# Patient Record
Sex: MALE | Race: WHITE | NOT HISPANIC OR LATINO | Employment: OTHER | URBAN - METROPOLITAN AREA
[De-identification: names, ages, dates, MRNs, and addresses within clinical notes are randomized per-mention and may not be internally consistent; named-entity substitution may affect disease eponyms.]

---

## 2017-01-05 RX ORDER — IRBESARTAN 75 MG/1
TABLET ORAL
Qty: 30 TABLET | Refills: 6 | Status: SHIPPED | OUTPATIENT
Start: 2017-01-05 | End: 2017-04-25 | Stop reason: SDUPTHER

## 2017-01-12 ENCOUNTER — DOCUMENTATION ONLY (OUTPATIENT)
Dept: CARDIAC REHAB | Facility: CLINIC | Age: 82
End: 2017-01-12

## 2017-01-12 NOTE — PROGRESS NOTES
Cardiac rehab order received on 12/14/21016. Epic letter sent to patient on 12/15/2016. On 12/22/2016 spoke to patient and stated he will be out of town for a while and stated he is not able to come to rehab on Mondays. As of 1/12/2017, pt has not contacted cardiac rehab office. Epic order removed.

## 2017-01-20 RX ORDER — INSULIN ASPART 100 [IU]/ML
16 INJECTION, SOLUTION INTRAVENOUS; SUBCUTANEOUS
Qty: 15 ML | Refills: 3 | Status: SHIPPED | OUTPATIENT
Start: 2017-01-20 | End: 2017-06-06 | Stop reason: SDUPTHER

## 2017-01-20 RX ORDER — INSULIN ASPART 100 [IU]/ML
16 INJECTION, SOLUTION INTRAVENOUS; SUBCUTANEOUS
Qty: 15 ML | Refills: 3 | Status: SHIPPED | OUTPATIENT
Start: 2017-01-20 | End: 2017-01-20 | Stop reason: SDUPTHER

## 2017-01-20 NOTE — TELEPHONE ENCOUNTER
----- Message from Jaime Maddox sent at 1/20/2017  9:49 AM CST -----  Contact: Self/ [phone]  Type: Rx    Name of medication(s): insulin aspart (NOVOLOG FLEXPEN) 100 unit/mL InPn pen    Is this a refill? New rx?refill    Who prescribed medication?Dr. Martinez    Pharmacy Name, Phone, & Location:[facility] on file    Comments:Patient is calling to request a refill on medication. He states he is completely out of the medication. Please call and advise.    Thank you!

## 2017-01-25 ENCOUNTER — OFFICE VISIT (OUTPATIENT)
Dept: CARDIOLOGY | Facility: CLINIC | Age: 82
End: 2017-01-25
Payer: MEDICARE

## 2017-01-25 VITALS
HEART RATE: 80 BPM | HEIGHT: 67 IN | DIASTOLIC BLOOD PRESSURE: 58 MMHG | WEIGHT: 216.5 LBS | SYSTOLIC BLOOD PRESSURE: 131 MMHG | BODY MASS INDEX: 33.98 KG/M2

## 2017-01-25 DIAGNOSIS — I25.10 CORONARY ARTERY DISEASE INVOLVING NATIVE CORONARY ARTERY OF NATIVE HEART WITHOUT ANGINA PECTORIS: Primary | ICD-10-CM

## 2017-01-25 DIAGNOSIS — I10 ESSENTIAL HYPERTENSION: ICD-10-CM

## 2017-01-25 DIAGNOSIS — N18.30 KIDNEY DISEASE, CHRONIC, STAGE III (GFR 30-59 ML/MIN): ICD-10-CM

## 2017-01-25 DIAGNOSIS — I21.4 NSTEMI (NON-ST ELEVATED MYOCARDIAL INFARCTION): ICD-10-CM

## 2017-01-25 DIAGNOSIS — I25.10 CORONARY ARTERY DISEASE, ANGINA PRESENCE UNSPECIFIED, UNSPECIFIED VESSEL OR LESION TYPE, UNSPECIFIED WHETHER NATIVE OR TRANSPLANTED HEART: ICD-10-CM

## 2017-01-25 PROCEDURE — 3078F DIAST BP <80 MM HG: CPT | Mod: S$GLB,,, | Performed by: INTERNAL MEDICINE

## 2017-01-25 PROCEDURE — 99499 UNLISTED E&M SERVICE: CPT | Mod: S$GLB,,, | Performed by: INTERNAL MEDICINE

## 2017-01-25 PROCEDURE — 99999 PR PBB SHADOW E&M-EST. PATIENT-LVL IV: CPT | Mod: PBBFAC,,, | Performed by: INTERNAL MEDICINE

## 2017-01-25 PROCEDURE — 1157F ADVNC CARE PLAN IN RCRD: CPT | Mod: S$GLB,,, | Performed by: INTERNAL MEDICINE

## 2017-01-25 PROCEDURE — 3075F SYST BP GE 130 - 139MM HG: CPT | Mod: S$GLB,,, | Performed by: INTERNAL MEDICINE

## 2017-01-25 PROCEDURE — 99214 OFFICE O/P EST MOD 30 MIN: CPT | Mod: S$GLB,,, | Performed by: INTERNAL MEDICINE

## 2017-01-25 PROCEDURE — 1126F AMNT PAIN NOTED NONE PRSNT: CPT | Mod: S$GLB,,, | Performed by: INTERNAL MEDICINE

## 2017-01-25 PROCEDURE — 1159F MED LIST DOCD IN RCRD: CPT | Mod: S$GLB,,, | Performed by: INTERNAL MEDICINE

## 2017-01-25 PROCEDURE — 1160F RVW MEDS BY RX/DR IN RCRD: CPT | Mod: S$GLB,,, | Performed by: INTERNAL MEDICINE

## 2017-01-25 NOTE — LETTER
January 25, 2017      Gavin aMrtinez MD  [address]  [place] [address]           [facility]  [address]  [place] [address]  Phone: [phone]          Patient: Anjum Cai Jr.   MR Number: [id number]   YOB: 1935   Date of Visit: 1/25/2017       Dear Dr. Gavin Martinez:    Thank you for referring Anjum Cai to me for evaluation. Attached you will find relevant portions of my assessment and plan of care.    If you have questions, please do not hesitate to call me. I look forward to following Anjum Cai along with you.    Sincerely,    Louie Shin MD    Enclosure  CC:  No Recipients    If you would like to receive this communication electronically, please contact [email or web address] or [phone] to request more information on [employer] access.    For providers and/or their staff who would like to refer a patient to [facility], please contact us through our one-stop-shop provider referral line, [facility], at [phone].    If you feel you have received this communication in error or would no longer like to receive these types of communications, please e-mail [email or web address]

## 2017-01-25 NOTE — PROGRESS NOTES
Subjective:    Patient ID:  Anjum Cai Jr. is a 81 y.o. male who presents for follow-up of NSTEMI (non-ST elevated myocardial infarction) (hospital discharge 12/10/16)      HPI  Mr. Anjum Cai Jr. is a 80 y.o. male with hx of CAD s/p PCI to LAD and RCA x 2 (2007), HTN, HLD, DM2, DAMIAN and CKD3 who presented 12/9/16 to [facility] ED with an episode of chest pain. He was taken to Cath Lab 12/9/16. Ostial OM2/D1 lesions noted in LHC. No intervention due to small size of vessels. He has mild SOB for a short time while in [place] but has resolved.[ticagrelor high altitude?]     Lab Results   Component Value Date     12/09/2016    K 3.8 12/09/2016     12/09/2016    CO2 24 12/09/2016    BUN 21 12/09/2016    CREATININE 1.5 (H) 12/09/2016     (H) 12/09/2016    HGBA1C 8.2 (H) 12/09/2016    MG 1.6 12/09/2016    AST 17 12/09/2016    ALT 21 12/09/2016    ALBUMIN 3.4 (L) 12/09/2016    PROT 7.3 12/09/2016    BILITOT 0.3 12/09/2016    WBC 7.29 12/09/2016    HGB 11.0 (L) 12/09/2016    HCT 33.6 (L) 12/09/2016    MCV 87 12/09/2016     12/09/2016    INR 1.0 12/09/2016    PSA 10.06 (H) 02/26/2013    TSH 1.336 10/16/2013         Lab Results   Component Value Date    CHOL 123 08/10/2016    HDL 44 08/10/2016    TRIG 62 08/10/2016       Lab Results   Component Value Date    LDLCALC 66.6 08/10/2016     Past Medical History   Diagnosis Date    Arthritis     BPH (benign prostatic hyperplasia)     Cataract      right     Colon polyp 11/18/2015    Colon polyps     Coronary artery disease     DR (diabetic retinopathy)     Dyslipidemia     Elevated PSA     Goiter, nontoxic, multinodular     Headaches, cluster     Hypertension     Kidney stones     Lump in the testicle      Left    Rotator cuff tendinitis      Right shoulder    Sleep apnea with use of continuous positive airway pressure (CPAP)      uses cpap at night    Type II or unspecified type diabetes mellitus with other specified  "manifestations, uncontrolled      Current Outpatient Prescriptions   Medication Sig    ASPIRIN (ASPIR-81 ORAL) Take 1 tablet by mouth Daily.    atorvastatin (LIPITOR) 40 MG tablet Take 1 tablet (40 mg total) by mouth once daily.    BD INSULIN SYRINGE ULTRA-FINE 1 mL 30 x 1/2" Syrg USE AS DIRECTED FOUR TIMES DAILY    blood sugar diagnostic, drum (ACCU-CHEK COMPACT TEST) Strp CHECK BLOOD GLUCOSE 4 TIMES DAILY    clotrimazole (LOTRIMIN) 1 % Soln Apply topically once daily. Apply to filed toenails daily    ergocalciferol (ERGOCALCIFEROL) 50,000 unit Cap TAKE ONE CAPSULE BY MOUTH EVERY 30 DAYS.    finasteride (PROSCAR) 5 mg tablet Take one tablet by mouth one time daily    insulin aspart (NOVOLOG FLEXPEN) 100 unit/mL InPn pen Inject 16 Units into the skin 3 (three) times daily with meals.    irbesartan (AVAPRO) 75 MG tablet TAKE ONE TABLET BY MOUTH ONE TIME DAILY     LANTUS SOLOSTAR 100 unit/mL (3 mL) InPn pen Inject 65 units into the skin every evening    metformin (GLUCOPHAGE) 1000 MG tablet TAKE 1 TABLET BY MOUTH TWICE DAILY WITH FOOD    metoprolol succinate (TOPROL-XL) 25 MG 24 hr tablet Take 1 tablet (25 mg total) by mouth once daily.    MULTIVITAMIN W-MINERALS/LUTEIN (CENTRUM SILVER ORAL) Take by mouth once daily.    omega-3 fatty acids-vitamin E (FISH OIL) 1,000 mg Cap Take 3 capsules by mouth Daily.    omeprazole (PRILOSEC) 20 MG capsule TAKE ONE CAPSULE BY MOUTH TWICE DAILY BEFORE MEALS    pen needle, diabetic (BD INSULIN PEN NEEDLE UF MINI) 31 gauge x 3/16" Ndle Give insulin injection 4 x a day.    ticagrelor (BRILINTA) 90 mg tablet Take 1 tablet (90 mg total) by mouth 2 (two) times daily.     No current facility-administered medications for this visit.      Review of Systems   Constitution: Negative for decreased appetite, diaphoresis, fever, weakness, malaise/fatigue, weight gain and weight loss.   HENT: Negative for congestion, ear discharge, ear pain, headaches and nosebleeds.    Eyes: " "Negative for blurred vision, double vision and visual disturbance.   Cardiovascular: Negative for chest pain, claudication, cyanosis, dyspnea on exertion, irregular heartbeat, leg swelling, near-syncope, orthopnea, palpitations, paroxysmal nocturnal dyspnea and syncope.   Respiratory: Negative for cough, hemoptysis, shortness of breath, sleep disturbances due to breathing, snoring, sputum production and wheezing.    Endocrine: Negative for polydipsia, polyphagia and polyuria.   Hematologic/Lymphatic: Negative for adenopathy and bleeding problem. Does not bruise/bleed easily.   Skin: Negative for color change, nail changes, poor wound healing and rash.   Musculoskeletal: Negative for muscle cramps and muscle weakness.   Gastrointestinal: Negative for abdominal pain, anorexia, change in bowel habit, hematochezia, nausea and vomiting.   Genitourinary: Negative for dysuria, frequency and hematuria.   Neurological: Negative for brief paralysis, difficulty with concentration, excessive daytime sleepiness, dizziness, focal weakness, light-headedness, seizures and vertigo.   Psychiatric/Behavioral: Negative for altered mental status and depression.   Allergic/Immunologic: Negative for persistent infections.        Objective:  Visit Vitals    BP (!) 131/58 (BP Location: Left arm, Patient Position: Sitting, BP Method: Automatic)    Pulse 80    Ht 5' 6.5" (1.689 m)    Wt 98.2 kg (216 lb 7.9 oz)    BMI 34.42 kg/m2       Physical Exam   Constitutional: He is oriented to person, place, and time. He appears well-developed and well-nourished.   HENT:   Head: Normocephalic.   Right Ear: External ear normal.   Left Ear: External ear normal.   Nose: Nose normal.   Inspection of lips, teeth and gums normal   Eyes: EOM are normal. Pupils are equal, round, and reactive to light. No scleral icterus.   Neck: Normal range of motion. Neck supple. No JVD present. No tracheal deviation present. No thyromegaly present.   Cardiovascular: " Normal rate, regular rhythm and intact distal pulses.  Exam reveals no gallop and no friction rub.    No murmur heard.  Pulses:       Dorsalis pedis pulses are 2+ on the right side, and 2+ on the left side.        Posterior tibial pulses are 2+ on the right side, and 2+ on the left side.   Pulmonary/Chest: Effort normal and breath sounds normal.   Abdominal: Bowel sounds are normal. He exhibits no distension. There is no hepatosplenomegaly. There is no tenderness. There is no guarding.   Musculoskeletal: Normal range of motion. He exhibits edema (plus 1). He exhibits no tenderness.   Lymphadenopathy:   Palpation of neck and groin lymph nodes normal   Neurological: He is alert and oriented to person, place, and time. No cranial nerve deficit. He exhibits normal muscle tone. Coordination normal.   Skin: Skin is dry.   Palpation of skin normal   Psychiatric: His behavior is normal. Judgment and thought content normal.         Assessment:       1. Coronary artery disease involving native coronary artery of native heart without angina pectoris    2. Essential hypertension    3. Coronary artery disease, angina presence unspecified, unspecified vessel or lesion type, unspecified whether native or transplanted heart    4. Kidney disease, chronic, stage III (GFR 30-59 ml/min)    5. NSTEMI (non-ST elevated myocardial infarction)         Plan:     Anjum was seen today for nstemi (non-st elevated myocardial infarction).    Diagnoses and all orders for this visit:    Coronary artery disease involving native coronary artery of native heart without angina pectoris  -     Cardiac Rehab Phase II; Future    Essential hypertension    Coronary artery disease, angina presence unspecified, unspecified vessel or lesion type, unspecified whether native or transplanted heart    Kidney disease, chronic, stage III (GFR 30-59 ml/min)    NSTEMI (non-ST elevated myocardial infarction)  -     Cardiac Rehab Phase II; Future

## 2017-01-25 NOTE — MR AVS SNAPSHOT
[facility]  [address]  [facility] [address]  Phone: [phone]                  Anjum Cai    2017 8:30 AM   Office Visit    Description:  Male : 1935   Provider:  Louie Shin MD   Department:  [facility]           Reason for Visit     NSTEMI (non-ST elevated myocardial infarction)           Diagnoses this Visit        Comments    Coronary artery disease involving native coronary artery of native heart without angina pectoris    -  Primary     Essential hypertension         Coronary artery disease, angina presence unspecified, unspecified vessel or lesion type, unspecified whether native or transplanted heart         Kidney disease, chronic, stage III (GFR 30-59 ml/min)         NSTEMI (non-ST elevated myocardial infarction)                To Do List           Future Appointments        Provider Department Dept Phone    2/3/2017 9:30 AM [facility] - Podiatry [phone]      Goals (5 Years of Data)     None      Follow-Up and Disposition     Return in about 3 months (around 2017), or if symptoms worsen or fail to improve.      [facility] On [facility]     [facility] On Call Nurse Care Line -  Assistance  Registered nurses in the [facility] On Call Center provide clinical advisement, health education, appointment booking, and other advisory services.  Call for this free service at [phone].             Medications           Message regarding Medications     Verify the changes and/or additions to your medication regime listed below are the same as discussed with your clinician today.  If any of these changes or additions are incorrect, please notify your healthcare provider.        STOP taking these medications     cyanocobalamin, vitamin B-12, 1,000 mcg/15 mL Liqd Take by mouth. 1 daily           Verify that the below list of medications is an accurate representation of the medications you are currently taking.  If none  "reported, the list may be blank. If incorrect, please contact your healthcare provider. Carry this list with you in case of emergency.           Current Medications     ASPIRIN (ASPIR-81 ORAL) Take 1 tablet by mouth Daily.    atorvastatin (LIPITOR) 40 MG tablet Take 1 tablet (40 mg total) by mouth once daily.    BD INSULIN SYRINGE ULTRA-FINE 1 mL 30 x 1/2" Syrg USE AS DIRECTED FOUR TIMES DAILY    blood sugar diagnostic, drum (ACCU-CHEK COMPACT TEST) Strp CHECK BLOOD GLUCOSE 4 TIMES DAILY    clotrimazole (LOTRIMIN) 1 % Soln Apply topically once daily. Apply to filed toenails daily    ergocalciferol (ERGOCALCIFEROL) 50,000 unit Cap TAKE ONE CAPSULE BY MOUTH EVERY 30 DAYS.    finasteride (PROSCAR) 5 mg tablet Take one tablet by mouth one time daily    insulin aspart (NOVOLOG FLEXPEN) 100 unit/mL InPn pen Inject 16 Units into the skin 3 (three) times daily with meals.    irbesartan (AVAPRO) 75 MG tablet TAKE ONE TABLET BY MOUTH ONE TIME DAILY     LANTUS SOLOSTAR 100 unit/mL (3 mL) InPn pen Inject 65 units into the skin every evening    metformin (GLUCOPHAGE) 1000 MG tablet TAKE 1 TABLET BY MOUTH TWICE DAILY WITH FOOD    metoprolol succinate (TOPROL-XL) 25 MG 24 hr tablet Take 1 tablet (25 mg total) by mouth once daily.    MULTIVITAMIN W-MINERALS/LUTEIN (CENTRUM SILVER ORAL) Take by mouth once daily.    omega-3 fatty acids-vitamin E (FISH OIL) 1,000 mg Cap Take 3 capsules by mouth Daily.    omeprazole (PRILOSEC) 20 MG capsule TAKE ONE CAPSULE BY MOUTH TWICE DAILY BEFORE MEALS    pen needle, diabetic (BD INSULIN PEN NEEDLE UF MINI) 31 gauge x 3/16" Ndle Give insulin injection 4 x a day.    ticagrelor (BRILINTA) 90 mg tablet Take 1 tablet (90 mg total) by mouth 2 (two) times daily.           Clinical Reference Information           Vital Signs - Last Recorded  Most recent update: 1/25/2017  8:46 AM by Silvana Hewitt MA    BP Pulse Ht Wt BMI    (!) 131/58 (BP Location: Left arm, Patient Position: Sitting, BP Method: " "Automatic) 80 5' 6.5" (1.689 m) 98.2 kg (216 lb 7.9 oz) 34.42 kg/m2      Blood Pressure          Most Recent Value    Right Arm BP - Sitting  129/59    Left Arm BP - Sitting  131/58    BP  (!)  131/58      Allergies as of 1/25/2017     Ace Inhibitors    Cefadroxil      Immunizations Administered on Date of Encounter - 1/25/2017     None      Orders Placed During Today's Visit     Future Labs/Procedures Expected by Expires    Cardiac Rehab Phase II  As directed 1/25/2018      Instructions    Cardiac rehab       "

## 2017-02-03 ENCOUNTER — OFFICE VISIT (OUTPATIENT)
Dept: PODIATRY | Facility: CLINIC | Age: 82
End: 2017-02-03
Payer: MEDICARE

## 2017-02-03 VITALS
HEART RATE: 72 BPM | HEIGHT: 67 IN | DIASTOLIC BLOOD PRESSURE: 58 MMHG | BODY MASS INDEX: 34.11 KG/M2 | SYSTOLIC BLOOD PRESSURE: 128 MMHG | WEIGHT: 217.31 LBS

## 2017-02-03 DIAGNOSIS — E11.42 DIABETIC POLYNEUROPATHY ASSOCIATED WITH TYPE 2 DIABETES MELLITUS: Primary | ICD-10-CM

## 2017-02-03 DIAGNOSIS — I25.10 CORONARY ATHEROSCLEROSIS OF UNSPECIFIED TYPE OF VESSEL, NATIVE OR GRAFT: ICD-10-CM

## 2017-02-03 DIAGNOSIS — M62.469 GASTROCNEMIUS EQUINUS, UNSPECIFIED LATERALITY: ICD-10-CM

## 2017-02-03 DIAGNOSIS — I87.8 VENOUS STASIS: ICD-10-CM

## 2017-02-03 DIAGNOSIS — I21.4 NON-ST ELEVATION MYOCARDIAL INFARCTION (NSTEMI): Primary | ICD-10-CM

## 2017-02-03 DIAGNOSIS — B35.1 ONYCHOMYCOSIS DUE TO DERMATOPHYTE: ICD-10-CM

## 2017-02-03 DIAGNOSIS — L84 CORN OR CALLUS: ICD-10-CM

## 2017-02-03 DIAGNOSIS — E11.9 DIABETES MELLITUS WITHOUT COMPLICATION: ICD-10-CM

## 2017-02-03 DIAGNOSIS — M21.41 PES PLANUS OF BOTH FEET: ICD-10-CM

## 2017-02-03 DIAGNOSIS — M21.42 PES PLANUS OF BOTH FEET: ICD-10-CM

## 2017-02-03 PROCEDURE — 1159F MED LIST DOCD IN RCRD: CPT | Mod: S$GLB,,, | Performed by: PODIATRIST

## 2017-02-03 PROCEDURE — 3078F DIAST BP <80 MM HG: CPT | Mod: S$GLB,,, | Performed by: PODIATRIST

## 2017-02-03 PROCEDURE — 99212 OFFICE O/P EST SF 10 MIN: CPT | Mod: 25,S$GLB,, | Performed by: PODIATRIST

## 2017-02-03 PROCEDURE — 1160F RVW MEDS BY RX/DR IN RCRD: CPT | Mod: S$GLB,,, | Performed by: PODIATRIST

## 2017-02-03 PROCEDURE — 1126F AMNT PAIN NOTED NONE PRSNT: CPT | Mod: S$GLB,,, | Performed by: PODIATRIST

## 2017-02-03 PROCEDURE — 1157F ADVNC CARE PLAN IN RCRD: CPT | Mod: S$GLB,,, | Performed by: PODIATRIST

## 2017-02-03 PROCEDURE — 99499 UNLISTED E&M SERVICE: CPT | Mod: S$GLB,,, | Performed by: PODIATRIST

## 2017-02-03 PROCEDURE — 99999 PR PBB SHADOW E&M-EST. PATIENT-LVL III: CPT | Mod: PBBFAC,,, | Performed by: PODIATRIST

## 2017-02-03 PROCEDURE — 11721 DEBRIDE NAIL 6 OR MORE: CPT | Mod: Q9,S$GLB,, | Performed by: PODIATRIST

## 2017-02-03 PROCEDURE — 3074F SYST BP LT 130 MM HG: CPT | Mod: S$GLB,,, | Performed by: PODIATRIST

## 2017-02-03 NOTE — PROGRESS NOTES
Subjective:      Patient ID: Anjum Cai Jr. is a 81 y.o. male.    Chief Complaint: Diabetes Mellitus (PCP Dr. Martinez 8/18/16); Diabetic Foot Exam; Routine Foot Care; and Peripheral Neuropathy    Anjum is a 81 y.o. male who presents to the clinic upon referral from Dr. Kristen us. provider found  for evaluation and treatment of diabetic feet. Anjum has a past medical history of Arthritis; BPH (benign prostatic hyperplasia); Cataract; Colon polyp (11/18/2015); Colon polyps; Coronary artery disease; DR (diabetic retinopathy); Dyslipidemia; Elevated PSA; Goiter, nontoxic, multinodular; Headaches, cluster; Hypertension; Kidney stones; Lump in the testicle; Rotator cuff tendinitis; Sleep apnea with use of continuous positive airway pressure (CPAP); and Type II or unspecified type diabetes mellitus with other specified manifestations, uncontrolled. Patient relates no major problem with feet. Only complaints today consist of tingling, numbness and occasional forefoot pain. Also complains of fungal toenails. He is using lotrimin with noted improvement.    PCP: Gavin Martinez MD    1/25/17, cardiology    Current shoe gear: Tennis shoes    Hemoglobin A1C   Date Value Ref Range Status   12/09/2016 8.2 (H) 4.5 - 6.2 % Final     Comment:     According to ADA guidelines, hemoglobin A1C <7.0% represents  optimal control in non-pregnant diabetic patients.  Different  metrics may apply to specific populations.   Standards of Medical Care in Diabetes - 2016.  For the purpose of screening for the presence of diabetes:  <5.7%     Consistent with the absence of diabetes  5.7-6.4%  Consistent with increasing risk for diabetes   (prediabetes)  >or=6.5%  Consistent with diabetes  Currently no consensus exists for use of hemoglobin A1C  for diagnosis of diabetes for children.     09/29/2016 7.6 (H) 4.5 - 6.2 % Final     Comment:     According to ADA guidelines, hemoglobin A1C <7.0% represents  optimal control in non-pregnant diabetic  patients.  Different  metrics may apply to specific populations.   Standards of Medical Care in Diabetes - 2016.  For the purpose of screening for the presence of diabetes:  <5.7%     Consistent with the absence of diabetes  5.7-6.4%  Consistent with increasing risk for diabetes   (prediabetes)  >or=6.5%  Consistent with diabetes  Currently no consensus exists for use of hemoglobin A1C  for diagnosis of diabetes for children.     08/10/2016 7.8 (H) 4.5 - 6.2 % Final     Comment:     According to ADA guidelines, hemoglobin A1C <7.0% represents  optimal control in non-pregnant diabetic patients.  Different  metrics may apply to specific populations.   Standards of Medical Care in Diabetes - 2016.  For the purpose of screening for the presence of diabetes:  <5.7%     Consistent with the absence of diabetes  5.7-6.4%  Consistent with increasing risk for diabetes   (prediabetes)  >or=6.5%  Consistent with diabetes  Currently no consensus exists for use of hemoglobin A1C  for diagnosis of diabetes for children.               Review of Systems   Constitution: Negative for chills, fever and malaise/fatigue.   Cardiovascular: Negative for chest pain, leg swelling, orthopnea and palpitations.   Respiratory: Negative for cough, shortness of breath and wheezing.    Skin: Positive for color change, dry skin and nail changes. Negative for itching, poor wound healing and rash.   Musculoskeletal: Negative for arthritis, gout, joint pain, joint swelling, muscle weakness and myalgias.   Neurological: Positive for numbness, paresthesias and sensory change. Negative for disturbances in coordination, dizziness, focal weakness and tremors.           Objective:      Physical Exam   Cardiovascular:   Dorsalis pedis and posterior tibial pulses are diminished Bilaterally. Toes are cool to touch. Feet are warm proximally.There is decreased digital hair. Skin is atrophic, slightly hyperpigmented, and mildly edematous       Musculoskeletal:    Musculoskeletal:  Muscle strength is 5/5 in all groups bilaterally.  Metatarsophalangeal and subtalar range of motion are within normal limits without crepitus bilaterally. There is limitation of ankle dorsiflexion with knees extended and flexed Bilaterally. Medial arch collapse in stance with mild heel valgus.       Neurological:   Mon[place]-Jose Antonio 5.07 monofilamant testing is diminished both feet. Sharp/dull sensation diminished Bilaterally. Light touch absent Bilaterally.       Skin:   Toenails 1-5 bilaterally are elongated by 2-3 mm, thickened by 2-3 mm, discolored/yellowed, dystrophic, brittle with subungual debris. No incurvation. Mild xerosis noted, bilat. No open wounds.                 Assessment:       Encounter Diagnoses   Name Primary?    Diabetic polyneuropathy associated with type 2 diabetes mellitus Yes    Gastrocnemius equinus, unspecified laterality     Pes planus of both feet     Onychomycosis due to dermatophyte     Venous stasis     Corn or callus          Plan:       Anjum was seen today for diabetes mellitus, diabetic foot exam, routine foot care and peripheral neuropathy.    Diagnoses and all orders for this visit:    Diabetic polyneuropathy associated with type 2 diabetes mellitus    Gastrocnemius equinus, unspecified laterality    Pes planus of both feet    Onychomycosis due to dermatophyte    Venous stasis    Corn or callus      I counseled the patient on his conditions, their implications and medical management.    Shoe inspection. Diabetic Foot Education. Patient reminded of the importance of good nutrition and blood sugar control to help prevent podiatric complications of diabetes. Patient instructed on proper foot hygeine. We discussed wearing proper shoe gear, daily foot inspections, never walking without protective shoe gear, never putting sharp instruments to feet    - With patient's permission, nails were aggressively reduced and debrided x 10 to their soft tissue attachment  mechanically, removing all offending nail and debris. Patient relates relief following the procedure. She will continue to monitor the areas daily, inspect her feet, wear protective shoe gear when ambulatory, moisturizer to maintain skin integrity and follow in this office in approximately 2-3 months, sooner p.r.n.    - Continue lotrimin solution to nails daily.

## 2017-02-06 RX ORDER — OMEPRAZOLE 20 MG/1
20 CAPSULE, DELAYED RELEASE ORAL
Qty: 60 CAPSULE | Refills: 5 | Status: SHIPPED | OUTPATIENT
Start: 2017-02-06 | End: 2017-08-03 | Stop reason: SDUPTHER

## 2017-02-15 ENCOUNTER — TELEPHONE (OUTPATIENT)
Dept: CARDIAC REHAB | Facility: CLINIC | Age: 82
End: 2017-02-15

## 2017-02-16 ENCOUNTER — TELEPHONE (OUTPATIENT)
Dept: CARDIAC REHAB | Facility: CLINIC | Age: 82
End: 2017-02-16

## 2017-02-22 ENCOUNTER — PATIENT MESSAGE (OUTPATIENT)
Dept: ENDOSCOPY | Facility: HOSPITAL | Age: 82
End: 2017-02-22

## 2017-03-06 RX ORDER — ATORVASTATIN CALCIUM 40 MG/1
TABLET, FILM COATED ORAL
Qty: 30 TABLET | Refills: 0 | Status: SHIPPED | OUTPATIENT
Start: 2017-03-06 | End: 2017-04-05 | Stop reason: SDUPTHER

## 2017-03-06 RX ORDER — ERGOCALCIFEROL 1.25 MG/1
CAPSULE ORAL
Qty: 3 CAPSULE | Refills: 2 | Status: SHIPPED | OUTPATIENT
Start: 2017-03-06 | End: 2017-03-07 | Stop reason: SDUPTHER

## 2017-03-07 RX ORDER — ERGOCALCIFEROL 1.25 MG/1
CAPSULE ORAL
Qty: 3 CAPSULE | Refills: 2 | Status: SHIPPED | OUTPATIENT
Start: 2017-03-07 | End: 2017-11-08 | Stop reason: SDUPTHER

## 2017-04-05 RX ORDER — ATORVASTATIN CALCIUM 40 MG/1
TABLET, FILM COATED ORAL
Qty: 30 TABLET | Refills: 0 | Status: CANCELLED | OUTPATIENT
Start: 2017-04-05

## 2017-04-05 RX ORDER — ATORVASTATIN CALCIUM 40 MG/1
40 TABLET, FILM COATED ORAL DAILY
Qty: 30 TABLET | Refills: 6 | Status: SHIPPED | OUTPATIENT
Start: 2017-04-05 | End: 2017-10-23 | Stop reason: SDUPTHER

## 2017-04-13 DIAGNOSIS — E11.9 DIABETES MELLITUS WITHOUT COMPLICATION: ICD-10-CM

## 2017-04-25 ENCOUNTER — OFFICE VISIT (OUTPATIENT)
Dept: CARDIOLOGY | Facility: CLINIC | Age: 82
End: 2017-04-25
Payer: MEDICARE

## 2017-04-25 VITALS
WEIGHT: 214.06 LBS | DIASTOLIC BLOOD PRESSURE: 57 MMHG | HEART RATE: 78 BPM | SYSTOLIC BLOOD PRESSURE: 126 MMHG | BODY MASS INDEX: 33.6 KG/M2 | HEIGHT: 67 IN

## 2017-04-25 DIAGNOSIS — N18.30 KIDNEY DISEASE, CHRONIC, STAGE III (GFR 30-59 ML/MIN): ICD-10-CM

## 2017-04-25 DIAGNOSIS — I10 ESSENTIAL HYPERTENSION: ICD-10-CM

## 2017-04-25 DIAGNOSIS — I25.118 CORONARY ARTERY DISEASE OF NATIVE ARTERY OF NATIVE HEART WITH STABLE ANGINA PECTORIS: ICD-10-CM

## 2017-04-25 DIAGNOSIS — E78.00 PURE HYPERCHOLESTEROLEMIA: ICD-10-CM

## 2017-04-25 DIAGNOSIS — N18.30 DIABETES MELLITUS DUE TO UNDERLYING CONDITION WITH STAGE 3 CHRONIC KIDNEY DISEASE, WITHOUT LONG-TERM CURRENT USE OF INSULIN: ICD-10-CM

## 2017-04-25 DIAGNOSIS — E08.22 DIABETES MELLITUS DUE TO UNDERLYING CONDITION WITH STAGE 3 CHRONIC KIDNEY DISEASE, WITHOUT LONG-TERM CURRENT USE OF INSULIN: ICD-10-CM

## 2017-04-25 DIAGNOSIS — I20.89 ANGINA EFFORT: Primary | ICD-10-CM

## 2017-04-25 PROCEDURE — 99214 OFFICE O/P EST MOD 30 MIN: CPT | Mod: S$GLB,,, | Performed by: INTERNAL MEDICINE

## 2017-04-25 PROCEDURE — 3074F SYST BP LT 130 MM HG: CPT | Mod: S$GLB,,, | Performed by: INTERNAL MEDICINE

## 2017-04-25 PROCEDURE — 3078F DIAST BP <80 MM HG: CPT | Mod: S$GLB,,, | Performed by: INTERNAL MEDICINE

## 2017-04-25 PROCEDURE — 1160F RVW MEDS BY RX/DR IN RCRD: CPT | Mod: S$GLB,,, | Performed by: INTERNAL MEDICINE

## 2017-04-25 PROCEDURE — 1126F AMNT PAIN NOTED NONE PRSNT: CPT | Mod: S$GLB,,, | Performed by: INTERNAL MEDICINE

## 2017-04-25 PROCEDURE — 99999 PR PBB SHADOW E&M-EST. PATIENT-LVL III: CPT | Mod: PBBFAC,,, | Performed by: INTERNAL MEDICINE

## 2017-04-25 PROCEDURE — 99499 UNLISTED E&M SERVICE: CPT | Mod: S$GLB,,, | Performed by: INTERNAL MEDICINE

## 2017-04-25 PROCEDURE — 1159F MED LIST DOCD IN RCRD: CPT | Mod: S$GLB,,, | Performed by: INTERNAL MEDICINE

## 2017-04-25 RX ORDER — METOPROLOL SUCCINATE 50 MG/1
50 TABLET, EXTENDED RELEASE ORAL DAILY
Qty: 90 TABLET | Refills: 3 | Status: SHIPPED | OUTPATIENT
Start: 2017-04-25 | End: 2018-04-16 | Stop reason: SDUPTHER

## 2017-04-25 RX ORDER — IRBESARTAN 75 MG/1
75 TABLET ORAL DAILY
Qty: 30 TABLET | Refills: 6 | Status: SHIPPED | OUTPATIENT
Start: 2017-04-25 | End: 2017-11-27 | Stop reason: SDUPTHER

## 2017-04-25 RX ORDER — NITROGLYCERIN 0.4 MG/1
0.4 TABLET SUBLINGUAL EVERY 5 MIN PRN
Qty: 60 TABLET | Refills: 12 | Status: SHIPPED | OUTPATIENT
Start: 2017-04-25 | End: 2018-11-26 | Stop reason: SDUPTHER

## 2017-04-25 NOTE — PROGRESS NOTES
Subjective:    Patient ID:  Anjum Cai Jr. is a 81 y.o. male who presents for follow-up of Coronary Artery Disease (3 months fu)      HPI  Mr. Anjum Cai Jr. is a 81 y.o. male with hx of CAD s/p PCI to LAD and RCA x 2 (2007), HTN, HLD, DM2, DAMIAN and CKD3 who presented 12/9/16 to [facility] with an episode of chest pain. He was taken to Cath Lab 12/9/16. Ostial OM2/D1 lesions noted in LHC. No intervention due to small size of vessels. He has occasional mild chest tightness with  exercise, mowing lawn etc.    Past Medical History:   Diagnosis Date    Arthritis     BPH (benign prostatic hyperplasia)     Cataract     right     Colon polyp 11/18/2015    Colon polyps     Coronary artery disease     DR (diabetic retinopathy)     Dyslipidemia     Elevated PSA     Goiter, nontoxic, multinodular     Headaches, cluster     Hypertension     Kidney stones     Lump in the testicle     Left    Rotator cuff tendinitis     Right shoulder    Sleep apnea with use of continuous positive airway pressure (CPAP)     uses cpap at night    Type II or unspecified type diabetes mellitus with other specified manifestations, uncontrolled                 Review of Systems   Constitution: Negative for decreased appetite, diaphoresis, fever, weakness, malaise/fatigue, weight gain and weight loss.   HENT: Negative for congestion, ear discharge, ear pain, headaches and nosebleeds.    Eyes: Negative for blurred vision, double vision and visual disturbance.   Cardiovascular: Negative for chest pain, claudication, cyanosis, dyspnea on exertion, irregular heartbeat, leg swelling, near-syncope, orthopnea, palpitations, paroxysmal nocturnal dyspnea and syncope.   Respiratory: Negative for cough, hemoptysis, shortness of breath, sleep disturbances due to breathing, snoring, sputum production and wheezing.    Endocrine: Negative for polydipsia, polyphagia and polyuria.   Hematologic/Lymphatic: Negative for adenopathy and bleeding  "problem. Does not bruise/bleed easily.   Skin: Negative for color change, nail changes, poor wound healing and rash.   Musculoskeletal: Negative for muscle cramps and muscle weakness.   Gastrointestinal: Negative for abdominal pain, anorexia, change in bowel habit, hematochezia, nausea and vomiting.   Genitourinary: Negative for dysuria, frequency and hematuria.   Neurological: Negative for brief paralysis, difficulty with concentration, excessive daytime sleepiness, dizziness, focal weakness, light-headedness, seizures and vertigo.   Psychiatric/Behavioral: Negative for altered mental status and depression.   Allergic/Immunologic: Negative for persistent infections.        Objective:BP (!) 126/57 (BP Location: Left arm, Patient Position: Sitting, BP Method: Automatic)  Pulse 78  Ht 5' 6.5" (1.689 m)  Wt 97.1 kg (214 lb 1.1 oz)  BMI 34.03 kg/m2    Physical Exam   Constitutional: He is oriented to person, place, and time. He appears well-developed and well-nourished.   obese   HENT:   Head: Normocephalic.   Right Ear: External ear normal.   Left Ear: External ear normal.   Nose: Nose normal.   Inspection of lips, teeth and gums normal   Eyes: EOM are normal. Pupils are equal, round, and reactive to light. No scleral icterus.   Neck: Normal range of motion. Neck supple. No JVD present. No tracheal deviation present. No thyromegaly present.   Cardiovascular: Normal rate, regular rhythm and intact distal pulses.  Exam reveals no gallop and no friction rub.    No murmur heard.  Pulses:       Dorsalis pedis pulses are 0 on the right side, and 0 on the left side.        Posterior tibial pulses are 0 on the right side, and 0 on the left side.   Pulmonary/Chest: Effort normal and breath sounds normal.   Abdominal: Bowel sounds are normal. He exhibits no distension. There is no hepatosplenomegaly. There is no tenderness. There is no guarding.   Musculoskeletal: Normal range of motion. He exhibits no edema or tenderness. "   Lymphadenopathy:   Palpation of neck and groin lymph nodes normal   Neurological: He is alert and oriented to person, place, and time. No cranial nerve deficit. He exhibits normal muscle tone. Coordination normal.   Skin: Skin is dry.   Palpation of skin normal   Psychiatric: His behavior is normal. Judgment and thought content normal.         Assessment:       1. Angina effort    2. Coronary artery disease of native artery of native heart with stable angina pectoris    3. Essential hypertension    4. Kidney disease, chronic, stage III (GFR 30-59 ml/min)    5. Pure hypercholesterolemia    6. Diabetes mellitus due to underlying condition with stage 3 chronic kidney disease, without long-term current use of insulin         Plan:       Anjum was seen today for coronary artery disease.    Diagnoses and all orders for this visit:    Angina effort  -     nitroGLYCERIN (NITROSTAT) 0.4 MG SL tablet; Place 1 tablet (0.4 mg total) under the tongue every 5 (five) minutes as needed for Chest pain.    Coronary artery disease of native artery of native heart with stable angina pectoris  -     CBC auto differential; Future; Expected date: 4/25/17  -     Lipid panel; Future; Expected date: 4/25/17  -     Lipid panel; Future; Expected date: 10/25/17  -     CBC auto differential; Future; Expected date: 10/25/17    Essential hypertension  -     Comprehensive metabolic panel; Future; Expected date: 4/25/17  -     Comprehensive metabolic panel; Future; Expected date: 10/25/17    Kidney disease, chronic, stage III (GFR 30-59 ml/min)    Pure hypercholesterolemia  -     Lipid panel; Future; Expected date: 4/25/17  -     Lipid panel; Future; Expected date: 10/25/17    Diabetes mellitus due to underlying condition with stage 3 chronic kidney disease, without long-term current use of insulin  -     Comprehensive metabolic panel; Future; Expected date: 4/25/17  -     Hemoglobin A1c; Future; Expected date: 4/25/17  -     Hemoglobin A1c;  Future; Expected date: 10/25/17  -     Comprehensive metabolic panel; Future; Expected date: 10/25/17    Other orders  -     metoprolol succinate (TOPROL-XL) 50 MG 24 hr tablet; Take 1 tablet (50 mg total) by mouth once daily.

## 2017-04-25 NOTE — MR AVS SNAPSHOT
[facility]  [address]  [facility] [address]  Phone: [phone]                  Anjum Cai JrApril   2017 8:30 AM   Office Visit    Description:  Male : 1935   Provider:  Louie Shin MD   Department:  [facility]           Reason for Visit     Coronary Artery Disease           Diagnoses this Visit        Comments    Angina effort    -  Primary     Coronary artery disease of native artery of native heart with stable angina pectoris         Essential hypertension         Kidney disease, chronic, stage III (GFR 30-59 ml/min)         Pure hypercholesterolemia         Diabetes mellitus due to underlying condition with stage 3 chronic kidney disease, without long-term current use of insulin                To Do List           Future Appointments        Provider Department Dept Phone    2017 10:00 AM [facility] [phone]      Goals (5 Years of Data)     None      Follow-Up and Disposition     Return in about 6 months (around 10/25/2017).    Follow-up and Disposition History       These Medications        Disp Refills Start End    nitroGLYCERIN (NITROSTAT) 0.4 MG SL tablet 60 tablet 12 2017     Place 1 tablet (0.4 mg total) under the tongue every 5 (five) minutes as needed for Chest pain. - Sublingual    [facility]: [facility] [address] [place], [place] [id number] [facility] Ph #: [phone]       metoprolol succinate (TOPROL-XL) 50 MG 24 hr tablet 90 tablet 3 2017     Take 1 tablet (50 mg total) by mouth once daily. - Oral    Pharmacy: [facility] [address] [place] [address] [facility] Ph #: [phone]       ticagrelor (BRILINTA) 90 mg tablet 60 tablet 11 2017    Take 1 tablet (90 mg total) by mouth 2 (two) times daily. - Oral    Pharmacy: [facility] [address] [place] [address] [facility] Ph #: [phone]       irbesartan (AVAPRO) 75 MG tablet 30 tablet 6 2017      Take 1 tablet (75 mg total) by mouth once daily. - Oral    Pharmacy: [facility] [address] [address] [place] Ph #: [phone]         [facility]     [facility] On Call Nurse Care Line - 24/7 Assistance  Unless otherwise directed by your provider, please contact [facility], our nurse care line that is available for 24/7 assistance.     Registered nurses in the [facility] provide: appointment scheduling, clinical advisement, health education, and other advisory services.  Call: [phone] (toll free)               Medications           Message regarding Medications     Verify the changes and/or additions to your medication regime listed below are the same as discussed with your clinician today.  If any of these changes or additions are incorrect, please notify your healthcare provider.        START taking these NEW medications        Refills    nitroGLYCERIN (NITROSTAT) 0.4 MG SL tablet 12    Sig: Place 1 tablet (0.4 mg total) under the tongue every 5 (five) minutes as needed for Chest pain.    Class: Normal    Route: Sublingual      CHANGE how you are taking these medications     Start Taking Instead of    metoprolol succinate (TOPROL-XL) 50 MG 24 hr tablet metoprolol succinate (TOPROL-XL) 25 MG 24 hr tablet    Dosage:  Take 1 tablet (50 mg total) by mouth once daily. Dosage:  Take 1 tablet (25 mg total) by mouth once daily.    Reason for Change:  Reorder     irbesartan (AVAPRO) 75 MG tablet irbesartan (AVAPRO) 75 MG tablet    Dosage:  Take 1 tablet (75 mg total) by mouth once daily. Dosage:  TAKE ONE TABLET BY MOUTH ONE TIME DAILY     Reason for Change:  Reorder            Verify that the below list of medications is an accurate representation of the medications you are currently taking.  If none reported, the list may be blank. If incorrect, please contact your healthcare provider. Carry this list with you in case of emergency.           Current Medications     ASPIRIN  "(ASPIR-81 ORAL) Take 1 tablet by mouth Daily.    atorvastatin (LIPITOR) 40 MG tablet Take 1 tablet (40 mg total) by mouth once daily.    BD INSULIN SYRINGE ULTRA-FINE 1 mL 30 x 1/2" Syrg USE AS DIRECTED FOUR TIMES DAILY    blood sugar diagnostic, drum (ACCU-CHEK COMPACT TEST) Strp CHECK BLOOD GLUCOSE 4 TIMES DAILY    finasteride (PROSCAR) 5 mg tablet Take one tablet by mouth one time daily    insulin aspart (NOVOLOG FLEXPEN) 100 unit/mL InPn pen Inject 16 Units into the skin 3 (three) times daily with meals.    irbesartan (AVAPRO) 75 MG tablet Take 1 tablet (75 mg total) by mouth once daily.    LANTUS SOLOSTAR 100 unit/mL (3 mL) InPn pen Inject 65 units into the skin every evening    metformin (GLUCOPHAGE) 1000 MG tablet TAKE 1 TABLET BY MOUTH TWICE DAILY WITH FOOD    metoprolol succinate (TOPROL-XL) 50 MG 24 hr tablet Take 1 tablet (50 mg total) by mouth once daily.    MULTIVITAMIN W-MINERALS/LUTEIN (CENTRUM SILVER ORAL) Take by mouth once daily.    omega-3 fatty acids-vitamin E (FISH OIL) 1,000 mg Cap Take 3 capsules by mouth Daily.    omeprazole (PRILOSEC) 20 MG capsule Take 1 capsule (20 mg total) by mouth 2 (two) times daily before meals.    pen needle, diabetic (BD INSULIN PEN NEEDLE UF MINI) 31 gauge x 3/16" Ndle Give insulin injection 4 x a day.    ticagrelor (BRILINTA) 90 mg tablet Take 1 tablet (90 mg total) by mouth 2 (two) times daily.    VITAMIN D2 50,000 unit capsule take 1 capsule by mouth every 30 days    clotrimazole (LOTRIMIN) 1 % Soln Apply topically once daily. Apply to filed toenails daily    nitroGLYCERIN (NITROSTAT) 0.4 MG SL tablet Place 1 tablet (0.4 mg total) under the tongue every 5 (five) minutes as needed for Chest pain.           Clinical Reference Information           Your Vitals Were     BP Pulse Height Weight BMI    126/57 (BP Location: Left arm, Patient Position: Sitting, BP Method: Automatic) 78 5' 6.5" (1.689 m) 97.1 kg (214 lb 1.1 oz) 34.03 kg/m2      Blood Pressure          " Most Recent Value    Right Arm BP - Sitting  133/60    Left Arm BP - Sitting  126/57    BP  (!)  126/57      Allergies as of 4/25/2017     Ace Inhibitors    Cefadroxil      Immunizations Administered on Date of Encounter - 4/25/2017     None      Orders Placed During Today's Visit     Future Labs/Procedures Expected by Expires    CBC auto differential  4/25/2017 (Approximate) 4/25/2018    Comprehensive metabolic panel  4/25/2017 (Approximate) 4/25/2018    Hemoglobin A1c  4/25/2017 (Approximate) 4/25/2018    Lipid panel  4/25/2017 (Approximate) 4/25/2018    CBC auto differential  10/25/2017 4/25/2018    Comprehensive metabolic panel  10/25/2017 (Approximate) 4/25/2018    Hemoglobin A1c  10/25/2017 4/25/2018    Lipid panel  10/25/2017 (Approximate) 4/25/2018      Language Assistance Services     ATTENTION: Language assistance services are available, free of charge. Please call [phone].      ATENCIÓN: Si habla español, tiene a crespo disposición servicios gratuitos de asistencia lingüística. Llame al [phone].     CHÚ Ý: N?u b?n nói Ti?ng Vi?t, có các d?ch v? h? tr? ngôn ng? mi?n phí dành cho b?n. G?i s? [phone].         [facility] complies with applicable Federal civil rights laws and does not discriminate on the basis of race, color, national origin, age, disability, or sex.

## 2017-04-28 ENCOUNTER — LAB VISIT (OUTPATIENT)
Dept: LAB | Facility: HOSPITAL | Age: 82
End: 2017-04-28
Attending: INTERNAL MEDICINE
Payer: MEDICARE

## 2017-04-28 DIAGNOSIS — I10 ESSENTIAL HYPERTENSION: ICD-10-CM

## 2017-04-28 DIAGNOSIS — N18.30 DIABETES MELLITUS DUE TO UNDERLYING CONDITION WITH STAGE 3 CHRONIC KIDNEY DISEASE, WITHOUT LONG-TERM CURRENT USE OF INSULIN: ICD-10-CM

## 2017-04-28 DIAGNOSIS — E78.00 PURE HYPERCHOLESTEROLEMIA: ICD-10-CM

## 2017-04-28 DIAGNOSIS — E08.22 DIABETES MELLITUS DUE TO UNDERLYING CONDITION WITH STAGE 3 CHRONIC KIDNEY DISEASE, WITHOUT LONG-TERM CURRENT USE OF INSULIN: ICD-10-CM

## 2017-04-28 DIAGNOSIS — I25.118 CORONARY ARTERY DISEASE OF NATIVE ARTERY OF NATIVE HEART WITH STABLE ANGINA PECTORIS: ICD-10-CM

## 2017-04-28 LAB
ALBUMIN SERPL BCP-MCNC: 3.3 G/DL
ALP SERPL-CCNC: 66 U/L
ALT SERPL W/O P-5'-P-CCNC: 19 U/L
ANION GAP SERPL CALC-SCNC: 7 MMOL/L
AST SERPL-CCNC: 19 U/L
BASOPHILS # BLD AUTO: 0.04 K/UL
BASOPHILS NFR BLD: 0.5 %
BILIRUB SERPL-MCNC: 0.6 MG/DL
BUN SERPL-MCNC: 23 MG/DL
CALCIUM SERPL-MCNC: 9.3 MG/DL
CHLORIDE SERPL-SCNC: 106 MMOL/L
CHOLEST/HDLC SERPL: 3 {RATIO}
CO2 SERPL-SCNC: 28 MMOL/L
CREAT SERPL-MCNC: 1.5 MG/DL
DIFFERENTIAL METHOD: ABNORMAL
EOSINOPHIL # BLD AUTO: 0.4 K/UL
EOSINOPHIL NFR BLD: 4.1 %
ERYTHROCYTE [DISTWIDTH] IN BLOOD BY AUTOMATED COUNT: 15.1 %
EST. GFR  (AFRICAN AMERICAN): 50.5 ML/MIN/1.73 M^2
EST. GFR  (NON AFRICAN AMERICAN): 43.7 ML/MIN/1.73 M^2
ESTIMATED AVG GLUCOSE: 192 MG/DL
GLUCOSE SERPL-MCNC: 105 MG/DL
HBA1C MFR BLD HPLC: 8.3 %
HCT VFR BLD AUTO: 34.5 %
HDL/CHOLESTEROL RATIO: 33.1 %
HDLC SERPL-MCNC: 121 MG/DL
HDLC SERPL-MCNC: 40 MG/DL
HGB BLD-MCNC: 11.1 G/DL
LDLC SERPL CALC-MCNC: 61.8 MG/DL
LYMPHOCYTES # BLD AUTO: 2.2 K/UL
LYMPHOCYTES NFR BLD: 26.5 %
MCH RBC QN AUTO: 27.9 PG
MCHC RBC AUTO-ENTMCNC: 32.2 %
MCV RBC AUTO: 87 FL
MONOCYTES # BLD AUTO: 0.8 K/UL
MONOCYTES NFR BLD: 9.9 %
NEUTROPHILS # BLD AUTO: 5 K/UL
NEUTROPHILS NFR BLD: 58.6 %
NONHDLC SERPL-MCNC: 81 MG/DL
PLATELET # BLD AUTO: 176 K/UL
PMV BLD AUTO: 11.2 FL
POTASSIUM SERPL-SCNC: 4.1 MMOL/L
PROT SERPL-MCNC: 7 G/DL
RBC # BLD AUTO: 3.98 M/UL
SODIUM SERPL-SCNC: 141 MMOL/L
TRIGL SERPL-MCNC: 96 MG/DL
WBC # BLD AUTO: 8.45 K/UL

## 2017-04-28 PROCEDURE — 80053 COMPREHEN METABOLIC PANEL: CPT

## 2017-04-28 PROCEDURE — 83036 HEMOGLOBIN GLYCOSYLATED A1C: CPT

## 2017-04-28 PROCEDURE — 80061 LIPID PANEL: CPT

## 2017-04-28 PROCEDURE — 85025 COMPLETE CBC W/AUTO DIFF WBC: CPT

## 2017-04-28 PROCEDURE — 36415 COLL VENOUS BLD VENIPUNCTURE: CPT

## 2017-05-01 ENCOUNTER — TELEPHONE (OUTPATIENT)
Dept: CARDIOLOGY | Facility: CLINIC | Age: 82
End: 2017-05-01

## 2017-05-01 NOTE — TELEPHONE ENCOUNTER
Pt instructed as ordered by ,that the dose was increased to 50 mg take 1 tablet daily.Pt reports understanding of these instructions & said that he would go to the Pharmacy and  the Rx.

## 2017-05-01 NOTE — TELEPHONE ENCOUNTER
----- Message from Mian Garcia sent at 5/1/2017  2:26 PM CDT -----  Contact: patient  Please call pt at [phone]. Need a clarification of directions for Toprol-XL 50 mg. Last seen Dr Shin 04/25/17    Thank you

## 2017-05-01 NOTE — TELEPHONE ENCOUNTER
,        LOV:4/25/17.The pt said that his pharmacist called him b/c the dose of the of the Metoprolol succ. was increased from 25 mg QD to 50 mg QD.He said he was not aware that you would be increasing the dose but thought maybe you may have done this for another reason,he did c/o occasional c/p but said his b/p was good.Just double checking.Please advise.Thanks,Cony

## 2017-05-02 ENCOUNTER — OFFICE VISIT (OUTPATIENT)
Dept: PODIATRY | Facility: CLINIC | Age: 82
End: 2017-05-02
Payer: MEDICARE

## 2017-05-02 VITALS
HEIGHT: 67 IN | BODY MASS INDEX: 33.73 KG/M2 | DIASTOLIC BLOOD PRESSURE: 64 MMHG | SYSTOLIC BLOOD PRESSURE: 124 MMHG | WEIGHT: 214.88 LBS | HEART RATE: 76 BPM

## 2017-05-02 DIAGNOSIS — E11.42 DIABETIC POLYNEUROPATHY ASSOCIATED WITH TYPE 2 DIABETES MELLITUS: Primary | ICD-10-CM

## 2017-05-02 DIAGNOSIS — I87.8 VENOUS STASIS: ICD-10-CM

## 2017-05-02 DIAGNOSIS — M62.469 GASTROCNEMIUS EQUINUS, UNSPECIFIED LATERALITY: ICD-10-CM

## 2017-05-02 DIAGNOSIS — B35.1 ONYCHOMYCOSIS DUE TO DERMATOPHYTE: ICD-10-CM

## 2017-05-02 PROCEDURE — 99999 PR PBB SHADOW E&M-EST. PATIENT-LVL IV: CPT | Mod: PBBFAC,,, | Performed by: PODIATRIST

## 2017-05-02 PROCEDURE — 11721 DEBRIDE NAIL 6 OR MORE: CPT | Mod: Q9,S$GLB,, | Performed by: PODIATRIST

## 2017-05-02 PROCEDURE — 99499 UNLISTED E&M SERVICE: CPT | Mod: S$GLB,,, | Performed by: PODIATRIST

## 2017-05-02 NOTE — PATIENT INSTRUCTIONS
Diabetes: Inspecting Your Feet  Diabetes increases your chances of developing foot problems. So inspect your feet every day. This helps you find small skin irritations before they become serious infections. If you have trouble seeing the bottoms of your feet, use a mirror or ask a family member or friend to help.    How to check your feet  Below are tips to help you look for foot problems. Try to check your feet at the same time each day, such as when you get out of bed in the morning:  · Check the top of each foot. The tops of toes, back of the heel, and outer edge of the foot can get a lot of rubbing from poor-fitting shoes.  · Check the bottom of each foot. Daily wear and tear often leads to problems at pressure spots.  · Check the toes and nails. Fungal infections often occur between toes. Toenail problems can also be a sign of fungal infections or lead to breaks in the skin.  · Check your shoes, too. Loose objects inside a shoe can injure the foot. Use your hand to feel inside your shoes for things like nakia, loose stitching, or rough areas that could irritate your skin.  Warning signs  Look for any color changes in the foot. Redness with streaks can signal a severe infection, which needs immediate medical attention. Tell your doctor right away if you have any of these problems:  · Swelling, sometimes with color changes, may be a sign of poor blood flow or infection. Symptoms include tenderness and an increase in the size of your foot.  · Warm or hot areas on your feet may be signs of infection. A foot that is cold may not be getting enough blood.  · Sensations such as burning, tingling, or pins and needles can be signs of a problem. Also check for areas that may be numb.  · Hot spots are caused by friction or pressure. Look for hot spots in areas that get a lot of rubbing. Hot spots can turn into blisters, calluses, or sores.  · Cracks and sores are caused by dry or irritated skin. They are a sign that  the skin is breaking down, which can lead to infection.  · Toenail problems to watch for include nails growing into the skin (ingrown toenail) and causing redness or pain. Thick, yellow, or discolored nails can signal a fungal infection.  · Drainage and odor can develop from untreated sores and ulcers. Call your doctor right away if you notice white or yellow drainage, bleeding, or unpleasant odor.   © 6154-9309 [employer]. [address] [place] [address], [place] [address]. All rights reserved. This information is not intended as a substitute for professional medical care. Always follow your healthcare professional's instructions.    Diabetic Foot Care    Diabetes can lead to a number of different foot complications. Fortunately, most of these complications can be prevented with a little extra foot care. If diabetes is not well controlled, the high blood sugar can cause damage to blood vessels and result in poor circulation to the foot. When the skin does not get enough blood flow, it becomes prone to pressure sores and ulcers, which heal slowly.  High blood sugar can also damage nerves, interfering with the ability to feel pain and pressure. When you cant feel your foot normally, it is easy to injure your skin, bones and joints without knowing it. For these reasons diabetes increases the risk of fungal infections, bunions and ulcers. Deep ulcers can lead to bone infection. Gangrene is the most serious foot complication of diabetes. It usually occurs on the tips of the toes as blacked areas of skin. The black area is dead tissue. In severe cases, gangrene spreads to involve the entire toe, other toes and the entire foot. Foot or toe amputation may be required. Good foot care and blood sugar control can prevent this.    Home Care  1. Wear comfortable, proper fitting shoes.  2. Wash your feet daily with warm water and mild soap.  3. After drying, apply a moisturizing cream or lotion.  4. Check your feet daily  for skin breaks, blisters, swelling, or redness. Look between your toes also.  5. Wear cotton socks and change them every day.  6. Trim toe nails carefully and do not cut your cuticles.  7. Strive to keep your blood sugar under control with a combination of medicines, diet and activity.  8. If you smoke and have diabetes, it is very important that you stop. Smoking reduces blood flow to your foot.  9. Avoid activities that increase your risk of foot injury:  · Do not walk barefoot.  · Do not use heating pads or hot water bottles on your feet.  · Do not put your foot in a hot tub without first checking the temperature with your hand.  10) Schedule yearly foot exams.    Follow Up  with your doctor or as advised by our staff. Report any cut, puncture, scrape, other injury, blister, ingrown toenail or ulcer on your foot.    Get Prompt Medical Attention  if any of the following occur:  -- Open ulcer with pus draining from the wound  -- Increasing foot or leg pain  -- New areas of redness or swelling or tender areas of the foot    © 1569-9330 [employer]. [address] [place] [address]. All rights reserved. This information is not intended as a substitute for professional medical care. Always follow your healthcare professional's instructions.      Understanding Thickened Nails  There are several causes of very thick or crumbling nails. They can be caused by injuries or pressure from shoes. Fungal infections are a common cause. Diabetes, psoriasis, or vascular disease are other possible causes.    Symptoms  Along with thickening, the nail may appear ridged, brittle, or yellowish. It may also feel painful when pressure is put on it. After a while, a thickened nail may loosen and fall off.  Evaluation  Because thickened nails may be a symptom of a medical condition, your doctor will look at your medical history. A test may be done to check for fungal infection. The thickness and color of the nail  are examined carefully. This helps determine the cause.  Treatment  · For infection: Oral or topical antifungal medications may be used to treat infection. These can help prevent sores under the nail. They also keep the fungus from spreading to other nails.  · For thick nails not caused by infection: Thinning the nail may be an option. This can be done by trimming, filing, or grinding.  · For pain: If the nail causes pain, part or all of the nail can be removed with surgery. Never try to remove a nail by yourself.  Prevention  Many nail problems can be prevented by wearing the right shoes and trimming your nails properly. Keep your feet clean and dry to help avoid infection. If you have diabetes, talk with your podiatrist or primary care doctor before doing any foot self-care.  · The right shoes: Get your feet measured. Your size may change as you age. This is due to ligaments that loosen with age and allow the bones in your foot to change position or spread. Wear shoes that are supportive and roomy enough for your toes to wiggle. Look for shoes made of natural materials, such as leather, which allow your feet to breathe.  · Proper trimming: To avoid problems, trim your toenails straight across. Then, file the edges with a nail file. If you cant trim your own nails, ask your health care provider to do so for you.  © 1647-2101 [employer]. [address] [place] [address], [place] [address]. All rights reserved. This information is not intended as a substitute for professional medical care. Always follow your healthcare professional's instructions.

## 2017-05-03 NOTE — PROGRESS NOTES
Subjective:      Patient ID: Anjum Cai Jr. is a 81 y.o. male.    Chief Complaint: Diabetes Mellitus (PCP Dr. Martinez 8/18/16); Diabetic Foot Exam; and Routine Foot Care    Anjum is a 81 y.o. male who presents to the clinic upon referral from Dr. Kristen us. provider found  for evaluation and treatment of diabetic feet. Anjum has a past medical history of Arthritis; BPH (benign prostatic hyperplasia); Cataract; Colon polyp (11/18/2015); Colon polyps; Coronary artery disease; DR (diabetic retinopathy); Dyslipidemia; Elevated PSA; Goiter, nontoxic, multinodular; Headaches, cluster; Hypertension; Kidney stones; Lump in the testicle; Rotator cuff tendinitis; Sleep apnea with use of continuous positive airway pressure (CPAP); and Type II or unspecified type diabetes mellitus with other specified manifestations, uncontrolled. Patient relates no major problem with feet. Only complaints today consist of tingling, numbness and occasional forefoot pain. Also complains of fungal toenails. He is using lotrimin with noted improvement.    PCP: Gavin Martinez MD    4/25/17, cardiology    Current shoe gear: Tennis shoes    Hemoglobin A1C   Date Value Ref Range Status   04/28/2017 8.3 (H) 4.5 - 6.2 % Final     Comment:     According to ADA guidelines, hemoglobin A1C <7.0% represents  optimal control in non-pregnant diabetic patients.  Different  metrics may apply to specific populations.   Standards of Medical Care in Diabetes - 2016.  For the purpose of screening for the presence of diabetes:  <5.7%     Consistent with the absence of diabetes  5.7-6.4%  Consistent with increasing risk for diabetes   (prediabetes)  >or=6.5%  Consistent with diabetes  Currently no consensus exists for use of hemoglobin A1C  for diagnosis of diabetes for children.     12/09/2016 8.2 (H) 4.5 - 6.2 % Final     Comment:     According to ADA guidelines, hemoglobin A1C <7.0% represents  optimal control in non-pregnant diabetic patients.  Different  metrics  may apply to specific populations.   Standards of Medical Care in Diabetes - 2016.  For the purpose of screening for the presence of diabetes:  <5.7%     Consistent with the absence of diabetes  5.7-6.4%  Consistent with increasing risk for diabetes   (prediabetes)  >or=6.5%  Consistent with diabetes  Currently no consensus exists for use of hemoglobin A1C  for diagnosis of diabetes for children.     09/29/2016 7.6 (H) 4.5 - 6.2 % Final     Comment:     According to ADA guidelines, hemoglobin A1C <7.0% represents  optimal control in non-pregnant diabetic patients.  Different  metrics may apply to specific populations.   Standards of Medical Care in Diabetes - 2016.  For the purpose of screening for the presence of diabetes:  <5.7%     Consistent with the absence of diabetes  5.7-6.4%  Consistent with increasing risk for diabetes   (prediabetes)  >or=6.5%  Consistent with diabetes  Currently no consensus exists for use of hemoglobin A1C  for diagnosis of diabetes for children.               Review of Systems   Constitution: Negative for chills, fever and malaise/fatigue.   Cardiovascular: Negative for chest pain, leg swelling, orthopnea and palpitations.   Respiratory: Negative for cough, shortness of breath and wheezing.    Skin: Positive for color change, dry skin and nail changes. Negative for itching, poor wound healing and rash.   Musculoskeletal: Negative for arthritis, gout, joint pain, joint swelling, muscle weakness and myalgias.   Neurological: Positive for numbness, paresthesias and sensory change. Negative for disturbances in coordination, dizziness, focal weakness and tremors.           Objective:      Physical Exam   Cardiovascular:   Dorsalis pedis and posterior tibial pulses are diminished Bilaterally. Toes are cool to touch. Feet are warm proximally.There is decreased digital hair. Skin is atrophic, slightly hyperpigmented, and mildly edematous       Musculoskeletal:   Musculoskeletal:  Muscle  strength is 5/5 in all groups bilaterally.  Metatarsophalangeal and subtalar range of motion are within normal limits without crepitus bilaterally. There is limitation of ankle dorsiflexion with knees extended and flexed Bilaterally. Medial arch collapse in stance with mild heel valgus.       Neurological:   Elm Mott-Jose Antonio 5.07 monofilamant testing is diminished both feet. Sharp/dull sensation diminished Bilaterally. Light touch absent Bilaterally.       Skin:   Toenails 1-5 bilaterally are elongated by 2-3 mm, thickened by 2-3 mm, discolored/yellowed, dystrophic, brittle with subungual debris. No incurvation. Mild xerosis noted, bilat. No open wounds.                 Assessment:       Encounter Diagnoses   Name Primary?    Diabetic polyneuropathy associated with type 2 diabetes mellitus Yes    Gastrocnemius equinus, unspecified laterality     Onychomycosis due to dermatophyte     Venous stasis          Plan:       Anjum was seen today for diabetes mellitus, diabetic foot exam and routine foot care.    Diagnoses and all orders for this visit:    Diabetic polyneuropathy associated with type 2 diabetes mellitus    Gastrocnemius equinus, unspecified laterality    Onychomycosis due to dermatophyte    Venous stasis      I counseled the patient on his conditions, their implications and medical management.    Shoe inspection. Diabetic Foot Education. Patient reminded of the importance of good nutrition and blood sugar control to help prevent podiatric complications of diabetes. Patient instructed on proper foot hygeine. We discussed wearing proper shoe gear, daily foot inspections, never walking without protective shoe gear, never putting sharp instruments to feet    - With patient's permission, nails were aggressively reduced and debrided x 10 to their soft tissue attachment mechanically, removing all offending nail and debris. Patient relates relief following the procedure.    - Continue lotrimin solution to nails  daily.     - New DM shoes at next visit in 4 months.

## 2017-05-05 RX ORDER — METOPROLOL SUCCINATE 25 MG/1
TABLET, EXTENDED RELEASE ORAL
Qty: 30 TABLET | Refills: 1 | OUTPATIENT
Start: 2017-05-05

## 2017-05-08 ENCOUNTER — PATIENT MESSAGE (OUTPATIENT)
Dept: UROLOGY | Facility: CLINIC | Age: 82
End: 2017-05-08

## 2017-05-08 DIAGNOSIS — N40.1 BENIGN NON-NODULAR PROSTATIC HYPERPLASIA WITH LOWER URINARY TRACT SYMPTOMS: ICD-10-CM

## 2017-05-08 DIAGNOSIS — R97.20 ELEVATED PSA: ICD-10-CM

## 2017-05-08 RX ORDER — FINASTERIDE 5 MG/1
5 TABLET, FILM COATED ORAL DAILY
Qty: 30 TABLET | Refills: 1 | Status: SHIPPED | OUTPATIENT
Start: 2017-05-08 | End: 2017-07-07 | Stop reason: SDUPTHER

## 2017-05-16 ENCOUNTER — TELEPHONE (OUTPATIENT)
Dept: GASTROENTEROLOGY | Facility: CLINIC | Age: 82
End: 2017-05-16

## 2017-06-05 RX ORDER — METFORMIN HYDROCHLORIDE 1000 MG/1
TABLET ORAL
Qty: 60 TABLET | Refills: 2 | Status: SHIPPED | OUTPATIENT
Start: 2017-06-05 | End: 2017-10-02 | Stop reason: SDUPTHER

## 2017-06-06 RX ORDER — INSULIN ASPART 100 [IU]/ML
16 INJECTION, SOLUTION INTRAVENOUS; SUBCUTANEOUS
Qty: 15 ML | Refills: 3 | Status: SHIPPED | OUTPATIENT
Start: 2017-06-06 | End: 2017-10-17 | Stop reason: SDUPTHER

## 2017-06-06 NOTE — TELEPHONE ENCOUNTER
----- Message from Gavin Martinez MD sent at 6/5/2017  6:55 PM CDT -----  Please schedule a follow up appointment with me if he does not have one already.

## 2017-06-19 ENCOUNTER — TELEPHONE (OUTPATIENT)
Dept: GASTROENTEROLOGY | Facility: CLINIC | Age: 82
End: 2017-06-19

## 2017-06-20 NOTE — TELEPHONE ENCOUNTER
Spoke with patient's daughter regarding scheduling colonoscopy. She stated that her father had a recent heart attack and was placed on a blood thinner. She stated that it will be about 1 year before he can consider having colonosocopy

## 2017-07-07 ENCOUNTER — PATIENT MESSAGE (OUTPATIENT)
Dept: UROLOGY | Facility: CLINIC | Age: 82
End: 2017-07-07

## 2017-07-07 DIAGNOSIS — N40.1 BENIGN NON-NODULAR PROSTATIC HYPERPLASIA WITH LOWER URINARY TRACT SYMPTOMS: ICD-10-CM

## 2017-07-07 DIAGNOSIS — R97.20 ELEVATED PSA: ICD-10-CM

## 2017-07-07 RX ORDER — FINASTERIDE 5 MG/1
5 TABLET, FILM COATED ORAL DAILY
Qty: 30 TABLET | Refills: 11 | Status: SHIPPED | OUTPATIENT
Start: 2017-07-07 | End: 2018-06-15 | Stop reason: SDUPTHER

## 2017-07-07 NOTE — PROGRESS NOTES
Diagnoses and all orders for this visit:    Benign non-nodular prostatic hyperplasia with lower urinary tract symptoms  -     finasteride (PROSCAR) 5 mg tablet; Take 1 tablet (5 mg total) by mouth once daily.    Elevated PSA  -     finasteride (PROSCAR) 5 mg tablet; Take 1 tablet (5 mg total) by mouth once daily.    eRxed to the pharmacy.  Please call and advise the patient to take the medication as given.

## 2017-07-13 ENCOUNTER — OFFICE VISIT (OUTPATIENT)
Dept: INTERNAL MEDICINE | Facility: CLINIC | Age: 82
End: 2017-07-13
Payer: MEDICARE

## 2017-07-13 VITALS
SYSTOLIC BLOOD PRESSURE: 112 MMHG | HEART RATE: 75 BPM | DIASTOLIC BLOOD PRESSURE: 70 MMHG | WEIGHT: 212.5 LBS | BODY MASS INDEX: 33.35 KG/M2 | HEIGHT: 67 IN

## 2017-07-13 DIAGNOSIS — E78.5 HYPERLIPIDEMIA, UNSPECIFIED HYPERLIPIDEMIA TYPE: ICD-10-CM

## 2017-07-13 DIAGNOSIS — I25.2 HX OF NON-ST ELEVATION MYOCARDIAL INFARCTION (NSTEMI): ICD-10-CM

## 2017-07-13 DIAGNOSIS — I10 ESSENTIAL HYPERTENSION: ICD-10-CM

## 2017-07-13 PROCEDURE — 1159F MED LIST DOCD IN RCRD: CPT | Mod: S$GLB,,, | Performed by: INTERNAL MEDICINE

## 2017-07-13 PROCEDURE — 99214 OFFICE O/P EST MOD 30 MIN: CPT | Mod: S$GLB,,, | Performed by: INTERNAL MEDICINE

## 2017-07-13 PROCEDURE — 99499 UNLISTED E&M SERVICE: CPT | Mod: S$GLB,,, | Performed by: INTERNAL MEDICINE

## 2017-07-13 PROCEDURE — 1126F AMNT PAIN NOTED NONE PRSNT: CPT | Mod: S$GLB,,, | Performed by: INTERNAL MEDICINE

## 2017-07-13 PROCEDURE — 99999 PR PBB SHADOW E&M-EST. PATIENT-LVL III: CPT | Mod: PBBFAC,,, | Performed by: INTERNAL MEDICINE

## 2017-07-14 ENCOUNTER — OFFICE VISIT (OUTPATIENT)
Dept: UROLOGY | Facility: CLINIC | Age: 82
End: 2017-07-14
Payer: MEDICARE

## 2017-07-14 VITALS
BODY MASS INDEX: 33.91 KG/M2 | DIASTOLIC BLOOD PRESSURE: 59 MMHG | SYSTOLIC BLOOD PRESSURE: 129 MMHG | HEIGHT: 66 IN | WEIGHT: 211 LBS

## 2017-07-14 DIAGNOSIS — N13.8 BENIGN PROSTATIC HYPERPLASIA WITH URINARY OBSTRUCTION: Primary | ICD-10-CM

## 2017-07-14 DIAGNOSIS — N40.1 BENIGN PROSTATIC HYPERPLASIA WITH URINARY OBSTRUCTION: Primary | ICD-10-CM

## 2017-07-14 DIAGNOSIS — R97.20 ELEVATED PSA: ICD-10-CM

## 2017-07-14 PROCEDURE — 99999 PR PBB SHADOW E&M-EST. PATIENT-LVL III: CPT | Mod: PBBFAC,,, | Performed by: UROLOGY

## 2017-07-14 PROCEDURE — 99214 OFFICE O/P EST MOD 30 MIN: CPT | Mod: S$GLB,,, | Performed by: UROLOGY

## 2017-07-14 PROCEDURE — 1126F AMNT PAIN NOTED NONE PRSNT: CPT | Mod: S$GLB,,, | Performed by: UROLOGY

## 2017-07-14 PROCEDURE — 1159F MED LIST DOCD IN RCRD: CPT | Mod: S$GLB,,, | Performed by: UROLOGY

## 2017-07-14 NOTE — PATIENT INSTRUCTIONS
Lab Results   Component Value Date    PSA 10.06 (H) 02/26/2013    PSA 6.3 (H) 09/15/2009    PSA 4.0 08/11/2005    PSADIAG 5.5 (H) 04/05/2016    PSADIAG 5.0 (H) 10/28/2013

## 2017-07-14 NOTE — PROGRESS NOTES
Anjum Cai JrApril is a 81 y.o. man who is here for the evaluation of No chief complaint on file.  last seen by me on 4/5/16  had a negative prostate bx back on 3/28/13 for elevated PSA of 10. His prostate was 40 grams in size and no cancer was found.  Finasteride started for his LUTS and elevated PSA.  He has done well and reports no problem with urination.  Denies flank pain, dysuira, hematuria .     Past Medical History:   Diagnosis Date    Arthritis     BPH (benign prostatic hyperplasia)     Cataract     right     Colon polyp 11/18/2015    Colon polyps     Coronary artery disease     DR (diabetic retinopathy)     Dyslipidemia     Elevated PSA     Goiter, nontoxic, multinodular     Headaches, cluster     Hypertension     Kidney stones     Lump in the testicle     Left    Rotator cuff tendinitis     Right shoulder    Sleep apnea with use of continuous positive airway pressure (CPAP)     uses cpap at night    Type II or unspecified type diabetes mellitus with other specified manifestations, uncontrolled      Past Surgical History:   Procedure Laterality Date    CARDIAC CATHETERIZATION  2007    CATARACT EXTRACTION W/  INTRAOCULAR LENS IMPLANT Right 09/27/2016    Dr. Garcia    CORONARY STENT PLACEMENT  02/02/2007    LAD, RCAx2    INGUINAL HERNIA REPAIR Right 1995    PROSTATE BIOPSY  2012    ROTATOR CUFF REPAIR Right 2014    x2    ROTATOR CUFF REPAIR Left 2013    TONSILLECTOMY, ADENOIDECTOMY      TRIGGER FINGER RELEASE Right 2004    x2     Social History   Substance Use Topics    Smoking status: Former Smoker     Quit date: 3/12/1978    Smokeless tobacco: Never Used    Alcohol use No     Family History   Problem Relation Age of Onset    Cataracts Father     Heart disease Father     Cancer Mother     Diabetes Mother     Amblyopia Neg Hx     Blindness Neg Hx     Glaucoma Neg Hx     Macular degeneration Neg Hx     Retinal detachment Neg Hx     Strabismus Neg Hx   "    Allergy:  Allergies   Allergen Reactions    Ace Inhibitors      Other reaction(s): cough    Cefadroxil      Other reaction(s): possible vasculitis  Other reaction(s): vasculitis     Outpatient Encounter Prescriptions as of 7/14/2017   Medication Sig Dispense Refill    ASPIRIN (ASPIR-81 ORAL) Take 1 tablet by mouth Daily.      atorvastatin (LIPITOR) 40 MG tablet Take 1 tablet (40 mg total) by mouth once daily. 30 tablet 6    BD INSULIN SYRINGE ULTRA-FINE 1 mL 30 x 1/2" Syrg USE AS DIRECTED FOUR TIMES DAILY 200 each 11    blood sugar diagnostic, drum (ACCU-CHEK COMPACT TEST) Strp CHECK BLOOD GLUCOSE 4 TIMES DAILY 204 each 5    irbesartan (AVAPRO) 75 MG tablet Take 1 tablet (75 mg total) by mouth once daily. 30 tablet 6    LANTUS SOLOSTAR 100 unit/mL (3 mL) InPn pen Inject 65 units into the skin every evening 30 mL 5    metoprolol succinate (TOPROL-XL) 50 MG 24 hr tablet Take 1 tablet (50 mg total) by mouth once daily. 90 tablet 3    MULTIVITAMIN W-MINERALS/LUTEIN (CENTRUM SILVER ORAL) Take by mouth once daily.      nitroGLYCERIN (NITROSTAT) 0.4 MG SL tablet Place 1 tablet (0.4 mg total) under the tongue every 5 (five) minutes as needed for Chest pain. 60 tablet 12    omega-3 fatty acids-vitamin E (FISH OIL) 1,000 mg Cap Take 3 capsules by mouth Daily.      omeprazole (PRILOSEC) 20 MG capsule Take 1 capsule (20 mg total) by mouth 2 (two) times daily before meals. 60 capsule 5    pen needle, diabetic (BD INSULIN PEN NEEDLE UF MINI) 31 gauge x 3/16" Ndle Give insulin injection 4 x a day. 360 each 3    ticagrelor (BRILINTA) 90 mg tablet Take 1 tablet (90 mg total) by mouth 2 (two) times daily. 60 tablet 11    VITAMIN D2 50,000 unit capsule take 1 capsule by mouth every 30 days 3 capsule 2    clotrimazole (LOTRIMIN) 1 % Soln Apply topically once daily. Apply to filed toenails daily 30 mL 5    finasteride (PROSCAR) 5 mg tablet Take 1 tablet (5 mg total) by mouth once daily. 30 tablet 11    insulin " aspart (NOVOLOG FLEXPEN) 100 unit/mL InPn pen Inject 16 Units into the skin 3 (three) times daily with meals. 15 mL 3    metformin (GLUCOPHAGE) 1000 MG tablet TAKE ONE TABLET BY MOUTH TWICE DAILY WITH MEALS 60 tablet 2     No facility-administered encounter medications on file as of 7/14/2017.      Review of Systems   General ROS: GENERAL:  No weight gain or loss  SKIN:  No rashes or lacerations  HEAD:  No headaches  EYES:  No exophthalmos, jaundice or blindness  EARS:  No dizziness, tinnitus or hearing loss  NOSE:  No changes in smell  MOUTH & THROAT:  No dyskinetic movements or obvious goiter  CHEST:  No shortness of breath, hyperventilation or cough  CARDIOVASCULAR:  No tachycardia or chest pain  ABDOMEN:  No nausea, vomiting, pain, constipation or diarrhea  URINARY:  No frequency, dysuria or sexual dysfunction  ENDOCRINE:  No polydipsia, polyuria  MUSCULOSKELETAL:  No pain or stiffness of the joints  NEUROLOGIC:  No weakness, sensory changes, seizures, confusion, memory loss, tremor or other abnormal movements  Physical Exam     Vitals:    07/14/17 1011   BP: (!) 129/59     General Appearance:  Alert, cooperative, no distress, appears stated age   Head:  Normocephalic, without obvious abnormality, atraumatic   Eyes:  PERRL, conjunctiva/corneas clear, EOM's intact, fundi benign, both eyes   Ears:  Normal TM's and external ear canals, both ears   Nose: Nares normal, septum midline, mucosa normal, no drainage or sinus tenderness   Throat: Lips, mucosa, and tongue normal; teeth and gums normal   Neck: Supple, symmetrical, trachea midline, no adenopathy, thyroid: not enlarged, symmetric, no tenderness/mass/nodules, no carotid bruit or JVD   Back:   Symmetric, no curvature, ROM normal, no CVA tenderness   Lungs:   Clear to auscultation bilaterally, respirations unlabored   Chest Wall:  No tenderness or deformity   Heart:  Regular rate and rhythm, S1, S2 normal, no murmur, rub or gallop   Abdomen:   Soft, non-tender,  bowel sounds active all four quadrants,  no masses, no organomegaly of liver and spleen  No hernia noted   Genitalia:  Scrotum: no rash or lesion  Normal symmetric epididymis without masses  Normal vas palpated  Normal size, symmetric testicles with no masses   Normal urethral meatus with no discharge  Normal circumcised penis with no lesion   Rectal:  Normal perineum and anus upon inspection.  Normal tone, no masses or tenderness;   Extremities: Extremities normal, atraumatic, no cyanosis or edema   Pulses: 2+ and symmetric   Skin: Skin color, texture, turgor normal, no rashes or lesions   Lymph nodes: Cervical, supraclavicular, and axillary nodes normal   Neurologic: Normal     Prostate 30 grams smooth with no nodule or tenderness.    LABS:  Lab Results   Component Value Date    PSA 10.06 (H) 02/26/2013    PSA 6.3 (H) 09/15/2009    PSA 4.0 08/11/2005    PSA 3.7 06/17/2004    PSADIAG 5.5 (H) 04/05/2016    PSADIAG 5.0 (H) 10/28/2013     Results for orders placed or performed in visit on 10/28/13   Prostate Specific Antigen, Diagnostic   Result Value Ref Range    PSA DIAGNOSTIC 5.0 (H) 0.00-4.00 ng/mL     Lab Results   Component Value Date    CREATININE 1.5 (H) 04/28/2017    CREATININE 1.5 (H) 12/09/2016    CREATININE 1.5 (H) 10/21/2016     Urine Culture, Routine   Date Value Ref Range Status   09/13/2004 NO GROWTH AFTER 24 HOURS.  Final     Assessment and Plan:  Diagnoses and all orders for this visit:    Benign prostatic hyperplasia with urinary obstruction    Elevated PSA  -     Prostate Specific Antigen, Diagnostic; Future  -     Prostate Specific Antigen, Diagnostic; Future    PSA today. Will continue to monitor his PSA.  Continue Proscar.  Refills given. ( he called earlier and got the refills from me).  He says that he is on blood thinner because of heart disease and he can't stop for at least 1 year.    Follow-up:  Return in about 1 year (around 7/14/2018) for PSA.     Addendum:  Lab Results   Component Value  Date    PSA 10.06 (H) 02/26/2013    PSADIAG 7.7 (H) 07/14/2017     His PSA came back still elevated.  Will see him back in 6 months with PSA.

## 2017-07-16 NOTE — PROGRESS NOTES
CHIEF COMPLAINT:  Followup.    HPI:  The patient is a 81-year-old gentleman who is currently being treated for   insulin requiring diabetes, hypertension, hyperlipidemia, who comes in today for   followup.  Since we last saw him in December of this year, he had a non-ST   elevation MI.  He did undergo a heart catheterization, which showed that he had   a stenosis of a small OM1, as well as D1.  No intervention was done.  The   patient was referred to cardiac rehab, which he declined.  He states he can do   all this on his own at home.  He is feeling good.  He is cutting his own yard.    He went back to work.  He is working as much as he wants.  In regard to his   diabetes, his last A1c was in April and was 8.3.  He is on Lantus 65 units, as   well as NovoLog 13-15 units t.i.d.  He is also on metformin b.i.d.  He does   check his blood sugars twice a day.  The reason for this when he is at work, he   does not check his sugar.  He states he is not a big eater, but does like   sweets, especially chocolates.    REVIEW OF SYSTEMS:  No chest pain, no shortness of breath, no nausea or   vomiting, no abdominal pain.  He has a bowel movement daily.  He does complain   of a trigger finger involving his right hand.  He does have carpal tunnel   syndrome involving his right wrist.  He does wear wrist splint at night.    PHYSICAL EXAMINATION:  GENERAL APPEARANCE:  No acute distress.  PULMONARY:  Good inspiratory and expiratory breath sounds are heard.  Lungs are   clear to auscultation.  CARDIOVASCULAR:  S1, S2.  EXTREMITIES:  With trace to 1+ edema.  ABDOMEN:  Nontender, nondistended, without hepatosplenomegaly.  FEET:  He had 1-2+ dorsal pedal pulses.    ASSESSMENT:  1.  Insulin requiring diabetes.  2.  Non-ST elevation MI.  3.  Hypertension.  4.  Hyperlipidemia.    PLAN:  We will check a CMP, A1c in three months.  The patient is to watch his   diet more closely.  No other changes will be made at this time.      JUDI/ZANE  dd:  07/16/2017 15:25:30 (CDT)  td: 07/17/2017 02:44:02 (LYNNE)  Doc ID   #[id number]  Job ID #[id number]    CC:

## 2017-07-18 ENCOUNTER — PATIENT MESSAGE (OUTPATIENT)
Dept: INTERNAL MEDICINE | Facility: CLINIC | Age: 82
End: 2017-07-18

## 2017-07-18 RX ORDER — INSULIN GLARGINE 100 [IU]/ML
INJECTION, SOLUTION SUBCUTANEOUS
Qty: 30 ML | Refills: 4 | Status: SHIPPED | OUTPATIENT
Start: 2017-07-18 | End: 2017-09-05 | Stop reason: SDUPTHER

## 2017-08-03 RX ORDER — OMEPRAZOLE 20 MG/1
CAPSULE, DELAYED RELEASE ORAL
Qty: 60 CAPSULE | Refills: 4 | Status: SHIPPED | OUTPATIENT
Start: 2017-08-03 | End: 2017-12-21 | Stop reason: SDUPTHER

## 2017-09-05 RX ORDER — INSULIN GLARGINE 100 [IU]/ML
INJECTION, SOLUTION SUBCUTANEOUS
Qty: 30 ML | Refills: 4 | Status: SHIPPED | OUTPATIENT
Start: 2017-09-05 | End: 2018-03-26 | Stop reason: SDUPTHER

## 2017-09-07 RX ORDER — PEN NEEDLE, DIABETIC 31 GX5/16"
NEEDLE, DISPOSABLE MISCELLANEOUS
Qty: 100 EACH | Refills: 4 | Status: SHIPPED | OUTPATIENT
Start: 2017-09-07 | End: 2018-11-12 | Stop reason: SDUPTHER

## 2017-09-11 ENCOUNTER — TELEPHONE (OUTPATIENT)
Dept: ORTHOPEDICS | Facility: CLINIC | Age: 82
End: 2017-09-11

## 2017-09-11 NOTE — TELEPHONE ENCOUNTER
Left message for patient to return call to office.    ----- Message from Tessa Garduno sent at 9/7/2017 10:37 AM CDT -----  No. [phone]    Patient will not be able to have surgery until next year for the trigger finger and carpal tunnel.    Can he have a steroid injection.   He would like an injection very soon.

## 2017-09-11 NOTE — TELEPHONE ENCOUNTER
Left message for patient to return call to office.    ----- Message from Dora Macedo sent at 9/8/2017  3:01 PM CDT -----  Contact: self, [phone]  Patient states he is calling back requesting to schedule an injection.  Please advise.

## 2017-09-11 NOTE — TELEPHONE ENCOUNTER
----- Message from Gena NORRIS Stephon sent at 9/11/2017  1:48 PM CDT -----  Contact: PT  Hello,  PT was just calling back trying to reach the person that he just missed the call from.  PT would like for you to call him back please.    Callback: [phone]

## 2017-09-11 NOTE — TELEPHONE ENCOUNTER
----- Message from Charlene Davison sent at 9/11/2017  1:34 PM CDT -----  Contact: [phone] / self  Patient called in returning your call. Please advise.

## 2017-09-25 ENCOUNTER — OFFICE VISIT (OUTPATIENT)
Dept: ORTHOPEDICS | Facility: CLINIC | Age: 82
End: 2017-09-25
Payer: MEDICARE

## 2017-09-25 VITALS — BODY MASS INDEX: 33.91 KG/M2 | WEIGHT: 211 LBS | HEIGHT: 66 IN

## 2017-09-25 DIAGNOSIS — G56.01 CARPAL TUNNEL SYNDROME OF RIGHT WRIST: ICD-10-CM

## 2017-09-25 PROCEDURE — 99999 PR PBB SHADOW E&M-EST. PATIENT-LVL II: CPT | Mod: PBBFAC,,, | Performed by: ORTHOPAEDIC SURGERY

## 2017-09-25 PROCEDURE — 3008F BODY MASS INDEX DOCD: CPT | Mod: S$GLB,,, | Performed by: ORTHOPAEDIC SURGERY

## 2017-09-25 PROCEDURE — 99213 OFFICE O/P EST LOW 20 MIN: CPT | Mod: 25,S$GLB,, | Performed by: ORTHOPAEDIC SURGERY

## 2017-09-25 PROCEDURE — 20526 THER INJECTION CARP TUNNEL: CPT | Mod: 59,RT,S$GLB, | Performed by: ORTHOPAEDIC SURGERY

## 2017-09-25 PROCEDURE — 20550 NJX 1 TENDON SHEATH/LIGAMENT: CPT | Mod: 51,RT,S$GLB, | Performed by: ORTHOPAEDIC SURGERY

## 2017-09-25 PROCEDURE — 1125F AMNT PAIN NOTED PAIN PRSNT: CPT | Mod: S$GLB,,, | Performed by: ORTHOPAEDIC SURGERY

## 2017-09-25 PROCEDURE — 99499 UNLISTED E&M SERVICE: CPT | Mod: S$GLB,,, | Performed by: ORTHOPAEDIC SURGERY

## 2017-09-25 PROCEDURE — 1159F MED LIST DOCD IN RCRD: CPT | Mod: S$GLB,,, | Performed by: ORTHOPAEDIC SURGERY

## 2017-09-25 RX ORDER — TRIAMCINOLONE ACETONIDE 40 MG/ML
40 INJECTION, SUSPENSION INTRA-ARTICULAR; INTRAMUSCULAR
Status: COMPLETED | OUTPATIENT
Start: 2017-09-25 | End: 2017-09-25

## 2017-09-25 RX ADMIN — TRIAMCINOLONE ACETONIDE 40 MG: 40 INJECTION, SUSPENSION INTRA-ARTICULAR; INTRAMUSCULAR at 02:09

## 2017-09-25 NOTE — PROGRESS NOTES
Mr. Cai seen previously for carpal tunnel syndrome, had to cancel his   previously scheduled surgery because of a heart attack, but he is having a   flareup of pain in the right hand and also triggering of the right ring finger.    He would like to try injections today.    PHYSICAL EXAMINATION:  RIGHT HAND:  There is some mild swelling in the hand, tenderness over the flexor   tendon sheath to the right ring finger, with mild triggering of the ring finger   and a positive Tinel sign at the wrist.    IMPRESSION:  1.  Right carpal tunnel syndrome.  2.  Triggering, right ring finger.    PLAN:  After a pause for timeout, the patient identified the right ring finger   and right carpal tunnel.  Each injected with combination of Kenalog 10 mg and   0.5 mL Xylocaine, sterile technique.  He tolerated the procedure well.    Recommended ice to the hand tonight, continued use wrist splints at night, and   follow up in one month for recheck.      SHAE  dd: 09/25/2017 14:42:44 (CDT)  td: 09/26/2017 02:07:57 (CDT)  Doc ID   #[id number]  Job ID #[id number]    CC:

## 2017-10-02 RX ORDER — METFORMIN HYDROCHLORIDE 1000 MG/1
TABLET ORAL
Qty: 60 TABLET | Refills: 1 | Status: SHIPPED | OUTPATIENT
Start: 2017-10-02 | End: 2017-10-17 | Stop reason: SDUPTHER

## 2017-10-13 ENCOUNTER — LAB VISIT (OUTPATIENT)
Dept: LAB | Facility: HOSPITAL | Age: 82
End: 2017-10-13
Attending: INTERNAL MEDICINE
Payer: MEDICARE

## 2017-10-13 DIAGNOSIS — I10 ESSENTIAL HYPERTENSION: ICD-10-CM

## 2017-10-13 DIAGNOSIS — E78.5 HYPERLIPIDEMIA, UNSPECIFIED HYPERLIPIDEMIA TYPE: ICD-10-CM

## 2017-10-13 LAB
ALBUMIN SERPL BCP-MCNC: 3.3 G/DL
ALP SERPL-CCNC: 62 U/L
ALT SERPL W/O P-5'-P-CCNC: 24 U/L
ANION GAP SERPL CALC-SCNC: 9 MMOL/L
AST SERPL-CCNC: 16 U/L
BILIRUB SERPL-MCNC: 0.5 MG/DL
BUN SERPL-MCNC: 25 MG/DL
CALCIUM SERPL-MCNC: 9.1 MG/DL
CHLORIDE SERPL-SCNC: 107 MMOL/L
CO2 SERPL-SCNC: 27 MMOL/L
CREAT SERPL-MCNC: 1.6 MG/DL
EST. GFR  (AFRICAN AMERICAN): 47 ML/MIN/1.73 M^2
EST. GFR  (NON AFRICAN AMERICAN): 40 ML/MIN/1.73 M^2
ESTIMATED AVG GLUCOSE: 171 MG/DL
GLUCOSE SERPL-MCNC: 143 MG/DL
HBA1C MFR BLD HPLC: 7.6 %
POTASSIUM SERPL-SCNC: 3.9 MMOL/L
PROT SERPL-MCNC: 7 G/DL
SODIUM SERPL-SCNC: 143 MMOL/L

## 2017-10-13 PROCEDURE — 36415 COLL VENOUS BLD VENIPUNCTURE: CPT

## 2017-10-13 PROCEDURE — 80053 COMPREHEN METABOLIC PANEL: CPT

## 2017-10-13 PROCEDURE — 83036 HEMOGLOBIN GLYCOSYLATED A1C: CPT

## 2017-10-14 ENCOUNTER — PATIENT MESSAGE (OUTPATIENT)
Dept: INTERNAL MEDICINE | Facility: CLINIC | Age: 82
End: 2017-10-14

## 2017-10-16 ENCOUNTER — TELEPHONE (OUTPATIENT)
Dept: INTERNAL MEDICINE | Facility: CLINIC | Age: 82
End: 2017-10-16

## 2017-10-16 NOTE — TELEPHONE ENCOUNTER
----- Message from Sharona Kirby sent at 10/12/2017  9:13 AM CDT -----  Contact: pt [phone]  Pt would like a call from the nurse in regards to him getting an appointment on a Tuesday or Wednesday this month in the morning ,please advise pt

## 2017-10-17 ENCOUNTER — IMMUNIZATION (OUTPATIENT)
Dept: INTERNAL MEDICINE | Facility: CLINIC | Age: 82
End: 2017-10-17
Payer: MEDICARE

## 2017-10-17 ENCOUNTER — OFFICE VISIT (OUTPATIENT)
Dept: INTERNAL MEDICINE | Facility: CLINIC | Age: 82
End: 2017-10-17
Payer: MEDICARE

## 2017-10-17 ENCOUNTER — HOSPITAL ENCOUNTER (OUTPATIENT)
Dept: RADIOLOGY | Facility: HOSPITAL | Age: 82
Discharge: HOME | End: 2017-10-17
Attending: INTERNAL MEDICINE
Payer: MEDICARE

## 2017-10-17 VITALS
HEART RATE: 74 BPM | SYSTOLIC BLOOD PRESSURE: 118 MMHG | HEIGHT: 66 IN | DIASTOLIC BLOOD PRESSURE: 64 MMHG | OXYGEN SATURATION: 95 % | WEIGHT: 206.56 LBS | BODY MASS INDEX: 33.2 KG/M2

## 2017-10-17 DIAGNOSIS — R10.12 ACUTE LUQ PAIN: Primary | ICD-10-CM

## 2017-10-17 DIAGNOSIS — R10.12 ACUTE LUQ PAIN: ICD-10-CM

## 2017-10-17 DIAGNOSIS — N18.30 CKD (CHRONIC KIDNEY DISEASE) STAGE 3, GFR 30-59 ML/MIN: ICD-10-CM

## 2017-10-17 PROCEDURE — 99499 UNLISTED E&M SERVICE: CPT | Mod: S$GLB,,, | Performed by: INTERNAL MEDICINE

## 2017-10-17 PROCEDURE — 99214 OFFICE O/P EST MOD 30 MIN: CPT | Mod: S$GLB,,, | Performed by: INTERNAL MEDICINE

## 2017-10-17 PROCEDURE — 99999 PR PBB SHADOW E&M-EST. PATIENT-LVL IV: CPT | Mod: PBBFAC,,, | Performed by: INTERNAL MEDICINE

## 2017-10-17 PROCEDURE — 90662 IIV NO PRSV INCREASED AG IM: CPT | Mod: S$GLB,,, | Performed by: INTERNAL MEDICINE

## 2017-10-17 PROCEDURE — 74020 XR ABDOMEN FLAT AND ERECT: CPT | Mod: 26,,, | Performed by: RADIOLOGY

## 2017-10-17 PROCEDURE — 74020 XR ABDOMEN FLAT AND ERECT: CPT | Mod: TC

## 2017-10-17 PROCEDURE — G0008 ADMIN INFLUENZA VIRUS VAC: HCPCS | Mod: S$GLB,,, | Performed by: INTERNAL MEDICINE

## 2017-10-17 RX ORDER — INSULIN ASPART 100 [IU]/ML
12 INJECTION, SOLUTION INTRAVENOUS; SUBCUTANEOUS
Qty: 15 ML | Refills: 3 | Status: SHIPPED | OUTPATIENT
Start: 2017-10-17 | End: 2018-03-26 | Stop reason: SDUPTHER

## 2017-10-17 RX ORDER — METFORMIN HYDROCHLORIDE 1000 MG/1
TABLET ORAL
Qty: 60 TABLET | Refills: 1 | Status: SHIPPED | OUTPATIENT
Start: 2017-10-17 | End: 2017-12-05

## 2017-10-18 NOTE — PROGRESS NOTES
CHIEF COMPLAINT:  Followup of diabetes plus abdominal discomfort.    HISTORY OF PRESENT ILLNESS:  The patient is a 81-year-old gentleman who we see   for insulin requiring diabetes, hypertension, hyperlipidemia who comes in today   for followup of his diabetes.  He did have an A1c performed, which showed his   blood sugars were averaging around 171.  His A1c was 7.6, which is down from   8.3.  However, his BUN and creatinine was 25 and 1.6, giving an estimated GFR of   40.  The patient is on metformin 1000 mg twice a day.  The patient does state   he checks his blood sugars four times a day.  According to his MedCard, he is   supposed to be on NovoLog 16 units t.i.d.; however, he states he takes anywhere   from 12 to 14 units depending on what his blood sugars are.  He rarely takes 16.    He does take 65 units of Lantus in the evening.  The patient also reports he   has been having problems with abdominal discomfort.  He states he was in   [place] visiting his daughter.  While he was taking a shower, he developed   pretty intense abdominal discomfort.  He had pressure in his abdomen, it passed   after about 15 minutes, he was able to complete the shower.  The following day,   it happened again.  He thought he was going to vomit.  He got up and went to the   bathroom, but nothing happened.  He does report his symptoms improved with   belching.  He does report having an acid taste in the back of his throat.  Last   night, he thought he was going to go to the Emergency Room because it happened   again.  It lasted about 40 minutes.  Each time when this occurred, he felt very   bloated.  He had no bowel changes.  No diarrhea.    REVIEW OF SYSTEMS:  Please see the patient entered review of systems.  The   patient does report his weight has been staying stable.  No fever or chills.  He   has a normal appetite.  On clarification, the patient thinks he may have lost   some weight because he has been working more.  He has  two to three bowel   movements a day, which is usual for him.  No change in the caliber.    PHYSICAL EXAMINATION:  GENERAL APPEARANCE:  No acute distress.  VITAL SIGNS:  Repeat blood pressure was much better.  HEENT:  Conjunctivae are clear.  Pupils were equal.  He does have a cataract   involving the left eye, right shows a lens replacement.  TMs are clear.  Nasal   septum is midline without discharge.  Oropharynx is without erythema.  PULMONARY:  Good inspiratory, expiratory breath sounds are heard.  Lungs are   clear to auscultation.  CARDIOVASCULAR:  S1, S2.  EXTREMITIES:  With trace edema.  ABDOMEN:  Nontender, nondistended, without hepatosplenomegaly.  The patient had   no rebound, no guarding.    ASSESSMENT:  1.  Abdominal discomfort, especially in the left upper quadrant with associated   bloating.  2.  Insulin requiring diabetes.  3.  Chronic kidney disease, stage III.    PLAN:  Did discuss the patient's CKD with him and I would like to wean him off   of metformin.  We are going to do so by decreasing it to 500 mg b.i.d. for one   week, then decrease to 500 mg daily for one week, then stop.  He is to continue   with his insulin on a sliding scale.  He is to continue with the Lantus at 65   units every evening, but for the NovoLog, he is going to use 12 units t.i.d.   plus a sliding scale; if his blood sugar is 200 or less, it will be 12 units, if   it is 200 to 250, it will be 14 units, 251 to 300, 16 units, 300 to 350, will   be 18 units.  We will also get an abdominal film today.  The patient is to   follow up in two weeks for reevaluation.  At that time, we will repeat a BMP.    We will see what his creatinine is doing and possibly refer him to Nephrology.      JDS/HN  dd: 10/18/2017 11:37:28 (CDT)  td: 10/19/2017 02:25:00 (CDT)  Doc ID   #[id number]  Job ID #[id number]    CC:     Answers for HPI/ROS submitted by the patient on 10/16/2017   Diabetes problem  Diabetes type: type 2  MedicAlert ID: No  Disease  duration: 35 years  blurred vision: No  chest pain: No  fatigue: No  foot paresthesias: No  foot ulcerations: No  polydipsia: No  polyphagia: No  polyuria: No  visual change: No  weakness: No  weight loss: No  Symptom course: improving  confusion: No  dizziness: No  hunger: No  mood changes: No  nervous/anxious: No  pallor: No  seizures: No  sleepiness: No  speech difficulty: No  sweats: No  tremors: No  blackouts: No  hospitalization: No  nocturnal hypoglycemia: No  required assistance: No  required glucagon: No  CVA: No  heart disease: Yes  impotence: Yes  nephropathy: No  peripheral neuropathy: No  PVD: No  retinopathy: No  autonomic neuropathy: No  CAD risks: dyslipidemia, family history, hypertension, diabetes mellitus  Current treatments: diet, insulin injections, intensive insulin program  Dose schedule: pre-breakfast, pre-lunch, pre-dinner, at bedtime  Given by: patient  Injection sites: abdominal wall  Home blood tests: 5+ x per day  Home urines: <1 x per month  Monitoring compliance: excellent  Blood glucose trend: fluctuating minimally  Weight trend: stable  Current diet: generally healthy, low fat/cholesterol, low salt  Meal planning: avoidance of concentrated sweets  Exercise: every other day  Dietitian visit: No  Eye exam current: Yes  Sees podiatrist: Yes

## 2017-10-20 ENCOUNTER — OFFICE VISIT (OUTPATIENT)
Dept: PODIATRY | Facility: CLINIC | Age: 82
End: 2017-10-20
Payer: MEDICARE

## 2017-10-20 ENCOUNTER — TELEPHONE (OUTPATIENT)
Dept: CARDIOLOGY | Facility: CLINIC | Age: 82
End: 2017-10-20

## 2017-10-20 VITALS
HEART RATE: 69 BPM | BODY MASS INDEX: 33.49 KG/M2 | SYSTOLIC BLOOD PRESSURE: 114 MMHG | WEIGHT: 208.38 LBS | HEIGHT: 66 IN | DIASTOLIC BLOOD PRESSURE: 58 MMHG

## 2017-10-20 DIAGNOSIS — M21.42 PES PLANUS OF BOTH FEET: ICD-10-CM

## 2017-10-20 DIAGNOSIS — M62.469 GASTROCNEMIUS EQUINUS, UNSPECIFIED LATERALITY: ICD-10-CM

## 2017-10-20 DIAGNOSIS — E11.42 DIABETIC POLYNEUROPATHY ASSOCIATED WITH TYPE 2 DIABETES MELLITUS: Primary | ICD-10-CM

## 2017-10-20 DIAGNOSIS — I87.8 VENOUS STASIS: ICD-10-CM

## 2017-10-20 DIAGNOSIS — M21.41 PES PLANUS OF BOTH FEET: ICD-10-CM

## 2017-10-20 DIAGNOSIS — E11.628 TYPE 2 DIABETES MELLITUS WITH PRESSURE CALLUS: ICD-10-CM

## 2017-10-20 DIAGNOSIS — B35.1 ONYCHOMYCOSIS DUE TO DERMATOPHYTE: ICD-10-CM

## 2017-10-20 DIAGNOSIS — L84 TYPE 2 DIABETES MELLITUS WITH PRESSURE CALLUS: ICD-10-CM

## 2017-10-20 PROCEDURE — 99212 OFFICE O/P EST SF 10 MIN: CPT | Mod: 25,S$GLB,, | Performed by: PODIATRIST

## 2017-10-20 PROCEDURE — 99999 PR PBB SHADOW E&M-EST. PATIENT-LVL III: CPT | Mod: PBBFAC,,, | Performed by: PODIATRIST

## 2017-10-20 PROCEDURE — 11721 DEBRIDE NAIL 6 OR MORE: CPT | Mod: Q9,S$GLB,, | Performed by: PODIATRIST

## 2017-10-20 PROCEDURE — 99499 UNLISTED E&M SERVICE: CPT | Mod: S$GLB,,, | Performed by: PODIATRIST

## 2017-10-20 NOTE — TELEPHONE ENCOUNTER
Pt called and spoke with MISA Reynolds. Says that he got the message and has an appt with Dr. Shin for f/u on 11-28-17 - will call if continues to have symptoms.

## 2017-10-20 NOTE — PROGRESS NOTES
Subjective:      Patient ID: Anjum Cai Jr. is a 81 y.o. male.    Chief Complaint: Diabetes Mellitus (PCP Dr. Martinez 10/17/17); Diabetic Foot Exam (Pat.need RX for diabet. shoes); Routine Foot Care; and Peripheral Neuropathy    Anjum is a 81 y.o. male who presents to the clinic upon referral from Dr. Peterson ref. provider found  for evaluation and treatment of diabetic feet. Anjum has a past medical history of Arthritis; BPH (benign prostatic hyperplasia); Cataract; Colon polyp (11/18/2015); Colon polyps; Coronary artery disease; DR (diabetic retinopathy); Dyslipidemia; Elevated PSA; Goiter, nontoxic, multinodular; Headaches, cluster; Hypertension; Kidney stones; Lump in the testicle; Rotator cuff tendinitis; Sleep apnea with use of continuous positive airway pressure (CPAP); and Type II or unspecified type diabetes mellitus with other specified manifestations, uncontrolled. Patient relates no major problem with feet. Only complaints today consist of tingling, numbness and occasional forefoot pain. Also complains of fungal toenails. He is using lotrimin with noted improvement.    PCP: Gavin Martinez MD    Last visit 10/17/17    Current shoe gear: Tennis shoes    Hemoglobin A1C   Date Value Ref Range Status   10/13/2017 7.6 (H) 4.0 - 5.6 % Final     Comment:     According to ADA guidelines, hemoglobin A1c <7.0% represents  optimal control in non-pregnant diabetic patients. Different  metrics may apply to specific patient populations.   Standards of Medical Care in Diabetes-2016.  For the purpose of screening for the presence of diabetes:  <5.7%     Consistent with the absence of diabetes  5.7-6.4%  Consistent with increasing risk for diabetes   (prediabetes)  >or=6.5%  Consistent with diabetes  Currently, no consensus exists for use of hemoglobin A1c  for diagnosis of diabetes for children.  This Hemoglobin A1c assay has significant interference with fetal   hemoglobin   (HbF). The results are invalid for patients with  abnormal amounts of   HbF,   including those with known Hereditary Persistence   of Fetal Hemoglobin. Heterozygous hemoglobin variants (HbAS, HbAC,   HbAD, HbAE, HbA2) do not significantly interfere with this assay;   however, presence of multiple variants in a sample may impact the %   interference.     04/28/2017 8.3 (H) 4.5 - 6.2 % Final     Comment:     According to ADA guidelines, hemoglobin A1C <7.0% represents  optimal control in non-pregnant diabetic patients.  Different  metrics may apply to specific populations.   Standards of Medical Care in Diabetes - 2016.  For the purpose of screening for the presence of diabetes:  <5.7%     Consistent with the absence of diabetes  5.7-6.4%  Consistent with increasing risk for diabetes   (prediabetes)  >or=6.5%  Consistent with diabetes  Currently no consensus exists for use of hemoglobin A1C  for diagnosis of diabetes for children.     12/09/2016 8.2 (H) 4.5 - 6.2 % Final     Comment:     According to ADA guidelines, hemoglobin A1C <7.0% represents  optimal control in non-pregnant diabetic patients.  Different  metrics may apply to specific populations.   Standards of Medical Care in Diabetes - 2016.  For the purpose of screening for the presence of diabetes:  <5.7%     Consistent with the absence of diabetes  5.7-6.4%  Consistent with increasing risk for diabetes   (prediabetes)  >or=6.5%  Consistent with diabetes  Currently no consensus exists for use of hemoglobin A1C  for diagnosis of diabetes for children.               Review of Systems   Constitution: Negative for chills, fever and malaise/fatigue.   Cardiovascular: Negative for chest pain, leg swelling, orthopnea and palpitations.   Respiratory: Negative for cough, shortness of breath and wheezing.    Skin: Positive for color change, dry skin and nail changes. Negative for itching, poor wound healing and rash.   Musculoskeletal: Negative for arthritis, gout, joint pain, joint swelling, muscle weakness and  myalgias.   Neurological: Positive for numbness, paresthesias and sensory change. Negative for disturbances in coordination, dizziness, focal weakness and tremors.           Objective:      Physical Exam   Cardiovascular:   Dorsalis pedis and posterior tibial pulses are diminished Bilaterally. Toes are cool to touch. Feet are warm proximally.There is decreased digital hair. Skin is atrophic, slightly hyperpigmented, and mildly edematous       Musculoskeletal:   Musculoskeletal:  Muscle strength is 5/5 in all groups bilaterally.  Metatarsophalangeal and subtalar range of motion are within normal limits without crepitus bilaterally. There is limitation of ankle dorsiflexion with knees extended and flexed Bilaterally. Medial arch collapse in stance with mild heel valgus.       Neurological:   [place]-Jose Antonio 5.07 monofilamant testing is diminished both feet. Sharp/dull sensation diminished Bilaterally. Light touch absent Bilaterally.       Skin:   Toenails 1-5 bilaterally are elongated by 2-3 mm, thickened by 2-3 mm, discolored/yellowed, dystrophic, brittle with subungual debris. No incurvation. Mild xerosis noted, bilat. No open wounds.  Hyperkeratotic lesions at the following locations: plantar 1st and 2nd MTPJ, bilat                 Assessment:       Encounter Diagnoses   Name Primary?    Diabetic polyneuropathy associated with type 2 diabetes mellitus Yes    Gastrocnemius equinus, unspecified laterality     Pes planus of both feet     Type 2 diabetes mellitus with pressure callus     Onychomycosis due to dermatophyte     Venous stasis          Plan:       Anjum was seen today for diabetes mellitus, diabetic foot exam, routine foot care and peripheral neuropathy.    Diagnoses and all orders for this visit:    Diabetic polyneuropathy associated with type 2 diabetes mellitus  -     DIABETIC SHOES FOR HOME USE    Gastrocnemius equinus, unspecified laterality  -     DIABETIC SHOES FOR HOME USE    Pes planus of  both feet  -     DIABETIC SHOES FOR HOME USE    Type 2 diabetes mellitus with pressure callus  -     DIABETIC SHOES FOR HOME USE    Onychomycosis due to dermatophyte    Venous stasis      I counseled the patient on his conditions, their implications and medical management.    Shoe inspection. Diabetic Foot Education. Patient reminded of the importance of good nutrition and blood sugar control to help prevent podiatric complications of diabetes. Patient instructed on proper foot hygeine. We discussed wearing proper shoe gear, daily foot inspections, never walking without protective shoe gear, never putting sharp instruments to feet    - With patient's permission, nails were aggressively reduced and debrided x 10 to their soft tissue attachment mechanically, removing all offending nail and debris. Patient relates relief following the procedure.    - Continue lotrimin solution to nails daily.

## 2017-10-20 NOTE — TELEPHONE ENCOUNTER
----- Message from Louie Shin MD sent at 10/20/2017 12:33 PM CDT -----  Not likely cardiac pain, but call for appt if cotinues  ----- Message -----  From: Freida Narvaez MA  Sent: 10/20/2017  10:08 AM  To: Louie Shin MD, Aleida ANSARI Staff    Mr. Cai stopped by the clinic today to express his concern about having some abdominal pressure and discomfort. He took a Nitro on 10/17 while experiencing abdominal discomfort and the discomfort subsided so he believes it mat be heart related.  He is currently scheduled for a f/u on 11/28 but is unsure if he should be seen sooner.     Thank you,  Freida'

## 2017-10-24 RX ORDER — ATORVASTATIN CALCIUM 40 MG/1
40 TABLET, FILM COATED ORAL DAILY
Qty: 30 TABLET | Refills: 6 | Status: SHIPPED | OUTPATIENT
Start: 2017-10-24 | End: 2018-05-14 | Stop reason: SDUPTHER

## 2017-10-31 ENCOUNTER — TELEPHONE (OUTPATIENT)
Dept: OPHTHALMOLOGY | Facility: CLINIC | Age: 82
End: 2017-10-31

## 2017-10-31 ENCOUNTER — OFFICE VISIT (OUTPATIENT)
Dept: INTERNAL MEDICINE | Facility: CLINIC | Age: 82
End: 2017-10-31
Payer: MEDICARE

## 2017-10-31 VITALS
HEART RATE: 60 BPM | HEIGHT: 66 IN | BODY MASS INDEX: 34.01 KG/M2 | SYSTOLIC BLOOD PRESSURE: 128 MMHG | OXYGEN SATURATION: 9 % | DIASTOLIC BLOOD PRESSURE: 60 MMHG | WEIGHT: 211.63 LBS

## 2017-10-31 DIAGNOSIS — R10.13 EPIGASTRIC ABDOMINAL PAIN: Primary | ICD-10-CM

## 2017-10-31 PROCEDURE — 99999 PR PBB SHADOW E&M-EST. PATIENT-LVL III: CPT | Mod: PBBFAC,,, | Performed by: INTERNAL MEDICINE

## 2017-10-31 PROCEDURE — 99499 UNLISTED E&M SERVICE: CPT | Mod: S$GLB,,, | Performed by: INTERNAL MEDICINE

## 2017-10-31 PROCEDURE — 99214 OFFICE O/P EST MOD 30 MIN: CPT | Mod: S$GLB,,, | Performed by: INTERNAL MEDICINE

## 2017-10-31 RX ORDER — BLOOD SUGAR DIAGNOSTIC
STRIP MISCELLANEOUS
COMMUNITY
Start: 2017-10-22 | End: 2018-03-06 | Stop reason: SDUPTHER

## 2017-10-31 NOTE — PROGRESS NOTES
CHIEF COMPLAINT:  Followup.    HISTORY OF PRESENT ILLNESS:  The patient is a 81-year-old gentleman who we see   for insulin requiring diabetes, hypertension and hyperlipidemia, who comes in   today for followup of his diabetes.  The patient is being weaned off of   metformin, today is his last dose.  The patient is also on a sliding scale of   NovoLog, reports this is working well.  He does check his blood sugars 4 times a   day, the lowest has been 89, the highest was in the 220s, this was last night.    It was his wedding anniversary.  His wife had  a few years ago.  He felt a   little depressed.  He did go out to eat and overate.  His blood sugar this   morning was 190.  The patient also was seen with complaints of epigastric   abdominal discomfort and bloating.  He reports that he still has some bloating.    After we saw him last time, he took a sublingual nitroglycerin and symptoms   went away.  He did get in contact with his cardiologist's office.  He states he   was told they did not feel this was related to his heart.    REVIEW OF SYSTEMS:  Bloating as mentioned above in HPI, epigastric discomfort.    No symptoms of polyuria, polyphagia or polydipsia.    PHYSICAL EXAMINATION:  GENERAL APPEARANCE:  No acute distress.  HEENT:  Conjunctivae are clear.  Pupils are equal and reactive.  TMs are clear.    Nasal septum is midline without discharge.  Oropharynx is without erythema.  NECK:  Trachea is midline without JVD, without thyromegaly.  PULMONARY:  Good inspiratory, expiratory breath sounds are heard.  Lungs are   clear to auscultation.  CARDIOVASCULAR:  S1, S2.  Rhythm appeared to be normal.  EXTREMITIES:  With trace to 1+ edema.  ABDOMEN:  Nontender, nondistended, without hepatosplenomegaly, without rebound,   without guarding.    COMMENTS:  Did discuss with the patient about his medications.  We will go ahead   and try adding Gas-X and see if that helps.  If he ever has any more episodes   of abdominal  discomfort, he is to continue with the omeprazole twice a day.    ASSESSMENT:  1.  Insulin requiring diabetes.  2.  Epigastric abdominal discomfort.    PLAN:  We will get an ultrasound of the abdomen.  The patient is to continue   with insulin sliding scale.  We will also prescribe him Gas-X to try if he has   any more episodes of epigastric abdominal discomfort.      JUDI/IN  dd: 10/31/2017 13:40:40 (CDT)  td: 11/01/2017 01:01:40 (LYNNE)  Doc ID   #[id number]  Job ID #[id number]    CC:

## 2017-11-01 ENCOUNTER — PATIENT MESSAGE (OUTPATIENT)
Dept: INTERNAL MEDICINE | Facility: CLINIC | Age: 82
End: 2017-11-01

## 2017-11-01 ENCOUNTER — HOSPITAL ENCOUNTER (OUTPATIENT)
Dept: RADIOLOGY | Facility: HOSPITAL | Age: 82
Discharge: HOME | End: 2017-11-01
Attending: INTERNAL MEDICINE
Payer: MEDICARE

## 2017-11-01 DIAGNOSIS — R10.13 EPIGASTRIC ABDOMINAL PAIN: ICD-10-CM

## 2017-11-01 DIAGNOSIS — K80.20 GALLSTONES: Primary | ICD-10-CM

## 2017-11-01 PROCEDURE — 76700 US EXAM ABDOM COMPLETE: CPT | Mod: TC

## 2017-11-01 PROCEDURE — 76700 US EXAM ABDOM COMPLETE: CPT | Mod: 26,,, | Performed by: RADIOLOGY

## 2017-11-02 ENCOUNTER — TELEPHONE (OUTPATIENT)
Dept: PODIATRY | Facility: CLINIC | Age: 82
End: 2017-11-02

## 2017-11-02 ENCOUNTER — TELEPHONE (OUTPATIENT)
Dept: INTERNAL MEDICINE | Facility: CLINIC | Age: 82
End: 2017-11-02

## 2017-11-02 ENCOUNTER — PATIENT MESSAGE (OUTPATIENT)
Dept: INTERNAL MEDICINE | Facility: CLINIC | Age: 82
End: 2017-11-02

## 2017-11-02 NOTE — TELEPHONE ENCOUNTER
----- Message from Hellen Washington sent at 11/2/2017 10:31 AM CDT -----  Contact: [facility] [place]   [facility] need notes stating that patient has a pressure callus for shoe order

## 2017-11-02 NOTE — TELEPHONE ENCOUNTER
----- Message from Ina Vidales sent at 11/2/2017  9:54 AM CDT -----  Contact: Patient [phone]  Patient called and said he got the test results and would like to speak to you concerning the next step    Please call and advise.    Thank You

## 2017-11-03 ENCOUNTER — TELEPHONE (OUTPATIENT)
Dept: RADIOLOGY | Facility: HOSPITAL | Age: 82
End: 2017-11-03

## 2017-11-06 ENCOUNTER — HOSPITAL ENCOUNTER (OUTPATIENT)
Dept: RADIOLOGY | Facility: HOSPITAL | Age: 82
Discharge: HOME | End: 2017-11-06
Attending: INTERNAL MEDICINE
Payer: MEDICARE

## 2017-11-06 ENCOUNTER — PATIENT MESSAGE (OUTPATIENT)
Dept: INTERNAL MEDICINE | Facility: CLINIC | Age: 82
End: 2017-11-06

## 2017-11-06 DIAGNOSIS — R10.13 EPIGASTRIC ABDOMINAL PAIN: Primary | ICD-10-CM

## 2017-11-06 DIAGNOSIS — K80.20 GALLSTONES: ICD-10-CM

## 2017-11-06 DIAGNOSIS — R10.13 EPIGASTRIC ABDOMINAL PAIN: ICD-10-CM

## 2017-11-06 PROCEDURE — 78227 HEPATOBIL SYST IMAGE W/DRUG: CPT | Mod: 26,,, | Performed by: NUCLEAR MEDICINE

## 2017-11-06 PROCEDURE — B4155 EF INCOMPLETE/MODULAR: HCPCS

## 2017-11-06 PROCEDURE — 78227 HEPATOBIL SYST IMAGE W/DRUG: CPT | Mod: TC

## 2017-11-07 ENCOUNTER — OFFICE VISIT (OUTPATIENT)
Dept: OPTOMETRY | Facility: CLINIC | Age: 82
End: 2017-11-07
Payer: MEDICARE

## 2017-11-07 ENCOUNTER — TELEPHONE (OUTPATIENT)
Dept: INTERNAL MEDICINE | Facility: CLINIC | Age: 82
End: 2017-11-07

## 2017-11-07 DIAGNOSIS — H52.7 REFRACTIVE ERROR: ICD-10-CM

## 2017-11-07 DIAGNOSIS — E11.9 TYPE 2 DIABETES MELLITUS WITHOUT RETINOPATHY: Primary | ICD-10-CM

## 2017-11-07 DIAGNOSIS — H25.12 NUCLEAR SCLEROSIS OF LEFT EYE: ICD-10-CM

## 2017-11-07 PROCEDURE — 92014 COMPRE OPH EXAM EST PT 1/>: CPT | Mod: S$GLB,,, | Performed by: OPTOMETRIST

## 2017-11-07 PROCEDURE — 99499 UNLISTED E&M SERVICE: CPT | Mod: S$GLB,,, | Performed by: OPTOMETRIST

## 2017-11-07 PROCEDURE — 99999 PR PBB SHADOW E&M-EST. PATIENT-LVL II: CPT | Mod: PBBFAC,,, | Performed by: OPTOMETRIST

## 2017-11-07 NOTE — TELEPHONE ENCOUNTER
----- Message from Gavin Martinez MD sent at 11/6/2017  5:19 PM CST -----  Please contact patient. Notify that his HIDA scan was normal. I would like to refer him to Gastroenterology. Referral is in.

## 2017-11-07 NOTE — PROGRESS NOTES
HPI     Diabetic eye exam\  Using tears as needed  No blurred vision  Doesn't feel left cataract is ready     Last edited by Jose A Morgan, OD on 11/7/2017  9:25 AM. (History)            Assessment /Plan     For exam results, see Encounter Report.    Type 2 diabetes mellitus without retinopathy    Nuclear sclerosis of left eye    Refractive error      1. No diabetic retinopathy, no csme. Return in 1 year for dilated eye exam.  2. Educated pt on presence of cataracts and effects on vision. No surgery at this time. Recheck in one year.  3. Spec Rx given. Different lens options discussed with patient. RTC 1 year full exam.

## 2017-11-08 ENCOUNTER — PATIENT MESSAGE (OUTPATIENT)
Dept: GASTROENTEROLOGY | Facility: CLINIC | Age: 82
End: 2017-11-08

## 2017-11-09 ENCOUNTER — TELEPHONE (OUTPATIENT)
Dept: GASTROENTEROLOGY | Facility: CLINIC | Age: 82
End: 2017-11-09

## 2017-11-09 RX ORDER — ERGOCALCIFEROL 1.25 MG/1
CAPSULE ORAL
Qty: 3 CAPSULE | Refills: 1 | Status: SHIPPED | OUTPATIENT
Start: 2017-11-09 | End: 2018-05-14 | Stop reason: SDUPTHER

## 2017-11-09 NOTE — TELEPHONE ENCOUNTER
----- Message from Onelia Treviño sent at 11/9/2017 10:29 AM CST -----  Contact: self - [phone]  Girish - ashlyns f/u appt with dr richey - please call patient at [phone]

## 2017-11-09 NOTE — TELEPHONE ENCOUNTER
Spoke with patient.  Stated  would like for him to see Dr.James Stout for pressure in his stomach.  Appointment scheduled for him to see Dr.James Stout on 11/20 for 4:00.

## 2017-11-09 NOTE — TELEPHONE ENCOUNTER
Message left for patient to return my call regarding an appointment.  Dr.James Stout said we would have to put on cancellation list for him or he can see a midlevel provider.

## 2017-11-14 ENCOUNTER — PATIENT MESSAGE (OUTPATIENT)
Dept: INTERNAL MEDICINE | Facility: CLINIC | Age: 82
End: 2017-11-14

## 2017-11-14 NOTE — TELEPHONE ENCOUNTER
Spoke with pt informed him that we have received his paper work  Just waiting for paper work to be signed so it can be faxed over will call pt back once it has been done

## 2017-11-20 ENCOUNTER — PATIENT MESSAGE (OUTPATIENT)
Dept: INTERNAL MEDICINE | Facility: CLINIC | Age: 82
End: 2017-11-20

## 2017-11-20 ENCOUNTER — TELEPHONE (OUTPATIENT)
Dept: INTERNAL MEDICINE | Facility: CLINIC | Age: 82
End: 2017-11-20

## 2017-11-20 ENCOUNTER — OFFICE VISIT (OUTPATIENT)
Dept: GASTROENTEROLOGY | Facility: CLINIC | Age: 82
End: 2017-11-20
Payer: MEDICARE

## 2017-11-20 ENCOUNTER — LAB VISIT (OUTPATIENT)
Dept: LAB | Facility: HOSPITAL | Age: 82
End: 2017-11-20
Attending: INTERNAL MEDICINE
Payer: MEDICARE

## 2017-11-20 VITALS
HEART RATE: 67 BPM | WEIGHT: 209.88 LBS | SYSTOLIC BLOOD PRESSURE: 113 MMHG | DIASTOLIC BLOOD PRESSURE: 50 MMHG | BODY MASS INDEX: 33.73 KG/M2 | HEIGHT: 66 IN

## 2017-11-20 DIAGNOSIS — E08.22 DIABETES MELLITUS DUE TO UNDERLYING CONDITION WITH STAGE 3 CHRONIC KIDNEY DISEASE, WITHOUT LONG-TERM CURRENT USE OF INSULIN: ICD-10-CM

## 2017-11-20 DIAGNOSIS — N18.30 DIABETES MELLITUS DUE TO UNDERLYING CONDITION WITH STAGE 3 CHRONIC KIDNEY DISEASE, WITHOUT LONG-TERM CURRENT USE OF INSULIN: ICD-10-CM

## 2017-11-20 DIAGNOSIS — K80.20 GALLSTONES: Primary | ICD-10-CM

## 2017-11-20 DIAGNOSIS — I25.118 CORONARY ARTERY DISEASE OF NATIVE ARTERY OF NATIVE HEART WITH STABLE ANGINA PECTORIS: ICD-10-CM

## 2017-11-20 DIAGNOSIS — E78.00 PURE HYPERCHOLESTEROLEMIA: ICD-10-CM

## 2017-11-20 DIAGNOSIS — I10 ESSENTIAL HYPERTENSION: ICD-10-CM

## 2017-11-20 DIAGNOSIS — Z86.010 HISTORY OF COLON POLYPS: ICD-10-CM

## 2017-11-20 LAB
ALBUMIN SERPL BCP-MCNC: 3.3 G/DL
ALP SERPL-CCNC: 71 U/L
ALT SERPL W/O P-5'-P-CCNC: 21 U/L
ANION GAP SERPL CALC-SCNC: 7 MMOL/L
AST SERPL-CCNC: 19 U/L
BASOPHILS # BLD AUTO: 0.06 K/UL
BASOPHILS NFR BLD: 0.8 %
BILIRUB SERPL-MCNC: 0.7 MG/DL
BUN SERPL-MCNC: 21 MG/DL
CALCIUM SERPL-MCNC: 8.5 MG/DL
CHLORIDE SERPL-SCNC: 108 MMOL/L
CHOLEST SERPL-MCNC: 125 MG/DL
CHOLEST/HDLC SERPL: 2.7 {RATIO}
CO2 SERPL-SCNC: 28 MMOL/L
CREAT SERPL-MCNC: 1.4 MG/DL
DIFFERENTIAL METHOD: ABNORMAL
EOSINOPHIL # BLD AUTO: 0.3 K/UL
EOSINOPHIL NFR BLD: 3.5 %
ERYTHROCYTE [DISTWIDTH] IN BLOOD BY AUTOMATED COUNT: 14.7 %
EST. GFR  (AFRICAN AMERICAN): 54.9 ML/MIN/1.73 M^2
EST. GFR  (NON AFRICAN AMERICAN): 47.4 ML/MIN/1.73 M^2
ESTIMATED AVG GLUCOSE: 163 MG/DL
GLUCOSE SERPL-MCNC: 106 MG/DL
HBA1C MFR BLD HPLC: 7.3 %
HCT VFR BLD AUTO: 34.9 %
HDLC SERPL-MCNC: 47 MG/DL
HDLC SERPL: 37.6 %
HGB BLD-MCNC: 11.4 G/DL
IMM GRANULOCYTES # BLD AUTO: 0.02 K/UL
IMM GRANULOCYTES NFR BLD AUTO: 0.3 %
LDLC SERPL CALC-MCNC: 68.2 MG/DL
LYMPHOCYTES # BLD AUTO: 2.1 K/UL
LYMPHOCYTES NFR BLD: 28.7 %
MCH RBC QN AUTO: 28.6 PG
MCHC RBC AUTO-ENTMCNC: 32.7 G/DL
MCV RBC AUTO: 88 FL
MONOCYTES # BLD AUTO: 0.7 K/UL
MONOCYTES NFR BLD: 9.2 %
NEUTROPHILS # BLD AUTO: 4.3 K/UL
NEUTROPHILS NFR BLD: 57.5 %
NONHDLC SERPL-MCNC: 78 MG/DL
NRBC BLD-RTO: 0 /100 WBC
PLATELET # BLD AUTO: 167 K/UL
PMV BLD AUTO: 11.4 FL
POTASSIUM SERPL-SCNC: 4.1 MMOL/L
PROT SERPL-MCNC: 7.6 G/DL
RBC # BLD AUTO: 3.98 M/UL
SODIUM SERPL-SCNC: 143 MMOL/L
TRIGL SERPL-MCNC: 49 MG/DL
WBC # BLD AUTO: 7.43 K/UL

## 2017-11-20 PROCEDURE — 36415 COLL VENOUS BLD VENIPUNCTURE: CPT

## 2017-11-20 PROCEDURE — 85025 COMPLETE CBC W/AUTO DIFF WBC: CPT

## 2017-11-20 PROCEDURE — 80061 LIPID PANEL: CPT

## 2017-11-20 PROCEDURE — 99214 OFFICE O/P EST MOD 30 MIN: CPT | Mod: S$GLB,,, | Performed by: INTERNAL MEDICINE

## 2017-11-20 PROCEDURE — 99499 UNLISTED E&M SERVICE: CPT | Mod: S$GLB,,, | Performed by: INTERNAL MEDICINE

## 2017-11-20 PROCEDURE — 80053 COMPREHEN METABOLIC PANEL: CPT

## 2017-11-20 PROCEDURE — 83036 HEMOGLOBIN GLYCOSYLATED A1C: CPT

## 2017-11-20 PROCEDURE — 99999 PR PBB SHADOW E&M-EST. PATIENT-LVL III: CPT | Mod: PBBFAC,,, | Performed by: INTERNAL MEDICINE

## 2017-11-20 NOTE — TELEPHONE ENCOUNTER
----- Message from Bev Martin sent at 11/20/2017 10:33 AM CST -----  Contact: self/[phone]  Patient called in regards needing to talk with Dr Martinez about the rx for his shoes. Patient wants to know why is taking more than two weeks for him to follow up on the request. Please call and advise.      Thank you!!!

## 2017-11-20 NOTE — PROGRESS NOTES
Subjective:       Patient ID: Anjum Cai Jr. is a 81 y.o. male.    Chief Complaint: Abdominal Pain    HPI  Review of Systems    Objective:      Physical Exam   Abdominal: Soft. Normal appearance and bowel sounds are normal. He exhibits no shifting dullness, no distension, no fluid wave, no abdominal bruit and no mass. There is no hepatosplenomegaly.       Assessment:       1. Gallstones    2. History of colon polyps        Plan:         HISTORY OF PRESENT ILLNESS:  This is an add-on appointment for Mr. Cai.    He is a 81-year-old gentleman who has had several recent attacks of epigastric   pain.  This is something completely new for him.  This is a pain isolated to the   epigastric region.  It is a tightness type pain.  One time, it radiated to the   back.  It was associated with nausea, although he did not vomit.  He felt like   if he would vomit he would feel better.  Also, associated with belching   afterwards.  The pain lasted around 10 minutes.  None of these episodes woke him   up.  He does not say that any of them were particularly caused by any specific   food.  He denies any heartburn.  He has been on Prilosec twice a day for many   years and this gives him complete relief of his heartburn.  There has been no   change in his bowel movements.    He has had a workup, which included ultrasound, which showed multiple gallstones   and a HIDA scan, which was normal.    ASSESSMENT AND DECISION MAKIN.  Epigastric pain.  I think this likely is biliary colic.  I think by the   description of the pain that is the most likely diagnosis.  The fact that he is   on the Prilosec twice a day pretty much rules out peptic ulcer disease.  I do   not think it was cardiac in origin.  I told him that I think he will likely need   to have cholecystectomy.  This is more complicated as he is on blood thinner   because of a heart attack last year.  For the time being, I encouraged him to be   moderate in his diet and  try to eat low-fat foods in general.  I specifically   advised him that if he has a prolonged episode of pain, for instance one that   lasts more than 2 hours, he should go to the Emergency Room because of the   possibility of cholecystitis.  2.  History of colon polyps.  I did a colonoscopy and because of his very   tortuous colon, could not completely navigate the colon.  Therefore, I referred   him to Dr. Patino at the end of 2015 to have a balloon-assisted colonoscopy and   that was successful at navigating the colon.  Multiple polyps were found.  Dr. Patino recommended a followup within six months, which would have been a year or   year and a half ago.  However, he was unable to schedule that because of his   heart attack and inability to get off blood thinners.  He has a followup exam   with Cardiology next month.  If they give him a clearance for colonoscopy, then   I think he should proceed with that.  I recommended to him that he get the   procedure done with Dr. Posada who also does the balloon-assisted enteroscopy.    A 30-minute appointment, greater than half the time face-to-face counseling.      MARIAA  dd: 11/20/2017 16:23:34 (CST)  td: 11/21/2017 11:54:54 (CST)  Doc ID   #[id number]  Job ID #[id number]    CC:

## 2017-11-22 ENCOUNTER — TELEPHONE (OUTPATIENT)
Dept: INTERNAL MEDICINE | Facility: CLINIC | Age: 82
End: 2017-11-22

## 2017-11-22 NOTE — TELEPHONE ENCOUNTER
----- Message from Bev Martin sent at 11/22/2017  8:43 AM CST -----  Contact: self/[phone]  Patient called in regards needing to talk with Dr Martinez nurse about paper work that has not been done yet, patient stated that if he does not receive a call today, he will call management. Please call and advise.       Thank you!!!

## 2017-11-22 NOTE — TELEPHONE ENCOUNTER
Spoke with pt and informed him again that  is away from the office until 12/11. I also informed pt that Dr. Martinez has the paperwork that he's requesting with him. I will contact management to see if there's anything else we can do to help the pt.

## 2017-11-27 ENCOUNTER — PATIENT MESSAGE (OUTPATIENT)
Dept: CARDIOLOGY | Facility: CLINIC | Age: 82
End: 2017-11-27

## 2017-11-27 RX ORDER — IRBESARTAN 75 MG/1
75 TABLET ORAL DAILY
Qty: 30 TABLET | Refills: 6 | Status: SHIPPED | OUTPATIENT
Start: 2017-11-27 | End: 2017-11-28 | Stop reason: SDUPTHER

## 2017-11-28 ENCOUNTER — PATIENT MESSAGE (OUTPATIENT)
Dept: INTERNAL MEDICINE | Facility: CLINIC | Age: 82
End: 2017-11-28

## 2017-11-28 RX ORDER — IRBESARTAN 75 MG/1
75 TABLET ORAL DAILY
Qty: 30 TABLET | Refills: 6 | Status: SHIPPED | OUTPATIENT
Start: 2017-11-28 | End: 2018-11-26 | Stop reason: SDUPTHER

## 2017-11-28 RX ORDER — METFORMIN HYDROCHLORIDE 1000 MG/1
TABLET ORAL
Qty: 60 TABLET | Refills: 0 | OUTPATIENT
Start: 2017-11-28

## 2017-11-29 NOTE — TELEPHONE ENCOUNTER
I spoke with Marcy from [employer] and she states that she's the one handling Mr Trell briceno. Ms. Vidal states that  is within the time frame for his shoes and that the paperwork must be signed by Dr. Martinez only in order to be approved by his insurance  and on Dec. 11 th when  return we(her and I ) will go over the paperwork and make sure that the paperwork is taking care of. Ms Vidal will also contact pt and inform of the situation.

## 2017-12-04 NOTE — PROGRESS NOTES
"Mr. Cai is a patient of Dr. Shin and was last seen in [facility] 4/25/2017.      Subjective:   Patient ID:  Anjum Cai Jr. is a 81 y.o. male who presents for follow-up of Coronary Artery Disease (Annual Exam)  .   HPI:    Mr. Trell Kay Is a 82yo male with CAD (s/p PCI to LAD and RCA x 2 in 2007; [facility] in 12/2016 noted ostial OM2/D1 lesions with no intervention), HTN, HLD, DM II, DAMIAN, and CKD III here for follow up. Overall he feels well and has no cardiac complaints. He was recently diagnosed with gallstones. Mr. Cai denies chest pain with exertion or at rest, palpitations, SOB, MYERS, dizziness, syncope, claudication, PND, or orthopnea. He has chronic leg edema. He does not notice whether it has worsened or whether it resolves overnight. He does not wear compression stockings.  He is currently taking ASA 81mg, brilinta 90mg BID, and atorvastatin 40mg for CAD management. LDL 68.2 on 11/20/2017. He denies bleeding episodes. He is also taking irbesartan 75mg and metoprolol succinate 50mg daily. BPs are in the 110s-130s systolic. Creatinine is 1.4. K is 4.1. Hepatic transaminases are within normal limits. HA1C is 7.3 on insulin.   He did not do cardiac rehab after his angiogram in 12/2016. He says he walks and mows the lawn and experiences no limitations in activity tolerance, but he does not exercise regularly. Weight is stable since last clinic visit.    Recent Cardiac Tests:    2D Echo (2/9/2016):  CONCLUSIONS     1 - Normal left ventricular systolic function (EF 60-65%).     2 - Low normal right ventricular systolic function .     3 - Left ventricular diastolic dysfunction.     Current Outpatient Prescriptions   Medication Sig    ACCU-CHEK SMARTVIEW TEST STRIP Strp     ASPIRIN (ASPIR-81 ORAL) Take 1 tablet by mouth Daily.    atorvastatin (LIPITOR) 40 MG tablet Take 1 tablet (40 mg total) by mouth once daily.    BD INSULIN PEN NEEDLE UF MINI 31 gauge x 3/16" Ndle USE WITH INSULIN " "INJECTION FOUR TIMES DAILY    BD INSULIN SYRINGE ULTRA-FINE 1 mL 30 x 1/2" Syrg USE AS DIRECTED FOUR TIMES DAILY    blood sugar diagnostic, drum (ACCU-CHEK COMPACT TEST) Strp CHECK BLOOD GLUCOSE 4 TIMES DAILY    clotrimazole (LOTRIMIN) 1 % Soln Apply topically once daily. Apply to filed toenails daily    finasteride (PROSCAR) 5 mg tablet Take 1 tablet (5 mg total) by mouth once daily.    insulin aspart (NOVOLOG FLEXPEN) 100 unit/mL InPn pen Inject 12 Units into the skin 3 (three) times daily with meals. Insulin sliding scale : 100 to 200 -12 units.                                     200 to 250 -14 units.                                      251 to 300 -16 units.                                       301 to 350 - 18 units.    insulin glargine (LANTUS SOLOSTAR) 100 unit/mL (3 mL) InPn pen inject 65 units subcutaneously every evening    irbesartan (AVAPRO) 75 MG tablet Take 1 tablet (75 mg total) by mouth once daily.    metformin (GLUCOPHAGE) 1000 MG tablet Take 500 mg twice a day for one weeks then 500 mg one a day for one weeks then stop.    metoprolol succinate (TOPROL-XL) 50 MG 24 hr tablet Take 1 tablet (50 mg total) by mouth once daily.    MULTIVITAMIN W-MINERALS/LUTEIN (CENTRUM SILVER ORAL) Take by mouth once daily.    nitroGLYCERIN (NITROSTAT) 0.4 MG SL tablet Place 1 tablet (0.4 mg total) under the tongue every 5 (five) minutes as needed for Chest pain.    omega-3 fatty acids-vitamin E (FISH OIL) 1,000 mg Cap Take 3 capsules by mouth Daily.    omeprazole (PRILOSEC) 20 MG capsule TAKE ONE CAPSULE BY MOUTH TWICE DAILY before meals    ticagrelor (BRILINTA) 90 mg tablet Take 1 tablet (90 mg total) by mouth 2 (two) times daily.    VITAMIN D2 50,000 unit capsule take one capsule by mouth every 30 days     No current facility-administered medications for this visit.        Review of Systems   Constitution: Negative for malaise/fatigue.   Eyes: Negative for blurred vision.   Cardiovascular: Negative " "for chest pain, claudication, dyspnea on exertion, irregular heartbeat, leg swelling, orthopnea, palpitations, paroxysmal nocturnal dyspnea and syncope.   Respiratory: Negative for shortness of breath.    Hematologic/Lymphatic: Negative for bleeding problem.   Skin: Negative for rash.   Musculoskeletal: Negative for myalgias.   Gastrointestinal: Negative for abdominal pain, constipation, diarrhea and nausea.   Genitourinary: Negative for hematuria.   Neurological: Negative for headaches, loss of balance and numbness.   Psychiatric/Behavioral: Negative for altered mental status.   Allergic/Immunologic: Negative for persistent infections.     Objective:     Right Arm BP - Sittin/61 (17 0830)  Left Arm BP - Sittin/60 (1730)    /60 (BP Location: Left arm, Patient Position: Sitting, BP Method: Medium (Automatic))   Pulse 70   Ht 5' 6" (1.676 m)   Wt 97.4 kg (214 lb 11.7 oz)   BMI 34.66 kg/m²     Physical Exam   Constitutional: He is oriented to person, place, and time. He appears well-developed and well-nourished.   HENT:   Head: Normocephalic.   Nose: Nose normal.   Eyes: Pupils are equal, round, and reactive to light.   Neck: No JVD present. Carotid bruit is present (right - soft).   Cardiovascular: Normal rate, regular rhythm, S1 normal and S2 normal.  Exam reveals no gallop.    No murmur heard.  Pulses:       Carotid pulses are 2+ on the right side, and 2+ on the left side.       Radial pulses are 2+ on the right side, and 2+ on the left side.        Dorsalis pedis pulses are 1+ on the right side, and 1+ on the left side.   Pulmonary/Chest: Breath sounds normal. No respiratory distress.   Abdominal: Soft. Bowel sounds are normal. He exhibits no distension. There is no tenderness.   Musculoskeletal: Normal range of motion. He exhibits edema (bilateral pitting edema +2 to lower legs).   Neurological: He is alert and oriented to person, place, and time.   Skin: Skin is warm and dry. " No erythema.   Psychiatric: He has a normal mood and affect. His speech is normal and behavior is normal.   Nursing note and vitals reviewed.    Lab Results   Component Value Date     11/20/2017    K 4.1 11/20/2017    MG 1.6 12/09/2016     11/20/2017    CO2 28 11/20/2017    BUN 21 11/20/2017    CREATININE 1.4 11/20/2017     11/20/2017    HGBA1C 7.3 (H) 11/20/2017    AST 19 11/20/2017    ALT 21 11/20/2017    ALBUMIN 3.3 (L) 11/20/2017    PROT 7.6 11/20/2017    BILITOT 0.7 11/20/2017    WBC 7.43 11/20/2017    HGB 11.4 (L) 11/20/2017    HCT 34.9 (L) 11/20/2017    MCV 88 11/20/2017     11/20/2017    TSH 1.336 10/16/2013    CHOL 125 11/20/2017    HDL 47 11/20/2017    LDLCALC 68.2 11/20/2017    TRIG 49 11/20/2017         Recent Labs  Lab 12/09/16  0527   INR 1.0        Test(s) Reviewed  I have reviewed the following in detail:  [] Stress test   [] Angiography   [] Echocardiogram   [x] Labs   [x] Other:  EKG     Assessment:         1. Coronary artery disease of native artery of native heart with stable angina pectoris. On DAPT and statin. Will stop brilinta (one year post [facility]). EKG shows no significant changes. Patient has no angina, SOB or other signs of ischemia. No unstable arrhythmia. No symptoms of heart failure. No additional cardiac testing required prior to proceeding with surgery for gallstones or carpal tunnel syndrome.      2. Essential hypertension. Controlled on current medications.      3. Hyperlipidemia, unspecified hyperlipidemia type. LDL 68.2 on atorvastatin 40mg.      4. Kidney disease, chronic, stage III (GFR 30-59 ml/min). Creatinine 1.4.      7. Type 2 diabetes mellitus without retinopathy. HA1C 7.3.     8. Bilateral leg edema. Likely venous insufficiency. Low salt diet and compression stockings prescribed.    9. Right carotid bruit. Will order carotid ultrasound for evaluation.      Plan:     Anjum was seen today for coronary artery disease.    Diagnoses and all orders for  this visit:    Coronary artery disease of native artery of native heart with stable angina pectoris  -     Lipid panel; Future; Expected date: 06/06/2018  -     Basic metabolic panel; Future; Expected date: 06/06/2018    Essential hypertension    Hyperlipidemia, unspecified hyperlipidemia type  -     Lipid panel; Future; Expected date: 06/06/2018    Kidney disease, chronic, stage III (GFR 30-59 ml/min)    Dyslipidemia    Type 2 diabetes mellitus without retinopathy    Bilateral leg edema  -     COMPRESSION STOCKINGS    Right carotid bruit  -     CAR Ultrasound doppler carotid bliateral; Future; Expected date: 12/05/2017      Can stop brilinta (ticagrelor).   Compression stockings.  Low salt diet.  Patient has been encouraged to exercise a minimum of 30 minutes daily, 5 times per week.   Carotid ultrasound.  Return to clinic in 6 months after blood tests.    Return in about 6 months (around 6/5/2018).    ------------------------------------------------------------------    WESTLEY Daniels, NP-C  Consult Cardiology

## 2017-12-05 ENCOUNTER — OFFICE VISIT (OUTPATIENT)
Dept: CARDIOLOGY | Facility: CLINIC | Age: 82
End: 2017-12-05
Payer: MEDICARE

## 2017-12-05 ENCOUNTER — HOSPITAL ENCOUNTER (OUTPATIENT)
Dept: CARDIOLOGY | Facility: CLINIC | Age: 82
Discharge: HOME | End: 2017-12-05
Payer: MEDICARE

## 2017-12-05 VITALS
HEIGHT: 66 IN | SYSTOLIC BLOOD PRESSURE: 134 MMHG | DIASTOLIC BLOOD PRESSURE: 60 MMHG | WEIGHT: 214.75 LBS | HEART RATE: 70 BPM | BODY MASS INDEX: 34.51 KG/M2

## 2017-12-05 DIAGNOSIS — I10 ESSENTIAL HYPERTENSION: ICD-10-CM

## 2017-12-05 DIAGNOSIS — R09.89 RIGHT CAROTID BRUIT: ICD-10-CM

## 2017-12-05 DIAGNOSIS — E78.5 DYSLIPIDEMIA: ICD-10-CM

## 2017-12-05 DIAGNOSIS — E78.5 HYPERLIPIDEMIA, UNSPECIFIED HYPERLIPIDEMIA TYPE: ICD-10-CM

## 2017-12-05 DIAGNOSIS — I25.118 CORONARY ARTERY DISEASE OF NATIVE ARTERY OF NATIVE HEART WITH STABLE ANGINA PECTORIS: Primary | ICD-10-CM

## 2017-12-05 DIAGNOSIS — E11.9 TYPE 2 DIABETES MELLITUS WITHOUT RETINOPATHY: ICD-10-CM

## 2017-12-05 DIAGNOSIS — R60.0 BILATERAL LEG EDEMA: ICD-10-CM

## 2017-12-05 DIAGNOSIS — N18.30 KIDNEY DISEASE, CHRONIC, STAGE III (GFR 30-59 ML/MIN): ICD-10-CM

## 2017-12-05 DIAGNOSIS — I25.10 CORONARY ATHEROSCLEROSIS: ICD-10-CM

## 2017-12-05 DIAGNOSIS — I21.4 NON-ST ELEVATION MYOCARDIAL INFARCTION (NSTEMI): ICD-10-CM

## 2017-12-05 PROCEDURE — 99999 PR PBB SHADOW E&M-EST. PATIENT-LVL III: CPT | Mod: PBBFAC,,, | Performed by: NURSE PRACTITIONER

## 2017-12-05 PROCEDURE — 99214 OFFICE O/P EST MOD 30 MIN: CPT | Mod: S$GLB,,, | Performed by: NURSE PRACTITIONER

## 2017-12-05 PROCEDURE — 93000 ELECTROCARDIOGRAM COMPLETE: CPT | Mod: S$GLB,,, | Performed by: INTERNAL MEDICINE

## 2017-12-05 PROCEDURE — 99499 UNLISTED E&M SERVICE: CPT | Mod: S$GLB,,, | Performed by: NURSE PRACTITIONER

## 2017-12-05 NOTE — PATIENT INSTRUCTIONS
Can stop brilinta (ticagrelor).   Compression stockings.  Low salt diet.  Patient has been encouraged to exercise a minimum of 30 minutes daily, 5 times per week.   Carotid ultrasound.  Return to clinic in 6 months after blood tests.

## 2017-12-13 ENCOUNTER — PATIENT MESSAGE (OUTPATIENT)
Dept: INTERNAL MEDICINE | Facility: CLINIC | Age: 82
End: 2017-12-13

## 2017-12-14 ENCOUNTER — PATIENT MESSAGE (OUTPATIENT)
Dept: INTERNAL MEDICINE | Facility: CLINIC | Age: 82
End: 2017-12-14

## 2017-12-14 ENCOUNTER — CLINICAL SUPPORT (OUTPATIENT)
Dept: CARDIOLOGY | Facility: CLINIC | Age: 82
End: 2017-12-14
Attending: NURSE PRACTITIONER
Payer: MEDICARE

## 2017-12-14 ENCOUNTER — TELEPHONE (OUTPATIENT)
Dept: INTERNAL MEDICINE | Facility: CLINIC | Age: 82
End: 2017-12-14

## 2017-12-14 DIAGNOSIS — R09.89 RIGHT CAROTID BRUIT: ICD-10-CM

## 2017-12-14 LAB — INTERNAL CAROTID STENOSIS: NORMAL

## 2017-12-14 PROCEDURE — 93880 EXTRACRANIAL BILAT STUDY: CPT | Mod: S$GLB,,, | Performed by: INTERNAL MEDICINE

## 2017-12-14 NOTE — TELEPHONE ENCOUNTER
----- Message from Carlyn Washington sent at 12/13/2017  3:59 PM CST -----  Contact: self/[phone] cell  Pt is calling to check on the status for paper work on diabetic shoes. Please call and advise.    Thank You

## 2017-12-14 NOTE — TELEPHONE ENCOUNTER
Pt came into office today to see if he can get the paperwork that he needs for his diabetic shoes but Dr Martinez is out of the office on today  I informed pt that I would give him a call on tomorrow to see if we can work him in on Dr Martinez's schedule for Monday per Gretchen

## 2017-12-18 ENCOUNTER — OFFICE VISIT (OUTPATIENT)
Dept: INTERNAL MEDICINE | Facility: CLINIC | Age: 82
End: 2017-12-18
Payer: MEDICARE

## 2017-12-18 VITALS
OXYGEN SATURATION: 99 % | HEIGHT: 66 IN | DIASTOLIC BLOOD PRESSURE: 60 MMHG | SYSTOLIC BLOOD PRESSURE: 105 MMHG | BODY MASS INDEX: 33.7 KG/M2 | WEIGHT: 209.69 LBS | HEART RATE: 69 BPM

## 2017-12-18 DIAGNOSIS — K80.20 GALLSTONES: ICD-10-CM

## 2017-12-18 DIAGNOSIS — M21.42 FLAT FEET, BILATERAL: Primary | ICD-10-CM

## 2017-12-18 DIAGNOSIS — E11.9 ENCOUNTER FOR DIABETIC FOOT EXAM: ICD-10-CM

## 2017-12-18 DIAGNOSIS — H83.3X3 NOISE-INDUCED HEARING LOSS OF BOTH EARS: ICD-10-CM

## 2017-12-18 DIAGNOSIS — M21.41 FLAT FEET, BILATERAL: Primary | ICD-10-CM

## 2017-12-18 PROCEDURE — 99999 PR PBB SHADOW E&M-EST. PATIENT-LVL IV: CPT | Mod: PBBFAC,,, | Performed by: INTERNAL MEDICINE

## 2017-12-18 PROCEDURE — 99499 UNLISTED E&M SERVICE: CPT | Mod: S$GLB,,, | Performed by: INTERNAL MEDICINE

## 2017-12-18 PROCEDURE — 99212 OFFICE O/P EST SF 10 MIN: CPT | Mod: S$GLB,,, | Performed by: INTERNAL MEDICINE

## 2017-12-18 NOTE — PROGRESS NOTES
CHIEF COMPLAINT:  Diabetic foot exam.    HISTORY OF PRESENT ILLNESS:  The patient is an 82-year-old gentleman with a   history of insulin requiring diabetes, hypertension, coronary artery disease who   comes in today for diabetic foot exam.  The patient had been seen by Podiatry,   who recommended a prescription for diabetic shoes.  The patient is here today   for paperwork to be filled out.    REVIEW OF SYSTEMS:  Please see the patient entered review of systems.    PHYSICAL EXAMINATION:  EXTREMITIES:  Exam was limited to just his feet.  The patient has decreased   pulses in both feet.  He appeared to have a normal monofilament exam.  The   patient had flatfeet.  The second toe of each foot was slightly deformed in   shape.    ASSESSMENT:  1.  Diabetic foot exam.  2.  Flatfeet.  3.  Foot abnormality.    PLAN:  We will put the patient in for diabetic shoes.      JDS/HN  dd: 12/18/2017 14:11:46 (CST)  td: 12/19/2017 09:27:09 (CST)  Doc ID   #[id number]  Job ID #[id number]    CC:     Answers for HPI/ROS submitted by the patient on 12/17/2017   Diabetes problem  Diabetes type: type 2  MedicAlert ID: No  Disease duration: 35 years  blurred vision: No  chest pain: No  foot paresthesias: No  foot ulcerations: No  polyphagia: No  polyuria: No  weakness: No  weight loss: No  Symptom course: improving  confusion: No  dizziness: No  headaches: No  hunger: No  mood changes: No  nervous/anxious: No  pallor: No  seizures: No  sleepiness: No  speech difficulty: No  sweats: No  tremors: No  blackouts: No  hospitalization: Yes  nocturnal hypoglycemia: No  required assistance: No  required glucagon: No  CVA: No  heart disease: Yes  peripheral neuropathy: No  retinopathy: No  autonomic neuropathy: Yes  CAD risks: family history, diabetes mellitus  Current treatments: diet, insulin injections, intensive insulin program  Treatment compliance: all of the time  Dose schedule: pre-breakfast, pre-lunch, pre-dinner, at bedtime  Given by:  patient  Injection sites: abdominal wall  Home blood tests: 3-4 x per day  Home urines: <1 x per month  Monitoring compliance: excellent  Blood glucose trend: no change  Weight trend: stable  Current diet: diabetic  Meal planning: carbohydrate counting  Exercise: daily  Dietitian visit: No  Eye exam current: Yes  Sees podiatrist: Yes

## 2017-12-20 ENCOUNTER — TELEPHONE (OUTPATIENT)
Dept: INTERNAL MEDICINE | Facility: CLINIC | Age: 82
End: 2017-12-20

## 2017-12-20 RX ORDER — OMEPRAZOLE 20 MG/1
20 CAPSULE, DELAYED RELEASE ORAL
Qty: 60 CAPSULE | Refills: 4 | Status: CANCELLED | OUTPATIENT
Start: 2017-12-20

## 2017-12-20 NOTE — TELEPHONE ENCOUNTER
----- Message from America Bae sent at 12/20/2017 11:57 AM CST -----  Contact: self  Pt is calling in regards to having a nurse contact him. Per pt he has paperwork that needs to be filled out today.    Pt can be reached at  [phone].    Thank you

## 2017-12-20 NOTE — TELEPHONE ENCOUNTER
Spoke with pt, he stated that paperwork was left with you at 12/18/17 visit for diabetic shoes. Pt stated that the paperwork has not been faxed in, the shoe office is contacting him about it and he will be happy to come pick it up because it need to be done today or he will have to wait until next year. I could not locate it, please advise.

## 2017-12-20 NOTE — TELEPHONE ENCOUNTER
----- Message from Maite Brooks sent at 12/20/2017 12:19 PM CST -----  Contact: Pt at [phone]  Pt requesting a call back regarding paperwork that is supposed to be completed for him.

## 2017-12-21 RX ORDER — OMEPRAZOLE 20 MG/1
20 CAPSULE, DELAYED RELEASE ORAL
Qty: 60 CAPSULE | Refills: 4 | Status: CANCELLED | OUTPATIENT
Start: 2017-12-21

## 2017-12-21 RX ORDER — OMEPRAZOLE 20 MG/1
20 CAPSULE, DELAYED RELEASE ORAL
Qty: 60 CAPSULE | Refills: 4 | Status: SHIPPED | OUTPATIENT
Start: 2017-12-21 | End: 2018-06-14 | Stop reason: SDUPTHER

## 2017-12-22 ENCOUNTER — TELEPHONE (OUTPATIENT)
Dept: INTERNAL MEDICINE | Facility: CLINIC | Age: 82
End: 2017-12-22

## 2017-12-22 NOTE — TELEPHONE ENCOUNTER
----- Message from Jnen Ashley sent at 12/22/2017  1:12 PM CST -----  Contact: Pt  Pt is calling in regards to docs for shoes, has not heard anything since 12/20/17.  Pt can be reached at [phone].    Thank you

## 2018-01-12 ENCOUNTER — PATIENT MESSAGE (OUTPATIENT)
Dept: INTERNAL MEDICINE | Facility: CLINIC | Age: 83
End: 2018-01-12

## 2018-01-23 ENCOUNTER — OFFICE VISIT (OUTPATIENT)
Dept: SURGERY | Facility: CLINIC | Age: 83
End: 2018-01-23
Payer: MEDICARE

## 2018-01-23 VITALS
HEIGHT: 66 IN | TEMPERATURE: 98 F | SYSTOLIC BLOOD PRESSURE: 117 MMHG | WEIGHT: 213.31 LBS | DIASTOLIC BLOOD PRESSURE: 57 MMHG | HEART RATE: 71 BPM | BODY MASS INDEX: 34.28 KG/M2

## 2018-01-23 DIAGNOSIS — R10.9 ABDOMINAL PAIN, UNSPECIFIED ABDOMINAL LOCATION: Primary | ICD-10-CM

## 2018-01-23 PROCEDURE — 99499 UNLISTED E&M SERVICE: CPT | Mod: S$GLB,,, | Performed by: SURGERY

## 2018-01-23 PROCEDURE — 99203 OFFICE O/P NEW LOW 30 MIN: CPT | Mod: S$GLB,,, | Performed by: SURGERY

## 2018-01-23 PROCEDURE — 99999 PR PBB SHADOW E&M-EST. PATIENT-LVL III: CPT | Mod: PBBFAC,,, | Performed by: SURGERY

## 2018-01-23 NOTE — Clinical Note
January 23, 2018      Gavin Martinez MD  [address]  [place] [address]           [facility] General Surgery  [address]  [place] [address]  Phone: [phone]          Patient: Anjum Cai Jr.   MR Number: [id number]   YOB: 1935   Date of Visit: 1/23/2018       Dear Dr. Gavin Martinez:    Thank you for referring Anjum Cai to me for evaluation. Attached you will find relevant portions of my assessment and plan of care.    If you have questions, please do not hesitate to call me. I look forward to following Anjum Cai along with you.    Sincerely,    Arnaud Avery MD    Enclosure  CC:  No Recipients    If you would like to receive this communication electronically, please contact [email or web address] or [phone] to request more information on [employer] Link access.    For providers and/or their staff who would like to refer a patient to [facility], please contact us through our one-stop-shop provider referral line, [facility], at [phone].    If you feel you have received this communication in error or would no longer like to receive these types of communications, please e-mail [email or web address]

## 2018-01-23 NOTE — LETTER
[facility] - General Surgery  [address]  [place] [address]  Phone: [phone] January 23, 2018      Gavin Martinez MD  [address]  [place] [address]    Patient: Anjum Cai Jr.   MR Number: [id number]   YOB: 1935   Date of Visit: 1/23/2018     Dear Dr. Martinez:    Thank you for referring Anjum Cai to me for evaluation. Below are the relevant portions of my assessment and plan of care.    Patient presents with gallstones, DM2 with poor control, Renal Manifestations, Diabetic foot problems and Retinopathy, CAD with stents, status post MI 2016, now off blood thinners, obesity.     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  Will get CT.  If otherwise negative for lap zhang.  Will need Cardiology clearance and will stay on ASA.      If you have questions, please do not hesitate to call me. I look forward to following Anjum along with you.    Sincerely,      Arnaud Avery MD   Section Head - General, Laparoscopic, Bariatric  Acute Care and Oncologic Surgery   - Surgical Weight Loss Program  [facility]    WSR/nithink    CC  Gavin Stout MD

## 2018-01-26 ENCOUNTER — OFFICE VISIT (OUTPATIENT)
Dept: OTOLARYNGOLOGY | Facility: CLINIC | Age: 83
End: 2018-01-26
Payer: MEDICARE

## 2018-01-26 ENCOUNTER — CLINICAL SUPPORT (OUTPATIENT)
Dept: AUDIOLOGY | Facility: CLINIC | Age: 83
End: 2018-01-26
Payer: MEDICARE

## 2018-01-26 VITALS
WEIGHT: 213.19 LBS | DIASTOLIC BLOOD PRESSURE: 62 MMHG | SYSTOLIC BLOOD PRESSURE: 114 MMHG | HEART RATE: 76 BPM | BODY MASS INDEX: 34.41 KG/M2

## 2018-01-26 DIAGNOSIS — H90.3 BILATERAL SENSORINEURAL HEARING LOSS: Primary | ICD-10-CM

## 2018-01-26 DIAGNOSIS — H90.3 SENSORINEURAL HEARING LOSS, BILATERAL: Primary | ICD-10-CM

## 2018-01-26 PROCEDURE — 92557 COMPREHENSIVE HEARING TEST: CPT | Mod: S$GLB,,, | Performed by: AUDIOLOGIST

## 2018-01-26 PROCEDURE — 99203 OFFICE O/P NEW LOW 30 MIN: CPT | Mod: S$GLB,,, | Performed by: NURSE PRACTITIONER

## 2018-01-26 PROCEDURE — 99999 PR PBB SHADOW E&M-EST. PATIENT-LVL IV: CPT | Mod: PBBFAC,,, | Performed by: NURSE PRACTITIONER

## 2018-01-26 NOTE — LETTER
January 26, 2018      Gavin Martinez MD  [address]  [facility] [address]           [facility] - Otorhinolaryngology  [address]  [facility] [address]  Phone: [phone]  Fax: [phone]          Patient: Anjum Cai Jr.   MR Number: [id number]   YOB: 1935   Date of Visit: 1/26/2018       Dear Dr. Gavin Martinez:    Thank you for referring Anjum Cai to me for evaluation. Attached you will find relevant portions of my assessment and plan of care.    If you have questions, please do not hesitate to call me. I look forward to following Anjum Cai along with you.    Sincerely,    Palmer Morales, NP    Enclosure  CC:  No Recipients    If you would like to receive this communication electronically, please contact [email or web address] or [phone] to request more information on [employer] Link access.    For providers and/or their staff who would like to refer a patient to [facility], please contact us through our one-stop-shop provider referral line, [facility], at [phone].    If you feel you have received this communication in error or would no longer like to receive these types of communications, please e-mail [email or web address]

## 2018-01-26 NOTE — PROGRESS NOTES
Subjective:       Patient ID: Anjum Cai Jr. is a 82 y.o. male.    Chief Complaint: Hearing Loss    Hearing Loss:   Chronicity:  Chronic  Onset:  Over 10 years ago  Progression since onset:  Gradually worsening  Frequency:  Constantly  Pain scale:  0/10  Severity:  Moderate  Hearing loss characteristics:  Moderate, trouble hearing TV, difficult on telephone and worse background noise   Associated symptoms: tinnitus.  No dizziness, no vertigo, no ear congestion, no ear pain, no fever, no headaches, no buzzing, no rhinorrhea, no aural fullness, no fluctuance, no imbalance, no otalgia, no otorrhea, no facial weakness, no visual disturbances and not masked by noise.  Aggravated by:  Nothing  Risk factors for lung disease:  Noise exposure  Treatments tried:  Nothing   PMH includes: noise exposure and ear infections.  No stroke, TIA, or systemic emboli, no neurologic disease, no dizziness, no ear surgery, no environmental allergies, no ear tubes, no ototoxic drugs and no recent URI.   HTN, HLD, DM       Past Medical History: Patient has a past medical history of Arthritis; BPH (benign prostatic hyperplasia); Cataract; Colon polyp (11/18/2015); Colon polyps; Coronary artery disease; DR (diabetic retinopathy); Dyslipidemia; Elevated PSA; Goiter, nontoxic, multinodular; Headaches, cluster; Hypertension; Kidney stones; Lump in the testicle; Rotator cuff tendinitis; Sleep apnea with use of continuous positive airway pressure (CPAP); and Type II or unspecified type diabetes mellitus with other specified manifestations, uncontrolled.    Past Surgical History: Patient has a past surgical history that includes Coronary stent placement (02/02/2007); Prostate biopsy (2012); TONSILLECTOMY, ADENOIDECTOMY; Rotator cuff repair (Right, 2014); Cardiac catheterization (2007); Cataract extraction w/  intraocular lens implant (Right, 09/27/2016); Rotator cuff repair (Left, 2013); Trigger finger release (Right, 2004); and Inguinal  "hernia repair (Right, 1995).    Social History: Patient reports that he quit smoking about 39 years ago. He has never used smokeless tobacco. He reports that he does not drink alcohol or use drugs.    Family History: family history includes Cancer in his mother; Cataracts in his father; Diabetes in his mother; Heart disease in his father.    Medications:   Current Outpatient Prescriptions   Medication Sig    ACCU-CHEK SMARTVIEW TEST STRIP Strp     ASPIRIN (ASPIR-81 ORAL) Take 1 tablet by mouth Daily.    atorvastatin (LIPITOR) 40 MG tablet Take 1 tablet (40 mg total) by mouth once daily.    BD INSULIN PEN NEEDLE UF MINI 31 gauge x 3/16" Ndle USE WITH INSULIN INJECTION FOUR TIMES DAILY    BD INSULIN SYRINGE ULTRA-FINE 1 mL 30 x 1/2" Syrg USE AS DIRECTED FOUR TIMES DAILY    blood sugar diagnostic, drum (ACCU-CHEK COMPACT TEST) Strp CHECK BLOOD GLUCOSE 4 TIMES DAILY    clotrimazole (LOTRIMIN) 1 % Soln Apply topically once daily. Apply to filed toenails daily    finasteride (PROSCAR) 5 mg tablet Take 1 tablet (5 mg total) by mouth once daily.    insulin aspart (NOVOLOG FLEXPEN) 100 unit/mL InPn pen Inject 12 Units into the skin 3 (three) times daily with meals. Insulin sliding scale : 100 to 200 -12 units.                                     200 to 250 -14 units.                                      251 to 300 -16 units.                                       301 to 350 - 18 units.    insulin glargine (LANTUS SOLOSTAR) 100 unit/mL (3 mL) InPn pen inject 65 units subcutaneously every evening    irbesartan (AVAPRO) 75 MG tablet Take 1 tablet (75 mg total) by mouth once daily.    metoprolol succinate (TOPROL-XL) 50 MG 24 hr tablet Take 1 tablet (50 mg total) by mouth once daily.    MULTIVITAMIN W-MINERALS/LUTEIN (CENTRUM SILVER ORAL) Take by mouth once daily.    nitroGLYCERIN (NITROSTAT) 0.4 MG SL tablet Place 1 tablet (0.4 mg total) under the tongue every 5 (five) minutes as needed for Chest pain.    omega-3 " fatty acids-vitamin E (FISH OIL) 1,000 mg Cap Take 2 capsules by mouth Daily.     omeprazole (PRILOSEC) 20 MG capsule Take 1 capsule (20 mg total) by mouth 2 (two) times daily before meals.    ticagrelor (BRILINTA) 90 mg tablet Take 1 tablet (90 mg total) by mouth 2 (two) times daily.    VITAMIN D2 50,000 unit capsule take one capsule by mouth every 30 days    ZOSTAVAX, PF, 19,400 unit/0.65 mL injection      No current facility-administered medications for this visit.        Allergies: Patient is allergic to ace inhibitors and cefadroxil.    Review of Systems   Constitutional: Negative for activity change, appetite change, chills, diaphoresis, fatigue, fever and unexpected weight change.   HENT: Positive for hearing loss and tinnitus. Negative for congestion, dental problem, drooling, ear discharge, ear pain, facial swelling, mouth sores, nosebleeds, postnasal drip, rhinorrhea, sinus pain, sinus pressure, sneezing, sore throat, trouble swallowing and voice change.    Eyes: Negative for pain and visual disturbance.   Respiratory: Negative for cough, choking, chest tightness, shortness of breath, wheezing and stridor.    Cardiovascular: Negative for chest pain.   Gastrointestinal: Negative for nausea and vomiting.   Musculoskeletal: Negative for gait problem, neck pain and neck stiffness.   Skin: Negative for color change, rash and wound.   Allergic/Immunologic: Negative for environmental allergies.   Neurological: Negative for dizziness, vertigo, seizures, syncope, facial asymmetry, speech difficulty, weakness, light-headedness, numbness and headaches.   Psychiatric/Behavioral: Negative for agitation, confusion and decreased concentration. The patient is not nervous/anxious.        Objective:       /62   Pulse 76   Wt 96.7 kg (213 lb 3 oz)   BMI 34.41 kg/m²     Physical Exam   Constitutional: He is oriented to person, place, and time. He appears well-developed and well-nourished.   HENT:   Head:  Normocephalic and atraumatic. Not macrocephalic and not microcephalic. Head is without raccoon's eyes, without Wilson's sign, without abrasion, without contusion, without laceration, without right periorbital erythema and without left periorbital erythema. Hair is normal.   Right Ear: Tympanic membrane, external ear and ear canal normal. No lacerations. No drainage, swelling or tenderness. No foreign bodies. No mastoid tenderness. Tympanic membrane is not injected, not scarred, not perforated, not erythematous, not retracted and not bulging. Tympanic membrane mobility is normal. No middle ear effusion. No hemotympanum. Decreased hearing is noted.   Left Ear: External ear and ear canal normal. No lacerations. No drainage, swelling or tenderness. No foreign bodies. No mastoid tenderness. Tympanic membrane is scarred. Tympanic membrane is not injected, not perforated, not erythematous, not retracted and not bulging. Tympanic membrane mobility is normal.  No middle ear effusion. No hemotympanum. Decreased hearing is noted.   Nose: Nose normal. No mucosal edema, rhinorrhea, nose lacerations, sinus tenderness, nasal deformity, septal deviation or nasal septal hematoma. No epistaxis.  No foreign bodies. Right sinus exhibits no maxillary sinus tenderness and no frontal sinus tenderness. Left sinus exhibits no maxillary sinus tenderness and no frontal sinus tenderness.   Mouth/Throat: Uvula is midline, oropharynx is clear and moist and mucous membranes are normal. Abnormal dentition.   No AOM or OE.  No mastoid, tenderness, swelling, or redness.  Facial nerve intact.     Eyes: Conjunctivae, EOM and lids are normal. Pupils are equal, round, and reactive to light.   Neck: Trachea normal and normal range of motion. Neck supple. No spinous process tenderness and no muscular tenderness present. No neck rigidity. No edema, no erythema and normal range of motion present. No thyroid mass and no thyromegaly present.    Pulmonary/Chest: Effort normal.   Abdominal: Soft.   Musculoskeletal: Normal range of motion.   Lymphadenopathy:        Head (right side): No submental, no submandibular, no tonsillar, no preauricular and no posterior auricular adenopathy present.        Head (left side): No submental, no submandibular, no tonsillar, no preauricular, no posterior auricular and no occipital adenopathy present.     He has no cervical adenopathy.   Neurological: He is alert and oriented to person, place, and time. No cranial nerve deficit or sensory deficit.   Skin: Skin is warm and dry.   Psychiatric: He has a normal mood and affect. His behavior is normal. Judgment and thought content normal.   Nursing note and vitals reviewed.      As a result of this patients history and examination findings, a comprehensive audiogram was ordered to determine the level of hearing/hearing loss.        Assessment:       1. Bilateral sensorineural hearing loss        Plan:       Audiogram Reviewed: Mild to Severe B SNHL.  Noise and hearing Protection pamphlet provided.  Hearing conservation strongly recommended.  Trial of amplification bilaterally also recommended.  Re-check of hearing in 18-24 months or sooner if subjective change noted.  Schedule HAC.  F/U with PCP as per schedule.  RTC prn.

## 2018-01-26 NOTE — PATIENT INSTRUCTIONS
Audiogram Reviewed: Mild to Severe B SNHL.  Noise and hearing Protection pamphlet provided.  Hearing conservation strongly recommended.  Trial of amplification bilaterally also recommended (patient not interested).  Re-check of hearing in 18-24 months or sooner if subjective change noted.  Schedule HAC.  F/U with PCP as per schedule.  RTC prn.

## 2018-01-31 ENCOUNTER — HOSPITAL ENCOUNTER (OUTPATIENT)
Dept: RADIOLOGY | Facility: HOSPITAL | Age: 83
Discharge: HOME | End: 2018-01-31
Attending: SURGERY
Payer: MEDICARE

## 2018-01-31 DIAGNOSIS — R10.9 ABDOMINAL PAIN, UNSPECIFIED ABDOMINAL LOCATION: ICD-10-CM

## 2018-01-31 LAB
CREAT SERPL-MCNC: 1.3 MG/DL (ref 0.5–1.4)
POCT GLUCOSE: 104 MG/DL (ref 70–110)
SAMPLE: NORMAL

## 2018-01-31 PROCEDURE — 74176 CT ABD & PELVIS W/O CONTRAST: CPT | Mod: TC

## 2018-01-31 PROCEDURE — 74176 CT ABD & PELVIS W/O CONTRAST: CPT | Mod: 26,,, | Performed by: RADIOLOGY

## 2018-02-04 ENCOUNTER — PATIENT MESSAGE (OUTPATIENT)
Dept: SURGERY | Facility: CLINIC | Age: 83
End: 2018-02-04

## 2018-02-05 ENCOUNTER — TELEPHONE (OUTPATIENT)
Dept: SURGERY | Facility: CLINIC | Age: 83
End: 2018-02-05

## 2018-02-05 DIAGNOSIS — K80.20 GALLSTONES: Primary | ICD-10-CM

## 2018-02-05 NOTE — TELEPHONE ENCOUNTER
Notified pt of test results per Dr. Avery.      Scheduled surgery for 2/21.  Pt to f/u in clinic on 2/15 and to tentatively see anesthesia same day.    Pt ok with plan.

## 2018-02-05 NOTE — TELEPHONE ENCOUNTER
----- Message from Arnaud Avery MD sent at 2/2/2018 10:20 AM CST -----  For lap zhang.  Anesthesia clearance.  Will wait on [facility].

## 2018-02-15 ENCOUNTER — ANESTHESIA EVENT (OUTPATIENT)
Dept: SURGERY | Facility: HOSPITAL | Age: 83
DRG: 419 | End: 2018-02-15
Payer: MEDICARE

## 2018-02-15 ENCOUNTER — TELEPHONE (OUTPATIENT)
Dept: CARDIOLOGY | Facility: CLINIC | Age: 83
End: 2018-02-15

## 2018-02-15 ENCOUNTER — HOSPITAL ENCOUNTER (OUTPATIENT)
Dept: PREADMISSION TESTING | Facility: HOSPITAL | Age: 83
Discharge: HOME | End: 2018-02-15
Attending: ANESTHESIOLOGY
Payer: MEDICARE

## 2018-02-15 ENCOUNTER — OFFICE VISIT (OUTPATIENT)
Dept: SURGERY | Facility: CLINIC | Age: 83
End: 2018-02-15
Payer: MEDICARE

## 2018-02-15 VITALS
TEMPERATURE: 98 F | SYSTOLIC BLOOD PRESSURE: 140 MMHG | HEART RATE: 57 BPM | WEIGHT: 212.69 LBS | RESPIRATION RATE: 18 BRPM | HEIGHT: 67 IN | DIASTOLIC BLOOD PRESSURE: 64 MMHG | OXYGEN SATURATION: 99 % | BODY MASS INDEX: 33.38 KG/M2

## 2018-02-15 VITALS
HEART RATE: 67 BPM | BODY MASS INDEX: 34.17 KG/M2 | SYSTOLIC BLOOD PRESSURE: 126 MMHG | WEIGHT: 212.63 LBS | TEMPERATURE: 98 F | DIASTOLIC BLOOD PRESSURE: 61 MMHG | HEIGHT: 66 IN

## 2018-02-15 DIAGNOSIS — K80.20 GALLSTONES: ICD-10-CM

## 2018-02-15 PROCEDURE — 99499 UNLISTED E&M SERVICE: CPT | Mod: S$GLB,,, | Performed by: SURGERY

## 2018-02-15 PROCEDURE — 1126F AMNT PAIN NOTED NONE PRSNT: CPT | Mod: S$GLB,,, | Performed by: SURGERY

## 2018-02-15 PROCEDURE — 99999 PR PBB SHADOW E&M-EST. PATIENT-LVL III: CPT | Mod: PBBFAC,,, | Performed by: SURGERY

## 2018-02-15 PROCEDURE — 99213 OFFICE O/P EST LOW 20 MIN: CPT | Mod: S$GLB,,, | Performed by: SURGERY

## 2018-02-15 PROCEDURE — 3008F BODY MASS INDEX DOCD: CPT | Mod: S$GLB,,, | Performed by: SURGERY

## 2018-02-15 PROCEDURE — 1159F MED LIST DOCD IN RCRD: CPT | Mod: S$GLB,,, | Performed by: SURGERY

## 2018-02-15 NOTE — TELEPHONE ENCOUNTER
Patient has a Revised Cardiac Risk Index (RCRI) score of 2. The patient is a low-moderate risk, with a 6.6% risk for major cardiac event. Patient has no angina, SOB or other signs of ischemia. No unstable arrhythmia. No symptoms of heart failure. No additional cardiac testing required prior to proceeding with surgery 2/21/18.

## 2018-02-15 NOTE — LETTER
[facility] - General Surgery  [address]  [place] [address]  Phone: [phone] February 15, 2018      Gavin Martinez MD  [address]  [place] [address]    Patient: Anjum Cai Jr.   MR Number: [id number]   YOB: 1935   Date of Visit: 2/15/2018     Dear Dr. Martinez:    Thank you for referring Anjum Cai to me for evaluation. Below are the relevant portions of my assessment and plan of care.    Assessment:       1. Gallstones      Also, large lih, DM2 with poor control, renal manifestations, diabetic foot   problems and retinopathy, CAD with stents, s/p MI 2016, is off blood thinners,   obesity.  Plan:    Lap zhang (will get sister to help out), Anesthesia and Aardiology clearance.  Consider open LIH with mesh (23 hour stay).    If you have questions, please do not hesitate to call me. I look forward to following Anjum along with you.    Sincerely,      Arnaud Avery MD   Section Head - General, Laparoscopic, Bariatric  Acute Care and Oncologic Surgery   - Surgical Weight Loss Program  [facility]    WSR/ze

## 2018-02-15 NOTE — DISCHARGE INSTRUCTIONS
Your surgery has been scheduled for:__________________________________________    You should report to:  ____[facility], located on the [place] side of the first floor of the           [facility] ([phone])  ____[facility], located on the [facility] side of the            Second floor of the [facility] ([phone])  ____3rd Floor [facility] located on the [facility] side of the [facility] [phone]  Please Note   - Tell your doctor if you take Aspirin, products containing Aspirin, herbal medications  or blood thinners, such as Coumadin, Ticlid, or Plavix.  (Consult your provider regarding holding or stopping before surgery).  - Arrange for someone to drive you home following surgery.  You will not be allowed to leave the surgical facility alone or drive yourself home following sedation and anesthesia.  Before Surgery  - Stop taking all herbal medications 14days prior to surgery  - No Motrin/Advil (Ibuprofen) 7 days before surgery  - No Aleve (Naproxen) 7 days before surgery  - Stop Taking Asprin, products containing Asprin _____days before surgery  - Stop taking blood thinners_______days before surgery  - Refrain from drinking alcoholic beverages for 24hours before and after surgery  - Stop or limit smoking _________days before surgery  Night before Surgery  - DO NOT EAT OR DRINK ANYTHING AFTER MIDNIGHT, INCLUDING GUM, HARD CANDY, MINTS, OR CHEWING TOBACCO.  - Take a shower or bath (shower is recommended).  Bathe with Hibiclens soap or an antibacterial soap from the neck down.  If not supplied by your surgeon, hibiclens soap will need to be purchased over the counter in pharmacy.  Rinse soap off thoroughly.  - Shampoo your hair with your regular shampoo  The Day of Surgery  - Take another bath or shower with hibiclens or any antibacterial soap, to reduce the chance of infection.  - Take heart and blood  pressure medications with a small sip of water, as advised by the perioperative team.  - Do not take fluid pills  - You may brush your teeth and rinse your mouth, but do not swall any additional water.   - Do not apply perfumes, powder, body lotions or deodorant on the day of surgery.  - Nail polish should be removed.  - Do not wear makeup or moisturizer  - Wear comfortable clothes, such as a button front shirt and loose fitting pants.  - Leave all jewelry, including body piercings, and valuables at home.    - Bring any devices you will neeed after surgery such as crutches or canes.  - If you have sleep apnea, please bring your CPAP machine  In the event that your physical condition changes including the onset of a cold or respiratory illness, or if you have to delay or cancel your surgery, please notify your surgeon.Anesthesia: General Anesthesia  Youre due to have surgery. During surgery, youll be given medication called anesthesia. (It is also called anesthetic.) This will keep you comfortable and pain-free. Your anesthesia provider will use general anesthesia. This sheet tells you more about it.  What is general anesthesia?     You are watched continuously during your procedure by the anesthesia provider   General anesthesia puts you into a state like deep sleep. It goes into the bloodstream (IV anesthetics), into the lungs (gas anesthetics), or both. You feel nothing during the procedure. You will not remember it. During the procedure, the anesthesia provider monitors you continuously. He or she checks your heart rate and rhythm, blood pressure, breathing, and blood oxygen.  · IV Anesthetics. IV anesthetics are given through an IV line in your arm. Theyre often given first. This is so you are asleep before a gas anesthetic is started. Some kinds of IV anesthetics relieve pain. Others relax you. Your doctor will decide which kind is best in your case.  · Gas Anesthetics. Gas anesthetics are breathed into the  lungs. They are often used to keep you asleep. They can be given through a facemask or a tube placed in your larynx or trachea (breathing tube).  ? If you have a facemask, your anesthesia provider will most likely place it over your nose and mouth while youre still awake. Youll breathe oxygen through the mask as your IV anesthetic is started. Gas anesthetic may be added through the mask.  ? If you have a tube in the larynx or trachea, it will be inserted into your throat after youre asleep.  Anesthesia tools and medications  You will likely have:  · IV anesthetics. These are put into an IV line into your bloodstream.  · Gas anesthetics. You breathe these anesthetics into your lungs, where they pass into your bloodstream.  · Pulse oximeter. This is a small clip that is attached to the end of your finger. This measures your blood oxygen level.  · Electrocardiography leads (electrodes). These are small sticky pads that are placed on your chest. They record your heart rate and rhythm.  · Blood pressure cuff. This reads your blood pressure.  Risks and possible complications  General anesthesia has some risks. These include:  · Breathing problems  · Nausea and vomiting  · Sore throat or hoarseness (usually temporary)  · Allergic reaction to the anesthetic  · Irregular heartbeat (rare)  · Cardiac arrest (rare)   Anesthesia safety  · Follow all instructions you are given for how long not to eat or drink before your procedure.  · Be sure your doctor knows what medications and drugs you take. This includes over-the-counter medications, herbs, supplements, alcohol or other drugs. You will be asked when those were last taken.  · Have an adult family member or friend drive you home after the procedure.  · For the first 24 hours after your surgery:  ? Do not drive or use heavy equipment.  ? Have a trusted family member or spouse make important decisions or sign documents.  ? Avoid alcohol.  ? Have a responsible adult stay with  you. He or she can watch for problems and help keep you safe.  Date Last Reviewed: 10/16/2014  © 8543-4931 [employer]. [address] [place] [address], [place] [address]. All rights reserved. This information is not intended as a substitute for professional medical care. Always follow your healthcare professional's instructions.

## 2018-02-15 NOTE — ANESTHESIA PREPROCEDURE EVALUATION
02/15/2018  Anjum Cai Jr. is a 82 y.o., male.    Anesthesia Evaluation    I have reviewed the Patient Summary Reports.        Review of Systems  Anesthesia Hx:  No problems with previous Anesthesia Moderately difficult intubation noted on previous anesthesia record 6/2014 History of prior surgery of interest to airway management or planning: Previous anesthesia: MAC  release trigger finger 10/2016 with MAC.  Procedure performed at an [facility].   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 Denies Seizures. Carpal tunnel R  Had steroid injection to wrist & hand 11/2017 Pain , onset is acute , location of upper abd , quality of aching/dull   Peripheral Neuropathy    Endocrine:   Diabetes (A1C 7.4 11/20/17), type 2, using insulin    Dermatological:  Skin Normal    Psych:  Psychiatric Normal           Physical Exam  General:  Obesity    Airway/Jaw/Neck:  Airway Findings: Mouth Opening: Normal Tongue: Normal  General Airway Assessment: Adult  Mallampati: II  Improves to II with phonation.  TM Distance: Normal, at least 6 cm  Jaw/Neck Findings:  Neck ROM: Extension Decreased, Mild      Dental:  Dental Findings: molar caps   Chest/Lungs:  Chest/Lungs Findings: Clear to  auscultation, Normal Respiratory Rate     Heart/Vascular:  Heart Findings: Rate: Normal  Rhythm: Regular Rhythm  Sounds: Normal        Mental Status:  Mental Status Findings:  Cooperative, Alert and Oriented       Pt was seen in POC 2/15/18; pending cardiology clearance/Jennyfer Leigh RN    Addendum 2/16/18 1122: per Ms Dong NP for cardiology: Patient has a Revised Cardiac Risk Index (RCRI) score of 2. The patient is a low-moderate risk, with a 6.6% risk for major cardiac event. Patient has no angina, SOB or other signs of ischemia. No unstable arrhythmia. No symptoms of heart failure. No additional cardiac testing required prior to proceeding with surgery 2/21/18.  /Jennyfer Leigh RN        Anesthesia Plan  Type of Anesthesia, risks & benefits discussed:  Anesthesia Type:  general  Patient's Preference: General  Intra-op Monitoring Plan:   Intra-op Monitoring Plan Comments:   Post Op Pain Control Plan:   Post Op Pain Control Plan Comments:   Induction:   IV  Beta Blocker:  Patient is on a Beta-Blocker and has received one dose within the past 24 hours (No further documentation required).       Informed Consent: Patient understands risks and agrees with Anesthesia plan.  Questions answered. Anesthesia consent signed with patient.  ASA Score: 3     Day of Surgery Review of History & Physical: I have interviewed and examined the patient. I have reviewed the patient's H&P dated:  There are no significant changes.          Ready For Surgery From Anesthesia Perspective.

## 2018-02-15 NOTE — PROGRESS NOTES
Subjective:       Patient ID: Anjum Cai Jr. is a 82 y.o. male.    Chief Complaint: No chief complaint on file.    HPI Patient is a 82 y.o. male presents with gallstones.  He has 8/10 pain in his upper abdoman.  It is a pressure type of pain that radiates to the lower chest.  It is intermittent, coming every few weeks and lasting 20 minutes.  During one attack nitro seemed to make it better but he has no other inciting or relieving factors.  He has not lost weight.  The pain is not affected by eating or bowel habits.  Since I last saw him he has had 2 short attacks and some attacks of nausea.  Review of Systems   Constitutional: Negative for fever and unexpected weight change.   Respiratory: Positive for shortness of breath. Negative for chest tightness.    Cardiovascular: Negative for chest pain.   Genitourinary: Negative for difficulty urinating and dysuria.   Hematological: Does not bruise/bleed easily.       Objective:     CT Reviewed  Physical Exam   Constitutional: He is oriented to person, place, and time. He appears well-developed and well-nourished.   Cardiovascular: Normal rate, regular rhythm and normal heart sounds.    Pulmonary/Chest: Effort normal and breath sounds normal.   Abdominal: Soft. Bowel sounds are normal.   Genitourinary:         Neurological: He is alert and oriented to person, place, and time.   Skin: Skin is warm and dry.   Psychiatric: He has a normal mood and affect. His behavior is normal. Judgment and thought content normal.   Vitals reviewed.      Assessment:       1. Gallstones      Also, large lih, DM2 with poor control, renal manifestations, diabetic foot problems and retinopathy, CAD with stents, s/p MI 2016, is off blood thinners, obesity.  Plan:      Lap zhang (will get sister to help out), anesthesia and cardiology clearance.  Consider open lih with mesh (23 hour stay).

## 2018-02-16 ENCOUNTER — TELEPHONE (OUTPATIENT)
Dept: SURGERY | Facility: CLINIC | Age: 83
End: 2018-02-16

## 2018-02-16 ENCOUNTER — PATIENT MESSAGE (OUTPATIENT)
Dept: SURGERY | Facility: HOSPITAL | Age: 83
End: 2018-02-16

## 2018-02-16 NOTE — TELEPHONE ENCOUNTER
Pt wanting to let Dr. Avery know he would like to stay the night after surgery d/t his age.  He said Dr. Avery offered at his appt yesterday.   Will discuss with Dr. Avery and notify surgery schedulers.

## 2018-02-16 NOTE — TELEPHONE ENCOUNTER
----- Message from Travis Reid sent at 2/16/2018  9:48 AM CST -----  Patient states that (s)he needs to speak with nurse in ref to some questions he has about his upcoming sx//please call back at [phone]//thank you

## 2018-02-19 ENCOUNTER — PES CALL (OUTPATIENT)
Dept: ADMINISTRATIVE | Facility: CLINIC | Age: 83
End: 2018-02-19

## 2018-02-20 ENCOUNTER — TELEPHONE (OUTPATIENT)
Dept: SURGERY | Facility: CLINIC | Age: 83
End: 2018-02-20

## 2018-02-21 ENCOUNTER — HOSPITAL ENCOUNTER (INPATIENT)
Facility: HOSPITAL | Age: 83
LOS: 1 days | Discharge: HOME | DRG: 419 | End: 2018-02-22
Attending: SURGERY | Admitting: SURGERY
Payer: MEDICARE

## 2018-02-21 ENCOUNTER — ANESTHESIA (OUTPATIENT)
Dept: SURGERY | Facility: HOSPITAL | Age: 83
DRG: 419 | End: 2018-02-21
Payer: MEDICARE

## 2018-02-21 DIAGNOSIS — K80.20 GALLSTONES: Primary | ICD-10-CM

## 2018-02-21 LAB
POCT GLUCOSE: 109 MG/DL (ref 70–110)
POCT GLUCOSE: 178 MG/DL (ref 70–110)
POCT GLUCOSE: 193 MG/DL (ref 70–110)
POCT GLUCOSE: 193 MG/DL (ref 70–110)

## 2018-02-21 PROCEDURE — 82962 GLUCOSE BLOOD TEST: CPT | Performed by: SURGERY

## 2018-02-21 PROCEDURE — 88304 TISSUE EXAM BY PATHOLOGIST: CPT | Mod: 26,,, | Performed by: PATHOLOGY

## 2018-02-21 PROCEDURE — 25000003 PHARM REV CODE 250: Performed by: NURSE ANESTHETIST, CERTIFIED REGISTERED

## 2018-02-21 PROCEDURE — 94761 N-INVAS EAR/PLS OXIMETRY MLT: CPT

## 2018-02-21 PROCEDURE — 27000221 HC OXYGEN, UP TO 24 HOURS

## 2018-02-21 PROCEDURE — 0FT44ZZ RESECTION OF GALLBLADDER, PERCUTANEOUS ENDOSCOPIC APPROACH: ICD-10-PCS | Performed by: SURGERY

## 2018-02-21 PROCEDURE — 37000008 HC ANESTHESIA 1ST 15 MINUTES: Performed by: SURGERY

## 2018-02-21 PROCEDURE — 37000009 HC ANESTHESIA EA ADD 15 MINS: Performed by: SURGERY

## 2018-02-21 PROCEDURE — S0077 INJECTION, CLINDAMYCIN PHOSP: HCPCS | Performed by: SURGERY

## 2018-02-21 PROCEDURE — 71000039 HC RECOVERY, EACH ADD'L HOUR: Performed by: SURGERY

## 2018-02-21 PROCEDURE — 63600175 PHARM REV CODE 636 W HCPCS: Performed by: STUDENT IN AN ORGANIZED HEALTH CARE EDUCATION/TRAINING PROGRAM

## 2018-02-21 PROCEDURE — 25000003 PHARM REV CODE 250: Performed by: SURGERY

## 2018-02-21 PROCEDURE — 25000003 PHARM REV CODE 250: Performed by: STUDENT IN AN ORGANIZED HEALTH CARE EDUCATION/TRAINING PROGRAM

## 2018-02-21 PROCEDURE — 88304 TISSUE EXAM BY PATHOLOGIST: CPT | Performed by: PATHOLOGY

## 2018-02-21 PROCEDURE — 47562 LAPAROSCOPIC CHOLECYSTECTOMY: CPT | Mod: ,,, | Performed by: SURGERY

## 2018-02-21 PROCEDURE — 71000033 HC RECOVERY, INTIAL HOUR: Performed by: SURGERY

## 2018-02-21 PROCEDURE — G0378 HOSPITAL OBSERVATION PER HR: HCPCS

## 2018-02-21 PROCEDURE — D9220A PRA ANESTHESIA: Mod: CRNA,,, | Performed by: NURSE ANESTHETIST, CERTIFIED REGISTERED

## 2018-02-21 PROCEDURE — 36000708 HC OR TIME LEV III 1ST 15 MIN: Performed by: SURGERY

## 2018-02-21 PROCEDURE — D9220A PRA ANESTHESIA: Mod: ANES,,, | Performed by: ANESTHESIOLOGY

## 2018-02-21 PROCEDURE — 63600175 PHARM REV CODE 636 W HCPCS: Performed by: NURSE ANESTHETIST, CERTIFIED REGISTERED

## 2018-02-21 PROCEDURE — 36000709 HC OR TIME LEV III EA ADD 15 MIN: Performed by: SURGERY

## 2018-02-21 PROCEDURE — 27201423 OPTIME MED/SURG SUP & DEVICES STERILE SUPPLY: Performed by: SURGERY

## 2018-02-21 RX ORDER — FINASTERIDE 5 MG/1
5 TABLET, FILM COATED ORAL EVERY MORNING
Status: DISCONTINUED | OUTPATIENT
Start: 2018-02-22 | End: 2018-02-22 | Stop reason: HOSPADM

## 2018-02-21 RX ORDER — INSULIN ASPART 100 [IU]/ML
1-10 INJECTION, SOLUTION INTRAVENOUS; SUBCUTANEOUS
Status: DISCONTINUED | OUTPATIENT
Start: 2018-02-21 | End: 2018-02-22 | Stop reason: HOSPADM

## 2018-02-21 RX ORDER — SODIUM CHLORIDE 0.9 % (FLUSH) 0.9 %
3 SYRINGE (ML) INJECTION
Status: DISCONTINUED | OUTPATIENT
Start: 2018-02-21 | End: 2018-02-22 | Stop reason: HOSPADM

## 2018-02-21 RX ORDER — GLUCAGON 1 MG
1 KIT INJECTION
Status: DISCONTINUED | OUTPATIENT
Start: 2018-02-21 | End: 2018-02-22 | Stop reason: HOSPADM

## 2018-02-21 RX ORDER — ONDANSETRON 2 MG/ML
INJECTION INTRAMUSCULAR; INTRAVENOUS
Status: DISCONTINUED | OUTPATIENT
Start: 2018-02-21 | End: 2018-02-21

## 2018-02-21 RX ORDER — FENTANYL CITRATE 50 UG/ML
INJECTION, SOLUTION INTRAMUSCULAR; INTRAVENOUS
Status: DISCONTINUED | OUTPATIENT
Start: 2018-02-21 | End: 2018-02-21

## 2018-02-21 RX ORDER — NEOSTIGMINE METHYLSULFATE 1 MG/ML
INJECTION, SOLUTION INTRAVENOUS
Status: DISCONTINUED | OUTPATIENT
Start: 2018-02-21 | End: 2018-02-21

## 2018-02-21 RX ORDER — MIDAZOLAM HYDROCHLORIDE 1 MG/ML
INJECTION, SOLUTION INTRAMUSCULAR; INTRAVENOUS
Status: DISCONTINUED | OUTPATIENT
Start: 2018-02-21 | End: 2018-02-21

## 2018-02-21 RX ORDER — LIDOCAINE HCL/PF 100 MG/5ML
SYRINGE (ML) INTRAVENOUS
Status: DISCONTINUED | OUTPATIENT
Start: 2018-02-21 | End: 2018-02-21

## 2018-02-21 RX ORDER — ONDANSETRON 8 MG/1
8 TABLET, ORALLY DISINTEGRATING ORAL EVERY 8 HOURS PRN
Status: DISCONTINUED | OUTPATIENT
Start: 2018-02-21 | End: 2018-02-22 | Stop reason: HOSPADM

## 2018-02-21 RX ORDER — METOPROLOL SUCCINATE 50 MG/1
50 TABLET, EXTENDED RELEASE ORAL EVERY MORNING
Status: DISCONTINUED | OUTPATIENT
Start: 2018-02-22 | End: 2018-02-22 | Stop reason: HOSPADM

## 2018-02-21 RX ORDER — IBUPROFEN 200 MG
24 TABLET ORAL
Status: DISCONTINUED | OUTPATIENT
Start: 2018-02-21 | End: 2018-02-22 | Stop reason: HOSPADM

## 2018-02-21 RX ORDER — KETOROLAC TROMETHAMINE 30 MG/ML
INJECTION, SOLUTION INTRAMUSCULAR; INTRAVENOUS
Status: DISCONTINUED | OUTPATIENT
Start: 2018-02-21 | End: 2018-02-21

## 2018-02-21 RX ORDER — IRBESARTAN 75 MG/1
75 TABLET ORAL EVERY MORNING
Status: DISCONTINUED | OUTPATIENT
Start: 2018-02-22 | End: 2018-02-22 | Stop reason: HOSPADM

## 2018-02-21 RX ORDER — PROPOFOL 10 MG/ML
VIAL (ML) INTRAVENOUS
Status: DISCONTINUED | OUTPATIENT
Start: 2018-02-21 | End: 2018-02-21

## 2018-02-21 RX ORDER — SODIUM CHLORIDE 9 MG/ML
INJECTION, SOLUTION INTRAVENOUS CONTINUOUS
Status: DISCONTINUED | OUTPATIENT
Start: 2018-02-21 | End: 2018-02-21

## 2018-02-21 RX ORDER — SODIUM CHLORIDE 9 MG/ML
INJECTION, SOLUTION INTRAVENOUS CONTINUOUS
Status: DISCONTINUED | OUTPATIENT
Start: 2018-02-21 | End: 2018-02-22

## 2018-02-21 RX ORDER — NITROGLYCERIN 0.4 MG/1
0.4 TABLET SUBLINGUAL EVERY 5 MIN PRN
Status: DISCONTINUED | OUTPATIENT
Start: 2018-02-21 | End: 2018-02-22 | Stop reason: HOSPADM

## 2018-02-21 RX ORDER — ACETAMINOPHEN 10 MG/ML
INJECTION, SOLUTION INTRAVENOUS
Status: DISCONTINUED | OUTPATIENT
Start: 2018-02-21 | End: 2018-02-21

## 2018-02-21 RX ORDER — IBUPROFEN 200 MG
16 TABLET ORAL
Status: DISCONTINUED | OUTPATIENT
Start: 2018-02-21 | End: 2018-02-22 | Stop reason: HOSPADM

## 2018-02-21 RX ORDER — FENTANYL CITRATE 50 UG/ML
25 INJECTION, SOLUTION INTRAMUSCULAR; INTRAVENOUS EVERY 5 MIN PRN
Status: DISCONTINUED | OUTPATIENT
Start: 2018-02-21 | End: 2018-02-21 | Stop reason: HOSPADM

## 2018-02-21 RX ORDER — ATORVASTATIN CALCIUM 20 MG/1
40 TABLET, FILM COATED ORAL EVERY MORNING
Status: DISCONTINUED | OUTPATIENT
Start: 2018-02-22 | End: 2018-02-22 | Stop reason: HOSPADM

## 2018-02-21 RX ORDER — PANTOPRAZOLE SODIUM 40 MG/1
40 TABLET, DELAYED RELEASE ORAL DAILY
Status: DISCONTINUED | OUTPATIENT
Start: 2018-02-22 | End: 2018-02-22 | Stop reason: HOSPADM

## 2018-02-21 RX ORDER — GLYCOPYRROLATE 0.2 MG/ML
INJECTION INTRAMUSCULAR; INTRAVENOUS
Status: DISCONTINUED | OUTPATIENT
Start: 2018-02-21 | End: 2018-02-21

## 2018-02-21 RX ORDER — BUPIVACAINE HYDROCHLORIDE 2.5 MG/ML
INJECTION, SOLUTION EPIDURAL; INFILTRATION; INTRACAUDAL
Status: DISCONTINUED | OUTPATIENT
Start: 2018-02-21 | End: 2018-02-21

## 2018-02-21 RX ORDER — ROCURONIUM BROMIDE 10 MG/ML
INJECTION, SOLUTION INTRAVENOUS
Status: DISCONTINUED | OUTPATIENT
Start: 2018-02-21 | End: 2018-02-21

## 2018-02-21 RX ORDER — HYDROCODONE BITARTRATE AND ACETAMINOPHEN 10; 325 MG/1; MG/1
1 TABLET ORAL EVERY 4 HOURS PRN
Status: DISCONTINUED | OUTPATIENT
Start: 2018-02-21 | End: 2018-02-22 | Stop reason: HOSPADM

## 2018-02-21 RX ORDER — ASPIRIN 81 MG/1
81 TABLET ORAL DAILY
Status: DISCONTINUED | OUTPATIENT
Start: 2018-02-22 | End: 2018-02-22 | Stop reason: HOSPADM

## 2018-02-21 RX ORDER — CLINDAMYCIN PHOSPHATE 900 MG/50ML
900 INJECTION, SOLUTION INTRAVENOUS
Status: COMPLETED | OUTPATIENT
Start: 2018-02-21 | End: 2018-02-21

## 2018-02-21 RX ORDER — ACETAMINOPHEN 325 MG/1
650 TABLET ORAL EVERY 8 HOURS PRN
Status: DISCONTINUED | OUTPATIENT
Start: 2018-02-21 | End: 2018-02-22 | Stop reason: HOSPADM

## 2018-02-21 RX ORDER — HYDROCODONE BITARTRATE AND ACETAMINOPHEN 5; 325 MG/1; MG/1
1 TABLET ORAL EVERY 4 HOURS PRN
Status: DISCONTINUED | OUTPATIENT
Start: 2018-02-21 | End: 2018-02-22 | Stop reason: HOSPADM

## 2018-02-21 RX ORDER — SUCCINYLCHOLINE CHLORIDE 20 MG/ML
INJECTION INTRAMUSCULAR; INTRAVENOUS
Status: DISCONTINUED | OUTPATIENT
Start: 2018-02-21 | End: 2018-02-21

## 2018-02-21 RX ADMIN — ROCURONIUM BROMIDE 5 MG: 10 INJECTION, SOLUTION INTRAVENOUS at 11:02

## 2018-02-21 RX ADMIN — EPHEDRINE SULFATE 10 MG: 50 INJECTION, SOLUTION INTRAMUSCULAR; INTRAVENOUS; SUBCUTANEOUS at 11:02

## 2018-02-21 RX ADMIN — EPHEDRINE SULFATE 5 MG: 50 INJECTION, SOLUTION INTRAMUSCULAR; INTRAVENOUS; SUBCUTANEOUS at 11:02

## 2018-02-21 RX ADMIN — PROPOFOL 50 MG: 10 INJECTION, EMULSION INTRAVENOUS at 11:02

## 2018-02-21 RX ADMIN — SODIUM CHLORIDE: 0.9 INJECTION, SOLUTION INTRAVENOUS at 09:02

## 2018-02-21 RX ADMIN — ACETAMINOPHEN 1000 MG: 10 INJECTION, SOLUTION INTRAVENOUS at 11:02

## 2018-02-21 RX ADMIN — HYDROCODONE BITARTRATE AND ACETAMINOPHEN 1 TABLET: 5; 325 TABLET ORAL at 01:02

## 2018-02-21 RX ADMIN — HYDROCODONE BITARTRATE AND ACETAMINOPHEN 1 TABLET: 10; 325 TABLET ORAL at 09:02

## 2018-02-21 RX ADMIN — EPHEDRINE SULFATE 25 MG: 50 INJECTION, SOLUTION INTRAMUSCULAR; INTRAVENOUS; SUBCUTANEOUS at 11:02

## 2018-02-21 RX ADMIN — SUCCINYLCHOLINE CHLORIDE 160 MG: 20 INJECTION, SOLUTION INTRAMUSCULAR; INTRAVENOUS at 11:02

## 2018-02-21 RX ADMIN — FENTANYL CITRATE 50 MCG: 50 INJECTION, SOLUTION INTRAMUSCULAR; INTRAVENOUS at 11:02

## 2018-02-21 RX ADMIN — MIDAZOLAM HYDROCHLORIDE 2 MG: 1 INJECTION, SOLUTION INTRAMUSCULAR; INTRAVENOUS at 11:02

## 2018-02-21 RX ADMIN — LIDOCAINE HYDROCHLORIDE 75 MG: 20 INJECTION, SOLUTION INTRAVENOUS at 11:02

## 2018-02-21 RX ADMIN — SODIUM CHLORIDE, SODIUM GLUCONATE, SODIUM ACETATE, POTASSIUM CHLORIDE, MAGNESIUM CHLORIDE, SODIUM PHOSPHATE, DIBASIC, AND POTASSIUM PHOSPHATE: .53; .5; .37; .037; .03; .012; .00082 INJECTION, SOLUTION INTRAVENOUS at 11:02

## 2018-02-21 RX ADMIN — SODIUM CHLORIDE: 0.9 INJECTION, SOLUTION INTRAVENOUS at 01:02

## 2018-02-21 RX ADMIN — ROCURONIUM BROMIDE 45 MG: 10 INJECTION, SOLUTION INTRAVENOUS at 11:02

## 2018-02-21 RX ADMIN — PROPOFOL 150 MG: 10 INJECTION, EMULSION INTRAVENOUS at 11:02

## 2018-02-21 RX ADMIN — ONDANSETRON 4 MG: 2 INJECTION INTRAMUSCULAR; INTRAVENOUS at 12:02

## 2018-02-21 RX ADMIN — NEOSTIGMINE METHYLSULFATE 4 MG: 1 INJECTION INTRAVENOUS at 12:02

## 2018-02-21 RX ADMIN — INSULIN ASPART 2 UNITS: 100 INJECTION, SOLUTION INTRAVENOUS; SUBCUTANEOUS at 02:02

## 2018-02-21 RX ADMIN — CLINDAMYCIN IN 5 PERCENT DEXTROSE 900 MG: 18 INJECTION, SOLUTION INTRAVENOUS at 11:02

## 2018-02-21 RX ADMIN — GLYCOPYRROLATE 0.6 MG: 0.2 INJECTION, SOLUTION INTRAMUSCULAR; INTRAVENOUS at 12:02

## 2018-02-21 RX ADMIN — KETOROLAC TROMETHAMINE 30 MG: 30 INJECTION, SOLUTION INTRAMUSCULAR; INTRAVENOUS at 12:02

## 2018-02-21 NOTE — OP NOTE
DATE OF PROCEDURE: 2/21/2018    DIAGNOSIS: Gallstones [K80.20]    PROCEDURE: Procedure(s) (LRB):  CHOLECYSTECTOMY-LAPAROSCOPIC (N/A)    Surgeon(s) and Role:     * Arnaud Avery MD - Primary     * Dara Deleon MD - Resident - Assisting    ANESTHESIA: General.   PROCEDURE IN DETAIL: The patient was placed under general anesthesia. The   abdomen was prepped and draped in the usual manner. Access to peritoneum was   gained through the umbilicus using the Optiview trocar under direct vision.   Pneumoperitoneum to 15 mmHg with CO2 gas was obtained. Three 5 mm trocars were   placed under the right costal margin under direct vision at midline,   midclavicular line, and the anterior axial line. The gallbladder was pulled up   over the liver, exposing the triangle of Calot. Peritoneum was stripped off the   base of the gallbladder, exposing the cystic duct and artery as they entered   the gallbladder.  The cystic duct and artery were clipped divided.   The gallbladder was removed from the gallbladder bed using the hook cautery,   placed in an EndoCatch bag and removed from the abdomen through the navel.  The base was cauterized. The abdomen was irrigated, all irrigation fluid removed and   inspected for hemostasis. The trocars were removed under direct vision. Prior   to removing the last trocar, pneumoperitoneum was allowed to escape. The fascia   at the naval was closed with 0 Vicryl. The skin incisions were closed with 4-0   plain catgut, and reinforced with Mastisol, Steri-Strips, and Band-Aids. The   patient tolerated the procedure well and was brought to Recovery Room in stable   condition. Sponge and needle counts were correct at the end of the case.    Blood loss was min, complications were none, consent was obtained and pathology was gallbladder

## 2018-02-21 NOTE — BRIEF OP NOTE
Operative Note       Surgery Date: 2/21/2018     Surgeon(s) and Role:     * Arnaud Avery MD - Primary     * Dara Deleon MD - Resident - Assisting    Pre-op Diagnosis:  Gallstones [K80.20]    Post-op Diagnosis:  Gallstones [K80.20]    Procedure(s) (LRB):  CHOLECYSTECTOMY-LAPAROSCOPIC (N/A)    Anesthesia: General    Procedure in Detail/Findings:  Slight thickening of gallbladder especially at the base with impacted stone.    Estimated Blood Loss: Minimal           Specimens     Start     Ordered    02/21/18 1147  Specimen to Pathology - Surgery  Once      02/21/18 1147        Implants: * No implants in log *           Disposition: PACU - hemodynamically stable.           Condition: Good    Attestation:  I was present and scrubbed for the entire procedure.

## 2018-02-21 NOTE — NURSING TRANSFER
Nursing Transfer Note      2/21/2018     Transfer To: [address]    Transfer via stretcher    Transported by PCT    Medicines sent: IVF    Chart send with patient: Yes    Notified: Sister    Patient reassessed at: 2/21/18, 1600

## 2018-02-21 NOTE — TRANSFER OF CARE
"Anesthesia Transfer of Care Note    Patient: Anjum Cai JrApril    Procedure(s) Performed: Procedure(s) (LRB):  CHOLECYSTECTOMY-LAPAROSCOPIC (N/A)    Patient location: PACU    Anesthesia Type: general    Transport from OR: Transported from OR on 6-10 L/min O2 by face mask with adequate spontaneous ventilation    Post pain: adequate analgesia    Post assessment: no apparent anesthetic complications and tolerated procedure well    Post vital signs: stable    Level of consciousness: sedated    Nausea/Vomiting: no nausea/vomiting    Complications: none    Transfer of care protocol was followed      Last vitals:   Visit Vitals  BP (!) 159/74 (Patient Position: Lying)   Pulse 72   Temp 36.6 °C (97.8 °F) (Oral)   Resp 12   Ht 5' 6.5" (1.689 m)   Wt 96.2 kg (212 lb)   SpO2 (!) 94%   BMI 33.71 kg/m²     "

## 2018-02-21 NOTE — PROGRESS NOTES
Pt arrive to floor via stretcher escorted by staff.VSS.No oxygen/telemetry.Pt AAO x 4. Family at bedside.Call light within reach.

## 2018-02-21 NOTE — BRIEF OP NOTE
[facility]  Surgery Department  Operative Note    SUMMARY     Date of Procedure: 2/21/2018     Procedure: Procedure(s) (LRB):  CHOLECYSTECTOMY-LAPAROSCOPIC (N/A)     Surgeon(s) and Role:     * Dara Deleon MD - Resident - Assisting     * Arnaud Avery MD - Primary        Pre-Operative Diagnosis: Gallstones [K80.20]    Post-Operative Diagnosis: Post-Op Diagnosis Codes:     * Gallstones [K80.20]    Anesthesia: General    Technical Procedures Used: Laparoscopic cholecystectomy    Description of the Findings of the Procedure: Significant chronic inflammatory changes and cholelithiasis    Significant Surgical Tasks Conducted by the Assistant(s), if Applicable: n/a    Complications: No    Estimated Blood Loss (EBL): 50 mL           Implants: * No implants in log *    Specimens:   Specimen (12h ago through future)    Start     Ordered    02/21/18 1147  Specimen to Pathology - Surgery  Once     Comments:  Specimen to Pathology: 1) Gallbladder   - permanent , formalin , to refrigerator      02/21/18 1147                  Condition: Good    Disposition: PACU - hemodynamically stable.    Attestation: I was present and scrubbed for the entire procedure.

## 2018-02-22 VITALS
HEART RATE: 83 BPM | WEIGHT: 212 LBS | DIASTOLIC BLOOD PRESSURE: 60 MMHG | OXYGEN SATURATION: 98 % | SYSTOLIC BLOOD PRESSURE: 125 MMHG | RESPIRATION RATE: 18 BRPM | TEMPERATURE: 98 F | HEIGHT: 67 IN | BODY MASS INDEX: 33.27 KG/M2

## 2018-02-22 LAB
ALBUMIN SERPL BCP-MCNC: 2.8 G/DL
ALP SERPL-CCNC: 52 U/L
ALT SERPL W/O P-5'-P-CCNC: 32 U/L
ANION GAP SERPL CALC-SCNC: 10 MMOL/L
ANISOCYTOSIS BLD QL SMEAR: SLIGHT
AST SERPL-CCNC: 28 U/L
BASOPHILS # BLD AUTO: 0.02 K/UL
BASOPHILS NFR BLD: 0.1 %
BILIRUB SERPL-MCNC: 1.1 MG/DL
BUN SERPL-MCNC: 26 MG/DL
CALCIUM SERPL-MCNC: 8.2 MG/DL
CHLORIDE SERPL-SCNC: 107 MMOL/L
CO2 SERPL-SCNC: 22 MMOL/L
CREAT SERPL-MCNC: 1.7 MG/DL
DIFFERENTIAL METHOD: ABNORMAL
EOSINOPHIL # BLD AUTO: 0 K/UL
EOSINOPHIL NFR BLD: 0 %
ERYTHROCYTE [DISTWIDTH] IN BLOOD BY AUTOMATED COUNT: 15.3 %
EST. GFR  (AFRICAN AMERICAN): 43.1 ML/MIN/1.73 M^2
EST. GFR  (NON AFRICAN AMERICAN): 37.3 ML/MIN/1.73 M^2
GLUCOSE SERPL-MCNC: 148 MG/DL
HCT VFR BLD AUTO: 31.2 %
HGB BLD-MCNC: 10.3 G/DL
HYPOCHROMIA BLD QL SMEAR: ABNORMAL
IMM GRANULOCYTES # BLD AUTO: 0.13 K/UL
IMM GRANULOCYTES NFR BLD AUTO: 0.7 %
LYMPHOCYTES # BLD AUTO: 1.4 K/UL
LYMPHOCYTES NFR BLD: 7.3 %
MAGNESIUM SERPL-MCNC: 1.1 MG/DL
MCH RBC QN AUTO: 27.2 PG
MCHC RBC AUTO-ENTMCNC: 33 G/DL
MCV RBC AUTO: 83 FL
MONOCYTES # BLD AUTO: 1.2 K/UL
MONOCYTES NFR BLD: 6.4 %
NEUTROPHILS # BLD AUTO: 15.7 K/UL
NEUTROPHILS NFR BLD: 85.5 %
NRBC BLD-RTO: 0 /100 WBC
OVALOCYTES BLD QL SMEAR: ABNORMAL
PHOSPHATE SERPL-MCNC: 3.6 MG/DL
PLATELET # BLD AUTO: 146 K/UL
PMV BLD AUTO: 11.2 FL
POCT GLUCOSE: 264 MG/DL (ref 70–110)
POIKILOCYTOSIS BLD QL SMEAR: SLIGHT
POLYCHROMASIA BLD QL SMEAR: ABNORMAL
POTASSIUM SERPL-SCNC: 4 MMOL/L
PROT SERPL-MCNC: 6.4 G/DL
RBC # BLD AUTO: 3.78 M/UL
SODIUM SERPL-SCNC: 139 MMOL/L
WBC # BLD AUTO: 18.39 K/UL

## 2018-02-22 PROCEDURE — 83735 ASSAY OF MAGNESIUM: CPT

## 2018-02-22 PROCEDURE — 85025 COMPLETE CBC W/AUTO DIFF WBC: CPT

## 2018-02-22 PROCEDURE — 63600175 PHARM REV CODE 636 W HCPCS: Performed by: STUDENT IN AN ORGANIZED HEALTH CARE EDUCATION/TRAINING PROGRAM

## 2018-02-22 PROCEDURE — 80053 COMPREHEN METABOLIC PANEL: CPT

## 2018-02-22 PROCEDURE — 12000002 HC ACUTE/MED SURGE SEMI-PRIVATE ROOM

## 2018-02-22 PROCEDURE — 25000003 PHARM REV CODE 250: Performed by: STUDENT IN AN ORGANIZED HEALTH CARE EDUCATION/TRAINING PROGRAM

## 2018-02-22 PROCEDURE — 84100 ASSAY OF PHOSPHORUS: CPT

## 2018-02-22 PROCEDURE — 36415 COLL VENOUS BLD VENIPUNCTURE: CPT

## 2018-02-22 RX ORDER — MAGNESIUM SULFATE HEPTAHYDRATE 40 MG/ML
2 INJECTION, SOLUTION INTRAVENOUS ONCE
Status: COMPLETED | OUTPATIENT
Start: 2018-02-22 | End: 2018-02-22

## 2018-02-22 RX ORDER — HYDROCODONE BITARTRATE AND ACETAMINOPHEN 5; 325 MG/1; MG/1
1 TABLET ORAL EVERY 4 HOURS PRN
Qty: 35 TABLET | Refills: 0 | Status: SHIPPED | OUTPATIENT
Start: 2018-02-22 | End: 2018-05-10 | Stop reason: CLARIF

## 2018-02-22 RX ADMIN — METOPROLOL SUCCINATE 50 MG: 50 TABLET, EXTENDED RELEASE ORAL at 06:02

## 2018-02-22 RX ADMIN — IRBESARTAN 75 MG: 75 TABLET ORAL at 06:02

## 2018-02-22 RX ADMIN — INSULIN ASPART 6 UNITS: 100 INJECTION, SOLUTION INTRAVENOUS; SUBCUTANEOUS at 12:02

## 2018-02-22 RX ADMIN — PANTOPRAZOLE SODIUM 40 MG: 40 TABLET, DELAYED RELEASE ORAL at 08:02

## 2018-02-22 RX ADMIN — FINASTERIDE 5 MG: 5 TABLET, FILM COATED ORAL at 06:02

## 2018-02-22 RX ADMIN — MAGNESIUM SULFATE HEPTAHYDRATE 2 G: 40 INJECTION, SOLUTION INTRAVENOUS at 08:02

## 2018-02-22 RX ADMIN — ATORVASTATIN CALCIUM 40 MG: 20 TABLET, FILM COATED ORAL at 06:02

## 2018-02-22 RX ADMIN — HYDROCODONE BITARTRATE AND ACETAMINOPHEN 1 TABLET: 10; 325 TABLET ORAL at 12:02

## 2018-02-22 RX ADMIN — ASPIRIN 81 MG: 81 TABLET, COATED ORAL at 08:02

## 2018-02-22 NOTE — PROGRESS NOTES
[facility]  General Surgery  Progress Note    Subjective:     History of Present Illness:  No notes on file    Post-Op Info:  Procedure(s) (LRB):  CHOLECYSTECTOMY-LAPAROSCOPIC (N/A)   1 Day Post-Op     Interval History: Febrile to 101 overnight. Otherwise feels very well this morning. Sitting in chair. Ambulated in tellez. Tolerated mashed potatoes yesterday without nausea or vomiting.    Medications:  Continuous Infusions:  Scheduled Meds:   aspirin  81 mg Oral Daily    atorvastatin  40 mg Oral QAM    finasteride  5 mg Oral QAM    irbesartan  75 mg Oral QAM    metoprolol succinate  50 mg Oral QAM    pantoprazole  40 mg Oral Daily     PRN Meds:acetaminophen, dextrose 50%, dextrose 50%, glucagon (human recombinant), glucose, glucose, hydrocodone-acetaminophen 10-325mg, hydrocodone-acetaminophen 5-325mg, insulin aspart U-100, nitroGLYCERIN, ondansetron, promethazine (PHENERGAN) IVPB, sodium chloride 0.9%, sodium chloride 0.9%     Review of patient's allergies indicates:   Allergen Reactions    Ace inhibitors      Other reaction(s): cough    Cefadroxil      Other reaction(s): possible vasculitis  Other reaction(s): vasculitis     Objective:     Vital Signs (Most Recent):  Temp: 98.7 °F (37.1 °C) (02/22/18 0533)  Pulse: 87 (02/22/18 0533)  Resp: 18 (02/22/18 0533)  BP: (!) 90/45 (02/22/18 0533)  SpO2: 98 % (02/22/18 0533) Vital Signs (24h Range):  Temp:  [97.3 °F (36.3 °C)-101.7 °F (38.7 °C)] 98.7 °F (37.1 °C)  Pulse:  [] 87  Resp:  [12-18] 18  SpO2:  [94 %-100 %] 98 %  BP: ()/(45-77) 90/45     Weight: 96.2 kg (212 lb)  Body mass index is 33.71 kg/m².    Intake/Output - Last 3 Shifts       02/20 0700 - 02/21 0659 02/21 0700 - 02/22 0659 02/22 0700 - 02/23 0659    I.V. (mL/kg)  1733 (18)     Total Intake(mL/kg)  1733 (18)     Blood  50     Total Output   50      Net   +1683                   Physical Exam   Constitutional: He is oriented to person, place, and time. He appears  well-developed and well-nourished. No distress.   Cardiovascular: Normal rate and regular rhythm.    Pulmonary/Chest: Effort normal. No respiratory distress.   Abdominal: Soft. He exhibits no distension. There is no tenderness.   Neurological: He is alert and oriented to person, place, and time.   Skin: Skin is warm and dry.       Significant Labs:  CBC:   Recent Labs  Lab 02/22/18  0502   WBC 18.39*   RBC 3.78*   HGB 10.3*   HCT 31.2*   *   MCV 83   MCH 27.2   MCHC 33.0     CMP:   Recent Labs  Lab 02/22/18  0502   *   CALCIUM 8.2*   ALBUMIN 2.8*   PROT 6.4      K 4.0   CO2 22*      BUN 26*   CREATININE 1.7*   ALKPHOS 52*   ALT 32   AST 28   BILITOT 1.1*         Assessment/Plan:     * Gallstones    81 yo male POD1 s/p lap zhang    Plan:  Regular diet  Saline lock IV  PO pain  OOB/ambulate  Will recheck BP this morning  Likely home this afternoon            Dara Deleon MD  General Surgery  [facility]

## 2018-02-22 NOTE — ASSESSMENT & PLAN NOTE
81 yo male POD1 s/p lap zhang    Plan:  Regular diet  Saline lock IV  PO pain  OOB/ambulate  Will recheck BP this morning  Likely home this afternoon

## 2018-02-22 NOTE — NURSING
Discharged to home. Condition stable. IV removed. No redness or swelling noted to site. Verbalizes understanding of discharge instructions

## 2018-02-22 NOTE — DISCHARGE SUMMARY
[facility]  General Surgery  Discharge Summary      Patient Name: Anjum Cai Jr.  MRN: [id number]  Admission Date: 2/21/2018  Hospital Length of Stay: 0 days  Discharge Date and Time:  02/22/2018 12:04 PM  Attending Physician: Arnaud Avery MD   Discharging Provider: Dara Deleon MD  Primary Care Provider: Gavin Martinez MD     HPI: Patient presented for an outpatient lap zhang for symptomatic cholelithiasis    Procedure(s) (LRB):  CHOLECYSTECTOMY-LAPAROSCOPIC (N/A)     Hospital Course: Pt presented to [facility] for a laparoscopic cholecystectomy.  Pt tolerated the procedure well in its entirety without issue.  For more details, please refer to op note.  Post-op, pt was observed overnight.  Pt was started on a regular diet and tolerated it well.  Pain was controlled with PO analgesics.  Pt was able to ambulate and urinate without issue.  Pt stable for discharge.      Consults:     Significant Diagnostic Studies: Labs:   CMP   Recent Labs  Lab 02/22/18  0502      K 4.0      CO2 22*   *   BUN 26*   CREATININE 1.7*   CALCIUM 8.2*   PROT 6.4   ALBUMIN 2.8*   BILITOT 1.1*   ALKPHOS 52*   AST 28   ALT 32   ANIONGAP 10   ESTGFRAFRICA 43.1*   EGFRNONAA 37.3*    and CBC   Recent Labs  Lab 02/22/18  0502   WBC 18.39*   HGB 10.3*   HCT 31.2*   *       Pending Diagnostic Studies:     None        Final Active Diagnoses:    Diagnosis Date Noted POA    PRINCIPAL PROBLEM:  Gallstones [K80.20] 02/15/2018 Yes      Problems Resolved During this Admission:    Diagnosis Date Noted Date Resolved POA      Discharged Condition: good    Disposition: Home    Follow Up:  Follow-up Information     Arnaud Avery MD In 2 weeks.    Specialties:  General Surgery, Bariatrics  Why:  For wound re-check/Appt 3/8/18 at 07:45 AM  Contact information:  [address]  [facility] [address] [phone]                 Patient Instructions:     Activity as tolerated     Lifting  restrictions   Order Comments: No lifting anything over 10lbs for 6 weeks     Notify your health care provider if you experience any of the following:  temperature >100.4     Notify your health care provider if you experience any of the following:  persistent nausea and vomiting or diarrhea     Notify your health care provider if you experience any of the following:  severe uncontrolled pain     Notify your health care provider if you experience any of the following:  redness, tenderness, or signs of infection (pain, swelling, redness, odor or green/yellow discharge around incision site)     Notify your health care provider if you experience any of the following:  difficulty breathing or increased cough     Notify your health care provider if you experience any of the following:  severe persistent headache     Notify your health care provider if you experience any of the following:  worsening rash     Notify your health care provider if you experience any of the following:  persistent dizziness, light-headedness, or visual disturbances     Notify your health care provider if you experience any of the following:  increased confusion or weakness     Remove dressing in 48 hours   Order Comments: After that, no dressing needed  Ok to shower over incision  No soaking incision in a tub or pool for 4 weeks  Steri strips will fall off on their own       Medications:  Reconciled Home Medications:   Current Discharge Medication List      START taking these medications    Details   hydrocodone-acetaminophen 5-325mg (NORCO) 5-325 mg per tablet Take 1 tablet by mouth every 4 (four) hours as needed.  Qty: 35 tablet, Refills: 0         CONTINUE these medications which have NOT CHANGED    Details   ASPIRIN (ASPIR-81 ORAL) Take 1 tablet by mouth Daily.      atorvastatin (LIPITOR) 40 MG tablet Take 1 tablet (40 mg total) by mouth once daily.  Qty: 30 tablet, Refills: 6      finasteride (PROSCAR) 5 mg tablet Take 1 tablet (5 mg total)  "by mouth once daily.  Qty: 30 tablet, Refills: 11    Associated Diagnoses: Benign non-nodular prostatic hyperplasia with lower urinary tract symptoms; Elevated PSA      insulin aspart (NOVOLOG FLEXPEN) 100 unit/mL InPn pen Inject 12 Units into the skin 3 (three) times daily with meals. Insulin sliding scale : 100 to 200 -12 units.                                     200 to 250 -14 units.                                      251 to 300 -16 units.                                       301 to 350 - 18 units.  Qty: 15 mL, Refills: 3      insulin glargine (LANTUS SOLOSTAR) 100 unit/mL (3 mL) InPn pen inject 65 units subcutaneously every evening  Qty: 30 mL, Refills: 4      irbesartan (AVAPRO) 75 MG tablet Take 1 tablet (75 mg total) by mouth once daily.  Qty: 30 tablet, Refills: 6      metoprolol succinate (TOPROL-XL) 50 MG 24 hr tablet Take 1 tablet (50 mg total) by mouth once daily.  Qty: 90 tablet, Refills: 3      MULTIVITAMIN W-MINERALS/LUTEIN (CENTRUM SILVER ORAL) Take by mouth once daily.      nitroGLYCERIN (NITROSTAT) 0.4 MG SL tablet Place 1 tablet (0.4 mg total) under the tongue every 5 (five) minutes as needed for Chest pain.  Qty: 60 tablet, Refills: 12    Associated Diagnoses: Angina effort      omega-3 fatty acids-vitamin E (FISH OIL) 1,000 mg Cap Take 2 capsules by mouth Daily.       omeprazole (PRILOSEC) 20 MG capsule Take 1 capsule (20 mg total) by mouth 2 (two) times daily before meals.  Qty: 60 capsule, Refills: 4      VITAMIN D2 50,000 unit capsule take one capsule by mouth every 30 days  Qty: 3 capsule, Refills: 1      ACCU-CHEK SMARTVIEW TEST STRIP Strp       BD INSULIN PEN NEEDLE UF MINI 31 gauge x 3/16" Ndle USE WITH INSULIN INJECTION FOUR TIMES DAILY  Qty: 100 each, Refills: 4      BD INSULIN SYRINGE ULTRA-FINE 1 mL 30 x 1/2" Syrg USE AS DIRECTED FOUR TIMES DAILY  Qty: 200 each, Refills: 11      blood sugar diagnostic, drum (ACCU-CHEK COMPACT TEST) Strp CHECK BLOOD GLUCOSE 4 TIMES DAILY  Qty: 204 " each, Refills: 5      clotrimazole (LOTRIMIN) 1 % Soln Apply topically once daily. Apply to filed toenails daily  Qty: 30 mL, Refills: 5    Associated Diagnoses: Onychomycosis due to dermatophyte             Dara Deleon MD  General Surgery  [facility]

## 2018-02-22 NOTE — PLAN OF CARE
Patient lives alone in a 1 story house. Sister at . Discharging to home, without needs, today pending medical stability.     [facility] given to patient after informed about it;patient verbalized their understanding.        02/22/18 1120   Discharge Assessment   Assessment Type Discharge Planning Assessment   Confirmed/corrected address and phone number on facesheet? Yes   Assessment information obtained from? Patient;Medical Record   Expected Length of Stay (days) (1)   Communicated expected length of stay with patient/caregiver no  (Per MD)   Prior to hospitilization cognitive status: Alert/Oriented;No Deficits   Prior to hospitalization functional status: Independent;Assistive Equipment   Current cognitive status: Alert/Oriented;No Deficits   Current Functional Status: Independent;Needs Assistance   Facility Arrived From: (N/A)   Lives With alone   Able to Return to Prior Arrangements yes   Is patient able to care for self after discharge? Yes   Who are your caregiver(s) and their phone number(s)? (Sister-Ariela no # given. Lives in [place]: Maria Elena Cai Daughter [phone]   )   Patient's perception of discharge disposition home or selfcare   Readmission Within The Last 30 Days no previous admission in last 30 days   Patient currently being followed by outpatient case management? No   Patient currently receives any other outside agency services? No   Equipment Currently Used at Home CPAP   Do you have any problems affording any of your prescribed medications? No   Is the patient taking medications as prescribed? yes   Does the patient have transportation home? Yes   Transportation Available car;family or friend will provide   Dialysis Name and Scheduled days (N/A)   Does the patient receive services at the Coumadin Clinic? No   Discharge Plan A Home with family  (Sister)   Discharge Plan B Home with family   Patient/Family In Agreement With Plan yes

## 2018-02-22 NOTE — ANESTHESIA POSTPROCEDURE EVALUATION
"Anesthesia Post Evaluation    Patient: Anjum Cai JrApril    Procedure(s) Performed: Procedure(s) (LRB):  CHOLECYSTECTOMY-LAPAROSCOPIC (N/A)    Final Anesthesia Type: general  Patient location during evaluation: PACU  Patient participation: Yes- Able to Participate  Level of consciousness: awake and alert  Post-procedure vital signs: reviewed and stable  Pain management: adequate  Airway patency: patent  PONV status at discharge: No PONV  Anesthetic complications: no      Cardiovascular status: blood pressure returned to baseline and stable  Respiratory status: unassisted  Hydration status: euvolemic  Follow-up not needed.        Visit Vitals  BP (!) 108/54   Pulse 85   Temp 36 °C (96.8 °F)   Resp 16   Ht 5' 6.5" (1.689 m)   Wt 96.2 kg (212 lb)   SpO2 96%   BMI 33.71 kg/m²       Pain/Renetta Score: Pain Assessment Performed: Yes (2/22/2018  6:00 AM)  Presence of Pain: denies (2/22/2018  6:00 AM)  Pain Rating Prior to Med Admin: 7 (2/21/2018  9:10 PM)  Pain Rating Post Med Admin: 4 (2/21/2018 10:05 PM)  Renetta Score: 10 (2/21/2018  2:30 PM)      "

## 2018-02-22 NOTE — SUBJECTIVE & OBJECTIVE
Interval History: Febrile to 101 overnight. Otherwise feels very well this morning. Sitting in chair. Ambulated in tellez. Tolerated mashed potatoes yesterday without nausea or vomiting.    Medications:  Continuous Infusions:  Scheduled Meds:   aspirin  81 mg Oral Daily    atorvastatin  40 mg Oral QAM    finasteride  5 mg Oral QAM    irbesartan  75 mg Oral QAM    metoprolol succinate  50 mg Oral QAM    pantoprazole  40 mg Oral Daily     PRN Meds:acetaminophen, dextrose 50%, dextrose 50%, glucagon (human recombinant), glucose, glucose, hydrocodone-acetaminophen 10-325mg, hydrocodone-acetaminophen 5-325mg, insulin aspart U-100, nitroGLYCERIN, ondansetron, promethazine (PHENERGAN) IVPB, sodium chloride 0.9%, sodium chloride 0.9%     Review of patient's allergies indicates:   Allergen Reactions    Ace inhibitors      Other reaction(s): cough    Cefadroxil      Other reaction(s): possible vasculitis  Other reaction(s): vasculitis     Objective:     Vital Signs (Most Recent):  Temp: 98.7 °F (37.1 °C) (02/22/18 0533)  Pulse: 87 (02/22/18 0533)  Resp: 18 (02/22/18 0533)  BP: (!) 90/45 (02/22/18 0533)  SpO2: 98 % (02/22/18 0533) Vital Signs (24h Range):  Temp:  [97.3 °F (36.3 °C)-101.7 °F (38.7 °C)] 98.7 °F (37.1 °C)  Pulse:  [] 87  Resp:  [12-18] 18  SpO2:  [94 %-100 %] 98 %  BP: ()/(45-77) 90/45     Weight: 96.2 kg (212 lb)  Body mass index is 33.71 kg/m².    Intake/Output - Last 3 Shifts       02/20 0700 - 02/21 0659 02/21 0700 - 02/22 0659 02/22 0700 - 02/23 0659    I.V. (mL/kg)  1733 (18)     Total Intake(mL/kg)  1733 (18)     Blood  50     Total Output   50      Net   +1683                   Physical Exam   Constitutional: He is oriented to person, place, and time. He appears well-developed and well-nourished. No distress.   Cardiovascular: Normal rate and regular rhythm.    Pulmonary/Chest: Effort normal. No respiratory distress.   Abdominal: Soft. He exhibits no distension. There is no tenderness.    Neurological: He is alert and oriented to person, place, and time.   Skin: Skin is warm and dry.       Significant Labs:  CBC:   Recent Labs  Lab 02/22/18  0502   WBC 18.39*   RBC 3.78*   HGB 10.3*   HCT 31.2*   *   MCV 83   MCH 27.2   MCHC 33.0     CMP:   Recent Labs  Lab 02/22/18  0502   *   CALCIUM 8.2*   ALBUMIN 2.8*   PROT 6.4      K 4.0   CO2 22*      BUN 26*   CREATININE 1.7*   ALKPHOS 52*   ALT 32   AST 28   BILITOT 1.1*

## 2018-02-24 ENCOUNTER — HOSPITAL ENCOUNTER (INPATIENT)
Facility: HOSPITAL | Age: 83
LOS: 1 days | Discharge: HOME | DRG: 872 | End: 2018-02-27
Admitting: SURGERY
Payer: MEDICARE

## 2018-02-24 DIAGNOSIS — R50.9 FEVER: Primary | ICD-10-CM
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
## 2018-02-24 DIAGNOSIS — J18.9 HAP (HOSPITAL-ACQUIRED PNEUMONIA): ICD-10-CM

## 2018-02-24 PROBLEM — R50.82 POST-PROCEDURAL FEVER: Status: ACTIVE | Noted: 2018-02-24

## 2018-02-24 LAB
ALBUMIN SERPL BCP-MCNC: 2.8 G/DL
ALLENS TEST: ABNORMAL
ALP SERPL-CCNC: 117 U/L
ALT SERPL W/O P-5'-P-CCNC: 53 U/L
ANION GAP SERPL CALC-SCNC: 10 MMOL/L
APTT BLDCRRT: 25.1 SEC
AST SERPL-CCNC: 58 U/L
BACTERIA #/AREA URNS AUTO: NORMAL /HPF
BASOPHILS # BLD AUTO: 0.03 K/UL
BASOPHILS NFR BLD: 0.3 %
BILIRUB SERPL-MCNC: 1.4 MG/DL
BILIRUB UR QL STRIP: NEGATIVE
BUN SERPL-MCNC: 23 MG/DL
CALCIUM SERPL-MCNC: 9.2 MG/DL
CHLORIDE SERPL-SCNC: 106 MMOL/L
CLARITY UR REFRACT.AUTO: CLEAR
CO2 SERPL-SCNC: 23 MMOL/L
COLOR UR AUTO: YELLOW
CREAT SERPL-MCNC: 1.4 MG/DL
DIFFERENTIAL METHOD: ABNORMAL
EOSINOPHIL # BLD AUTO: 0.1 K/UL
EOSINOPHIL NFR BLD: 0.7 %
ERYTHROCYTE [DISTWIDTH] IN BLOOD BY AUTOMATED COUNT: 15.5 %
EST. GFR  (AFRICAN AMERICAN): 54.5 ML/MIN/1.73 M^2
EST. GFR  (NON AFRICAN AMERICAN): 47.1 ML/MIN/1.73 M^2
ESTIMATED AVG GLUCOSE: 197 MG/DL
FLUAV AG SPEC QL IA: NEGATIVE
FLUBV AG SPEC QL IA: NEGATIVE
GLUCOSE SERPL-MCNC: 110 MG/DL
GLUCOSE UR QL STRIP: NEGATIVE
HBA1C MFR BLD HPLC: 8.5 %
HCO3 UR-SCNC: 23.2 MMOL/L (ref 24–28)
HCT VFR BLD AUTO: 31.6 %
HGB BLD-MCNC: 10.4 G/DL
HGB UR QL STRIP: ABNORMAL
HYALINE CASTS UR QL AUTO: 0 /LPF
IMM GRANULOCYTES # BLD AUTO: 0.03 K/UL
IMM GRANULOCYTES NFR BLD AUTO: 0.3 %
INR PPP: 1
KETONES UR QL STRIP: NEGATIVE
LACTATE SERPL-SCNC: 0.8 MMOL/L
LACTATE SERPL-SCNC: 1 MMOL/L
LEUKOCYTE ESTERASE UR QL STRIP: NEGATIVE
LIPASE SERPL-CCNC: 5 U/L
LYMPHOCYTES # BLD AUTO: 0.7 K/UL
LYMPHOCYTES NFR BLD: 7.6 %
MAGNESIUM SERPL-MCNC: 1.9 MG/DL
MCH RBC QN AUTO: 26.9 PG
MCHC RBC AUTO-ENTMCNC: 32.9 G/DL
MCV RBC AUTO: 82 FL
MICROSCOPIC COMMENT: NORMAL
MONOCYTES # BLD AUTO: 0.6 K/UL
MONOCYTES NFR BLD: 6.6 %
NEUTROPHILS # BLD AUTO: 7.8 K/UL
NEUTROPHILS NFR BLD: 84.5 %
NITRITE UR QL STRIP: NEGATIVE
NRBC BLD-RTO: 0 /100 WBC
PCO2 BLDA: 39.1 MMHG (ref 35–45)
PH SMN: 7.38 [PH] (ref 7.35–7.45)
PH UR STRIP: 5 [PH] (ref 5–8)
PHOSPHATE SERPL-MCNC: 1.9 MG/DL
PLATELET # BLD AUTO: 150 K/UL
PMV BLD AUTO: 10.4 FL
PO2 BLDA: 20 MMHG (ref 40–60)
POC BE: -2 MMOL/L
POC SATURATED O2: 31 % (ref 95–100)
POC TCO2: 24 MMOL/L (ref 24–29)
POCT GLUCOSE: 196 MG/DL (ref 70–110)
POCT GLUCOSE: 199 MG/DL (ref 70–110)
POTASSIUM SERPL-SCNC: 3.6 MMOL/L
PROCALCITONIN SERPL IA-MCNC: 1.75 NG/ML
PROT SERPL-MCNC: 7.5 G/DL
PROT UR QL STRIP: ABNORMAL
PROTHROMBIN TIME: 10.4 SEC
RBC # BLD AUTO: 3.87 M/UL
RBC #/AREA URNS AUTO: 1 /HPF (ref 0–4)
SAMPLE: ABNORMAL
SITE: ABNORMAL
SODIUM SERPL-SCNC: 139 MMOL/L
SP GR UR STRIP: 1.02 (ref 1–1.03)
SPECIMEN SOURCE: NORMAL
SQUAMOUS #/AREA URNS AUTO: 1 /HPF
URN SPEC COLLECT METH UR: ABNORMAL
UROBILINOGEN UR STRIP-ACNC: NEGATIVE EU/DL
WBC # BLD AUTO: 9.18 K/UL
WBC #/AREA URNS AUTO: 3 /HPF (ref 0–5)

## 2018-02-24 PROCEDURE — 11000001 HC ACUTE MED/SURG PRIVATE ROOM

## 2018-02-24 PROCEDURE — G0378 HOSPITAL OBSERVATION PER HR: HCPCS

## 2018-02-24 PROCEDURE — 63600175 PHARM REV CODE 636 W HCPCS: Performed by: SURGERY

## 2018-02-24 PROCEDURE — 85025 COMPLETE CBC W/AUTO DIFF WBC: CPT

## 2018-02-24 PROCEDURE — S0073 INJECTION, AZTREONAM, 500 MG: HCPCS | Performed by: STUDENT IN AN ORGANIZED HEALTH CARE EDUCATION/TRAINING PROGRAM

## 2018-02-24 PROCEDURE — 63600175 PHARM REV CODE 636 W HCPCS: Performed by: STUDENT IN AN ORGANIZED HEALTH CARE EDUCATION/TRAINING PROGRAM

## 2018-02-24 PROCEDURE — 96365 THER/PROPH/DIAG IV INF INIT: CPT

## 2018-02-24 PROCEDURE — 93005 ELECTROCARDIOGRAM TRACING: CPT

## 2018-02-24 PROCEDURE — 96375 TX/PRO/DX INJ NEW DRUG ADDON: CPT

## 2018-02-24 PROCEDURE — 82962 GLUCOSE BLOOD TEST: CPT

## 2018-02-24 PROCEDURE — 83605 ASSAY OF LACTIC ACID: CPT | Mod: 91

## 2018-02-24 PROCEDURE — 83690 ASSAY OF LIPASE: CPT

## 2018-02-24 PROCEDURE — 93010 ELECTROCARDIOGRAM REPORT: CPT | Mod: ,,, | Performed by: INTERNAL MEDICINE

## 2018-02-24 PROCEDURE — 84145 PROCALCITONIN (PCT): CPT

## 2018-02-24 PROCEDURE — 81001 URINALYSIS AUTO W/SCOPE: CPT

## 2018-02-24 PROCEDURE — 87040 BLOOD CULTURE FOR BACTERIA: CPT

## 2018-02-24 PROCEDURE — 85610 PROTHROMBIN TIME: CPT

## 2018-02-24 PROCEDURE — 80053 COMPREHEN METABOLIC PANEL: CPT

## 2018-02-24 PROCEDURE — 87400 INFLUENZA A/B EACH AG IA: CPT | Mod: 59

## 2018-02-24 PROCEDURE — 99285 EMERGENCY DEPT VISIT HI MDM: CPT | Mod: 25

## 2018-02-24 PROCEDURE — 25500020 PHARM REV CODE 255

## 2018-02-24 PROCEDURE — 83605 ASSAY OF LACTIC ACID: CPT

## 2018-02-24 PROCEDURE — 96361 HYDRATE IV INFUSION ADD-ON: CPT

## 2018-02-24 PROCEDURE — 87186 SC STD MICRODIL/AGAR DIL: CPT

## 2018-02-24 PROCEDURE — 25000003 PHARM REV CODE 250: Performed by: STUDENT IN AN ORGANIZED HEALTH CARE EDUCATION/TRAINING PROGRAM

## 2018-02-24 PROCEDURE — 82803 BLOOD GASES ANY COMBINATION: CPT

## 2018-02-24 PROCEDURE — 83735 ASSAY OF MAGNESIUM: CPT

## 2018-02-24 PROCEDURE — 84100 ASSAY OF PHOSPHORUS: CPT

## 2018-02-24 PROCEDURE — 96366 THER/PROPH/DIAG IV INF ADDON: CPT

## 2018-02-24 PROCEDURE — 83036 HEMOGLOBIN GLYCOSYLATED A1C: CPT

## 2018-02-24 PROCEDURE — 85730 THROMBOPLASTIN TIME PARTIAL: CPT

## 2018-02-24 PROCEDURE — 87077 CULTURE AEROBIC IDENTIFY: CPT

## 2018-02-24 RX ORDER — HYDROCODONE BITARTRATE AND ACETAMINOPHEN 10; 325 MG/1; MG/1
1 TABLET ORAL
Status: DISCONTINUED | OUTPATIENT
Start: 2018-02-24 | End: 2018-02-24

## 2018-02-24 RX ORDER — FINASTERIDE 5 MG/1
5 TABLET, FILM COATED ORAL EVERY MORNING
Status: DISCONTINUED | OUTPATIENT
Start: 2018-02-25 | End: 2018-02-27 | Stop reason: HOSPADM

## 2018-02-24 RX ORDER — SODIUM CHLORIDE 0.9 % (FLUSH) 0.9 %
3 SYRINGE (ML) INJECTION
Status: DISCONTINUED | OUTPATIENT
Start: 2018-02-24 | End: 2018-02-27 | Stop reason: HOSPADM

## 2018-02-24 RX ORDER — METOPROLOL SUCCINATE 50 MG/1
50 TABLET, EXTENDED RELEASE ORAL EVERY MORNING
Status: DISCONTINUED | OUTPATIENT
Start: 2018-02-25 | End: 2018-02-27 | Stop reason: HOSPADM

## 2018-02-24 RX ORDER — HEPARIN SODIUM 5000 [USP'U]/ML
5000 INJECTION, SOLUTION INTRAVENOUS; SUBCUTANEOUS EVERY 8 HOURS
Status: DISCONTINUED | OUTPATIENT
Start: 2018-02-24 | End: 2018-02-27 | Stop reason: HOSPADM

## 2018-02-24 RX ORDER — ACETAMINOPHEN 500 MG
1000 TABLET ORAL
Status: COMPLETED | OUTPATIENT
Start: 2018-02-24 | End: 2018-02-24

## 2018-02-24 RX ORDER — AZTREONAM 2 G/1
2000 INJECTION, POWDER, LYOPHILIZED, FOR SOLUTION INTRAMUSCULAR; INTRAVENOUS
Status: COMPLETED | OUTPATIENT
Start: 2018-02-24 | End: 2018-02-25

## 2018-02-24 RX ORDER — DEXTROSE 50 % IN WATER (D50W) INTRAVENOUS SYRINGE
12.5
Status: DISCONTINUED | OUTPATIENT
Start: 2018-02-24 | End: 2018-02-27 | Stop reason: HOSPADM

## 2018-02-24 RX ORDER — INSULIN ASPART 100 [IU]/ML
1-10 INJECTION, SOLUTION INTRAVENOUS; SUBCUTANEOUS EVERY 6 HOURS PRN
Status: DISCONTINUED | OUTPATIENT
Start: 2018-02-24 | End: 2018-02-27 | Stop reason: HOSPADM

## 2018-02-24 RX ORDER — GLUCAGON 1 MG
1 KIT INJECTION
Status: DISCONTINUED | OUTPATIENT
Start: 2018-02-24 | End: 2018-02-27 | Stop reason: HOSPADM

## 2018-02-24 RX ORDER — IRBESARTAN 75 MG/1
75 TABLET ORAL EVERY MORNING
Status: DISCONTINUED | OUTPATIENT
Start: 2018-02-25 | End: 2018-02-27 | Stop reason: HOSPADM

## 2018-02-24 RX ORDER — HYDROCODONE BITARTRATE AND ACETAMINOPHEN 5; 325 MG/1; MG/1
1 TABLET ORAL EVERY 4 HOURS PRN
Status: DISCONTINUED | OUTPATIENT
Start: 2018-02-24 | End: 2018-02-27 | Stop reason: HOSPADM

## 2018-02-24 RX ORDER — ATORVASTATIN CALCIUM 20 MG/1
40 TABLET, FILM COATED ORAL EVERY MORNING
Status: DISCONTINUED | OUTPATIENT
Start: 2018-02-25 | End: 2018-02-27 | Stop reason: HOSPADM

## 2018-02-24 RX ORDER — PANTOPRAZOLE SODIUM 40 MG/1
40 TABLET, DELAYED RELEASE ORAL DAILY
Status: DISCONTINUED | OUTPATIENT
Start: 2018-02-25 | End: 2018-02-27 | Stop reason: HOSPADM

## 2018-02-24 RX ORDER — ASPIRIN 81 MG/1
81 TABLET ORAL DAILY
Status: DISCONTINUED | OUTPATIENT
Start: 2018-02-25 | End: 2018-02-27 | Stop reason: HOSPADM

## 2018-02-24 RX ORDER — ONDANSETRON 8 MG/1
8 TABLET, ORALLY DISINTEGRATING ORAL EVERY 8 HOURS PRN
Status: DISCONTINUED | OUTPATIENT
Start: 2018-02-24 | End: 2018-02-27 | Stop reason: HOSPADM

## 2018-02-24 RX ORDER — ALBUTEROL SULFATE 0.83 MG/ML
2.5 SOLUTION RESPIRATORY (INHALATION) EVERY 4 HOURS PRN
Status: DISCONTINUED | OUTPATIENT
Start: 2018-02-24 | End: 2018-02-27 | Stop reason: HOSPADM

## 2018-02-24 RX ADMIN — INSULIN ASPART 1 UNITS: 100 INJECTION, SOLUTION INTRAVENOUS; SUBCUTANEOUS at 11:02

## 2018-02-24 RX ADMIN — AZTREONAM 2000 MG: 2 INJECTION, POWDER, LYOPHILIZED, FOR SOLUTION INTRAMUSCULAR; INTRAVENOUS at 11:02

## 2018-02-24 RX ADMIN — AZTREONAM 2000 MG: 2 INJECTION, POWDER, LYOPHILIZED, FOR SOLUTION INTRAMUSCULAR; INTRAVENOUS at 01:02

## 2018-02-24 RX ADMIN — IOHEXOL 100 ML: 350 INJECTION, SOLUTION INTRAVENOUS at 04:02

## 2018-02-24 RX ADMIN — SODIUM CHLORIDE 1000 ML: 0.9 INJECTION, SOLUTION INTRAVENOUS at 12:02

## 2018-02-24 RX ADMIN — VANCOMYCIN HYDROCHLORIDE 2000 MG: 1 INJECTION, POWDER, LYOPHILIZED, FOR SOLUTION INTRAVENOUS at 02:02

## 2018-02-24 RX ADMIN — ACETAMINOPHEN 1000 MG: 500 TABLET ORAL at 03:02

## 2018-02-24 RX ADMIN — HEPARIN SODIUM 5000 UNITS: 5000 INJECTION, SOLUTION INTRAVENOUS; SUBCUTANEOUS at 11:02

## 2018-02-24 RX ADMIN — SODIUM CHLORIDE 1000 ML: 0.9 INJECTION, SOLUTION INTRAVENOUS at 03:02

## 2018-02-24 NOTE — ED NOTES
Pt had lap kody on feb 21st, 2018 and was discharged Thursday. Been running a fever since yesterday afternoon, with the highest temp being 102.3. Pt complains of slight headache since yesterday.

## 2018-02-24 NOTE — ED NOTES
Pt resting quietly on stretcher; remains on continuous cardiac and pulse ox monitoring with non-invasive blood pressure to cycle every 30 minutes.  VS stable; NSR noted. Bed locked in lowest position; side rails up and locked x 2; call light, bedside table, and personal belongings within reach.  Pt denies needs or complaints at this time; will continue to monitor pt.

## 2018-02-24 NOTE — ED PROVIDER NOTES
Encounter Date: 2/24/2018    SCRIBE #1 NOTE: I, Lo Ziegler, am scribing for, and in the presence of,  Dr. Donovan. I have scribed the following portions of the note - the Resident attestation.       History     Chief Complaint   Patient presents with    Post-op Problem     fever 102, had choley wed, dr toribio wants to be called [phone]     82 year old male 2 days s/p lap zhang presents with fever since last night. He has not had bowel movement since surgery but is passing flatus. He denies dysuria, oliguria, hematuria, flank pain, SOB, CP, cough, hemoptysis, leg pain, history DVT, history malignancy, nausea, vomiting, dizziness. He reports compliance with IS at home.            Review of patient's allergies indicates:   Allergen Reactions    Ace inhibitors      Other reaction(s): cough    Cefadroxil      Other reaction(s): possible vasculitis  Other reaction(s): vasculitis     Past Medical History:   Diagnosis Date    Arthritis     BPH (benign prostatic hyperplasia)     Cataract     right     Colon polyp 11/18/2015    Colon polyps     Coronary artery disease      (diabetic retinopathy)     Dyslipidemia     Elevated PSA     Goiter, nontoxic, multinodular     Headaches, cluster     Hypertension     Kidney stones     Lump in the testicle     Left    Rotator cuff tendinitis     Right shoulder    Sleep apnea with use of continuous positive airway pressure (CPAP)     uses cpap at night    Type II or unspecified type diabetes mellitus with other specified manifestations, uncontrolled      Past Surgical History:   Procedure Laterality Date    CARDIAC CATHETERIZATION  2007    CATARACT EXTRACTION W/  INTRAOCULAR LENS IMPLANT Right 09/27/2016    Dr. Garcia    CORONARY STENT PLACEMENT  02/02/2007    LAD, RCAx2    INGUINAL HERNIA REPAIR Right 1995    PROSTATE BIOPSY  2012    ROTATOR CUFF REPAIR Right 2014    x2    ROTATOR CUFF REPAIR Left 2013    TONSILLECTOMY, ADENOIDECTOMY       TRIGGER FINGER RELEASE Right 2004    x2     Family History   Problem Relation Age of Onset    Cataracts Father     Heart disease Father     Cancer Mother     Diabetes Mother     Amblyopia Neg Hx     Blindness Neg Hx     Glaucoma Neg Hx     Macular degeneration Neg Hx     Retinal detachment Neg Hx     Strabismus Neg Hx      Social History   Substance Use Topics    Smoking status: Former Smoker     Quit date: 3/12/1978    Smokeless tobacco: Never Used    Alcohol use No     Review of Systems   Constitutional: Positive for fever.   HENT: Negative for sore throat.    Respiratory: Negative for shortness of breath.    Cardiovascular: Negative for chest pain.   Gastrointestinal: Positive for constipation. Negative for nausea.        Passing flatus, no BM   Genitourinary: Negative for dysuria.   Musculoskeletal: Negative for back pain.   Skin: Negative for rash.   Neurological: Negative for weakness.   Hematological: Does not bruise/bleed easily.       Physical Exam     Initial Vitals [02/24/18 1150]   BP Pulse Resp Temp SpO2   130/60 106 20 (!) 101.1 °F (38.4 °C) 96 %      MAP       83.33         Physical Exam    Nursing note and vitals reviewed.  Constitutional: He appears well-developed and well-nourished. He is not diaphoretic. He appears ill. No distress.   HENT:   Head: Normocephalic and atraumatic.   Eyes: EOM are normal. Pupils are equal, round, and reactive to light.   Neck: Normal range of motion. Neck supple.   Cardiovascular: Regular rhythm. Tachycardia present.    Pulmonary/Chest: Tachypnea noted. He has decreased breath sounds in the right lower field. He has rales in the right lower field.   Abdominal: Bowel sounds are normal. He exhibits no distension. There is tenderness in the right upper quadrant. There is no rigidity and no guarding.   Mild tenderness in RUQ    Neurological: He is alert and oriented to person, place, and time.   Skin: Skin is warm and dry. Capillary refill takes less than 2  seconds.   Psychiatric: He has a normal mood and affect. Thought content normal.         ED Course   Procedures  Labs Reviewed   CBC W/ AUTO DIFFERENTIAL - Abnormal; Notable for the following:        Result Value    RBC 3.87 (*)     Hemoglobin 10.4 (*)     Hematocrit 31.6 (*)     MCH 26.9 (*)     RDW 15.5 (*)     Gran # (ANC) 7.8 (*)     Lymph # 0.7 (*)     Gran% 84.5 (*)     Lymph% 7.6 (*)     All other components within normal limits   COMPREHENSIVE METABOLIC PANEL - Abnormal; Notable for the following:     Albumin 2.8 (*)     Total Bilirubin 1.4 (*)     AST 58 (*)     ALT 53 (*)     eGFR if  54.5 (*)     eGFR if non  47.1 (*)     All other components within normal limits   PHOSPHORUS - Abnormal; Notable for the following:     Phosphorus 1.9 (*)     All other components within normal limits   PROCALCITONIN - Abnormal; Notable for the following:     Procalcitonin 1.75 (*)     All other components within normal limits   URINALYSIS, REFLEX TO URINE CULTURE - Abnormal; Notable for the following:     Protein, UA 1+ (*)     Occult Blood UA 1+ (*)     All other components within normal limits    Narrative:     Preferred Collection Type->Urine, Clean Catch  1 cup   ISTAT PROCEDURE - Abnormal; Notable for the following:     POC PO2 20 (*)     POC HCO3 23.2 (*)     POC SATURATED O2 31 (*)     All other components within normal limits   CULTURE, BLOOD   CULTURE, BLOOD   APTT   LACTIC ACID, PLASMA   LIPASE   MAGNESIUM   PROTIME-INR   INFLUENZA A AND B ANTIGEN   URINALYSIS MICROSCOPIC    Narrative:     Preferred Collection Type->Urine, Clean Catch  1 cup   LACTIC ACID, PLASMA   LACTIC ACID, PLASMA   POCT INFLUENZA A/B             Medical Decision Making:   History:   Old Medical Records: I decided to obtain old medical records.  Initial Assessment:   Sepsis protocol ordered for post-op fever (101.1), tachycardia (100s), tachypnea (25). Patient is well-appearing on exam, ambulates to room in NAD,  "able to dress and undress without difficulty, denies significant abdominal pain. O2 sat 96% on RA, consistent with baseline saturation. Likely chronic CO2 retainer 2/2 DAMIAN. Surgical incisions are healing appropriately without drainage, induration, fluctuance, or other sign of infection. Portable CXR shows opacity in right lower lung field concerning for atelectasis vs pneumonia. Will obtain upright XR and start PPX ABX. WELLS score 3.0 - Moderate risk. CT PE chest pending.     Sonja Alcocer MD  [facility]   1:41 PM        Differential Diagnosis:   Pneumonia vs UTI vs surgical incision infection vs PE vs drug fever   Clinical Tests:   Lab Tests: Ordered and Reviewed  Radiological Study: Ordered and Reviewed  ED Management:  Upright CXR shows normal contour of diaphragm and no opacity, likely just atelectasis. Fever resolved without antipyretics and before ABX administration, down to 98.5 and has not gone back up. WBC normal, UA normal. Spoke with surgeon Dr. Avery who recommended RUQ US to evaluate for abnormal fluid collection. US shows normal post-op fluid. CT PE pending. If CT normal, patient will be safe to follow as outpatient with strict return precautions.     Sonja Alcocer MD  [facility]   3:42 PM    CT shows no evidence of PE. Dr. Paz recommends observation overnight. Patient is willing to come into hospital "as long as I get a room fast." Explained that we do not have control over that but will try to accommodate him best we can.     Sonja Alcocer MD  [facility]   5:01 PM                Scribe Attestation:   Scribe #1: I performed the above scribed service and the documentation accurately describes the services I performed. I attest to the accuracy of the note.    Attending Attestation:   Physician Attestation Statement for Resident:  As the supervising MD   Physician Attestation Statement: I have personally seen and examined this patient.   I agree with the " above history. -:   As the supervising MD I agree with the above PE.    As the supervising MD I agree with the above treatment, course, plan, and disposition.                       Clinical Impression:   The primary encounter diagnosis was HAP (hospital-acquired pneumonia). A diagnosis of Fever was also pertinent to this visit.                           Sonja Alcocer MD  Resident  02/26/18 6478

## 2018-02-24 NOTE — ED NOTES
LOC: The patient is awake, alert, and oriented to place, time, situation. Affect is appropriate.  Speech is appropriate and clear.     APPEARANCE: Patient resting comfortably in no acute distress.  Patient is clean and well groomed.    SKIN: The skin is warm and dry; color consistent with ethnicity.  Patient has normal skin turgor and moist mucus membranes.  Skin intact; with surgical incisions noted on abdomen.    MUSCULOSKELETAL: Patient moving upper and lower extremities without difficulty.  Denies weakness.     RESPIRATORY: Airway is open and patent. Respirations spontaneous, even, easy, and non-labored.  Patient has a normal effort and rate.  No accessory muscle use noted. Denies cough.     CARDIAC:  Normal rhythm and rate noted.  No peripheral edema noted. No complaints of chest pain.      ABDOMEN: Soft and non tender to palpation.  No distention noted. Three small surgical incisions. Dressings are clean dry and intact.     NEUROLOGIC: Eyes open spontaneously.  Behavior appropriate to situation.  Follows commands; facial expression symmetrical.  Purposeful motor response noted; normal sensation in all extremities.

## 2018-02-24 NOTE — ED NOTES
Pt placed on continuous cardiac and pulse ox monitoring with blood pressure to cycle every 30 minutes.  VS stable; sinus tachy noted with a HR in the low 100's.  Bed locked in lowest position; side rails up and locked x 2; call light, bedside table, and personal belongings within reach.  Pt denies needs or complaints at this time; will continue to monitor pt.

## 2018-02-25 LAB
ALBUMIN SERPL BCP-MCNC: 2.4 G/DL
ALP SERPL-CCNC: 103 U/L
ALT SERPL W/O P-5'-P-CCNC: 43 U/L
ANION GAP SERPL CALC-SCNC: 8 MMOL/L
AST SERPL-CCNC: 33 U/L
BASOPHILS # BLD AUTO: 0.03 K/UL
BASOPHILS NFR BLD: 0.3 %
BILIRUB SERPL-MCNC: 0.8 MG/DL
BUN SERPL-MCNC: 20 MG/DL
CALCIUM SERPL-MCNC: 8.4 MG/DL
CHLORIDE SERPL-SCNC: 107 MMOL/L
CO2 SERPL-SCNC: 22 MMOL/L
CREAT SERPL-MCNC: 1.3 MG/DL
DIFFERENTIAL METHOD: ABNORMAL
EOSINOPHIL # BLD AUTO: 0.2 K/UL
EOSINOPHIL NFR BLD: 2.2 %
ERYTHROCYTE [DISTWIDTH] IN BLOOD BY AUTOMATED COUNT: 15.5 %
EST. GFR  (AFRICAN AMERICAN): 59.6 ML/MIN/1.73 M^2
EST. GFR  (NON AFRICAN AMERICAN): 51.5 ML/MIN/1.73 M^2
GLUCOSE SERPL-MCNC: 174 MG/DL
HCT VFR BLD AUTO: 28.9 %
HGB BLD-MCNC: 9.4 G/DL
IMM GRANULOCYTES # BLD AUTO: 0.05 K/UL
IMM GRANULOCYTES NFR BLD AUTO: 0.5 %
LYMPHOCYTES # BLD AUTO: 1.3 K/UL
LYMPHOCYTES NFR BLD: 13.3 %
MCH RBC QN AUTO: 27 PG
MCHC RBC AUTO-ENTMCNC: 32.5 G/DL
MCV RBC AUTO: 83 FL
MONOCYTES # BLD AUTO: 1.3 K/UL
MONOCYTES NFR BLD: 13.2 %
NEUTROPHILS # BLD AUTO: 6.6 K/UL
NEUTROPHILS NFR BLD: 70.5 %
NRBC BLD-RTO: 0 /100 WBC
PLATELET # BLD AUTO: 147 K/UL
PMV BLD AUTO: 11.2 FL
POCT GLUCOSE: 200 MG/DL (ref 70–110)
POCT GLUCOSE: 216 MG/DL (ref 70–110)
POCT GLUCOSE: 261 MG/DL (ref 70–110)
POTASSIUM SERPL-SCNC: 3.6 MMOL/L
PROT SERPL-MCNC: 6.7 G/DL
RBC # BLD AUTO: 3.48 M/UL
SODIUM SERPL-SCNC: 137 MMOL/L
WBC # BLD AUTO: 9.44 K/UL

## 2018-02-25 PROCEDURE — 63600175 PHARM REV CODE 636 W HCPCS: Performed by: STUDENT IN AN ORGANIZED HEALTH CARE EDUCATION/TRAINING PROGRAM

## 2018-02-25 PROCEDURE — 36415 COLL VENOUS BLD VENIPUNCTURE: CPT

## 2018-02-25 PROCEDURE — S0073 INJECTION, AZTREONAM, 500 MG: HCPCS | Performed by: STUDENT IN AN ORGANIZED HEALTH CARE EDUCATION/TRAINING PROGRAM

## 2018-02-25 PROCEDURE — 99205 OFFICE O/P NEW HI 60 MIN: CPT | Mod: GC,,, | Performed by: INTERNAL MEDICINE

## 2018-02-25 PROCEDURE — 25000003 PHARM REV CODE 250: Performed by: STUDENT IN AN ORGANIZED HEALTH CARE EDUCATION/TRAINING PROGRAM

## 2018-02-25 PROCEDURE — G0378 HOSPITAL OBSERVATION PER HR: HCPCS

## 2018-02-25 PROCEDURE — 85025 COMPLETE CBC W/AUTO DIFF WBC: CPT

## 2018-02-25 PROCEDURE — 11000001 HC ACUTE MED/SURG PRIVATE ROOM

## 2018-02-25 PROCEDURE — 25000003 PHARM REV CODE 250: Performed by: SURGERY

## 2018-02-25 PROCEDURE — 80053 COMPREHEN METABOLIC PANEL: CPT

## 2018-02-25 PROCEDURE — 25500020 PHARM REV CODE 255: Performed by: STUDENT IN AN ORGANIZED HEALTH CARE EDUCATION/TRAINING PROGRAM

## 2018-02-25 PROCEDURE — 63600175 PHARM REV CODE 636 W HCPCS: Performed by: SURGERY

## 2018-02-25 PROCEDURE — 87040 BLOOD CULTURE FOR BACTERIA: CPT | Mod: 59

## 2018-02-25 RX ORDER — POLYETHYLENE GLYCOL 3350 17 G/17G
17 POWDER, FOR SOLUTION ORAL DAILY
Status: DISCONTINUED | OUTPATIENT
Start: 2018-02-25 | End: 2018-02-27 | Stop reason: HOSPADM

## 2018-02-25 RX ADMIN — HEPARIN SODIUM 5000 UNITS: 5000 INJECTION, SOLUTION INTRAVENOUS; SUBCUTANEOUS at 06:02

## 2018-02-25 RX ADMIN — ATORVASTATIN CALCIUM 40 MG: 20 TABLET, FILM COATED ORAL at 06:02

## 2018-02-25 RX ADMIN — IRBESARTAN 75 MG: 75 TABLET ORAL at 06:02

## 2018-02-25 RX ADMIN — AZTREONAM 2000 MG: 2 INJECTION, POWDER, LYOPHILIZED, FOR SOLUTION INTRAMUSCULAR; INTRAVENOUS at 02:02

## 2018-02-25 RX ADMIN — FINASTERIDE 5 MG: 5 TABLET, FILM COATED ORAL at 06:02

## 2018-02-25 RX ADMIN — METOPROLOL SUCCINATE 50 MG: 50 TABLET, EXTENDED RELEASE ORAL at 06:02

## 2018-02-25 RX ADMIN — AZTREONAM 2000 MG: 2 INJECTION, POWDER, LYOPHILIZED, FOR SOLUTION INTRAMUSCULAR; INTRAVENOUS at 10:02

## 2018-02-25 RX ADMIN — PANTOPRAZOLE SODIUM 40 MG: 40 TABLET, DELAYED RELEASE ORAL at 07:02

## 2018-02-25 RX ADMIN — IOHEXOL 15 ML: 350 INJECTION, SOLUTION INTRAVENOUS at 11:02

## 2018-02-25 RX ADMIN — HEPARIN SODIUM 5000 UNITS: 5000 INJECTION, SOLUTION INTRAVENOUS; SUBCUTANEOUS at 09:02

## 2018-02-25 RX ADMIN — IOHEXOL 15 ML: 350 INJECTION, SOLUTION INTRAVENOUS at 10:02

## 2018-02-25 RX ADMIN — ASPIRIN 81 MG: 81 TABLET, COATED ORAL at 07:02

## 2018-02-25 RX ADMIN — INSULIN ASPART 4 UNITS: 100 INJECTION, SOLUTION INTRAVENOUS; SUBCUTANEOUS at 04:02

## 2018-02-25 RX ADMIN — INSULIN ASPART 4 UNITS: 100 INJECTION, SOLUTION INTRAVENOUS; SUBCUTANEOUS at 01:02

## 2018-02-25 RX ADMIN — HYDROCODONE BITARTRATE AND ACETAMINOPHEN 1 TABLET: 5; 325 TABLET ORAL at 09:02

## 2018-02-25 RX ADMIN — AZTREONAM 2000 MG: 2 INJECTION, POWDER, LYOPHILIZED, FOR SOLUTION INTRAMUSCULAR; INTRAVENOUS at 06:02

## 2018-02-25 RX ADMIN — HEPARIN SODIUM 5000 UNITS: 5000 INJECTION, SOLUTION INTRAVENOUS; SUBCUTANEOUS at 01:02

## 2018-02-25 NOTE — PROGRESS NOTES
[facility]  General Surgery  Progress Note    Subjective:     History of Present Illness:  Anjum Cai Jr. is a 82 y.o. male with history of BPH, CAD, DM II, HTN, and HLD who presents 3 days post lap zhang for symptomatic cholelithiasis with the complaint of fever.  He states that he had a mild fever in the hospital.  He went home and states that the fever increased to 101.3 F today.  This concerned him so he came to be evaluated.  He denies increased abdominal pain, nausea, vomiting, chills, night sweats, shortness of breath, chest pain, leg swelling, diarrhea, dysuria, difficulty urinating.  He has not had a BM in the 3 days post-op but he is passing flatus.  He has an improving appetite.  He is ambulating around the house well.    Post-Op Info:  * No surgery found *         Interval History: No acute events overnight. States he feels well this morning. No belly pain. Passing flatus. No nausea or vomiting. No bowel movement.    Medications:  Continuous Infusions:  Scheduled Meds:   aspirin  81 mg Oral Daily    atorvastatin  40 mg Oral QAM    finasteride  5 mg Oral QAM    heparin (porcine)  5,000 Units Subcutaneous Q8H    irbesartan  75 mg Oral QAM    metoprolol succinate  50 mg Oral QAM    pantoprazole  40 mg Oral Daily     PRN Meds:albuterol sulfate, dextrose 50 % in water (D50W), glucagon (human recombinant), hydrocodone-acetaminophen 5-325mg, insulin aspart U-100, ondansetron, promethazine (PHENERGAN) IVPB, sodium chloride 0.9%     Review of patient's allergies indicates:   Allergen Reactions    Ace inhibitors      Other reaction(s): cough    Cefadroxil      Other reaction(s): possible vasculitis  Other reaction(s): vasculitis     Objective:     Vital Signs (Most Recent):  Temp: 97.9 °F (36.6 °C) (02/25/18 0614)  Pulse: 72 (02/25/18 0614)  Resp: 18 (02/25/18 0614)  BP: 136/62 (02/25/18 0614)  SpO2: 97 % (02/25/18 0614) Vital Signs (24h Range):  Temp:  [96.9 °F (36.1 °C)-101.1  °F (38.4 °C)] 97.9 °F (36.6 °C)  Pulse:  [] 72  Resp:  [17-24] 18  SpO2:  [93 %-99 %] 97 %  BP: (118-166)/(52-69) 136/62     Weight: 97.5 kg (215 lb)  Body mass index is 34.18 kg/m².    Intake/Output - Last 3 Shifts       02/23 0700 - 02/24 0659 02/24 0700 - 02/25 0659    IV Piggyback  2500    Total Intake(mL/kg)  2500 (25.6)    Net   +2500                Physical Exam   Constitutional: He is oriented to person, place, and time. He appears well-developed and well-nourished. No distress.   Cardiovascular: Normal rate and regular rhythm.    Pulmonary/Chest: Effort normal. No respiratory distress.   Abdominal: Soft. He exhibits distension. There is no tenderness. There is no rebound (incisions are clean, dry and intact) and no guarding.   Neurological: He is alert and oriented to person, place, and time.       Significant Labs:  CBC:   Recent Labs  Lab 02/25/18  0353   WBC 9.44   RBC 3.48*   HGB 9.4*   HCT 28.9*   *   MCV 83   MCH 27.0   MCHC 32.5     CMP:   Recent Labs  Lab 02/25/18  0353   *   CALCIUM 8.4*   ALBUMIN 2.4*   PROT 6.7      K 3.6   CO2 22*      BUN 20   CREATININE 1.3   ALKPHOS 103   ALT 43   AST 33   BILITOT 0.8     Assessment/Plan:     * Post-procedural fever    -WBC count wnl  -No tachycardia  - 1 blood culture positive for gram negative rods. Will consult ID for antibiotic recommendations, as patient has reaction to Cefadroxil  -Diabetic diet; SSI  -Ambulate  -Pulmonary toilet  - Continue abx started in ED while awaiting ID recs              Dara Deleon MD  General Surgery  [facility]

## 2018-02-25 NOTE — SUBJECTIVE & OBJECTIVE
"No current facility-administered medications on file prior to encounter.      Current Outpatient Prescriptions on File Prior to Encounter   Medication Sig    ACCU-CHEK SMARTVIEW TEST STRIP Strp     ASPIRIN (ASPIR-81 ORAL) Take 1 tablet by mouth Daily.    atorvastatin (LIPITOR) 40 MG tablet Take 1 tablet (40 mg total) by mouth once daily. (Patient taking differently: Take 40 mg by mouth every morning. )    BD INSULIN PEN NEEDLE UF MINI 31 gauge x 3/16" Ndle USE WITH INSULIN INJECTION FOUR TIMES DAILY    BD INSULIN SYRINGE ULTRA-FINE 1 mL 30 x 1/2" Syrg USE AS DIRECTED FOUR TIMES DAILY    blood sugar diagnostic, drum (ACCU-CHEK COMPACT TEST) Strp CHECK BLOOD GLUCOSE 4 TIMES DAILY    finasteride (PROSCAR) 5 mg tablet Take 1 tablet (5 mg total) by mouth once daily. (Patient taking differently: Take 5 mg by mouth every morning. )    hydrocodone-acetaminophen 5-325mg (NORCO) 5-325 mg per tablet Take 1 tablet by mouth every 4 (four) hours as needed.    insulin aspart (NOVOLOG FLEXPEN) 100 unit/mL InPn pen Inject 12 Units into the skin 3 (three) times daily with meals. Insulin sliding scale : 100 to 200 -12 units.                                     200 to 250 -14 units.                                      251 to 300 -16 units.                                       301 to 350 - 18 units.    insulin glargine (LANTUS SOLOSTAR) 100 unit/mL (3 mL) InPn pen inject 65 units subcutaneously every evening (Patient taking differently: every evening. inject 65 units subcutaneously every evening)    irbesartan (AVAPRO) 75 MG tablet Take 1 tablet (75 mg total) by mouth once daily. (Patient taking differently: Take 75 mg by mouth every morning. )    metoprolol succinate (TOPROL-XL) 50 MG 24 hr tablet Take 1 tablet (50 mg total) by mouth once daily. (Patient taking differently: Take 50 mg by mouth every morning. )    MULTIVITAMIN W-MINERALS/LUTEIN (CENTRUM SILVER ORAL) Take by mouth once daily.    nitroGLYCERIN (NITROSTAT) " 0.4 MG SL tablet Place 1 tablet (0.4 mg total) under the tongue every 5 (five) minutes as needed for Chest pain.    omega-3 fatty acids-vitamin E (FISH OIL) 1,000 mg Cap Take 2 capsules by mouth Daily.     omeprazole (PRILOSEC) 20 MG capsule Take 1 capsule (20 mg total) by mouth 2 (two) times daily before meals.    VITAMIN D2 50,000 unit capsule take one capsule by mouth every 30 days    clotrimazole (LOTRIMIN) 1 % Soln Apply topically once daily. Apply to filed toenails daily       Review of patient's allergies indicates:   Allergen Reactions    Ace inhibitors      Other reaction(s): cough    Cefadroxil      Other reaction(s): possible vasculitis  Other reaction(s): vasculitis       Past Medical History:   Diagnosis Date    Arthritis     BPH (benign prostatic hyperplasia)     Cataract     right     Colon polyp 11/18/2015    Colon polyps     Coronary artery disease      (diabetic retinopathy)     Dyslipidemia     Elevated PSA     Goiter, nontoxic, multinodular     Headaches, cluster     Hypertension     Kidney stones     Lump in the testicle     Left    Rotator cuff tendinitis     Right shoulder    Sleep apnea with use of continuous positive airway pressure (CPAP)     uses cpap at night    Type II or unspecified type diabetes mellitus with other specified manifestations, uncontrolled      Past Surgical History:   Procedure Laterality Date    CARDIAC CATHETERIZATION  2007    CATARACT EXTRACTION W/  INTRAOCULAR LENS IMPLANT Right 09/27/2016    Dr. Garcia    CORONARY STENT PLACEMENT  02/02/2007    LAD, RCAx2    INGUINAL HERNIA REPAIR Right 1995    PROSTATE BIOPSY  2012    ROTATOR CUFF REPAIR Right 2014    x2    ROTATOR CUFF REPAIR Left 2013    TONSILLECTOMY, ADENOIDECTOMY      TRIGGER FINGER RELEASE Right 2004    x2     Family History     Problem Relation (Age of Onset)    Cancer Mother    Cataracts Father    Diabetes Mother    Heart disease Father        Social History Main Topics     Smoking status: Former Smoker     Quit date: 3/12/1978    Smokeless tobacco: Never Used    Alcohol use No    Drug use: No    Sexual activity: Not on file     Review of Systems   Constitutional: Positive for fever. Negative for activity change, appetite change, chills, fatigue and unexpected weight change.   HENT: Negative.    Respiratory: Negative for cough and shortness of breath.    Cardiovascular: Negative for chest pain and palpitations.   Gastrointestinal: Positive for abdominal pain (very minimal soreness directly over incisions) and constipation. Negative for abdominal distention, diarrhea, nausea and vomiting.   Genitourinary: Negative for difficulty urinating, dysuria and hematuria.   Musculoskeletal: Negative.    Hematological: Does not bruise/bleed easily.     Objective:     Vital Signs (Most Recent):  Temp: 98.3 °F (36.8 °C) (02/24/18 1747)  Pulse: 64 (02/24/18 2102)  Resp: 17 (02/24/18 1631)  BP: (!) 166/69 (02/24/18 2102)  SpO2: 96 % (02/24/18 2102) Vital Signs (24h Range):  Temp:  [98.3 °F (36.8 °C)-101.1 °F (38.4 °C)] 98.3 °F (36.8 °C)  Pulse:  [] 64  Resp:  [17-24] 17  SpO2:  [93 %-99 %] 96 %  BP: (118-166)/(52-69) 166/69     Weight: 95.3 kg (210 lb)  Body mass index is 32.41 kg/m².    Physical Exam   Constitutional: He is oriented to person, place, and time. He appears well-developed and well-nourished. No distress.   HENT:   Head: Normocephalic and atraumatic.   Eyes: Conjunctivae and EOM are normal. No scleral icterus.   Neck: Normal range of motion.   Cardiovascular: Normal rate and regular rhythm.    Pulmonary/Chest: Effort normal. No respiratory distress.   Abdominal: Soft. He exhibits no distension. There is tenderness (very mild directly over incisions, which are c/d/i without s/s of infection). There is no rebound and no guarding.   Musculoskeletal: Normal range of motion. He exhibits no edema.   Neurological: He is alert and oriented to person, place, and time.   Skin: He is  not diaphoretic.   Psychiatric: He has a normal mood and affect.   Nursing note and vitals reviewed.      Significant Labs:  CBC:   Recent Labs  Lab 02/24/18  1229   WBC 9.18   RBC 3.87*   HGB 10.4*   HCT 31.6*      MCV 82   MCH 26.9*   MCHC 32.9     CMP:   Recent Labs  Lab 02/24/18  1229      CALCIUM 9.2   ALBUMIN 2.8*   PROT 7.5      K 3.6   CO2 23      BUN 23   CREATININE 1.4   ALKPHOS 117   ALT 53*   AST 58*   BILITOT 1.4*       Significant Diagnostics:  I have reviewed and interpreted all pertinent imaging results/findings within the past 24 hours.

## 2018-02-25 NOTE — ASSESSMENT & PLAN NOTE
83 yo M w/PMH of BPH, CAD, DM2, HTN, HLD. Cholecystectomy 2/21. Presented 2/24 w/fevers s/p sx. ID consulted for GNR bacteremia.  - T 101.1 2/24 @ noon, leukocytosis resolved (18 >9), lactate wnl, procal elevated 1.7  - 2/24 blood cx: GNR x 1 bottle  - S/p vanc 2 g x 1 in the ED and Aztreonam 2 g q8 hrs     Recommendations:  - f/u blood cx speciation/sensitivities  - Cont Aztreonam 2 g q8hrs  - repeat blood cultures today  - f/u CT abd/pelvis to r/o abscess

## 2018-02-25 NOTE — ASSESSMENT & PLAN NOTE
-WBC count wnl  -No tachycardia  - 1 blood culture positive for gram negative rods. Will consult ID for antibiotic recommendations, as patient has reaction to Cefadroxil  -Diabetic diet; SSI  -Ambulate  -Pulmonary toilet  - Continue abx started in ED while awaiting ID recs

## 2018-02-25 NOTE — SUBJECTIVE & OBJECTIVE
Interval History: No acute events overnight. States he feels well this morning. No belly pain. Passing flatus. No nausea or vomiting. No bowel movement.    Medications:  Continuous Infusions:  Scheduled Meds:   aspirin  81 mg Oral Daily    atorvastatin  40 mg Oral QAM    finasteride  5 mg Oral QAM    heparin (porcine)  5,000 Units Subcutaneous Q8H    irbesartan  75 mg Oral QAM    metoprolol succinate  50 mg Oral QAM    pantoprazole  40 mg Oral Daily     PRN Meds:albuterol sulfate, dextrose 50 % in water (D50W), glucagon (human recombinant), hydrocodone-acetaminophen 5-325mg, insulin aspart U-100, ondansetron, promethazine (PHENERGAN) IVPB, sodium chloride 0.9%     Review of patient's allergies indicates:   Allergen Reactions    Ace inhibitors      Other reaction(s): cough    Cefadroxil      Other reaction(s): possible vasculitis  Other reaction(s): vasculitis     Objective:     Vital Signs (Most Recent):  Temp: 97.9 °F (36.6 °C) (02/25/18 0614)  Pulse: 72 (02/25/18 0614)  Resp: 18 (02/25/18 0614)  BP: 136/62 (02/25/18 0614)  SpO2: 97 % (02/25/18 0614) Vital Signs (24h Range):  Temp:  [96.9 °F (36.1 °C)-101.1 °F (38.4 °C)] 97.9 °F (36.6 °C)  Pulse:  [] 72  Resp:  [17-24] 18  SpO2:  [93 %-99 %] 97 %  BP: (118-166)/(52-69) 136/62     Weight: 97.5 kg (215 lb)  Body mass index is 34.18 kg/m².    Intake/Output - Last 3 Shifts       02/23 0700 - 02/24 0659 02/24 0700 - 02/25 0659    IV Piggyback  2500    Total Intake(mL/kg)  2500 (25.6)    Net   +2500                Physical Exam   Constitutional: He is oriented to person, place, and time. He appears well-developed and well-nourished. No distress.   Cardiovascular: Normal rate and regular rhythm.    Pulmonary/Chest: Effort normal. No respiratory distress.   Abdominal: Soft. He exhibits distension. There is no tenderness. There is no rebound (incisions are clean, dry and intact) and no guarding.   Neurological: He is alert and oriented to person, place, and  time.       Significant Labs:  CBC:   Recent Labs  Lab 02/25/18 0353   WBC 9.44   RBC 3.48*   HGB 9.4*   HCT 28.9*   *   MCV 83   MCH 27.0   MCHC 32.5     CMP:   Recent Labs  Lab 02/25/18 0353   *   CALCIUM 8.4*   ALBUMIN 2.4*   PROT 6.7      K 3.6   CO2 22*      BUN 20   CREATININE 1.3   ALKPHOS 103   ALT 43   AST 33   BILITOT 0.8

## 2018-02-25 NOTE — ASSESSMENT & PLAN NOTE
-WBC count wnl; fever resolved during ED evaluation  -No tachycardia  -Abdominal exam benign and no imaging to suggest an etiology other than some atelectasis on x-ray and CT scan  -Diabetic diet; SSI  -Ambulate  -Pulmonary toilet  -No need for Abx  -Place in obs overnight; likely discharge home tomorrow

## 2018-02-25 NOTE — ED NOTES
Had ED tech go [facility]

## 2018-02-25 NOTE — NURSING
Spoke with pts daughter whom is power of  and requesting  contact her to give her so she can give some medical history as well as update her on situation due to him binging  a poor historian. Daughters name is Maria Elena Davis [phone]

## 2018-02-25 NOTE — CONSULTS
[facility]  Infectious Disease  Consult Note    Patient Name: Anjum Cai Jr.  MRN: [id number]  Admission Date: 2/24/2018  Hospital Length of Stay: 0 days  Attending Physician: Arnaud Avery MD  Primary Care Provider: Gavin Martinez MD     Isolation Status: No active isolations    Patient information was obtained from patient, past medical records and ER records.      Inpatient consult to Infectious Diseases  Consult performed by: DESTIN ZAZUETA  Consult ordered by: MIRA SHAW        Assessment/Plan:     * Post-procedural fever    81 yo M w/PMH of BPH, CAD, DM2, HTN, HLD. Cholecystectomy 2/21. Presented 2/24 w/fevers s/p sx. ID consulted for GNR bacteremia.  - T 101.1 2/24 @ noon, leukocytosis resolved (18 >9), lactate wnl, procal elevated 1.7  - 2/24 blood cx: GNR x 1 bottle  - S/p vanc 2 g x 1 in the ED and Aztreonam 2 g q8 hrs     Recommendations:  - f/u blood cx speciation/sensitivities  - Cont Aztreonam 2 g q8hrs  - repeat blood cultures today  - f/u CT abd/pelvis to r/o abscess            Thank you for your consult. I will follow-up with patient. Please contact us if you have any additional questions.    Destin Zazueta MD  Infectious Disease  [facility]    Subjective:     Principal Problem: Post-procedural fever    HPI: Anjum Cai Jr. is a 82 y.o. male with history of BPH, CAD, DM II, HTN, and HLD who presents 3 days post lap zhang for symptomatic cholelithiasis with the complaint of fever.  He states that he had a mild fever in the hospital.  He went home and states that the fever increased to 101.3 F today.  This concerned him so he came to be evaluated.  He denies increased abdominal pain, nausea, vomiting, chills, night sweats, shortness of breath, chest pain, leg swelling, diarrhea, dysuria, difficulty urinating.      ID consulted for GNR bacteremia and febrile w/p GB surgery. Allergy to cefadroxil (possible vasculitis). Abx  "recommendations.     Past Medical History:   Diagnosis Date    Arthritis     BPH (benign prostatic hyperplasia)     Cataract     right     Colon polyp 11/18/2015    Colon polyps     Coronary artery disease     DR (diabetic retinopathy)     Dyslipidemia     Elevated PSA     Goiter, nontoxic, multinodular     Headaches, cluster     Hypertension     Kidney stones     Lump in the testicle     Left    Rotator cuff tendinitis     Right shoulder    Sleep apnea with use of continuous positive airway pressure (CPAP)     uses cpap at night    Type II or unspecified type diabetes mellitus with other specified manifestations, uncontrolled        Past Surgical History:   Procedure Laterality Date    CARDIAC CATHETERIZATION  2007    CATARACT EXTRACTION W/  INTRAOCULAR LENS IMPLANT Right 09/27/2016    Dr. Garcia    CORONARY STENT PLACEMENT  02/02/2007    LAD, RCAx2    INGUINAL HERNIA REPAIR Right 1995    PROSTATE BIOPSY  2012    ROTATOR CUFF REPAIR Right 2014    x2    ROTATOR CUFF REPAIR Left 2013    TONSILLECTOMY, ADENOIDECTOMY      TRIGGER FINGER RELEASE Right 2004    x2       Review of patient's allergies indicates:   Allergen Reactions    Ace inhibitors      Other reaction(s): cough    Cefadroxil      Other reaction(s): possible vasculitis  Other reaction(s): vasculitis       Medications:  Prescriptions Prior to Admission   Medication Sig    ACCU-CHEK SMARTVIEW TEST STRIP Strp     ASPIRIN (ASPIR-81 ORAL) Take 1 tablet by mouth Daily.    atorvastatin (LIPITOR) 40 MG tablet Take 1 tablet (40 mg total) by mouth once daily. (Patient taking differently: Take 40 mg by mouth every morning. )    BD INSULIN PEN NEEDLE UF MINI 31 gauge x 3/16" Ndle USE WITH INSULIN INJECTION FOUR TIMES DAILY    BD INSULIN SYRINGE ULTRA-FINE 1 mL 30 x 1/2" Syrg USE AS DIRECTED FOUR TIMES DAILY    blood sugar diagnostic, drum (ACCU-CHEK COMPACT TEST) Strp CHECK BLOOD GLUCOSE 4 TIMES DAILY    finasteride (PROSCAR) 5 " mg tablet Take 1 tablet (5 mg total) by mouth once daily. (Patient taking differently: Take 5 mg by mouth every morning. )    hydrocodone-acetaminophen 5-325mg (NORCO) 5-325 mg per tablet Take 1 tablet by mouth every 4 (four) hours as needed.    insulin aspart (NOVOLOG FLEXPEN) 100 unit/mL InPn pen Inject 12 Units into the skin 3 (three) times daily with meals. Insulin sliding scale : 100 to 200 -12 units.                                     200 to 250 -14 units.                                      251 to 300 -16 units.                                       301 to 350 - 18 units.    insulin glargine (LANTUS SOLOSTAR) 100 unit/mL (3 mL) InPn pen inject 65 units subcutaneously every evening (Patient taking differently: every evening. inject 65 units subcutaneously every evening)    irbesartan (AVAPRO) 75 MG tablet Take 1 tablet (75 mg total) by mouth once daily. (Patient taking differently: Take 75 mg by mouth every morning. )    metoprolol succinate (TOPROL-XL) 50 MG 24 hr tablet Take 1 tablet (50 mg total) by mouth once daily. (Patient taking differently: Take 50 mg by mouth every morning. )    MULTIVITAMIN W-MINERALS/LUTEIN (CENTRUM SILVER ORAL) Take by mouth once daily.    nitroGLYCERIN (NITROSTAT) 0.4 MG SL tablet Place 1 tablet (0.4 mg total) under the tongue every 5 (five) minutes as needed for Chest pain.    omega-3 fatty acids-vitamin E (FISH OIL) 1,000 mg Cap Take 2 capsules by mouth Daily.     omeprazole (PRILOSEC) 20 MG capsule Take 1 capsule (20 mg total) by mouth 2 (two) times daily before meals.    VITAMIN D2 50,000 unit capsule take one capsule by mouth every 30 days    clotrimazole (LOTRIMIN) 1 % Soln Apply topically once daily. Apply to filed toenails daily     Antibiotics     None        Antifungals     None        Antivirals     None           Immunization History   Administered Date(s) Administered    Influenza - High Dose 10/24/2012, 10/17/2013, 11/26/2014, 10/19/2015, 10/17/2017     Pneumococcal Conjugate - 13 Valent 09/15/2015    Zoster 11/02/2017    influenza - Quadrivalent 02/06/2017       Family History     Problem Relation (Age of Onset)    Cancer Mother    Cataracts Father    Diabetes Mother    Heart disease Father        Social History     Social History    Marital status:      Spouse name: N/A    Number of children: N/A    Years of education: N/A     Social History Main Topics    Smoking status: Former Smoker     Quit date: 3/12/1978    Smokeless tobacco: Never Used    Alcohol use No    Drug use: No    Sexual activity: Not Asked     Other Topics Concern    None     Social History Narrative    None     Review of Systems   Constitutional: Negative for fatigue and fever.   HENT: Negative for congestion and rhinorrhea.    Eyes: Negative for pain and visual disturbance.   Respiratory: Negative for cough and shortness of breath.    Cardiovascular: Negative for chest pain and palpitations.   Gastrointestinal: Positive for abdominal pain (at incision sites) and constipation. Negative for nausea.   Genitourinary: Negative for difficulty urinating and dysuria.   Musculoskeletal: Negative for arthralgias and back pain.   Skin: Negative for color change and pallor.   Neurological: Negative for dizziness and headaches.   Hematological: Negative for adenopathy. Does not bruise/bleed easily.   Psychiatric/Behavioral: Negative for agitation and confusion.     Objective:     Vital Signs (Most Recent):  Temp: 97.5 °F (36.4 °C) (02/25/18 1153)  Pulse: 73 (02/25/18 1153)  Resp: 16 (02/25/18 1153)  BP: (!) 160/66 (02/25/18 1153)  SpO2: 95 % (02/25/18 1153) Vital Signs (24h Range):  Temp:  [96.9 °F (36.1 °C)-98.7 °F (37.1 °C)] 97.5 °F (36.4 °C)  Pulse:  [] 73  Resp:  [16-24] 16  SpO2:  [93 %-99 %] 95 %  BP: (118-166)/(52-69) 160/66     Weight: 97.5 kg (215 lb)  Body mass index is 34.18 kg/m².    Estimated Creatinine Clearance: 50 mL/min (based on SCr of 1.3 mg/dL).    Physical Exam    Constitutional: He is oriented to person, place, and time. He appears well-developed and well-nourished. No distress.   HENT:   Head: Normocephalic and atraumatic.   Eyes: EOM are normal. Pupils are equal, round, and reactive to light.   Neck: Normal range of motion. Neck supple.   Cardiovascular: Normal rate and regular rhythm.    Pulmonary/Chest: Effort normal and breath sounds normal.   Abdominal: Soft. Bowel sounds are normal. There is tenderness.   Incision sites clean, dry.    Musculoskeletal: Normal range of motion. He exhibits no edema.   Neurological: He is alert and oriented to person, place, and time.   Skin: Skin is warm and dry. He is not diaphoretic.   Psychiatric: He has a normal mood and affect. His behavior is normal.   Vitals reviewed.      Significant Labs:   CBC:   Recent Labs  Lab 02/24/18  1229 02/25/18  0353   WBC 9.18 9.44   HGB 10.4* 9.4*   HCT 31.6* 28.9*    147*     CMP:   Recent Labs  Lab 02/24/18  1229 02/25/18  0353    137   K 3.6 3.6    107   CO2 23 22*    174*   BUN 23 20   CREATININE 1.4 1.3   CALCIUM 9.2 8.4*   PROT 7.5 6.7   ALBUMIN 2.8* 2.4*   BILITOT 1.4* 0.8   ALKPHOS 117 103   AST 58* 33   ALT 53* 43   ANIONGAP 10 8   EGFRNONAA 47.1* 51.5*     Lactic Acid:   Recent Labs  Lab 02/24/18  1229 02/24/18  2019   LACTATE 1.0 0.8     Procalcitonin:   Recent Labs  Lab 02/24/18  1229   PROCAL 1.75*       Significant Imaging: I have reviewed all pertinent imaging results/findings within the past 24 hours.

## 2018-02-25 NOTE — HPI
Anjum Leyvagretchen Kay is a 82 y.o. male with history of BPH, CAD, DM II, HTN, and HLD who presents 3 days post lap zhang for symptomatic cholelithiasis with the complaint of fever.  He states that he had a mild fever in the hospital.  He went home and states that the fever increased to 101.3 F today.  This concerned him so he came to be evaluated.  He denies increased abdominal pain, nausea, vomiting, chills, night sweats, shortness of breath, chest pain, leg swelling, diarrhea, dysuria, difficulty urinating.      ID consulted for GNR bacteremia and febrile w/p GB surgery. Allergy to cefadroxil (possible vasculitis). Abx recommendations.

## 2018-02-25 NOTE — SUBJECTIVE & OBJECTIVE
"Past Medical History:   Diagnosis Date    Arthritis     BPH (benign prostatic hyperplasia)     Cataract     right     Colon polyp 11/18/2015    Colon polyps     Coronary artery disease      (diabetic retinopathy)     Dyslipidemia     Elevated PSA     Goiter, nontoxic, multinodular     Headaches, cluster     Hypertension     Kidney stones     Lump in the testicle     Left    Rotator cuff tendinitis     Right shoulder    Sleep apnea with use of continuous positive airway pressure (CPAP)     uses cpap at night    Type II or unspecified type diabetes mellitus with other specified manifestations, uncontrolled        Past Surgical History:   Procedure Laterality Date    CARDIAC CATHETERIZATION  2007    CATARACT EXTRACTION W/  INTRAOCULAR LENS IMPLANT Right 09/27/2016    Dr. Garcia    CORONARY STENT PLACEMENT  02/02/2007    LAD, RCAx2    INGUINAL HERNIA REPAIR Right 1995    PROSTATE BIOPSY  2012    ROTATOR CUFF REPAIR Right 2014    x2    ROTATOR CUFF REPAIR Left 2013    TONSILLECTOMY, ADENOIDECTOMY      TRIGGER FINGER RELEASE Right 2004    x2       Review of patient's allergies indicates:   Allergen Reactions    Ace inhibitors      Other reaction(s): cough    Cefadroxil      Other reaction(s): possible vasculitis  Other reaction(s): vasculitis       Medications:  Prescriptions Prior to Admission   Medication Sig    ACCU-CHEK SMARTVIEW TEST STRIP Strp     ASPIRIN (ASPIR-81 ORAL) Take 1 tablet by mouth Daily.    atorvastatin (LIPITOR) 40 MG tablet Take 1 tablet (40 mg total) by mouth once daily. (Patient taking differently: Take 40 mg by mouth every morning. )    BD INSULIN PEN NEEDLE UF MINI 31 gauge x 3/16" Ndle USE WITH INSULIN INJECTION FOUR TIMES DAILY    BD INSULIN SYRINGE ULTRA-FINE 1 mL 30 x 1/2" Syrg USE AS DIRECTED FOUR TIMES DAILY    blood sugar diagnostic, drum (ACCU-CHEK COMPACT TEST) Strp CHECK BLOOD GLUCOSE 4 TIMES DAILY    finasteride (PROSCAR) 5 mg tablet Take 1 " tablet (5 mg total) by mouth once daily. (Patient taking differently: Take 5 mg by mouth every morning. )    hydrocodone-acetaminophen 5-325mg (NORCO) 5-325 mg per tablet Take 1 tablet by mouth every 4 (four) hours as needed.    insulin aspart (NOVOLOG FLEXPEN) 100 unit/mL InPn pen Inject 12 Units into the skin 3 (three) times daily with meals. Insulin sliding scale : 100 to 200 -12 units.                                     200 to 250 -14 units.                                      251 to 300 -16 units.                                       301 to 350 - 18 units.    insulin glargine (LANTUS SOLOSTAR) 100 unit/mL (3 mL) InPn pen inject 65 units subcutaneously every evening (Patient taking differently: every evening. inject 65 units subcutaneously every evening)    irbesartan (AVAPRO) 75 MG tablet Take 1 tablet (75 mg total) by mouth once daily. (Patient taking differently: Take 75 mg by mouth every morning. )    metoprolol succinate (TOPROL-XL) 50 MG 24 hr tablet Take 1 tablet (50 mg total) by mouth once daily. (Patient taking differently: Take 50 mg by mouth every morning. )    MULTIVITAMIN W-MINERALS/LUTEIN (CENTRUM SILVER ORAL) Take by mouth once daily.    nitroGLYCERIN (NITROSTAT) 0.4 MG SL tablet Place 1 tablet (0.4 mg total) under the tongue every 5 (five) minutes as needed for Chest pain.    omega-3 fatty acids-vitamin E (FISH OIL) 1,000 mg Cap Take 2 capsules by mouth Daily.     omeprazole (PRILOSEC) 20 MG capsule Take 1 capsule (20 mg total) by mouth 2 (two) times daily before meals.    VITAMIN D2 50,000 unit capsule take one capsule by mouth every 30 days    clotrimazole (LOTRIMIN) 1 % Soln Apply topically once daily. Apply to filed toenails daily     Antibiotics     None        Antifungals     None        Antivirals     None           Immunization History   Administered Date(s) Administered    Influenza - High Dose 10/24/2012, 10/17/2013, 11/26/2014, 10/19/2015, 10/17/2017    Pneumococcal  Conjugate - 13 Valent 09/15/2015    Zoster 11/02/2017    influenza - Quadrivalent 02/06/2017       Family History     Problem Relation (Age of Onset)    Cancer Mother    Cataracts Father    Diabetes Mother    Heart disease Father        Social History     Social History    Marital status:      Spouse name: N/A    Number of children: N/A    Years of education: N/A     Social History Main Topics    Smoking status: Former Smoker     Quit date: 3/12/1978    Smokeless tobacco: Never Used    Alcohol use No    Drug use: No    Sexual activity: Not Asked     Other Topics Concern    None     Social History Narrative    None     Review of Systems   Constitutional: Negative for fatigue and fever.   HENT: Negative for congestion and rhinorrhea.    Eyes: Negative for pain and visual disturbance.   Respiratory: Negative for cough and shortness of breath.    Cardiovascular: Negative for chest pain and palpitations.   Gastrointestinal: Positive for abdominal pain (at incision sites) and constipation. Negative for nausea.   Genitourinary: Negative for difficulty urinating and dysuria.   Musculoskeletal: Negative for arthralgias and back pain.   Skin: Negative for color change and pallor.   Neurological: Negative for dizziness and headaches.   Hematological: Negative for adenopathy. Does not bruise/bleed easily.   Psychiatric/Behavioral: Negative for agitation and confusion.     Objective:     Vital Signs (Most Recent):  Temp: 97.5 °F (36.4 °C) (02/25/18 1153)  Pulse: 73 (02/25/18 1153)  Resp: 16 (02/25/18 1153)  BP: (!) 160/66 (02/25/18 1153)  SpO2: 95 % (02/25/18 1153) Vital Signs (24h Range):  Temp:  [96.9 °F (36.1 °C)-98.7 °F (37.1 °C)] 97.5 °F (36.4 °C)  Pulse:  [] 73  Resp:  [16-24] 16  SpO2:  [93 %-99 %] 95 %  BP: (118-166)/(52-69) 160/66     Weight: 97.5 kg (215 lb)  Body mass index is 34.18 kg/m².    Estimated Creatinine Clearance: 50 mL/min (based on SCr of 1.3 mg/dL).    Physical Exam    Constitutional: He is oriented to person, place, and time. He appears well-developed and well-nourished. No distress.   HENT:   Head: Normocephalic and atraumatic.   Eyes: EOM are normal. Pupils are equal, round, and reactive to light.   Neck: Normal range of motion. Neck supple.   Cardiovascular: Normal rate and regular rhythm.    Pulmonary/Chest: Effort normal and breath sounds normal.   Abdominal: Soft. Bowel sounds are normal. There is tenderness.   Incision sites clean, dry.    Musculoskeletal: Normal range of motion. He exhibits no edema.   Neurological: He is alert and oriented to person, place, and time.   Skin: Skin is warm and dry. He is not diaphoretic.   Psychiatric: He has a normal mood and affect. His behavior is normal.   Vitals reviewed.      Significant Labs:   CBC:   Recent Labs  Lab 02/24/18  1229 02/25/18  0353   WBC 9.18 9.44   HGB 10.4* 9.4*   HCT 31.6* 28.9*    147*     CMP:   Recent Labs  Lab 02/24/18  1229 02/25/18  0353    137   K 3.6 3.6    107   CO2 23 22*    174*   BUN 23 20   CREATININE 1.4 1.3   CALCIUM 9.2 8.4*   PROT 7.5 6.7   ALBUMIN 2.8* 2.4*   BILITOT 1.4* 0.8   ALKPHOS 117 103   AST 58* 33   ALT 53* 43   ANIONGAP 10 8   EGFRNONAA 47.1* 51.5*     Lactic Acid:   Recent Labs  Lab 02/24/18  1229 02/24/18  2019   LACTATE 1.0 0.8     Procalcitonin:   Recent Labs  Lab 02/24/18  1229   PROCAL 1.75*       Significant Imaging: I have reviewed all pertinent imaging results/findings within the past 24 hours.

## 2018-02-25 NOTE — PLAN OF CARE
Problem: Patient Care Overview  Goal: Plan of Care Review  Plan of care reviewed and updated. Pt AA+O. Pt's pain is managed with the medication ordered at this time. Pt's VS are as charted.  No falls this shift. Pt is oriented to room and call system. Will continue to [facility].

## 2018-02-25 NOTE — HPI
Anjum Leyvagretchen Kay is a 82 y.o. male with history of BPH, CAD, DM II, HTN, and HLD who presents 3 days post lap zhang for symptomatic cholelithiasis with the complaint of fever.  He states that he had a mild fever in the hospital.  He went home and states that the fever increased to 101.3 F today.  This concerned him so he came to be evaluated.  He denies increased abdominal pain, nausea, vomiting, chills, night sweats, shortness of breath, chest pain, leg swelling, diarrhea, dysuria, difficulty urinating.  He has not had a BM in the 3 days post-op but he is passing flatus.  He has an improving appetite.  He is ambulating around the house well.

## 2018-02-25 NOTE — H&P
"[facility]  General Surgery  History & Physical    Patient Name: Anjum Cai Jr.  MRN: [id number]  Admission Date: 2/24/2018  Attending Physician: Josy Donovan MD   Primary Care Provider: Gavin Martinez MD    Patient information was obtained from patient, past medical records and ER records.     Subjective:     Chief Complaint/Reason for Admission: fever    History of Present Illness: Anjum Cai Jr. is a 82 y.o. male with history of BPH, CAD, DM II, HTN, and HLD who presents 3 days post lap zhang for symptomatic cholelithiasis with the complaint of fever.  He states that he had a mild fever in the hospital.  He went home and states that the fever increased to 101.3 F today.  This concerned him so he came to be evaluated.  He denies increased abdominal pain, nausea, vomiting, chills, night sweats, shortness of breath, chest pain, leg swelling, diarrhea, dysuria, difficulty urinating.  He has not had a BM in the 3 days post-op but he is passing flatus.  He has an improving appetite.  He is ambulating around the house well.    No current facility-administered medications on file prior to encounter.      Current Outpatient Prescriptions on File Prior to Encounter   Medication Sig    ACCU-CHEK SMARTVIEW TEST STRIP Strp     ASPIRIN (ASPIR-81 ORAL) Take 1 tablet by mouth Daily.    atorvastatin (LIPITOR) 40 MG tablet Take 1 tablet (40 mg total) by mouth once daily. (Patient taking differently: Take 40 mg by mouth every morning. )    BD INSULIN PEN NEEDLE UF MINI 31 gauge x 3/16" Ndle USE WITH INSULIN INJECTION FOUR TIMES DAILY    BD INSULIN SYRINGE ULTRA-FINE 1 mL 30 x 1/2" Syrg USE AS DIRECTED FOUR TIMES DAILY    blood sugar diagnostic, drum (ACCU-CHEK COMPACT TEST) Strp CHECK BLOOD GLUCOSE 4 TIMES DAILY    finasteride (PROSCAR) 5 mg tablet Take 1 tablet (5 mg total) by mouth once daily. (Patient taking differently: Take 5 mg by mouth every morning. )    hydrocodone-acetaminophen 5-325mg " (NORCO) 5-325 mg per tablet Take 1 tablet by mouth every 4 (four) hours as needed.    insulin aspart (NOVOLOG FLEXPEN) 100 unit/mL InPn pen Inject 12 Units into the skin 3 (three) times daily with meals. Insulin sliding scale : 100 to 200 -12 units.                                     200 to 250 -14 units.                                      251 to 300 -16 units.                                       301 to 350 - 18 units.    insulin glargine (LANTUS SOLOSTAR) 100 unit/mL (3 mL) InPn pen inject 65 units subcutaneously every evening (Patient taking differently: every evening. inject 65 units subcutaneously every evening)    irbesartan (AVAPRO) 75 MG tablet Take 1 tablet (75 mg total) by mouth once daily. (Patient taking differently: Take 75 mg by mouth every morning. )    metoprolol succinate (TOPROL-XL) 50 MG 24 hr tablet Take 1 tablet (50 mg total) by mouth once daily. (Patient taking differently: Take 50 mg by mouth every morning. )    MULTIVITAMIN W-MINERALS/LUTEIN (CENTRUM SILVER ORAL) Take by mouth once daily.    nitroGLYCERIN (NITROSTAT) 0.4 MG SL tablet Place 1 tablet (0.4 mg total) under the tongue every 5 (five) minutes as needed for Chest pain.    omega-3 fatty acids-vitamin E (FISH OIL) 1,000 mg Cap Take 2 capsules by mouth Daily.     omeprazole (PRILOSEC) 20 MG capsule Take 1 capsule (20 mg total) by mouth 2 (two) times daily before meals.    VITAMIN D2 50,000 unit capsule take one capsule by mouth every 30 days    clotrimazole (LOTRIMIN) 1 % Soln Apply topically once daily. Apply to filed toenails daily       Review of patient's allergies indicates:   Allergen Reactions    Ace inhibitors      Other reaction(s): cough    Cefadroxil      Other reaction(s): possible vasculitis  Other reaction(s): vasculitis       Past Medical History:   Diagnosis Date    Arthritis     BPH (benign prostatic hyperplasia)     Cataract     right     Colon polyp 11/18/2015    Colon polyps     Coronary artery  disease     DR (diabetic retinopathy)     Dyslipidemia     Elevated PSA     Goiter, nontoxic, multinodular     Headaches, cluster     Hypertension     Kidney stones     Lump in the testicle     Left    Rotator cuff tendinitis     Right shoulder    Sleep apnea with use of continuous positive airway pressure (CPAP)     uses cpap at night    Type II or unspecified type diabetes mellitus with other specified manifestations, uncontrolled      Past Surgical History:   Procedure Laterality Date    CARDIAC CATHETERIZATION  2007    CATARACT EXTRACTION W/  INTRAOCULAR LENS IMPLANT Right 09/27/2016    Dr. Garcia    CORONARY STENT PLACEMENT  02/02/2007    LAD, RCAx2    INGUINAL HERNIA REPAIR Right 1995    PROSTATE BIOPSY  2012    ROTATOR CUFF REPAIR Right 2014    x2    ROTATOR CUFF REPAIR Left 2013    TONSILLECTOMY, ADENOIDECTOMY      TRIGGER FINGER RELEASE Right 2004    x2     Family History     Problem Relation (Age of Onset)    Cancer Mother    Cataracts Father    Diabetes Mother    Heart disease Father        Social History Main Topics    Smoking status: Former Smoker     Quit date: 3/12/1978    Smokeless tobacco: Never Used    Alcohol use No    Drug use: No    Sexual activity: Not on file     Review of Systems   Constitutional: Positive for fever. Negative for activity change, appetite change, chills, fatigue and unexpected weight change.   HENT: Negative.    Respiratory: Negative for cough and shortness of breath.    Cardiovascular: Negative for chest pain and palpitations.   Gastrointestinal: Positive for abdominal pain (very minimal soreness directly over incisions) and constipation. Negative for abdominal distention, diarrhea, nausea and vomiting.   Genitourinary: Negative for difficulty urinating, dysuria and hematuria.   Musculoskeletal: Negative.    Hematological: Does not bruise/bleed easily.     Objective:     Vital Signs (Most Recent):  Temp: 98.3 °F (36.8 °C) (02/24/18 1747)  Pulse:  64 (02/24/18 2102)  Resp: 17 (02/24/18 1631)  BP: (!) 166/69 (02/24/18 2102)  SpO2: 96 % (02/24/18 2102) Vital Signs (24h Range):  Temp:  [98.3 °F (36.8 °C)-101.1 °F (38.4 °C)] 98.3 °F (36.8 °C)  Pulse:  [] 64  Resp:  [17-24] 17  SpO2:  [93 %-99 %] 96 %  BP: (118-166)/(52-69) 166/69     Weight: 95.3 kg (210 lb)  Body mass index is 32.41 kg/m².    Physical Exam   Constitutional: He is oriented to person, place, and time. He appears well-developed and well-nourished. No distress.   HENT:   Head: Normocephalic and atraumatic.   Eyes: Conjunctivae and EOM are normal. No scleral icterus.   Neck: Normal range of motion.   Cardiovascular: Normal rate and regular rhythm.    Pulmonary/Chest: Effort normal. No respiratory distress.   Abdominal: Soft. He exhibits no distension. There is tenderness (very mild directly over incisions, which are c/d/i without s/s of infection). There is no rebound and no guarding.   Musculoskeletal: Normal range of motion. He exhibits no edema.   Neurological: He is alert and oriented to person, place, and time.   Skin: He is not diaphoretic.   Psychiatric: He has a normal mood and affect.   Nursing note and vitals reviewed.      Significant Labs:  CBC:   Recent Labs  Lab 02/24/18  1229   WBC 9.18   RBC 3.87*   HGB 10.4*   HCT 31.6*      MCV 82   MCH 26.9*   MCHC 32.9     CMP:   Recent Labs  Lab 02/24/18  1229      CALCIUM 9.2   ALBUMIN 2.8*   PROT 7.5      K 3.6   CO2 23      BUN 23   CREATININE 1.4   ALKPHOS 117   ALT 53*   AST 58*   BILITOT 1.4*       Significant Diagnostics:  I have reviewed and interpreted all pertinent imaging results/findings within the past 24 hours.    Assessment/Plan:     * Post-procedural fever    -WBC count wnl; fever resolved during ED evaluation  -No tachycardia  -Abdominal exam benign and no imaging to suggest an etiology other than some atelectasis on x-ray and CT scan  -Diabetic diet; SSI  -Ambulate  -Pulmonary toilet  -No need for  Abx  -Place in obs overnight; likely discharge home tomorrow          VTE Risk Mitigation         Ordered     heparin (porcine) injection 5,000 Units  Every 8 hours     Route:  Subcutaneous        02/24/18 2009     Medium Risk of VTE  Once      02/24/18 2009     Place sequential compression device  Until discontinued      02/24/18 2009     Place RAUDEL hose  Until discontinued      02/24/18 2009          Konstantin Vidales Jr., MD  General Surgery  [facility]

## 2018-02-25 NOTE — PLAN OF CARE
Problem: Patient Care Overview  Goal: Plan of Care Review  Outcome: Ongoing (interventions implemented as appropriate)  Pt AAOx4 and VSS. Pt is progressing with plan of care. Free of skin breakdown as the pt positioned/repositioned moves well independently. SCDs in place at all times when not ambulating. No complain of pain at this time. Frequent rounds made to assess pain and safety and no complaints at this time noted. Side rails up x 2. Bed locked. Call light within reach. No falls noted. Will continue to monitor.      .

## 2018-02-25 NOTE — NURSING
Report received. Care assumed. Patient arrived via wheelchair. AAOx4. Pt lying supine in bed. Pt denies pain or any other concerns at this time. See assessment. Patient oriented to room. Side rails up x2. Bed in the lowest position and bed wheels locked. Call light within reach. Will continue to monitor.

## 2018-02-26 PROBLEM — D64.9 ANEMIA: Status: ACTIVE | Noted: 2018-02-26

## 2018-02-26 PROBLEM — D69.6 THROMBOCYTOPENIA: Status: ACTIVE | Noted: 2018-02-26

## 2018-02-26 PROBLEM — R78.81 BACTEREMIA: Status: ACTIVE | Noted: 2018-02-26

## 2018-02-26 LAB
ALBUMIN SERPL BCP-MCNC: 2.5 G/DL
ALP SERPL-CCNC: 125 U/L
ALT SERPL W/O P-5'-P-CCNC: 44 U/L
ANION GAP SERPL CALC-SCNC: 12 MMOL/L
AST SERPL-CCNC: 28 U/L
BASOPHILS # BLD AUTO: 0.04 K/UL
BASOPHILS NFR BLD: 0.5 %
BILIRUB SERPL-MCNC: 0.5 MG/DL
BUN SERPL-MCNC: 18 MG/DL
CALCIUM SERPL-MCNC: 9.3 MG/DL
CHLORIDE SERPL-SCNC: 103 MMOL/L
CO2 SERPL-SCNC: 21 MMOL/L
CREAT SERPL-MCNC: 1.5 MG/DL
DIFFERENTIAL METHOD: ABNORMAL
EOSINOPHIL # BLD AUTO: 0.2 K/UL
EOSINOPHIL NFR BLD: 2.1 %
ERYTHROCYTE [DISTWIDTH] IN BLOOD BY AUTOMATED COUNT: 15.2 %
EST. GFR  (AFRICAN AMERICAN): 50.1 ML/MIN/1.73 M^2
EST. GFR  (NON AFRICAN AMERICAN): 43.3 ML/MIN/1.73 M^2
GLUCOSE SERPL-MCNC: 281 MG/DL
HCT VFR BLD AUTO: 33.4 %
HGB BLD-MCNC: 10.5 G/DL
IMM GRANULOCYTES # BLD AUTO: 0.03 K/UL
IMM GRANULOCYTES NFR BLD AUTO: 0.3 %
LYMPHOCYTES # BLD AUTO: 1.6 K/UL
LYMPHOCYTES NFR BLD: 18.5 %
MCH RBC QN AUTO: 25.9 PG
MCHC RBC AUTO-ENTMCNC: 31.4 G/DL
MCV RBC AUTO: 83 FL
MONOCYTES # BLD AUTO: 1 K/UL
MONOCYTES NFR BLD: 11.4 %
NEUTROPHILS # BLD AUTO: 6 K/UL
NEUTROPHILS NFR BLD: 67.2 %
NRBC BLD-RTO: 0 /100 WBC
PLATELET # BLD AUTO: 196 K/UL
PMV BLD AUTO: 11.3 FL
POCT GLUCOSE: 260 MG/DL (ref 70–110)
POCT GLUCOSE: 261 MG/DL (ref 70–110)
POCT GLUCOSE: 292 MG/DL (ref 70–110)
POCT GLUCOSE: 338 MG/DL (ref 70–110)
POTASSIUM SERPL-SCNC: 3.7 MMOL/L
PROT SERPL-MCNC: 7.2 G/DL
RBC # BLD AUTO: 4.05 M/UL
SODIUM SERPL-SCNC: 136 MMOL/L
WBC # BLD AUTO: 8.88 K/UL

## 2018-02-26 PROCEDURE — 63600175 PHARM REV CODE 636 W HCPCS: Performed by: STUDENT IN AN ORGANIZED HEALTH CARE EDUCATION/TRAINING PROGRAM

## 2018-02-26 PROCEDURE — 80053 COMPREHEN METABOLIC PANEL: CPT

## 2018-02-26 PROCEDURE — 11000001 HC ACUTE MED/SURG PRIVATE ROOM

## 2018-02-26 PROCEDURE — 99232 SBSQ HOSP IP/OBS MODERATE 35: CPT | Mod: ,,, | Performed by: INTERNAL MEDICINE

## 2018-02-26 PROCEDURE — 85025 COMPLETE CBC W/AUTO DIFF WBC: CPT

## 2018-02-26 PROCEDURE — 36415 COLL VENOUS BLD VENIPUNCTURE: CPT

## 2018-02-26 PROCEDURE — S0073 INJECTION, AZTREONAM, 500 MG: HCPCS | Performed by: STUDENT IN AN ORGANIZED HEALTH CARE EDUCATION/TRAINING PROGRAM

## 2018-02-26 PROCEDURE — 63600175 PHARM REV CODE 636 W HCPCS: Performed by: SURGERY

## 2018-02-26 PROCEDURE — 25000003 PHARM REV CODE 250: Performed by: SURGERY

## 2018-02-26 RX ADMIN — HEPARIN SODIUM 5000 UNITS: 5000 INJECTION, SOLUTION INTRAVENOUS; SUBCUTANEOUS at 01:02

## 2018-02-26 RX ADMIN — PANTOPRAZOLE SODIUM 40 MG: 40 TABLET, DELAYED RELEASE ORAL at 09:02

## 2018-02-26 RX ADMIN — INSULIN ASPART 8 UNITS: 100 INJECTION, SOLUTION INTRAVENOUS; SUBCUTANEOUS at 06:02

## 2018-02-26 RX ADMIN — HYDROCODONE BITARTRATE AND ACETAMINOPHEN 1 TABLET: 5; 325 TABLET ORAL at 09:02

## 2018-02-26 RX ADMIN — FINASTERIDE 5 MG: 5 TABLET, FILM COATED ORAL at 06:02

## 2018-02-26 RX ADMIN — INSULIN ASPART 3 UNITS: 100 INJECTION, SOLUTION INTRAVENOUS; SUBCUTANEOUS at 09:02

## 2018-02-26 RX ADMIN — ASPIRIN 81 MG: 81 TABLET, COATED ORAL at 09:02

## 2018-02-26 RX ADMIN — HEPARIN SODIUM 5000 UNITS: 5000 INJECTION, SOLUTION INTRAVENOUS; SUBCUTANEOUS at 06:02

## 2018-02-26 RX ADMIN — AZTREONAM 2000 MG: 2 INJECTION, POWDER, LYOPHILIZED, FOR SOLUTION INTRAMUSCULAR; INTRAVENOUS at 06:02

## 2018-02-26 RX ADMIN — METOPROLOL SUCCINATE 50 MG: 50 TABLET, EXTENDED RELEASE ORAL at 06:02

## 2018-02-26 RX ADMIN — IRBESARTAN 75 MG: 75 TABLET ORAL at 06:02

## 2018-02-26 RX ADMIN — INSULIN ASPART 6 UNITS: 100 INJECTION, SOLUTION INTRAVENOUS; SUBCUTANEOUS at 09:02

## 2018-02-26 RX ADMIN — ATORVASTATIN CALCIUM 40 MG: 20 TABLET, FILM COATED ORAL at 06:02

## 2018-02-26 RX ADMIN — HEPARIN SODIUM 5000 UNITS: 5000 INJECTION, SOLUTION INTRAVENOUS; SUBCUTANEOUS at 09:02

## 2018-02-26 NOTE — SUBJECTIVE & OBJECTIVE
Interval History:   No acute events overnight. Afebrile. VSS. CT abdomen/pelvis yesterday with no evidence of developing abscess. ID consulted and recommending Aztreonam for coverage of GNR in 1 of 2 blood cultures (2/24/18). No further growth.      Medications:  Continuous Infusions:  Scheduled Meds:   aspirin  81 mg Oral Daily    atorvastatin  40 mg Oral QAM    finasteride  5 mg Oral QAM    heparin (porcine)  5,000 Units Subcutaneous Q8H    irbesartan  75 mg Oral QAM    metoprolol succinate  50 mg Oral QAM    pantoprazole  40 mg Oral Daily    polyethylene glycol  17 g Oral Daily     PRN Meds:albuterol sulfate, dextrose 50 % in water (D50W), glucagon (human recombinant), hydrocodone-acetaminophen 5-325mg, insulin aspart U-100, ondansetron, promethazine (PHENERGAN) IVPB, sodium chloride 0.9%     Review of patient's allergies indicates:   Allergen Reactions    Ace inhibitors      Other reaction(s): cough    Cefadroxil      Other reaction(s): possible vasculitis  Other reaction(s): vasculitis     Objective:     Vital Signs (Most Recent):  Temp: 98.1 °F (36.7 °C) (02/26/18 0504)  Pulse: 69 (02/26/18 0504)  Resp: 20 (02/26/18 0504)  BP: 135/62 (02/26/18 0504)  SpO2: 98 % (02/26/18 0504) Vital Signs (24h Range):  Temp:  [97.5 °F (36.4 °C)-99.4 °F (37.4 °C)] 98.1 °F (36.7 °C)  Pulse:  [69-80] 69  Resp:  [16-20] 20  SpO2:  [92 %-98 %] 98 %  BP: (127-162)/(60-71) 135/62     Weight: 97.5 kg (215 lb)  Body mass index is 34.18 kg/m².    Intake/Output - Last 3 Shifts       02/24 0700 - 02/25 0659 02/25 0700 - 02/26 0659 02/26 0700 - 02/27 0659    P.O.  720     IV Piggyback 2500      Total Intake(mL/kg) 2500 (25.6) 720 (7.4)     Net +2500 +720             Urine Occurrence  3 x           Physical Exam   Constitutional: He is oriented to person, place, and time. He appears well-developed and well-nourished. No distress.   HENT:   Head: Normocephalic and atraumatic.   Eyes: EOM are normal.   Neck: Neck supple. No JVD  present.   Cardiovascular: Normal rate, regular rhythm and intact distal pulses.    Pulmonary/Chest: Effort normal. No respiratory distress.   Abdominal:   Soft, mild distention, incisions healing well with no signs of infection   Musculoskeletal: He exhibits no edema or deformity.   Neurological: He is alert and oriented to person, place, and time.   Skin: Skin is warm and dry. No rash noted. He is not diaphoretic. No erythema.   Psychiatric: He has a normal mood and affect. His behavior is normal.       Significant Labs:  CBC:     Recent Labs  Lab 02/26/18  0436   WBC 8.88   RBC 4.05*   HGB 10.5*   HCT 33.4*      MCV 83   MCH 25.9*   MCHC 31.4*     BMP:     Recent Labs  Lab 02/24/18  1229 02/25/18  0353    174*    137   K 3.6 3.6    107   CO2 23 22*   BUN 23 20   CREATININE 1.4 1.3   CALCIUM 9.2 8.4*   MG 1.9  --        Significant Diagnostics:  CT abdomen/pelvis (2/25/18): Reviewed.

## 2018-02-26 NOTE — PROGRESS NOTES
[facility]  General Surgery  Progress Note    Subjective:     History of Present Illness:  Anjum Cai Jr. is a 82 y.o. male with history of BPH, CAD, DM II, HTN, and HLD who presents 3 days post lap zhang for symptomatic cholelithiasis with the complaint of fever.  He states that he had a mild fever in the hospital.  He went home and states that the fever increased to 101.3 F today.  This concerned him so he came to be evaluated.  He denies increased abdominal pain, nausea, vomiting, chills, night sweats, shortness of breath, chest pain, leg swelling, diarrhea, dysuria, difficulty urinating.  He has not had a BM in the 3 days post-op but he is passing flatus.  He has an improving appetite.  He is ambulating around the house well.    Post-Op Info:  * No surgery found *         Interval History:   No acute events overnight. Afebrile. VSS. CT abdomen/pelvis yesterday with no evidence of developing abscess. ID consulted and recommending Aztreonam for coverage of GNR in 1 of 2 blood cultures (2/24/18). No further growth.      Medications:  Continuous Infusions:  Scheduled Meds:   aspirin  81 mg Oral Daily    atorvastatin  40 mg Oral QAM    finasteride  5 mg Oral QAM    heparin (porcine)  5,000 Units Subcutaneous Q8H    irbesartan  75 mg Oral QAM    metoprolol succinate  50 mg Oral QAM    pantoprazole  40 mg Oral Daily    polyethylene glycol  17 g Oral Daily     PRN Meds:albuterol sulfate, dextrose 50 % in water (D50W), glucagon (human recombinant), hydrocodone-acetaminophen 5-325mg, insulin aspart U-100, ondansetron, promethazine (PHENERGAN) IVPB, sodium chloride 0.9%     Review of patient's allergies indicates:   Allergen Reactions    Ace inhibitors      Other reaction(s): cough    Cefadroxil      Other reaction(s): possible vasculitis  Other reaction(s): vasculitis     Objective:     Vital Signs (Most Recent):  Temp: 98.1 °F (36.7 °C) (02/26/18 0504)  Pulse: 69 (02/26/18 0504)  Resp:  20 (02/26/18 0504)  BP: 135/62 (02/26/18 0504)  SpO2: 98 % (02/26/18 0504) Vital Signs (24h Range):  Temp:  [97.5 °F (36.4 °C)-99.4 °F (37.4 °C)] 98.1 °F (36.7 °C)  Pulse:  [69-80] 69  Resp:  [16-20] 20  SpO2:  [92 %-98 %] 98 %  BP: (127-162)/(60-71) 135/62     Weight: 97.5 kg (215 lb)  Body mass index is 34.18 kg/m².    Intake/Output - Last 3 Shifts       02/24 0700 - 02/25 0659 02/25 0700 - 02/26 0659 02/26 0700 - 02/27 0659    P.O.  720     IV Piggyback 2500      Total Intake(mL/kg) 2500 (25.6) 720 (7.4)     Net +2500 +720             Urine Occurrence  3 x           Physical Exam   Constitutional: He is oriented to person, place, and time. He appears well-developed and well-nourished. No distress.   HENT:   Head: Normocephalic and atraumatic.   Eyes: EOM are normal.   Neck: Neck supple. No JVD present.   Cardiovascular: Normal rate, regular rhythm and intact distal pulses.    Pulmonary/Chest: Effort normal. No respiratory distress.   Abdominal:   Soft, mild distention, incisions healing well with no signs of infection   Musculoskeletal: He exhibits no edema or deformity.   Neurological: He is alert and oriented to person, place, and time.   Skin: Skin is warm and dry. No rash noted. He is not diaphoretic. No erythema.   Psychiatric: He has a normal mood and affect. His behavior is normal.       Significant Labs:  CBC:     Recent Labs  Lab 02/26/18  0436   WBC 8.88   RBC 4.05*   HGB 10.5*   HCT 33.4*      MCV 83   MCH 25.9*   MCHC 31.4*     BMP:     Recent Labs  Lab 02/24/18  1229 02/25/18  0353    174*    137   K 3.6 3.6    107   CO2 23 22*   BUN 23 20   CREATININE 1.4 1.3   CALCIUM 9.2 8.4*   MG 1.9  --        Significant Diagnostics:  CT abdomen/pelvis (2/25/18): Reviewed.    Assessment/Plan:     * Post-procedural fever    - See above  - No further febrile episodes      Bacteremia    - ID following, appreciate assistance  - Monitor for further febrile episodes  - Continue Abx  -  Monitor cultures for speciation/further growth      Anemia    - Continue to trend Hgb/Hct      Type 2 diabetes mellitus without retinopathy    - Accuchecks  - SSI      Essential hypertension    - Metoprolol, irbesartan      BPH (benign prostatic hyperplasia)    - Finasteride      Dyslipidemia    - Statin      CAD (coronary artery disease)    - ASA 81        Junito Pearson MD  Surgery Resident, PGY-III  Pager: [phone]  2/26/2018 7:08 AM

## 2018-02-26 NOTE — ASSESSMENT & PLAN NOTE
81 yo M w/PMH of BPH, CAD, DM2, HTN, HLD. Cholecystectomy 2/21. Presented 2/24 w/fevers s/p sx. ID consulted for GNR bacteremia.  - T 101.1 2/24 @ noon, leukocytosis resolved (18 >9), lactate wnl, procal elevated 1.7  - 2/24 blood cx: GNR x 1 bottle  - S/p vanc 2 g x 1 in the ED and Aztreonam 2 g q8 hrs     Recommendations:  - f/u blood cx speciation/sensitivities  - Cont Aztreonam 2 g q8hrs  - await repeat blood cultures   - Ct abd without abscess

## 2018-02-26 NOTE — PROGRESS NOTES
[facility]  Infectious Disease  Progress Note    Patient Name: Anjum Cai Jr.  MRN: [id number]  Admission Date: 2/24/2018  Length of Stay: 0 days  Attending Physician: Arnaud Avery MD  Primary Care Provider: Gavin Martinez MD    Isolation Status: No active isolations  Assessment/Plan:      * Post-procedural fever    83 yo M w/PMH of BPH, CAD, DM2, HTN, HLD. Cholecystectomy 2/21. Presented 2/24 w/fevers s/p sx. ID consulted for GNR bacteremia.  - T 101.1 2/24 @ noon, leukocytosis resolved (18 >9), lactate wnl, procal elevated 1.7  - 2/24 blood cx: GNR x 1 bottle  - S/p vanc 2 g x 1 in the ED and Aztreonam 2 g q8 hrs     Recommendations:  - f/u blood cx speciation/sensitivities  - Cont Aztreonam 2 g q8hrs  - await repeat blood cultures   - Ct abd without abscess                Thank you for your consult. I will follow-up with patient. Please contact us if you have any additional questions.    Justin Brown MD  Infectious Disease  [facility]    Subjective:     Principal Problem:Post-procedural fever    HPI: Anjum Cai Jr. is a 82 y.o. male with history of BPH, CAD, DM II, HTN, and HLD who presents 3 days post lap zhang for symptomatic cholelithiasis with the complaint of fever.  He states that he had a mild fever in the hospital.  He went home and states that the fever increased to 101.3 F today.  This concerned him so he came to be evaluated.  He denies increased abdominal pain, nausea, vomiting, chills, night sweats, shortness of breath, chest pain, leg swelling, diarrhea, dysuria, difficulty urinating.      ID consulted for GNR bacteremia and febrile w/p GB surgery. Allergy to cefadroxil (possible vasculitis). Abx recommendations.   Interval History: No events    Review of Systems   Constitutional: Negative for fatigue and fever.   HENT: Negative for congestion and rhinorrhea.    Eyes: Negative for pain and visual disturbance.   Respiratory: Negative for  cough and shortness of breath.    Cardiovascular: Negative for chest pain and palpitations.   Gastrointestinal: Positive for abdominal pain (at incision sites) and constipation. Negative for nausea.   Genitourinary: Negative for difficulty urinating and dysuria.   Musculoskeletal: Negative for arthralgias and back pain.   Skin: Negative for color change and pallor.   Neurological: Negative for dizziness and headaches.   Hematological: Negative for adenopathy. Does not bruise/bleed easily.   Psychiatric/Behavioral: Negative for agitation and confusion.     Objective:     Vital Signs (Most Recent):  Temp: 97.6 °F (36.4 °C) (02/26/18 0741)  Pulse: 70 (02/26/18 0744)  Resp: 16 (02/26/18 0741)  BP: (!) 150/65 (02/26/18 0741)  SpO2: 99 % (02/26/18 0741) Vital Signs (24h Range):  Temp:  [97.5 °F (36.4 °C)-99.4 °F (37.4 °C)] 97.6 °F (36.4 °C)  Pulse:  [69-80] 70  Resp:  [16-20] 16  SpO2:  [92 %-99 %] 99 %  BP: (135-162)/(62-71) 150/65     Weight: 97.5 kg (215 lb)  Body mass index is 34.18 kg/m².    Estimated Creatinine Clearance: 43.3 mL/min (A) (based on SCr of 1.5 mg/dL (H)).    Physical Exam   Constitutional: He is oriented to person, place, and time. He appears well-developed and well-nourished. No distress.   HENT:   Head: Normocephalic and atraumatic.   Eyes: EOM are normal. Pupils are equal, round, and reactive to light.   Neck: Normal range of motion. Neck supple.   Cardiovascular: Normal rate and regular rhythm.    Pulmonary/Chest: Effort normal and breath sounds normal.   Abdominal: Soft. Bowel sounds are normal. There is tenderness.   Incision sites clean, dry.    Musculoskeletal: Normal range of motion. He exhibits no edema.   Neurological: He is alert and oriented to person, place, and time.   Skin: Skin is warm and dry. He is not diaphoretic.   Psychiatric: He has a normal mood and affect. His behavior is normal.   Vitals reviewed.      Significant Labs: All pertinent labs within the past 24 hours have been  reviewed.    Significant Imaging: I have reviewed all pertinent imaging results/findings within the past 24 hours.

## 2018-02-26 NOTE — SUBJECTIVE & OBJECTIVE
Interval History: No events    Review of Systems   Constitutional: Negative for fatigue and fever.   HENT: Negative for congestion and rhinorrhea.    Eyes: Negative for pain and visual disturbance.   Respiratory: Negative for cough and shortness of breath.    Cardiovascular: Negative for chest pain and palpitations.   Gastrointestinal: Positive for abdominal pain (at incision sites) and constipation. Negative for nausea.   Genitourinary: Negative for difficulty urinating and dysuria.   Musculoskeletal: Negative for arthralgias and back pain.   Skin: Negative for color change and pallor.   Neurological: Negative for dizziness and headaches.   Hematological: Negative for adenopathy. Does not bruise/bleed easily.   Psychiatric/Behavioral: Negative for agitation and confusion.     Objective:     Vital Signs (Most Recent):  Temp: 97.6 °F (36.4 °C) (02/26/18 0741)  Pulse: 70 (02/26/18 0744)  Resp: 16 (02/26/18 0741)  BP: (!) 150/65 (02/26/18 0741)  SpO2: 99 % (02/26/18 0741) Vital Signs (24h Range):  Temp:  [97.5 °F (36.4 °C)-99.4 °F (37.4 °C)] 97.6 °F (36.4 °C)  Pulse:  [69-80] 70  Resp:  [16-20] 16  SpO2:  [92 %-99 %] 99 %  BP: (135-162)/(62-71) 150/65     Weight: 97.5 kg (215 lb)  Body mass index is 34.18 kg/m².    Estimated Creatinine Clearance: 43.3 mL/min (A) (based on SCr of 1.5 mg/dL (H)).    Physical Exam   Constitutional: He is oriented to person, place, and time. He appears well-developed and well-nourished. No distress.   HENT:   Head: Normocephalic and atraumatic.   Eyes: EOM are normal. Pupils are equal, round, and reactive to light.   Neck: Normal range of motion. Neck supple.   Cardiovascular: Normal rate and regular rhythm.    Pulmonary/Chest: Effort normal and breath sounds normal.   Abdominal: Soft. Bowel sounds are normal. There is tenderness.   Incision sites clean, dry.    Musculoskeletal: Normal range of motion. He exhibits no edema.   Neurological: He is alert and oriented to person, place, and  time.   Skin: Skin is warm and dry. He is not diaphoretic.   Psychiatric: He has a normal mood and affect. His behavior is normal.   Vitals reviewed.      Significant Labs: All pertinent labs within the past 24 hours have been reviewed.    Significant Imaging: I have reviewed all pertinent imaging results/findings within the past 24 hours.

## 2018-02-26 NOTE — PLAN OF CARE
Re-admitted for post-op fever (2/21/18 s/p lap cholecystectomy).   Patient lives alone in a 1 story house. Not medically stable for discharge;repeat blood cultures drawn 2/25-results pending. Per Dr. Mosher patient will go home on oral antibiotics pending sensitivity results. No needs determined.      [facility] Packet given to patient after informed about it;patient verbalized their understanding.        02/26/18 1130   Discharge Assessment   Assessment Type Discharge Planning Assessment   Confirmed/corrected address and phone number on facesheet? Yes   Assessment information obtained from? Patient;Medical Record   Expected Length of Stay (days) (3)   Communicated expected length of stay with patient/caregiver no  (Per MD)   Prior to hospitilization cognitive status: Alert/Oriented;No Deficits   Prior to hospitalization functional status: Independent;Assistive Equipment   Current cognitive status: Alert/Oriented;No Deficits   Current Functional Status: Independent   Facility Arrived From: (N/A)   Lives With alone   Able to Return to Prior Arrangements yes   Is patient able to care for self after discharge? Yes   Who are your caregiver(s) and their phone number(s)? (Sister-lives locally. Daughter-See emergency contacts in demographics. )   Patient's perception of discharge disposition home or selfcare   Readmission Within The Last 30 Days other (see comments)  (2/21/18 s/p lap cholecystectomy-post-op fever)   Patient currently being followed by outpatient case management? No   Patient currently receives any other outside agency services? No   Equipment Currently Used at Home CPAP   Do you have any problems affording any of your prescribed medications? No   Is the patient taking medications as prescribed? yes   Does the patient have transportation home? Yes   Transportation Available car;family or friend will provide   Dialysis Name and Scheduled days (N/A)   Does the patient receive services at [facility]? No   Discharge Plan A Home  (Sister will assist him as needs)   Discharge Plan B Home  (as above)   Patient/Family In Agreement With Plan yes

## 2018-02-26 NOTE — ASSESSMENT & PLAN NOTE
- ID following, appreciate assistance  - Monitor for further febrile episodes  - Continue Abx  - Monitor cultures for speciation/further growth

## 2018-02-27 VITALS
RESPIRATION RATE: 16 BRPM | HEIGHT: 67 IN | DIASTOLIC BLOOD PRESSURE: 63 MMHG | HEART RATE: 78 BPM | SYSTOLIC BLOOD PRESSURE: 121 MMHG | WEIGHT: 215 LBS | TEMPERATURE: 98 F | OXYGEN SATURATION: 97 % | BODY MASS INDEX: 33.74 KG/M2

## 2018-02-27 PROBLEM — D69.6 THROMBOCYTOPENIA: Status: RESOLVED | Noted: 2018-02-26 | Resolved: 2018-02-27

## 2018-02-27 LAB
ALBUMIN SERPL BCP-MCNC: 2.3 G/DL
ALP SERPL-CCNC: 122 U/L
ALT SERPL W/O P-5'-P-CCNC: 39 U/L
ANION GAP SERPL CALC-SCNC: 11 MMOL/L
AST SERPL-CCNC: 25 U/L
BACTERIA BLD CULT: NORMAL
BASOPHILS # BLD AUTO: 0.03 K/UL
BASOPHILS NFR BLD: 0.4 %
BILIRUB SERPL-MCNC: 0.5 MG/DL
BUN SERPL-MCNC: 18 MG/DL
CALCIUM SERPL-MCNC: 8.9 MG/DL
CHLORIDE SERPL-SCNC: 100 MMOL/L
CO2 SERPL-SCNC: 24 MMOL/L
CREAT SERPL-MCNC: 1.5 MG/DL
DIFFERENTIAL METHOD: ABNORMAL
EOSINOPHIL # BLD AUTO: 0.2 K/UL
EOSINOPHIL NFR BLD: 1.8 %
ERYTHROCYTE [DISTWIDTH] IN BLOOD BY AUTOMATED COUNT: 15 %
EST. GFR  (AFRICAN AMERICAN): 50.1 ML/MIN/1.73 M^2
EST. GFR  (NON AFRICAN AMERICAN): 43.3 ML/MIN/1.73 M^2
GLUCOSE SERPL-MCNC: 296 MG/DL
HCT VFR BLD AUTO: 30.6 %
HGB BLD-MCNC: 10.1 G/DL
IMM GRANULOCYTES # BLD AUTO: 0.06 K/UL
IMM GRANULOCYTES NFR BLD AUTO: 0.7 %
LYMPHOCYTES # BLD AUTO: 1.7 K/UL
LYMPHOCYTES NFR BLD: 20.8 %
MCH RBC QN AUTO: 26.8 PG
MCHC RBC AUTO-ENTMCNC: 33 G/DL
MCV RBC AUTO: 81 FL
MONOCYTES # BLD AUTO: 1 K/UL
MONOCYTES NFR BLD: 12.3 %
NEUTROPHILS # BLD AUTO: 5.2 K/UL
NEUTROPHILS NFR BLD: 64 %
NRBC BLD-RTO: 0 /100 WBC
PLATELET # BLD AUTO: 184 K/UL
PMV BLD AUTO: 10.9 FL
POCT GLUCOSE: 288 MG/DL (ref 70–110)
POTASSIUM SERPL-SCNC: 4.1 MMOL/L
PROT SERPL-MCNC: 6.7 G/DL
RBC # BLD AUTO: 3.77 M/UL
SODIUM SERPL-SCNC: 135 MMOL/L
WBC # BLD AUTO: 8.12 K/UL

## 2018-02-27 PROCEDURE — 36415 COLL VENOUS BLD VENIPUNCTURE: CPT

## 2018-02-27 PROCEDURE — 63600175 PHARM REV CODE 636 W HCPCS: Performed by: INTERNAL MEDICINE

## 2018-02-27 PROCEDURE — 63600175 PHARM REV CODE 636 W HCPCS: Performed by: SURGERY

## 2018-02-27 PROCEDURE — 80053 COMPREHEN METABOLIC PANEL: CPT

## 2018-02-27 PROCEDURE — 25000003 PHARM REV CODE 250: Performed by: SURGERY

## 2018-02-27 PROCEDURE — 85025 COMPLETE CBC W/AUTO DIFF WBC: CPT

## 2018-02-27 PROCEDURE — 94761 N-INVAS EAR/PLS OXIMETRY MLT: CPT

## 2018-02-27 PROCEDURE — S0073 INJECTION, AZTREONAM, 500 MG: HCPCS | Performed by: INTERNAL MEDICINE

## 2018-02-27 RX ORDER — SULFAMETHOXAZOLE AND TRIMETHOPRIM 800; 160 MG/1; MG/1
1 TABLET ORAL 2 TIMES DAILY
Qty: 20 TABLET | Refills: 0 | Status: SHIPPED | OUTPATIENT
Start: 2018-02-27 | End: 2018-03-09

## 2018-02-27 RX ORDER — AZTREONAM 1 G/1
1000 INJECTION, POWDER, LYOPHILIZED, FOR SOLUTION INTRAMUSCULAR; INTRAVENOUS
Status: DISCONTINUED | OUTPATIENT
Start: 2018-02-27 | End: 2018-02-27 | Stop reason: HOSPADM

## 2018-02-27 RX ADMIN — INSULIN ASPART 6 UNITS: 100 INJECTION, SOLUTION INTRAVENOUS; SUBCUTANEOUS at 08:02

## 2018-02-27 RX ADMIN — HEPARIN SODIUM 5000 UNITS: 5000 INJECTION, SOLUTION INTRAVENOUS; SUBCUTANEOUS at 06:02

## 2018-02-27 RX ADMIN — PANTOPRAZOLE SODIUM 40 MG: 40 TABLET, DELAYED RELEASE ORAL at 08:02

## 2018-02-27 RX ADMIN — AZTREONAM 1000 MG: 1 INJECTION, POWDER, LYOPHILIZED, FOR SOLUTION INTRAMUSCULAR; INTRAVENOUS at 12:02

## 2018-02-27 RX ADMIN — FINASTERIDE 5 MG: 5 TABLET, FILM COATED ORAL at 06:02

## 2018-02-27 RX ADMIN — METOPROLOL SUCCINATE 50 MG: 50 TABLET, EXTENDED RELEASE ORAL at 06:02

## 2018-02-27 RX ADMIN — IRBESARTAN 75 MG: 75 TABLET ORAL at 06:02

## 2018-02-27 RX ADMIN — ATORVASTATIN CALCIUM 40 MG: 20 TABLET, FILM COATED ORAL at 06:02

## 2018-02-27 RX ADMIN — ASPIRIN 81 MG: 81 TABLET, COATED ORAL at 08:02

## 2018-02-27 NOTE — ASSESSMENT & PLAN NOTE
83 yo M w/PMH of BPH, CAD, DM2, HTN, HLD. Cholecystectomy 2/21. Presented 2/24 w/fevers s/p sx. ID consulted for GNR bacteremia.  - T 101.1 2/24 @ noon, leukocytosis resolved (18 >9), lactate wnl, procal elevated 1.7  - 2/24 blood cx: GNR x 1 bottle  - S/p vanc 2 g x 1 in the ED and Aztreonam 2 g q8 hrs     Recommendations:  - blood cx with ecoli  - Would stop aztreonam and start cipro  - follow up rpeat blood cxs   - Ct abd without abscess  - needs 2 weeks of therapy from negative blood cxs

## 2018-02-27 NOTE — SUBJECTIVE & OBJECTIVE
Interval History:   No acute events overnight. Afebrile. VSS. Continues without leukocytosis. Awaiting culture speciation and sensitivities. No growth from more recent cultures.      Medications:  Continuous Infusions:  Scheduled Meds:   aspirin  81 mg Oral Daily    atorvastatin  40 mg Oral QAM    finasteride  5 mg Oral QAM    heparin (porcine)  5,000 Units Subcutaneous Q8H    irbesartan  75 mg Oral QAM    metoprolol succinate  50 mg Oral QAM    pantoprazole  40 mg Oral Daily    polyethylene glycol  17 g Oral Daily     PRN Meds:albuterol sulfate, dextrose 50 % in water (D50W), glucagon (human recombinant), hydrocodone-acetaminophen 5-325mg, insulin aspart U-100, ondansetron, promethazine (PHENERGAN) IVPB, sodium chloride 0.9%     Review of patient's allergies indicates:   Allergen Reactions    Ace inhibitors      Other reaction(s): cough    Cefadroxil      Other reaction(s): possible vasculitis  Other reaction(s): vasculitis     Objective:     Vital Signs (Most Recent):  Temp: 98.2 °F (36.8 °C) (02/27/18 0346)  Pulse: 68 (02/27/18 0346)  Resp: 18 (02/27/18 0346)  BP: 117/80 (02/27/18 0346)  SpO2: 100 % (02/27/18 0346) Vital Signs (24h Range):  Temp:  [97.6 °F (36.4 °C)-99.4 °F (37.4 °C)] 98.2 °F (36.8 °C)  Pulse:  [68-95] 68  Resp:  [16-18] 18  SpO2:  [95 %-100 %] 100 %  BP: (117-150)/(59-80) 117/80     Weight: 97.5 kg (215 lb)  Body mass index is 34.18 kg/m².    Intake/Output - Last 3 Shifts       02/25 0700 - 02/26 0659 02/26 0700 - 02/27 0659    P.O. 720     Total Intake(mL/kg) 720 (7.4)     Net +720            Urine Occurrence 3 x           Physical Exam   Constitutional: He is oriented to person, place, and time. He appears well-developed and well-nourished. No distress.   HENT:   Head: Normocephalic and atraumatic.   Eyes: EOM are normal.   Neck: Neck supple. No JVD present.   Cardiovascular: Normal rate, regular rhythm and intact distal pulses.    Pulmonary/Chest: Effort normal. No respiratory  distress.   Abdominal:   Soft, ND, NT, incisions healing well with no signs of infection   Musculoskeletal: He exhibits no edema or deformity.   Neurological: He is alert and oriented to person, place, and time.   Skin: Skin is warm and dry. No rash noted. He is not diaphoretic. No erythema.   Psychiatric: He has a normal mood and affect. His behavior is normal.       Significant Labs:  CBC:   Recent Labs  Lab 02/27/18  0356   WBC 8.12   RBC 3.77*   HGB 10.1*   HCT 30.6*      MCV 81*   MCH 26.8*   MCHC 33.0     BMP:   Recent Labs  Lab 02/24/18  1229  02/27/18  0355     < > 296*     < > 135*   K 3.6  < > 4.1     < > 100   CO2 23  < > 24   BUN 23  < > 18   CREATININE 1.4  < > 1.5*   CALCIUM 9.2  < > 8.9   MG 1.9  --   --    < > = values in this interval not displayed.    Significant Diagnostics:  None

## 2018-02-27 NOTE — PROGRESS NOTES
[facility]  General Surgery  Progress Note    Subjective:     History of Present Illness:  Anjum Cai Jr. is a 82 y.o. male with history of BPH, CAD, DM II, HTN, and HLD who presents 3 days post lap zhang for symptomatic cholelithiasis with the complaint of fever.  He states that he had a mild fever in the hospital.  He went home and states that the fever increased to 101.3 F today.  This concerned him so he came to be evaluated.  He denies increased abdominal pain, nausea, vomiting, chills, night sweats, shortness of breath, chest pain, leg swelling, diarrhea, dysuria, difficulty urinating.  He has not had a BM in the 3 days post-op but he is passing flatus.  He has an improving appetite.  He is ambulating around the house well.    Post-Op Info:  * No surgery found *         Interval History:   No acute events overnight. Afebrile. VSS. Continues without leukocytosis. Awaiting culture speciation and sensitivities. No growth from more recent cultures.      Medications:  Continuous Infusions:  Scheduled Meds:   aspirin  81 mg Oral Daily    atorvastatin  40 mg Oral QAM    finasteride  5 mg Oral QAM    heparin (porcine)  5,000 Units Subcutaneous Q8H    irbesartan  75 mg Oral QAM    metoprolol succinate  50 mg Oral QAM    pantoprazole  40 mg Oral Daily    polyethylene glycol  17 g Oral Daily     PRN Meds:albuterol sulfate, dextrose 50 % in water (D50W), glucagon (human recombinant), hydrocodone-acetaminophen 5-325mg, insulin aspart U-100, ondansetron, promethazine (PHENERGAN) IVPB, sodium chloride 0.9%     Review of patient's allergies indicates:   Allergen Reactions    Ace inhibitors      Other reaction(s): cough    Cefadroxil      Other reaction(s): possible vasculitis  Other reaction(s): vasculitis     Objective:     Vital Signs (Most Recent):  Temp: 98.2 °F (36.8 °C) (02/27/18 0346)  Pulse: 68 (02/27/18 0346)  Resp: 18 (02/27/18 0346)  BP: 117/80 (02/27/18 0346)  SpO2: 100 %  (02/27/18 0346) Vital Signs (24h Range):  Temp:  [97.6 °F (36.4 °C)-99.4 °F (37.4 °C)] 98.2 °F (36.8 °C)  Pulse:  [68-95] 68  Resp:  [16-18] 18  SpO2:  [95 %-100 %] 100 %  BP: (117-150)/(59-80) 117/80     Weight: 97.5 kg (215 lb)  Body mass index is 34.18 kg/m².    Intake/Output - Last 3 Shifts       02/25 0700 - 02/26 0659 02/26 0700 - 02/27 0659    P.O. 720     Total Intake(mL/kg) 720 (7.4)     Net +720            Urine Occurrence 3 x           Physical Exam   Constitutional: He is oriented to person, place, and time. He appears well-developed and well-nourished. No distress.   HENT:   Head: Normocephalic and atraumatic.   Eyes: EOM are normal.   Neck: Neck supple. No JVD present.   Cardiovascular: Normal rate, regular rhythm and intact distal pulses.    Pulmonary/Chest: Effort normal. No respiratory distress.   Abdominal:   Soft, ND, NT, incisions healing well with no signs of infection   Musculoskeletal: He exhibits no edema or deformity.   Neurological: He is alert and oriented to person, place, and time.   Skin: Skin is warm and dry. No rash noted. He is not diaphoretic. No erythema.   Psychiatric: He has a normal mood and affect. His behavior is normal.       Significant Labs:  CBC:   Recent Labs  Lab 02/27/18  0356   WBC 8.12   RBC 3.77*   HGB 10.1*   HCT 30.6*      MCV 81*   MCH 26.8*   MCHC 33.0     BMP:   Recent Labs  Lab 02/24/18  1229  02/27/18  0355     < > 296*     < > 135*   K 3.6  < > 4.1     < > 100   CO2 23  < > 24   BUN 23  < > 18   CREATININE 1.4  < > 1.5*   CALCIUM 9.2  < > 8.9   MG 1.9  --   --    < > = values in this interval not displayed.    Significant Diagnostics:  None    Assessment/Plan:     * Post-procedural fever    - See above  - No further febrile episodes      Bacteremia    - ID following, appreciate assistance  - Monitor for further febrile episodes  - Continue Abx  - Monitor cultures for speciation/further growth      Anemia    - Continue to trend  Hgb/Hct      Type 2 diabetes mellitus without retinopathy    - Accuchecks  - SSI      Essential hypertension    - Metoprolol, irbesartan      BPH (benign prostatic hyperplasia)    - Finasteride      Dyslipidemia    - Statin      CAD (coronary artery disease)    - ASA 81        Junito Pearson MD  Surgery Resident, PGY-III  Pager: [phone]  2/27/2018 7:00 AM

## 2018-02-27 NOTE — PROGRESS NOTES
Spoke to MD and expressed patient's concerns, she stated she will review meds and possibly make changes if possible otherwise will consult endocrine in the morning with primary team

## 2018-02-27 NOTE — PROGRESS NOTES
Pt discharged. Instructions and prescriptions given and explained. Pt verbalized understanding with no questions. Pt AAOx3, VSS

## 2018-02-27 NOTE — SUBJECTIVE & OBJECTIVE
Interval History: No events    Review of Systems   Constitutional: Negative for fatigue and fever.   HENT: Negative for congestion and rhinorrhea.    Eyes: Negative for pain and visual disturbance.   Respiratory: Negative for cough and shortness of breath.    Cardiovascular: Negative for chest pain and palpitations.   Gastrointestinal: Positive for abdominal pain (at incision sites) and constipation. Negative for nausea.   Genitourinary: Negative for difficulty urinating and dysuria.   Musculoskeletal: Negative for arthralgias and back pain.   Skin: Negative for color change and pallor.   Neurological: Negative for dizziness and headaches.   Hematological: Negative for adenopathy. Does not bruise/bleed easily.   Psychiatric/Behavioral: Negative for agitation and confusion.     Objective:     Vital Signs (Most Recent):  Temp: 97.4 °F (36.3 °C) (02/27/18 0734)  Pulse: 70 (02/27/18 0734)  Resp: 18 (02/27/18 0734)  BP: 134/60 (02/27/18 0734)  SpO2: 98 % (02/27/18 0812) Vital Signs (24h Range):  Temp:  [97.4 °F (36.3 °C)-99.4 °F (37.4 °C)] 97.4 °F (36.3 °C)  Pulse:  [68-95] 70  Resp:  [16-18] 18  SpO2:  [95 %-100 %] 98 %  BP: (117-145)/(59-80) 134/60     Weight: 97.5 kg (215 lb)  Body mass index is 34.18 kg/m².    Estimated Creatinine Clearance: 43.3 mL/min (A) (based on SCr of 1.5 mg/dL (H)).    Physical Exam   Constitutional: He is oriented to person, place, and time. He appears well-developed and well-nourished. No distress.   HENT:   Head: Normocephalic and atraumatic.   Eyes: EOM are normal. Pupils are equal, round, and reactive to light.   Neck: Normal range of motion. Neck supple.   Cardiovascular: Normal rate and regular rhythm.    Pulmonary/Chest: Effort normal and breath sounds normal.   Abdominal: Soft. Bowel sounds are normal. There is tenderness.   Incision sites clean, dry.    Musculoskeletal: Normal range of motion. He exhibits no edema.   Neurological: He is alert and oriented to person, place, and time.    Skin: Skin is warm and dry. He is not diaphoretic.   Psychiatric: He has a normal mood and affect. His behavior is normal.   Vitals reviewed.      Significant Labs: All pertinent labs within the past 24 hours have been reviewed.    Significant Imaging: I have reviewed all pertinent imaging results/findings within the past 24 hours.

## 2018-02-27 NOTE — PROGRESS NOTES
Spoke to nurse for MD scrubbed in surgery in regards to patient's concern about blood glucose management, no further orders.

## 2018-02-27 NOTE — DISCHARGE SUMMARY
"[facility]  Discharge Summary  General Surgery      Admit Date: 2/24/2018    Discharge Date: 2/27/2018    Attending Physician: Arnaud Avery MD     Discharge Provider: Junito Pearson MD    Reason for Admission: Post-operative fever    Procedures Performed: None    Hospital Course:  Patient is an 82-year-old male status post recent laparoscopic cholecystectomy who was admitted to the General Surgery service after presenting to the ED with fever. CT abdomen/pelvis showed no intra-abdominal process. He was found to have Gram-negative ina bacteremia on one of two blood cultures that were drawn. Infectious Disease was consulted due to several antibiotic allergies. He was treated with Aztreonam until cultures speciated to E. coli and sensitivities resulted. He was transitioned to appropriate oral antibiotics and discharged to home in good condition on HD 3.    Consults: Infectious Disease    Significant Diagnostic Studies:  CT abdomen/pelvis    Final Diagnoses:   Principal Problem: Gram-negative ina bactermia   Secondary Diagnoses: Post-operative fever    Discharged Condition: Good    Disposition: Home or Self Care    Follow Up/Patient Instructions: Follow up with Dr. Avery as scheduled    Medications:  Reconciled Home Medications:   Current Discharge Medication List      START taking these medications    Details   sulfamethoxazole-trimethoprim 800-160mg (BACTRIM DS) 800-160 mg Tab Take 1 tablet by mouth 2 (two) times daily.  Qty: 20 tablet, Refills: 0         CONTINUE these medications which have NOT CHANGED    Details   ACCU-CHEK SMARTVIEW TEST STRIP Strp       ASPIRIN (ASPIR-81 ORAL) Take 1 tablet by mouth Daily.      atorvastatin (LIPITOR) 40 MG tablet Take 1 tablet (40 mg total) by mouth once daily.  Qty: 30 tablet, Refills: 6      BD INSULIN PEN NEEDLE UF MINI 31 gauge x 3/16" Ndle USE WITH INSULIN INJECTION FOUR TIMES DAILY  Qty: 100 each, Refills: 4      BD INSULIN SYRINGE ULTRA-FINE 1 " "mL 30 x 1/2" Syrg USE AS DIRECTED FOUR TIMES DAILY  Qty: 200 each, Refills: 11      blood sugar diagnostic, drum (ACCU-CHEK COMPACT TEST) Strp CHECK BLOOD GLUCOSE 4 TIMES DAILY  Qty: 204 each, Refills: 5      finasteride (PROSCAR) 5 mg tablet Take 1 tablet (5 mg total) by mouth once daily.  Qty: 30 tablet, Refills: 11    Associated Diagnoses: Benign non-nodular prostatic hyperplasia with lower urinary tract symptoms; Elevated PSA      hydrocodone-acetaminophen 5-325mg (NORCO) 5-325 mg per tablet Take 1 tablet by mouth every 4 (four) hours as needed.  Qty: 35 tablet, Refills: 0      insulin aspart (NOVOLOG FLEXPEN) 100 unit/mL InPn pen Inject 12 Units into the skin 3 (three) times daily with meals. Insulin sliding scale : 100 to 200 -12 units.                                     200 to 250 -14 units.                                      251 to 300 -16 units.                                       301 to 350 - 18 units.  Qty: 15 mL, Refills: 3      insulin glargine (LANTUS SOLOSTAR) 100 unit/mL (3 mL) InPn pen inject 65 units subcutaneously every evening  Qty: 30 mL, Refills: 4      irbesartan (AVAPRO) 75 MG tablet Take 1 tablet (75 mg total) by mouth once daily.  Qty: 30 tablet, Refills: 6      metoprolol succinate (TOPROL-XL) 50 MG 24 hr tablet Take 1 tablet (50 mg total) by mouth once daily.  Qty: 90 tablet, Refills: 3      MULTIVITAMIN W-MINERALS/LUTEIN (CENTRUM SILVER ORAL) Take by mouth once daily.      nitroGLYCERIN (NITROSTAT) 0.4 MG SL tablet Place 1 tablet (0.4 mg total) under the tongue every 5 (five) minutes as needed for Chest pain.  Qty: 60 tablet, Refills: 12    Associated Diagnoses: Angina effort      omega-3 fatty acids-vitamin E (FISH OIL) 1,000 mg Cap Take 2 capsules by mouth Daily.       omeprazole (PRILOSEC) 20 MG capsule Take 1 capsule (20 mg total) by mouth 2 (two) times daily before meals.  Qty: 60 capsule, Refills: 4      VITAMIN D2 50,000 unit capsule take one capsule by mouth every 30 " days  Qty: 3 capsule, Refills: 1      clotrimazole (LOTRIMIN) 1 % Soln Apply topically once daily. Apply to filed toenails daily  Qty: 30 mL, Refills: 5    Associated Diagnoses: Onychomycosis due to dermatophyte             Discharge Procedure Orders  Diet diabetic     Activity as tolerated     Notify your health care provider if you experience any of the following:  increased confusion or weakness     Notify your health care provider if you experience any of the following:  persistent dizziness, light-headedness, or visual disturbances     Notify your health care provider if you experience any of the following:  worsening rash     Notify your health care provider if you experience any of the following:  severe persistent headache     Notify your health care provider if you experience any of the following:  difficulty breathing or increased cough     Notify your health care provider if you experience any of the following:  redness, tenderness, or signs of infection (pain, swelling, redness, odor or green/yellow discharge around incision site)     Notify your health care provider if you experience any of the following:  severe uncontrolled pain     Notify your health care provider if you experience any of the following:  persistent nausea and vomiting or diarrhea     Notify your health care provider if you experience any of the following:  temperature >100.4       Follow-up Information     Arnaud Avery MD On 3/8/2018.    Specialties:  General Surgery, Bariatrics  Why:  Post-op Appt already scheduled for 3/8/18 07:45 AM.   Contact information:  [address]  [facility] [address]  [phone]           Activity: As tolerated.  Diet: Adult diabetic.  Wound Care: N/A

## 2018-02-28 NOTE — PHYSICIAN QUERY
PT Name: Anjum Cai Jr.  MR #: [id number]     Physician Query Form - Diagnosis Clarification      CDS/: Brandy E Capley               Contact information:  Spectralink: [phone]    This form is a permanent document in the medical record.     Query Date: February 28, 2018    By submitting this query, we are merely seeking further clarification of documentation.  Please utilize your independent clinical judgment when addressing the question(s) below.     The medical record contains the following:      Findings Supporting Clinical Information Location in Medical Record   Order Questions     Question Answer   Diagnosis HAP (hospital-acquired pneumonia)                Portable CXR shows opacity in right lower lung field concerning for atelectasis vs pneumonia.  The primary encounter diagnosis was HAP (hospital-acquired pneumonia).     Narrative   Single view.  Comparison 2016.  There is low lung volume.  Surgical change is present at the right shoulder.  Cardiac size is within the normal range allowing for diminished inspiration and portable technique.  There is calcification along the wall of the aorta.  Mild atelectasis is present at the right lung base.  No large volume of pleural fluid is present    Findings:  The cardiomediastinal silhouette is not enlarged, noting calcification of aortic arch.  There is stable elevation of the right hemidiaphragm.  There is no pleural effusion.  The trachea is midline.  The lungs are symmetrically expanded bilaterally mildly coarse interstitial attenuation in bilateral basilar subsegmental atelectasis or scarring. No large focal consolidation seen.  There is no pneumothorax.  The osseous structures are remarkable for degenerative change.    Final Diagnoses:              Principal Problem: Gram-negative ina bactermia              Secondary Diagnoses: Post-operative fever    Blood Culture, Routine ESCHERICHIA COLI    Admit to Inpatient (Order #[id number]) on  2/26/18          ED provider note 2/26      CXR 2/24 1249              CXR 2/24 1322            Discharge Summaries 2/27        Blood Culture 2/24 1257     Please clarify if the __hospital-acquired pneumonia_______ diagnosis has been:    [  ] Ruled In  [ xx ] Ruled Out  [  ] Clinically undetermined  [  ] Other/Clarification of findings (please specify)_______________________________    Please document in your progress notes daily for the duration of treatment, until resolved, and include in your discharge summary.

## 2018-03-01 LAB — BACTERIA BLD CULT: NORMAL

## 2018-03-02 LAB
BACTERIA BLD CULT: NORMAL
BACTERIA BLD CULT: NORMAL

## 2018-03-06 RX ORDER — BLOOD SUGAR DIAGNOSTIC
STRIP MISCELLANEOUS
Qty: 200 EACH | Refills: 4 | OUTPATIENT
Start: 2018-03-06

## 2018-03-06 RX ORDER — BLOOD SUGAR DIAGNOSTIC
1 STRIP MISCELLANEOUS
Qty: 200 STRIP | Refills: 3 | Status: SHIPPED | OUTPATIENT
Start: 2018-03-06 | End: 2018-11-12 | Stop reason: SDUPTHER

## 2018-03-08 ENCOUNTER — OFFICE VISIT (OUTPATIENT)
Dept: SURGERY | Facility: CLINIC | Age: 83
End: 2018-03-08
Payer: MEDICARE

## 2018-03-08 VITALS
HEIGHT: 67 IN | WEIGHT: 205 LBS | SYSTOLIC BLOOD PRESSURE: 132 MMHG | DIASTOLIC BLOOD PRESSURE: 62 MMHG | BODY MASS INDEX: 32.18 KG/M2 | HEART RATE: 63 BPM

## 2018-03-08 DIAGNOSIS — Z09 POSTOP CHECK: Primary | ICD-10-CM

## 2018-03-08 PROBLEM — R78.81 BACTEREMIA: Status: RESOLVED | Noted: 2018-02-26 | Resolved: 2018-03-08

## 2018-03-08 PROBLEM — K80.20 GALLSTONES: Status: RESOLVED | Noted: 2018-02-15 | Resolved: 2018-03-08

## 2018-03-08 PROCEDURE — 99024 POSTOP FOLLOW-UP VISIT: CPT | Mod: S$GLB,,, | Performed by: SURGERY

## 2018-03-08 PROCEDURE — 99999 PR PBB SHADOW E&M-EST. PATIENT-LVL III: CPT | Mod: PBBFAC,,, | Performed by: SURGERY

## 2018-03-08 NOTE — PROGRESS NOTES
I have seen the patient, reviewed the Resident's history and physical, assessment and plan. I have personally interviewed and examined the patient at bedside and: agree with the findings.     S/p removal gb for severe cholecystitis and readmit for sepsis from this.  Doing well since then with little pain and no other complaints.  Regular duty and rtc prn.  He has a large lih but is concerned about having it done at [facility] as he has had two postop infections here (although this procedure had it as an expected complication due to the severity of the gallbladder disease).  We would try to do this as an outpatient but it may require 23 hour stay due to size.  He also has carpel tunnel and is going to have surgery for this.

## 2018-03-08 NOTE — PROGRESS NOTES
"[facility] - [facility]  General Surgery  History & Physical       Subjective:     Chief Complaint: Post-operative follow up    History of Present Illness:  Patient is a 82 y.o. male with Hx of cholelithiasis and biliary colic s/p laparoscopic cholecystectomy (2/21/18) who presents to clinic today for post-operative follow up. He did well initially but was re-admitted after presenting to the ED (2/24/18) with complaint of fever to 102 F. CT abdomen/pelvis showed no intra-abdominal process. He was found to have Gram-negative ina bacteremia on one of two blood cultures that were drawn. Infectious Disease was consulted due to several antibiotic allergies. He was treated with Aztreonam until cultures speciated to E. coli and sensitivities resulted. Repeat blood cultures were negative x 2. He was transitioned to appropriate oral antibiotics and discharged to home in good condition on HD 3. He has done well since discharge. Pain improving daily. Tolerating diet. No nausea. No vomiting. Moving bowels appropriately. No further febrile episodes.        Current Outpatient Prescriptions on File Prior to Visit   Medication Sig    ACCU-CHEK SMARTVIEW TEST STRIP Strp 1 strip by Misc.(Non-Drug; Combo Route) route 3 (three) times daily before meals.    ASPIRIN (ASPIR-81 ORAL) Take 1 tablet by mouth Daily.    atorvastatin (LIPITOR) 40 MG tablet Take 1 tablet (40 mg total) by mouth once daily. (Patient taking differently: Take 40 mg by mouth every morning. )    BD INSULIN PEN NEEDLE UF MINI 31 gauge x 3/16" Ndle USE WITH INSULIN INJECTION FOUR TIMES DAILY    BD INSULIN SYRINGE ULTRA-FINE 1 mL 30 x 1/2" Syrg USE AS DIRECTED FOUR TIMES DAILY    blood sugar diagnostic, drum (ACCU-CHEK COMPACT TEST) Strp CHECK BLOOD GLUCOSE 4 TIMES DAILY    clotrimazole (LOTRIMIN) 1 % Soln Apply topically once daily. Apply to filed toenails daily    finasteride (PROSCAR) 5 mg tablet Take 1 tablet (5 mg total) by mouth once daily. (Patient " taking differently: Take 5 mg by mouth every morning. )    hydrocodone-acetaminophen 5-325mg (NORCO) 5-325 mg per tablet Take 1 tablet by mouth every 4 (four) hours as needed.    insulin aspart (NOVOLOG FLEXPEN) 100 unit/mL InPn pen Inject 12 Units into the skin 3 (three) times daily with meals. Insulin sliding scale : 100 to 200 -12 units.                                     200 to 250 -14 units.                                      251 to 300 -16 units.                                       301 to 350 - 18 units.    insulin glargine (LANTUS SOLOSTAR) 100 unit/mL (3 mL) InPn pen inject 65 units subcutaneously every evening (Patient taking differently: every evening. inject 65 units subcutaneously every evening)    irbesartan (AVAPRO) 75 MG tablet Take 1 tablet (75 mg total) by mouth once daily. (Patient taking differently: Take 75 mg by mouth every morning. )    metoprolol succinate (TOPROL-XL) 50 MG 24 hr tablet Take 1 tablet (50 mg total) by mouth once daily. (Patient taking differently: Take 50 mg by mouth every morning. )    MULTIVITAMIN W-MINERALS/LUTEIN (CENTRUM SILVER ORAL) Take by mouth once daily.    nitroGLYCERIN (NITROSTAT) 0.4 MG SL tablet Place 1 tablet (0.4 mg total) under the tongue every 5 (five) minutes as needed for Chest pain.    omega-3 fatty acids-vitamin E (FISH OIL) 1,000 mg Cap Take 2 capsules by mouth Daily.     omeprazole (PRILOSEC) 20 MG capsule Take 1 capsule (20 mg total) by mouth 2 (two) times daily before meals.    sulfamethoxazole-trimethoprim 800-160mg (BACTRIM DS) 800-160 mg Tab Take 1 tablet by mouth 2 (two) times daily.    VITAMIN D2 50,000 unit capsule take one capsule by mouth every 30 days     No current facility-administered medications on file prior to visit.      Review of patient's allergies indicates:   Allergen Reactions    Ace inhibitors      Other reaction(s): cough    Cefadroxil      Other reaction(s): possible vasculitis  Other reaction(s): vasculitis      Past Medical History:   Diagnosis Date    Anemia 2/26/2018    Arthritis     BPH (benign prostatic hyperplasia)     Cataract     right     Colon polyp 11/18/2015    Colon polyps     Coronary artery disease     DR (diabetic retinopathy)     Dyslipidemia     Elevated PSA     Goiter, nontoxic, multinodular     Headaches, cluster     Hypertension     Kidney stones     Lump in the testicle     Left    Rotator cuff tendinitis     Right shoulder    Sleep apnea with use of continuous positive airway pressure (CPAP)     uses cpap at night    Type II or unspecified type diabetes mellitus with other specified manifestations, uncontrolled      Past Surgical History:   Procedure Laterality Date    CARDIAC CATHETERIZATION  2007    CATARACT EXTRACTION W/  INTRAOCULAR LENS IMPLANT Right 09/27/2016    Dr. Garcia    CORONARY STENT PLACEMENT  02/02/2007    LAD, RCAx2    INGUINAL HERNIA REPAIR Right 1995    PROSTATE BIOPSY  2012    ROTATOR CUFF REPAIR Right 2014    x2    ROTATOR CUFF REPAIR Left 2013    TONSILLECTOMY, ADENOIDECTOMY      TRIGGER FINGER RELEASE Right 2004    x2     Family History     Problem Relation (Age of Onset)    Cancer Mother    Cataracts Father    Diabetes Mother    Heart disease Father        Social History Main Topics    Smoking status: Former Smoker     Quit date: 3/12/1978    Smokeless tobacco: Never Used    Alcohol use No    Drug use: No    Sexual activity: Not on file     Review of Systems   Constitutional: Negative for activity change, appetite change, diaphoresis and fever.   HENT: Negative for congestion, rhinorrhea and sore throat.    Eyes: Negative for visual disturbance.   Respiratory: Negative for cough, shortness of breath and wheezing.    Cardiovascular: Negative for chest pain, palpitations and leg swelling.   Gastrointestinal: Negative for abdominal distention, abdominal pain, constipation, diarrhea, nausea and vomiting.   Genitourinary: Negative for dysuria,  frequency and hematuria.   Musculoskeletal: Negative for arthralgias and myalgias.   Skin: Negative for color change, rash and wound.   Neurological: Negative for syncope, weakness and numbness.   Hematological: Does not bruise/bleed easily.   Psychiatric/Behavioral: Negative for behavioral problems and confusion.        Objective:     Vital Signs (Most Recent):  Pulse: 63 (03/08/18 0754)  BP: 132/62 (03/08/18 0754)     Weight: 93 kg (205 lb)  Body mass index is 32.59 kg/m².    Physical Exam   Constitutional: He is oriented to person, place, and time. He appears well-developed and well-nourished. No distress.   HENT:   Head: Normocephalic and atraumatic.   Eyes: EOM are normal.   Neck: Neck supple. No JVD present.   Cardiovascular: Normal rate and intact distal pulses.    Pulmonary/Chest: Effort normal. No respiratory distress.   Abdominal: Soft. He exhibits no distension. There is no tenderness.   Incisions well approximated and healing well with no signs of infection   Musculoskeletal: He exhibits no edema or deformity.   Neurological: He is alert and oriented to person, place, and time.   Skin: Skin is warm and dry. No rash noted. He is not diaphoretic. No erythema.   Psychiatric: He has a normal mood and affect. His behavior is normal.   Vitals reviewed.      Surgical Pathology:  Gallbladder (2/21/18): Severe chronic cholecystitis and cholelithiasis.      Assessment/Plan:   83 y/o male s/p laparoscopic cholecystectomy    - Doing well post-operatively  - Continue local wound care  - Return to clinic as needed      Junito Pearson MD  Surgery Resident, PGY-III  Pager: [phone]  3/8/2018 8:08 AM

## 2018-03-08 NOTE — LETTER
[facility] - General Surgery  [address]  [place] [address]  Phone: [phone] March 8, 2018      Gavin Martinez MD  [address]  [place] [address]    Patient: Anjum Cai Jr.   MR Number: [id number]   YOB: 1935   Date of Visit: 3/8/2018     Dear Dr. Martinez:    Thank you for referring Anjum Cai to me for evaluation. Below are the relevant portions of my assessment and plan of care.    Patient is a 82-year-old male status post laparoscopic cholecystectomy.    PLAN:   - Doing well post-operatively  - Continue local wound care  - Return to clinic as needed    If you have questions, please do not hesitate to call me. I look forward to following Anjum along with you.    Sincerely,      Arnaud Avery MD   Section Head - General, Laparoscopic, Bariatric  Acute Care and Oncologic Surgery   - Surgical Weight Loss Program  [facility]    JAIDA/ze

## 2018-03-26 RX ORDER — INSULIN ASPART 100 [IU]/ML
12 INJECTION, SOLUTION INTRAVENOUS; SUBCUTANEOUS
Qty: 15 ML | Refills: 3 | Status: SHIPPED | OUTPATIENT
Start: 2018-03-26 | End: 2018-03-29 | Stop reason: SDUPTHER

## 2018-03-26 RX ORDER — INSULIN GLARGINE 100 [IU]/ML
INJECTION, SOLUTION SUBCUTANEOUS
Qty: 30 ML | Refills: 1 | Status: SHIPPED | OUTPATIENT
Start: 2018-03-26 | End: 2018-09-10 | Stop reason: SDUPTHER

## 2018-03-29 RX ORDER — INSULIN ASPART 100 [IU]/ML
INJECTION, SOLUTION INTRAVENOUS; SUBCUTANEOUS
Qty: 15 ML | Refills: 2 | Status: SHIPPED | OUTPATIENT
Start: 2018-03-29 | End: 2018-07-28 | Stop reason: SDUPTHER

## 2018-03-29 NOTE — TELEPHONE ENCOUNTER
----- Message from Randy Crow sent at 3/29/2018 11:39 AM CDT -----  Contact: Patient [phone]  Patient is requesting Refill   Rx [facility]    Pharmacy [id number] - [place] [address] [address] [phone] (Phone)  [phone] (Fax)    Please call and advise  Than you

## 2018-04-10 ENCOUNTER — PATIENT MESSAGE (OUTPATIENT)
Dept: INTERNAL MEDICINE | Facility: CLINIC | Age: 83
End: 2018-04-10

## 2018-04-16 RX ORDER — METOPROLOL SUCCINATE 50 MG/1
TABLET, EXTENDED RELEASE ORAL
Qty: 90 TABLET | Refills: 2 | Status: SHIPPED | OUTPATIENT
Start: 2018-04-16 | End: 2019-01-25 | Stop reason: SDUPTHER

## 2018-04-19 ENCOUNTER — TELEPHONE (OUTPATIENT)
Dept: INTERNAL MEDICINE | Facility: CLINIC | Age: 83
End: 2018-04-19

## 2018-04-19 ENCOUNTER — OFFICE VISIT (OUTPATIENT)
Dept: ORTHOPEDICS | Facility: CLINIC | Age: 83
End: 2018-04-19
Payer: MEDICARE

## 2018-04-19 DIAGNOSIS — G56.01 CARPAL TUNNEL SYNDROME OF RIGHT WRIST: Primary | ICD-10-CM

## 2018-04-19 DIAGNOSIS — M65.341 TRIGGER RING FINGER OF RIGHT HAND: ICD-10-CM

## 2018-04-19 PROCEDURE — 99214 OFFICE O/P EST MOD 30 MIN: CPT | Mod: S$GLB,,, | Performed by: ORTHOPAEDIC SURGERY

## 2018-04-19 PROCEDURE — 99999 PR PBB SHADOW E&M-EST. PATIENT-LVL III: CPT | Mod: PBBFAC,,, | Performed by: ORTHOPAEDIC SURGERY

## 2018-04-19 NOTE — TELEPHONE ENCOUNTER
----- Message from Carlyn Washington sent at 4/19/2018  3:40 PM CDT -----  Contact: self [phone]  Patient is calling to check status of forms, stated have MD received paperwork for his sleep apnea medical [email or web address] . Please advise , Thanks

## 2018-04-19 NOTE — PROGRESS NOTES
OFFICE VISIT AND PREOP H AND P     CHIEF COMPLAINT:  Right carpal tunnel syndrome and triggering right ring finger.    HISTORY OF PRESENT ILLNESS:  An 82-year-old male with ongoing symptoms right   hand related to carpal tunnel and triggering ring finger.  Previously set up for   surgery, he had to cancel, but now symptoms have recurred.  He would like to   reschedule surgery.    No recent trauma or injury is reported.  He does have numbness and tingling in   the right thumb, index and middle fingers.    PAST MEDICAL HISTORY:  Significant for anemia, arthritis, cataracts, colon   polyps, diabetic retinopathy and type 2 diabetes.    PAST SURGICAL HISTORY:  Includes coronary stent placement, prostate biopsy,   tonsillectomy, rotator cuff repair, trigger finger release and hernia repair.    FAMILY HISTORY:  Positive for cataracts, heart disease and diabetes.    SOCIAL HISTORY:  The patient is a former smoker, does not drink alcohol.    REVIEW OF SYSTEMS:  Negative fevers, chills, rashes.    CURRENT MEDICATIONS:  Reviewed on chart.    ALLERGIES:  ACE inhibitors and cefadroxil.    PHYSICAL EXAMINATION:  GENERAL:  Well-developed, well-nourished male in no acute distress, alert and   oriented x3.  HEENT:  Unremarkable.  LUNGS:  Clear to auscultation.  HEART:  Regular rate and rhythm.  ABDOMEN:  Soft, nontender.  EXTREMITIES:  Significant for the right hand, demonstrating mild swelling volar   compartment wrist.  Positive Tinel sign.  Positive Phalen test.  There is also   some triggering of the right ring finger, which is mild and intermittent.     strength slightly decreased.    IMPRESSION:  1.  Right carpal tunnel syndrome.  2.  Triggering right ring finger.    PLAN:  We will set up surgery for combined procedure for right carpal tunnel   release and trigger release, right ring finger.  The risks and benefits of   surgery explained to the patient, he understands.      SHAE  dd: 04/19/2018 14:55:20 (CDT)  td:  04/20/2018 12:19:44 (CDT)  Doc ID   #[id number]  Job ID #[id number]    CC:

## 2018-04-19 NOTE — TELEPHONE ENCOUNTER
Pt msg originated from 04/10/18 in reference to Sleep Apnea Supplies. Please check for paperwork. Please advise.

## 2018-04-20 ENCOUNTER — TELEPHONE (OUTPATIENT)
Dept: ORTHOPEDICS | Facility: CLINIC | Age: 83
End: 2018-04-20

## 2018-04-20 NOTE — TELEPHONE ENCOUNTER
----- Message from Naima Jennings sent at 4/20/2018  9:07 AM CDT -----  Contact: self/[phone]  Patient is requesting a call back regarding his surgery schedule. Please advise.

## 2018-04-24 ENCOUNTER — PATIENT MESSAGE (OUTPATIENT)
Dept: INTERNAL MEDICINE | Facility: CLINIC | Age: 83
End: 2018-04-24

## 2018-04-25 ENCOUNTER — TELEPHONE (OUTPATIENT)
Dept: INTERNAL MEDICINE | Facility: CLINIC | Age: 83
End: 2018-04-25

## 2018-04-25 ENCOUNTER — DOCUMENTATION ONLY (OUTPATIENT)
Dept: ORTHOPEDICS | Facility: CLINIC | Age: 83
End: 2018-04-25

## 2018-04-25 NOTE — TELEPHONE ENCOUNTER
----- Message from Carlyn Washington sent at 4/25/2018 12:49 PM CDT -----  Contact: self [phone]  Type: Sooner appointment than  is able to schedule    When is the first available appointment? 05/25/18    What is the nature of the appointment? Lost his voice/ head cold     What appointment type: same day     Comments: please advise, Thanks

## 2018-04-26 ENCOUNTER — OFFICE VISIT (OUTPATIENT)
Dept: INTERNAL MEDICINE | Facility: CLINIC | Age: 83
End: 2018-04-26
Payer: MEDICARE

## 2018-04-26 VITALS
OXYGEN SATURATION: 99 % | DIASTOLIC BLOOD PRESSURE: 50 MMHG | HEART RATE: 77 BPM | BODY MASS INDEX: 33.77 KG/M2 | SYSTOLIC BLOOD PRESSURE: 110 MMHG | TEMPERATURE: 98 F | HEIGHT: 66 IN | WEIGHT: 210.13 LBS

## 2018-04-26 DIAGNOSIS — J30.89 NON-SEASONAL ALLERGIC RHINITIS, UNSPECIFIED TRIGGER: ICD-10-CM

## 2018-04-26 DIAGNOSIS — R09.82 POST-NASAL DRIP: Primary | ICD-10-CM

## 2018-04-26 PROCEDURE — 99214 OFFICE O/P EST MOD 30 MIN: CPT | Mod: S$GLB,,, | Performed by: NURSE PRACTITIONER

## 2018-04-26 PROCEDURE — 3078F DIAST BP <80 MM HG: CPT | Mod: CPTII,S$GLB,, | Performed by: NURSE PRACTITIONER

## 2018-04-26 PROCEDURE — 3074F SYST BP LT 130 MM HG: CPT | Mod: CPTII,S$GLB,, | Performed by: NURSE PRACTITIONER

## 2018-04-26 PROCEDURE — 99999 PR PBB SHADOW E&M-EST. PATIENT-LVL V: CPT | Mod: PBBFAC,,, | Performed by: NURSE PRACTITIONER

## 2018-04-26 RX ORDER — IPRATROPIUM BROMIDE 21 UG/1
2 SPRAY, METERED NASAL 2 TIMES DAILY
Qty: 30 ML | Refills: 2 | Status: SHIPPED | OUTPATIENT
Start: 2018-04-26 | End: 2018-05-10 | Stop reason: CLARIF

## 2018-04-26 RX ORDER — MONTELUKAST SODIUM 10 MG/1
10 TABLET ORAL DAILY
Qty: 30 TABLET | Refills: 12 | Status: SHIPPED | OUTPATIENT
Start: 2018-04-26 | End: 2018-05-26

## 2018-04-26 NOTE — PATIENT INSTRUCTIONS
Increase water intake    Take Singulair ( montelukast) once a day    Use nasal spray 2-3 times a day    Return for any fever over 100.4, or if symptoms persist more than 10 days    To ER for any shortness of breath

## 2018-04-26 NOTE — PROGRESS NOTES
Subjective:       Patient ID: Anjum Cai Jr. is a 82 y.o. male.    Chief Complaint: Cough and Nasal Congestion    Pt here c/o loss of voice, cough and rhinorrhea.  Sx started yesterday.  Denies n/v/d/fever/chills/SOB.  No body aches  Pt has been using Robitussin DM.  BG this am 117        Cough   Associated symptoms include postnasal drip and rhinorrhea. Pertinent negatives include no chest pain, chills, fever, headaches, myalgias, rash, sore throat, shortness of breath or wheezing.     Past Medical History:   Diagnosis Date    Anemia 2/26/2018    Arthritis     BPH (benign prostatic hyperplasia)     Cataract     right     Colon polyp 11/18/2015    Colon polyps     Coronary artery disease     DR (diabetic retinopathy)     Dyslipidemia     Elevated PSA     Goiter, nontoxic, multinodular     Headaches, cluster     Hypertension     Kidney stones     Lump in the testicle     Left    Rotator cuff tendinitis     Right shoulder    Sleep apnea with use of continuous positive airway pressure (CPAP)     uses cpap at night    Type II or unspecified type diabetes mellitus with other specified manifestations, uncontrolled      Past Surgical History:   Procedure Laterality Date    CARDIAC CATHETERIZATION  2007    CATARACT EXTRACTION W/  INTRAOCULAR LENS IMPLANT Right 09/27/2016    Dr. Garcia    CORONARY STENT PLACEMENT  02/02/2007    LAD, RCAx2    INGUINAL HERNIA REPAIR Right 1995    PROSTATE BIOPSY  2012    ROTATOR CUFF REPAIR Right 2014    x2    ROTATOR CUFF REPAIR Left 2013    TONSILLECTOMY, ADENOIDECTOMY      TRIGGER FINGER RELEASE Right 2004    x2     Social History     Social History Narrative    No narrative on file     Family History   Problem Relation Age of Onset    Cataracts Father     Heart disease Father     Cancer Mother     Diabetes Mother     Amblyopia Neg Hx     Blindness Neg Hx     Glaucoma Neg Hx     Macular degeneration Neg Hx     Retinal detachment Neg Hx      "Strabismus Neg Hx      Vitals:    04/26/18 0755 04/26/18 0820   BP: (!) 118/44 (!) 110/50   Pulse: 77    Temp: 98 °F (36.7 °C)    SpO2: 99%    Weight: 95.3 kg (210 lb 1.6 oz)    Height: 5' 6" (1.676 m)    PainSc: 0-No pain      Outpatient Encounter Prescriptions as of 4/26/2018   Medication Sig Dispense Refill    ACCU-CHEK SMARTVIEW TEST STRIP Strp 1 strip by Misc.(Non-Drug; Combo Route) route 3 (three) times daily before meals. 200 strip 3    ASPIRIN (ASPIR-81 ORAL) Take 1 tablet by mouth Daily.      atorvastatin (LIPITOR) 40 MG tablet Take 1 tablet (40 mg total) by mouth once daily. (Patient taking differently: Take 40 mg by mouth every morning. ) 30 tablet 6    BD INSULIN PEN NEEDLE UF MINI 31 gauge x 3/16" Ndle USE WITH INSULIN INJECTION FOUR TIMES DAILY 100 each 4    BD INSULIN SYRINGE ULTRA-FINE 1 mL 30 x 1/2" Syrg USE AS DIRECTED FOUR TIMES DAILY 200 each 11    blood sugar diagnostic, drum (ACCU-CHEK COMPACT TEST) Strp CHECK BLOOD GLUCOSE 4 TIMES DAILY 204 each 5    finasteride (PROSCAR) 5 mg tablet Take 1 tablet (5 mg total) by mouth once daily. (Patient taking differently: Take 5 mg by mouth every morning. ) 30 tablet 11    hydrocodone-acetaminophen 5-325mg (NORCO) 5-325 mg per tablet Take 1 tablet by mouth every 4 (four) hours as needed. 35 tablet 0    insulin glargine (LANTUS SOLOSTAR U-100 INSULIN) 100 unit/mL (3 mL) InPn pen inject 65 units subcutaneously every evening 30 mL 1    irbesartan (AVAPRO) 75 MG tablet Take 1 tablet (75 mg total) by mouth once daily. (Patient taking differently: Take 75 mg by mouth every morning. ) 30 tablet 6    metoprolol succinate (TOPROL-XL) 50 MG 24 hr tablet TAKE ONE TABLET BY MOUTH ONE TIME DAILY  90 tablet 2    MULTIVITAMIN W-MINERALS/LUTEIN (CENTRUM SILVER ORAL) Take by mouth once daily.      nitroGLYCERIN (NITROSTAT) 0.4 MG SL tablet Place 1 tablet (0.4 mg total) under the tongue every 5 (five) minutes as needed for Chest pain. 60 tablet 12    NOVOLOG " "FLEXPEN U-100 INSULIN 100 unit/mL InPn pen INJECT 12 UNITS 3 TIMES DAILY WITH MEALS; SLIDING SCALE 12U:100-200,14U:201-250,16U:251-300,18U:301-350 15 mL 2    omega-3 fatty acids-vitamin E (FISH OIL) 1,000 mg Cap Take 2 capsules by mouth Daily.       omeprazole (PRILOSEC) 20 MG capsule Take 1 capsule (20 mg total) by mouth 2 (two) times daily before meals. 60 capsule 4    VITAMIN D2 50,000 unit capsule take one capsule by mouth every 30 days 3 capsule 1    clotrimazole (LOTRIMIN) 1 % Soln Apply topically once daily. Apply to filed toenails daily 30 mL 5    ipratropium (ATROVENT) 0.03 % nasal spray 2 sprays by Nasal route 2 (two) times daily. 30 mL 2    montelukast (SINGULAIR) 10 mg tablet Take 1 tablet (10 mg total) by mouth once daily. 30 tablet 12     No facility-administered encounter medications on file as of 4/26/2018.      Wt Readings from Last 3 Encounters:   04/26/18 95.3 kg (210 lb 1.6 oz)   03/08/18 93 kg (205 lb)   02/24/18 97.5 kg (215 lb)     Last 3 sets of Vitals    Vitals - 1 value per visit 3/8/2018 4/19/2018 4/26/2018   SYSTOLIC 132 - 110   DIASTOLIC 62 - 50   PULSE 63 - 77   TEMPERATURE - - 98   RESPIRATIONS - - -   SPO2 - - 99   Weight (lb) 205 - 210.1   Weight (kg) 92.987 - 95.3   HEIGHT 5' 6.5" - 5' 6"   BODY MASS INDEX 32.59 - 33.91   VISIT REPORT - - -   Pain Score  - 3 0   Some recent data might be hidden     No results found for: CBC  Review of Systems   Constitutional: Negative for activity change, appetite change, chills, diaphoresis, fatigue, fever and unexpected weight change.   HENT: Positive for postnasal drip, rhinorrhea and voice change. Negative for congestion, sinus pain, sinus pressure, sneezing, sore throat, tinnitus and trouble swallowing.    Respiratory: Positive for cough. Negative for chest tightness, shortness of breath, wheezing and stridor.    Cardiovascular: Negative for chest pain.   Gastrointestinal: Negative for abdominal pain, diarrhea, nausea and vomiting. "   Genitourinary: Negative for difficulty urinating.   Musculoskeletal: Negative for arthralgias, myalgias, neck pain and neck stiffness.   Skin: Negative for rash.   Neurological: Negative for dizziness, facial asymmetry and headaches.   Hematological: Negative for adenopathy.   Psychiatric/Behavioral: Negative for sleep disturbance.       Objective:      Physical Exam   Constitutional: He is oriented to person, place, and time. He appears well-developed and well-nourished. No distress.   Pleasant 83 yo male in NAD     HENT:   Head: Normocephalic and atraumatic.   Right Ear: External ear normal.   Left Ear: External ear normal.   Nose: Mucosal edema and rhinorrhea present.   Mouth/Throat: Uvula is midline, oropharynx is clear and moist and mucous membranes are normal. No tonsillar exudate.   Post nasal drip noted     Eyes: Conjunctivae are normal. Pupils are equal, round, and reactive to light. Right eye exhibits no discharge. Left eye exhibits no discharge. No scleral icterus.   Neck: Normal range of motion. Neck supple.   Cardiovascular: Normal rate, regular rhythm and normal heart sounds.    Pulmonary/Chest: Effort normal and breath sounds normal. No stridor. No respiratory distress. He has no wheezes. He has no rales. He exhibits no tenderness.   Abdominal: Soft.   Lymphadenopathy:     He has no cervical adenopathy.   Neurological: He is alert and oriented to person, place, and time.   Skin: Skin is warm and dry. Capillary refill takes less than 2 seconds. No rash noted. He is not diaphoretic. No erythema. No pallor.   Psychiatric: He has a normal mood and affect. His behavior is normal. Judgment and thought content normal.   Nursing note and vitals reviewed.      Assessment:       1. Post-nasal drip    2. Non-seasonal allergic rhinitis, unspecified trigger        Plan:       Anjum was seen today for cough and nasal congestion.    Diagnoses and all orders for this visit:    Post-nasal drip  -     ipratropium  (ATROVENT) 0.03 % nasal spray; 2 sprays by Nasal route 2 (two) times daily.  -     montelukast (SINGULAIR) 10 mg tablet; Take 1 tablet (10 mg total) by mouth once daily.    Non-seasonal allergic rhinitis, unspecified trigger  -     ipratropium (ATROVENT) 0.03 % nasal spray; 2 sprays by Nasal route 2 (two) times daily.  -     montelukast (SINGULAIR) 10 mg tablet; Take 1 tablet (10 mg total) by mouth once daily.      Patient Instructions   Increase water intake    Take Singulair ( montelukast) once a day    Use nasal spray 2-3 times a day    Return for any fever over 100.4, or if symptoms persist more than 10 days    To ER for any shortness of breath

## 2018-04-30 ENCOUNTER — PATIENT MESSAGE (OUTPATIENT)
Dept: INTERNAL MEDICINE | Facility: CLINIC | Age: 83
End: 2018-04-30

## 2018-05-02 ENCOUNTER — TELEPHONE (OUTPATIENT)
Dept: INTERNAL MEDICINE | Facility: CLINIC | Age: 83
End: 2018-05-02

## 2018-05-02 NOTE — TELEPHONE ENCOUNTER
----- Message from Vy Branch sent at 5/2/2018 10:54 AM CDT -----  Contact: pt [phone]  Pt states that he sent a message through [employer] 2 days ago and has not heard back from the doctor. He didn't want to explain what he needed so please give him a call.

## 2018-05-03 ENCOUNTER — OFFICE VISIT (OUTPATIENT)
Dept: INTERNAL MEDICINE | Facility: CLINIC | Age: 83
End: 2018-05-03
Payer: MEDICARE

## 2018-05-03 VITALS
BODY MASS INDEX: 33.52 KG/M2 | HEART RATE: 69 BPM | SYSTOLIC BLOOD PRESSURE: 110 MMHG | OXYGEN SATURATION: 97 % | DIASTOLIC BLOOD PRESSURE: 54 MMHG | HEIGHT: 66 IN | WEIGHT: 208.56 LBS

## 2018-05-03 DIAGNOSIS — J01.00 ACUTE MAXILLARY SINUSITIS, RECURRENCE NOT SPECIFIED: Primary | ICD-10-CM

## 2018-05-03 DIAGNOSIS — I10 ESSENTIAL HYPERTENSION: ICD-10-CM

## 2018-05-03 PROCEDURE — 99999 PR PBB SHADOW E&M-EST. PATIENT-LVL III: CPT | Mod: PBBFAC,,, | Performed by: INTERNAL MEDICINE

## 2018-05-03 PROCEDURE — 99499 UNLISTED E&M SERVICE: CPT | Mod: S$PBB,,, | Performed by: INTERNAL MEDICINE

## 2018-05-03 PROCEDURE — 99214 OFFICE O/P EST MOD 30 MIN: CPT | Mod: S$GLB,,, | Performed by: INTERNAL MEDICINE

## 2018-05-03 PROCEDURE — 3078F DIAST BP <80 MM HG: CPT | Mod: CPTII,S$GLB,, | Performed by: INTERNAL MEDICINE

## 2018-05-03 PROCEDURE — 3074F SYST BP LT 130 MM HG: CPT | Mod: CPTII,S$GLB,, | Performed by: INTERNAL MEDICINE

## 2018-05-03 RX ORDER — CODEINE PHOSPHATE AND GUAIFENESIN 10; 100 MG/5ML; MG/5ML
5 SOLUTION ORAL 3 TIMES DAILY PRN
Qty: 180 ML | Refills: 0 | Status: SHIPPED | OUTPATIENT
Start: 2018-05-03 | End: 2018-05-13

## 2018-05-03 RX ORDER — AZITHROMYCIN 250 MG/1
TABLET, FILM COATED ORAL
Qty: 6 TABLET | Refills: 0 | Status: SHIPPED | OUTPATIENT
Start: 2018-05-03 | End: 2018-05-08

## 2018-05-03 NOTE — PROGRESS NOTES
CHIEF COMPLAINT:  Upper respiratory symptoms.    HISTORY OF PRESENT ILLNESS:  The patient is an 82-year-old gentleman who we see   for insulin requiring diabetes, hypertension who comes in today for worsening   symptoms.  The patient was seen one week ago by our nurse practitioner.  He was   treated with Singulair as well as Atrovent nasal spray.  The patient states that   his sinuses have gotten worse.  His cough has gotten worse.  He is coughing so   hard, it makes his hernia heard.  He has been using Vicks to help keep his   sinuses open.  He now has a yellow nasal discharge.    REVIEW OF SYSTEMS:  No fever or chills.  He is not short of breath.  No nausea,   vomiting or diarrhea.  He does have problems with carpal tunnel syndrome.  He   cannot feel his fingertips of his right hand.    PHYSICAL EXAMINATION:  GENERAL APPEARANCE:  No acute distress.  HEENT:  Conjunctivae clear.  His right lens has been replaced, left shows a   cataract.  He has no photophobia.  TMs are a little bit sclerosed, but there are   no signs of infection.  Nasal septum is midline.  The patient has obvious nasal   congestion.  He does report some tenderness on palpation over the maxillary   sinuses.  Oropharynx is without erythema.  PULMONARY:  Good inspiratory, expiratory breath sounds are heard.  Lungs clear   to auscultation.  CARDIOVASCULAR:  S1, S2.  EXTREMITIES:  With trace edema.  ABDOMEN:  Nontender, nondistended, without hepatosplenomegaly.    ASSESSMENT:  1.  Sinusitis.  2.  Hypertension.  3.  Insulin requiring diabetes.    PLAN:  We are going to put the patient on a Z-GLORIA.  We will also start him on   Robitussin with codeine.  He is to follow up with any worsening of symptoms or   problems with the medications.      JUDI/ZANE  dd: 05/03/2018 09:23:58 (CDT)  td: 05/04/2018 05:04:07 (CDT)  Doc ID   #[id number]  Job ID #[id number]    CC:

## 2018-05-07 DIAGNOSIS — N18.9 CHRONIC KIDNEY DISEASE, UNSPECIFIED CKD STAGE: ICD-10-CM

## 2018-05-10 ENCOUNTER — ANESTHESIA EVENT (OUTPATIENT)
Dept: SURGERY | Facility: HOSPITAL | Age: 83
End: 2018-05-10
Payer: MEDICARE

## 2018-05-10 ENCOUNTER — CLINICAL SUPPORT (OUTPATIENT)
Dept: LAB | Facility: HOSPITAL | Age: 83
End: 2018-05-10
Attending: ORTHOPAEDIC SURGERY
Payer: MEDICARE

## 2018-05-10 ENCOUNTER — HOSPITAL ENCOUNTER (OUTPATIENT)
Dept: PREADMISSION TESTING | Facility: HOSPITAL | Age: 83
Discharge: HOME | End: 2018-05-10
Attending: ORTHOPAEDIC SURGERY
Payer: MEDICARE

## 2018-05-10 VITALS
HEIGHT: 66 IN | HEART RATE: 65 BPM | SYSTOLIC BLOOD PRESSURE: 136 MMHG | OXYGEN SATURATION: 99 % | DIASTOLIC BLOOD PRESSURE: 63 MMHG | RESPIRATION RATE: 16 BRPM | BODY MASS INDEX: 32.95 KG/M2 | WEIGHT: 205 LBS | TEMPERATURE: 98 F

## 2018-05-10 DIAGNOSIS — G56.01 CARPAL TUNNEL SYNDROME OF RIGHT WRIST: Primary | ICD-10-CM

## 2018-05-10 DIAGNOSIS — G56.01 CARPAL TUNNEL SYNDROME OF RIGHT WRIST: ICD-10-CM

## 2018-05-10 PROCEDURE — 93010 EKG 12-LEAD: ICD-10-PCS | Mod: ,,, | Performed by: INTERNAL MEDICINE

## 2018-05-10 PROCEDURE — 93010 ELECTROCARDIOGRAM REPORT: CPT | Mod: ,,, | Performed by: INTERNAL MEDICINE

## 2018-05-10 PROCEDURE — 93005 ELECTROCARDIOGRAM TRACING: CPT

## 2018-05-10 RX ORDER — SODIUM CHLORIDE, SODIUM LACTATE, POTASSIUM CHLORIDE, CALCIUM CHLORIDE 600; 310; 30; 20 MG/100ML; MG/100ML; MG/100ML; MG/100ML
INJECTION, SOLUTION INTRAVENOUS CONTINUOUS
Status: CANCELLED | OUTPATIENT
Start: 2018-05-10

## 2018-05-10 RX ORDER — LIDOCAINE HYDROCHLORIDE 10 MG/ML
1 INJECTION, SOLUTION EPIDURAL; INFILTRATION; INTRACAUDAL; PERINEURAL ONCE
Status: CANCELLED | OUTPATIENT
Start: 2018-05-10 | End: 2018-05-10

## 2018-05-10 NOTE — ANESTHESIA PREPROCEDURE EVALUATION
05/10/2018  Anjum Cai JrApril is a 82 y.o., male with DAMIAN using CPAP regularly is scheduled for right CTR with trigger finger release under MAC/gen on 5/15/2018.    PRIOR ANES:  6/2014 RCR  Direct laryngoscopy  Airway Device: Endotracheal Tube; Mask Ventilation: Mod Diff - oral; Intubated: Postinduction; Blade: Ramiro #3; Airway Device Size: 8.0; Style: Cuffed; Cuff Inflation: Minimal occlusive pressure; Placement Verified By: Auscultation, Capnometry; Grade: Grade II; Complicating Factors: Anterior larynx; Intubation Findings: Positive EtCO2, Bilateral breath sounds, Atraumatic/Condition of teeth unchanged;  Depth of Insertion: 24; Securment: Lips; Complications: None; Breath Sounds: Equal Bilateral; Insertion Attempts: 1      6/2013 RCR  Direct laryngoscopy, Grade: Grade II (Blankets under shoulders to optimize airway); Complicating Factors: Anterior larynx; Intubation Findings: Positive EtCO2,   Depth of Insertion: 23; Securment: Lips; Complications: None; Breath Sounds: Equal Bilateral; Insertion Attempts: 1    Past Surgical History:   Procedure Laterality Date    CARDIAC CATHETERIZATION  2007    CATARACT EXTRACTION W/  INTRAOCULAR LENS IMPLANT Right 09/27/2016    Dr. Garcia    CORONARY STENT PLACEMENT  02/02/2007    LAD, RCAx2    INGUINAL HERNIA REPAIR Right 1995    PROSTATE BIOPSY  2012    ROTATOR CUFF REPAIR Right 2014    x2    ROTATOR CUFF REPAIR Left 2013    TONSILLECTOMY, ADENOIDECTOMY      TRIGGER FINGER RELEASE Right 2004    x2       Anesthesia Evaluation    I have reviewed the Patient Summary Reports.    I have reviewed the Nursing Notes.   I have reviewed the Medications.     Review of Systems  Anesthesia Hx:  Hx of Anesthetic complications  History of prior surgery of interest to airway management or planning: Previous anesthesia: MAC, General  Procedure performed at an  "[facility].  release trigger finger 10/2016 with MAC.  Procedure performed at an [facility]. Personal Hx of Anesthesia complications  Difficult Intubation (2013, 2014 not documented for lap choly 2/2018), documented in [facility] anesthesia history   Social:  Former Smoker, No Alcohol Use    Hematology/Oncology:  Hematology Normal        EENT/Dental:EENT/Dental Normal   Cardiovascular:   Hypertension, well controlled Past MI (NSTEMI 12/9/17) CAD (stents x 2 LAD, RCA 2007)   Denies Dysrhythmias.   Denies Angina. hyperlipidemia     ECG has been reviewed.  Functional Capacity good / => 4 METS    Pulmonary:   Denies Shortness of breath. Recent URI (completed Z-chuy 5/07/2018; denies fever/chills states feels better but with residual "scratch throat"), resolved Sleep Apnea, CPAP    Renal/:   Chronic Renal Disease renal calculi BPH  Kidney Function/Disease, Chronic Kidney Disease (CKD) , CKD Stage III (GFR 30-59) Baseline Cr 1.3-1.7    Hepatic/GI:   GERD, well controlled  Esophageal / Stomach Disorders Gerd Controlled by chronic antireflux medication.  Hernia (left and planning surgery in near future at [facility]), Inguinal Hernia   Musculoskeletal:  Musculoskeletal Normal    Neurological:   Denies Headaches. S/p left CTR with trigger finger release 2016    Now with right CTS and trigger finger left ring finger    Pain , onset is acute , location of upper abd , quality of aching/dull    Endocrine:   Diabetes, poorly controlled, type 2, using insulin  Diabetes, Type 2 Diabetes , controlled by insulin. , most recent HgA1c value was 8.5 on 2/2018.    Dermatological:  Skin Normal    Psych:  Psychiatric Normal           Physical Exam  General:  Obesity    Airway/Jaw/Neck:  Airway Findings: Mouth Opening: Normal Tongue: Normal  General Airway Assessment: Adult  Mallampati: II  Improves to II with phonation.  TM Distance: Normal, at least 6 cm  Jaw/Neck Findings:  Neck ROM: Extension Decreased, Mild  Neck Findings:  " Girth Increased     Eyes/Ears/Nose:  EYES/EARS/NOSE FINDINGS: Normal   Dental:  Dental Findings: molar caps, Periodontal disease, Mild   Chest/Lungs:  Chest/Lungs Clear    Heart/Vascular:  Heart Findings: Normal Heart murmur: negative    Abdomen:  Abdomen Findings: Normal    Musculoskeletal:  Musculoskeletal Findings: (right wrist, right ring finger) Tender Joint, Joint Contracture     Mental Status:  Mental Status Findings: Normal       EKG 2/2018  Sinus rhythm with Premature atrial complexes  Otherwise normal ECG  When compared with ECG of 05-DEC-2017 08:20,  Premature atrial complexes are now Present  Confirmed by CHARBEL RIVERA MD ([id number]) on 2/26/2018 12:42:13 AM       Anesthesia Plan  Type of Anesthesia, risks & benefits discussed:  Anesthesia Type:  general, MAC  Patient's Preference:    Intra-op Monitoring Plan:   Intra-op Monitoring Plan Comments:   Post Op Pain Control Plan:   Post Op Pain Control Plan Comments:   Induction:   IV  Beta Blocker:  Patient is on a Beta-Blocker and has received one dose within the past 24 hours (No further documentation required).       Informed Consent: Patient understands risks and agrees with Anesthesia plan.  Questions answered.   ASA Score: 3     Day of Surgery Review of History & Physical: I have interviewed and examined the patient. I have reviewed the patient's H&P dated:        Anesthesia Plan Notes: Anesthesia consent will be obtained prior to procedure on 5/15/2018.        Ready For Surgery From Anesthesia Perspective.

## 2018-05-10 NOTE — PRE-PROCEDURE INSTRUCTIONS
sister - Ariela - Doesnt know her cell number    Allergies, medical, surgical, family and psychosocial histories reviewed with patient. Periop plan of care reviewed. Preop instructions given, including medications to take and to hold. Time allotted for questions to be addressed.  Patient verbalized understanding.

## 2018-05-10 NOTE — DISCHARGE INSTRUCTIONS
Your surgery is scheduled for 5/15/18.    Please report to Outpatient Surgery Intake Office on the 2nd FLOOR at 12:30 p.m.          INSTRUCTIONS IMPORTANT!!!  ¨ Do not eat or drink after 6:30am-including water. OK to brush teeth, no   gum, candy or mints!    ¨ Take only these medicines with a small swallow of water-morning of surgery: Irbesartan, metoprolol, prilosec        ____  Proceed to [facility] on 5/10/18 for additional blood test.        ____  No powder, lotions or creams to surgical area.  ____  Please remove all jewelry, including piercings and leave at home.  ____  No money or valuables needed. Please leave at home.  ____  Please bring any documents given by your doctor.  ____  If going home the same day, arrange for a ride home. You will not be able to             drive if Anesthesia was used.  ____  Wear loose fitting clothing. Allow for dressings, bandages.  ____  Stop Aspirin, Ibuprofen, Motrin and Aleve at least 3-5 days before surgery, unless otherwise instructed by your doctor, or the nurse.   You MAY use Tylenol/acetaminophen until day of surgery.  ____  Wash the surgical area with Hibiclens the night before surgery, and again the             morning of surgery.  Be sure to rinse hibiclens off completely (if instructed by   nurse).  ____  If you take diabetic medication, do not take am of surgery unless instructed by Doctor.  ____  Call MD for temperature above 101 degrees.  ____ Stop taking any Fish Oil supplement or any Vitamins that contain Vitamin E at least 5 days prior to surgery.  ____ Do Not wear your contact lenses the day of your procedure.  You may wear your glasses.        I have read or had read and explained to me, and understand the above information.  Additional comments or instructions:  For additional questions call [phone]      Pre-Op Bathing Instructions    Before surgery, you can play an important role in your own health.    Because skin is not sterile, we need  to be sure that your skin is as free of germs as possible. By following the instructions below, you can reduce the number of germs on your skin before surgery.    IMPORTANT: You will need to shower with a special soap called Hibiclens*, available at any pharmacy.  If you are allergic to Chlorhexidine (the antiseptic in Hibiclens), use an antibacterial soap such as Dial Soap for your preoperative shower.  You will shower with Hibiclens both the night before your surgery and the morning of your surgery.  Do not use Hibiclens on the head, face or genitals to avoid injury to those areas.    STEP #1: THE NIGHT BEFORE YOUR SURGERY     1. Do not shave the area of your body where your surgery will be performed.  2. Shower and wash your hair and body as usual with your normal soap and shampoo.  3. Rinse your hair and body thoroughly after you shower to remove all soap residue.  4. With your hand, apply one packet of Hibiclens soap to the surgical site.   5. Wash the site gently for five (5) minutes. Do not scrub your skin too hard.   6. Do not wash with your regular soap after Hibiclens is used.  7. Rinse your body thoroughly.  8. Pat yourself dry with a clean, soft towel.  9. Do not use lotion, cream, or powder.  10. Wear clean clothes.    STEP #2: THE MORNING OF YOUR SURGERY     1. Repeat Step #1.    * Not to be used by people allergic to Chlorhexidine.          Carpal Tunnel Release Surgery  Surgery may be done if your carpal tunnel syndrome (CTS) symptoms become severe. Or, you may have surgery if no other treatment brings relief. There are 2 types of CTS procedures. You will be told about the one you will have. Youll also be instructed how to prepare for it.      The goals of surgery  Two types of surgery--open and endoscopic--are used to treat CTS.  · With open surgery, your surgeon makes one incision in your palm. Standard surgical tools are used.  · With endoscopic surgery, one or two small incisions may be made in  your hand. A scope (with a very small camera attached) and tools are inserted under the carpal ligament. The surgeon then operates while watching images on a video screen. No matter which one you have, the goal remains the same: Your surgeon will relieve pressure on the median nerve. To do this, the transverse carpal ligament is cut (released).  After surgery  If youve had carpal tunnel surgery, you will spend a few hours resting before you go home. The nerve sensation and circulation in your hand will be checked at this time. For the safest healing, keep the following in mind.  · Keep your hand raised above heart level. This will help reduce swelling.  · Limit hand and wrist use as instructed. A wrist brace may be required.  · Take any pain medication as directed.  · Do hand exercises as directed by your surgeon or therapist.  When to call the surgeon  Call your surgeon if you notice any of the following:  · White or pale-blue hand or nails (If you pinch your skin or nail and the color doesnt return)  · Pain that is not relieved by prescribed medicine  · Loss of sensation or excess swelling in hand or fingers  · Fever over 100.4°F (38°C)   Date Last Reviewed: 9/11/2015  © 6010-4188 [employer]. [address] [place] [address], [place] [address]. All rights reserved. This information is not intended as a substitute for professional medical care. Always follow your healthcare professional's instructions.

## 2018-05-14 DIAGNOSIS — E55.9 VITAMIN D DEFICIENCY: Primary | ICD-10-CM

## 2018-05-14 RX ORDER — ATORVASTATIN CALCIUM 40 MG/1
40 TABLET, FILM COATED ORAL DAILY
Qty: 30 TABLET | Refills: 5 | Status: SHIPPED | OUTPATIENT
Start: 2018-05-14 | End: 2018-11-29 | Stop reason: SDUPTHER

## 2018-05-14 RX ORDER — ERGOCALCIFEROL 1.25 MG/1
CAPSULE ORAL
Qty: 3 CAPSULE | Refills: 0 | Status: SHIPPED | OUTPATIENT
Start: 2018-05-14 | End: 2018-05-16 | Stop reason: SDUPTHER

## 2018-05-15 ENCOUNTER — ANESTHESIA (OUTPATIENT)
Dept: SURGERY | Facility: HOSPITAL | Age: 83
End: 2018-05-15
Payer: MEDICARE

## 2018-05-15 ENCOUNTER — SURGERY (OUTPATIENT)
Age: 83
End: 2018-05-15

## 2018-05-15 ENCOUNTER — HOSPITAL ENCOUNTER (OUTPATIENT)
Facility: HOSPITAL | Age: 83
Discharge: HOME | End: 2018-05-15
Attending: ORTHOPAEDIC SURGERY | Admitting: ORTHOPAEDIC SURGERY
Payer: MEDICARE

## 2018-05-15 VITALS
HEIGHT: 66 IN | SYSTOLIC BLOOD PRESSURE: 112 MMHG | TEMPERATURE: 98 F | HEART RATE: 66 BPM | BODY MASS INDEX: 32.95 KG/M2 | WEIGHT: 205 LBS | OXYGEN SATURATION: 98 % | RESPIRATION RATE: 16 BRPM | DIASTOLIC BLOOD PRESSURE: 61 MMHG

## 2018-05-15 DIAGNOSIS — G56.01 CARPAL TUNNEL SYNDROME OF RIGHT WRIST: ICD-10-CM

## 2018-05-15 DIAGNOSIS — M65.341 TRIGGER RING FINGER OF RIGHT HAND: ICD-10-CM

## 2018-05-15 LAB — GLUCOSE SERPL-MCNC: 150 MG/DL (ref 70–110)

## 2018-05-15 PROCEDURE — 25000003 PHARM REV CODE 250: Performed by: NURSE PRACTITIONER

## 2018-05-15 PROCEDURE — S0077 INJECTION, CLINDAMYCIN PHOSP: HCPCS | Performed by: ORTHOPAEDIC SURGERY

## 2018-05-15 PROCEDURE — 25000003 PHARM REV CODE 250: Performed by: ORTHOPAEDIC SURGERY

## 2018-05-15 PROCEDURE — 71000015 HC POSTOP RECOV 1ST HR: Performed by: ORTHOPAEDIC SURGERY

## 2018-05-15 PROCEDURE — 64721 CARPAL TUNNEL SURGERY: CPT | Mod: 51,RT,, | Performed by: ORTHOPAEDIC SURGERY

## 2018-05-15 PROCEDURE — 71000016 HC POSTOP RECOV ADDL HR: Performed by: ORTHOPAEDIC SURGERY

## 2018-05-15 PROCEDURE — S0020 INJECTION, BUPIVICAINE HYDRO: HCPCS | Performed by: ORTHOPAEDIC SURGERY

## 2018-05-15 PROCEDURE — 63600175 PHARM REV CODE 636 W HCPCS: Performed by: NURSE ANESTHETIST, CERTIFIED REGISTERED

## 2018-05-15 PROCEDURE — 26055 INCISE FINGER TENDON SHEATH: CPT | Mod: F8,,, | Performed by: ORTHOPAEDIC SURGERY

## 2018-05-15 PROCEDURE — 37000008 HC ANESTHESIA 1ST 15 MINUTES: Performed by: ORTHOPAEDIC SURGERY

## 2018-05-15 PROCEDURE — 36000706: Performed by: ORTHOPAEDIC SURGERY

## 2018-05-15 PROCEDURE — 36000707: Performed by: ORTHOPAEDIC SURGERY

## 2018-05-15 PROCEDURE — 37000009 HC ANESTHESIA EA ADD 15 MINS: Performed by: ORTHOPAEDIC SURGERY

## 2018-05-15 RX ORDER — LIDOCAINE HYDROCHLORIDE 10 MG/ML
1 INJECTION, SOLUTION EPIDURAL; INFILTRATION; INTRACAUDAL; PERINEURAL ONCE
Status: DISCONTINUED | OUTPATIENT
Start: 2018-05-15 | End: 2018-05-15 | Stop reason: HOSPADM

## 2018-05-15 RX ORDER — LIDOCAINE HYDROCHLORIDE 10 MG/ML
INJECTION, SOLUTION EPIDURAL; INFILTRATION; INTRACAUDAL; PERINEURAL
Status: DISCONTINUED | OUTPATIENT
Start: 2018-05-15 | End: 2018-05-15 | Stop reason: HOSPADM

## 2018-05-15 RX ORDER — ONDANSETRON 8 MG/1
8 TABLET, ORALLY DISINTEGRATING ORAL EVERY 8 HOURS PRN
Status: DISCONTINUED | OUTPATIENT
Start: 2018-05-15 | End: 2018-05-15 | Stop reason: HOSPADM

## 2018-05-15 RX ORDER — PROPOFOL 10 MG/ML
VIAL (ML) INTRAVENOUS CONTINUOUS PRN
Status: DISCONTINUED | OUTPATIENT
Start: 2018-05-15 | End: 2018-05-15

## 2018-05-15 RX ORDER — PROPOFOL 10 MG/ML
VIAL (ML) INTRAVENOUS
Status: DISCONTINUED | OUTPATIENT
Start: 2018-05-15 | End: 2018-05-15

## 2018-05-15 RX ORDER — ACETAMINOPHEN 325 MG/1
650 TABLET ORAL EVERY 4 HOURS PRN
Status: DISCONTINUED | OUTPATIENT
Start: 2018-05-15 | End: 2018-05-15 | Stop reason: HOSPADM

## 2018-05-15 RX ORDER — TRAMADOL HYDROCHLORIDE 50 MG/1
50 TABLET ORAL EVERY 6 HOURS PRN
Qty: 30 TABLET | Refills: 0 | Status: SHIPPED | OUTPATIENT
Start: 2018-05-15 | End: 2018-05-25

## 2018-05-15 RX ORDER — SODIUM CHLORIDE, SODIUM LACTATE, POTASSIUM CHLORIDE, CALCIUM CHLORIDE 600; 310; 30; 20 MG/100ML; MG/100ML; MG/100ML; MG/100ML
INJECTION, SOLUTION INTRAVENOUS CONTINUOUS
Status: DISCONTINUED | OUTPATIENT
Start: 2018-05-15 | End: 2018-05-15 | Stop reason: HOSPADM

## 2018-05-15 RX ORDER — HYDROCODONE BITARTRATE AND ACETAMINOPHEN 5; 325 MG/1; MG/1
1 TABLET ORAL EVERY 4 HOURS PRN
Status: DISCONTINUED | OUTPATIENT
Start: 2018-05-15 | End: 2018-05-15 | Stop reason: HOSPADM

## 2018-05-15 RX ORDER — LIDOCAINE HCL/PF 100 MG/5ML
SYRINGE (ML) INTRAVENOUS
Status: DISCONTINUED | OUTPATIENT
Start: 2018-05-15 | End: 2018-05-15

## 2018-05-15 RX ORDER — CLINDAMYCIN PHOSPHATE 900 MG/50ML
900 INJECTION, SOLUTION INTRAVENOUS
Status: COMPLETED | OUTPATIENT
Start: 2018-05-15 | End: 2018-05-15

## 2018-05-15 RX ORDER — OXYCODONE HYDROCHLORIDE 5 MG/1
10 TABLET ORAL EVERY 4 HOURS PRN
Status: DISCONTINUED | OUTPATIENT
Start: 2018-05-15 | End: 2018-05-15 | Stop reason: HOSPADM

## 2018-05-15 RX ORDER — HYDROCODONE BITARTRATE AND ACETAMINOPHEN 5; 325 MG/1; MG/1
1 TABLET ORAL EVERY 4 HOURS PRN
Qty: 20 TABLET | Refills: 0 | Status: ON HOLD | OUTPATIENT
Start: 2018-05-15 | End: 2018-08-02 | Stop reason: HOSPADM

## 2018-05-15 RX ORDER — BUPIVACAINE HYDROCHLORIDE 5 MG/ML
INJECTION, SOLUTION EPIDURAL; INTRACAUDAL
Status: DISCONTINUED | OUTPATIENT
Start: 2018-05-15 | End: 2018-05-15 | Stop reason: HOSPADM

## 2018-05-15 RX ADMIN — LIDOCAINE HYDROCHLORIDE 100 MG: 20 INJECTION, SOLUTION INTRAVENOUS at 12:05

## 2018-05-15 RX ADMIN — CLINDAMYCIN PHOSPHATE 900 MG: 18 INJECTION, SOLUTION INTRAVENOUS at 12:05

## 2018-05-15 RX ADMIN — PROPOFOL 30 MG: 10 INJECTION, EMULSION INTRAVENOUS at 12:05

## 2018-05-15 RX ADMIN — PROPOFOL 100 MCG/KG/MIN: 10 INJECTION, EMULSION INTRAVENOUS at 12:05

## 2018-05-15 RX ADMIN — SODIUM CHLORIDE, SODIUM LACTATE, POTASSIUM CHLORIDE, AND CALCIUM CHLORIDE 10 ML/HR: .6; .31; .03; .02 INJECTION, SOLUTION INTRAVENOUS at 11:05

## 2018-05-15 RX ADMIN — LIDOCAINE HYDROCHLORIDE 5 ML: 10 INJECTION, SOLUTION EPIDURAL; INFILTRATION; INTRACAUDAL; PERINEURAL at 12:05

## 2018-05-15 RX ADMIN — BUPIVACAINE HYDROCHLORIDE 5 ML: 5 INJECTION, SOLUTION EPIDURAL; INTRACAUDAL; PERINEURAL at 12:05

## 2018-05-15 NOTE — PLAN OF CARE
Discharge instructions reviewed with pt and pt's family. Pt and pt's family verbalize understanding. PIV discontinued as per md order. Tip intact. Pt tolerated well.

## 2018-05-15 NOTE — TRANSFER OF CARE
"Anesthesia Transfer of Care Note    Patient: Anjum Cai     Procedure(s) Performed: Procedure(s) (LRB):  RELEASE-CARPAL TUNNEL (Right)  RELEASE-FINGER-TRIGGER (Right)    Patient location: OPS    Anesthesia Type: MAC    Transport from OR: Transported from OR on room air with adequate spontaneous ventilation    Post pain: adequate analgesia    Post assessment: no apparent anesthetic complications and tolerated procedure well    Post vital signs: stable    Level of consciousness: awake, alert and oriented    Nausea/Vomiting: no nausea/vomiting    Complications: none    Transfer of care protocol was followed      Last vitals:   Visit Vitals  BP (!) 119/57   Pulse 71   Temp 36.9 °C (98.5 °F) (Oral)   Resp 18   Ht 5' 6" (1.676 m)   Wt 93 kg (205 lb)   SpO2 99%   BMI 33.09 kg/m²     "

## 2018-05-15 NOTE — H&P (VIEW-ONLY)
OFFICE VISIT AND PREOP H AND P     CHIEF COMPLAINT:  Right carpal tunnel syndrome and triggering right ring finger.    HISTORY OF PRESENT ILLNESS:  An 82-year-old male with ongoing symptoms right   hand related to carpal tunnel and triggering ring finger.  Previously set up for   surgery, he had to cancel, but now symptoms have recurred.  He would like to   reschedule surgery.    No recent trauma or injury is reported.  He does have numbness and tingling in   the right thumb, index and middle fingers.    PAST MEDICAL HISTORY:  Significant for anemia, arthritis, cataracts, colon   polyps, diabetic retinopathy and type 2 diabetes.    PAST SURGICAL HISTORY:  Includes coronary stent placement, prostate biopsy,   tonsillectomy, rotator cuff repair, trigger finger release and hernia repair.    FAMILY HISTORY:  Positive for cataracts, heart disease and diabetes.    SOCIAL HISTORY:  The patient is a former smoker, does not drink alcohol.    REVIEW OF SYSTEMS:  Negative fevers, chills, rashes.    CURRENT MEDICATIONS:  Reviewed on chart.    ALLERGIES:  ACE inhibitors and cefadroxil.    PHYSICAL EXAMINATION:  GENERAL:  Well-developed, well-nourished male in no acute distress, alert and   oriented x3.  HEENT:  Unremarkable.  LUNGS:  Clear to auscultation.  HEART:  Regular rate and rhythm.  ABDOMEN:  Soft, nontender.  EXTREMITIES:  Significant for the right hand, demonstrating mild swelling volar   compartment wrist.  Positive Tinel sign.  Positive Phalen test.  There is also   some triggering of the right ring finger, which is mild and intermittent.     strength slightly decreased.    IMPRESSION:  1.  Right carpal tunnel syndrome.  2.  Triggering right ring finger.    PLAN:  We will set up surgery for combined procedure for right carpal tunnel   release and trigger release, right ring finger.  The risks and benefits of   surgery explained to the patient, he understands.      SHAE  dd: 04/19/2018 14:55:20 (CDT)  td:  04/20/2018 12:19:44 (CDT)  Doc ID   #[id number]  Job ID #[id number]    CC:

## 2018-05-15 NOTE — ANESTHESIA POSTPROCEDURE EVALUATION
"Anesthesia Post Evaluation    Patient: Anjum Cai     Procedure(s) Performed: Procedure(s) (LRB):  RELEASE-CARPAL TUNNEL (Right)  RELEASE-FINGER-TRIGGER (Right)    Final Anesthesia Type: MAC  Patient location during evaluation: OPS  Patient participation: Yes- Able to Participate  Level of consciousness: awake and alert and oriented  Post-procedure vital signs: reviewed and stable  Pain management: adequate  Airway patency: patent  PONV status at discharge: No PONV  Anesthetic complications: no      Cardiovascular status: blood pressure returned to baseline, hemodynamically stable and stable  Respiratory status: unassisted, spontaneous ventilation and room air  Hydration status: euvolemic  Follow-up not needed.        Visit Vitals  BP (!) 119/57   Pulse 71   Temp 36.9 °C (98.5 °F) (Oral)   Resp 18   Ht 5' 6" (1.676 m)   Wt 93 kg (205 lb)   SpO2 99%   BMI 33.09 kg/m²       Pain/Renetta Score: Pain Assessment Performed: Yes (5/15/2018 10:48 AM)  Presence of Pain: denies (5/15/2018 10:48 AM)      "

## 2018-05-15 NOTE — BRIEF OP NOTE
[facility]  Brief Operative Note     SUMMARY     Surgery Date: 5/15/2018     Surgeon(s) and Role:     * Shade Lilly Jr., MD - Primary    Assisting Surgeon: None    Pre-op Diagnosis:  Carpal tunnel syndrome of right wrist [G56.01]  Trigger ring finger of right hand [M65.341]    Post-op Diagnosis:  Post-Op Diagnosis Codes:     * Carpal tunnel syndrome of right wrist [G56.01]     * Trigger ring finger of right hand [M65.341]    Procedure(s) (LRB):  RELEASE-CARPAL TUNNEL (Right)  RELEASE-FINGER-TRIGGER (Right)    Anesthesia: Local MAC    Description of the findings of the procedure: right CTS and trigger finger    Findings/Key Components: right CTR and trigger finger release    Estimated Blood Loss: * No values recorded between 5/15/2018 12:44 PM and 5/15/2018  1:13 PM *         Specimens:   Specimen (12h ago through future)    None          Discharge Note    SUMMARY     Admit Date: 5/15/2018    Discharge Date and Time:  05/15/2018 1:13 PM    Hospital Course (synopsis of major diagnoses, care, treatment, and services provided during the course of the hospital stay): se op note     Final Diagnosis: Post-Op Diagnosis Codes:     * Carpal tunnel syndrome of right wrist [G56.01]     * Trigger ring finger of right hand [M65.341]    Disposition: Home or Self Care    Follow Up/Patient Instructions:     Medications:  Reconciled Home Medications:      Medication List      START taking these medications    hydrocodone-acetaminophen 5-325 mg per tablet  Commonly known as:  NORCO  Take 1 tablet by mouth every 4 (four) hours as needed for Pain.        CHANGE how you take these medications    finasteride 5 mg tablet  Commonly known as:  PROSCAR  Take 1 tablet (5 mg total) by mouth once daily.  What changed:  when to take this     irbesartan 75 MG tablet  Commonly known as:  AVAPRO  Take 1 tablet (75 mg total) by mouth once daily.  What changed:  when to take this        CONTINUE taking these medications   "  ACCU-CHEK SMARTVIEW TEST STRIP Strp  Generic drug:  blood sugar diagnostic  1 strip by Misc.(Non-Drug; Combo Route) route 3 (three) times daily before meals.     ASPIR-81 ORAL  Take 1 tablet by mouth Daily.     atorvastatin 40 MG tablet  Commonly known as:  LIPITOR  Take 1 tablet (40 mg total) by mouth once daily.     BD INSULIN SYRINGE ULTRA-FINE 1 mL 30 gauge x 1/2" Syrg  Generic drug:  insulin syringe-needle U-100  USE AS DIRECTED FOUR TIMES DAILY     BD ULTRA-FINE MINI PEN NEEDLE 31 gauge x 3/16" Ndle  Generic drug:  pen needle, diabetic  USE WITH INSULIN INJECTION FOUR TIMES DAILY     blood sugar diagnostic, drum Strp  Commonly known as:  ACCU-CHEK COMPACT TEST  CHECK BLOOD GLUCOSE 4 TIMES DAILY     CENTRUM SILVER ORAL  Take by mouth once daily.     clotrimazole 1 % Soln  Commonly known as:  LOTRIMIN  Apply topically once daily. Apply to filed toenails daily     FISH OIL 1,000 mg Cap  Generic drug:  omega-3 fatty acids-vitamin E  Take 2 capsules by mouth Daily.     insulin glargine 100 unit/mL (3 mL) Inpn pen  Commonly known as:  LANTUS SOLOSTAR U-100 INSULIN  inject 65 units subcutaneously every evening     metoprolol succinate 50 MG 24 hr tablet  Commonly known as:  TOPROL-XL  TAKE ONE TABLET BY MOUTH ONE TIME DAILY     montelukast 10 mg tablet  Commonly known as:  SINGULAIR  Take 1 tablet (10 mg total) by mouth once daily.     nitroGLYCERIN 0.4 MG SL tablet  Commonly known as:  NITROSTAT  Place 1 tablet (0.4 mg total) under the tongue every 5 (five) minutes as needed for Chest pain.     NovoLOG Flexpen U-100 Insulin 100 unit/mL Inpn pen  Generic drug:  insulin aspart U-100  INJECT 12 UNITS 3 TIMES DAILY WITH MEALS; SLIDING SCALE 12U:100-200,14U:201-250,16U:251-300,18U:301-350     omeprazole 20 MG capsule  Commonly known as:  PRILOSEC  Take 1 capsule (20 mg total) by mouth 2 (two) times daily before meals.     VITAMIN D2 50,000 unit Cap  Generic drug:  ergocalciferol  TAKE 1 CAPSULE BY MOUTH EVERY 30 DAYS   "          Discharge Procedure Orders  Diet general     Shower on day dressing removed (No bath)     Call MD for:  temperature >100.4     Call MD for:  persistent nausea and vomiting     Call MD for:  severe uncontrolled pain     Keep surgical extremity elevated     Remove dressing in 48 hours       Follow-up Information     Shade Lilly Jr, MD. Schedule an appointment as soon as possible for a visit in 9 days.    Specialties:  Hand Surgery, Orthopedic Surgery  Why:  For suture removal  Contact information:  [address] [facility]  [address] [place] [id number] [phone]

## 2018-05-16 ENCOUNTER — TELEPHONE (OUTPATIENT)
Dept: ORTHOPEDICS | Facility: CLINIC | Age: 83
End: 2018-05-16

## 2018-05-16 DIAGNOSIS — E55.9 VITAMIN D DEFICIENCY: Primary | ICD-10-CM

## 2018-05-16 LAB — POCT GLUCOSE: 150 MG/DL (ref 70–110)

## 2018-05-16 RX ORDER — ERGOCALCIFEROL 1.25 MG/1
CAPSULE ORAL
Qty: 3 CAPSULE | Refills: 0 | Status: SHIPPED | OUTPATIENT
Start: 2018-05-16 | End: 2018-10-24 | Stop reason: SDUPTHER

## 2018-05-16 NOTE — TELEPHONE ENCOUNTER
----- Message from Yung Spears sent at 5/16/2018 12:00 PM CDT -----  Contact: [phone]/self  Pt need to be seen within 9 days for a Post-Op appointment   Please call and advise

## 2018-05-16 NOTE — TELEPHONE ENCOUNTER
I spoke with the patient and made him a post op appointment. He was made aware of date, time and location.

## 2018-05-16 NOTE — OP NOTE
DATE OF PROCEDURE:  05/15/2018    PREOPERATIVE DIAGNOSES:  1.  Right carpal tunnel syndrome.  2.  Triggering right ring finger.    POSTOPERATIVE DIAGNOSIS:  1.  Right carpal tunnel syndrome.  2.  Triggering right ring finger.    OPERATIVE PROCEDURES:  1.  Right carpal tunnel release.  2.  Trigger release, right ring finger.    SURGEON:  Shade Lilly Jr., M.D.    ANESTHESIA:  MAC.    ESTIMATED BLOOD LOSS:  Minimal.    COMPLICATIONS:  None.    SPECIMENS:  None.    BRIEF INDICATIONS:  An 82-year-old male with right carpal tunnel syndrome and   triggering right ring finger, both unresponsive to conservative treatment.    PROCEDURE IN DETAIL:  After operative consent was obtained, the patient brought   to the Operating Room, placed supine on the operating room table.  Anesthesia by   IV sedation followed by injection of Xylocaine, Marcaine combination, operative   site right carpal tunnel and right palm.    Following this, tourniquet applied right arm, right upper extremity prepped and   draped out in the normal sterile fashion.  The Esmarch used to exsanguinate the   limb and the tourniquet inflated to 225 mmHg.    Following this, an incision made in the thenar crease right palm with a #15   blade.  Palmar fascia divided, deep retractors placed.  Transverse carpal   ligament identified, carefully divided with the [place] blade.  The median nerve   protected with the [place] elevator and division of ligament continued proximally   and distally under direct visualization.  After complete release of the median   nerve, the contents of the carpal canal were inspected and noted to be intact   including the recurrent branch.  The wound irrigated and closed with interrupted   4-0 Monocryl on the subcutaneous layer and Dermabond on the skin.    Attention then turned to the base of the ring finger where an oblique incision   was made in the distal palmar crease with a 15 blade.  Careful dissection used   to divide the  subcutaneous tissue.  Digital nerves identified and protected.    Deep retractors placed.  A1 pulley was noted to be thickened and tight.  It was   released longitudinally.  A small portion of the A2 pulley also released.  After   complete release, range of motion of the finger checked and noted to be full   without triggering.  The wound irrigated.  The skin closed with a running 5-0   nylon horizontal mattress suture.  Sterile dressings applied to both followed by   light wrap and a splint.  Tourniquet deflated.  The patient was brought to the   Recovery Room in stable condition.  All sponge and needle counts reported as   correct.  No complications.      ALYCIA/ZANE  dd: 05/15/2018 13:16:31 (CDT)  td: 05/15/2018 20:26:11 (CD)  Doc ID   #[id number]  Job ID #[id number]    CC:

## 2018-05-24 ENCOUNTER — OFFICE VISIT (OUTPATIENT)
Dept: ORTHOPEDICS | Facility: CLINIC | Age: 83
End: 2018-05-24
Payer: MEDICARE

## 2018-05-24 VITALS — BODY MASS INDEX: 32.95 KG/M2 | HEIGHT: 66 IN | WEIGHT: 205 LBS

## 2018-05-24 DIAGNOSIS — Z98.890 S/P TRIGGER FINGER RELEASE: Primary | ICD-10-CM

## 2018-05-24 DIAGNOSIS — M65.341 TRIGGER RING FINGER OF RIGHT HAND: ICD-10-CM

## 2018-05-24 DIAGNOSIS — G56.01 CARPAL TUNNEL SYNDROME OF RIGHT WRIST: ICD-10-CM

## 2018-05-24 DIAGNOSIS — Z98.890 S/P CARPAL TUNNEL RELEASE: ICD-10-CM

## 2018-05-24 PROCEDURE — 99999 PR PBB SHADOW E&M-EST. PATIENT-LVL II: CPT | Mod: PBBFAC,,, | Performed by: ORTHOPAEDIC SURGERY

## 2018-05-24 PROCEDURE — 99024 POSTOP FOLLOW-UP VISIT: CPT | Mod: S$GLB,,, | Performed by: ORTHOPAEDIC SURGERY

## 2018-05-24 NOTE — PROGRESS NOTES
"Subjective:      Patient ID: Anjum Cai Jr. is a 82 y.o. male.    Chief Complaint: Post-op Evaluation of the Right Hand      HPI: Anjum Cai Jr. is here for post-op visit. He is 9 days s/p right carpal tunnel and right ringer finger release. He is doing very well. He has already weaned out of the brace and using his hand for light activities without difficulty or pain. He does report numbness and tingling not fully resolved. Triggering right ring finger resolved.     Past Medical History:   Diagnosis Date    Anemia 2/26/2018    Arthritis     BPH (benign prostatic hyperplasia)     Cataract     right     Colon polyp 11/18/2015    Colon polyps     Coronary artery disease     DR (diabetic retinopathy)     Dyslipidemia     Elevated PSA     Goiter, nontoxic, multinodular     Headaches, cluster     Hypertension     Kidney stones     Lump in the testicle     Left    Rotator cuff tendinitis     Right shoulder    Sleep apnea with use of continuous positive airway pressure (CPAP)     uses cpap at night    Type II or unspecified type diabetes mellitus with other specified manifestations, uncontrolled        Current Outpatient Prescriptions:     ACCU-CHEK SMARTVIEW TEST STRIP Strp, 1 strip by Misc.(Non-Drug; Combo Route) route 3 (three) times daily before meals., Disp: 200 strip, Rfl: 3    ASPIRIN (ASPIR-81 ORAL), Take 1 tablet by mouth Daily., Disp: , Rfl:     atorvastatin (LIPITOR) 40 MG tablet, Take 1 tablet (40 mg total) by mouth once daily., Disp: 30 tablet, Rfl: 5    BD INSULIN PEN NEEDLE UF MINI 31 gauge x 3/16" Ndle, USE WITH INSULIN INJECTION FOUR TIMES DAILY, Disp: 100 each, Rfl: 4    BD INSULIN SYRINGE ULTRA-FINE 1 mL 30 x 1/2" Syrg, USE AS DIRECTED FOUR TIMES DAILY, Disp: 200 each, Rfl: 11    blood sugar diagnostic, drum (ACCU-CHEK COMPACT TEST) Strp, CHECK BLOOD GLUCOSE 4 TIMES DAILY, Disp: 204 each, Rfl: 5    finasteride (PROSCAR) 5 mg tablet, Take 1 tablet (5 mg total) by mouth " once daily. (Patient taking differently: Take 5 mg by mouth every morning. ), Disp: 30 tablet, Rfl: 11    hydrocodone-acetaminophen (NORCO) 5-325 mg per tablet, Take 1 tablet by mouth every 4 (four) hours as needed for Pain., Disp: 20 tablet, Rfl: 0    insulin glargine (LANTUS SOLOSTAR U-100 INSULIN) 100 unit/mL (3 mL) InPn pen, inject 65 units subcutaneously every evening, Disp: 30 mL, Rfl: 1    irbesartan (AVAPRO) 75 MG tablet, Take 1 tablet (75 mg total) by mouth once daily. (Patient taking differently: Take 75 mg by mouth every morning. ), Disp: 30 tablet, Rfl: 6    metoprolol succinate (TOPROL-XL) 50 MG 24 hr tablet, TAKE ONE TABLET BY MOUTH ONE TIME DAILY , Disp: 90 tablet, Rfl: 2    montelukast (SINGULAIR) 10 mg tablet, Take 1 tablet (10 mg total) by mouth once daily., Disp: 30 tablet, Rfl: 12    MULTIVITAMIN W-MINERALS/LUTEIN (CENTRUM SILVER ORAL), Take by mouth once daily., Disp: , Rfl:     nitroGLYCERIN (NITROSTAT) 0.4 MG SL tablet, Place 1 tablet (0.4 mg total) under the tongue every 5 (five) minutes as needed for Chest pain., Disp: 60 tablet, Rfl: 12    NOVOLOG FLEXPEN U-100 INSULIN 100 unit/mL InPn pen, INJECT 12 UNITS 3 TIMES DAILY WITH MEALS; SLIDING SCALE 12U:100-200,14U:201-250,16U:251-300,18U:301-350, Disp: 15 mL, Rfl: 2    omega-3 fatty acids-vitamin E (FISH OIL) 1,000 mg Cap, Take 2 capsules by mouth Daily. , Disp: , Rfl:     omeprazole (PRILOSEC) 20 MG capsule, Take 1 capsule (20 mg total) by mouth 2 (two) times daily before meals., Disp: 60 capsule, Rfl: 4    traMADol (ULTRAM) 50 mg tablet, Take 1 tablet (50 mg total) by mouth every 6 (six) hours as needed for Pain., Disp: 30 tablet, Rfl: 0    VITAMIN D2 50,000 unit capsule, TAKE 1 CAPSULE BY MOUTH EVERY 30 DAYS, Disp: 3 capsule, Rfl: 0    clotrimazole (LOTRIMIN) 1 % Soln, Apply topically once daily. Apply to filed toenails daily, Disp: 30 mL, Rfl: 5  Review of patient's allergies indicates:   Allergen Reactions    Ace inhibitors   "    Other reaction(s): cough    Cefadroxil      Other reaction(s): possible vasculitis  Other reaction(s): vasculitis       Ht 5' 6" (1.676 m)   Wt 93 kg (205 lb)   BMI 33.09 kg/m²     Review of Systems   Constitution: Negative for chills and fever.   Cardiovascular: Negative for chest pain and palpitations.   Respiratory: Negative for shortness of breath and wheezing.    Skin: Negative for poor wound healing and rash.   Musculoskeletal: Negative for falls and joint pain.   Gastrointestinal: Negative for nausea and vomiting.   Neurological: Positive for numbness and paresthesias. Negative for seizures and tremors.   Psychiatric/Behavioral: Negative for altered mental status.         Objective:    Ortho Exam   RUE: No swelling of volar wrist. Carpal tunnel incision healing well. No redness or signs of infection. Right ring finger incision healing well. Sutures in place. No redness or swelling. No sign of injection. ALL fingers ROM full. Wrist ROM full. Decreased sensation of right index finger.       Assessment:     Imaging: none        1. S/P trigger finger release    2. Carpal tunnel syndrome of right wrist    3. Trigger ring finger of right hand    4. S/P carpal tunnel release    Healing well.      Plan:        Wean out of brace more and more. Return to normal daily activities as tolerated.   Continue regular wound care.  Start scar massage for carpal tunnel incision.  Discusses sensation should return overtime.   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 Wear brace for heavy lifting.   Follow-up in about 4 weeks (around 6/21/2018).        "

## 2018-06-14 ENCOUNTER — OFFICE VISIT (OUTPATIENT)
Dept: ORTHOPEDICS | Facility: CLINIC | Age: 83
End: 2018-06-14
Payer: MEDICARE

## 2018-06-14 ENCOUNTER — TELEPHONE (OUTPATIENT)
Dept: ORTHOPEDICS | Facility: CLINIC | Age: 83
End: 2018-06-14

## 2018-06-14 VITALS — HEIGHT: 66 IN | BODY MASS INDEX: 32.95 KG/M2 | WEIGHT: 205 LBS

## 2018-06-14 DIAGNOSIS — G56.01 CARPAL TUNNEL SYNDROME OF RIGHT WRIST: ICD-10-CM

## 2018-06-14 DIAGNOSIS — M65.341 TRIGGER RING FINGER OF RIGHT HAND: Primary | ICD-10-CM

## 2018-06-14 PROCEDURE — 99999 PR PBB SHADOW E&M-EST. PATIENT-LVL II: CPT | Mod: PBBFAC,,, | Performed by: ORTHOPAEDIC SURGERY

## 2018-06-14 PROCEDURE — 99024 POSTOP FOLLOW-UP VISIT: CPT | Mod: S$GLB,,, | Performed by: ORTHOPAEDIC SURGERY

## 2018-06-14 RX ORDER — DICLOFENAC SODIUM 10 MG/G
2 GEL TOPICAL 3 TIMES DAILY
Qty: 1 TUBE | Refills: 3 | Status: SHIPPED | OUTPATIENT
Start: 2018-06-14 | End: 2019-05-20

## 2018-06-14 NOTE — PROGRESS NOTES
HISTORY OF PRESENT ILLNESS:  Mr. Cai in followup of right carpal tunnel   release and trigger release.  He is about one month's postop, still having some   soreness in the palm.  No numbness or tingling reported.    PHYSICAL EXAMINATION:  RIGHT HAND:  Both incisions are well healed, but he does have some slight   swelling and mild sensitivity over the palm.  Range of motion of fingers full.     strength slightly decreased.  Sensation intact.    PLAN:  I have ordered some Voltaren gel for topical use both places, especially   in the palm.  I have given him a squeeze ball to work on strengthening, increase   activities as tolerated and discontinue the splint, which he has already done.    Follow up in one month.      SAHE  dd: 06/14/2018 10:20:07 (CDT)  td: 06/15/2018 07:41:03 (ALEJOT)  Doc ID   #[id number]  Job ID #[id number]    CC:

## 2018-06-14 NOTE — TELEPHONE ENCOUNTER
----- Message from Luann Quevedo sent at 6/14/2018  1:50 PM CDT -----  Contact: [facility] [phone]  Pharmacy would like to speak with you about directions for medication diclofenac sodium (VOLTAREN) 1 % Gel. Please call and advise.

## 2018-06-15 DIAGNOSIS — N40.1 BENIGN NON-NODULAR PROSTATIC HYPERPLASIA WITH LOWER URINARY TRACT SYMPTOMS: ICD-10-CM

## 2018-06-15 DIAGNOSIS — R97.20 ELEVATED PSA: ICD-10-CM

## 2018-06-15 RX ORDER — FINASTERIDE 5 MG/1
5 TABLET, FILM COATED ORAL DAILY
Qty: 30 TABLET | Refills: 10 | Status: SHIPPED | OUTPATIENT
Start: 2018-06-15 | End: 2018-11-26 | Stop reason: SDUPTHER

## 2018-06-15 RX ORDER — OMEPRAZOLE 20 MG/1
20 CAPSULE, DELAYED RELEASE ORAL
Qty: 180 CAPSULE | Refills: 3 | Status: SHIPPED | OUTPATIENT
Start: 2018-06-15 | End: 2018-11-26 | Stop reason: SDUPTHER

## 2018-07-05 ENCOUNTER — OFFICE VISIT (OUTPATIENT)
Dept: SURGERY | Facility: CLINIC | Age: 83
End: 2018-07-05
Payer: MEDICARE

## 2018-07-05 VITALS
HEIGHT: 66 IN | SYSTOLIC BLOOD PRESSURE: 113 MMHG | DIASTOLIC BLOOD PRESSURE: 55 MMHG | WEIGHT: 204 LBS | BODY MASS INDEX: 32.78 KG/M2 | HEART RATE: 63 BPM | TEMPERATURE: 98 F

## 2018-07-05 DIAGNOSIS — I10 ESSENTIAL HYPERTENSION: ICD-10-CM

## 2018-07-05 DIAGNOSIS — N18.30 KIDNEY DISEASE, CHRONIC, STAGE III (GFR 30-59 ML/MIN): ICD-10-CM

## 2018-07-05 DIAGNOSIS — K40.90 LEFT INGUINAL HERNIA: ICD-10-CM

## 2018-07-05 DIAGNOSIS — E11.3299 CONTROLLED TYPE 2 DIABETES MELLITUS WITH MILD NONPROLIFERATIVE RETINOPATHY WITHOUT MACULAR EDEMA, UNSPECIFIED LATERALITY, UNSPECIFIED WHETHER LONG TERM INSULIN USE: Primary | ICD-10-CM

## 2018-07-05 PROCEDURE — 3074F SYST BP LT 130 MM HG: CPT | Mod: CPTII,S$GLB,, | Performed by: SURGERY

## 2018-07-05 PROCEDURE — 99213 OFFICE O/P EST LOW 20 MIN: CPT | Mod: S$GLB,,, | Performed by: SURGERY

## 2018-07-05 PROCEDURE — 3078F DIAST BP <80 MM HG: CPT | Mod: CPTII,S$GLB,, | Performed by: SURGERY

## 2018-07-05 PROCEDURE — 99999 PR PBB SHADOW E&M-EST. PATIENT-LVL III: CPT | Mod: PBBFAC,,, | Performed by: SURGERY

## 2018-07-05 PROCEDURE — 99499 UNLISTED E&M SERVICE: CPT | Mod: S$GLB,,, | Performed by: SURGERY

## 2018-07-05 NOTE — LETTER
July 5, 2018        Gavin Martinez MD  [address]  [place] [address]             [facility] - General Surgery  [address]  [place] [address]  Phone: [phone]   Patient: Anjum Cai Jr.   MR Number: [id number]   YOB: 1935   Date of Visit: 7/5/2018       Dear Dr. Martinez:    Thank you for referring Anjum Cai to me for evaluation. Attached you will find relevant portions of my assessment and plan of care.    Assessment:       1. 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 Left inguinal hernia    3. Kidney disease, chronic, stage III (GFR 30-59 ml/min)    4. Essential hypertension        Plan:      Open lih with mesh.  23 hour stay.  Cardiology clearance.    If you have questions, please do not hesitate to call me. I look forward to following Anjum Cai along with you.    Sincerely,      Arnaud Avery MD            CC  No Recipients    Enclosure

## 2018-07-05 NOTE — PROGRESS NOTES
Subjective:       Patient ID: Anjum Cai Jr. is a 82 y.o. male.    Chief Complaint: Hernia (LIH)    HPI  Here today for large lih.  He is s/p removal gb for severe cholecystitis and readmit for sepsis from this and is s/p rih.  He has localized pain in his left groin with standing and this improves with rest.  Review of Systems   Constitutional: Negative for fever and unexpected weight change.   Respiratory: Negative for cough, chest tightness and shortness of breath.    Cardiovascular: Negative for chest pain.   Gastrointestinal: Negative for constipation, diarrhea, nausea and vomiting.   Genitourinary: Negative for difficulty urinating and dysuria.   Hematological: Does not bruise/bleed easily.       Objective:      Physical Exam   Constitutional: He is oriented to person, place, and time. He appears well-developed and well-nourished.   Cardiovascular: Normal rate, regular rhythm and normal heart sounds.    Pulmonary/Chest: Effort normal and breath sounds normal.   Abdominal: Soft. Bowel sounds are normal. There is no tenderness. A hernia is present. Hernia confirmed positive in the left inguinal area. Hernia confirmed negative in the right inguinal area.   Genitourinary:         Neurological: He is alert and oriented to person, place, and time.   Skin: Skin is warm and dry.   Psychiatric: He has a normal mood and affect. His behavior is normal. Judgment and thought content normal.   Vitals reviewed.      Assessment:       1. 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 Left inguinal hernia    3. Kidney disease, chronic, stage III (GFR 30-59 ml/min)    4. Essential hypertension        Plan:      Open lih with mesh.  23 hour stay.  Cardiology clearance.

## 2018-07-24 ENCOUNTER — LAB VISIT (OUTPATIENT)
Dept: LAB | Facility: HOSPITAL | Age: 83
End: 2018-07-24
Payer: MEDICARE

## 2018-07-24 DIAGNOSIS — E78.5 HYPERLIPIDEMIA, UNSPECIFIED HYPERLIPIDEMIA TYPE: ICD-10-CM

## 2018-07-24 DIAGNOSIS — I25.118 CORONARY ARTERY DISEASE OF NATIVE ARTERY OF NATIVE HEART WITH STABLE ANGINA PECTORIS: ICD-10-CM

## 2018-07-24 LAB
ANION GAP SERPL CALC-SCNC: 7 MMOL/L
BUN SERPL-MCNC: 19 MG/DL
CALCIUM SERPL-MCNC: 9.3 MG/DL
CHLORIDE SERPL-SCNC: 104 MMOL/L
CHOLEST SERPL-MCNC: 125 MG/DL
CHOLEST/HDLC SERPL: 2.8 {RATIO}
CO2 SERPL-SCNC: 27 MMOL/L
CREAT SERPL-MCNC: 1.6 MG/DL
EST. GFR  (AFRICAN AMERICAN): 46.3 ML/MIN/1.73 M^2
EST. GFR  (NON AFRICAN AMERICAN): 40.1 ML/MIN/1.73 M^2
GLUCOSE SERPL-MCNC: 170 MG/DL
HDLC SERPL-MCNC: 44 MG/DL
HDLC SERPL: 35.2 %
LDLC SERPL CALC-MCNC: 67.6 MG/DL
NONHDLC SERPL-MCNC: 81 MG/DL
POTASSIUM SERPL-SCNC: 4.2 MMOL/L
SODIUM SERPL-SCNC: 138 MMOL/L
TRIGL SERPL-MCNC: 67 MG/DL

## 2018-07-24 PROCEDURE — 80061 LIPID PANEL: CPT

## 2018-07-24 PROCEDURE — 80048 BASIC METABOLIC PNL TOTAL CA: CPT

## 2018-07-24 PROCEDURE — 36415 COLL VENOUS BLD VENIPUNCTURE: CPT

## 2018-07-26 ENCOUNTER — OFFICE VISIT (OUTPATIENT)
Dept: ORTHOPEDICS | Facility: CLINIC | Age: 83
End: 2018-07-26
Payer: MEDICARE

## 2018-07-26 VITALS — WEIGHT: 204 LBS | BODY MASS INDEX: 32.78 KG/M2 | HEIGHT: 66 IN

## 2018-07-26 DIAGNOSIS — M65.341 TRIGGER RING FINGER OF RIGHT HAND: Primary | ICD-10-CM

## 2018-07-26 PROCEDURE — 99999 PR PBB SHADOW E&M-EST. PATIENT-LVL III: CPT | Mod: PBBFAC,,, | Performed by: ORTHOPAEDIC SURGERY

## 2018-07-26 PROCEDURE — 99024 POSTOP FOLLOW-UP VISIT: CPT | Mod: S$GLB,,, | Performed by: ORTHOPAEDIC SURGERY

## 2018-07-26 NOTE — PROGRESS NOTES
HISTORY OF PRESENT ILLNESS:  Mr. Cai is in followup of right carpal tunnel   release and trigger release of the right ring finger.  He is about two months   out from surgery.  He is doing excellent.  No problems reported.    PHYSICAL EXAMINATION:  RIGHT HAND:  The incisions are both healed.  No significant swelling or   tenderness.  Range of motion in wrist and fingers is full.    PLAN:  Continue to work on strengthening with a squeeze ball, otherwise full   activities and follow up as needed.      SHAE  dd: 07/26/2018 09:34:59 (CDT)  td: 07/26/2018 14:30:37 (CDT)  Doc ID   #[id number]  Job ID #[id number]    CC:

## 2018-07-28 RX ORDER — INSULIN ASPART 100 [IU]/ML
INJECTION, SOLUTION INTRAVENOUS; SUBCUTANEOUS
Qty: 15 ML | Refills: 1 | Status: SHIPPED | OUTPATIENT
Start: 2018-07-28 | End: 2018-10-08 | Stop reason: SDUPTHER

## 2018-07-31 ENCOUNTER — ANESTHESIA EVENT (OUTPATIENT)
Dept: SURGERY | Facility: HOSPITAL | Age: 83
End: 2018-07-31
Payer: MEDICARE

## 2018-07-31 ENCOUNTER — TELEPHONE (OUTPATIENT)
Dept: SURGERY | Facility: CLINIC | Age: 83
End: 2018-07-31

## 2018-07-31 NOTE — ANESTHESIA PREPROCEDURE EVALUATION
[facility]  Anesthesia Pre-Operative Evaluation         Patient Name: Anjum Cai Jr.  YOB: 1935  MRN: [id number]    SUBJECTIVE:     Pre-operative evaluation for Procedure(s) (LRB):  REPAIR, HERNIA, INGUINAL, WITHOUT HISTORY OF PRIOR REPAIR, AGE 5 YEARS OR OLDER with mesh (Left)     07/31/2018    Anjum Cai Jr. is a 82 y.o. male w/ a significant PMHx of HTN, IDDM, CAD s/p PCI (2007), DAMIAN (w/ CPAP), CKDIII, OA. Patient has been experiencing pain and discomfort from left groin.    Patient now presents for the above procedure(s).      LDA: None documented.    Prev airway:   Placement Date: 02/21/18; Placement Time: 1109; Method of Intubation: Direct laryngoscopy; Inserted by: CRNA; Airway Device: Endotracheal Tube; Mask Ventilation: Easy; Intubated: Postinduction; Blade: Pulido #2; Airway Device Size: 7.5; Style: Cuffed; Cuff Inflation: Minimal occlusive pressure; Inflation Amount: 8; Placement Verified By: Auscultation, Capnometry, ETT Condensation; Grade: Grade I; Complicating Factors: None; Intubation Findings: Positive EtCO2, Bilateral breath sounds, Atraumatic/Condition of teeth unchanged;  Depth of Insertion: 22; Securment: Lips; Complications: None; Breath Sounds: Equal Bilateral; Insertion Attempts: 1; Removal Date: 02/21/18;  Removal Time: 1222    Drips: None documented.    Patient Active Problem List   Diagnosis    Type 2 diabetes mellitus without retinopathy    Headaches, cluster    Thyroid nodule    BPH (benign prostatic hyperplasia)    Elevated PSA    Right rotator cuff tear    Kidney stones    Lump in the testicle    Low back pain radiating to right leg    Rotator cuff tear    DR (diabetic retinopathy)    Goiter, nontoxic, multinodular    Dyslipidemia    Shoulder pain    History of PTCA 2    CAD (coronary artery disease)    Hyperlipidemia    Kidney disease, chronic, stage III (GFR 30-59 ml/min)    Personal history of colonic polyps    Colon  polyp    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    Nuclear sclerosis of both eyes    PSC (posterior subcapsular cataract), right    Essential hypertension    Senile nuclear sclerosis    Post-operative state    Trigger ring finger of right hand    Nuclear sclerosis of left eye    Refractive error    Chest pain    Angina effort    Carpal tunnel syndrome of right wrist    Post-procedural fever    Anemia    Left inguinal hernia       Review of patient's allergies indicates:   Allergen Reactions    Ace inhibitors      Other reaction(s): cough    Cefadroxil      Other reaction(s): possible vasculitis  Other reaction(s): vasculitis       Current Outpatient Medications:  No current facility-administered medications for this encounter.     Current Outpatient Prescriptions:     atorvastatin (LIPITOR) 40 MG tablet, Take 1 tablet (40 mg total) by mouth once daily., Disp: 30 tablet, Rfl: 5    finasteride (PROSCAR) 5 mg tablet, Take 1 tablet (5 mg total) by mouth once daily., Disp: 30 tablet, Rfl: 10    hydrocodone-acetaminophen (NORCO) 5-325 mg per tablet, Take 1 tablet by mouth every 4 (four) hours as needed for Pain., Disp: 20 tablet, Rfl: 0    insulin glargine (LANTUS SOLOSTAR U-100 INSULIN) 100 unit/mL (3 mL) InPn pen, inject 65 units subcutaneously every evening, Disp: 30 mL, Rfl: 1    irbesartan (AVAPRO) 75 MG tablet, Take 1 tablet (75 mg total) by mouth once daily. (Patient taking differently: Take 75 mg by mouth every morning. ), Disp: 30 tablet, Rfl: 6    metoprolol succinate (TOPROL-XL) 50 MG 24 hr tablet, TAKE ONE TABLET BY MOUTH ONE TIME DAILY , Disp: 90 tablet, Rfl: 2    MULTIVITAMIN W-MINERALS/LUTEIN (CENTRUM SILVER ORAL), Take by mouth once daily., Disp: , Rfl:     NOVOLOG FLEXPEN U-100 INSULIN 100 unit/mL InPn pen, INJECT 12 UNITS SUBCUTANEOUSLY 3 TIMES DAILY WITH MEALS; SLIDING SCALE  "12U:100-200,14U:201-250,16U:251-300,18U:301-350, Disp: 15 mL, Rfl: 1    omeprazole (PRILOSEC) 20 MG capsule, Take 1 capsule (20 mg total) by mouth 2 (two) times daily before meals., Disp: 180 capsule, Rfl: 3    ACCU-CHEK SMARTVIEW TEST STRIP Strp, 1 strip by Misc.(Non-Drug; Combo Route) route 3 (three) times daily before meals., Disp: 200 strip, Rfl: 3    ASPIRIN (ASPIR-81 ORAL), Take 1 tablet by mouth Daily., Disp: , Rfl:     BD INSULIN PEN NEEDLE UF MINI 31 gauge x 3/16" Ndle, USE WITH INSULIN INJECTION FOUR TIMES DAILY, Disp: 100 each, Rfl: 4    BD INSULIN SYRINGE ULTRA-FINE 1 mL 30 x 1/2" Syrg, USE AS DIRECTED FOUR TIMES DAILY, Disp: 200 each, Rfl: 11    blood sugar diagnostic, drum (ACCU-CHEK COMPACT TEST) Strp, CHECK BLOOD GLUCOSE 4 TIMES DAILY, Disp: 204 each, Rfl: 5    diclofenac sodium (VOLTAREN) 1 % Gel, Apply 2 g topically 3 (three) times daily., Disp: 1 Tube, Rfl: 3    nitroGLYCERIN (NITROSTAT) 0.4 MG SL tablet, Place 1 tablet (0.4 mg total) under the tongue every 5 (five) minutes as needed for Chest pain., Disp: 60 tablet, Rfl: 12    omega-3 fatty acids-vitamin E (FISH OIL) 1,000 mg Cap, Take 2 capsules by mouth Daily. , Disp: , Rfl:     VITAMIN D2 50,000 unit capsule, TAKE 1 CAPSULE BY MOUTH EVERY 30 DAYS, Disp: 3 capsule, Rfl: 0    Past Surgical History:   Procedure Laterality Date    CARDIAC CATHETERIZATION  2007    CATARACT EXTRACTION W/  INTRAOCULAR LENS IMPLANT Right 09/27/2016    Dr. Garcia    CHOLECYSTECTOMY  02/2018    CORONARY STENT PLACEMENT  02/02/2007    LAD, RCAx2    INGUINAL HERNIA REPAIR Right 1995    PROSTATE BIOPSY  2012    ROTATOR CUFF REPAIR Right 2014    x2    ROTATOR CUFF REPAIR Left 2013    TONSILLECTOMY, ADENOIDECTOMY      as child    TRIGGER FINGER RELEASE Right 2004    x2       Social History     Social History    Marital status:      Spouse name: N/A    Number of children: N/A    Years of education: N/A     Occupational History    Not on " file.     Social History Main Topics    Smoking status: Former Smoker     Quit date: 3/12/1978    Smokeless tobacco: Never Used    Alcohol use No    Drug use: No    Sexual activity: Not on file     Other Topics Concern    Not on file     Social History Narrative    No narrative on file       OBJECTIVE:     Vital Signs Range (Last 24H):         Significant Labs:  Lab Results   Component Value Date    WBC 8.12 02/27/2018    HGB 10.1 (L) 02/27/2018    HCT 30.6 (L) 02/27/2018     02/27/2018    CHOL 125 07/24/2018    TRIG 67 07/24/2018    HDL 44 07/24/2018    ALT 39 02/27/2018    AST 25 02/27/2018     07/24/2018    K 4.2 07/24/2018     07/24/2018    CREATININE 1.6 (H) 07/24/2018    BUN 19 07/24/2018    CO2 27 07/24/2018    TSH 1.336 10/16/2013    PSA 10.06 (H) 02/26/2013    INR 1.0 02/24/2018    HGBA1C 8.5 (H) 02/24/2018       Diagnostic Studies:   LH (12/09/2018)  Ostial OMB2 and Diag2 severe stenosis.  Diffuse small vessel distal disease.    EKG (5/10/2018):   Vent. Rate : 065 BPM     Atrial Rate : 065 BPM     P-R Int : 206 ms          QRS Dur : 102 ms      QT Int : 442 ms       P-R-T Axes : 049 078 064 degrees     QTc Int : 459 ms    Normal sinus rhythm  Incomplete right bundle branch block  Borderline Abnormal ECG  When compared with ECG of 24-FEB-2018 12:07,  Premature atrial complexes are no longer Present  Vent. rate has decreased BY  35 BPM  Nonspecific T wave abnormality no longer evident in Anterior leads  Confirmed by Jessie Saini MD ([id number]) on 5/10/2018 10:41:17 AM    2D ECHO:  Results for orders placed or performed during the hospital encounter of 12/09/16   2D echo with color flow doppler   Result Value Ref Range    EF 60 55 - 65    Mitral Valve Regurgitation TRIVIAL     Diastolic Dysfunction Yes (A)     Mitral Valve Mobility NORMAL          ASSESSMENT/PLAN:         Anesthesia Evaluation    I have reviewed the Patient Summary Reports.    I have reviewed the Nursing Notes.       Review of Systems  Anesthesia Hx:  Had shaking after gall bladder  Anesthetic. Last temp for that case was 34.7 History of prior surgery of interest to airway management or planning:  Denies Personal Hx of Anesthesia complications.   Social:  Former Smoker, No Alcohol Use    Hematology/Oncology:  Hematology Normal   Oncology Normal    -- Denies Anemia:    Cardiovascular:   Exercise tolerance: good Hypertension Past MI (12/2016-had chest pain) CAD    Denies Angina. 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 S/P Percutaneous Coronary Intervention (PCI) , currently on aspirin.  Hypertension, Essential Hypertension , Fairly Controlled on RX    Pulmonary:   Denies COPD.  Denies Asthma.  Denies Shortness of breath. Sleep Apnea, CPAP  Obstructive Sleep Apnea (DAMIAN), CPAP used.   Renal/:   Chronic Renal Disease, CRI  Kidney Function/Disease, Chronic Kidney Disease (CKD) , CKD Stage III (GFR 30-59)    Hepatic/GI:  Hepatic/GI Normal Denies Liver Disease.    Musculoskeletal:   Arthritis     Neurological:   Denies TIA. Denies CVA. Headaches (not for years.) Denies Seizures.    Endocrine:  Diabetes, Type 2 Diabetes , controlled by insulin. , most recent HgA1c value was 8.5 on 2/24/2018.    Dermatological:  Skin Normal    Psych:  Psychiatric Normal           Physical Exam  General:  Well nourished    Airway/Jaw/Neck:  Airway Findings: Mouth Opening: Normal Tongue: Normal  General Airway Assessment: Adult  Mallampati: III  Improves to II with phonation.  TM Distance: 4 - 6 cm  Jaw/Neck Findings:  Neck ROM: Normal ROM      Dental:  Dental Findings: In tact   Chest/Lungs:  Chest/Lungs Findings: Clear to auscultation, Normal Respiratory Rate     Heart/Vascular:  Heart Findings: Rate: Normal  Rhythm: Regular Rhythm  Sounds: Normal     Abdomen:  Abdomen Findings: Normal     Musculoskeletal:  Musculoskeletal Findings: Normal   Skin:  Skin Findings: Normal    Mental Status:  Mental Status Findings:  Cooperative, Alert and Oriented         Anesthesia Plan  Type of Anesthesia, risks & benefits discussed:  Anesthesia Type:  general  Patient's Preference:   Intra-op Monitoring Plan: standard ASA monitors  Intra-op Monitoring Plan Comments:   Post Op Pain Control Plan: multimodal analgesia and IV/PO Opioids PRN  Post Op Pain Control Plan Comments:   Induction:   IV  Beta Blocker:  Patient is on a Beta-Blocker and has received one dose within the past 24 hours (No further documentation required).       Informed Consent: Patient understands risks and agrees with Anesthesia plan.  Questions answered. Anesthesia consent signed with patient.  ASA Score: 2     Day of Surgery Review of History & Physical:            Ready For Surgery From Anesthesia Perspective.

## 2018-08-01 ENCOUNTER — HOSPITAL ENCOUNTER (OUTPATIENT)
Facility: HOSPITAL | Age: 83
Discharge: HOME | End: 2018-08-02
Attending: SURGERY | Admitting: SURGERY
Payer: MEDICARE

## 2018-08-01 ENCOUNTER — SURGERY (OUTPATIENT)
Age: 83
End: 2018-08-01

## 2018-08-01 ENCOUNTER — ANESTHESIA (OUTPATIENT)
Dept: SURGERY | Facility: HOSPITAL | Age: 83
End: 2018-08-01
Payer: MEDICARE

## 2018-08-01 DIAGNOSIS — R61 DIAPHORESIS: ICD-10-CM

## 2018-08-01 DIAGNOSIS — K40.90 LEFT INGUINAL HERNIA: ICD-10-CM

## 2018-08-01 DIAGNOSIS — K40.90 INGUINAL HERNIA: Primary | ICD-10-CM

## 2018-08-01 LAB
ESTIMATED AVG GLUCOSE: 189 MG/DL
HBA1C MFR BLD HPLC: 8.2 %
POCT GLUCOSE: 222 MG/DL (ref 70–110)
POCT GLUCOSE: 247 MG/DL (ref 70–110)
POCT GLUCOSE: 255 MG/DL (ref 70–110)
POCT GLUCOSE: 261 MG/DL (ref 70–110)
POCT GLUCOSE: 274 MG/DL (ref 70–110)

## 2018-08-01 PROCEDURE — D9220A PRA ANESTHESIA: Mod: ,,, | Performed by: ANESTHESIOLOGY

## 2018-08-01 PROCEDURE — 36000707: Performed by: SURGERY

## 2018-08-01 PROCEDURE — 71000033 HC RECOVERY, INTIAL HOUR: Performed by: SURGERY

## 2018-08-01 PROCEDURE — 25000003 PHARM REV CODE 250: Performed by: STUDENT IN AN ORGANIZED HEALTH CARE EDUCATION/TRAINING PROGRAM

## 2018-08-01 PROCEDURE — 71000039 HC RECOVERY, EACH ADD'L HOUR: Performed by: SURGERY

## 2018-08-01 PROCEDURE — 11000001 HC ACUTE MED/SURG PRIVATE ROOM

## 2018-08-01 PROCEDURE — 36415 COLL VENOUS BLD VENIPUNCTURE: CPT

## 2018-08-01 PROCEDURE — 25000003 PHARM REV CODE 250: Performed by: SURGERY

## 2018-08-01 PROCEDURE — 63600175 PHARM REV CODE 636 W HCPCS: Performed by: STUDENT IN AN ORGANIZED HEALTH CARE EDUCATION/TRAINING PROGRAM

## 2018-08-01 PROCEDURE — 27000190 HC CPAP FULL FACE MASK W/VALVE

## 2018-08-01 PROCEDURE — 94660 CPAP INITIATION&MGMT: CPT

## 2018-08-01 PROCEDURE — 82962 GLUCOSE BLOOD TEST: CPT | Performed by: SURGERY

## 2018-08-01 PROCEDURE — 37000008 HC ANESTHESIA 1ST 15 MINUTES: Performed by: SURGERY

## 2018-08-01 PROCEDURE — 94761 N-INVAS EAR/PLS OXIMETRY MLT: CPT

## 2018-08-01 PROCEDURE — C1781 MESH (IMPLANTABLE): HCPCS | Performed by: SURGERY

## 2018-08-01 PROCEDURE — 36000706: Performed by: SURGERY

## 2018-08-01 PROCEDURE — 27000221 HC OXYGEN, UP TO 24 HOURS

## 2018-08-01 PROCEDURE — 83036 HEMOGLOBIN GLYCOSYLATED A1C: CPT

## 2018-08-01 PROCEDURE — S0077 INJECTION, CLINDAMYCIN PHOSP: HCPCS | Performed by: SURGERY

## 2018-08-01 PROCEDURE — 99900035 HC TECH TIME PER 15 MIN (STAT)

## 2018-08-01 PROCEDURE — 49507 PRP I/HERN INIT BLOCK >5 YR: CPT | Mod: LT,,, | Performed by: SURGERY

## 2018-08-01 PROCEDURE — 37000009 HC ANESTHESIA EA ADD 15 MINS: Performed by: SURGERY

## 2018-08-01 DEVICE — MESH PROLENE 3INX6IN 6/BX: Type: IMPLANTABLE DEVICE | Site: GROIN | Status: FUNCTIONAL

## 2018-08-01 RX ORDER — ENOXAPARIN SODIUM 100 MG/ML
40 INJECTION SUBCUTANEOUS EVERY 24 HOURS
Status: DISCONTINUED | OUTPATIENT
Start: 2018-08-01 | End: 2018-08-02 | Stop reason: HOSPADM

## 2018-08-01 RX ORDER — ATORVASTATIN CALCIUM 20 MG/1
40 TABLET, FILM COATED ORAL DAILY
Status: DISCONTINUED | OUTPATIENT
Start: 2018-08-01 | End: 2018-08-02 | Stop reason: HOSPADM

## 2018-08-01 RX ORDER — SODIUM CHLORIDE 9 MG/ML
INJECTION, SOLUTION INTRAVENOUS CONTINUOUS
Status: DISCONTINUED | OUTPATIENT
Start: 2018-08-01 | End: 2018-08-01

## 2018-08-01 RX ORDER — IRBESARTAN 75 MG/1
75 TABLET ORAL EVERY MORNING
Status: DISCONTINUED | OUTPATIENT
Start: 2018-08-01 | End: 2018-08-02 | Stop reason: HOSPADM

## 2018-08-01 RX ORDER — TRAMADOL HYDROCHLORIDE 50 MG/1
50 TABLET ORAL EVERY 6 HOURS PRN
Status: DISCONTINUED | OUTPATIENT
Start: 2018-08-01 | End: 2018-08-02 | Stop reason: HOSPADM

## 2018-08-01 RX ORDER — FINASTERIDE 5 MG/1
5 TABLET, FILM COATED ORAL DAILY
Status: DISCONTINUED | OUTPATIENT
Start: 2018-08-01 | End: 2018-08-02 | Stop reason: HOSPADM

## 2018-08-01 RX ORDER — TRAMADOL HYDROCHLORIDE 50 MG/1
TABLET ORAL
Status: DISPENSED
Start: 2018-08-01 | End: 2018-08-02

## 2018-08-01 RX ORDER — LIDOCAINE HYDROCHLORIDE 10 MG/ML
1 INJECTION, SOLUTION EPIDURAL; INFILTRATION; INTRACAUDAL; PERINEURAL ONCE
Status: COMPLETED | OUTPATIENT
Start: 2018-08-01 | End: 2018-08-01

## 2018-08-01 RX ORDER — GLUCAGON 1 MG
1 KIT INJECTION
Status: DISCONTINUED | OUTPATIENT
Start: 2018-08-01 | End: 2018-08-01

## 2018-08-01 RX ORDER — FENTANYL CITRATE 50 UG/ML
INJECTION, SOLUTION INTRAMUSCULAR; INTRAVENOUS
Status: DISCONTINUED | OUTPATIENT
Start: 2018-08-01 | End: 2018-08-01

## 2018-08-01 RX ORDER — SUCCINYLCHOLINE CHLORIDE 20 MG/ML
INJECTION INTRAMUSCULAR; INTRAVENOUS
Status: DISCONTINUED | OUTPATIENT
Start: 2018-08-01 | End: 2018-08-01

## 2018-08-01 RX ORDER — PANTOPRAZOLE SODIUM 40 MG/1
40 TABLET, DELAYED RELEASE ORAL DAILY
Status: DISCONTINUED | OUTPATIENT
Start: 2018-08-01 | End: 2018-08-02 | Stop reason: HOSPADM

## 2018-08-01 RX ORDER — DEXTROSE MONOHYDRATE, SODIUM CHLORIDE, AND POTASSIUM CHLORIDE 50; 1.49; 4.5 G/1000ML; G/1000ML; G/1000ML
INJECTION, SOLUTION INTRAVENOUS CONTINUOUS
Status: DISCONTINUED | OUTPATIENT
Start: 2018-08-01 | End: 2018-08-02

## 2018-08-01 RX ORDER — BACITRACIN 50000 [IU]/1
INJECTION, POWDER, FOR SOLUTION INTRAMUSCULAR
Status: DISCONTINUED | OUTPATIENT
Start: 2018-08-01 | End: 2018-08-01 | Stop reason: HOSPADM

## 2018-08-01 RX ORDER — GLUCAGON 1 MG
1 KIT INJECTION
Status: DISCONTINUED | OUTPATIENT
Start: 2018-08-01 | End: 2018-08-02 | Stop reason: HOSPADM

## 2018-08-01 RX ORDER — INSULIN ASPART 100 [IU]/ML
1-10 INJECTION, SOLUTION INTRAVENOUS; SUBCUTANEOUS EVERY 6 HOURS PRN
Status: DISCONTINUED | OUTPATIENT
Start: 2018-08-01 | End: 2018-08-02 | Stop reason: HOSPADM

## 2018-08-01 RX ORDER — EPHEDRINE SULFATE 50 MG/ML
INJECTION, SOLUTION INTRAVENOUS
Status: DISCONTINUED | OUTPATIENT
Start: 2018-08-01 | End: 2018-08-01

## 2018-08-01 RX ORDER — SODIUM CHLORIDE 0.9 % (FLUSH) 0.9 %
3 SYRINGE (ML) INJECTION
Status: DISCONTINUED | OUTPATIENT
Start: 2018-08-01 | End: 2018-08-01 | Stop reason: HOSPADM

## 2018-08-01 RX ORDER — OXYCODONE AND ACETAMINOPHEN 5; 325 MG/1; MG/1
1 TABLET ORAL EVERY 4 HOURS PRN
Status: DISCONTINUED | OUTPATIENT
Start: 2018-08-01 | End: 2018-08-01

## 2018-08-01 RX ORDER — DEXAMETHASONE SODIUM PHOSPHATE 4 MG/ML
INJECTION, SOLUTION INTRA-ARTICULAR; INTRALESIONAL; INTRAMUSCULAR; INTRAVENOUS; SOFT TISSUE
Status: DISCONTINUED | OUTPATIENT
Start: 2018-08-01 | End: 2018-08-01

## 2018-08-01 RX ORDER — PHENYLEPHRINE HYDROCHLORIDE 10 MG/ML
INJECTION INTRAVENOUS
Status: DISCONTINUED | OUTPATIENT
Start: 2018-08-01 | End: 2018-08-01

## 2018-08-01 RX ORDER — LIDOCAINE HCL/PF 100 MG/5ML
SYRINGE (ML) INTRAVENOUS
Status: DISCONTINUED | OUTPATIENT
Start: 2018-08-01 | End: 2018-08-01

## 2018-08-01 RX ORDER — HYDROMORPHONE HYDROCHLORIDE 1 MG/ML
0.2 INJECTION, SOLUTION INTRAMUSCULAR; INTRAVENOUS; SUBCUTANEOUS EVERY 5 MIN PRN
Status: DISCONTINUED | OUTPATIENT
Start: 2018-08-01 | End: 2018-08-01 | Stop reason: HOSPADM

## 2018-08-01 RX ORDER — INSULIN ASPART 100 [IU]/ML
0-5 INJECTION, SOLUTION INTRAVENOUS; SUBCUTANEOUS EVERY 6 HOURS PRN
Status: DISCONTINUED | OUTPATIENT
Start: 2018-08-01 | End: 2018-08-01

## 2018-08-01 RX ORDER — HYDROCODONE BITARTRATE AND ACETAMINOPHEN 5; 325 MG/1; MG/1
1 TABLET ORAL EVERY 6 HOURS PRN
Status: DISCONTINUED | OUTPATIENT
Start: 2018-08-01 | End: 2018-08-02 | Stop reason: HOSPADM

## 2018-08-01 RX ORDER — NEOSTIGMINE METHYLSULFATE 1 MG/ML
INJECTION, SOLUTION INTRAVENOUS
Status: DISCONTINUED | OUTPATIENT
Start: 2018-08-01 | End: 2018-08-01

## 2018-08-01 RX ORDER — ACETAMINOPHEN 10 MG/ML
INJECTION, SOLUTION INTRAVENOUS
Status: DISCONTINUED | OUTPATIENT
Start: 2018-08-01 | End: 2018-08-01

## 2018-08-01 RX ORDER — CLINDAMYCIN PHOSPHATE 900 MG/50ML
900 INJECTION, SOLUTION INTRAVENOUS
Status: COMPLETED | OUTPATIENT
Start: 2018-08-01 | End: 2018-08-01

## 2018-08-01 RX ORDER — BUPIVACAINE HYDROCHLORIDE 2.5 MG/ML
INJECTION, SOLUTION EPIDURAL; INFILTRATION; INTRACAUDAL
Status: DISCONTINUED | OUTPATIENT
Start: 2018-08-01 | End: 2018-08-01 | Stop reason: HOSPADM

## 2018-08-01 RX ORDER — OXYCODONE AND ACETAMINOPHEN 10; 325 MG/1; MG/1
1 TABLET ORAL EVERY 4 HOURS PRN
Status: DISCONTINUED | OUTPATIENT
Start: 2018-08-01 | End: 2018-08-01

## 2018-08-01 RX ORDER — ONDANSETRON 2 MG/ML
INJECTION INTRAMUSCULAR; INTRAVENOUS
Status: DISCONTINUED | OUTPATIENT
Start: 2018-08-01 | End: 2018-08-01

## 2018-08-01 RX ORDER — ROCURONIUM BROMIDE 10 MG/ML
INJECTION, SOLUTION INTRAVENOUS
Status: DISCONTINUED | OUTPATIENT
Start: 2018-08-01 | End: 2018-08-01

## 2018-08-01 RX ORDER — GLYCOPYRROLATE 0.2 MG/ML
INJECTION INTRAMUSCULAR; INTRAVENOUS
Status: DISCONTINUED | OUTPATIENT
Start: 2018-08-01 | End: 2018-08-01

## 2018-08-01 RX ORDER — PROPOFOL 10 MG/ML
VIAL (ML) INTRAVENOUS
Status: DISCONTINUED | OUTPATIENT
Start: 2018-08-01 | End: 2018-08-01

## 2018-08-01 RX ADMIN — PHENYLEPHRINE HYDROCHLORIDE 100 MCG: 10 INJECTION INTRAVENOUS at 12:08

## 2018-08-01 RX ADMIN — DEXAMETHASONE SODIUM PHOSPHATE 4 MG: 4 INJECTION, SOLUTION INTRAMUSCULAR; INTRAVENOUS at 11:08

## 2018-08-01 RX ADMIN — DEXTROSE MONOHYDRATE, SODIUM CHLORIDE, AND POTASSIUM CHLORIDE: 50; 4.5; 1.49 INJECTION, SOLUTION INTRAVENOUS at 02:08

## 2018-08-01 RX ADMIN — GLYCOPYRROLATE 0.6 MG: 0.2 INJECTION, SOLUTION INTRAMUSCULAR; INTRAVENOUS at 12:08

## 2018-08-01 RX ADMIN — BACITRACIN 50000 UNITS: 50000 INJECTION, POWDER, LYOPHILIZED, FOR SOLUTION INTRAMUSCULAR at 12:08

## 2018-08-01 RX ADMIN — ATORVASTATIN CALCIUM 40 MG: 20 TABLET, FILM COATED ORAL at 02:08

## 2018-08-01 RX ADMIN — BUPIVACAINE HYDROCHLORIDE 9 ML: 2.5 INJECTION, SOLUTION EPIDURAL; INFILTRATION; INTRACAUDAL; PERINEURAL at 12:08

## 2018-08-01 RX ADMIN — PROPOFOL 150 MG: 10 INJECTION, EMULSION INTRAVENOUS at 11:08

## 2018-08-01 RX ADMIN — IRBESARTAN 75 MG: 75 TABLET ORAL at 03:08

## 2018-08-01 RX ADMIN — ROCURONIUM BROMIDE 10 MG: 10 INJECTION, SOLUTION INTRAVENOUS at 11:08

## 2018-08-01 RX ADMIN — LIDOCAINE HYDROCHLORIDE 2 MG: 10 INJECTION, SOLUTION EPIDURAL; INFILTRATION; INTRACAUDAL; PERINEURAL at 10:08

## 2018-08-01 RX ADMIN — TRAMADOL HYDROCHLORIDE 50 MG: 50 TABLET, FILM COATED ORAL at 02:08

## 2018-08-01 RX ADMIN — ONDANSETRON 4 MG: 2 INJECTION INTRAMUSCULAR; INTRAVENOUS at 11:08

## 2018-08-01 RX ADMIN — SODIUM CHLORIDE: 0.9 INJECTION, SOLUTION INTRAVENOUS at 10:08

## 2018-08-01 RX ADMIN — INSULIN ASPART 3 UNITS: 100 INJECTION, SOLUTION INTRAVENOUS; SUBCUTANEOUS at 11:08

## 2018-08-01 RX ADMIN — FENTANYL CITRATE 100 MCG: 50 INJECTION, SOLUTION INTRAMUSCULAR; INTRAVENOUS at 11:08

## 2018-08-01 RX ADMIN — SODIUM CHLORIDE, SODIUM GLUCONATE, SODIUM ACETATE, POTASSIUM CHLORIDE, MAGNESIUM CHLORIDE, SODIUM PHOSPHATE, DIBASIC, AND POTASSIUM PHOSPHATE: .53; .5; .37; .037; .03; .012; .00082 INJECTION, SOLUTION INTRAVENOUS at 11:08

## 2018-08-01 RX ADMIN — NEOSTIGMINE METHYLSULFATE 3 MG: 1 INJECTION INTRAVENOUS at 12:08

## 2018-08-01 RX ADMIN — LIDOCAINE HYDROCHLORIDE 100 MG: 20 INJECTION, SOLUTION INTRAVENOUS at 11:08

## 2018-08-01 RX ADMIN — ACETAMINOPHEN 1000 MG: 10 INJECTION, SOLUTION INTRAVENOUS at 11:08

## 2018-08-01 RX ADMIN — SUCCINYLCHOLINE CHLORIDE 60 MG: 20 INJECTION, SOLUTION INTRAMUSCULAR; INTRAVENOUS at 12:08

## 2018-08-01 RX ADMIN — EPHEDRINE SULFATE 5 MG: 50 INJECTION, SOLUTION INTRAMUSCULAR; INTRAVENOUS; SUBCUTANEOUS at 12:08

## 2018-08-01 RX ADMIN — SUCCINYLCHOLINE CHLORIDE 140 MG: 20 INJECTION, SOLUTION INTRAMUSCULAR; INTRAVENOUS at 11:08

## 2018-08-01 RX ADMIN — CLINDAMYCIN PHOSPHATE 900 MG: 18 INJECTION, SOLUTION INTRAVENOUS at 11:08

## 2018-08-01 RX ADMIN — INSULIN ASPART 6 UNITS: 100 INJECTION, SOLUTION INTRAVENOUS; SUBCUTANEOUS at 07:08

## 2018-08-01 NOTE — BRIEF OP NOTE
[facility]  Brief Operative Note     SUMMARY     Surgery Date: 8/1/2018     Surgeon(s) and Role:     * Imani Luo MD - Resident - Assisting     * Mary Jo MD - Resident - Assisting     * Arnaud Avery MD - Primary    Pre-op Diagnosis:  Left inguinal hernia [K40.90]    Post-op Diagnosis:  Post-Op Diagnosis Codes:     * Left inguinal hernia [K40.90]    Procedure(s) (LRB):  REPAIR, HERNIA, INGUINAL, INCARCERATED with MESH, INITIAL, AGE 5 YEARS OR OLDER (Left)    Anesthesia: general    Description of the findings of the procedure: large left incarcerated inguinal hernia repair, repaired with mesh (prolene 6 x 3 cm)    Findings/Key Components: please refer to dictated operative note    Estimated Blood Loss: minimal          Specimens:   Specimen (12h ago through future)    None

## 2018-08-01 NOTE — ANESTHESIA POSTPROCEDURE EVALUATION
"Anesthesia Post Evaluation    Patient: Anjum Cai     Procedure(s) Performed: Procedure(s) (LRB):  REPAIR, HERNIA, INGUINAL, INCARCERATED with MESH, INITIAL, AGE 5 YEARS OR OLDER (Left)    Final Anesthesia Type: general  Patient location during evaluation: PACU  Patient participation: Yes- Able to Participate  Level of consciousness: awake and alert  Post-procedure vital signs: reviewed and stable  Pain management: adequate  Airway patency: patent  PONV status at discharge: No PONV  Anesthetic complications: no      Cardiovascular status: blood pressure returned to baseline  Respiratory status: unassisted  Hydration status: euvolemic  Follow-up not needed.        Visit Vitals  /63   Pulse 66   Temp 36.4 °C (97.5 °F) (Temporal)   Resp 19   Ht 5' 6.5" (1.689 m)   Wt 92.5 kg (204 lb)   SpO2 99%   BMI 32.43 kg/m²       Pain/Renetta Score: Pain Assessment Performed: Yes (8/1/2018  1:15 PM)  Presence of Pain: denies (8/1/2018  1:15 PM)  Pain Rating Prior to Med Admin: 3 (8/1/2018  2:29 PM)  Renetta Score: 8 (8/1/2018  1:15 PM)      "

## 2018-08-01 NOTE — BRIEF OP NOTE
Operative Note       Surgery Date: 8/1/2018     Surgeon(s) and Role:     * Imani Luo MD - Resident - Assisting     * Mary Jo MD - Resident - Assisting     * Arnaud Avery MD - Primary    Pre-op Diagnosis:  Left inguinal hernia [K40.90]    Post-op Diagnosis:  Left inguinal hernia [K40.90]    Procedure(s) (LRB):  REPAIR, HERNIA, INGUINAL, INCARCERATED with MESH, INITIAL, AGE 5 YEARS OR OLDER (Left)    Anesthesia: * No anesthesia type entered *    Procedure in Detail/Findings:  Scrotal, incarcerated lih with indirect and lipoma of cord contents.    Estimated Blood Loss: Minimal           Specimens     None        Implants:   Implant Name Type Inv. Item Serial No.  Lot No. LRB No. Used   MESH PROLENE [id number] 6/BX - [id number]   MESH PROLENE [id number] 6/BX   [employer] [id number] Left 1              Disposition: PACU - hemodynamically stable.           Condition: Good    Attestation:  I was present and scrubbed for the entire procedure.

## 2018-08-01 NOTE — TRANSFER OF CARE
"Anesthesia Transfer of Care Note    Patient: Anjum Cai JrApril    Procedure(s) Performed: Procedure(s) (LRB):  REPAIR, HERNIA, INGUINAL, INCARCERATED with MESH, INITIAL, AGE 5 YEARS OR OLDER (Left)    Patient location: PACU    Transport from OR: Transported from OR on 6-10 L/min O2 by face mask with adequate spontaneous ventilation    Post pain: adequate analgesia    Post assessment: no apparent anesthetic complications    Post vital signs: stable    Level of consciousness: awake and alert    Nausea/Vomiting: no nausea/vomiting    Complications: none    Transfer of care protocol was followed      Last vitals:   Visit Vitals  BP (!) 161/67 (BP Location: Left arm, Patient Position: Lying)   Pulse 72   Temp 36.4 °C (97.5 °F) (Temporal)   Resp 16   Ht 5' 6.5" (1.689 m)   Wt 92.5 kg (204 lb)   SpO2 100%   BMI 32.43 kg/m²     "

## 2018-08-01 NOTE — OP NOTE
[facility]  Operative Note    DATE OF PROCEDURE: 08/01/2018     Surgeon(s) and Role:     * Imani Luo MD - Resident - Assisting     * Mary Jo MD - Resident - Assisting     * Arnaud Avery MD - Primary    PREOPERATIVE DIAGNOSIS: left inguinal hernia, incarcerated    POSTOPERATIVE DIAGNOSIS: left indirect inguinal hernia, incarcerated    PROCEDURE PERFORMED: left inguinal hernia repair with mesh    ANESTHESIA: General Anesthesia    FINDINGS: large-sized left incarcerated indirect inguinal hernia repaired with prolene mesh    SPECIMEN: none     DRAINS: None    COMPLICATIONS: None    INDICATIONS: Anjum Cai Jr. is a 82 y.o.male referred to General Surgery Clinic with a history of a symptomatic, non-reducible inguinal bulge. The history and exam were consistent with inguinal hernia. We recommended an open inguinal hernia repair with mesh and the patient agreed to proceed. The patient signed informed consent and expressed understanding of the risks and benefits of surgery.     DESCRIPTION OF OPERATIVE PROCEDURE: The patient was identified in preoperative holding and brought back to the Operating Room. The patient was placed supine on the operating table and padded appropriately. Monitors were applied and monitored anesthesia care was initiated. His left groin was shaved with electric clippers and prepped and draped in the standard sterile surgical fashion. A timeout was performed and all team members present agreed this was the correct procedure on the correct side of the correct patient. We also confirmed administration of appropriate preoperative antibiotics.     We marked out an appropriate 6 cm horizontal inguinal incision and administered a mix of lidocaine and Marcaine. We also did a TEP block, 2 inches After assuring adequate local anesthesia, a 7 cm oblique skin incision was made. Subcutaneous tissue was divided with Bovie electrocautery. This dissection was  carried down through Maddie fascia down to the aponeurosis of the external oblique. The aponeurosis of the external oblique was incised in line with its fibers, first with a scalpel and then Metzenbaum scissors, and this incision was extended to the external spermatic ring. The inguinal canal contents were bluntly encircled, first digitally and then with a Penrose drain. Avery retractors were placed to facilitate the dissection. We proceeded to identify a large indirect inguinal hernia sac, which was dissected away from the inguinal canal contents until it could be reduced into the defect. There was no direct hernia identified on inspection. We then had appropriate borders of the inguinal canal identified and decided to repair the defect with a piece of prolene mesh. The mesh was cut to fit the defect appropriately and sewn in place with 2 interrupted 2-0 Prolene suture to the pubic tubercle. Inferiorly, the mesh was anchored to the shelving edge of the inguinal ligament. Superiorly, the mesh was anchored to the conjoined tendon. The tails of the mesh were brought together around the cord structures creating a new internal ring that just admitted the tip of the finger. This was secured with interrupted 2-0 prolene suture. The mesh was inspected and noted to lie in appropriate position without any redundancy or tension. The testicle was pulled back down to the scrotum and there was noted to be no tension on the cord structures. The aponeurosis of the external oblique was closed with a running 3-0 Vicryl suture. Maddie fascia was closed with running 3-0 Vicryl suture and the skin was closed with a running 4-0 subcuticular Monocryl suture. A sterile dressing was applied. The patient was then transported to the Recovery Room in stable Condition. Dr. Avery was present and scrubbed for the entirety of the case.     SPONGE COUNT: Correct.     BLOOD LOSS: 10 mL    FLUIDS: Per Anesthesia    BLOOD GIVEN: None      CONDITION OF PATIENT: Good.

## 2018-08-01 NOTE — NURSING TRANSFER
Nursing Transfer Note      8/1/2018     Transfer To: 1150    Transfer via stretcher    Transfer with cardiac monitoring    Transported by PCT    Medicines sent: IVF    Chart send with patient: Yes    Notified: daughter    Patient reassessed at: 8/1/18, 1530    Upon arrival to floor: cardiac monitor applied, patient oriented to room, call bell in reach and bed in lowest position

## 2018-08-02 VITALS
DIASTOLIC BLOOD PRESSURE: 63 MMHG | HEIGHT: 67 IN | HEART RATE: 89 BPM | OXYGEN SATURATION: 99 % | WEIGHT: 204 LBS | RESPIRATION RATE: 18 BRPM | SYSTOLIC BLOOD PRESSURE: 144 MMHG | TEMPERATURE: 98 F | BODY MASS INDEX: 32.02 KG/M2

## 2018-08-02 PROBLEM — K40.90 INGUINAL HERNIA: Status: ACTIVE | Noted: 2018-08-02

## 2018-08-02 LAB
ANION GAP SERPL CALC-SCNC: 7 MMOL/L
BASOPHILS # BLD AUTO: 0.03 K/UL
BASOPHILS NFR BLD: 0.3 %
BUN SERPL-MCNC: 22 MG/DL
CALCIUM SERPL-MCNC: 9.1 MG/DL
CHLORIDE SERPL-SCNC: 102 MMOL/L
CO2 SERPL-SCNC: 26 MMOL/L
CREAT SERPL-MCNC: 1.3 MG/DL
DIFFERENTIAL METHOD: ABNORMAL
EOSINOPHIL # BLD AUTO: 0 K/UL
EOSINOPHIL NFR BLD: 0.2 %
ERYTHROCYTE [DISTWIDTH] IN BLOOD BY AUTOMATED COUNT: 15.9 %
EST. GFR  (AFRICAN AMERICAN): 59.6 ML/MIN/1.73 M^2
EST. GFR  (NON AFRICAN AMERICAN): 51.5 ML/MIN/1.73 M^2
GLUCOSE SERPL-MCNC: 261 MG/DL
HCT VFR BLD AUTO: 34.2 %
HGB BLD-MCNC: 11.3 G/DL
IMM GRANULOCYTES # BLD AUTO: 0.03 K/UL
IMM GRANULOCYTES NFR BLD AUTO: 0.3 %
LYMPHOCYTES # BLD AUTO: 1.5 K/UL
LYMPHOCYTES NFR BLD: 12.8 %
MAGNESIUM SERPL-MCNC: 1.6 MG/DL
MCH RBC QN AUTO: 27.2 PG
MCHC RBC AUTO-ENTMCNC: 33 G/DL
MCV RBC AUTO: 82 FL
MONOCYTES # BLD AUTO: 0.9 K/UL
MONOCYTES NFR BLD: 7.5 %
NEUTROPHILS # BLD AUTO: 9.1 K/UL
NEUTROPHILS NFR BLD: 78.9 %
NRBC BLD-RTO: 0 /100 WBC
PHOSPHATE SERPL-MCNC: 2.8 MG/DL
PLATELET # BLD AUTO: 168 K/UL
PMV BLD AUTO: 11.5 FL
POCT GLUCOSE: 232 MG/DL (ref 70–110)
POCT GLUCOSE: 255 MG/DL (ref 70–110)
POCT GLUCOSE: 270 MG/DL (ref 70–110)
POCT GLUCOSE: 270 MG/DL (ref 70–110)
POCT GLUCOSE: 363 MG/DL (ref 70–110)
POCT GLUCOSE: 374 MG/DL (ref 70–110)
POTASSIUM SERPL-SCNC: 4.7 MMOL/L
RBC # BLD AUTO: 4.16 M/UL
SODIUM SERPL-SCNC: 135 MMOL/L
TROPONIN I SERPL DL<=0.01 NG/ML-MCNC: 0.01 NG/ML
WBC # BLD AUTO: 11.53 K/UL

## 2018-08-02 PROCEDURE — 93005 ELECTROCARDIOGRAM TRACING: CPT

## 2018-08-02 PROCEDURE — 25000003 PHARM REV CODE 250: Performed by: STUDENT IN AN ORGANIZED HEALTH CARE EDUCATION/TRAINING PROGRAM

## 2018-08-02 PROCEDURE — 84484 ASSAY OF TROPONIN QUANT: CPT

## 2018-08-02 PROCEDURE — 84100 ASSAY OF PHOSPHORUS: CPT

## 2018-08-02 PROCEDURE — 63600175 PHARM REV CODE 636 W HCPCS: Performed by: STUDENT IN AN ORGANIZED HEALTH CARE EDUCATION/TRAINING PROGRAM

## 2018-08-02 PROCEDURE — 85025 COMPLETE CBC W/AUTO DIFF WBC: CPT

## 2018-08-02 PROCEDURE — 93010 ELECTROCARDIOGRAM REPORT: CPT | Mod: ,,, | Performed by: INTERNAL MEDICINE

## 2018-08-02 PROCEDURE — 83735 ASSAY OF MAGNESIUM: CPT

## 2018-08-02 PROCEDURE — 82962 GLUCOSE BLOOD TEST: CPT | Mod: 91 | Performed by: SURGERY

## 2018-08-02 PROCEDURE — 36415 COLL VENOUS BLD VENIPUNCTURE: CPT

## 2018-08-02 PROCEDURE — 94761 N-INVAS EAR/PLS OXIMETRY MLT: CPT

## 2018-08-02 PROCEDURE — 80048 BASIC METABOLIC PNL TOTAL CA: CPT

## 2018-08-02 PROCEDURE — G0378 HOSPITAL OBSERVATION PER HR: HCPCS

## 2018-08-02 RX ORDER — LANOLIN ALCOHOL/MO/W.PET/CERES
800 CREAM (GRAM) TOPICAL ONCE
Status: COMPLETED | OUTPATIENT
Start: 2018-08-02 | End: 2018-08-02

## 2018-08-02 RX ORDER — SODIUM CHLORIDE 9 MG/ML
INJECTION, SOLUTION INTRAVENOUS CONTINUOUS
Status: DISCONTINUED | OUTPATIENT
Start: 2018-08-02 | End: 2018-08-02 | Stop reason: HOSPADM

## 2018-08-02 RX ORDER — TRAMADOL HYDROCHLORIDE 50 MG/1
50 TABLET ORAL EVERY 6 HOURS PRN
Qty: 20 TABLET | Refills: 0 | Status: SHIPPED | OUTPATIENT
Start: 2018-08-02 | End: 2018-08-12

## 2018-08-02 RX ORDER — TRAMADOL HYDROCHLORIDE 50 MG/1
50 TABLET ORAL EVERY 6 HOURS PRN
Qty: 20 TABLET | Refills: 0 | Status: SHIPPED | OUTPATIENT
Start: 2018-08-02 | End: 2018-08-02

## 2018-08-02 RX ORDER — INSULIN ASPART 100 [IU]/ML
12 INJECTION, SOLUTION INTRAVENOUS; SUBCUTANEOUS
Status: DISCONTINUED | OUTPATIENT
Start: 2018-08-02 | End: 2018-08-02 | Stop reason: HOSPADM

## 2018-08-02 RX ORDER — SODIUM,POTASSIUM PHOSPHATES 280-250MG
1 POWDER IN PACKET (EA) ORAL ONCE
Status: COMPLETED | OUTPATIENT
Start: 2018-08-02 | End: 2018-08-02

## 2018-08-02 RX ORDER — GUAIFENESIN/DEXTROMETHORPHAN 100-10MG/5
5 SYRUP ORAL EVERY 6 HOURS PRN
Status: DISCONTINUED | OUTPATIENT
Start: 2018-08-02 | End: 2018-08-02 | Stop reason: HOSPADM

## 2018-08-02 RX ADMIN — INSULIN ASPART 2 UNITS: 100 INJECTION, SOLUTION INTRAVENOUS; SUBCUTANEOUS at 02:08

## 2018-08-02 RX ADMIN — FINASTERIDE 5 MG: 5 TABLET, FILM COATED ORAL at 08:08

## 2018-08-02 RX ADMIN — ATORVASTATIN CALCIUM 40 MG: 20 TABLET, FILM COATED ORAL at 08:08

## 2018-08-02 RX ADMIN — INSULIN ASPART 8 UNITS: 100 INJECTION, SOLUTION INTRAVENOUS; SUBCUTANEOUS at 10:08

## 2018-08-02 RX ADMIN — INSULIN ASPART 12 UNITS: 100 INJECTION, SOLUTION INTRAVENOUS; SUBCUTANEOUS at 12:08

## 2018-08-02 RX ADMIN — GUAIFENESIN AND DEXTROMETHORPHAN 5 ML: 100; 10 SYRUP ORAL at 10:08

## 2018-08-02 RX ADMIN — TRAMADOL HYDROCHLORIDE 50 MG: 50 TABLET, FILM COATED ORAL at 02:08

## 2018-08-02 RX ADMIN — PANTOPRAZOLE SODIUM 40 MG: 40 TABLET, DELAYED RELEASE ORAL at 08:08

## 2018-08-02 RX ADMIN — INSULIN DETEMIR 20 UNITS: 100 INJECTION, SOLUTION SUBCUTANEOUS at 12:08

## 2018-08-02 RX ADMIN — TRAMADOL HYDROCHLORIDE 50 MG: 50 TABLET, FILM COATED ORAL at 10:08

## 2018-08-02 RX ADMIN — SODIUM CHLORIDE: 0.9 INJECTION, SOLUTION INTRAVENOUS at 09:08

## 2018-08-02 RX ADMIN — IRBESARTAN 75 MG: 75 TABLET ORAL at 07:08

## 2018-08-02 RX ADMIN — POTASSIUM & SODIUM PHOSPHATES POWDER PACK 280-160-250 MG 1 PACKET: 280-160-250 PACK at 09:08

## 2018-08-02 RX ADMIN — MAGNESIUM OXIDE TAB 400 MG (241.3 MG ELEMENTAL MG) 800 MG: 400 (241.3 MG) TAB at 09:08

## 2018-08-02 RX ADMIN — GUAIFENESIN AND DEXTROMETHORPHAN 5 ML: 100; 10 SYRUP ORAL at 12:08

## 2018-08-02 NOTE — PLAN OF CARE
08/02/18 1530   Final Note   Assessment Type Final Discharge Note   Discharge Disposition Home   What phone number can be called within the next 1-3 days to see how you are doing after discharge? ([phone])   Hospital Follow Up  Appt(s) scheduled? Yes   Discharge plans and expectations educations in teach back method with documentation complete? Yes   Right Care Referral Info   Post Acute Recommendation No Care

## 2018-08-02 NOTE — PLAN OF CARE
(Patient was in OBS LOC earlier.)    Patient lives alone in a 1 story house. Daughter is at BS. Patient is dressed in his clothes & cheerful. No needs determined.     Patient declined [employer] Packet due to he reports he got one w/previous surgery.        18 1105   Discharge Assessment   Assessment Type Discharge Planning Assessment   Confirmed/corrected address and phone number on facesheet? Yes   Assessment information obtained from? Patient;Medical Record   Expected Length of Stay (days) (1)   Communicated expected length of stay with patient/caregiver yes   Prior to hospitilization cognitive status: Alert/Oriented;No Deficits   Prior to hospitalization functional status: Independent;Assistive Equipment   Current cognitive status: Alert/Oriented;No Deficits   Current Functional Status: Independent;Needs Assistance   Facility Arrived From: (N/A)   Lives With alone   Able to Return to Prior Arrangements yes   Is patient able to care for self after discharge? Yes   Who are your caregiver(s) and their phone number(s)? (Maria Elena Cai Daughter     [phone]. Daughter is in town to care for him , as he needs, until 18. )   Patient's perception of discharge disposition home or selfcare   Readmission Within The Last 30 Days no previous admission in last 30 days   Patient currently being followed by outpatient case management? No   Patient currently receives any other outside agency services? No   Equipment Currently Used at Home CPAP  ((Is not using/Has  spouse's DME available should he need it: scooter, straight cane, rollator.))   Do you have any problems affording any of your prescribed medications? No   Is the patient taking medications as prescribed? yes   Does the patient have transportation home? Yes   Transportation Available car;family or friend will provide   Dialysis Name and Scheduled days (N/A)   Does the patient receive services at the Coumadin Clinic? No   Discharge Plan A  Home with family   Discharge Plan B Home with family   Patient/Family In Agreement With Plan yes

## 2018-08-02 NOTE — HOSPITAL COURSE
Mr. Cai is an 82 year old male presenting with a symptomatic left inguinal hernia now s/p repair with mesh placement on 8/1. He tolerated the procedure well with no acute events while monitored on telemetry for 23 hour observation, tolerated a diabetic diet, voided spontaneously with pain controlled with oral medications. He will follow-up with Dr. Avery in outpatient clinic in 2 weeks for a wound check.

## 2018-08-02 NOTE — PROGRESS NOTES
[facility]  General Surgery  Progress Note    Subjective:     History of Present Illness:  No notes on file    Post-Op Info:  Procedure(s) (LRB):  REPAIR, HERNIA, INGUINAL, INCARCERATED with MESH, INITIAL, AGE 5 YEARS OR OLDER (Left)   1 Day Post-Op     Interval History: Noted elevated blood glucoses overnight while holding home regimen of lantus 65, advancement to regular diet, no noted abnormalities or hemodynamic instability while on telemetry.     Medications:  Continuous Infusions:   dextrose 5 % and 0.45 % NaCl with KCl 20 mEq 75 mL/hr at 08/02/18 0556     Scheduled Meds:   atorvastatin  40 mg Oral Daily    enoxaparin  40 mg Subcutaneous Daily    finasteride  5 mg Oral Daily    insulin detemir U-100  20 Units Subcutaneous QHS    irbesartan  75 mg Oral QAM    pantoprazole  40 mg Oral Daily     PRN Meds:benzocaine, dextromethorphan-guaifenesin  mg/5 ml, dextrose 50%, glucagon (human recombinant), HYDROcodone-acetaminophen, insulin aspart U-100, traMADol     Review of patient's allergies indicates:   Allergen Reactions    Ace inhibitors      Other reaction(s): cough    Cefadroxil      Other reaction(s): possible vasculitis  Other reaction(s): vasculitis     Objective:     Vital Signs (Most Recent):  Temp: 98 °F (36.7 °C) (08/02/18 0432)  Pulse: 88 (08/02/18 0432)  Resp: 20 (08/02/18 0432)  BP: 134/60 (08/02/18 0432)  SpO2: 100 % (08/02/18 0432) Vital Signs (24h Range):  Temp:  [97.5 °F (36.4 °C)-98.2 °F (36.8 °C)] 98 °F (36.7 °C)  Pulse:  [60-91] 88  Resp:  [10-20] 20  SpO2:  [93 %-100 %] 100 %  BP: (123-161)/(57-68) 134/60     Weight: 92.5 kg (204 lb)  Body mass index is 32.43 kg/m².    Intake/Output - Last 3 Shifts       07/31 0700 - 08/01 0659 08/01 0700 - 08/02 0659 08/02 0700 - 08/03 0659    P.O.  120     I.V. (mL/kg)  1413 (15.3)     Total Intake(mL/kg)  1533 (16.6)     Urine (mL/kg/hr)  100     Total Output   100      Net   +1433                   Physical Exam    Constitutional: He appears well-developed and well-nourished.   Cardiovascular: Normal rate.    Pulmonary/Chest: Effort normal.   On room air sating well   Abdominal: Soft.   Protuberant with lower midline right transverse incision dressing dry, able to tolerate palpation    Neurological: He is alert.   Skin: Skin is warm and dry.           Significant Labs:  CBC: No results for input(s): WBC, RBC, HGB, HCT, PLT, MCV, MCH, MCHC in the last 168 hours.  BMP: No results for input(s): GLU, NA, K, CL, CO2, BUN, CREATININE, CALCIUM, MG in the last 168 hours.      Assessment/Plan:   Mr. Cai is an 82 year old male s/p left inguinal hernia repair on 8/1 now recovering in stable condition while on the floor.     - prn pain and nausea control with PO meds   - RA as tolerated   - continuous telemetry  - adult diabetic diet   - restart home medications   - SSI, will resume home regimen upon discharge home   - saul-op abx  - Plov   - SCDs, OOB, ICS    Dispo: d/c home today pending pain control with PO meds     Imani Luo MD  General Surgery  [facility]

## 2018-08-02 NOTE — ASSESSMENT & PLAN NOTE
Mr. Cai is an 82 year old male s/p left inguinal hernia repair on 8/1 now recovering in stable condition while on the floor.     - prn pain and nausea control with PO meds   - RA as tolerated   - continuous telemetry  - adult diabetic diet   - restart home medications   - SSI, will resume home regimen upon discharge home   - saul-op abx  - Plov   - SCDs, OOB, ICS    Dispo: d/c home today pending pain control with PO meds

## 2018-08-02 NOTE — SUBJECTIVE & OBJECTIVE
Interval History: Noted elevated blood glucoses overnight while holding home regimen of lantus 65, advancement to regular diet, no noted abnormalities or hemodynamic instability while on telemetry.     Medications:  Continuous Infusions:   dextrose 5 % and 0.45 % NaCl with KCl 20 mEq 75 mL/hr at 08/02/18 0556     Scheduled Meds:   atorvastatin  40 mg Oral Daily    enoxaparin  40 mg Subcutaneous Daily    finasteride  5 mg Oral Daily    insulin detemir U-100  20 Units Subcutaneous QHS    irbesartan  75 mg Oral QAM    pantoprazole  40 mg Oral Daily     PRN Meds:benzocaine, dextromethorphan-guaifenesin  mg/5 ml, dextrose 50%, glucagon (human recombinant), HYDROcodone-acetaminophen, insulin aspart U-100, traMADol     Review of patient's allergies indicates:   Allergen Reactions    Ace inhibitors      Other reaction(s): cough    Cefadroxil      Other reaction(s): possible vasculitis  Other reaction(s): vasculitis     Objective:     Vital Signs (Most Recent):  Temp: 98 °F (36.7 °C) (08/02/18 0432)  Pulse: 88 (08/02/18 0432)  Resp: 20 (08/02/18 0432)  BP: 134/60 (08/02/18 0432)  SpO2: 100 % (08/02/18 0432) Vital Signs (24h Range):  Temp:  [97.5 °F (36.4 °C)-98.2 °F (36.8 °C)] 98 °F (36.7 °C)  Pulse:  [60-91] 88  Resp:  [10-20] 20  SpO2:  [93 %-100 %] 100 %  BP: (123-161)/(57-68) 134/60     Weight: 92.5 kg (204 lb)  Body mass index is 32.43 kg/m².    Intake/Output - Last 3 Shifts       07/31 0700 - 08/01 0659 08/01 0700 - 08/02 0659 08/02 0700 - 08/03 0659    P.O.  120     I.V. (mL/kg)  1413 (15.3)     Total Intake(mL/kg)  1533 (16.6)     Urine (mL/kg/hr)  100     Total Output   100      Net   +1433                   Physical Exam   Constitutional: He appears well-developed and well-nourished.   Cardiovascular: Normal rate.    Pulmonary/Chest: Effort normal.   On room air sating well   Abdominal: Soft.   Protuberant with lower midline right transverse incision dressing dry, able to tolerate palpation     Neurological: He is alert.   Skin: Skin is warm and dry.           Significant Labs:  CBC: No results for input(s): WBC, RBC, HGB, HCT, PLT, MCV, MCH, MCHC in the last 168 hours.  BMP: No results for input(s): GLU, NA, K, CL, CO2, BUN, CREATININE, CALCIUM, MG in the last 168 hours.

## 2018-08-02 NOTE — NURSING
Called to pts room as he says he feels he is about to pass out.  Pt is sitting in chair.  Daughter at bedside.  Pt is pale, diaphoretic and HR is 50.  Put pt back in bed and laid flat.  Pts HR came up to 75.  Pt dtr took blood sugar with her monitor and was 246.  Will check again and chart results.  Pt says he feels better and thinks it is because he has not slept in 2 days and has not been eating.  Will speak to MD and continue to monitor.

## 2018-08-02 NOTE — DISCHARGE SUMMARY
[facility]  General Surgery  Discharge Summary      Patient Name: Anjum Cai Jr.  MRN: [id number]  Admission Date: 8/1/2018  Hospital Length of Stay: 1 days  Discharge Date and Time:  08/02/2018 7:56 AM  Attending Physician: Arnaud Avery MD   Discharging Provider: Imani Luo MD  Primary Care Provider: Gavin Martinez MD    HPI:   Here today for large lih.  He is s/p removal gb for severe cholecystitis and readmit for sepsis from this and is s/p rih.  He has localized pain in his left groin with standing and this improves with rest.    Procedure(s) (LRB):  REPAIR, HERNIA, INGUINAL, INCARCERATED with MESH, INITIAL, AGE 5 YEARS OR OLDER (Left)      Indwelling Lines/Drains at time of discharge:   Lines/Drains/Airways          No matching active lines, drains, or airways        Hospital Course: Mr. Cai is an 82 year old male presenting with a symptomatic left inguinal hernia now s/p repair with mesh placement on 8/1. He tolerated the procedure well with no acute events while monitored on telemetry for 23 hour observation, tolerated a diabetic diet, voided spontaneously with pain controlled with oral medications. He will follow-up with Dr. Avery in outpatient clinic in 2 weeks for a wound check.     Consults: none       Pending Diagnostic Studies:     Procedure Component Value Units Date/Time    Basic metabolic panel [[id number]] Collected:  08/02/18 0709    Order Status:  Sent Lab Status:  In process Updated:  08/02/18 0730    Specimen:  Blood from Blood     Magnesium [[id number]] Collected:  08/02/18 0709    Order Status:  Sent Lab Status:  In process Updated:  08/02/18 0730    Specimen:  Blood from Blood     Phosphorus [[id number]] Collected:  08/02/18 0709    Order Status:  Sent Lab Status:  In process Updated:  08/02/18 0730    Specimen:  Blood from Blood         Final Active Diagnoses:    Diagnosis Date Noted POA    Inguinal hernia [K40.90] 08/02/2018 Yes      Problems  Resolved During this Admission:    Diagnosis Date Noted Date Resolved POA    PRINCIPAL PROBLEM:  Inguinal hernia [K40.90] 08/01/2018 08/01/2018 Yes      Discharged Condition: stable    Disposition: Home or Self Care    Follow Up:  Follow-up Information     Arnaud Avery MD In 2 weeks.    Specialties:  General Surgery, Bariatrics  Contact information:  [address]  [facility] [address]  [phone]                 Patient Instructions:     Diet diabetic     Other restrictions (specify):   Order Comments: No driving while still taking pain medications daily.   Take a stool softener while taking pain medications daily.   No lifting more than 10 lbs for 6 weeks.   Ok to shower in 24 hours and leave steristrips in place until your follow-up appointment in 2 weeks  Follow a diabetic diet     Notify your health care provider if you experience any of the following:  temperature >100.4     Notify your health care provider if you experience any of the following:  persistent nausea and vomiting or diarrhea     Notify your health care provider if you experience any of the following:  severe uncontrolled pain     Notify your health care provider if you experience any of the following:  redness, tenderness, or signs of infection (pain, swelling, redness, odor or green/yellow discharge around incision site)     Notify your health care provider if you experience any of the following:  difficulty breathing or increased cough     Weight bearing restrictions (specify):   Order Comments: No driving while still taking pain medications daily.   Take a stool softener while taking pain medications daily.   No lifting more than 10 lbs for 6 weeks.   Ok to shower in 24 hours, please leave steristrips in place   Follow a diabetic diet as per home regimen       Medications:  Reconciled Home Medications:      Medication List      START taking these medications    traMADol 50 mg tablet  Commonly known as:  ULTRAM  Take 1 tablet  "(50 mg total) by mouth every 6 (six) hours as needed.        CHANGE how you take these medications    irbesartan 75 MG tablet  Commonly known as:  AVAPRO  Take 1 tablet (75 mg total) by mouth once daily.  What changed:  when to take this        CONTINUE taking these medications    ACCU-CHEK SMARTVIEW TEST STRIP Strp  Generic drug:  blood sugar diagnostic  1 strip by Misc.(Non-Drug; Combo Route) route 3 (three) times daily before meals.     ASPIR-81 ORAL  Take 1 tablet by mouth Daily.     atorvastatin 40 MG tablet  Commonly known as:  LIPITOR  Take 1 tablet (40 mg total) by mouth once daily.     BD INSULIN SYRINGE ULTRA-FINE 1 mL 30 gauge x 1/2" Syrg  Generic drug:  insulin syringe-needle U-100  USE AS DIRECTED FOUR TIMES DAILY     BD ULTRA-FINE MINI PEN NEEDLE 31 gauge x 3/16" Ndle  Generic drug:  pen needle, diabetic  USE WITH INSULIN INJECTION FOUR TIMES DAILY     blood sugar diagnostic, drum Strp  Commonly known as:  ACCU-CHEK COMPACT TEST  CHECK BLOOD GLUCOSE 4 TIMES DAILY     CENTRUM SILVER ORAL  Take by mouth once daily.     diclofenac sodium 1 % Gel  Commonly known as:  VOLTAREN  Apply 2 g topically 3 (three) times daily.     finasteride 5 mg tablet  Commonly known as:  PROSCAR  Take 1 tablet (5 mg total) by mouth once daily.     FISH OIL 1,000 mg Cap  Generic drug:  omega-3 fatty acids-vitamin E  Take 2 capsules by mouth Daily.     insulin glargine 100 unit/mL (3 mL) Inpn pen  Commonly known as:  LANTUS SOLOSTAR U-100 INSULIN  inject 65 units subcutaneously every evening     metoprolol succinate 50 MG 24 hr tablet  Commonly known as:  TOPROL-XL  TAKE ONE TABLET BY MOUTH ONE TIME DAILY     nitroGLYCERIN 0.4 MG SL tablet  Commonly known as:  NITROSTAT  Place 1 tablet (0.4 mg total) under the tongue every 5 (five) minutes as needed for Chest pain.     NovoLOG Flexpen U-100 Insulin 100 unit/mL Inpn pen  Generic drug:  insulin aspart U-100  INJECT 12 UNITS SUBCUTANEOUSLY 3 TIMES DAILY WITH MEALS; SLIDING SCALE " 12U:100-200,14U:201-250,16U:251-300,18U:301-350     omeprazole 20 MG capsule  Commonly known as:  PRILOSEC  Take 1 capsule (20 mg total) by mouth 2 (two) times daily before meals.     VITAMIN D2 50,000 unit Cap  Generic drug:  ergocalciferol  TAKE 1 CAPSULE BY MOUTH EVERY 30 DAYS        STOP taking these medications    HYDROcodone-acetaminophen 5-325 mg per tablet  Commonly known as:  NORCO          Time spent on the discharge of patient: 6 minutes    Imani Luo MD  General Surgery  [facility]

## 2018-08-02 NOTE — NURSING
Discharge instructions given to pt and dtr .   Pt verbalized understanding regarding post-op instructions and restrictions.  Medications discussed as well as follow up appointments.   Discharged and brought to parking garage by pt transport.

## 2018-08-02 NOTE — HPI
Here today for large lih.  He is s/p removal gb for severe cholecystitis and readmit for sepsis from this and is s/p rih.  He has localized pain in his left groin with standing and this improves with rest.

## 2018-08-14 ENCOUNTER — TELEPHONE (OUTPATIENT)
Dept: CARDIOLOGY | Facility: CLINIC | Age: 83
End: 2018-08-14

## 2018-08-14 DIAGNOSIS — R00.2 PALPITATIONS: Primary | ICD-10-CM

## 2018-08-15 ENCOUNTER — OFFICE VISIT (OUTPATIENT)
Dept: CARDIOLOGY | Facility: CLINIC | Age: 83
End: 2018-08-15
Payer: MEDICARE

## 2018-08-15 VITALS
HEART RATE: 70 BPM | DIASTOLIC BLOOD PRESSURE: 50 MMHG | HEIGHT: 67 IN | SYSTOLIC BLOOD PRESSURE: 104 MMHG | WEIGHT: 203.94 LBS | BODY MASS INDEX: 32.01 KG/M2

## 2018-08-15 DIAGNOSIS — R09.89 RIGHT CAROTID BRUIT: ICD-10-CM

## 2018-08-15 DIAGNOSIS — I87.2 VENOUS INSUFFICIENCY: ICD-10-CM

## 2018-08-15 DIAGNOSIS — R60.0 BILATERAL LEG EDEMA: ICD-10-CM

## 2018-08-15 DIAGNOSIS — I25.10 CORONARY ARTERY DISEASE INVOLVING NATIVE CORONARY ARTERY OF NATIVE HEART WITHOUT ANGINA PECTORIS: Primary | ICD-10-CM

## 2018-08-15 DIAGNOSIS — E78.00 PURE HYPERCHOLESTEROLEMIA: ICD-10-CM

## 2018-08-15 DIAGNOSIS — N18.30 KIDNEY DISEASE, CHRONIC, STAGE III (GFR 30-59 ML/MIN): ICD-10-CM

## 2018-08-15 DIAGNOSIS — I10 ESSENTIAL HYPERTENSION: ICD-10-CM

## 2018-08-15 DIAGNOSIS — E11.9 TYPE 2 DIABETES MELLITUS WITHOUT RETINOPATHY: ICD-10-CM

## 2018-08-15 PROCEDURE — 99999 PR PBB SHADOW E&M-EST. PATIENT-LVL IV: CPT | Mod: PBBFAC,,, | Performed by: INTERNAL MEDICINE

## 2018-08-15 PROCEDURE — 3078F DIAST BP <80 MM HG: CPT | Mod: CPTII,S$GLB,, | Performed by: INTERNAL MEDICINE

## 2018-08-15 PROCEDURE — 3074F SYST BP LT 130 MM HG: CPT | Mod: CPTII,S$GLB,, | Performed by: INTERNAL MEDICINE

## 2018-08-15 PROCEDURE — 99214 OFFICE O/P EST MOD 30 MIN: CPT | Mod: S$GLB,,, | Performed by: INTERNAL MEDICINE

## 2018-08-15 NOTE — PROGRESS NOTES
Subjective:    Patient ID:  Anjum aCi Jr. is a 82 y.o. male who presents for follow-up of Coronary Artery Disease (8 month f/u )      HPI     Mr. Anjum Cai Jr. is a 82 y.o. male with hx of CAD s/p PCI to LAD and RCA x 2 (2007), HTN, HLD, DM2, DAMIAN and CKD3 who presented 12/9/16 to [facility] ED with an episode of chest pain. He was taken to Cath Lab 12/9/16. Ostial OM2/D1 lesions noted in LHC. No intervention due to small size of vessels. He is post op left hernia repair 8/1/18 and cholecystectomy last Feburary.He is doing well and reports no chest pain or MYERS.  Lab Results   Component Value Date     (L) 08/02/2018    K 4.7 08/02/2018     08/02/2018    CO2 26 08/02/2018    BUN 22 08/02/2018    CREATININE 1.3 08/02/2018     (H) 08/02/2018    HGBA1C 8.2 (H) 08/01/2018    MG 1.6 08/02/2018    AST 25 02/27/2018    ALT 39 02/27/2018    ALBUMIN 2.3 (L) 02/27/2018    PROT 6.7 02/27/2018    BILITOT 0.5 02/27/2018    WBC 11.53 08/02/2018    HGB 11.3 (L) 08/02/2018    HCT 34.2 (L) 08/02/2018    MCV 82 08/02/2018     08/02/2018    INR 1.0 02/24/2018    PSA 10.06 (H) 02/26/2013    TSH 1.336 10/16/2013         Lab Results   Component Value Date    CHOL 125 07/24/2018    HDL 44 07/24/2018    TRIG 67 07/24/2018       Lab Results   Component Value Date    LDLCALC 67.6 07/24/2018       Past Medical History:   Diagnosis Date    Anemia 2/26/2018    Arthritis     BPH (benign prostatic hyperplasia)     Cataract     right     Colon polyp 11/18/2015    Colon polyps     Coronary artery disease     DR (diabetic retinopathy)     Dyslipidemia     Elevated PSA     Goiter, nontoxic, multinodular     Headaches, cluster     Hypertension     Kidney stones     Lump in the testicle     Left    Rotator cuff tendinitis     Right shoulder    Sleep apnea with use of continuous positive airway pressure (CPAP)     uses cpap at night    Type II or unspecified type diabetes mellitus with other specified  "manifestations, uncontrolled        Current Outpatient Medications:     ACCU-CHEK SMARTVIEW TEST STRIP Strp, 1 strip by Misc.(Non-Drug; Combo Route) route 3 (three) times daily before meals., Disp: 200 strip, Rfl: 3    ASPIRIN (ASPIR-81 ORAL), Take 1 tablet by mouth Daily., Disp: , Rfl:     atorvastatin (LIPITOR) 40 MG tablet, Take 1 tablet (40 mg total) by mouth once daily., Disp: 30 tablet, Rfl: 5    BD INSULIN PEN NEEDLE UF MINI 31 gauge x 3/16" Ndle, USE WITH INSULIN INJECTION FOUR TIMES DAILY, Disp: 100 each, Rfl: 4    BD INSULIN SYRINGE ULTRA-FINE 1 mL 30 x 1/2" Syrg, USE AS DIRECTED FOUR TIMES DAILY, Disp: 200 each, Rfl: 11    blood sugar diagnostic, drum (ACCU-CHEK COMPACT TEST) Strp, CHECK BLOOD GLUCOSE 4 TIMES DAILY, Disp: 204 each, Rfl: 5    finasteride (PROSCAR) 5 mg tablet, Take 1 tablet (5 mg total) by mouth once daily., Disp: 30 tablet, Rfl: 10    insulin glargine (LANTUS SOLOSTAR U-100 INSULIN) 100 unit/mL (3 mL) InPn pen, inject 65 units subcutaneously every evening (Patient taking differently: inject 64 units subcutaneously every evening), Disp: 30 mL, Rfl: 1    irbesartan (AVAPRO) 75 MG tablet, Take 1 tablet (75 mg total) by mouth once daily. (Patient taking differently: Take 75 mg by mouth every morning. ), Disp: 30 tablet, Rfl: 6    metoprolol succinate (TOPROL-XL) 50 MG 24 hr tablet, TAKE ONE TABLET BY MOUTH ONE TIME DAILY , Disp: 90 tablet, Rfl: 2    MULTIVITAMIN W-MINERALS/LUTEIN (CENTRUM SILVER ORAL), Take by mouth once daily., Disp: , Rfl:     nitroGLYCERIN (NITROSTAT) 0.4 MG SL tablet, Place 1 tablet (0.4 mg total) under the tongue every 5 (five) minutes as needed for Chest pain., Disp: 60 tablet, Rfl: 12    NOVOLOG FLEXPEN U-100 INSULIN 100 unit/mL InPn pen, INJECT 12 UNITS SUBCUTANEOUSLY 3 TIMES DAILY WITH MEALS; SLIDING SCALE 12U:100-200,14U:201-250,16U:251-300,18U:301-350, Disp: 15 mL, Rfl: 1    omega-3 fatty acids-vitamin E (FISH OIL) 1,000 mg Cap, Take 2 capsules by " mouth Daily. , Disp: , Rfl:     omeprazole (PRILOSEC) 20 MG capsule, Take 1 capsule (20 mg total) by mouth 2 (two) times daily before meals., Disp: 180 capsule, Rfl: 3    VITAMIN D2 50,000 unit capsule, TAKE 1 CAPSULE BY MOUTH EVERY 30 DAYS, Disp: 3 capsule, Rfl: 0    diclofenac sodium (VOLTAREN) 1 % Gel, Apply 2 g topically 3 (three) times daily., Disp: 1 Tube, Rfl: 3        Review of Systems   Constitution: Positive for weight loss (11#). Negative for decreased appetite, diaphoresis, fever, weakness, malaise/fatigue and weight gain.   HENT: Negative for congestion, ear discharge, ear pain and nosebleeds.    Eyes: Negative for blurred vision, double vision and visual disturbance.   Cardiovascular: Negative for chest pain, claudication, cyanosis, dyspnea on exertion, irregular heartbeat, leg swelling, near-syncope, orthopnea, palpitations, paroxysmal nocturnal dyspnea and syncope.   Respiratory: Negative for cough, hemoptysis, shortness of breath, sleep disturbances due to breathing, snoring, sputum production and wheezing.    Endocrine: Negative for polydipsia, polyphagia and polyuria.   Hematologic/Lymphatic: Negative for adenopathy and bleeding problem. Does not bruise/bleed easily.   Skin: Negative for color change, nail changes, poor wound healing and rash.   Musculoskeletal: Negative for muscle cramps and muscle weakness.   Gastrointestinal: Negative for abdominal pain, anorexia, change in bowel habit, hematochezia, nausea and vomiting.   Genitourinary: Negative for dysuria, frequency and hematuria.   Neurological: Negative for brief paralysis, difficulty with concentration, excessive daytime sleepiness, dizziness, focal weakness, headaches, light-headedness, seizures and vertigo.   Psychiatric/Behavioral: Negative for altered mental status and depression.   Allergic/Immunologic: Negative for persistent infections.        Objective:BP (!) 104/50 (BP Location: Left arm, Patient Position: Sitting, BP Method:  "Large (Automatic))   Pulse 70   Ht 5' 6.5" (1.689 m)   Wt 92.5 kg (203 lb 14.8 oz)   BMI 32.42 kg/m²             Physical Exam   Constitutional: He is oriented to person, place, and time. He appears well-developed and well-nourished.   HENT:   Head: Normocephalic.   Right Ear: External ear normal.   Left Ear: External ear normal.   Nose: Nose normal.   Inspection of lips, teeth and gums normal   Eyes: EOM are normal. Pupils are equal, round, and reactive to light. No scleral icterus.   Neck: Normal range of motion. Neck supple. No JVD present. No tracheal deviation present. No thyromegaly present.   Cardiovascular: Normal rate, regular rhythm and intact distal pulses. Exam reveals no gallop and no friction rub.   No murmur heard.  Pulses:       Dorsalis pedis pulses are 0 on the right side, and 0 on the left side.        Posterior tibial pulses are 0 on the right side, and 0 on the left side.   Pulmonary/Chest: Effort normal and breath sounds normal.   Abdominal: Bowel sounds are normal. He exhibits no distension. There is no hepatosplenomegaly. There is no tenderness. There is no guarding.   Musculoskeletal: Normal range of motion. He exhibits no edema or tenderness.   Lymphadenopathy:   Palpation of neck and groin lymph nodes normal   Neurological: He is alert and oriented to person, place, and time. No cranial nerve deficit. He exhibits normal muscle tone. Coordination normal.   Skin: Skin is dry.        Palpation of skin normal   Psychiatric: His behavior is normal. Judgment and thought content normal.         Assessment:       No diagnosis found.     Plan:       There are no diagnoses linked to this encounter.            "

## 2018-08-16 ENCOUNTER — OFFICE VISIT (OUTPATIENT)
Dept: SURGERY | Facility: CLINIC | Age: 83
End: 2018-08-16
Payer: MEDICARE

## 2018-08-16 VITALS
HEIGHT: 66 IN | DIASTOLIC BLOOD PRESSURE: 53 MMHG | HEART RATE: 82 BPM | SYSTOLIC BLOOD PRESSURE: 107 MMHG | TEMPERATURE: 98 F | BODY MASS INDEX: 32.5 KG/M2 | WEIGHT: 202.25 LBS

## 2018-08-16 DIAGNOSIS — Z09 POSTOP CHECK: Primary | ICD-10-CM

## 2018-08-16 PROBLEM — K40.90 LEFT INGUINAL HERNIA: Status: RESOLVED | Noted: 2018-07-05 | Resolved: 2018-08-16

## 2018-08-16 PROCEDURE — 99999 PR PBB SHADOW E&M-EST. PATIENT-LVL III: CPT | Mod: PBBFAC,,, | Performed by: SURGERY

## 2018-08-16 PROCEDURE — 99024 POSTOP FOLLOW-UP VISIT: CPT | Mod: S$GLB,,, | Performed by: SURGERY

## 2018-08-16 NOTE — LETTER
August 16, 2018        aGvin Martinez MD  [address]  [place] [address]             [facility] - General Surgery  [address]  [place] [address]  Phone: [phone]   Patient: Anjum Cai Jr.   MR Number: [id number]   YOB: 1935   Date of Visit: 8/16/2018       Dear Dr. Martinez:    Thank you for referring Anjum Cai to me for evaluation. Attached you will find relevant portions of my assessment and plan of care.    Assessment & Plan Doing well after open, scrotal, lih.  Light duty one more month and rtc one month.    If you have questions, please do not hesitate to call me. I look forward to following Anjum Cai along with you.    Sincerely,      Arnaud Avery MD            CC  No Recipients    Enclosure

## 2018-08-16 NOTE — PROGRESS NOTES
"Anjum Cai Jr. is a 82 y.o. male patient.   No diagnosis found.  Past Medical History:   Diagnosis Date    Anemia 2018    Arthritis     BPH (benign prostatic hyperplasia)     Cataract     right     Colon polyp 2015    Colon polyps     Coronary artery disease     DR (diabetic retinopathy)     Dyslipidemia     Elevated PSA     Goiter, nontoxic, multinodular     Headaches, cluster     Hypertension     Kidney stones     Lump in the testicle     Left    Rotator cuff tendinitis     Right shoulder    Sleep apnea with use of continuous positive airway pressure (CPAP)     uses cpap at night    Type II or unspecified type diabetes mellitus with other specified manifestations, uncontrolled      Past Surgical History Pertinent Negatives:   Procedure Date Noted    BLADDER SURGERY 2013    CARDIAC VALVE REPLACEMENT 2013     SECTION 2013    CORNEAL TRANSPLANT 2016    CYSTOSCOPY 2013    KIDNEY STONE SURGERY 2013    LITHOTRIPSY 2013    NEPHRECTOMY 2013    OOPHORECTOMY 2013    ORCHIECTOMY 2013    PROSTATE SURGERY 2013    RETINAL DETACHMENT SURGERY 2016    SMALL INTESTINE SURGERY 2013    STRABISMUS SURGERY 2016    TUBAL LIGATION 2013    VASECTOMY 2013     Scheduled Meds:  Continuous Infusions:  PRN Meds:    Review of patient's allergies indicates:   Allergen Reactions    Ace inhibitors      Other reaction(s): cough    Cefadroxil      Other reaction(s): possible vasculitis  Other reaction(s): vasculitis     There are no hospital problems to display for this patient.    Blood pressure (!) 107/53, pulse 82, temperature 98.4 °F (36.9 °C), height 5' 6" (1.676 m), weight 91.7 kg (202 lb 4.4 oz).    Subjective S/p open lih with mesh 18 (prior lap zhang by me).  He only complains of mild incision pain.  Objective Abdomen benign, wound clear with significant/expeted postop swelling, no " hernias.   Assessment & Plan Doing well after open, scrotal, lih.  Light duty one more month and rtc one month.       Arnaud Avery MD  8/16/2018

## 2018-08-30 ENCOUNTER — OFFICE VISIT (OUTPATIENT)
Dept: PODIATRY | Facility: CLINIC | Age: 83
End: 2018-08-30
Payer: MEDICARE

## 2018-08-30 VITALS
WEIGHT: 205.69 LBS | DIASTOLIC BLOOD PRESSURE: 59 MMHG | HEIGHT: 66 IN | BODY MASS INDEX: 33.06 KG/M2 | HEART RATE: 72 BPM | SYSTOLIC BLOOD PRESSURE: 122 MMHG

## 2018-08-30 DIAGNOSIS — I87.8 VENOUS STASIS: ICD-10-CM

## 2018-08-30 DIAGNOSIS — E11.42 DIABETIC POLYNEUROPATHY ASSOCIATED WITH TYPE 2 DIABETES MELLITUS: Primary | ICD-10-CM

## 2018-08-30 DIAGNOSIS — B35.1 ONYCHOMYCOSIS DUE TO DERMATOPHYTE: ICD-10-CM

## 2018-08-30 PROCEDURE — 3078F DIAST BP <80 MM HG: CPT | Mod: CPTII,S$GLB,, | Performed by: PODIATRIST

## 2018-08-30 PROCEDURE — 11721 DEBRIDE NAIL 6 OR MORE: CPT | Mod: Q9,S$GLB,, | Performed by: PODIATRIST

## 2018-08-30 PROCEDURE — 99999 PR PBB SHADOW E&M-EST. PATIENT-LVL III: CPT | Mod: PBBFAC,,, | Performed by: PODIATRIST

## 2018-08-30 PROCEDURE — 3074F SYST BP LT 130 MM HG: CPT | Mod: CPTII,S$GLB,, | Performed by: PODIATRIST

## 2018-08-30 PROCEDURE — 99213 OFFICE O/P EST LOW 20 MIN: CPT | Mod: 25,S$GLB,, | Performed by: PODIATRIST

## 2018-08-30 RX ORDER — CICLOPIROX 80 MG/ML
SOLUTION TOPICAL NIGHTLY
Qty: 6.6 ML | Refills: 11 | Status: SHIPPED | OUTPATIENT
Start: 2018-08-30 | End: 2019-06-05

## 2018-08-30 NOTE — PROGRESS NOTES
Subjective:      Patient ID: Anjum Cai Jr. is a 82 y.o. male.    Chief Complaint: Diabetes Mellitus (PCP Dr. Martinez 5/3/18); Diabetic Foot Exam; Routine Foot Care; and Peripheral Neuropathy    Anjum is a 82 y.o. male who presents to the clinic for evaluation and treatment of high risk feet. Anjum has a past medical history of Anemia (2/26/2018), Arthritis, BPH (benign prostatic hyperplasia), Cataract, Colon polyp (11/18/2015), Colon polyps, Coronary artery disease, DR (diabetic retinopathy), Dyslipidemia, Elevated PSA, Goiter, nontoxic, multinodular, Headaches, cluster, Hypertension, Kidney stones, Lump in the testicle, Rotator cuff tendinitis, Sleep apnea with use of continuous positive airway pressure (CPAP), and Type II or unspecified type diabetes mellitus with other specified manifestations, uncontrolled. The patient's chief complaint is long, thick toenails. Gradual onset, worsening over past several weeks, aggravated by increased weight bearing, shoe gear, pressure.  Periodic debridement helps symptoms .    PCP: Gavin Martinez MD    Date Last Seen by PCP:   Chief Complaint   Patient presents with    Diabetes Mellitus     PCP Dr. Martinez 5/3/18    Diabetic Foot Exam    Routine Foot Care    Peripheral Neuropathy         Current shoe gear:  Affected Foot: Casual shoes     Unaffected Foot: Casual shoes    Hemoglobin A1C   Date Value Ref Range Status   08/01/2018 8.2 (H) 4.0 - 5.6 % Final     Comment:     ADA Screening Guidelines:  5.7-6.4%  Consistent with prediabetes  >or=6.5%  Consistent with diabetes  High levels of fetal hemoglobin interfere with the HbA1C  assay. Heterozygous hemoglobin variants (HbS, HgC, etc)do  not significantly interfere with this assay.   However, presence of multiple variants may affect accuracy.     02/24/2018 8.5 (H) 4.0 - 5.6 % Final     Comment:     According to ADA guidelines, hemoglobin A1c <7.0% represents  optimal control in non-pregnant diabetic patients.  Different  metrics may apply to specific patient populations.   Standards of Medical Care in Diabetes-2016.  For the purpose of screening for the presence of diabetes:  <5.7%     Consistent with the absence of diabetes  5.7-6.4%  Consistent with increasing risk for diabetes   (prediabetes)  >or=6.5%  Consistent with diabetes  Currently, no consensus exists for use of hemoglobin A1c  for diagnosis of diabetes for children.  This Hemoglobin A1c assay has significant interference with fetal   hemoglobin   (HbF). The results are invalid for patients with abnormal amounts of   HbF,   including those with known Hereditary Persistence   of Fetal Hemoglobin. Heterozygous hemoglobin variants (HbAS, HbAC,   HbAD, HbAE, HbA2) do not significantly interfere with this assay;   however, presence of multiple variants in a sample may impact the %   interference.     11/20/2017 7.3 (H) 4.0 - 5.6 % Final     Comment:     According to ADA guidelines, hemoglobin A1c <7.0% represents  optimal control in non-pregnant diabetic patients. Different  metrics may apply to specific patient populations.   Standards of Medical Care in Diabetes-2016.  For the purpose of screening for the presence of diabetes:  <5.7%     Consistent with the absence of diabetes  5.7-6.4%  Consistent with increasing risk for diabetes   (prediabetes)  >or=6.5%  Consistent with diabetes  Currently, no consensus exists for use of hemoglobin A1c  for diagnosis of diabetes for children.  This Hemoglobin A1c assay has significant interference with fetal   hemoglobin   (HbF). The results are invalid for patients with abnormal amounts of   HbF,   including those with known Hereditary Persistence   of Fetal Hemoglobin. Heterozygous hemoglobin variants (HbAS, HbAC,   HbAD, HbAE, HbA2) do not significantly interfere with this assay;   however, presence of multiple variants in a sample may impact the %   interference.         Review of Systems   Constitution: Negative for  chills, diaphoresis, fever, malaise/fatigue and night sweats.   Cardiovascular: Positive for leg swelling. Negative for claudication, cyanosis and syncope.   Skin: Positive for nail changes. Negative for color change, dry skin, rash, suspicious lesions and unusual hair distribution.   Musculoskeletal: Negative for falls, joint pain, joint swelling, muscle cramps, muscle weakness and stiffness.   Gastrointestinal: Negative for constipation, diarrhea, nausea and vomiting.   Neurological: Positive for paresthesias and sensory change. Negative for brief paralysis, disturbances in coordination, focal weakness, numbness and tremors.           Objective:      Physical Exam   Constitutional: He is oriented to person, place, and time. He appears well-developed and well-nourished. He is cooperative.   Oriented to time, place, and person.   Cardiovascular:   Pulses:       Dorsalis pedis pulses are 1+ on the right side, and 1+ on the left side.        Posterior tibial pulses are 1+ on the right side, and 1+ on the left side.   Capillary fill time 3-5 seconds.  All toes warm to touch.      <2+ pitting lower extremity edema bilateral.    Negative elevational pallor and dependent rubor bilateral.     Musculoskeletal:   Normal angle, base, station of gait. Decreased stride length, early heel off, moderately propulsive toe off bilateral.    All ten toes without clubbing, cyanosis, or signs of ischemia.      No pain to palpation bilateral lower extremities.      Range of motion, stability, muscle strength, and muscle tone are age and health appropriate normal bilateral feet and legs.       Lymphadenopathy:   Negative lymphadenopathy bilateral popliteal fossa and tarsal tunnel.  Negative lymphangitic streaking bilateral foot/ankle bilateral.     Neurological: He is alert and oriented to person, place, and time. He has normal strength. He is not disoriented. He displays no atrophy and no tremor. A sensory deficit is present. He  exhibits normal muscle tone.   Reflex Scores:       Patellar reflexes are 2+ on the right side and 2+ on the left side.       Achilles reflexes are 2+ on the right side and 2+ on the left side.  Decreased/absent vibratory sensation bilateral feet to 128Hz tuning fork.    Paresthesias, and burning bilateral feet with no clearly identified trigger or source.       Skin: Skin is warm, dry and intact. No abrasion, no bruising, no burn, no ecchymosis, no laceration, no lesion, no petechiae and no rash noted. He is not diaphoretic. No cyanosis or erythema. No pallor. Nails show no clubbing.   Skin thin, atrophic, with decreased density and distribution of pedal hair bilateral, but without hyperpigmentation, meghna discoloration,  ulcers, masses, nodules or cords palpated bilateral feet and legs.      Toenails 1st, 2nd, 3rd, 4th, 5th  bilateral are hypertrophic thickened 2-3 mm, dystrophic, discolored tanish brown with tan, gray crumbly subungual debris.  Tender to distal nail plate pressure, without periungual skin abnormality of each.               Assessment:       Encounter Diagnoses   Name Primary?    Diabetic polyneuropathy associated with type 2 diabetes mellitus Yes    Onychomycosis due to dermatophyte     Venous stasis          Plan:       Anjum was seen today for diabetes mellitus, diabetic foot exam, routine foot care and peripheral neuropathy.    Diagnoses and all orders for this visit:    Diabetic polyneuropathy associated with type 2 diabetes mellitus    Onychomycosis due to dermatophyte    Venous stasis    Other orders  -     ciclopirox (PENLAC) 8 % Soln; Apply topically nightly.      I counseled the patient on his conditions, their implications and medical management.        - Shoe inspection. Diabetic Foot Education. Patient reminded of the importance of good nutrition and blood sugar control to help prevent podiatric complications of diabetes. Patient instructed on proper foot hygeine. We discussed  wearing proper shoe gear, daily foot inspections, never walking without protective shoe gear, never putting sharp instruments to feet, routine podiatric visits at least annually.      - With patient's permission, nails were aggressively reduced and debrided x 10 to their soft tissue attachment mechanically and with electric , removing all offending nail and debris. Patient relates relief following the procedure. He will continue to monitor the areas daily, inspect his feet, wear protective shoe gear when ambulatory, moisturizer to maintain skin integrity and follow in this office p.r.n.    .evelyn    Discussed conservative treatment with shoes of adequate dimensions, material, and style to alleviate symptoms and delay or prevent surgical intervention.           Follow-up in about 1 year (around 8/30/2019).

## 2018-09-10 RX ORDER — INSULIN GLARGINE 100 [IU]/ML
INJECTION, SOLUTION SUBCUTANEOUS
Qty: 30 ML | Refills: 1 | Status: SHIPPED | OUTPATIENT
Start: 2018-09-10 | End: 2018-11-14 | Stop reason: SDUPTHER

## 2018-09-13 ENCOUNTER — OFFICE VISIT (OUTPATIENT)
Dept: OPTOMETRY | Facility: CLINIC | Age: 83
End: 2018-09-13
Payer: COMMERCIAL

## 2018-09-13 DIAGNOSIS — H25.12 NUCLEAR SCLEROSIS OF LEFT EYE: ICD-10-CM

## 2018-09-13 DIAGNOSIS — H52.7 REFRACTIVE ERROR: ICD-10-CM

## 2018-09-13 DIAGNOSIS — E11.9 TYPE 2 DIABETES MELLITUS WITHOUT RETINOPATHY: Primary | ICD-10-CM

## 2018-09-13 PROCEDURE — 99999 PR PBB SHADOW E&M-EST. PATIENT-LVL II: CPT | Mod: PBBFAC,,, | Performed by: OPTOMETRIST

## 2018-09-13 PROCEDURE — 92014 COMPRE OPH EXAM EST PT 1/>: CPT | Mod: S$GLB,,, | Performed by: OPTOMETRIST

## 2018-09-13 NOTE — PROGRESS NOTES
HPI     Diabetic eye exam  Wants check of distance glasses  No eye problems  Tear drops used on occasion with relief    Last edited by Jose A Morgan, OD on 9/13/2018  3:24 PM. (History)            Assessment /Plan     For exam results, see Encounter Report.    Type 2 diabetes mellitus without retinopathy    Nuclear sclerosis of left eye    Refractive error      1. No diabetic retinopathy, no csme. Return in 1 year for dilated eye exam.  2. Educated pt on presence of cataracts and effects on vision. No surgery at this time. Recheck in one year.  3. Spec Rx given. Different lens options discussed with patient. RTC 1 year full exam.

## 2018-09-18 ENCOUNTER — OFFICE VISIT (OUTPATIENT)
Dept: SURGERY | Facility: CLINIC | Age: 83
End: 2018-09-18
Payer: MEDICARE

## 2018-09-18 ENCOUNTER — HOSPITAL ENCOUNTER (OUTPATIENT)
Dept: RADIOLOGY | Facility: HOSPITAL | Age: 83
Discharge: HOME | End: 2018-09-18
Attending: NURSE PRACTITIONER
Payer: MEDICARE

## 2018-09-18 ENCOUNTER — OFFICE VISIT (OUTPATIENT)
Dept: INTERNAL MEDICINE | Facility: CLINIC | Age: 83
End: 2018-09-18
Payer: MEDICARE

## 2018-09-18 ENCOUNTER — TELEPHONE (OUTPATIENT)
Dept: INTERNAL MEDICINE | Facility: CLINIC | Age: 83
End: 2018-09-18

## 2018-09-18 VITALS
HEIGHT: 66 IN | HEART RATE: 92 BPM | OXYGEN SATURATION: 97 % | SYSTOLIC BLOOD PRESSURE: 108 MMHG | DIASTOLIC BLOOD PRESSURE: 50 MMHG | TEMPERATURE: 100 F | WEIGHT: 205.5 LBS | BODY MASS INDEX: 33.03 KG/M2

## 2018-09-18 VITALS
HEART RATE: 95 BPM | TEMPERATURE: 98 F | WEIGHT: 205.69 LBS | HEIGHT: 66 IN | SYSTOLIC BLOOD PRESSURE: 129 MMHG | DIASTOLIC BLOOD PRESSURE: 58 MMHG | BODY MASS INDEX: 33.06 KG/M2

## 2018-09-18 DIAGNOSIS — Z09 POSTOP CHECK: Primary | ICD-10-CM

## 2018-09-18 DIAGNOSIS — R50.9 FEVER, UNSPECIFIED FEVER CAUSE: ICD-10-CM

## 2018-09-18 DIAGNOSIS — N17.9 AKI (ACUTE KIDNEY INJURY): ICD-10-CM

## 2018-09-18 DIAGNOSIS — E86.0 DEHYDRATION: ICD-10-CM

## 2018-09-18 DIAGNOSIS — R82.90 ABNORMAL URINALYSIS: ICD-10-CM

## 2018-09-18 DIAGNOSIS — R50.9 FEVER, UNSPECIFIED FEVER CAUSE: Primary | ICD-10-CM

## 2018-09-18 LAB
BILIRUB SERPL-MCNC: ABNORMAL MG/DL
BLOOD URINE, POC: ABNORMAL
COLOR, POC UA: YELLOW
CTP QC/QA: YES
FLUAV AG NPH QL: NEGATIVE
FLUBV AG NPH QL: NEGATIVE
GLUCOSE UR QL STRIP: 50
KETONES UR QL STRIP: ABNORMAL
LEUKOCYTE ESTERASE URINE, POC: ABNORMAL
NITRITE, POC UA: ABNORMAL
PH, POC UA: 5
PROTEIN, POC: ABNORMAL
SPECIFIC GRAVITY, POC UA: 1.02
UROBILINOGEN, POC UA: NORMAL

## 2018-09-18 PROCEDURE — 99024 POSTOP FOLLOW-UP VISIT: CPT | Mod: ,,, | Performed by: SURGERY

## 2018-09-18 PROCEDURE — 3074F SYST BP LT 130 MM HG: CPT | Mod: CPTII,,, | Performed by: NURSE PRACTITIONER

## 2018-09-18 PROCEDURE — 99213 OFFICE O/P EST LOW 20 MIN: CPT | Mod: PBBFAC,25,27 | Performed by: SURGERY

## 2018-09-18 PROCEDURE — 87086 URINE CULTURE/COLONY COUNT: CPT

## 2018-09-18 PROCEDURE — 71046 X-RAY EXAM CHEST 2 VIEWS: CPT | Mod: TC

## 2018-09-18 PROCEDURE — 99999 PR PBB SHADOW E&M-EST. PATIENT-LVL V: CPT | Mod: PBBFAC,,, | Performed by: NURSE PRACTITIONER

## 2018-09-18 PROCEDURE — 81002 URINALYSIS NONAUTO W/O SCOPE: CPT | Mod: PBBFAC | Performed by: NURSE PRACTITIONER

## 2018-09-18 PROCEDURE — 87804 INFLUENZA ASSAY W/OPTIC: CPT | Mod: PBBFAC | Performed by: NURSE PRACTITIONER

## 2018-09-18 PROCEDURE — 3078F DIAST BP <80 MM HG: CPT | Mod: CPTII,,, | Performed by: NURSE PRACTITIONER

## 2018-09-18 PROCEDURE — 99214 OFFICE O/P EST MOD 30 MIN: CPT | Mod: S$PBB,,, | Performed by: NURSE PRACTITIONER

## 2018-09-18 PROCEDURE — 1101F PT FALLS ASSESS-DOCD LE1/YR: CPT | Mod: CPTII,,, | Performed by: NURSE PRACTITIONER

## 2018-09-18 PROCEDURE — 71046 X-RAY EXAM CHEST 2 VIEWS: CPT | Mod: 26,,, | Performed by: RADIOLOGY

## 2018-09-18 PROCEDURE — 99215 OFFICE O/P EST HI 40 MIN: CPT | Mod: PBBFAC,25 | Performed by: NURSE PRACTITIONER

## 2018-09-18 PROCEDURE — 99999 PR PBB SHADOW E&M-EST. PATIENT-LVL III: CPT | Mod: PBBFAC,,, | Performed by: SURGERY

## 2018-09-18 RX ORDER — ACETAMINOPHEN 325 MG/1
325 TABLET ORAL
Status: COMPLETED | OUTPATIENT
Start: 2018-09-18 | End: 2018-09-18

## 2018-09-18 RX ADMIN — ACETAMINOPHEN 325 MG: 325 TABLET ORAL at 04:09

## 2018-09-18 NOTE — PROGRESS NOTES
"Anjum Cai Jr. is a 82 y.o. male patient.   No diagnosis found.  Past Medical History:   Diagnosis Date    Anemia 2018    Arthritis     BPH (benign prostatic hyperplasia)     Cataract     right     Colon polyp 2015    Colon polyps     Coronary artery disease     DR (diabetic retinopathy)     Dyslipidemia     Elevated PSA     Goiter, nontoxic, multinodular     Headaches, cluster     Hypertension     Kidney stones     Lump in the testicle     Left    Rotator cuff tendinitis     Right shoulder    Sleep apnea with use of continuous positive airway pressure (CPAP)     uses cpap at night    Type II or unspecified type diabetes mellitus with other specified manifestations, uncontrolled      Past Surgical History Pertinent Negatives:   Procedure Date Noted    BLADDER SURGERY 2013    CARDIAC VALVE REPLACEMENT 2013     SECTION 2013    CORNEAL TRANSPLANT 2016    CYSTOSCOPY 2013    KIDNEY STONE SURGERY 2013    LITHOTRIPSY 2013    NEPHRECTOMY 2013    OOPHORECTOMY 2013    ORCHIECTOMY 2013    PROSTATE SURGERY 2013    RETINAL DETACHMENT SURGERY 2016    SMALL INTESTINE SURGERY 2013    STRABISMUS SURGERY 2016    TUBAL LIGATION 2013    VASECTOMY 2013     Scheduled Meds:  Continuous Infusions:  PRN Meds:    Review of patient's allergies indicates:   Allergen Reactions    Ace inhibitors      Other reaction(s): cough    Cefadroxil      Other reaction(s): possible vasculitis  Other reaction(s): vasculitis     There are no hospital problems to display for this patient.    Blood pressure (!) 129/58, pulse 95, temperature 98.3 °F (36.8 °C), height 5' 6" (1.676 m), weight 93.3 kg (205 lb 11 oz).    Subjective S/p open lih with mesh 18  Pain and testicular swelling improved. Occasional soreness and shooting pain down thigh. Urinating at baseline.   Having some diarrhea and fever. " "    Objective:  Vital signs (most recent): Blood pressure (!) 129/58, pulse 95, temperature 98.3 °F (36.8 °C), height 5' 6" (1.676 m), weight 93.3 kg (205 lb 11 oz).   Abdomen benign, wound cd/i, expected left groin swelling   Assessment & Plan Doing well after open, scrotal, lih.  Okay for full duty  Note sent to PCP regarding likely viral gastro     Lin Perez MD, PGY-3  General Surgery  [phone]    Lin Perez MD  9/18/2018  "

## 2018-09-18 NOTE — TELEPHONE ENCOUNTER
----- Message from Manoj Taylor sent at 9/18/2018  1:29 PM CDT -----  Contact: self [phone]  Patient is running a fever 101.2 with no other symptoms     Please advise

## 2018-09-18 NOTE — PATIENT INSTRUCTIONS
- Drink lots of fluids (water, gatorade)  - Tylenol as needed for fever (do not take more than 2,000mg in a 24 hours period)

## 2018-09-18 NOTE — TELEPHONE ENCOUNTER
Spoke with pt, he had chills and diarrhea on yesterday but today just fever. Pt was scheduled an  appt.

## 2018-09-18 NOTE — PROGRESS NOTES
2 patient identifiers verified (Name and )    Fall Risk Band applied    Patient instructed not to go to restroom or stand alone.  Patient verbalized understanding.    Symptoms: Dehydration    Number of attempts: 2    Size of cath used: 22    Location: Right Hand    Fluid ordered: 500mL    Amount of fluid given: 500mL      Initial Vitals:   BP: 108/50   Pulse: 92   O2: 97%   Temp: 99.9

## 2018-09-18 NOTE — PROGRESS NOTES
2 patient identifiers verified (Name and )    Amount of fluid given: 500mL    Final Vitals:   BP: 129/59   Pulse: 95   O2: 98%   Temp: 101.9       Catheter removed, tip intact.      Patient had no complaints of pain or discomfort    Instructions given to patient to continue hydrating at home     Amount of Urine Output: unable to assess

## 2018-09-18 NOTE — PROGRESS NOTES
Subjective:       Patient ID: Anjum Cai Jr. is a 82 y.o. male.    Chief Complaint: Fever  Mr Cai presents today for a fever.  The highest it got was 101.9 F, last night. He took Tylenol today around noon when he felt badly. He had 1 episode of diarrhea this AM. He had a hernia repair on 8/1/18 and was seen for follow up by his surgeon earlier today.  He was afebrile at that visit.   After being seen in clinic, sent for xrays, and returning to clinic, his temperature alysia to max of 101.6.     PMH is significant for DM, CAD, former smoker  Fever    This is a new problem. The current episode started yesterday. The problem occurs 2 to 4 times per day. The problem has been gradually improving. The maximum temperature noted was 101 to 101.9 F. The temperature was taken using an oral thermometer. Associated symptoms include diarrhea. Pertinent negatives include no abdominal pain, chest pain, congestion, coughing, ear pain, headaches, muscle aches, nausea, rash, sleepiness, sore throat, urinary pain, vomiting or wheezing. He has tried acetaminophen for the symptoms. The treatment provided significant relief.     Review of Systems   Constitutional: Positive for fever.   HENT: Negative for congestion, ear pain and sore throat.    Eyes: Negative for visual disturbance.   Respiratory: Negative for cough and wheezing.    Cardiovascular: Negative for chest pain.   Gastrointestinal: Positive for diarrhea. Negative for abdominal pain, nausea and vomiting.   Genitourinary: Positive for frequency. Negative for dysuria.   Musculoskeletal: Negative for gait problem.   Skin: Negative for rash.   Neurological: Negative for headaches.   Psychiatric/Behavioral: Negative for confusion.       Objective:      Physical Exam   Constitutional: He is oriented to person, place, and time. He appears well-developed. No distress.   obese   HENT:   Right Ear: Tympanic membrane, external ear and ear canal normal.   Left Ear: Hearing,  external ear and ear canal normal.   Nose: Nose normal. No mucosal edema, rhinorrhea or sinus tenderness. Right sinus exhibits no maxillary sinus tenderness and no frontal sinus tenderness. Left sinus exhibits no maxillary sinus tenderness and no frontal sinus tenderness.   Mouth/Throat: Uvula is midline and oropharynx is clear and moist. No posterior oropharyngeal erythema. Tonsils are 0 on the right. Tonsils are 0 on the left. No tonsillar exudate.   Possible pinhole perforation of left TM, at 2 oclock.    Cardiovascular: Normal rate, regular rhythm and normal heart sounds.   Pulmonary/Chest: Effort normal and breath sounds normal. No stridor. No respiratory distress. He has no wheezes. He has no rales.   Abdominal: Soft. Bowel sounds are normal. He exhibits no distension and no mass. There is no tenderness. There is no rebound and no guarding.       Musculoskeletal: He exhibits no edema.   Neurological: He is alert and oriented to person, place, and time.   Hemosiderin staining to lower legs bilat. Small abrasion to left lower leg.    Skin: He is not diaphoretic.   Psychiatric: He has a normal mood and affect. His behavior is normal.   Nursing note and vitals reviewed.      Assessment:       1. Fever, unspecified fever cause    2. Abnormal urinalysis    3. Dehydration    4. EMIGDIO (acute kidney injury)        Plan:   Anjum was seen today for fever.    Diagnoses and all orders for this visit:    Fever, unspecified fever cause  -     POCT urine dipstick without microscope  -     X-Ray Chest PA And Lateral; Future  -     CBC auto differential; Future  -     Comprehensive metabolic panel; Future  -     Urine culture  -     acetaminophen tablet 325 mg; Take 1 tablet (325 mg total) by mouth one time.  -     POCT Influenza A/B    Abnormal urinalysis  -     Urine culture    Dehydration  -     sodium chloride 0.9% bolus 500 mL; Inject 500 mLs into the vein one time.    EMIGDIO (acute kidney injury)  -     sodium chloride 0.9%  bolus 500 mL; Inject 500 mLs into the vein one time.    I suspect viral etiology. Tylenol for fever, as needed. Push oral fluids at home. Rest. Will follow up by phone tomorrow.       Pt has been given instructions populated from [employer] database and has verbalized understanding of the after visit summary and information contained wherein.    Follow up with a primary care provider. May go to ER for acute shortness of breath, lightheadedness, fever, or any other emergent complaints or changes in condition.

## 2018-09-18 NOTE — PROGRESS NOTES
I have seen the patient, reviewed the Resident's history and physical, assessment and plan. I have personally interviewed and examined the patient at bedside and: agree with the findings.     Doing well after repair of large lih except for occasional shooting pain and moderate seroma.  This is improving.  Regular duty and rtc prn.

## 2018-09-20 LAB — BACTERIA UR CULT: NO GROWTH

## 2018-09-21 ENCOUNTER — TELEPHONE (OUTPATIENT)
Dept: INTERNAL MEDICINE | Facility: CLINIC | Age: 83
End: 2018-09-21

## 2018-09-21 DIAGNOSIS — N18.9 CHRONIC KIDNEY DISEASE, UNSPECIFIED CKD STAGE: ICD-10-CM

## 2018-09-21 NOTE — TELEPHONE ENCOUNTER
----- Message from Jessie Robbins sent at 9/21/2018 11:36 AM CDT -----  Contact: [employer]  Message from [employer], System Message sent at 9/21/2018 10:58 AM CDT -----    Appointment Request From: Anjum Cai Jr.    With Provider: Gavin Martinez MD [[facility] - Internal Medicine]    Preferred Date Range: Any    Preferred Times: Any time    Reason for visit: Existing Patient    Comments:  My right hip is hurting when I email and stand from sitting

## 2018-09-21 NOTE — TELEPHONE ENCOUNTER
----- Message from Ginger Winkler sent at 9/21/2018  1:59 PM CDT -----  Contact: [phone]  Patient is returning a phone call.  Who left a message for the patient: Sejal  Does patient know what this is regarding:  Yes  Comments:

## 2018-09-24 DIAGNOSIS — M51.36 DDD (DEGENERATIVE DISC DISEASE), LUMBAR: Primary | ICD-10-CM

## 2018-09-25 ENCOUNTER — OFFICE VISIT (OUTPATIENT)
Dept: ORTHOPEDICS | Facility: CLINIC | Age: 83
End: 2018-09-25
Payer: MEDICARE

## 2018-09-25 ENCOUNTER — HOSPITAL ENCOUNTER (OUTPATIENT)
Dept: RADIOLOGY | Facility: HOSPITAL | Age: 83
Discharge: HOME | End: 2018-09-25
Attending: PHYSICIAN ASSISTANT
Payer: MEDICARE

## 2018-09-25 VITALS
HEIGHT: 66 IN | HEART RATE: 69 BPM | DIASTOLIC BLOOD PRESSURE: 60 MMHG | WEIGHT: 202.94 LBS | SYSTOLIC BLOOD PRESSURE: 118 MMHG | BODY MASS INDEX: 32.61 KG/M2

## 2018-09-25 DIAGNOSIS — M51.36 DDD (DEGENERATIVE DISC DISEASE), LUMBAR: ICD-10-CM

## 2018-09-25 DIAGNOSIS — M51.36 DDD (DEGENERATIVE DISC DISEASE), LUMBAR: Primary | ICD-10-CM

## 2018-09-25 DIAGNOSIS — M48.062 SPINAL STENOSIS OF LUMBAR REGION WITH NEUROGENIC CLAUDICATION: ICD-10-CM

## 2018-09-25 PROCEDURE — 99999 PR PBB SHADOW E&M-EST. PATIENT-LVL IV: CPT | Mod: PBBFAC,,, | Performed by: PHYSICIAN ASSISTANT

## 2018-09-25 PROCEDURE — 99214 OFFICE O/P EST MOD 30 MIN: CPT | Mod: PBBFAC,25 | Performed by: PHYSICIAN ASSISTANT

## 2018-09-25 PROCEDURE — 1101F PT FALLS ASSESS-DOCD LE1/YR: CPT | Mod: CPTII,,, | Performed by: PHYSICIAN ASSISTANT

## 2018-09-25 PROCEDURE — 99204 OFFICE O/P NEW MOD 45 MIN: CPT | Mod: S$PBB,,, | Performed by: PHYSICIAN ASSISTANT

## 2018-09-25 PROCEDURE — 72100 X-RAY EXAM L-S SPINE 2/3 VWS: CPT | Mod: 26,,, | Performed by: RADIOLOGY

## 2018-09-25 PROCEDURE — 72120 X-RAY BEND ONLY L-S SPINE: CPT | Mod: 26,,, | Performed by: RADIOLOGY

## 2018-09-25 PROCEDURE — 3074F SYST BP LT 130 MM HG: CPT | Mod: CPTII,,, | Performed by: PHYSICIAN ASSISTANT

## 2018-09-25 PROCEDURE — 3078F DIAST BP <80 MM HG: CPT | Mod: CPTII,,, | Performed by: PHYSICIAN ASSISTANT

## 2018-09-25 PROCEDURE — 72120 X-RAY BEND ONLY L-S SPINE: CPT | Mod: TC

## 2018-09-25 NOTE — PROGRESS NOTES
DATE: 9/25/2018  PATIENT: Anjum Cai Jr.    Supervising Physician: Anjum Marks M.D.    CHIEF COMPLAINT: low back pain    HISTORY:  Anjum Cai Jr. is a 82 y.o. male here for initial evaluation of low back and right leg pain (Back - 5, Leg - 5).  The pain has been present for 2 weeks. The patient describes the pain as aching.  The pain is worse with walking, lifting his right leg and getting out of bed and improved by sitting. There is no associated numbness and tingling. There is no subjective weakness. Prior treatments have included tylenol, but no physical therapy, ESIs or surgery.    The patient denies myelopathic symptoms such as handwriting changes or difficulty with buttons/coins/keys. Denies perineal paresthesias, bowel/bladder dysfunction.    PAST MEDICAL/SURGICAL HISTORY:  Past Medical History:   Diagnosis Date    Anemia 2/26/2018    Arthritis     BPH (benign prostatic hyperplasia)     Cataract     right     Colon polyp 11/18/2015    Colon polyps     Coronary artery disease     DR (diabetic retinopathy)     Dyslipidemia     Elevated PSA     Goiter, nontoxic, multinodular     Headaches, cluster     Hypertension     Kidney stones     Lump in the testicle     Left    Rotator cuff tendinitis     Right shoulder    Sleep apnea with use of continuous positive airway pressure (CPAP)     uses cpap at night    Type II or unspecified type diabetes mellitus with other specified manifestations, uncontrolled      Past Surgical History:   Procedure Laterality Date    ARTHROSCOPY-SHOULDER WITH SUBACROMIAL DECOMPRESSION Right 6/24/2014    [facility]ST F[facility]    ARTHROSCOPY-SHOULDER, I & D of Right Shoulder Right 9/9/2014    [facility] at [facility] OR 1ST FLR    CARDIAC CATHETERIZATION  2007    CATARACT EXTRACTION W/  INTRAOCULAR LENS IMPLANT Right 09/27/2016    Dr. Garcia    CHOLECYSTECTOMY  02/2018     CHOLECYSTECTOMY-LAPAROSCOPIC N/A 2/21/2018    Performed by [facility]very [facility] at [facility] OR 2ND FLR    COLONOSCOPY N/A 7/9/2015    Performed by Gavin Stout MD at [facility] ENDO (4TH FLR)    COLONOSCOPY N/A 4/9/2014    Performed by Gavin [facility]out [facility] at [facility] ENDO (4TH FLR)    COLONOSCOPY-DEVICE ASSISTED N/A 11/18/2015    Performed by Donaldo Patino MD at [facility] ENDO (2ND FLR)    CORONARY STENT PLACEMENT  02/02/2007    LAD, RCAx2    HEART CATH-LEFT N/A 12/9/2016    Performed by Justin Mars MD at [facility] CATH LAB    INGUINAL HERNIA REPAIR Right 1995    INSERTION-INTRAOCULAR LENS (IOL) Right 9/27/2016    Performed by Jose A Garcia MD at [facility] OR 1ST FLR    PHACOEMULSIFICATION-ASPIRATION-CATARACT Right 9/27/2016    Performed by Jose A Garcia [facility] at [facility] OR 1ST FLR    PROSTATE BIOPSY  2012    RELEASE-CARPAL TUNNEL Right 5/15/2018    Per[facility] by Shade Lilly Jr., [facility] at [facility] OR    RELEASE-FINGER-TRIGGER Right 5/15/2018    Performed by Shade Lilly Jr., [facility] at [facility] OR    RELEASE-FINGER-TRIGGER Left 10/28/2016    Performed by Shade Lilly Jr., MD at [facility] OR    REPAIR ROTATOR CUFF ARTHROSCOPIC/right shoulder labral tear repair, rotator cuff repair and biceps tenotomy Right 6/24/2014    Performed by [facility]akar Keita [facility] at [facility] OR 1ST FLR    REPAIR, HERNIA, INGUINAL, INCARCERATED with MESH, INITIAL, AGE 5 YEARS OR OLDER Left 8/1/2018    Performed by Ar[facility]ud Avery MD at [facility] OR 2ND FLR    REPAIR, ROTATOR CUFF, ARTHROSCOPIC Left 6/18/2013    Performed by [facility]ita [facility] at [facility] OR 1ST FLR    REPAIR-TENDON-BICEP/right shoulder labral tear repair, rotator cuff repair and biceps tenotomy Right 6/24/2014    Performed by [facility]ita [facility] at [facility] OR 1ST FLR    ROTATOR CUFF REPAIR Right 2014    x2    ROTATOR CUFF REPAIR Left 2013    TONSILLECTOMY, ADENOIDECTOMY      as child    TRIGGER FINGER RELEASE Right 2004    x2       Medications:   Current Outpatient  "Medications on File Prior to Visit   Medication Sig Dispense Refill    ACCU-CHEK SMARTVIEW TEST STRIP Strp 1 strip by Misc.(Non-Drug; Combo Route) route 3 (three) times daily before meals. 200 strip 3    ASPIRIN (ASPIR-81 ORAL) Take 1 tablet by mouth Daily.      atorvastatin (LIPITOR) 40 MG tablet Take 1 tablet (40 mg total) by mouth once daily. 30 tablet 5    BD INSULIN PEN NEEDLE UF MINI 31 gauge x 3/16" Ndle USE WITH INSULIN INJECTION FOUR TIMES DAILY 100 each 4    BD INSULIN SYRINGE ULTRA-FINE 1 mL 30 x 1/2" Syrg USE AS DIRECTED FOUR TIMES DAILY 200 each 11    blood sugar diagnostic, drum (ACCU-CHEK COMPACT TEST) Strp CHECK BLOOD GLUCOSE 4 TIMES DAILY 204 each 5    ciclopirox (PENLAC) 8 % Soln Apply topically nightly. 6.6 mL 11    finasteride (PROSCAR) 5 mg tablet Take 1 tablet (5 mg total) by mouth once daily. 30 tablet 10    insulin glargine (LANTUS SOLOSTAR U-100 INSULIN) 100 unit/mL (3 mL) InPn pen inject 65 units subcutaneously every evening 30 mL 1    irbesartan (AVAPRO) 75 MG tablet Take 1 tablet (75 mg total) by mouth once daily. (Patient taking differently: Take 75 mg by mouth every morning. ) 30 tablet 6    metoprolol succinate (TOPROL-XL) 50 MG 24 hr tablet TAKE ONE TABLET BY MOUTH ONE TIME DAILY  90 tablet 2    MULTIVITAMIN W-MINERALS/LUTEIN (CENTRUM SILVER ORAL) Take by mouth once daily.      nitroGLYCERIN (NITROSTAT) 0.4 MG SL tablet Place 1 tablet (0.4 mg total) under the tongue every 5 (five) minutes as needed for Chest pain. 60 tablet 12    NOVOLOG FLEXPEN U-100 INSULIN 100 unit/mL InPn pen INJECT 12 UNITS SUBCUTANEOUSLY 3 TIMES DAILY WITH MEALS; SLIDING SCALE 12U:100-200,14U:201-250,16U:251-300,18U:301-350 15 mL 1    omega-3 fatty acids-vitamin E (FISH OIL) 1,000 mg Cap Take 2 capsules by mouth Daily.       omeprazole (PRILOSEC) 20 MG capsule Take 1 capsule (20 mg total) by mouth 2 (two) times daily before meals. 180 capsule 3    VITAMIN D2 50,000 unit capsule TAKE 1 CAPSULE BY " "MOUTH EVERY 30 DAYS 3 capsule 0    diclofenac sodium (VOLTAREN) 1 % Gel Apply 2 g topically 3 (three) times daily. 1 Tube 3     No current facility-administered medications on file prior to visit.        Social History:   Social History     Socioeconomic History    Marital status:      Spouse name: Not on file    Number of children: Not on file    Years of education: Not on file    Highest education level: Not on file   Social Needs    Financial resource strain: Not on file    Food insecurity - worry: Not on file    Food insecurity - inability: Not on file    Transportation needs - medical: Not on file    Transportation needs - non-medical: Not on file   Occupational History    Not on file   Tobacco Use    Smoking status: Former Smoker     Last attempt to quit: 3/12/1978     Years since quittin.5    Smokeless tobacco: Never Used   Substance and Sexual Activity    Alcohol use: No    Drug use: No    Sexual activity: Not on file   Other Topics Concern    Not on file   Social History Narrative    Not on file       REVIEW OF SYSTEMS:  Constitution: Negative. Negative for chills, fever and night sweats.   Cardiovascular: Negative for chest pain and syncope.   Respiratory: Negative for cough and shortness of breath.   Gastrointestinal: See HPI. Negative for nausea/vomiting. Negative for abdominal pain.  Genitourinary: See HPI. Negative for discoloration or dysuria.  Skin: Negative for dry skin, itching and rash.   Hematologic/Lymphatic: Negative for bleeding problem. Does not bruise/bleed easily.   Musculoskeletal: Negative for falls and muscle weakness.   Neurological: See HPI. No seizures.   Endocrine: Negative for polydipsia, polyphagia and polyuria.   Allergic/Immunologic: Negative for hives and persistent infections.     EXAM:  /60 (BP Location: Left arm, Patient Position: Sitting, BP Method: Large (Automatic))   Pulse 69   Ht 5' 6" (1.676 m)   Wt 92 kg (202 lb 14.9 oz)   BMI " 32.75 kg/m²     General: The patient is a very pleasant 82 y.o. male in no apparent distress, the patient is oriented to person, place and time.  Psych: Normal mood and affect  HEENT: Vision grossly intact, hearing intact to the spoken word.  Lungs: Respirations unlabored.  Gait: Normal station and gait, no difficulty with toe or heel walk.   Skin: Dorsal lumbar skin negative for rashes, lesions, hairy patches and surgical scars. There is mild lumbar tenderness to palpation.  Range of motion: Lumbar range of motion is acceptable.  Spinal Balance: Global saggital and coronal spinal balance acceptable, not significant for scoliosis and kyphosis.  Musculoskeletal: No pain with the range of motion of the bilateral hips. No trochanteric tenderness to palpation.  Vascular: Bilateral lower extremities warm and well perfused, dorsalis pedis pulses 2+ bilaterally.  Neurological: Normal strength and tone in all major motor groups in the bilateral lower extremities. Normal sensation to light touch in the L2-S1 dermatomes bilaterally.  Deep tendon reflexes symmetric 2+ in the bilateral lower extremities.  Negative Babinski bilaterally. Straight leg raise negative bilaterally.    IMAGING:      Today I personally reviewed AP, Lat and Flex/Ex  upright L-spine films that demonstrate moderate degenerative changes.        Body mass index is 32.75 kg/m².    Hemoglobin A1C   Date Value Ref Range Status   08/01/2018 8.2 (H) 4.0 - 5.6 % Final     Comment:     ADA Screening Guidelines:  5.7-6.4%  Consistent with prediabetes  >or=6.5%  Consistent with diabetes  High levels of fetal hemoglobin interfere with the HbA1C  assay. Heterozygous hemoglobin variants (HbS, HgC, etc)do  not significantly interfere with this assay.   However, presence of multiple variants may affect accuracy.     02/24/2018 8.5 (H) 4.0 - 5.6 % Final     Comment:     According to ADA guidelines, hemoglobin A1c <7.0% represents  optimal control in non-pregnant diabetic  patients. Different  metrics may apply to specific patient populations.   Standards of Medical Care in Diabetes-2016.  For the purpose of screening for the presence of diabetes:  <5.7%     Consistent with the absence of diabetes  5.7-6.4%  Consistent with increasing risk for diabetes   (prediabetes)  >or=6.5%  Consistent with diabetes  Currently, no consensus exists for use of hemoglobin A1c  for diagnosis of diabetes for children.  This Hemoglobin A1c assay has significant interference with fetal   hemoglobin   (HbF). The results are invalid for patients with abnormal amounts of   HbF,   including those with known Hereditary Persistence   of Fetal Hemoglobin. Heterozygous hemoglobin variants (HbAS, HbAC,   HbAD, HbAE, HbA2) do not significantly interfere with this assay;   however, presence of multiple variants in a sample may impact the %   interference.     11/20/2017 7.3 (H) 4.0 - 5.6 % Final     Comment:     According to ADA guidelines, hemoglobin A1c <7.0% represents  optimal control in non-pregnant diabetic patients. Different  metrics may apply to specific patient populations.   Standards of Medical Care in Diabetes-2016.  For the purpose of screening for the presence of diabetes:  <5.7%     Consistent with the absence of diabetes  5.7-6.4%  Consistent with increasing risk for diabetes   (prediabetes)  >or=6.5%  Consistent with diabetes  Currently, no consensus exists for use of hemoglobin A1c  for diagnosis of diabetes for children.  This Hemoglobin A1c assay has significant interference with fetal   hemoglobin   (HbF). The results are invalid for patients with abnormal amounts of   HbF,   including those with known Hereditary Persistence   of Fetal Hemoglobin. Heterozygous hemoglobin variants (HbAS, HbAC,   HbAD, HbAE, HbA2) do not significantly interfere with this assay;   however, presence of multiple variants in a sample may impact the %   interference.             ASSESSMENT/PLAN:    Diagnoses and all  orders for this visit:    DDD (degenerative disc disease), lumbar  -     Ambulatory Referral to Physical/Occupational Therapy    Spinal stenosis of lumbar region with neurogenic claudication  -     Ambulatory Referral to Physical/Occupational Therapy    The patient is having low back and right leg pain that has been present for 2 weeks.  Referral for PT placed today.  He will continue tylenol as needed.  Follow up after therapy if symptoms persist.  Will consider an MRI at that time.       Follow-up if symptoms worsen or fail to improve.

## 2018-09-27 ENCOUNTER — TELEPHONE (OUTPATIENT)
Dept: ORTHOPEDICS | Facility: CLINIC | Age: 83
End: 2018-09-27

## 2018-09-27 NOTE — TELEPHONE ENCOUNTER
----- Message from Hellen Washington sent at 9/27/2018  1:28 PM CDT -----  Contact: [facility]  office  fax  Need referral and orders fax over to office.

## 2018-09-28 ENCOUNTER — PES CALL (OUTPATIENT)
Dept: ADMINISTRATIVE | Facility: CLINIC | Age: 83
End: 2018-09-28

## 2018-10-05 ENCOUNTER — PATIENT MESSAGE (OUTPATIENT)
Dept: ORTHOPEDICS | Facility: CLINIC | Age: 83
End: 2018-10-05

## 2018-10-08 RX ORDER — INSULIN ASPART 100 [IU]/ML
INJECTION, SOLUTION INTRAVENOUS; SUBCUTANEOUS
Qty: 15 ML | Refills: 1 | Status: SHIPPED | OUTPATIENT
Start: 2018-10-08 | End: 2018-11-26 | Stop reason: SDUPTHER

## 2018-10-24 DIAGNOSIS — E55.9 VITAMIN D DEFICIENCY: ICD-10-CM

## 2018-10-24 RX ORDER — ERGOCALCIFEROL 1.25 MG/1
CAPSULE ORAL
Qty: 1 CAPSULE | Refills: 0 | Status: SHIPPED | OUTPATIENT
Start: 2018-10-24 | End: 2018-11-26 | Stop reason: SDUPTHER

## 2018-10-24 NOTE — TELEPHONE ENCOUNTER
----- Message from Jacquie Alexis sent at 10/24/2018  1:58 PM CDT -----  Contact: Travis @ & Pharmacy direct# [phone],Fax# [phone]  RX request - refill or new RX.  Is this a refill or new RX:  Refill  RX name and strength: VITAMIN D2 50,000 unit capsule  Directions:   Is this a 30 day or 90 day RX: 90  Local pharmacy or mail order pharmacy:  [facility] #[address] [place] [address] [address]  Pharmacy name and phone #[facility] phone# [phone],Fax# [phone]

## 2018-11-12 RX ORDER — BLOOD SUGAR DIAGNOSTIC
STRIP MISCELLANEOUS
Qty: 200 EACH | Refills: 2 | Status: SHIPPED | OUTPATIENT
Start: 2018-11-12 | End: 2019-02-05 | Stop reason: SDUPTHER

## 2018-11-12 RX ORDER — LANCETS
EACH MISCELLANEOUS
Qty: 204 EACH | Status: SHIPPED | OUTPATIENT
Start: 2018-11-12 | End: 2019-11-15 | Stop reason: SDUPTHER

## 2018-11-12 RX ORDER — PEN NEEDLE, DIABETIC 31 GX5/16"
NEEDLE, DISPOSABLE MISCELLANEOUS
Qty: 100 EACH | Refills: 3 | Status: SHIPPED | OUTPATIENT
Start: 2018-11-12 | End: 2019-11-13 | Stop reason: SDUPTHER

## 2018-11-14 RX ORDER — INSULIN GLARGINE 100 [IU]/ML
INJECTION, SOLUTION SUBCUTANEOUS
Qty: 30 ML | Refills: 1 | Status: SHIPPED | OUTPATIENT
Start: 2018-11-14 | End: 2019-02-26 | Stop reason: SDUPTHER

## 2018-11-26 DIAGNOSIS — N40.1 BENIGN NON-NODULAR PROSTATIC HYPERPLASIA WITH LOWER URINARY TRACT SYMPTOMS: ICD-10-CM

## 2018-11-26 DIAGNOSIS — I20.89 ANGINA EFFORT: ICD-10-CM

## 2018-11-26 DIAGNOSIS — E55.9 VITAMIN D DEFICIENCY: ICD-10-CM

## 2018-11-26 DIAGNOSIS — R97.20 ELEVATED PSA: ICD-10-CM

## 2018-11-26 DIAGNOSIS — R97.20 ELEVATED PSA: Primary | ICD-10-CM

## 2018-11-26 RX ORDER — INSULIN ASPART 100 [IU]/ML
INJECTION, SOLUTION INTRAVENOUS; SUBCUTANEOUS
Qty: 15 ML | Refills: 1 | Status: SHIPPED | OUTPATIENT
Start: 2018-11-26 | End: 2019-03-24 | Stop reason: SDUPTHER

## 2018-11-26 RX ORDER — OMEPRAZOLE 20 MG/1
20 CAPSULE, DELAYED RELEASE ORAL
Qty: 180 CAPSULE | Refills: 3 | Status: SHIPPED | OUTPATIENT
Start: 2018-11-26 | End: 2019-11-30 | Stop reason: SDUPTHER

## 2018-11-26 RX ORDER — ERGOCALCIFEROL 1.25 MG/1
CAPSULE ORAL
Qty: 1 CAPSULE | Refills: 0 | Status: SHIPPED | OUTPATIENT
Start: 2018-11-26 | End: 2019-01-22 | Stop reason: SDUPTHER

## 2018-11-26 RX ORDER — IRBESARTAN 75 MG/1
75 TABLET ORAL DAILY
Qty: 30 TABLET | Refills: 6 | Status: SHIPPED | OUTPATIENT
Start: 2018-11-26 | End: 2019-02-05 | Stop reason: SDUPTHER

## 2018-11-26 RX ORDER — NITROGLYCERIN 0.4 MG/1
0.4 TABLET SUBLINGUAL EVERY 5 MIN PRN
Qty: 60 TABLET | Refills: 12 | Status: SHIPPED | OUTPATIENT
Start: 2018-11-26 | End: 2020-08-31 | Stop reason: SDUPTHER

## 2018-11-26 RX ORDER — FINASTERIDE 5 MG/1
5 TABLET, FILM COATED ORAL DAILY
Qty: 30 TABLET | Refills: 10 | Status: SHIPPED | OUTPATIENT
Start: 2018-11-26 | End: 2019-07-16

## 2018-11-27 RX ORDER — FINASTERIDE 5 MG/1
5 TABLET, FILM COATED ORAL DAILY
Qty: 90 TABLET | Refills: 0 | Status: SHIPPED | OUTPATIENT
Start: 2018-11-27 | End: 2020-01-23 | Stop reason: SDUPTHER

## 2018-11-29 RX ORDER — ATORVASTATIN CALCIUM 40 MG/1
40 TABLET, FILM COATED ORAL DAILY
Qty: 30 TABLET | Refills: 6 | Status: SHIPPED | OUTPATIENT
Start: 2018-11-29 | End: 2019-05-04 | Stop reason: SDUPTHER

## 2018-11-30 ENCOUNTER — PATIENT MESSAGE (OUTPATIENT)
Dept: CARDIOLOGY | Facility: CLINIC | Age: 83
End: 2018-11-30

## 2019-01-22 DIAGNOSIS — E55.9 VITAMIN D DEFICIENCY: ICD-10-CM

## 2019-01-22 RX ORDER — ERGOCALCIFEROL 1.25 MG/1
CAPSULE ORAL
Qty: 4 CAPSULE | Refills: 3 | Status: SHIPPED | OUTPATIENT
Start: 2019-01-22 | End: 2020-01-28

## 2019-01-24 ENCOUNTER — PATIENT MESSAGE (OUTPATIENT)
Dept: CARDIOLOGY | Facility: CLINIC | Age: 84
End: 2019-01-24

## 2019-01-25 RX ORDER — METOPROLOL SUCCINATE 50 MG/1
50 TABLET, EXTENDED RELEASE ORAL DAILY
Qty: 90 TABLET | Refills: 3 | Status: SHIPPED | OUTPATIENT
Start: 2019-01-25 | End: 2020-01-26 | Stop reason: SDUPTHER

## 2019-02-04 ENCOUNTER — PATIENT MESSAGE (OUTPATIENT)
Dept: ADMINISTRATIVE | Facility: OTHER | Age: 84
End: 2019-02-04

## 2019-02-05 RX ORDER — IRBESARTAN 75 MG/1
75 TABLET ORAL DAILY
Qty: 30 TABLET | Refills: 6 | Status: SHIPPED | OUTPATIENT
Start: 2019-02-05 | End: 2019-08-31 | Stop reason: SDUPTHER

## 2019-02-05 RX ORDER — BLOOD SUGAR DIAGNOSTIC
STRIP MISCELLANEOUS
Qty: 200 EACH | Refills: 2 | Status: SHIPPED | OUTPATIENT
Start: 2019-02-05 | End: 2019-09-17 | Stop reason: SDUPTHER

## 2019-02-26 RX ORDER — INSULIN GLARGINE 100 [IU]/ML
INJECTION, SOLUTION SUBCUTANEOUS
Qty: 30 ML | Refills: 1 | Status: SHIPPED | OUTPATIENT
Start: 2019-02-26 | End: 2019-05-28 | Stop reason: SDUPTHER

## 2019-02-27 NOTE — CONSULTS
See admit note for full General Surgery consultation.   Double O-Z Plasty Text: The defect edges were debeveled with a #15 scalpel blade.  Given the location of the defect, shape of the defect and the proximity to free margins a Double O-Z plasty (double transposition flap) was deemed most appropriate.  Using a sterile surgical marker, the appropriate transposition flaps were drawn incorporating the defect and placing the expected incisions within the relaxed skin tension lines where possible. The area thus outlined was incised deep to adipose tissue with a #15 scalpel blade.  The skin margins were undermined to an appropriate distance in all directions utilizing iris scissors.  Hemostasis was achieved with electrocautery.  The flaps were then transposed into place, one clockwise and the other counterclockwise, and anchored with interrupted buried subcutaneous sutures.

## 2019-03-24 RX ORDER — INSULIN ASPART 100 [IU]/ML
INJECTION, SOLUTION INTRAVENOUS; SUBCUTANEOUS
Qty: 15 ML | Refills: 3 | Status: SHIPPED | OUTPATIENT
Start: 2019-03-24 | End: 2019-10-01 | Stop reason: SDUPTHER

## 2019-05-06 RX ORDER — ATORVASTATIN CALCIUM 40 MG/1
TABLET, FILM COATED ORAL
Qty: 90 TABLET | Refills: 4 | Status: SHIPPED | OUTPATIENT
Start: 2019-05-06 | End: 2020-01-23 | Stop reason: SDUPTHER

## 2019-05-06 NOTE — INTERVAL H&P NOTE
The patient has been examined and the H&P has been reviewed:    I concur with the findings and no changes have occurred since H&P was written.    Anesthesia/Surgery risks, benefits and alternative options discussed and understood by patient/family.          Active Hospital Problems    Diagnosis  POA    Inguinal hernia [K40.90]  Yes      Resolved Hospital Problems    Diagnosis Date Resolved POA   No resolved problems to display.      Hide Include Location In Plan Question?: No Detail Level: Zone

## 2019-05-20 ENCOUNTER — OFFICE VISIT (OUTPATIENT)
Dept: OPTOMETRY | Facility: CLINIC | Age: 84
End: 2019-05-20
Payer: COMMERCIAL

## 2019-05-20 DIAGNOSIS — H25.12 NUCLEAR SCLEROSIS OF LEFT EYE: ICD-10-CM

## 2019-05-20 DIAGNOSIS — H52.7 REFRACTIVE ERROR: ICD-10-CM

## 2019-05-20 DIAGNOSIS — E11.9 TYPE 2 DIABETES MELLITUS WITHOUT RETINOPATHY: Primary | ICD-10-CM

## 2019-05-20 PROCEDURE — 99999 PR PBB SHADOW E&M-EST. PATIENT-LVL II: CPT | Mod: PBBFAC,,, | Performed by: OPTOMETRIST

## 2019-05-20 PROCEDURE — 92015 PR REFRACTION: ICD-10-PCS | Mod: S$GLB,,, | Performed by: OPTOMETRIST

## 2019-05-20 PROCEDURE — 92015 DETERMINE REFRACTIVE STATE: CPT | Mod: S$GLB,,, | Performed by: OPTOMETRIST

## 2019-05-20 PROCEDURE — 92014 COMPRE OPH EXAM EST PT 1/>: CPT | Mod: S$GLB,,, | Performed by: OPTOMETRIST

## 2019-05-20 PROCEDURE — 99999 PR PBB SHADOW E&M-EST. PATIENT-LVL II: ICD-10-PCS | Mod: PBBFAC,,, | Performed by: OPTOMETRIST

## 2019-05-20 PROCEDURE — 92014 PR EYE EXAM, EST PATIENT,COMPREHESV: ICD-10-PCS | Mod: S$GLB,,, | Performed by: OPTOMETRIST

## 2019-05-20 NOTE — PROGRESS NOTES
BATSHEVA CAMERON 09/2018  Diabetic patient doesn't remember BS this morning.  Glasses   at least 2 yrs. Old, still seem fine.  Patient hasn't noticed any vision   changes.  Not using any drops.     Hemoglobin A1C       Date                     Value               Ref Range             Status                08/01/2018               8.2 (H)             4.0 - 5.6 %           Final             02/24/2018               8.5 (H)             4.0 - 5.6 %           Final           11/20/2017               7.3 (H)             4.0 - 5.6 %           Final         Last edited by Samreen Beckwith on 5/20/2019 10:24 AM. (History)            Assessment /Plan     For exam results, see Encounter Report.    Type 2 diabetes mellitus without retinopathy    Nuclear sclerosis of left eye    Refractive error      1. No diabetic retinopathy, no csme. Return in 1 year for dilated eye exam.  2. Educated pt on presence of cataracts and effects on vision. No surgery at this time. Recheck in one year.  3. New Spec Rx given. Different lens options discussed with patient. RTC 1 year full exam.

## 2019-05-28 RX ORDER — INSULIN GLARGINE 100 [IU]/ML
INJECTION, SOLUTION SUBCUTANEOUS
Qty: 30 ML | Refills: 0 | Status: SHIPPED | OUTPATIENT
Start: 2019-05-28 | End: 2019-07-08 | Stop reason: SDUPTHER

## 2019-05-29 ENCOUNTER — LAB VISIT (OUTPATIENT)
Dept: LAB | Facility: HOSPITAL | Age: 84
End: 2019-05-29
Attending: INTERNAL MEDICINE
Payer: MEDICARE

## 2019-05-29 ENCOUNTER — OFFICE VISIT (OUTPATIENT)
Dept: INTERNAL MEDICINE | Facility: CLINIC | Age: 84
End: 2019-05-29
Payer: MEDICARE

## 2019-05-29 VITALS
BODY MASS INDEX: 33.8 KG/M2 | DIASTOLIC BLOOD PRESSURE: 60 MMHG | HEART RATE: 64 BPM | HEIGHT: 66 IN | WEIGHT: 210.31 LBS | SYSTOLIC BLOOD PRESSURE: 112 MMHG

## 2019-05-29 DIAGNOSIS — I10 ESSENTIAL HYPERTENSION: ICD-10-CM

## 2019-05-29 DIAGNOSIS — H92.02 CHRONIC EAR PAIN, LEFT: ICD-10-CM

## 2019-05-29 DIAGNOSIS — M21.42 FLAT FEET, BILATERAL: ICD-10-CM

## 2019-05-29 DIAGNOSIS — E78.5 HYPERLIPIDEMIA, UNSPECIFIED HYPERLIPIDEMIA TYPE: ICD-10-CM

## 2019-05-29 DIAGNOSIS — N18.30 CKD (CHRONIC KIDNEY DISEASE) STAGE 3, GFR 30-59 ML/MIN: ICD-10-CM

## 2019-05-29 DIAGNOSIS — M21.41 FLAT FEET, BILATERAL: ICD-10-CM

## 2019-05-29 DIAGNOSIS — N40.0 BPH WITH ELEVATED PSA: ICD-10-CM

## 2019-05-29 DIAGNOSIS — Z00.00 ANNUAL PHYSICAL EXAM: Primary | ICD-10-CM

## 2019-05-29 DIAGNOSIS — R23.9 SKIN CHANGE: ICD-10-CM

## 2019-05-29 DIAGNOSIS — Z12.11 COLON CANCER SCREENING: ICD-10-CM

## 2019-05-29 DIAGNOSIS — I25.2 HX OF NON-ST ELEVATION MYOCARDIAL INFARCTION (NSTEMI): ICD-10-CM

## 2019-05-29 DIAGNOSIS — Z00.00 ANNUAL PHYSICAL EXAM: ICD-10-CM

## 2019-05-29 DIAGNOSIS — R97.20 BPH WITH ELEVATED PSA: ICD-10-CM

## 2019-05-29 DIAGNOSIS — G89.29 CHRONIC EAR PAIN, LEFT: ICD-10-CM

## 2019-05-29 LAB
ALBUMIN SERPL BCP-MCNC: 3.7 G/DL (ref 3.5–5.2)
ALBUMIN/CREAT UR: 18.2 UG/MG (ref 0–30)
ALP SERPL-CCNC: 91 U/L (ref 55–135)
ALT SERPL W/O P-5'-P-CCNC: 17 U/L (ref 10–44)
ANION GAP SERPL CALC-SCNC: 6 MMOL/L (ref 8–16)
AST SERPL-CCNC: 17 U/L (ref 10–40)
BACTERIA #/AREA URNS AUTO: NORMAL /HPF
BASOPHILS # BLD AUTO: 0.05 K/UL (ref 0–0.2)
BASOPHILS NFR BLD: 0.7 % (ref 0–1.9)
BILIRUB SERPL-MCNC: 0.3 MG/DL (ref 0.1–1)
BILIRUB UR QL STRIP: NEGATIVE
BUN SERPL-MCNC: 23 MG/DL (ref 8–23)
CALCIUM SERPL-MCNC: 9.4 MG/DL (ref 8.7–10.5)
CHLORIDE SERPL-SCNC: 106 MMOL/L (ref 95–110)
CHOLEST SERPL-MCNC: 123 MG/DL (ref 120–199)
CHOLEST/HDLC SERPL: 2.9 {RATIO} (ref 2–5)
CLARITY UR REFRACT.AUTO: ABNORMAL
CO2 SERPL-SCNC: 28 MMOL/L (ref 23–29)
COLOR UR AUTO: YELLOW
CREAT SERPL-MCNC: 1.5 MG/DL (ref 0.5–1.4)
CREAT UR-MCNC: 121 MG/DL (ref 23–375)
DIFFERENTIAL METHOD: ABNORMAL
EOSINOPHIL # BLD AUTO: 0.3 K/UL (ref 0–0.5)
EOSINOPHIL NFR BLD: 3.6 % (ref 0–8)
ERYTHROCYTE [DISTWIDTH] IN BLOOD BY AUTOMATED COUNT: 15.6 % (ref 11.5–14.5)
EST. GFR  (AFRICAN AMERICAN): 50 ML/MIN/1.73 M^2
EST. GFR  (NON AFRICAN AMERICAN): 43 ML/MIN/1.73 M^2
ESTIMATED AVG GLUCOSE: 180 MG/DL (ref 68–131)
GLUCOSE SERPL-MCNC: 201 MG/DL (ref 70–110)
GLUCOSE UR QL STRIP: ABNORMAL
HBA1C MFR BLD HPLC: 7.9 % (ref 4–5.6)
HCT VFR BLD AUTO: 34.9 % (ref 40–54)
HDLC SERPL-MCNC: 43 MG/DL (ref 40–75)
HDLC SERPL: 35 % (ref 20–50)
HGB BLD-MCNC: 11 G/DL (ref 14–18)
HGB UR QL STRIP: NEGATIVE
KETONES UR QL STRIP: NEGATIVE
LDLC SERPL CALC-MCNC: 60.8 MG/DL (ref 63–159)
LEUKOCYTE ESTERASE UR QL STRIP: NEGATIVE
LYMPHOCYTES # BLD AUTO: 2.3 K/UL (ref 1–4.8)
LYMPHOCYTES NFR BLD: 33.7 % (ref 18–48)
MCH RBC QN AUTO: 27.1 PG (ref 27–31)
MCHC RBC AUTO-ENTMCNC: 31.5 G/DL (ref 32–36)
MCV RBC AUTO: 86 FL (ref 82–98)
MICROALBUMIN UR DL<=1MG/L-MCNC: 22 UG/ML
MICROSCOPIC COMMENT: NORMAL
MONOCYTES # BLD AUTO: 0.7 K/UL (ref 0.3–1)
MONOCYTES NFR BLD: 10.1 % (ref 4–15)
NEUTROPHILS # BLD AUTO: 3.6 K/UL (ref 1.8–7.7)
NEUTROPHILS NFR BLD: 51.6 % (ref 38–73)
NITRITE UR QL STRIP: NEGATIVE
NONHDLC SERPL-MCNC: 80 MG/DL
PH UR STRIP: 5 [PH] (ref 5–8)
PLATELET # BLD AUTO: 147 K/UL (ref 150–350)
PMV BLD AUTO: 11.5 FL (ref 9.2–12.9)
POTASSIUM SERPL-SCNC: 4.4 MMOL/L (ref 3.5–5.1)
PROT SERPL-MCNC: 7.4 G/DL (ref 6–8.4)
PROT UR QL STRIP: NEGATIVE
RBC # BLD AUTO: 4.06 M/UL (ref 4.6–6.2)
SODIUM SERPL-SCNC: 140 MMOL/L (ref 136–145)
SP GR UR STRIP: 1.02 (ref 1–1.03)
SQUAMOUS #/AREA URNS AUTO: 1 /HPF
TRIGL SERPL-MCNC: 96 MG/DL (ref 30–150)
URN SPEC COLLECT METH UR: ABNORMAL
WBC # BLD AUTO: 6.92 K/UL (ref 3.9–12.7)
WBC #/AREA URNS AUTO: 0 /HPF (ref 0–5)
YEAST UR QL AUTO: NORMAL

## 2019-05-29 PROCEDURE — 3074F PR MOST RECENT SYSTOLIC BLOOD PRESSURE < 130 MM HG: ICD-10-PCS | Mod: HCNC,CPTII,S$GLB, | Performed by: INTERNAL MEDICINE

## 2019-05-29 PROCEDURE — 3078F DIAST BP <80 MM HG: CPT | Mod: HCNC,CPTII,S$GLB, | Performed by: INTERNAL MEDICINE

## 2019-05-29 PROCEDURE — 80053 COMPREHEN METABOLIC PANEL: CPT | Mod: HCNC

## 2019-05-29 PROCEDURE — 99397 PR PREVENTIVE VISIT,EST,65 & OVER: ICD-10-PCS | Mod: HCNC,25,S$GLB, | Performed by: INTERNAL MEDICINE

## 2019-05-29 PROCEDURE — 99999 PR PBB SHADOW E&M-EST. PATIENT-LVL IV: CPT | Mod: PBBFAC,HCNC,, | Performed by: INTERNAL MEDICINE

## 2019-05-29 PROCEDURE — 80061 LIPID PANEL: CPT | Mod: HCNC

## 2019-05-29 PROCEDURE — 99397 PER PM REEVAL EST PAT 65+ YR: CPT | Mod: HCNC,25,S$GLB, | Performed by: INTERNAL MEDICINE

## 2019-05-29 PROCEDURE — 36415 COLL VENOUS BLD VENIPUNCTURE: CPT | Mod: HCNC

## 2019-05-29 PROCEDURE — 83036 HEMOGLOBIN GLYCOSYLATED A1C: CPT | Mod: HCNC

## 2019-05-29 PROCEDURE — 3078F PR MOST RECENT DIASTOLIC BLOOD PRESSURE < 80 MM HG: ICD-10-PCS | Mod: HCNC,CPTII,S$GLB, | Performed by: INTERNAL MEDICINE

## 2019-05-29 PROCEDURE — 99999 PR PBB SHADOW E&M-EST. PATIENT-LVL IV: ICD-10-PCS | Mod: PBBFAC,HCNC,, | Performed by: INTERNAL MEDICINE

## 2019-05-29 PROCEDURE — 3074F SYST BP LT 130 MM HG: CPT | Mod: HCNC,CPTII,S$GLB, | Performed by: INTERNAL MEDICINE

## 2019-05-29 PROCEDURE — 81001 URINALYSIS AUTO W/SCOPE: CPT | Mod: HCNC

## 2019-05-29 PROCEDURE — 82043 UR ALBUMIN QUANTITATIVE: CPT | Mod: HCNC

## 2019-05-29 PROCEDURE — 85025 COMPLETE CBC W/AUTO DIFF WBC: CPT | Mod: HCNC

## 2019-05-29 NOTE — PROGRESS NOTES
CHIEF COMPLAINT:  Annual exam.    HISTORY OF PRESENT ILLNESS:  The patient is an 83-year-old gentleman who we see   for coronary artery disease, hypertension, hyperlipidemia as well as insulin   requiring diabetes who comes in today for annual physical exam.  The patient has   no new complaints.    REVIEW OF SYSTEMS:  The patient reports weight has been staying the same.  He   reports having his eyes checked with Dr. Morgan earlier this month.  He does   report that he has hearing checked and he does need hearing aids; however, he   does not want to give him just yet.  He does complain of some chronic left ear   pain, which has been going on for quite some time.  No problems with chest pain   or shortness of breath.  He does not formally exercise but stays busy, cuts his   own yard.  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  No problems with nausea,   vomiting, no abdominal pain, no change in his bowel habits.  He reports his   urine stream is good since he is on finasteride.  He has not seen Dr. Thorpe in 2   years.  He does have a history of elevated PSA.  No weakness in the arms or   legs.  The patient does report having a cyst on his back, which has come back.    He also reports having a little small scaly spot near the left tragus, which is   very itchy and bothers him.  No numbness, tingling in the arms or legs.    PHYSICAL EXAMINATION:  GENERAL APPEARANCE:  No acute distress.  HEENT:  Conjunctivae are clear.  He does have bilateral lens replacements.  TMs   are clear.  Nasal septum is midline without discharge.  Oropharynx is without   erythema.  NECK:  Trachea is midline without JVD.  PULMONARY:  Good inspiratory, expiratory breath sounds are heard.  Lungs are   clear to auscultation.  CARDIOVASCULAR:  S1, S2.  Rhythm appeared to be normal.  2+ carotid pulses   without bruits.  EXTREMITIES:  With 1-2+ edema.  ABDOMEN:  Nontender, nondistended, without hepatosplenomegaly.  GENITOURINARY:  Deferred since the  patient will be referred to Urology.  LYMPHATICS:  No cervical or axillary adenopathy.  SKIN:  The patient's skin was inspected.  The patient had multiple seborrheic   keratoses.  The lesion in question in the left tragus was a horny spot, maybe a millimeter   in size.  The patient's feet were inspected.  He had a normal monofilament exam.    The patient did have flat feet.  He did have some mild onychomycosis.    ASSESSMENT:  1.  Physical exam.  2.  Insulin requiring diabetes.  3.  Hypertension.  4.  Hyperlipidemia.  5.  History of non-ST elevation MI.  6.  Flat feet.  7.  BPH with elevated PSA.  8.  Chronic left ear pain.  9.  Skin change.    PLAN:  Refer the patient to ENT, Dermatology and Urology.  We will put him in   for a CMP, CBC, lipid panel, UA and urine for microalbumin as well as an A1c and   stools for occult blood.      JDS/HN  dd: 05/29/2019 09:35:24 (CDT)  td: 05/29/2019 20:15:36 (CDT)  Doc ID   #[id number]  Job ID #[id number]    CC:

## 2019-05-30 ENCOUNTER — TELEPHONE (OUTPATIENT)
Dept: INTERNAL MEDICINE | Facility: CLINIC | Age: 84
End: 2019-05-30

## 2019-05-30 NOTE — TELEPHONE ENCOUNTER
----- Message from Gavin Martinez MD sent at 5/30/2019  4:55 AM CDT -----  Please call the patient regarding his abnormal result. Notify that his A1C did come down from 8.2 to 7.9. I would like to get it a little closer to 7.0. Please have him watch his diet more closely. His creatinine is a little elevated but stable ( kidney function). His blood count is a little low but stable.  Do want his to see a Nephrologist for his kidneys . Referral is in. I do want a CMP and A1c in 3 months. Order is in.

## 2019-05-31 NOTE — TELEPHONE ENCOUNTER
Pt informed. He is not happy with having to see a NP for a sebaceous cyst on his back. He wants to see a physician only. He will call the dept to reschedule. He does not want to schedule with a nephrologist until he can investigate the doctors. He also mentions not being happy about having lab done with his last visit with Dr Martinez and never was told to fast. He was informed that the hemoglobin A1c does not require fasting and that it used to indicate adverage blood sugars over the last 3 months. Lab appointment booked and mailed.

## 2019-06-05 ENCOUNTER — OFFICE VISIT (OUTPATIENT)
Dept: PODIATRY | Facility: CLINIC | Age: 84
End: 2019-06-05
Payer: MEDICARE

## 2019-06-05 ENCOUNTER — INITIAL CONSULT (OUTPATIENT)
Dept: DERMATOLOGY | Facility: CLINIC | Age: 84
End: 2019-06-05
Payer: MEDICARE

## 2019-06-05 ENCOUNTER — OFFICE VISIT (OUTPATIENT)
Dept: OTOLARYNGOLOGY | Facility: CLINIC | Age: 84
End: 2019-06-05
Payer: MEDICARE

## 2019-06-05 VITALS
HEART RATE: 64 BPM | BODY MASS INDEX: 33.41 KG/M2 | DIASTOLIC BLOOD PRESSURE: 51 MMHG | SYSTOLIC BLOOD PRESSURE: 119 MMHG | WEIGHT: 207 LBS

## 2019-06-05 VITALS
SYSTOLIC BLOOD PRESSURE: 107 MMHG | WEIGHT: 207 LBS | HEIGHT: 66 IN | DIASTOLIC BLOOD PRESSURE: 46 MMHG | HEART RATE: 59 BPM | BODY MASS INDEX: 33.27 KG/M2

## 2019-06-05 DIAGNOSIS — G47.33 OSA ON CPAP: ICD-10-CM

## 2019-06-05 DIAGNOSIS — L91.8 SKIN TAG: ICD-10-CM

## 2019-06-05 DIAGNOSIS — H92.02 LEFT EAR PAIN: Primary | ICD-10-CM

## 2019-06-05 DIAGNOSIS — L72.3 SEBACEOUS CYST: ICD-10-CM

## 2019-06-05 DIAGNOSIS — L82.0 INFLAMED SEBORRHEIC KERATOSIS: Primary | ICD-10-CM

## 2019-06-05 DIAGNOSIS — B35.1 ONYCHOMYCOSIS DUE TO DERMATOPHYTE: ICD-10-CM

## 2019-06-05 DIAGNOSIS — B35.3 TINEA PEDIS OF BOTH FEET: ICD-10-CM

## 2019-06-05 DIAGNOSIS — E11.42 DIABETIC POLYNEUROPATHY ASSOCIATED WITH TYPE 2 DIABETES MELLITUS: ICD-10-CM

## 2019-06-05 DIAGNOSIS — M26.629 TMJ PAIN DYSFUNCTION SYNDROME: ICD-10-CM

## 2019-06-05 DIAGNOSIS — M24.573 EQUINUS CONTRACTURE OF ANKLE: Primary | ICD-10-CM

## 2019-06-05 DIAGNOSIS — Z86.69 HISTORY OF HEARING LOSS: ICD-10-CM

## 2019-06-05 PROCEDURE — 3078F PR MOST RECENT DIASTOLIC BLOOD PRESSURE < 80 MM HG: ICD-10-PCS | Mod: HCNC,CPTII,S$GLB, | Performed by: OTOLARYNGOLOGY

## 2019-06-05 PROCEDURE — 99499 RISK ADDL DX/OHS AUDIT: ICD-10-PCS | Mod: HCNC,S$GLB,, | Performed by: PODIATRIST

## 2019-06-05 PROCEDURE — 17110 DESTRUCTION B9 LES UP TO 14: CPT | Mod: HCNC,S$GLB,, | Performed by: NURSE PRACTITIONER

## 2019-06-05 PROCEDURE — 99213 OFFICE O/P EST LOW 20 MIN: CPT | Mod: 25,HCNC,S$GLB, | Performed by: PODIATRIST

## 2019-06-05 PROCEDURE — 99499 RISK ADDL DX/OHS AUDIT: ICD-10-PCS | Mod: HCNC,S$GLB,, | Performed by: OTOLARYNGOLOGY

## 2019-06-05 PROCEDURE — 99999 PR PBB SHADOW E&M-EST. PATIENT-LVL III: ICD-10-PCS | Mod: PBBFAC,HCNC,, | Performed by: OTOLARYNGOLOGY

## 2019-06-05 PROCEDURE — 3078F PR MOST RECENT DIASTOLIC BLOOD PRESSURE < 80 MM HG: ICD-10-PCS | Mod: HCNC,CPTII,S$GLB, | Performed by: PODIATRIST

## 2019-06-05 PROCEDURE — 99499 UNLISTED E&M SERVICE: CPT | Mod: HCNC,S$GLB,, | Performed by: OTOLARYNGOLOGY

## 2019-06-05 PROCEDURE — 99213 OFFICE O/P EST LOW 20 MIN: CPT | Mod: HCNC,S$GLB,, | Performed by: OTOLARYNGOLOGY

## 2019-06-05 PROCEDURE — 99202 OFFICE O/P NEW SF 15 MIN: CPT | Mod: HCNC,25,S$GLB, | Performed by: NURSE PRACTITIONER

## 2019-06-05 PROCEDURE — 3074F PR MOST RECENT SYSTOLIC BLOOD PRESSURE < 130 MM HG: ICD-10-PCS | Mod: HCNC,CPTII,S$GLB, | Performed by: OTOLARYNGOLOGY

## 2019-06-05 PROCEDURE — 1101F PT FALLS ASSESS-DOCD LE1/YR: CPT | Mod: HCNC,CPTII,S$GLB, | Performed by: PODIATRIST

## 2019-06-05 PROCEDURE — 3074F PR MOST RECENT SYSTOLIC BLOOD PRESSURE < 130 MM HG: ICD-10-PCS | Mod: HCNC,CPTII,S$GLB, | Performed by: NURSE PRACTITIONER

## 2019-06-05 PROCEDURE — 99499 UNLISTED E&M SERVICE: CPT | Mod: HCNC,S$GLB,, | Performed by: PODIATRIST

## 2019-06-05 PROCEDURE — 99999 PR PBB SHADOW E&M-EST. PATIENT-LVL III: CPT | Mod: PBBFAC,HCNC,, | Performed by: PODIATRIST

## 2019-06-05 PROCEDURE — 3078F DIAST BP <80 MM HG: CPT | Mod: HCNC,CPTII,S$GLB, | Performed by: NURSE PRACTITIONER

## 2019-06-05 PROCEDURE — 1101F PR PT FALLS ASSESS DOC 0-1 FALLS W/OUT INJ PAST YR: ICD-10-PCS | Mod: HCNC,CPTII,S$GLB, | Performed by: NURSE PRACTITIONER

## 2019-06-05 PROCEDURE — 99213 PR OFFICE/OUTPT VISIT, EST, LEVL III, 20-29 MIN: ICD-10-PCS | Mod: HCNC,S$GLB,, | Performed by: OTOLARYNGOLOGY

## 2019-06-05 PROCEDURE — 99999 PR PBB SHADOW E&M-EST. PATIENT-LVL III: ICD-10-PCS | Mod: PBBFAC,HCNC,, | Performed by: NURSE PRACTITIONER

## 2019-06-05 PROCEDURE — 1101F PT FALLS ASSESS-DOCD LE1/YR: CPT | Mod: HCNC,CPTII,S$GLB, | Performed by: NURSE PRACTITIONER

## 2019-06-05 PROCEDURE — 99213 PR OFFICE/OUTPT VISIT, EST, LEVL III, 20-29 MIN: ICD-10-PCS | Mod: 25,HCNC,S$GLB, | Performed by: PODIATRIST

## 2019-06-05 PROCEDURE — 99999 PR PBB SHADOW E&M-EST. PATIENT-LVL III: CPT | Mod: PBBFAC,HCNC,, | Performed by: OTOLARYNGOLOGY

## 2019-06-05 PROCEDURE — 1101F PT FALLS ASSESS-DOCD LE1/YR: CPT | Mod: HCNC,CPTII,S$GLB, | Performed by: OTOLARYNGOLOGY

## 2019-06-05 PROCEDURE — 1101F PR PT FALLS ASSESS DOC 0-1 FALLS W/OUT INJ PAST YR: ICD-10-PCS | Mod: HCNC,CPTII,S$GLB, | Performed by: PODIATRIST

## 2019-06-05 PROCEDURE — 3074F SYST BP LT 130 MM HG: CPT | Mod: HCNC,CPTII,S$GLB, | Performed by: PODIATRIST

## 2019-06-05 PROCEDURE — 99999 PR PBB SHADOW E&M-EST. PATIENT-LVL III: ICD-10-PCS | Mod: PBBFAC,HCNC,, | Performed by: PODIATRIST

## 2019-06-05 PROCEDURE — 3078F PR MOST RECENT DIASTOLIC BLOOD PRESSURE < 80 MM HG: ICD-10-PCS | Mod: HCNC,CPTII,S$GLB, | Performed by: NURSE PRACTITIONER

## 2019-06-05 PROCEDURE — 99202 PR OFFICE/OUTPT VISIT, NEW, LEVL II, 15-29 MIN: ICD-10-PCS | Mod: HCNC,25,S$GLB, | Performed by: NURSE PRACTITIONER

## 2019-06-05 PROCEDURE — 1101F PR PT FALLS ASSESS DOC 0-1 FALLS W/OUT INJ PAST YR: ICD-10-PCS | Mod: HCNC,CPTII,S$GLB, | Performed by: OTOLARYNGOLOGY

## 2019-06-05 PROCEDURE — 3074F SYST BP LT 130 MM HG: CPT | Mod: HCNC,CPTII,S$GLB, | Performed by: OTOLARYNGOLOGY

## 2019-06-05 PROCEDURE — 3078F DIAST BP <80 MM HG: CPT | Mod: HCNC,CPTII,S$GLB, | Performed by: PODIATRIST

## 2019-06-05 PROCEDURE — 17110 PR DESTRUCTION BENIGN LESIONS UP TO 14: ICD-10-PCS | Mod: HCNC,S$GLB,, | Performed by: NURSE PRACTITIONER

## 2019-06-05 PROCEDURE — 3074F PR MOST RECENT SYSTOLIC BLOOD PRESSURE < 130 MM HG: ICD-10-PCS | Mod: HCNC,CPTII,S$GLB, | Performed by: PODIATRIST

## 2019-06-05 PROCEDURE — 3078F DIAST BP <80 MM HG: CPT | Mod: HCNC,CPTII,S$GLB, | Performed by: OTOLARYNGOLOGY

## 2019-06-05 PROCEDURE — 3074F SYST BP LT 130 MM HG: CPT | Mod: HCNC,CPTII,S$GLB, | Performed by: NURSE PRACTITIONER

## 2019-06-05 PROCEDURE — 99999 PR PBB SHADOW E&M-EST. PATIENT-LVL III: CPT | Mod: PBBFAC,HCNC,, | Performed by: NURSE PRACTITIONER

## 2019-06-05 RX ORDER — CICLOPIROX 7.7 MG/G
GEL TOPICAL 2 TIMES DAILY
Qty: 100 G | Refills: 3 | Status: SHIPPED | OUTPATIENT
Start: 2019-06-05

## 2019-06-05 RX ORDER — CICLOPIROX 80 MG/ML
SOLUTION TOPICAL NIGHTLY
Qty: 6.6 ML | Refills: 11 | Status: SHIPPED | OUTPATIENT
Start: 2019-06-05

## 2019-06-05 NOTE — PROGRESS NOTES
Subjective:      Patient ID: Anjum Cai Jr. is a 83 y.o. male.    Chief Complaint: Diabetic Foot Exam (dr gavin leslie 5/29/19)    Anjum is a 83 y.o. male who presents to the clinic for evaluation and treatment of high risk feet. Anjum has a past medical history of Anemia (2/26/2018), Arthritis, BPH (benign prostatic hyperplasia), Cataract, Colon polyp (11/18/2015), Colon polyps, Coronary artery disease, DR (diabetic retinopathy), Dyslipidemia, Elevated PSA, Goiter, nontoxic, multinodular, Headaches, cluster, Hypertension, Kidney stones, Lump in the testicle, Rotator cuff tendinitis, Sleep apnea with use of continuous positive airway pressure (CPAP), and Type II or unspecified type diabetes mellitus with other specified manifestations, uncontrolled. The patient's chief complaint is long, thick toenails. Gradual onset, worsening over past several weeks, aggravated by increased weight bearing, shoe gear, pressure.  Periodic debridement helps symptoms .    PCP: Gvain Leslie MD    Date Last Seen by PCP:   Chief Complaint   Patient presents with    Diabetic Foot Exam     dr gavin leslie 5/29/19         Current shoe gear:  Affected Foot: Casual shoes     Unaffected Foot: Casual shoes    Hemoglobin A1C   Date Value Ref Range Status   05/29/2019 7.9 (H) 4.0 - 5.6 % Final     Comment:     ADA Screening Guidelines:  5.7-6.4%  Consistent with prediabetes  >or=6.5%  Consistent with diabetes  High levels of fetal hemoglobin interfere with the HbA1C  assay. Heterozygous hemoglobin variants (HbS, HgC, etc)do  not significantly interfere with this assay.   However, presence of multiple variants may affect accuracy.     08/01/2018 8.2 (H) 4.0 - 5.6 % Final     Comment:     ADA Screening Guidelines:  5.7-6.4%  Consistent with prediabetes  >or=6.5%  Consistent with diabetes  High levels of fetal hemoglobin interfere with the HbA1C  assay. Heterozygous hemoglobin variants (HbS, HgC, etc)do  not significantly interfere with this  assay.   However, presence of multiple variants may affect accuracy.     02/24/2018 8.5 (H) 4.0 - 5.6 % Final     Comment:     According to ADA guidelines, hemoglobin A1c <7.0% represents  optimal control in non-pregnant diabetic patients. Different  metrics may apply to specific patient populations.   Standards of Medical Care in Diabetes-2016.  For the purpose of screening for the presence of diabetes:  <5.7%     Consistent with the absence of diabetes  5.7-6.4%  Consistent with increasing risk for diabetes   (prediabetes)  >or=6.5%  Consistent with diabetes  Currently, no consensus exists for use of hemoglobin A1c  for diagnosis of diabetes for children.  This Hemoglobin A1c assay has significant interference with fetal   hemoglobin   (HbF). The results are invalid for patients with abnormal amounts of   HbF,   including those with known Hereditary Persistence   of Fetal Hemoglobin. Heterozygous hemoglobin variants (HbAS, HbAC,   HbAD, HbAE, HbA2) do not significantly interfere with this assay;   however, presence of multiple variants in a sample may impact the %   interference.         Review of Systems   Constitution: Negative for chills, diaphoresis, fever, malaise/fatigue and night sweats.   Cardiovascular: Positive for leg swelling. Negative for claudication, cyanosis and syncope.   Skin: Positive for itching and nail changes. Negative for color change, dry skin, rash, suspicious lesions and unusual hair distribution.   Musculoskeletal: Negative for falls, joint pain, joint swelling, muscle cramps, muscle weakness and stiffness.   Gastrointestinal: Negative for constipation, diarrhea, nausea and vomiting.   Neurological: Positive for paresthesias and sensory change. Negative for brief paralysis, disturbances in coordination, focal weakness, numbness and tremors.           Objective:      Physical Exam   Constitutional: He is oriented to person, place, and time. He appears well-developed and well-nourished.  He is cooperative.   Oriented to time, place, and person.   Cardiovascular:   Pulses:       Dorsalis pedis pulses are 1+ on the right side, and 1+ on the left side.        Posterior tibial pulses are 1+ on the right side, and 1+ on the left side.   Capillary fill time 3-5 seconds.  All toes warm to touch.      <2+ pitting lower extremity edema bilateral.    Negative elevational pallor and dependent rubor bilateral.     Musculoskeletal:   Normal angle, base, station of gait. Decreased stride length, early heel off, moderately propulsive toe off bilateral.    All ten toes without clubbing, cyanosis, or signs of ischemia.      Ankle dorsiflexion decreased at <10 degrees bilateral with moderate increase with knee flexion bilateral.      No pain to palpation bilateral lower extremities.      Range of motion, stability, muscle strength, and muscle tone are age and health appropriate normal bilateral feet and legs.       Lymphadenopathy:   Negative lymphadenopathy bilateral popliteal fossa and tarsal tunnel.  Negative lymphangitic streaking bilateral foot/ankle bilateral.     Neurological: He is alert and oriented to person, place, and time. He has normal strength. He is not disoriented. He displays no atrophy and no tremor. A sensory deficit is present. He exhibits normal muscle tone.   Reflex Scores:       Patellar reflexes are 2+ on the right side and 2+ on the left side.       Achilles reflexes are 2+ on the right side and 2+ on the left side.  Decreased/absent vibratory sensation bilateral feet to 128Hz tuning fork.    Paresthesias, and burning bilateral feet with no clearly identified trigger or source.       Skin: Skin is warm, dry and intact. No abrasion, no bruising, no burn, no ecchymosis, no laceration, no lesion, no petechiae and no rash noted. He is not diaphoretic. No cyanosis or erythema. No pallor. Nails show no clubbing.   Dry scale with superficial flakes over an erythematous base on arches and between  toes both feet without ulceration, drainage, pus, tracking, fluctuance, malodor, or cardinal signs infection.      Skin thin, atrophic, with decreased density and distribution of pedal hair bilateral, but without hyperpigmentation, meghna discoloration,  ulcers, masses, nodules or cords palpated bilateral feet and legs.      Toenails 1st,bilateral are hypertrophic thickened 2-3 mm, dystrophic, discolored tanish brown with tan, gray crumbly subungual debris.  Neatly trimmed and nontender to distal nail plate pressure, without periungual skin abnormality of each.    History preulcerative callus dr. Thayer note 10/2017.  Shoes preventing now.             Assessment:       Encounter Diagnoses   Name Primary?    Equinus contracture of ankle Yes    Diabetic polyneuropathy associated with type 2 diabetes mellitus     Tinea pedis of both feet     Onychomycosis due to dermatophyte          Plan:       Anjum was seen today for diabetic foot exam.    Diagnoses and all orders for this visit:    Equinus contracture of ankle  -     DIABETIC SHOES FOR HOME USE    Diabetic polyneuropathy associated with type 2 diabetes mellitus  -     DIABETIC SHOES FOR HOME USE    Tinea pedis of both feet  -     DIABETIC SHOES FOR HOME USE    Onychomycosis due to dermatophyte    Other orders  -     ciclopirox 0.77 % Gel; Apply topically 2 (two) times daily.  -     ciclopirox (PENLAC) 8 % Soln; Apply topically nightly.      I counseled the patient on his conditions, their implications and medical management.        - Shoe inspection. Diabetic Foot Education. Patient reminded of the importance of good nutrition and blood sugar control to help prevent podiatric complications of diabetes. Patient instructed on proper foot hygeine. We discussed wearing proper shoe gear, daily foot inspections, never walking without protective shoe gear, never putting sharp instruments to feet, routine podiatric visits at least annually.      - With patient's  permission, nails were aggressively reduced and debrided x 10 to their soft tissue attachment mechanically and with electric , removing all offending nail and debris. Patient relates relief following the procedure. He will continue to monitor the areas daily, inspect his feet, wear protective shoe gear when ambulatory, moisturizer to maintain skin integrity and follow in this office p.r.n.    .evelyn    Discussed conservative treatment with shoes of adequate dimensions, material, and style to alleviate symptoms and delay or prevent surgical intervention.           Follow up in about 1 year (around 6/5/2020).

## 2019-06-05 NOTE — LETTER
June 5, 2019      Gavin Martinez MD  [address]  [place] [address]           [facility] - Dermatology  [address]  [place] [address]  Phone: [phone]  Fax: [phone]          Patient: Anjum Cai Jr.   MR Number: [id number]   YOB: 1935   Date of Visit: 6/5/2019       Dear Dr. Gavin Martinez:    Thank you for referring Anjum Cai to me for evaluation. Attached you will find relevant portions of my assessment and plan of care.    If you have questions, please do not hesitate to call me. I look forward to following Anjum Cai along with you.    Sincerely,    Luna Chacon, NP    Enclosure  CC:  No Recipients    If you would like to receive this communication electronically, please contact [email or web address] or [phone] to request more information on [employer] access.    For providers and/or their staff who would like to refer a patient to [facility], please contact us through our one-stop-shop provider referral line, [facility], at [phone].    If you feel you have received this communication in error or would no longer like to receive these types of communications, please e-mail [email or web address]

## 2019-06-05 NOTE — LETTER
June 6, 2019      Gavin Martinez MD  [address]  [place] [address]           [facility] - Otorhinolaryngology  [address]  [place] [facility] [address]  Phone: [phone]  Fax: [phone]          Patient: Anjum Cai Jr.   MR Number: [id number]   YOB: 1935   Date of Visit: 6/5/2019       Dear Dr. Gavin Martinez:    Thank you for referring Anjum Cai to me for evaluation. Attached you will find relevant portions of my assessment and plan of care.    If you have questions, please do not hesitate to call me. I look forward to following Anjum Cai along with you.    Sincerely,    Jay Sanchez III, MD    Enclosure  CC:  No Recipients    If you would like to receive this communication electronically, please contact [email or web address] or [phone] to request more information on [employer] Link access.    For providers and/or their staff who would like to refer a patient to [facility], please contact us through our one-stop-shop provider referral line, [facility], at [phone].    If you feel you have received this communication in error or would no longer like to receive these types of communications, please e-mail [email or web address]

## 2019-06-05 NOTE — PATIENT INSTRUCTIONS
Audiometry offered / deferred today  Copy of 1/2018 audiogram provided; pt. is a candidate for amplification for one or both ears  TMJ otalgia literature   Dental/oral surgical consultation re: recurrent left TMJ sx encouraged   Hydration for salivary function encouraged  Monitor hearing yearly

## 2019-06-05 NOTE — PROGRESS NOTES
"  Subjective:       Patient ID:  Anjum Cai Jr. is a 83 y.o. male who presents for   Chief Complaint   Patient presents with    Cyst     left back, [id number], tender, no tx      Cyst  - Initial  Affected locations: back  Duration: 6 months  Signs / symptoms: tender and irritated  Severity: mild  Timing: constant  Aggravated by: pressure  Relieving factors/Treatments tried: nothing    Mole  - Initial  Affected locations: face  Signs / symptoms: growing  Severity: mild  Timing: constant  Aggravated by: nothing  Relieving factors/Treatments tried: nothing      Denies any previous h/o NMSC or MM    Reports has had x1 previous mole removed that was "almost cancer"    Review of Systems   Skin: Negative for daily sunscreen use, activity-related sunscreen use and wears hat.        Objective:    Physical Exam   Constitutional: He appears well-developed and well-nourished. No distress.   Neurological: He is alert and oriented to person, place, and time. He is not disoriented.   Psychiatric: He has a normal mood and affect.   Skin:   Areas Examined (abnormalities noted in diagram):   Head / Face Inspection Performed  Neck Inspection Performed  Chest / Axilla Inspection Performed  Back Inspection Performed  RUE Inspected  LUE Inspection Performed                   Diagram Legend     Erythematous scaling macule/papule c/w actinic keratosis       Vascular papule c/w angioma      Pigmented verrucoid papule/plaque c/w seborrheic keratosis      Yellow umbilicated papule c/w sebaceous hyperplasia      Irregularly shaped tan macule c/w lentigo     1-2 mm smooth white papules consistent with Milia      Movable subcutaneous cyst with punctum c/w epidermal inclusion cyst      Subcutaneous movable cyst c/w pilar cyst      Firm pink to brown papule c/w dermatofibroma      Pedunculated fleshy papule(s) c/w skin tag(s)      Evenly pigmented macule c/w junctional nevus     Mildly variegated pigmented, slightly irregular-bordered macule " c/w mildly atypical nevus      Flesh colored to evenly pigmented papule c/w intradermal nevus       Pink pearly papule/plaque c/w basal cell carcinoma      Erythematous hyperkeratotic cursted plaque c/w SCC      Surgical scar with no sign of skin cancer recurrence      Open and closed comedones      Inflammatory papules and pustules      Verrucoid papule consistent consistent with wart     Erythematous eczematous patches and plaques     Dystrophic onycholytic nail with subungual debris c/w onychomycosis     Umbilicated papule    Erythematous-base heme-crusted tan verrucoid plaque consistent with inflamed seborrheic keratosis     Erythematous Silvery Scaling Plaque c/w Psoriasis     See annotation          Assessment / Plan:        Inflamed seborrheic keratosis  Cryosurgery procedure note:    Verbal consent from the patient is obtained. Liquid nitrogen cryosurgery is applied to 4 lesions to produce a freeze injury. The patient is aware that blisters may form and is instructed on wound care with gentle cleansing and use of vaseline ointment to keep moist until healed. The patient is supplied a handout on cryosurgery and is instructed to call if lesions do not completely resolve.      Skin tag  Reassurance given to patient. No treatment is necessary.   Treatment of benign, asymptomatic lesions may be considered cosmetic.      Sebaceous cyst  -     Ambulatory referral to General Surgery             Follow up in about 6 months (around 12/5/2019) for skin cancer screening w/ MD.

## 2019-06-05 NOTE — PROGRESS NOTES
"Subjective:       Patient ID: Anjum Cai Jr. is a 83 y.o. male.    Chief Complaint: Otalgia    HPI: Mr. Cai is an 83 year old diabetic CM with CAD whose handwritten reason for the visit today is "ear pain".  He specifically indicates left jaw pain when chewing; i.e., it hurts to chew.  His jaw feels off.  The pain comes and goes.  It s not there presently.  He has an upcoming dental appointment.  He indicates a history of GERD symptoms.  He has been wearing a CPAP mask for years which really helps his DAMIAN condition.  He completed an annual physical exam with Gavin henderson 05/29/2019.  Mr. Cai indicated having his hearing checked and the need for hearing aids.  He did not want take get them just yet however.    He did complain of some chronic left ear pain which in been going on for quite some time .  He was referred to the ENT service for further evaluation of the chronic left ear pain specifically.    He was diagnosed with insulin requiring diabetes, hypertension, hyperlipidemia, non ST elevation MI history, flat feet, BPH and  skin changes ( skin lesion of the left tragus; 1 mm "horny" lesion)       PMH: heart disease, diabetes, DAMIAN/CPAP    Past Surgical History:   Procedure Laterality Date    ARTHROSCOPY-SHOULDER WITH SUBACROMIAL DECOMPRESSION Right 6/24/2014    Performed by [facility] at [facility] OR 1ST FLR    ARTHROSCOPY-SHOULDER, I & D of Right Shoulder Right 9/9/2014    Performed by [facility] at [facility] OR 1ST FLR    CARDIAC CATHETERIZATION  2007    CATARACT EXTRACTION W/  INTRAOCULAR LENS IMPLANT Right 09/27/2016    Dr. Garcia    CHOLECYSTECTOMY  02/2018    CHOLECYSTECTOMY-LAPAROSCOPIC N/A 2/21/2018    Performed by Arnaud Avery MD at [facility] OR 2ND FLR    COLONOSCOPY N/A 7/9/2015    Performed by Gavin Stout MD at [facility] ENDO (4TH FLR)    COLONOSCOPY N/A 4/9/2014    Performed by Gavin [facility]out MD at [facility] ENDO (4TH FLR)    COLONOSCOPY-DEVICE ASSISTED N/A " 11/18/2015    Performed by Donaldo Patino MD at [facility] ENDO (2ND FLR)    CORONARY STENT PLACEMENT  02/02/2007    LAD, RCAx2    HEART CATH-LEFT N/A 12/9/2016    Performed by Justin Mars MD at [facility] CATH LAB    INGUINAL HERNIA REPAIR Right 1995    INSERTION-INTRAOCULAR LENS (IOL) Right 9/27/2016    Performed by Jose A Garcia MD at [facility] OR 1ST FLR    PHACOEMULSIFICATION-ASPIRATION-CATARACT Right 9/27/2016    Performed by Jose A Garcia MD at [facility] OR 1ST FLR    PROSTATE BIOPSY  2012    RELEASE-CARPAL TUNNEL Right 5/15/2018    Performed by Shade Lilly Jr., [facility] at [facility] OR    RELEASE-FINGER-TRIGGER Right 5/15/2018    Performed by Shade Lilly Jr., MD at [facility] OR    RELEASE-FINGER-TRIGGER Left 10/28/2016    Performed by Shade Lilly Jr., MD at [facility] OR    REPAIR ROTATOR CUFF ARTHROSCOPIC/right shoulder labral tear repair, rotator cuff repair and biceps tenotomy Right 6/24/2014    Performed by [facility]ar Keita MD at [facility] OR 1ST FLR    REPAIR, HERNIA, INGUINAL, INCARCERATED with MESH, INITIAL, AGE 5 YEARS OR OLDER Left 8/1/2018    Performed by Ar[facility]ud Avery MD at [facility] OR 2ND FLR    REPAIR, ROTATOR CUFF, ARTHROSCOPIC Left 6/18/2013    Performed by [facility]akar Keita MD at [facility] OR 1ST FLR    REPAIR-TENDON-BICEP/right shoulder labral tear repair, rotator cuff repair and biceps tenotomy Right 6/24/2014    Performed by [facility]ar Keita MD at [facility] OR 1ST FLR    ROTATOR CUFF REPAIR Right 2014    x2    ROTATOR CUFF REPAIR Left 2013    TONSILLECTOMY, ADENOIDECTOMY      as child    TRIGGER FINGER RELEASE Right 2004    x2   Fam hx: heart disease, diabetes  OCC: retired    Review of Systems   Ears: Positive for hearing loss, ear pain and family history of hearing loss.    Cardiovascular:  Positive for history of high blood pressure.   Gastrointestinal:  Positive for acid reflux.   Other:  Positive for prostate disease.      Current Outpatient Medications on File Prior to Visit  "  Medication Sig Dispense Refill    ACCU-CHEK FASTCLIX LANCET DRUM Misc USE FOUR TIMES DAILY 204 each PRN    ACCU-CHEK SMARTVIEW TEST STRIP Strp Check blood sugars three times daily before meals. 200 each 2    ASPIRIN (ASPIR-81 ORAL) Take 1 tablet by mouth Daily.      atorvastatin (LIPITOR) 40 MG tablet TAKE 1 TABLET(40 MG) BY MOUTH EVERY DAY 90 tablet 4    BD INSULIN SYRINGE ULTRA-FINE 1 mL 30 x 1/2" Syrg USE AS DIRECTED FOUR TIMES DAILY 200 each 11    BD ULTRA-FINE MINI PEN NEEDLE 31 gauge x 3/16" Ndle USE WITH INSULIN INJECTION FOUR TIMES DAILY 100 each 3    ergocalciferol (VITAMIN D2) 50,000 unit Cap TAKE ONE CAPSULE BY MOUTH EVERY 30 DAYS 4 capsule 3    finasteride (PROSCAR) 5 mg tablet Take 1 tablet (5 mg total) by mouth once daily. 90 tablet 0    finasteride (PROSCAR) 5 mg tablet Take 1 tablet (5 mg total) by mouth once daily. 30 tablet 10    insulin (LANTUS SOLOSTAR U-100 INSULIN) glargine 100 units/mL (3mL) SubQ pen ADMINISTER 65 UNITS UNDER THE SKIN EVERY EVENING 30 mL 0    insulin aspart U-100 (NOVOLOG FLEXPEN U-100 INSULIN) 100 unit/mL InPn pen INJECT 12 UNITS UNDER THE SKIN THREE TIMES DAILY WITH MEALS PER SLIDING SCALE 15 mL 3    irbesartan (AVAPRO) 75 MG tablet Take 1 tablet (75 mg total) by mouth once daily. 30 tablet 6    metoprolol succinate (TOPROL-XL) 50 MG 24 hr tablet Take 1 tablet (50 mg total) by mouth once daily. 90 tablet 3    MULTIVITAMIN W-MINERALS/LUTEIN (CENTRUM SILVER ORAL) Take by mouth once daily.      nitroGLYCERIN (NITROSTAT) 0.4 MG SL tablet Place 1 tablet (0.4 mg total) under the tongue every 5 (five) minutes as needed for Chest pain. 60 tablet 12    omega-3 fatty acids-vitamin E (FISH OIL) 1,000 mg Cap Take 2 capsules by mouth Daily.       omeprazole (PRILOSEC) 20 MG capsule Take 1 capsule (20 mg total) by mouth 2 (two) times daily before meals. 180 capsule 3    ciclopirox (PENLAC) 8 % Soln Apply topically nightly. 6.6 mL 11    ciclopirox 0.77 % Gel Apply " topically 2 (two) times daily. 100 g 3     No current facility-administered medications on file prior to visit.            His medical problem list includes cluster headaches, carpal tunnel syndrome of right wrist, controlled type 2 diabetes with retinopathy, dyslipidemia, coronary artery disease, history of PTCA x2, hyperlipidemia, essential hypertension, right carotid bruit car venous insufficiency, BPH, kidney stones, anemia, goiter, colon polyp, bilateral leg edema among others      The patient completed an audiometric study performed by the [facility] audiology service LAST YEAR.  That study is duplicated below for reference;  the results are reviewed with the patient  Objective:           Physical Exam   HENT:   Head:       Ears:    Mouth/Throat:           Assessment:       1. Left ear pain    2. TMJ pain dysfunction syndrome ., left   3. DAMIAN on CPAP    4. History of hearing loss        Plan:     Audiometry offered / deferred today  Copy of 1/2018 audiogram provided; pt. is a candidate for amplification for one or both ears  TMJ otalgia literature   Dental/oral surgical consultation re: recurrent left TMJ sx encouraged   Hydration for salivary function encouraged  Monitor hearing yearly

## 2019-06-11 RX ORDER — ATORVASTATIN CALCIUM 40 MG/1
TABLET, FILM COATED ORAL
Qty: 90 TABLET | Refills: 4 | Status: SHIPPED | OUTPATIENT
Start: 2019-06-11 | End: 2020-06-24

## 2019-06-26 ENCOUNTER — PATIENT MESSAGE (OUTPATIENT)
Dept: INTERNAL MEDICINE | Facility: CLINIC | Age: 84
End: 2019-06-26

## 2019-06-27 ENCOUNTER — PES CALL (OUTPATIENT)
Dept: ADMINISTRATIVE | Facility: CLINIC | Age: 84
End: 2019-06-27

## 2019-07-08 RX ORDER — INSULIN GLARGINE 100 [IU]/ML
INJECTION, SOLUTION SUBCUTANEOUS
Qty: 30 ML | Refills: 0 | Status: SHIPPED | OUTPATIENT
Start: 2019-07-08 | End: 2019-08-29 | Stop reason: SDUPTHER

## 2019-07-16 ENCOUNTER — OFFICE VISIT (OUTPATIENT)
Dept: UROLOGY | Facility: CLINIC | Age: 84
End: 2019-07-16
Payer: MEDICARE

## 2019-07-16 VITALS
HEIGHT: 67 IN | DIASTOLIC BLOOD PRESSURE: 65 MMHG | SYSTOLIC BLOOD PRESSURE: 128 MMHG | BODY MASS INDEX: 32.18 KG/M2 | HEART RATE: 76 BPM | WEIGHT: 205 LBS

## 2019-07-16 DIAGNOSIS — N40.1 BENIGN NON-NODULAR PROSTATIC HYPERPLASIA WITH LOWER URINARY TRACT SYMPTOMS: ICD-10-CM

## 2019-07-16 DIAGNOSIS — N40.1 BENIGN PROSTATIC HYPERPLASIA WITH URINARY OBSTRUCTION: Primary | ICD-10-CM

## 2019-07-16 DIAGNOSIS — N13.8 BENIGN PROSTATIC HYPERPLASIA WITH URINARY OBSTRUCTION: Primary | ICD-10-CM

## 2019-07-16 DIAGNOSIS — R97.20 ELEVATED PSA: ICD-10-CM

## 2019-07-16 PROCEDURE — 99213 PR OFFICE/OUTPT VISIT, EST, LEVL III, 20-29 MIN: ICD-10-PCS | Mod: HCNC,S$GLB,, | Performed by: UROLOGY

## 2019-07-16 PROCEDURE — 3074F PR MOST RECENT SYSTOLIC BLOOD PRESSURE < 130 MM HG: ICD-10-PCS | Mod: HCNC,CPTII,S$GLB, | Performed by: UROLOGY

## 2019-07-16 PROCEDURE — 3074F SYST BP LT 130 MM HG: CPT | Mod: HCNC,CPTII,S$GLB, | Performed by: UROLOGY

## 2019-07-16 PROCEDURE — 1101F PT FALLS ASSESS-DOCD LE1/YR: CPT | Mod: HCNC,CPTII,S$GLB, | Performed by: UROLOGY

## 2019-07-16 PROCEDURE — 3078F DIAST BP <80 MM HG: CPT | Mod: HCNC,CPTII,S$GLB, | Performed by: UROLOGY

## 2019-07-16 PROCEDURE — 3078F PR MOST RECENT DIASTOLIC BLOOD PRESSURE < 80 MM HG: ICD-10-PCS | Mod: HCNC,CPTII,S$GLB, | Performed by: UROLOGY

## 2019-07-16 PROCEDURE — 99999 PR PBB SHADOW E&M-EST. PATIENT-LVL IV: CPT | Mod: PBBFAC,HCNC,, | Performed by: UROLOGY

## 2019-07-16 PROCEDURE — 99999 PR PBB SHADOW E&M-EST. PATIENT-LVL IV: ICD-10-PCS | Mod: PBBFAC,HCNC,, | Performed by: UROLOGY

## 2019-07-16 PROCEDURE — 99213 OFFICE O/P EST LOW 20 MIN: CPT | Mod: HCNC,S$GLB,, | Performed by: UROLOGY

## 2019-07-16 PROCEDURE — 1101F PR PT FALLS ASSESS DOC 0-1 FALLS W/OUT INJ PAST YR: ICD-10-PCS | Mod: HCNC,CPTII,S$GLB, | Performed by: UROLOGY

## 2019-07-16 RX ORDER — FINASTERIDE 5 MG/1
5 TABLET, FILM COATED ORAL DAILY
Qty: 90 TABLET | Refills: 3 | Status: SHIPPED | OUTPATIENT
Start: 2019-07-16 | End: 2020-07-01 | Stop reason: SDUPTHER

## 2019-07-16 NOTE — LETTER
July 16, 2019      Gavin Martinez MD  [address]  [place] [address]           [facility] - Urology 4th Floor  [address]  [place] [address]  Phone: [phone]          Patient: Anjum Cai Jr.   MR Number: [id number]   YOB: 1935   Date of Visit: 7/16/2019       Dear Dr. Gavin Martinez:    Thank you for referring Anjum Cai to me for evaluation. Attached you will find relevant portions of my assessment and plan of care.    If you have questions, please do not hesitate to call me. I look forward to following Anjum Cai along with you.    Sincerely,    Alexis Thorpe MD    Enclosure  CC:  No Recipients    If you would like to receive this communication electronically, please contact [email or web address] or [phone] to request more information on [employer] Link access.    For providers and/or their staff who would like to refer a patient to [facility], please contact us through our one-stop-shop provider referral line, [facility], at [phone].    If you feel you have received this communication in error or would no longer like to receive these types of communications, please e-mail [email or web address]

## 2019-07-16 NOTE — PROGRESS NOTES
CC: elevated PSA    Anjum Cai Jr. is a 83 y.o. man who is here for the evaluation of No chief complaint on file.    A new pt referred by his PCP, Dr. Gavin Martinez.    last seen by me on 7/14/17.  had a negative prostate bx back on 3/28/13 by me for elevated PSA of 10. His prostate was 40 grams in size and no cancer was found.  Finasteride started for his LUTS and elevated PSA.  He has done well and reports no problem with urination.  He sleeps thru at night.  Voices no problem with urination.  Denies flank pain, dysuira, hematuria .     He had a MI back in 2016 and was not able to stop anti-coagulation if prostate biopsy was indicated.  He reports that he no longer takes blood thinning meds other there ASA and fish oil.  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    Past Medical History:   Diagnosis Date    Anemia 2/26/2018    Arthritis     BPH (benign prostatic hyperplasia)     Cataract     right     Colon polyp 11/18/2015    Colon polyps     Coronary artery disease     DR (diabetic retinopathy)     Dyslipidemia     Elevated PSA     Goiter, nontoxic, multinodular     Headaches, cluster     Hypertension     Kidney stones     Lump in the testicle     Left    Rotator cuff tendinitis     Right shoulder    Sleep apnea with use of continuous positive airway pressure (CPAP)     uses cpap at night    Type II or unspecified type diabetes mellitus with other specified manifestations, uncontrolled      Past Surgical History:   Procedure Laterality Date    ARTHROSCOPY-SHOULDER WITH SUBACROMIAL DECOMPRESSION Right 6/24/2014    Performed by [facility] at [facility] OR 1ST FLR    ARTHROSCOPY-SHOULDER, I & D of Right Shoulder Right 9/9/2014    Performed by [facility] at [facility] OR 1ST FLR    CARDIAC CATHETERIZATION  2007    CATARACT EXTRACTION W/  INTRAOCULAR LENS IMPLANT Right 09/27/2016    Dr. Garcia    CHOLECYSTECTOMY  02/2018    CHOLECYSTECTOMY-LAPAROSCOPIC N/A 2/21/2018    Performed by  Ar[facility]ud Avery MD at [facility] OR 2ND FLR    COLONOSCOPY N/A 7/9/2015    Performed by Gavin Stout MD at [facility] ENDO (4TH FLR)    COLONOSCOPY N/A 4/9/2014    Performed by Gavin Stout MD at [facility] ENDO (4TH FLR)    COLONOSCOPY-DEVICE ASSISTED N/A 11/18/2015    Performed by Donaldo Patino MD at [facility] ENDO (2ND FLR)    CORONARY STENT PLACEMENT  02/02/2007    LAD, RCAx2    HEART CATH-LEFT N/A 12/9/2016    Performed by Justin Mars MD at [facility] CATH LAB    INGUINAL HERNIA REPAIR Right 1995    INSERTION-INTRAOCULAR LENS (IOL) Right 9/27/2016    Performed by Jsoe A Garcia MD at [facility] OR 1ST FLR    PHACOEMULSIFICATION-ASPIRATION-CATARACT Right 9/27/2016    Performed by Jose A Garcia MD at [facility] OR 1ST FLR    PROSTATE BIOPSY  2012    RELEASE-CARPAL TUNNEL Right 5/15/2018    Performed by Shade Lilly Jr., [facility] at [facility] OR    RELEASE-FINGER-TRIGGER Right 5/15/2018    Performed by Shade [facility] Jr., [facility] at [facility] OR    RELEASE-FINGER-TRIGGER Left 10/28/2016    Performed by Shade Lilly Jr., MD at [facility] OR    REPAIR ROTATOR CUFF ARTHROSCOPIC/right shoulder labral tear repair, rotator cuff repair and biceps tenotomy Right 6/24/2014    Performed by Sud[facility]akar Keita MD at [facility] OR 1ST FLR    REPAIR, HERNIA, INGUINAL, INCARCERATED with MESH, INITIAL, AGE 5 YEARS OR OLDER Left 8/1/2018    Performed by Arnaud Avery MD at [facility] OR 2ND FLR    REPAIR, ROTATOR CUFF, ARTHROSCOPIC Left 6/18/2013    Performed by [facility]akar Keita MD at [facility] OR 1ST FLR    REPAIR-TENDON-BICEP/right shoulder labral tear repair, rotator cuff repair and biceps tenotomy Right 6/24/2014    Performed by [facility]akar Keita MD at [facility] OR 1ST FLR    ROTATOR CUFF REPAIR Right 2014    x2    ROTATOR CUFF REPAIR Left 2013    TONSILLECTOMY, ADENOIDECTOMY      as child    TRIGGER FINGER RELEASE Right 2004    x2     Social History     Tobacco Use    Smoking status: Former Smoker     Last attempt to quit: 3/12/1978      "Years since quittin.3    Smokeless tobacco: Never Used   Substance Use Topics    Alcohol use: No    Drug use: No     Family History   Problem Relation Age of Onset    Cataracts Father     Heart disease Father     Cancer Mother     Diabetes Mother     Amblyopia Neg Hx     Blindness Neg Hx     Glaucoma Neg Hx     Macular degeneration Neg Hx     Retinal detachment Neg Hx     Strabismus Neg Hx      Allergy:  Review of patient's allergies indicates:   Allergen Reactions    Ace inhibitors      Other reaction(s): cough    Cefadroxil      Other reaction(s): possible vasculitis  Other reaction(s): vasculitis     Outpatient Encounter Medications as of 2019   Medication Sig Dispense Refill    ACCU-CHEK FASTCLIX LANCET DRUM Misc USE FOUR TIMES DAILY 204 each PRN    ACCU-CHEK SMARTVIEW TEST STRIP Strp Check blood sugars three times daily before meals. 200 each 2    ASPIRIN (ASPIR-81 ORAL) Take 1 tablet by mouth Daily.      atorvastatin (LIPITOR) 40 MG tablet TAKE 1 TABLET(40 MG) BY MOUTH EVERY DAY 90 tablet 4    atorvastatin (LIPITOR) 40 MG tablet TAKE 1 TABLET(40 MG) BY MOUTH EVERY DAY 90 tablet 4    BD INSULIN SYRINGE ULTRA-FINE 1 mL 30 x 1/2" Syrg USE AS DIRECTED FOUR TIMES DAILY 200 each 11    BD ULTRA-FINE MINI PEN NEEDLE 31 gauge x 3/16" Ndle USE WITH INSULIN INJECTION FOUR TIMES DAILY 100 each 3    ciclopirox (PENLAC) 8 % Soln Apply topically nightly. 6.6 mL 11    ciclopirox 0.77 % Gel Apply topically 2 (two) times daily. 100 g 3    ergocalciferol (VITAMIN D2) 50,000 unit Cap TAKE ONE CAPSULE BY MOUTH EVERY 30 DAYS 4 capsule 3    finasteride (PROSCAR) 5 mg tablet Take 1 tablet (5 mg total) by mouth once daily. 90 tablet 0    finasteride (PROSCAR) 5 mg tablet Take 1 tablet (5 mg total) by mouth once daily. 90 tablet 3    insulin (LANTUS SOLOSTAR U-100 INSULIN) glargine 100 units/mL (3mL) SubQ pen ADMINISTER 65 UNITS UNDER THE SKIN EVERY EVENING 30 mL 0    insulin aspart U-100 (NOVOLOG " FLEXPEN U-100 INSULIN) 100 unit/mL InPn pen INJECT 12 UNITS UNDER THE SKIN THREE TIMES DAILY WITH MEALS PER SLIDING SCALE 15 mL 3    irbesartan (AVAPRO) 75 MG tablet Take 1 tablet (75 mg total) by mouth once daily. 30 tablet 6    metoprolol succinate (TOPROL-XL) 50 MG 24 hr tablet Take 1 tablet (50 mg total) by mouth once daily. 90 tablet 3    MULTIVITAMIN W-MINERALS/LUTEIN (CENTRUM SILVER ORAL) Take by mouth once daily.      nitroGLYCERIN (NITROSTAT) 0.4 MG SL tablet Place 1 tablet (0.4 mg total) under the tongue every 5 (five) minutes as needed for Chest pain. 60 tablet 12    omega-3 fatty acids-vitamin E (FISH OIL) 1,000 mg Cap Take 2 capsules by mouth Daily.       omeprazole (PRILOSEC) 20 MG capsule Take 1 capsule (20 mg total) by mouth 2 (two) times daily before meals. 180 capsule 3    [DISCONTINUED] finasteride (PROSCAR) 5 mg tablet Take 1 tablet (5 mg total) by mouth once daily. 30 tablet 10     No facility-administered encounter medications on file as of 7/16/2019.      Review of Systems   ROS  Physical Exam     Vitals:    07/16/19 0817   BP: 128/65   Pulse: 76     Physical Exam   Constitutional: He is oriented to person, place, and time. He appears well-developed and well-nourished. No distress.   HENT:   Head: Normocephalic and atraumatic.   Right Ear: External ear normal.   Left Ear: External ear normal.   Nose: Nose normal.   Mouth/Throat: Oropharynx is clear and moist.   Eyes: Conjunctivae are normal. Pupils are equal, round, and reactive to light.   Neck: Normal range of motion. Neck supple. No JVD present. No tracheal deviation present. No thyromegaly present.   Cardiovascular: Normal rate, regular rhythm, normal heart sounds and intact distal pulses.  Exam reveals no gallop and no friction rub.    No murmur heard.  Pulmonary/Chest: Effort normal and breath sounds normal. No respiratory distress. He has no wheezes. He exhibits no tenderness.   Abdominal: Soft. Bowel sounds are normal. He  exhibits no distension and no mass. There is no tenderness. There is no rebound and no guarding.   Genitourinary: Rectum normal and penis normal. No penile tenderness.   Genitourinary Comments: Prostate 25 grams with positive nodule (slightt induration at the left side). negative tenderness     Musculoskeletal: Normal range of motion. He exhibits no edema, tenderness or deformity.   Lymphadenopathy:     He has no cervical adenopathy.   Neurological: He is alert and oriented to person, place, and time.   Skin: Skin is warm and dry. He is not diaphoretic.     Psychiatric: He has a normal mood and affect. His behavior is normal. Thought content normal.     Genitalia:  Scrotum: no rash or lesion  Normal symmetric epididymis without masses  Normal vas palpated  Normal size, symmetric testicles with no masses   Normal urethral meatus with no discharge  Normal circumcised penis with no lesion   Rectal:  Normal perineum and anus upon inspection.  Normal tone, no masses or tenderness;     LABS:  Lab Results   Component Value Date    PSA 10.06 (H) 02/26/2013    PSA 6.3 (H) 09/15/2009    PSA 4.0 08/11/2005    PSA 3.7 06/17/2004    PSADIAG 7.7 (H) 07/14/2017    PSADIAG 5.5 (H) 04/05/2016    PSADIAG 5.0 (H) 10/28/2013     Results for orders placed or performed in visit on 07/14/17   Prostate Specific Antigen, Diagnostic   Result Value Ref Range    PSA DIAGNOSTIC 7.7 (H) 0.00 - 4.00 ng/mL   Results for orders placed or performed in visit on 04/05/16   Prostate Specific Antigen, Diagnostic   Result Value Ref Range    PSA DIAGNOSTIC 5.5 (H) 0.00 - 4.00 ng/mL   Results for orders placed or performed in visit on 10/28/13   Prostate Specific Antigen, Diagnostic   Result Value Ref Range    PSA DIAGNOSTIC 5.0 (H) 0.00 - 4.00 ng/mL     Lab Results   Component Value Date    CREATININE 1.5 (H) 05/29/2019    CREATININE 1.7 (H) 09/18/2018    CREATININE 1.3 08/02/2018     No results found for this or any previous visit.  Urine Culture,  Routine   Date Value Ref Range Status   09/18/2018 No growth  Final     UA pH 6, 250 glucose, trace protein, neg. blood    Hemoglobin A1c 7.9    Assessment and Plan:  Diagnoses and all orders for this visit:    Benign prostatic hyperplasia with urinary obstruction    Elevated PSA  -     Prostate Specific Antigen, Diagnostic; Future  -     Prostate Specific Antigen, Diagnostic; Future  -     finasteride (PROSCAR) 5 mg tablet; Take 1 tablet (5 mg total) by mouth once daily.    Benign non-nodular prostatic hyperplasia with lower urinary tract symptoms  -     finasteride (PROSCAR) 5 mg tablet; Take 1 tablet (5 mg total) by mouth once daily.    I informed him of his elevated PSA.  His baseline PSA was 10, but with finasteride,it should come down close to 5.  He has shown rising PSA in the past.  We discussed possible TRUS bx of prostate, but he is not interested in undergoing another prostate biopsy.  Will continue to monitor his PSA for now.  Continue finasteride.  PSA today and in 6 months.    Follow-up:  Follow up in about 6 months (around 1/16/2020) for PSA.

## 2019-07-16 NOTE — PATIENT INSTRUCTIONS
Lab Results   Component Value Date    PSA 10.06 (H) 02/26/2013    PSA 6.3 (H) 09/15/2009    PSA 4.0 08/11/2005    PSADIAG 7.7 (H) 07/14/2017    PSADIAG 5.5 (H) 04/05/2016    PSADIAG 5.0 (H) 10/28/2013

## 2019-08-06 ENCOUNTER — TELEPHONE (OUTPATIENT)
Dept: SURGERY | Facility: CLINIC | Age: 84
End: 2019-08-06

## 2019-08-06 NOTE — TELEPHONE ENCOUNTER
----- Message from Charlie Pelayo sent at 8/6/2019 12:58 PM CDT -----  Contact: pt  Needs Advice    Reason for call: the pt states he has a sebaceous cyst that is too large for dermatology to remove on his back.         Communication Preference: [phone]     Additional Information:

## 2019-08-08 ENCOUNTER — OFFICE VISIT (OUTPATIENT)
Dept: SURGERY | Facility: CLINIC | Age: 84
End: 2019-08-08
Payer: MEDICARE

## 2019-08-08 VITALS
DIASTOLIC BLOOD PRESSURE: 66 MMHG | HEART RATE: 65 BPM | SYSTOLIC BLOOD PRESSURE: 142 MMHG | HEIGHT: 67 IN | BODY MASS INDEX: 32.18 KG/M2 | WEIGHT: 205 LBS

## 2019-08-08 PROCEDURE — 99999 PR PBB SHADOW E&M-EST. PATIENT-LVL III: CPT | Mod: PBBFAC,HCNC,, | Performed by: SURGERY

## 2019-08-08 PROCEDURE — 1101F PR PT FALLS ASSESS DOC 0-1 FALLS W/OUT INJ PAST YR: ICD-10-PCS | Mod: HCNC,CPTII,S$GLB, | Performed by: SURGERY

## 2019-08-08 PROCEDURE — 3078F PR MOST RECENT DIASTOLIC BLOOD PRESSURE < 80 MM HG: ICD-10-PCS | Mod: HCNC,CPTII,S$GLB, | Performed by: SURGERY

## 2019-08-08 PROCEDURE — 3077F SYST BP >= 140 MM HG: CPT | Mod: HCNC,CPTII,S$GLB, | Performed by: SURGERY

## 2019-08-08 PROCEDURE — 99999 PR PBB SHADOW E&M-EST. PATIENT-LVL III: ICD-10-PCS | Mod: PBBFAC,HCNC,, | Performed by: SURGERY

## 2019-08-08 PROCEDURE — 3077F PR MOST RECENT SYSTOLIC BLOOD PRESSURE >= 140 MM HG: ICD-10-PCS | Mod: HCNC,CPTII,S$GLB, | Performed by: SURGERY

## 2019-08-08 PROCEDURE — 1101F PT FALLS ASSESS-DOCD LE1/YR: CPT | Mod: HCNC,CPTII,S$GLB, | Performed by: SURGERY

## 2019-08-08 PROCEDURE — 99214 OFFICE O/P EST MOD 30 MIN: CPT | Mod: HCNC,S$GLB,, | Performed by: SURGERY

## 2019-08-08 PROCEDURE — 3078F DIAST BP <80 MM HG: CPT | Mod: HCNC,CPTII,S$GLB, | Performed by: SURGERY

## 2019-08-08 PROCEDURE — 99214 PR OFFICE/OUTPT VISIT, EST, LEVL IV, 30-39 MIN: ICD-10-PCS | Mod: HCNC,S$GLB,, | Performed by: SURGERY

## 2019-08-08 RX ORDER — SULFAMETHOXAZOLE AND TRIMETHOPRIM 800; 160 MG/1; MG/1
1 TABLET ORAL 2 TIMES DAILY
Qty: 20 TABLET | Refills: 0 | Status: SHIPPED | OUTPATIENT
Start: 2019-08-08 | End: 2019-08-18

## 2019-08-08 NOTE — LETTER
August 8, 2019      Luna Chacon, NP  [address] [address]           [facility] - General Surgery  [address]  [place] [address]  Phone: [phone]          Patient: Anjum Cai Jr.   MR Number: [id number]   YOB: 1935   Date of Visit: 8/8/2019       Dear Luna Chacon:    Thank you for referring Anjum Cai to me for evaluation. Attached you will find relevant portions of my assessment and plan of care.    If you have questions, please do not hesitate to call me. I look forward to following Anjum Cai along with you.    Sincerely,    Arnaud Avery MD    Enclosure  CC:  No Recipients    If you would like to receive this communication electronically, please contact [email or web address] or [phone] to request more information on [employer] Link access.    For providers and/or their staff who would like to refer a patient to [facility], please contact us through our one-stop-shop provider referral line, [facility], at [phone].    If you feel you have received this communication in error or would no longer like to receive these types of communications, please e-mail [email or web address]

## 2019-08-08 NOTE — PROGRESS NOTES
Subjective:       Patient ID: Anjum Cai Jr. is a 83 y.o. male.    Chief Complaint: Cyst    HPI Here for back cyst today.  It is symptomatic and he had worse pain a little while ago that has improved.  He has had several cut on and he thinks this may be a recurrence.  Also s/p inguinal repair by me with no complaints except occasional pain just above the inguinal hernia incision after doing some bending.  Review of Systems   Constitutional: Negative for fever and unexpected weight change.   Respiratory: Negative for cough, chest tightness and shortness of breath.    Cardiovascular: Negative for chest pain and palpitations.   Gastrointestinal: Negative for nausea and vomiting.   Genitourinary: Negative for difficulty urinating and dysuria.   Skin: Negative for rash and wound.   Hematological: Does not bruise/bleed easily.       Objective:      Physical Exam   Constitutional: He is oriented to person, place, and time.   Cardiovascular: Normal rate, regular rhythm and normal heart sounds.   Pulmonary/Chest: Effort normal and breath sounds normal.   Neurological: He is alert and oriented to person, place, and time.   Skin: Skin is warm and dry.        Psychiatric: He has a normal mood and affect. His behavior is normal. Judgment and thought content normal.   Vitals reviewed.      Assessment:       1. Mass      likely abscess  Plan:      Antibiotics with excision next week

## 2019-08-15 ENCOUNTER — OFFICE VISIT (OUTPATIENT)
Dept: SURGERY | Facility: CLINIC | Age: 84
End: 2019-08-15
Payer: MEDICARE

## 2019-08-15 VITALS
DIASTOLIC BLOOD PRESSURE: 61 MMHG | HEIGHT: 66 IN | BODY MASS INDEX: 32.95 KG/M2 | TEMPERATURE: 98 F | SYSTOLIC BLOOD PRESSURE: 129 MMHG | WEIGHT: 205 LBS | HEART RATE: 69 BPM

## 2019-08-15 DIAGNOSIS — L72.9 CYST OF SKIN: Primary | ICD-10-CM

## 2019-08-15 PROCEDURE — 99499 UNLISTED E&M SERVICE: CPT | Mod: HCNC,S$GLB,, | Performed by: SURGERY

## 2019-08-15 PROCEDURE — 21930 EXC, CYST: ICD-10-PCS | Mod: HCNC,GC,S$GLB, | Performed by: STUDENT IN AN ORGANIZED HEALTH CARE EDUCATION/TRAINING PROGRAM

## 2019-08-15 PROCEDURE — 99499 NO LOS: ICD-10-PCS | Mod: HCNC,S$GLB,, | Performed by: SURGERY

## 2019-08-15 PROCEDURE — 99999 PR PBB SHADOW E&M-EST. PATIENT-LVL III: ICD-10-PCS | Mod: PBBFAC,HCNC,, | Performed by: SURGERY

## 2019-08-15 PROCEDURE — 21930 EXC BACK LES SC < 3 CM: CPT | Mod: HCNC,GC,S$GLB, | Performed by: STUDENT IN AN ORGANIZED HEALTH CARE EDUCATION/TRAINING PROGRAM

## 2019-08-15 PROCEDURE — 88304 TISSUE SPECIMEN TO PATHOLOGY, SURGERY: ICD-10-PCS | Mod: 26,HCNC,, | Performed by: PATHOLOGY

## 2019-08-15 PROCEDURE — 88304 TISSUE EXAM BY PATHOLOGIST: CPT | Mod: 26,HCNC,, | Performed by: PATHOLOGY

## 2019-08-15 PROCEDURE — 88304 TISSUE EXAM BY PATHOLOGIST: CPT | Mod: HCNC | Performed by: PATHOLOGY

## 2019-08-15 PROCEDURE — 99999 PR PBB SHADOW E&M-EST. PATIENT-LVL III: CPT | Mod: PBBFAC,HCNC,, | Performed by: SURGERY

## 2019-08-15 NOTE — PROCEDURES
"Exc, Cyst  Date/Time: 8/15/2019 2:33 PM  Performed by: Fiona Gomez MD  Authorized by: Arnaud Avery MD     Consent Done?:  Yes (Written)  Time out: Immediately prior to procedure a "time out" was called to verify the correct patient, procedure, equipment, support staff and site/side marked as required.      STAFF:  Assistants?: No    INDICATIONS:cyst  LOCATION:  Body area:  Back / flankleft    PREP:  Patient was prepped and draped in the normal sterile fashion.Position:  Prone    ANESTHESIA:  Anesthesia:  Local infiltration  Local anesthetic:  Lidocaine 1% with epinephrine    PROCEDURE DETAILS:  Excision type:  Tumor  Excision depth:  Subcutaneous  Malignancy:  Benign  Excision size (cm):  2.5  Scalpel size:  15  Incision type:  Elliptical  Specimens?: Yes    Specimens submitted to pathology.  Hemostasis was obtained.  Wound closure:  Intermediate layered  Wound repair size (cm):  2.5  Sutures:  3-0 Vicryl and 4-0 Monocryl  Dressings: Dermabond.  Post-op diagnosis: Same as pre-op diagnosis.  Needle, instrument, and sponge counts were correct.  Patient tolerated the procedure well with no immediate complications.  Post-operative instructions were provided for the patient.  Patient was discharged and will follow up for wound check and pathology results.    Fiona Gomez MD  General Surgery, PGY-I  Pager: [phone]  8/15/2019 2:35 PM    "

## 2019-08-20 ENCOUNTER — LAB VISIT (OUTPATIENT)
Dept: LAB | Facility: HOSPITAL | Age: 84
End: 2019-08-20
Attending: INTERNAL MEDICINE
Payer: MEDICARE

## 2019-08-20 LAB
ALBUMIN SERPL BCP-MCNC: 3.8 G/DL (ref 3.5–5.2)
ALP SERPL-CCNC: 82 U/L (ref 55–135)
ALT SERPL W/O P-5'-P-CCNC: 25 U/L (ref 10–44)
ANION GAP SERPL CALC-SCNC: 7 MMOL/L (ref 8–16)
AST SERPL-CCNC: 24 U/L (ref 10–40)
BILIRUB SERPL-MCNC: 0.6 MG/DL (ref 0.1–1)
BUN SERPL-MCNC: 26 MG/DL (ref 8–23)
CALCIUM SERPL-MCNC: 9.3 MG/DL (ref 8.7–10.5)
CHLORIDE SERPL-SCNC: 107 MMOL/L (ref 95–110)
CO2 SERPL-SCNC: 25 MMOL/L (ref 23–29)
CREAT SERPL-MCNC: 1.9 MG/DL (ref 0.5–1.4)
EST. GFR  (AFRICAN AMERICAN): 37.4 ML/MIN/1.73 M^2
EST. GFR  (NON AFRICAN AMERICAN): 32.3 ML/MIN/1.73 M^2
ESTIMATED AVG GLUCOSE: 174 MG/DL (ref 68–131)
GLUCOSE SERPL-MCNC: 166 MG/DL (ref 70–110)
HBA1C MFR BLD HPLC: 7.7 % (ref 4–5.6)
POTASSIUM SERPL-SCNC: 4.6 MMOL/L (ref 3.5–5.1)
PROT SERPL-MCNC: 7.7 G/DL (ref 6–8.4)
SODIUM SERPL-SCNC: 139 MMOL/L (ref 136–145)

## 2019-08-20 PROCEDURE — 36415 COLL VENOUS BLD VENIPUNCTURE: CPT | Mod: HCNC

## 2019-08-20 PROCEDURE — 83036 HEMOGLOBIN GLYCOSYLATED A1C: CPT | Mod: HCNC

## 2019-08-20 PROCEDURE — 80053 COMPREHEN METABOLIC PANEL: CPT | Mod: HCNC

## 2019-08-22 ENCOUNTER — OFFICE VISIT (OUTPATIENT)
Dept: CARDIOLOGY | Facility: CLINIC | Age: 84
End: 2019-08-22
Payer: MEDICARE

## 2019-08-22 VITALS
HEART RATE: 66 BPM | BODY MASS INDEX: 32.63 KG/M2 | HEIGHT: 67 IN | SYSTOLIC BLOOD PRESSURE: 126 MMHG | DIASTOLIC BLOOD PRESSURE: 59 MMHG | WEIGHT: 207.88 LBS

## 2019-08-22 DIAGNOSIS — N18.30 KIDNEY DISEASE, CHRONIC, STAGE III (GFR 30-59 ML/MIN): ICD-10-CM

## 2019-08-22 DIAGNOSIS — I25.10 CORONARY ARTERY DISEASE INVOLVING NATIVE CORONARY ARTERY OF NATIVE HEART WITHOUT ANGINA PECTORIS: Primary | ICD-10-CM

## 2019-08-22 DIAGNOSIS — I10 ESSENTIAL HYPERTENSION: ICD-10-CM

## 2019-08-22 DIAGNOSIS — E78.5 DYSLIPIDEMIA: ICD-10-CM

## 2019-08-22 DIAGNOSIS — I87.2 VENOUS INSUFFICIENCY: ICD-10-CM

## 2019-08-22 DIAGNOSIS — Z98.61 HISTORY OF PTCA 2: ICD-10-CM

## 2019-08-22 PROCEDURE — 3074F PR MOST RECENT SYSTOLIC BLOOD PRESSURE < 130 MM HG: ICD-10-PCS | Mod: HCNC,CPTII,S$GLB, | Performed by: INTERNAL MEDICINE

## 2019-08-22 PROCEDURE — 99999 PR PBB SHADOW E&M-EST. PATIENT-LVL IV: CPT | Mod: PBBFAC,HCNC,, | Performed by: INTERNAL MEDICINE

## 2019-08-22 PROCEDURE — 3078F PR MOST RECENT DIASTOLIC BLOOD PRESSURE < 80 MM HG: ICD-10-PCS | Mod: HCNC,CPTII,S$GLB, | Performed by: INTERNAL MEDICINE

## 2019-08-22 PROCEDURE — 99214 PR OFFICE/OUTPT VISIT, EST, LEVL IV, 30-39 MIN: ICD-10-PCS | Mod: HCNC,S$GLB,, | Performed by: INTERNAL MEDICINE

## 2019-08-22 PROCEDURE — 1101F PR PT FALLS ASSESS DOC 0-1 FALLS W/OUT INJ PAST YR: ICD-10-PCS | Mod: HCNC,CPTII,S$GLB, | Performed by: INTERNAL MEDICINE

## 2019-08-22 PROCEDURE — 1101F PT FALLS ASSESS-DOCD LE1/YR: CPT | Mod: HCNC,CPTII,S$GLB, | Performed by: INTERNAL MEDICINE

## 2019-08-22 PROCEDURE — 3074F SYST BP LT 130 MM HG: CPT | Mod: HCNC,CPTII,S$GLB, | Performed by: INTERNAL MEDICINE

## 2019-08-22 PROCEDURE — 3078F DIAST BP <80 MM HG: CPT | Mod: HCNC,CPTII,S$GLB, | Performed by: INTERNAL MEDICINE

## 2019-08-22 PROCEDURE — 99999 PR PBB SHADOW E&M-EST. PATIENT-LVL IV: ICD-10-PCS | Mod: PBBFAC,HCNC,, | Performed by: INTERNAL MEDICINE

## 2019-08-22 PROCEDURE — 99214 OFFICE O/P EST MOD 30 MIN: CPT | Mod: HCNC,S$GLB,, | Performed by: INTERNAL MEDICINE

## 2019-08-22 NOTE — PROGRESS NOTES
Subjective:    Patient ID:  Anjum Cai Jr. is a 83 y.o. male who presents for follow-up of CAD    HPI     MrApril Cai Jr. is a 82 y.o. male with hx of CAD s/p PCI to LAD and RCA x 2 (2007), HTN, HLD, DM2, DAMIAN and CKD3 who presented 12/9/16 to [facility] ED with an episode of chest pain. He was taken to Cath Lab 12/9/16. Ostial OM2/D1 lesions noted in LHC. No intervention due to small size of vessels. He continues to do well but his creatinine has increased to 1.9.  Lab Results   Component Value Date     08/20/2019    K 4.6 08/20/2019     08/20/2019    CO2 25 08/20/2019    BUN 26 (H) 08/20/2019    CREATININE 1.9 (H) 08/20/2019     (H) 08/20/2019    HGBA1C 7.7 (H) 08/20/2019    MG 1.6 08/02/2018    AST 24 08/20/2019    ALT 25 08/20/2019    ALBUMIN 3.8 08/20/2019    PROT 7.7 08/20/2019    BILITOT 0.6 08/20/2019    WBC 6.92 05/29/2019    HGB 11.0 (L) 05/29/2019    HCT 34.9 (L) 05/29/2019    MCV 86 05/29/2019     (L) 05/29/2019    INR 1.0 02/24/2018    PSA 10.06 (H) 02/26/2013    TSH 1.336 10/16/2013         Lab Results   Component Value Date    CHOL 123 05/29/2019    HDL 43 05/29/2019    TRIG 96 05/29/2019       Lab Results   Component Value Date    LDLCALC 60.8 (L) 05/29/2019       Past Medical History:   Diagnosis Date    Anemia 2/26/2018    Arthritis     BPH (benign prostatic hyperplasia)     Cataract     right     Colon polyp 11/18/2015    Colon polyps     Coronary artery disease     DR (diabetic retinopathy)     Dyslipidemia     Elevated PSA     Goiter, nontoxic, multinodular     Headaches, cluster     Hypertension     Kidney stones     Lump in the testicle     Left    Rotator cuff tendinitis     Right shoulder    Sleep apnea with use of continuous positive airway pressure (CPAP)     uses cpap at night    Type II or unspecified type diabetes mellitus with other specified manifestations, uncontrolled        Current Outpatient Medications:     ACCU-CHEK  "FASTCLIX LANCET DRUM Misc, USE FOUR TIMES DAILY, Disp: 204 each, Rfl: PRN    ACCU-CHEK SMARTVIEW TEST STRIP Strp, Check blood sugars three times daily before meals., Disp: 200 each, Rfl: 2    ASPIRIN (ASPIR-81 ORAL), Take 1 tablet by mouth Daily., Disp: , Rfl:     atorvastatin (LIPITOR) 40 MG tablet, TAKE 1 TABLET(40 MG) BY MOUTH EVERY DAY, Disp: 90 tablet, Rfl: 4    atorvastatin (LIPITOR) 40 MG tablet, TAKE 1 TABLET(40 MG) BY MOUTH EVERY DAY, Disp: 90 tablet, Rfl: 4    BD INSULIN SYRINGE ULTRA-FINE 1 mL 30 x 1/2" Syrg, USE AS DIRECTED FOUR TIMES DAILY, Disp: 200 each, Rfl: 11    BD ULTRA-FINE MINI PEN NEEDLE 31 gauge x 3/16" Ndle, USE WITH INSULIN INJECTION FOUR TIMES DAILY, Disp: 100 each, Rfl: 3    ciclopirox (PENLAC) 8 % Soln, Apply topically nightly., Disp: 6.6 mL, Rfl: 11    ciclopirox 0.77 % Gel, Apply topically 2 (two) times daily., Disp: 100 g, Rfl: 3    ergocalciferol (VITAMIN D2) 50,000 unit Cap, TAKE ONE CAPSULE BY MOUTH EVERY 30 DAYS, Disp: 4 capsule, Rfl: 3    finasteride (PROSCAR) 5 mg tablet, Take 1 tablet (5 mg total) by mouth once daily., Disp: 90 tablet, Rfl: 0    finasteride (PROSCAR) 5 mg tablet, Take 1 tablet (5 mg total) by mouth once daily., Disp: 90 tablet, Rfl: 3    insulin (LANTUS SOLOSTAR U-100 INSULIN) glargine 100 units/mL (3mL) SubQ pen, ADMINISTER 65 UNITS UNDER THE SKIN EVERY EVENING, Disp: 30 mL, Rfl: 0    insulin aspart U-100 (NOVOLOG FLEXPEN U-100 INSULIN) 100 unit/mL InPn pen, INJECT 12 UNITS UNDER THE SKIN THREE TIMES DAILY WITH MEALS PER SLIDING SCALE, Disp: 15 mL, Rfl: 3    irbesartan (AVAPRO) 75 MG tablet, Take 1 tablet (75 mg total) by mouth once daily., Disp: 30 tablet, Rfl: 6    metoprolol succinate (TOPROL-XL) 50 MG 24 hr tablet, Take 1 tablet (50 mg total) by mouth once daily., Disp: 90 tablet, Rfl: 3    MULTIVITAMIN W-MINERALS/LUTEIN (CENTRUM SILVER ORAL), Take by mouth once daily., Disp: , Rfl:     nitroGLYCERIN (NITROSTAT) 0.4 MG SL tablet, Place 1 " tablet (0.4 mg total) under the tongue every 5 (five) minutes as needed for Chest pain., Disp: 60 tablet, Rfl: 12    omega-3 fatty acids-vitamin E (FISH OIL) 1,000 mg Cap, Take 2 capsules by mouth Daily. , Disp: , Rfl:     omeprazole (PRILOSEC) 20 MG capsule, Take 1 capsule (20 mg total) by mouth 2 (two) times daily before meals., Disp: 180 capsule, Rfl: 3          Review of Systems   Constitution: Negative for decreased appetite, diaphoresis, fever, malaise/fatigue, weight gain and weight loss.   HENT: Negative for congestion, ear discharge, ear pain and nosebleeds.    Eyes: Negative for blurred vision, double vision and visual disturbance.   Cardiovascular: Negative for chest pain, claudication, cyanosis, dyspnea on exertion, irregular heartbeat, leg swelling, near-syncope, orthopnea, palpitations, paroxysmal nocturnal dyspnea and syncope.   Respiratory: Negative for cough, hemoptysis, shortness of breath, sleep disturbances due to breathing, snoring, sputum production and wheezing.    Endocrine: Negative for polydipsia, polyphagia and polyuria.   Hematologic/Lymphatic: Negative for adenopathy and bleeding problem. Does not bruise/bleed easily.   Skin: Negative for color change, nail changes, poor wound healing and rash.   Musculoskeletal: Negative for muscle cramps and muscle weakness.   Gastrointestinal: Negative for abdominal pain, anorexia, change in bowel habit, hematochezia, nausea and vomiting.   Genitourinary: Negative for dysuria, frequency and hematuria.   Neurological: Negative for brief paralysis, difficulty with concentration, excessive daytime sleepiness, dizziness, focal weakness, headaches, light-headedness, seizures, vertigo and weakness.   Psychiatric/Behavioral: Negative for altered mental status and depression.   Allergic/Immunologic: Negative for persistent infections.        Objective:BP (!) 126/59 (BP Location: Left arm, Patient Position: Sitting, BP Method: Medium (Automatic))   Pulse 66  "  Ht 5' 6.5" (1.689 m)   Wt 94.3 kg (207 lb 14.3 oz)   BMI 33.05 kg/m²             Physical Exam   Constitutional: He is oriented to person, place, and time. He appears well-developed and well-nourished.   HENT:   Head: Normocephalic.   Right Ear: External ear normal.   Left Ear: External ear normal.   Nose: Nose normal.   Inspection of lips, teeth and gums normal   Eyes: Pupils are equal, round, and reactive to light. EOM are normal. No scleral icterus.   Neck: Normal range of motion. Neck supple. No JVD present. No tracheal deviation present. No thyromegaly present.   Cardiovascular: Normal rate, regular rhythm and intact distal pulses. Exam reveals no gallop and no friction rub.   No murmur heard.  Pulses:       Dorsalis pedis pulses are 0 on the right side, and 0 on the left side.        Posterior tibial pulses are 0 on the right side, and 0 on the left side.   Pulmonary/Chest: Effort normal and breath sounds normal.   Abdominal: Bowel sounds are normal. He exhibits no distension. There is no hepatosplenomegaly. There is no tenderness. There is no guarding.   Musculoskeletal: Normal range of motion. He exhibits no edema or tenderness.   Lymphadenopathy:   Palpation of neck and groin lymph nodes normal   Neurological: He is alert and oriented to person, place, and time. No cranial nerve deficit. He exhibits normal muscle tone. Coordination normal.   Skin: Skin is dry.   Palpation of skin normal   Psychiatric: His behavior is normal. Judgment and thought content normal.         Assessment:       1. Coronary artery disease involving native coronary artery of native heart without angina pectoris    2. Dyslipidemia    3. Essential hypertension    4. History of PTCA 2    5. Venous insufficiency    6. Kidney disease, chronic, stage III (GFR 30-59 ml/min)         Plan:       Anjum was seen today for coronary artery disease involving native coronary artery of .    Diagnoses and all orders for this visit:    Coronary " artery disease involving native coronary artery of native heart without angina pectoris    Dyslipidemia  -     Lipid panel; Future; Expected date: 08/21/2020    Essential hypertension  -     Comprehensive metabolic panel; Future; Expected date: 08/21/2020  -     CBC auto differential; Future; Expected date: 08/21/2020    History of PTCA 2    Venous insufficiency    Kidney disease, chronic, stage III (GFR 30-59 ml/min)  -     Ambulatory referral to Nephrology  -     Comprehensive metabolic panel; Future; Expected date: 08/21/2020

## 2019-08-23 ENCOUNTER — TELEPHONE (OUTPATIENT)
Dept: INTERNAL MEDICINE | Facility: CLINIC | Age: 84
End: 2019-08-23

## 2019-08-23 DIAGNOSIS — R79.89 ELEVATED SERUM CREATININE: Primary | ICD-10-CM

## 2019-08-23 NOTE — TELEPHONE ENCOUNTER
----- Message from Gavin Martinez MD sent at 8/23/2019 10:07 AM CDT -----  Please call the patient regarding his abnormal result. His creatinine is higher than on his last blood test. Please schedule a repeat blood test in two to three weeks. Please verify he is drinking fluids. It looks as if he a little dehydrated.

## 2019-08-26 ENCOUNTER — TELEPHONE (OUTPATIENT)
Dept: INTERNAL MEDICINE | Facility: CLINIC | Age: 84
End: 2019-08-26

## 2019-08-26 NOTE — TELEPHONE ENCOUNTER
----- Message from Brenda Nix sent at 8/26/2019  4:31 PM CDT -----  Contact: self   Patient is returning a phone call.  Who left a message for the patient: Mary Wheeler LPN  Does patient know what this is regarding:  Lab results  Comments:

## 2019-08-27 ENCOUNTER — OFFICE VISIT (OUTPATIENT)
Dept: SURGERY | Facility: CLINIC | Age: 84
End: 2019-08-27
Payer: MEDICARE

## 2019-08-27 VITALS
TEMPERATURE: 98 F | BODY MASS INDEX: 33.27 KG/M2 | WEIGHT: 207 LBS | HEART RATE: 63 BPM | HEIGHT: 66 IN | DIASTOLIC BLOOD PRESSURE: 61 MMHG | SYSTOLIC BLOOD PRESSURE: 130 MMHG

## 2019-08-27 DIAGNOSIS — Z09 POSTOP CHECK: Primary | ICD-10-CM

## 2019-08-27 PROCEDURE — 99024 POSTOP FOLLOW-UP VISIT: CPT | Mod: HCNC,S$GLB,, | Performed by: SURGERY

## 2019-08-27 PROCEDURE — 99024 PR POST-OP FOLLOW-UP VISIT: ICD-10-PCS | Mod: HCNC,S$GLB,, | Performed by: SURGERY

## 2019-08-27 PROCEDURE — 99999 PR PBB SHADOW E&M-EST. PATIENT-LVL III: CPT | Mod: PBBFAC,HCNC,, | Performed by: SURGERY

## 2019-08-27 PROCEDURE — 99999 PR PBB SHADOW E&M-EST. PATIENT-LVL III: ICD-10-PCS | Mod: PBBFAC,HCNC,, | Performed by: SURGERY

## 2019-08-27 NOTE — PROGRESS NOTES
I have seen the patient, reviewed the Student's history and physical, assessment and plan. I have personally interviewed and examined the patient at bedside and: agree with the findings.     Doing well after removal back mass.  Follow up prn.

## 2019-08-27 NOTE — MEDICAL/APP STUDENT
"Subjective:       Anjum COHEN Cai Jr. presents to the clinic 2 weeks following epidermal inclusion cyst excision. Eating a regular diet without difficulty. Bowel movements are Normal.  The patient is not having any pain..      Objective:      /61   Pulse 63   Temp 97.8 °F (36.6 °C)   Ht 5' 6" (1.676 m)   Wt 93.9 kg (207 lb)   BMI 33.41 kg/m²     General:  alert, appears stated age, cooperative and no distress   Abdomen: soft, bowel sounds active, non-tender   Incision:   healing well, no drainage, no erythema, no hernia, no seroma, no swelling, no dehiscence, incision well approximated       Assessment:      82 yo male presents today two weeks left upper back cyst excision. Doing well postoperatively.  Based on patient presentation and physical exam, plan is as follows:     Plan:      1. Cyst excision  - Follow up JA Sanchez-S2  [phone]  "

## 2019-08-29 RX ORDER — INSULIN GLARGINE 100 [IU]/ML
INJECTION, SOLUTION SUBCUTANEOUS
Qty: 30 ML | Refills: 5 | Status: SHIPPED | OUTPATIENT
Start: 2019-08-29 | End: 2020-06-09

## 2019-09-03 ENCOUNTER — TELEPHONE (OUTPATIENT)
Dept: NEPHROLOGY | Facility: CLINIC | Age: 84
End: 2019-09-03

## 2019-09-03 DIAGNOSIS — E11.8 TYPE 2 DIABETES MELLITUS WITH COMPLICATION, UNSPECIFIED WHETHER LONG TERM INSULIN USE: ICD-10-CM

## 2019-09-03 DIAGNOSIS — N18.30 STAGE 3 CHRONIC KIDNEY DISEASE: Primary | ICD-10-CM

## 2019-09-03 RX ORDER — IRBESARTAN 75 MG/1
75 TABLET ORAL DAILY
Qty: 30 TABLET | Refills: 6 | Status: SHIPPED | OUTPATIENT
Start: 2019-09-03 | End: 2020-03-12 | Stop reason: SDUPTHER

## 2019-09-03 RX ORDER — IRBESARTAN 75 MG/1
TABLET ORAL
Qty: 30 TABLET | Refills: 0 | Status: SHIPPED | OUTPATIENT
Start: 2019-09-03 | End: 2020-01-23 | Stop reason: SDUPTHER

## 2019-09-10 ENCOUNTER — LAB VISIT (OUTPATIENT)
Dept: LAB | Facility: HOSPITAL | Age: 84
End: 2019-09-10
Attending: INTERNAL MEDICINE
Payer: MEDICARE

## 2019-09-10 DIAGNOSIS — R79.89 ELEVATED SERUM CREATININE: ICD-10-CM

## 2019-09-10 LAB
ANION GAP SERPL CALC-SCNC: 6 MMOL/L (ref 8–16)
BUN SERPL-MCNC: 27 MG/DL (ref 8–23)
CALCIUM SERPL-MCNC: 9.5 MG/DL (ref 8.7–10.5)
CHLORIDE SERPL-SCNC: 106 MMOL/L (ref 95–110)
CO2 SERPL-SCNC: 28 MMOL/L (ref 23–29)
CREAT SERPL-MCNC: 1.5 MG/DL (ref 0.5–1.4)
EST. GFR  (AFRICAN AMERICAN): 50 ML/MIN/1.73 M^2
EST. GFR  (NON AFRICAN AMERICAN): 43 ML/MIN/1.73 M^2
GLUCOSE SERPL-MCNC: 178 MG/DL (ref 70–110)
POTASSIUM SERPL-SCNC: 4.7 MMOL/L (ref 3.5–5.1)
SODIUM SERPL-SCNC: 140 MMOL/L (ref 136–145)

## 2019-09-10 PROCEDURE — 36415 COLL VENOUS BLD VENIPUNCTURE: CPT | Mod: HCNC

## 2019-09-10 PROCEDURE — 80048 BASIC METABOLIC PNL TOTAL CA: CPT | Mod: HCNC

## 2019-09-12 ENCOUNTER — PATIENT MESSAGE (OUTPATIENT)
Dept: INTERNAL MEDICINE | Facility: CLINIC | Age: 84
End: 2019-09-12

## 2019-09-18 RX ORDER — BLOOD SUGAR DIAGNOSTIC
STRIP MISCELLANEOUS
Qty: 200 STRIP | Refills: 0 | Status: SHIPPED | OUTPATIENT
Start: 2019-09-18 | End: 2019-09-19 | Stop reason: SDUPTHER

## 2019-09-19 ENCOUNTER — LAB VISIT (OUTPATIENT)
Dept: LAB | Facility: HOSPITAL | Age: 84
End: 2019-09-19
Payer: MEDICARE

## 2019-09-19 DIAGNOSIS — N18.30 STAGE 3 CHRONIC KIDNEY DISEASE: ICD-10-CM

## 2019-09-19 DIAGNOSIS — E11.8 TYPE 2 DIABETES MELLITUS WITH COMPLICATION, UNSPECIFIED WHETHER LONG TERM INSULIN USE: ICD-10-CM

## 2019-09-19 LAB
ALBUMIN SERPL BCP-MCNC: 3.3 G/DL (ref 3.5–5.2)
ALP SERPL-CCNC: 73 U/L (ref 55–135)
ALT SERPL W/O P-5'-P-CCNC: 24 U/L (ref 10–44)
ANION GAP SERPL CALC-SCNC: 9 MMOL/L (ref 8–16)
AST SERPL-CCNC: 24 U/L (ref 10–40)
BASOPHILS # BLD AUTO: 0.05 K/UL (ref 0–0.2)
BASOPHILS NFR BLD: 0.9 % (ref 0–1.9)
BILIRUB SERPL-MCNC: 0.4 MG/DL (ref 0.1–1)
BUN SERPL-MCNC: 23 MG/DL (ref 8–23)
CALCIUM SERPL-MCNC: 9.6 MG/DL (ref 8.7–10.5)
CHLORIDE SERPL-SCNC: 103 MMOL/L (ref 95–110)
CHOLEST SERPL-MCNC: 123 MG/DL (ref 120–199)
CHOLEST/HDLC SERPL: 3.4 {RATIO} (ref 2–5)
CO2 SERPL-SCNC: 28 MMOL/L (ref 23–29)
CREAT SERPL-MCNC: 1.5 MG/DL (ref 0.5–1.4)
DIFFERENTIAL METHOD: ABNORMAL
EOSINOPHIL # BLD AUTO: 0.3 K/UL (ref 0–0.5)
EOSINOPHIL NFR BLD: 5.6 % (ref 0–8)
ERYTHROCYTE [DISTWIDTH] IN BLOOD BY AUTOMATED COUNT: 15 % (ref 11.5–14.5)
EST. GFR  (AFRICAN AMERICAN): 50 ML/MIN/1.73 M^2
EST. GFR  (NON AFRICAN AMERICAN): 43 ML/MIN/1.73 M^2
ESTIMATED AVG GLUCOSE: 169 MG/DL (ref 68–131)
GLUCOSE SERPL-MCNC: 146 MG/DL (ref 70–110)
HBA1C MFR BLD HPLC: 7.5 % (ref 4–5.6)
HCT VFR BLD AUTO: 34 % (ref 40–54)
HDLC SERPL-MCNC: 36 MG/DL (ref 40–75)
HDLC SERPL: 29.3 % (ref 20–50)
HGB BLD-MCNC: 11.2 G/DL (ref 14–18)
LDLC SERPL CALC-MCNC: 71.4 MG/DL (ref 63–159)
LYMPHOCYTES # BLD AUTO: 2.1 K/UL (ref 1–4.8)
LYMPHOCYTES NFR BLD: 36.5 % (ref 18–48)
MCH RBC QN AUTO: 27.9 PG (ref 27–31)
MCHC RBC AUTO-ENTMCNC: 32.9 G/DL (ref 32–36)
MCV RBC AUTO: 85 FL (ref 82–98)
MONOCYTES # BLD AUTO: 0.6 K/UL (ref 0.3–1)
MONOCYTES NFR BLD: 10.9 % (ref 4–15)
NEUTROPHILS # BLD AUTO: 2.7 K/UL (ref 1.8–7.7)
NEUTROPHILS NFR BLD: 46.1 % (ref 38–73)
NONHDLC SERPL-MCNC: 87 MG/DL
PHOSPHATE SERPL-MCNC: 3.8 MG/DL (ref 2.7–4.5)
PLATELET # BLD AUTO: 173 K/UL (ref 150–350)
PMV BLD AUTO: 10.5 FL (ref 9.2–12.9)
POTASSIUM SERPL-SCNC: 4.4 MMOL/L (ref 3.5–5.1)
PROT SERPL-MCNC: 7.5 G/DL (ref 6–8.4)
PTH-INTACT SERPL-MCNC: 33 PG/ML (ref 9–77)
RBC # BLD AUTO: 4.01 M/UL (ref 4.6–6.2)
SODIUM SERPL-SCNC: 140 MMOL/L (ref 136–145)
TRIGL SERPL-MCNC: 78 MG/DL (ref 30–150)
URATE SERPL-MCNC: 6.5 MG/DL (ref 3.4–7)
WBC # BLD AUTO: 5.76 K/UL (ref 3.9–12.7)

## 2019-09-19 PROCEDURE — 84550 ASSAY OF BLOOD/URIC ACID: CPT | Mod: HCNC

## 2019-09-19 PROCEDURE — 83036 HEMOGLOBIN GLYCOSYLATED A1C: CPT | Mod: HCNC

## 2019-09-19 PROCEDURE — 80053 COMPREHEN METABOLIC PANEL: CPT | Mod: HCNC

## 2019-09-19 PROCEDURE — 85025 COMPLETE CBC W/AUTO DIFF WBC: CPT | Mod: HCNC

## 2019-09-19 PROCEDURE — 83970 ASSAY OF PARATHORMONE: CPT | Mod: HCNC

## 2019-09-19 PROCEDURE — 36415 COLL VENOUS BLD VENIPUNCTURE: CPT | Mod: HCNC

## 2019-09-19 PROCEDURE — 84100 ASSAY OF PHOSPHORUS: CPT | Mod: HCNC

## 2019-09-19 PROCEDURE — 80061 LIPID PANEL: CPT | Mod: HCNC

## 2019-09-21 RX ORDER — BLOOD SUGAR DIAGNOSTIC
STRIP MISCELLANEOUS
Qty: 200 STRIP | Refills: 0 | Status: SHIPPED | OUTPATIENT
Start: 2019-09-21 | End: 2019-09-23 | Stop reason: SDUPTHER

## 2019-09-23 RX ORDER — BLOOD SUGAR DIAGNOSTIC
STRIP MISCELLANEOUS
Qty: 600 STRIP | Refills: 0 | Status: SHIPPED | OUTPATIENT
Start: 2019-09-23 | End: 2019-11-13 | Stop reason: SDUPTHER

## 2019-09-24 ENCOUNTER — PATIENT OUTREACH (OUTPATIENT)
Dept: ADMINISTRATIVE | Facility: OTHER | Age: 84
End: 2019-09-24

## 2019-09-26 ENCOUNTER — OFFICE VISIT (OUTPATIENT)
Dept: NEPHROLOGY | Facility: CLINIC | Age: 84
End: 2019-09-26
Payer: MEDICARE

## 2019-09-26 VITALS
HEIGHT: 66 IN | OXYGEN SATURATION: 98 % | BODY MASS INDEX: 32.81 KG/M2 | SYSTOLIC BLOOD PRESSURE: 120 MMHG | HEART RATE: 76 BPM | DIASTOLIC BLOOD PRESSURE: 52 MMHG | WEIGHT: 204.13 LBS

## 2019-09-26 DIAGNOSIS — I10 ESSENTIAL HYPERTENSION: ICD-10-CM

## 2019-09-26 DIAGNOSIS — N18.30 KIDNEY DISEASE, CHRONIC, STAGE III (GFR 30-59 ML/MIN): Primary | ICD-10-CM

## 2019-09-26 DIAGNOSIS — E11.9 TYPE 2 DIABETES MELLITUS WITHOUT RETINOPATHY: ICD-10-CM

## 2019-09-26 PROCEDURE — 99999 PR PBB SHADOW E&M-EST. PATIENT-LVL III: CPT | Mod: PBBFAC,HCNC,, | Performed by: INTERNAL MEDICINE

## 2019-09-26 PROCEDURE — 3074F SYST BP LT 130 MM HG: CPT | Mod: HCNC,CPTII,S$GLB, | Performed by: INTERNAL MEDICINE

## 2019-09-26 PROCEDURE — 3074F PR MOST RECENT SYSTOLIC BLOOD PRESSURE < 130 MM HG: ICD-10-PCS | Mod: HCNC,CPTII,S$GLB, | Performed by: INTERNAL MEDICINE

## 2019-09-26 PROCEDURE — 99999 PR PBB SHADOW E&M-EST. PATIENT-LVL III: ICD-10-PCS | Mod: PBBFAC,HCNC,, | Performed by: INTERNAL MEDICINE

## 2019-09-26 PROCEDURE — 1101F PT FALLS ASSESS-DOCD LE1/YR: CPT | Mod: HCNC,CPTII,S$GLB, | Performed by: INTERNAL MEDICINE

## 2019-09-26 PROCEDURE — 99205 PR OFFICE/OUTPT VISIT, NEW, LEVL V, 60-74 MIN: ICD-10-PCS | Mod: HCNC,S$GLB,, | Performed by: INTERNAL MEDICINE

## 2019-09-26 PROCEDURE — 3078F DIAST BP <80 MM HG: CPT | Mod: HCNC,CPTII,S$GLB, | Performed by: INTERNAL MEDICINE

## 2019-09-26 PROCEDURE — 1101F PR PT FALLS ASSESS DOC 0-1 FALLS W/OUT INJ PAST YR: ICD-10-PCS | Mod: HCNC,CPTII,S$GLB, | Performed by: INTERNAL MEDICINE

## 2019-09-26 PROCEDURE — 99205 OFFICE O/P NEW HI 60 MIN: CPT | Mod: HCNC,S$GLB,, | Performed by: INTERNAL MEDICINE

## 2019-09-26 PROCEDURE — 3078F PR MOST RECENT DIASTOLIC BLOOD PRESSURE < 80 MM HG: ICD-10-PCS | Mod: HCNC,CPTII,S$GLB, | Performed by: INTERNAL MEDICINE

## 2019-09-26 PROCEDURE — 99499 RISK ADDL DX/OHS AUDIT: ICD-10-PCS | Mod: HCNC,S$GLB,, | Performed by: INTERNAL MEDICINE

## 2019-09-26 PROCEDURE — 99499 UNLISTED E&M SERVICE: CPT | Mod: HCNC,S$GLB,, | Performed by: INTERNAL MEDICINE

## 2019-09-26 NOTE — PROGRESS NOTES
"New Consultation Report  Nephrology      Consult Requested By: No att. providers found  Reason for Consult: CKD    History of Present Illness:  Patient is a 83 y.o. male presents for a new consultation for evaluation of CKD. He has had long standing diabetes mellitus insulin-dependent for >30 years. A1c has improved lately but it remained elevated for many years. Also hypertensive and uses a CPAP. No previous evaluation by a nephrologist.     The patient denies taking NSAIDs or new antibiotics, recreational drugs, recent episode of dehydration, diarrhea, nausea or vomiting, acute illness, hospitalization or exposure to IV radiocontrast.     Past Medical History:   Diagnosis Date    Anemia 2/26/2018    Arthritis     BPH (benign prostatic hyperplasia)     Cataract     right     Colon polyp 11/18/2015    Colon polyps     Coronary artery disease     DR (diabetic retinopathy)     Dyslipidemia     Elevated PSA     Goiter, nontoxic, multinodular     Headaches, cluster     Hypertension     Kidney stones     Lump in the testicle     Left    Rotator cuff tendinitis     Right shoulder    Sleep apnea with use of continuous positive airway pressure (CPAP)     uses cpap at night    Type II or unspecified type diabetes mellitus with other specified manifestations, uncontrolled          Current Outpatient Medications:     ACCU-CHEK FASTCLIX LANCET DRUM Misc, USE FOUR TIMES DAILY, Disp: 204 each, Rfl: PRN    ACCU-CHEK SMARTVIEW TEST STRIP Strp, CHECK BLOOD SUGAR FOUR TIMES DAILY BEFORE MEALS AS DIRECTED, Disp: 600 strip, Rfl: 0    ASPIRIN (ASPIR-81 ORAL), Take 1 tablet by mouth Daily., Disp: , Rfl:     atorvastatin (LIPITOR) 40 MG tablet, TAKE 1 TABLET(40 MG) BY MOUTH EVERY DAY, Disp: 90 tablet, Rfl: 4    atorvastatin (LIPITOR) 40 MG tablet, TAKE 1 TABLET(40 MG) BY MOUTH EVERY DAY, Disp: 90 tablet, Rfl: 4    BD INSULIN SYRINGE ULTRA-FINE 1 mL 30 x 1/2" Syrg, USE AS DIRECTED FOUR TIMES DAILY, Disp: 200 each, " "Rfl: 11    BD ULTRA-FINE MINI PEN NEEDLE 31 gauge x 3/16" Ndle, USE WITH INSULIN INJECTION FOUR TIMES DAILY, Disp: 100 each, Rfl: 3    ciclopirox (PENLAC) 8 % Soln, Apply topically nightly., Disp: 6.6 mL, Rfl: 11    ciclopirox 0.77 % Gel, Apply topically 2 (two) times daily., Disp: 100 g, Rfl: 3    ergocalciferol (VITAMIN D2) 50,000 unit Cap, TAKE ONE CAPSULE BY MOUTH EVERY 30 DAYS, Disp: 4 capsule, Rfl: 3    finasteride (PROSCAR) 5 mg tablet, Take 1 tablet (5 mg total) by mouth once daily., Disp: 90 tablet, Rfl: 0    finasteride (PROSCAR) 5 mg tablet, Take 1 tablet (5 mg total) by mouth once daily., Disp: 90 tablet, Rfl: 3    insulin (LANTUS SOLOSTAR U-100 INSULIN) glargine 100 units/mL (3mL) SubQ pen, ADMINISTER 65 UNITS UNDER THE SKIN EVERY EVENING, Disp: 30 mL, Rfl: 5    insulin aspart U-100 (NOVOLOG FLEXPEN U-100 INSULIN) 100 unit/mL InPn pen, INJECT 12 UNITS UNDER THE SKIN THREE TIMES DAILY WITH MEALS PER SLIDING SCALE, Disp: 15 mL, Rfl: 3    irbesartan (AVAPRO) 75 MG tablet, TAKE 1 TABLET(75 MG) BY MOUTH EVERY DAY, Disp: 30 tablet, Rfl: 0    irbesartan (AVAPRO) 75 MG tablet, Take 1 tablet (75 mg total) by mouth once daily., Disp: 30 tablet, Rfl: 6    metoprolol succinate (TOPROL-XL) 50 MG 24 hr tablet, Take 1 tablet (50 mg total) by mouth once daily., Disp: 90 tablet, Rfl: 3    MULTIVITAMIN W-MINERALS/LUTEIN (CENTRUM SILVER ORAL), Take by mouth once daily., Disp: , Rfl:     nitroGLYCERIN (NITROSTAT) 0.4 MG SL tablet, Place 1 tablet (0.4 mg total) under the tongue every 5 (five) minutes as needed for Chest pain., Disp: 60 tablet, Rfl: 12    omega-3 fatty acids-vitamin E (FISH OIL) 1,000 mg Cap, Take 2 capsules by mouth Daily. , Disp: , Rfl:     omeprazole (PRILOSEC) 20 MG capsule, Take 1 capsule (20 mg total) by mouth 2 (two) times daily before meals., Disp: 180 capsule, Rfl: 3     Review of patient's allergies indicates:   Allergen Reactions    Ace inhibitors      Other reaction(s): cough    " Cefadroxil      Other reaction(s): possible vasculitis  Other reaction(s): vasculitis        Past Surgical History:   Procedure Laterality Date    CARDIAC CATHETERIZATION      CATARACT EXTRACTION W/  INTRAOCULAR LENS IMPLANT Right 2016    Dr. Garcia    CHOLECYSTECTOMY  2018    CORONARY STENT PLACEMENT  2007    LAD, RCAx2    INGUINAL HERNIA REPAIR Right 1995    PROSTATE BIOPSY  2012    ROTATOR CUFF REPAIR Right 2014    x2    ROTATOR CUFF REPAIR Left 2013    TONSILLECTOMY, ADENOIDECTOMY      as child    TRIGGER FINGER RELEASE Right 2004    x2     Family History   Problem Relation Age of Onset    Cataracts Father     Heart disease Father     Cancer Mother     Diabetes Mother     Amblyopia Neg Hx     Blindness Neg Hx     Glaucoma Neg Hx     Macular degeneration Neg Hx     Retinal detachment Neg Hx     Strabismus Neg Hx      Social History     Tobacco Use    Smoking status: Former Smoker     Last attempt to quit: 3/12/1978     Years since quittin.5    Smokeless tobacco: Never Used   Substance Use Topics    Alcohol use: No    Drug use: No       Review of Systems   Constitutional: Negative.    Respiratory: Negative.    Cardiovascular: Positive for leg swelling. Negative for chest pain, palpitations, orthopnea and claudication.   Gastrointestinal: Negative.    Genitourinary: Negative.    Musculoskeletal: Negative.    Skin: Negative.    All other systems reviewed and are negative.      Vitals:    19 0938   BP: (!) 120/52   Pulse: 76       PHYSICAL EXAMINATION:  General: no distress, well nourished  Skin: color, texture, turgor normal. No rash or lesions  HEENT: Eyes: reactive pupils, normal conjunctiva. Oral mucosa moist, no ulcers. Throat: no erythema.  Neck: supple, symmetrical, trachea midline, no JVD, no carotid bruit  Lungs: clear to auscultation bilaterally and normal respiratory effort  Cardiovascular: Heart: regular rate and rhythm, S1, S2 normal, no murmur,  rub or gallop. Pulses: 2+ and symmetric.  Abdomen: bowel sounds present, no abdominal bruit, soft, non-tender non-distented; no masses, organomegaly or ascites.   Musculoskeletal: no pitting edema in lower extremities, no clubbing or cyanosis  Lymph Nodes: No cervical or supraclavicular adenopathy  Neurologic: AAOx3, normal strength and tone. No focal deficit. No asterixis.       LABORATORY DATA:  Lab Results   Component Value Date    CREATININE 1.5 (H) 09/19/2019       Prot/Creat Ratio, Ur   Date Value Ref Range Status   09/19/2019 0.13 0.00 - 0.20 Final       Lab Results   Component Value Date     09/19/2019    K 4.4 09/19/2019    CO2 28 09/19/2019     Lab Results   Component Value Date    PTH 33.0 09/19/2019    CALCIUM 9.6 09/19/2019    PHOS 3.8 09/19/2019       Lab Results   Component Value Date    HGB 11.2 (L) 09/19/2019        Lab Results   Component Value Date    HGBA1C 7.5 (H) 09/19/2019       Lab Results   Component Value Date    LDLCALC 71.4 09/19/2019       IMAGING STUDIES  Kidney US: Reviewed      IMPRESSION / RECOMMENDATIONS:     1. Kidney disease, chronic, stage III (GFR 30-59 ml/min)  CKD for the last 3-4 years, no rapidly progressive, most likely due to diabetic nephropathy. Already on an ARB. Minimal proteinuria. A1c has improved. BP in fair range. Avoiding NSAIDs. Not on diuretics. Mild-to-moderate risk for CKD progression. No significant Anemia of CKD. MBD panel in fair range. Renal Us from 2 years ago showed normal kidneys. Small nephrolithiasis on CT 2018, asked to increase water intake and low Na diet.    2. Type 2 diabetes mellitus without retinopathy  Managed by PCP   3. Essential hypertension  Well controlled on irbesartan, irbesartan       SUMMARY OF PLAN:  1. Continue irbesartan  2. Continue avoiding NSAIDs  3. Maintain high water intake and low Na diet to prevent stones  4. RTC in 6 mo with MATTHEW

## 2019-10-01 RX ORDER — INSULIN ASPART 100 [IU]/ML
INJECTION, SOLUTION INTRAVENOUS; SUBCUTANEOUS
Qty: 15 ML | Refills: 5 | Status: SHIPPED | OUTPATIENT
Start: 2019-10-01 | End: 2020-06-01

## 2019-10-08 ENCOUNTER — OFFICE VISIT (OUTPATIENT)
Dept: URGENT CARE | Facility: CLINIC | Age: 84
End: 2019-10-08
Payer: MEDICARE

## 2019-10-08 VITALS
RESPIRATION RATE: 20 BRPM | HEART RATE: 73 BPM | SYSTOLIC BLOOD PRESSURE: 114 MMHG | WEIGHT: 203 LBS | OXYGEN SATURATION: 98 % | BODY MASS INDEX: 32.62 KG/M2 | DIASTOLIC BLOOD PRESSURE: 59 MMHG | TEMPERATURE: 98 F | HEIGHT: 66 IN

## 2019-10-08 DIAGNOSIS — J20.9 ACUTE BRONCHITIS DUE TO INFECTION: Primary | ICD-10-CM

## 2019-10-08 LAB
CTP QC/QA: YES
FLUAV AG NPH QL: NEGATIVE
FLUBV AG NPH QL: NEGATIVE

## 2019-10-08 PROCEDURE — 1101F PT FALLS ASSESS-DOCD LE1/YR: CPT | Mod: CPTII,S$GLB,, | Performed by: NURSE PRACTITIONER

## 2019-10-08 PROCEDURE — 87804 INFLUENZA ASSAY W/OPTIC: CPT | Mod: QW,S$GLB,, | Performed by: NURSE PRACTITIONER

## 2019-10-08 PROCEDURE — 3074F PR MOST RECENT SYSTOLIC BLOOD PRESSURE < 130 MM HG: ICD-10-PCS | Mod: CPTII,S$GLB,, | Performed by: NURSE PRACTITIONER

## 2019-10-08 PROCEDURE — 3078F DIAST BP <80 MM HG: CPT | Mod: CPTII,S$GLB,, | Performed by: NURSE PRACTITIONER

## 2019-10-08 PROCEDURE — 99214 OFFICE O/P EST MOD 30 MIN: CPT | Mod: 25,S$GLB,, | Performed by: NURSE PRACTITIONER

## 2019-10-08 PROCEDURE — 87804 POCT INFLUENZA A/B: ICD-10-PCS | Mod: QW,S$GLB,, | Performed by: NURSE PRACTITIONER

## 2019-10-08 PROCEDURE — 1101F PR PT FALLS ASSESS DOC 0-1 FALLS W/OUT INJ PAST YR: ICD-10-PCS | Mod: CPTII,S$GLB,, | Performed by: NURSE PRACTITIONER

## 2019-10-08 PROCEDURE — 3074F SYST BP LT 130 MM HG: CPT | Mod: CPTII,S$GLB,, | Performed by: NURSE PRACTITIONER

## 2019-10-08 PROCEDURE — 99214 PR OFFICE/OUTPT VISIT, EST, LEVL IV, 30-39 MIN: ICD-10-PCS | Mod: 25,S$GLB,, | Performed by: NURSE PRACTITIONER

## 2019-10-08 PROCEDURE — 3078F PR MOST RECENT DIASTOLIC BLOOD PRESSURE < 80 MM HG: ICD-10-PCS | Mod: CPTII,S$GLB,, | Performed by: NURSE PRACTITIONER

## 2019-10-08 RX ORDER — AZITHROMYCIN 250 MG/1
TABLET, FILM COATED ORAL
Qty: 6 TABLET | Refills: 0 | Status: SHIPPED | OUTPATIENT
Start: 2019-10-08 | End: 2020-01-23 | Stop reason: SDUPTHER

## 2019-10-08 NOTE — PROGRESS NOTES
"Subjective:       Patient ID: Anjum Cai Jr. is a 83 y.o. male.    Vitals:  height is 5' 6" (1.676 m) and weight is 92.1 kg (203 lb). His oral temperature is 98.4 °F (36.9 °C). His blood pressure is 114/59 (abnormal) and his pulse is 73. His respiration is 20 and oxygen saturation is 98%.     Chief Complaint: Cough    This is a 83 y.o. male   who presents today with a chief complaint of a cough he's had for the past two weeks. He said that he had a fever of 102 last night. He's complaining of congestion. He's been taking cough medicine and benadryl to help relieve his symptoms.     Cough   This is a new problem. The current episode started 1 to 4 weeks ago. The problem has been gradually worsening. The problem occurs every few minutes. The cough is non-productive. Associated symptoms include a fever. Pertinent negatives include no chills, ear pain, eye redness, hemoptysis, myalgias, rash, sore throat, shortness of breath or wheezing. Nothing aggravates the symptoms. Treatments tried: cough medicine and benadryl. The treatment provided no relief.       Constitution: Positive for fever. Negative for chills, sweating and fatigue.   HENT: Positive for congestion. Negative for ear pain, sinus pain, sinus pressure, sore throat and voice change.    Neck: Negative for painful lymph nodes.   Eyes: Negative for eye redness.   Respiratory: Positive for cough. Negative for chest tightness, sputum production, bloody sputum, COPD, shortness of breath, stridor, wheezing and asthma.    Gastrointestinal: Negative for nausea and vomiting.   Musculoskeletal: Negative for muscle ache.   Skin: Negative for rash.   Allergic/Immunologic: Negative for seasonal allergies and asthma.   Hematologic/Lymphatic: Negative for swollen lymph nodes.       Objective:      Physical Exam   Constitutional: He is oriented to person, place, and time. He appears well-developed and well-nourished. He is cooperative.  Non-toxic appearance. He does not " have a sickly appearance. He does not appear ill. No distress.   HENT:   Head: Normocephalic and atraumatic.   Right Ear: Hearing, tympanic membrane, external ear and ear canal normal.   Left Ear: Hearing, tympanic membrane, external ear and ear canal normal.   Nose: Nose normal. No mucosal edema, rhinorrhea or nasal deformity. No epistaxis. Right sinus exhibits no maxillary sinus tenderness and no frontal sinus tenderness. Left sinus exhibits no maxillary sinus tenderness and no frontal sinus tenderness.   Mouth/Throat: Uvula is midline, oropharynx is clear and moist and mucous membranes are normal. No trismus in the jaw. Normal dentition. No uvula swelling. No oropharyngeal exudate, posterior oropharyngeal edema or posterior oropharyngeal erythema.   Eyes: Conjunctivae and lids are normal. No scleral icterus.   Neck: Trachea normal, full passive range of motion without pain and phonation normal. Neck supple. No neck rigidity. No edema and no erythema present.   Cardiovascular: Normal rate, regular rhythm, normal heart sounds, intact distal pulses and normal pulses.   Pulmonary/Chest: Effort normal. No accessory muscle usage. No respiratory distress. He has no decreased breath sounds. He has no wheezes. He has rhonchi.   Abdominal: Normal appearance.   Musculoskeletal: Normal range of motion. He exhibits no edema or deformity.   Neurological: He is alert and oriented to person, place, and time. He exhibits normal muscle tone. Coordination normal.   Skin: Skin is warm, dry, intact, not diaphoretic and not pale.   Psychiatric: He has a normal mood and affect. His speech is normal and behavior is normal. Judgment and thought content normal. Cognition and memory are normal.   Nursing note and vitals reviewed.          Results for orders placed or performed in visit on 10/08/19   POCT Influenza A/B   Result Value Ref Range    Rapid Influenza A Ag Negative Negative    Rapid Influenza B Ag Negative Negative    Quality  Control Acceptable Yes        Assessment:       1. Acute bronchitis due to infection        Plan:         Acute bronchitis due to infection  -     POCT Influenza A/B  -     azithromycin (Z-GLORIA) 250 MG tablet; Take 2 tablets by mouth on day 1; Take 1 tablet by mouth on days 2-5  Dispense: 6 tablet; Refill: 0

## 2019-10-10 ENCOUNTER — LAB VISIT (OUTPATIENT)
Dept: LAB | Facility: HOSPITAL | Age: 84
End: 2019-10-10
Attending: UROLOGY
Payer: MEDICARE

## 2019-10-10 DIAGNOSIS — R97.20 ELEVATED PSA: ICD-10-CM

## 2019-10-10 LAB — COMPLEXED PSA SERPL-MCNC: 19.5 NG/ML (ref 0–4)

## 2019-10-10 PROCEDURE — 36415 COLL VENOUS BLD VENIPUNCTURE: CPT | Mod: HCNC

## 2019-10-10 PROCEDURE — 84153 ASSAY OF PSA TOTAL: CPT | Mod: HCNC

## 2019-10-14 ENCOUNTER — PATIENT OUTREACH (OUTPATIENT)
Dept: ADMINISTRATIVE | Facility: OTHER | Age: 84
End: 2019-10-14

## 2019-10-15 ENCOUNTER — OFFICE VISIT (OUTPATIENT)
Dept: UROLOGY | Facility: CLINIC | Age: 84
End: 2019-10-15
Payer: MEDICARE

## 2019-10-15 VITALS
DIASTOLIC BLOOD PRESSURE: 58 MMHG | WEIGHT: 202.81 LBS | HEIGHT: 66 IN | HEART RATE: 64 BPM | BODY MASS INDEX: 32.59 KG/M2 | SYSTOLIC BLOOD PRESSURE: 128 MMHG

## 2019-10-15 DIAGNOSIS — N13.8 BENIGN PROSTATIC HYPERPLASIA WITH URINARY OBSTRUCTION: Primary | ICD-10-CM

## 2019-10-15 DIAGNOSIS — D49.59 NEOPLASM OF PROSTATE: ICD-10-CM

## 2019-10-15 DIAGNOSIS — N40.1 BENIGN PROSTATIC HYPERPLASIA WITH URINARY OBSTRUCTION: Primary | ICD-10-CM

## 2019-10-15 DIAGNOSIS — R97.20 ELEVATED PSA: ICD-10-CM

## 2019-10-15 PROCEDURE — 99999 PR PBB SHADOW E&M-EST. PATIENT-LVL V: CPT | Mod: PBBFAC,HCNC,, | Performed by: UROLOGY

## 2019-10-15 PROCEDURE — 3078F DIAST BP <80 MM HG: CPT | Mod: HCNC,CPTII,S$GLB, | Performed by: UROLOGY

## 2019-10-15 PROCEDURE — 3074F SYST BP LT 130 MM HG: CPT | Mod: HCNC,CPTII,S$GLB, | Performed by: UROLOGY

## 2019-10-15 PROCEDURE — 99214 OFFICE O/P EST MOD 30 MIN: CPT | Mod: HCNC,S$GLB,, | Performed by: UROLOGY

## 2019-10-15 PROCEDURE — 99214 PR OFFICE/OUTPT VISIT, EST, LEVL IV, 30-39 MIN: ICD-10-PCS | Mod: HCNC,S$GLB,, | Performed by: UROLOGY

## 2019-10-15 PROCEDURE — 1101F PT FALLS ASSESS-DOCD LE1/YR: CPT | Mod: HCNC,CPTII,S$GLB, | Performed by: UROLOGY

## 2019-10-15 PROCEDURE — 3074F PR MOST RECENT SYSTOLIC BLOOD PRESSURE < 130 MM HG: ICD-10-PCS | Mod: HCNC,CPTII,S$GLB, | Performed by: UROLOGY

## 2019-10-15 PROCEDURE — 1101F PR PT FALLS ASSESS DOC 0-1 FALLS W/OUT INJ PAST YR: ICD-10-PCS | Mod: HCNC,CPTII,S$GLB, | Performed by: UROLOGY

## 2019-10-15 PROCEDURE — 3078F PR MOST RECENT DIASTOLIC BLOOD PRESSURE < 80 MM HG: ICD-10-PCS | Mod: HCNC,CPTII,S$GLB, | Performed by: UROLOGY

## 2019-10-15 PROCEDURE — 99999 PR PBB SHADOW E&M-EST. PATIENT-LVL V: ICD-10-PCS | Mod: PBBFAC,HCNC,, | Performed by: UROLOGY

## 2019-10-15 RX ORDER — CIPROFLOXACIN 500 MG/1
500 TABLET ORAL 2 TIMES DAILY
Qty: 6 TABLET | Refills: 0 | Status: SHIPPED | OUTPATIENT
Start: 2019-10-15 | End: 2019-10-18

## 2019-10-15 RX ORDER — GENTAMICIN SULFATE 40 MG/ML
160 INJECTION, SOLUTION INTRAMUSCULAR; INTRAVENOUS ONCE
Status: COMPLETED | OUTPATIENT
Start: 2019-10-15 | End: 2019-11-12

## 2019-10-15 RX ORDER — LIDOCAINE HYDROCHLORIDE 20 MG/ML
JELLY TOPICAL ONCE
Status: CANCELLED | OUTPATIENT
Start: 2019-10-15 | End: 2019-10-15

## 2019-10-15 RX ORDER — LIDOCAINE HYDROCHLORIDE 10 MG/ML
10 INJECTION INFILTRATION; PERINEURAL ONCE
Status: CANCELLED | OUTPATIENT
Start: 2019-10-15 | End: 2019-10-15

## 2019-10-15 NOTE — PROGRESS NOTES
CC: elevated PSA    Anjum Cai Jr. is a 83 y.o. man who is here for the evaluation of elevated PSA.  A new pt referred by his PCP, Dr. Gavin Martinez.    last seen by me on 7/14/17.  had a negative prostate bx back on 3/28/13 by me for elevated PSA of 10. His prostate was 40 grams in size and no cancer was found.  Finasteride started for his LUTS and elevated PSA.  He has done well and reports no problem with urination.  He sleeps thru at night.  Voices no problem with urination.  Denies flank pain, dysuira, hematuria .     He had a MI back in 2016 and was not able to stop anti-coagulation if prostate biopsy was indicated.  He reports that he no longer takes blood thinning meds other there ASA and fish oil.  He was able to stop ASA and fish oil for recent surgeries.  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    Past Medical History:   Diagnosis Date    Anemia 2/26/2018    Arthritis     BPH (benign prostatic hyperplasia)     Cataract     right     Colon polyp 11/18/2015    Colon polyps     Coronary artery disease     DR (diabetic retinopathy)     Dyslipidemia     Elevated PSA     Goiter, nontoxic, multinodular     Headaches, cluster     Hypertension     Kidney stones     Lump in the testicle     Left    Rotator cuff tendinitis     Right shoulder    Sleep apnea with use of continuous positive airway pressure (CPAP)     uses cpap at night    Type II or unspecified type diabetes mellitus with other specified manifestations, uncontrolled      Past Surgical History:   Procedure Laterality Date    CARDIAC CATHETERIZATION  2007    CATARACT EXTRACTION W/  INTRAOCULAR LENS IMPLANT Right 09/27/2016    Dr. Garcia    CHOLECYSTECTOMY  02/2018    CORONARY STENT PLACEMENT  02/02/2007    LAD, RCAx2    INGUINAL HERNIA REPAIR Right 1995    PROSTATE BIOPSY  2012    ROTATOR CUFF REPAIR Right 2014    x2    ROTATOR CUFF REPAIR Left 2013    TONSILLECTOMY, ADENOIDECTOMY      as child    TRIGGER FINGER RELEASE Right  "2004    x2     Social History     Tobacco Use    Smoking status: Former Smoker     Last attempt to quit: 3/12/1978     Years since quittin.6    Smokeless tobacco: Never Used   Substance Use Topics    Alcohol use: No    Drug use: No     Family History   Problem Relation Age of Onset    Cataracts Father     Heart disease Father     Cancer Mother     Diabetes Mother     Amblyopia Neg Hx     Blindness Neg Hx     Glaucoma Neg Hx     Macular degeneration Neg Hx     Retinal detachment Neg Hx     Strabismus Neg Hx      Allergy:  Review of patient's allergies indicates:   Allergen Reactions    Ace inhibitors      Other reaction(s): cough    Cefadroxil      Other reaction(s): possible vasculitis  Other reaction(s): vasculitis     Outpatient Encounter Medications as of 10/15/2019   Medication Sig Dispense Refill    ACCU-CHEK FASTCLIX LANCET DRUM Misc USE FOUR TIMES DAILY 204 each PRN    ACCU-CHEK SMARTVIEW TEST STRIP Strp CHECK BLOOD SUGAR FOUR TIMES DAILY BEFORE MEALS AS DIRECTED 600 strip 0    ASPIRIN (ASPIR-81 ORAL) Take 1 tablet by mouth Daily.      atorvastatin (LIPITOR) 40 MG tablet TAKE 1 TABLET(40 MG) BY MOUTH EVERY DAY 90 tablet 4    atorvastatin (LIPITOR) 40 MG tablet TAKE 1 TABLET(40 MG) BY MOUTH EVERY DAY 90 tablet 4    azithromycin (Z-GLORIA) 250 MG tablet Take 2 tablets by mouth on day 1; Take 1 tablet by mouth on days 2-5 6 tablet 0    BD INSULIN SYRINGE ULTRA-FINE 1 mL 30 x 1/2" Syrg USE AS DIRECTED FOUR TIMES DAILY 200 each 11    BD ULTRA-FINE MINI PEN NEEDLE 31 gauge x 3/16" Ndle USE WITH INSULIN INJECTION FOUR TIMES DAILY 100 each 3    ciclopirox (PENLAC) 8 % Soln Apply topically nightly. 6.6 mL 11    ciclopirox 0.77 % Gel Apply topically 2 (two) times daily. 100 g 3    ergocalciferol (VITAMIN D2) 50,000 unit Cap TAKE ONE CAPSULE BY MOUTH EVERY 30 DAYS 4 capsule 3    finasteride (PROSCAR) 5 mg tablet Take 1 tablet (5 mg total) by mouth once daily. 90 tablet 0    finasteride " (PROSCAR) 5 mg tablet Take 1 tablet (5 mg total) by mouth once daily. 90 tablet 3    insulin (LANTUS SOLOSTAR U-100 INSULIN) glargine 100 units/mL (3mL) SubQ pen ADMINISTER 65 UNITS UNDER THE SKIN EVERY EVENING 30 mL 5    insulin aspart U-100 (NOVOLOG FLEXPEN U-100 INSULIN) 100 unit/mL (3 mL) InPn pen ADMINISTER 12 UNITS UNDER THE SKIN THREE TIMES DAILY WITH MEALS PER SLIDING SCALE 15 mL 5    irbesartan (AVAPRO) 75 MG tablet TAKE 1 TABLET(75 MG) BY MOUTH EVERY DAY 30 tablet 0    irbesartan (AVAPRO) 75 MG tablet Take 1 tablet (75 mg total) by mouth once daily. 30 tablet 6    metoprolol succinate (TOPROL-XL) 50 MG 24 hr tablet Take 1 tablet (50 mg total) by mouth once daily. 90 tablet 3    MULTIVITAMIN W-MINERALS/LUTEIN (CENTRUM SILVER ORAL) Take by mouth once daily.      nitroGLYCERIN (NITROSTAT) 0.4 MG SL tablet Place 1 tablet (0.4 mg total) under the tongue every 5 (five) minutes as needed for Chest pain. 60 tablet 12    omega-3 fatty acids-vitamin E (FISH OIL) 1,000 mg Cap Take 2 capsules by mouth Daily.       omeprazole (PRILOSEC) 20 MG capsule Take 1 capsule (20 mg total) by mouth 2 (two) times daily before meals. 180 capsule 3    ciprofloxacin HCl (CIPRO) 500 MG tablet Take 1 tablet (500 mg total) by mouth 2 (two) times daily. for 6 doses 6 tablet 0    sodium phosphates (FLEET ENEMA) 19-7 gram/118 mL Enem Place 1 enema rectally once. for 1 dose 1 enema 1     Facility-Administered Encounter Medications as of 10/15/2019   Medication Dose Route Frequency Provider Last Rate Last Dose    gentamicin injection 160 mg  160 mg Intramuscular Once Alexis H. MD Maurilio         Review of Systems   ROS  Physical Exam     Vitals:    10/15/19 0833   BP: (!) 128/58   Pulse: 64     Physical Exam   Constitutional: He is oriented to person, place, and time. He appears well-developed and well-nourished. No distress.   HENT:   Head: Normocephalic and atraumatic.   Right Ear: External ear normal.   Left Ear: External ear  normal.   Nose: Nose normal.   Mouth/Throat: Oropharynx is clear and moist.   Eyes: Conjunctivae are normal. Pupils are equal, round, and reactive to light.   Neck: Normal range of motion. Neck supple. No JVD present. No tracheal deviation present. No thyromegaly present.   Cardiovascular: Normal rate, regular rhythm, normal heart sounds and intact distal pulses.  Exam reveals no gallop and no friction rub.    No murmur heard.  Pulmonary/Chest: Effort normal and breath sounds normal. No respiratory distress. He has no wheezes. He exhibits no tenderness.   Abdominal: Soft. Bowel sounds are normal. He exhibits no distension and no mass. There is no tenderness. There is no rebound and no guarding.   Genitourinary: Rectum normal and penis normal. No penile tenderness.   Genitourinary Comments: Prostate 25 grams with positive nodule (slightt induration at the left side). negative tenderness     Musculoskeletal: Normal range of motion. He exhibits no edema, tenderness or deformity.   Lymphadenopathy:     He has no cervical adenopathy.   Neurological: He is alert and oriented to person, place, and time.   Skin: Skin is warm and dry. He is not diaphoretic.     Psychiatric: He has a normal mood and affect. His behavior is normal. Thought content normal.     Genitalia:  Scrotum: no rash or lesion  Normal symmetric epididymis without masses  Normal vas palpated  Normal size, symmetric testicles with no masses   Normal urethral meatus with no discharge  Normal circumcised penis with no lesion   Rectal:  Normal perineum and anus upon inspection.  Normal tone, no masses or tenderness;     LABS:  Lab Results   Component Value Date    PSA 10.06 (H) 02/26/2013    PSA 6.3 (H) 09/15/2009    PSA 4.0 08/11/2005    PSA 3.7 06/17/2004    PSADIAG 19.5 (H) 10/10/2019    PSADIAG 15.0 (H) 07/16/2019    PSADIAG 7.7 (H) 07/14/2017    PSADIAG 5.5 (H) 04/05/2016    PSADIAG 5.0 (H) 10/28/2013     Results for orders placed or performed in visit on  10/10/19   Prostate Specific Antigen, Diagnostic   Result Value Ref Range    PSA DIAGNOSTIC 19.5 (H) 0.00 - 4.00 ng/mL   Results for orders placed or performed in visit on 07/16/19   Prostate Specific Antigen, Diagnostic   Result Value Ref Range    PSA DIAGNOSTIC 15.0 (H) 0.00 - 4.00 ng/mL   Results for orders placed or performed in visit on 07/14/17   Prostate Specific Antigen, Diagnostic   Result Value Ref Range    PSA DIAGNOSTIC 7.7 (H) 0.00 - 4.00 ng/mL     Lab Results   Component Value Date    CREATININE 1.5 (H) 09/19/2019    CREATININE 1.5 (H) 09/10/2019    CREATININE 1.9 (H) 08/20/2019     No results found for this or any previous visit.  Urine Culture, Routine   Date Value Ref Range Status   09/18/2018 No growth  Final     UA pH 6, 250 glucose, trace protein, neg. blood    Hemoglobin A1c 7.9    Assessment and Plan:  Diagnoses and all orders for this visit:    Benign prostatic hyperplasia with urinary obstruction    Elevated PSA  -     Transrectal Ultrasound w/ Biopsy; Future  -     Tissue Specimen To Pathology, Urology; Standing  -     ciprofloxacin HCl (CIPRO) 500 MG tablet; Take 1 tablet (500 mg total) by mouth 2 (two) times daily. for 6 doses  -     sodium phosphates (FLEET ENEMA) 19-7 gram/118 mL Enem; Place 1 enema rectally once. for 1 dose    Neoplasm of prostate  -     MRI Prostate W W/O Contrast; Future    Other orders  -     lidocaine HCL 2% jelly  -     lidocaine HCL 10 mg/ml (1%) injection 10 mL  -     gentamicin injection 160 mg    The patient will be scheduled for a MRI fusion guided prostate biopsy.    The risks and benefits of the procedure were discussed with the patient in detail.  The risks include but are not limited to bleeding, infection, pain, bloody ejaculation, and need for further procedures.    The patient was told to stop all blood thinners at least one week prior to the procedure.    The patient will do a fleets enema the AM of the biopsy and take antibiotics beginning the PM  prior to the procedure.        Follow-up:  Follow up for MRI of prostate, UroNav bx of prostate.

## 2019-10-16 ENCOUNTER — PATIENT MESSAGE (OUTPATIENT)
Dept: UROLOGY | Facility: CLINIC | Age: 84
End: 2019-10-16

## 2019-10-20 ENCOUNTER — PATIENT OUTREACH (OUTPATIENT)
Dept: ADMINISTRATIVE | Facility: OTHER | Age: 84
End: 2019-10-20

## 2019-10-21 RX ORDER — CIPROFLOXACIN 500 MG/1
500 TABLET ORAL 2 TIMES DAILY
Qty: 6 TABLET | Refills: 0 | Status: SHIPPED | OUTPATIENT
Start: 2019-10-21 | End: 2019-10-24

## 2019-10-22 ENCOUNTER — OFFICE VISIT (OUTPATIENT)
Dept: ORTHOPEDICS | Facility: CLINIC | Age: 84
End: 2019-10-22
Payer: MEDICARE

## 2019-10-22 VITALS
HEIGHT: 66 IN | HEART RATE: 66 BPM | DIASTOLIC BLOOD PRESSURE: 73 MMHG | WEIGHT: 202 LBS | SYSTOLIC BLOOD PRESSURE: 127 MMHG | BODY MASS INDEX: 32.47 KG/M2

## 2019-10-22 DIAGNOSIS — M65.331 TRIGGER MIDDLE FINGER OF RIGHT HAND: Primary | ICD-10-CM

## 2019-10-22 PROCEDURE — 3074F PR MOST RECENT SYSTOLIC BLOOD PRESSURE < 130 MM HG: ICD-10-PCS | Mod: HCNC,CPTII,S$GLB, | Performed by: ORTHOPAEDIC SURGERY

## 2019-10-22 PROCEDURE — 1101F PR PT FALLS ASSESS DOC 0-1 FALLS W/OUT INJ PAST YR: ICD-10-PCS | Mod: HCNC,CPTII,S$GLB, | Performed by: ORTHOPAEDIC SURGERY

## 2019-10-22 PROCEDURE — 99213 PR OFFICE/OUTPT VISIT, EST, LEVL III, 20-29 MIN: ICD-10-PCS | Mod: 25,HCNC,S$GLB, | Performed by: ORTHOPAEDIC SURGERY

## 2019-10-22 PROCEDURE — 99999 PR PBB SHADOW E&M-EST. PATIENT-LVL III: ICD-10-PCS | Mod: PBBFAC,HCNC,, | Performed by: ORTHOPAEDIC SURGERY

## 2019-10-22 PROCEDURE — 3078F DIAST BP <80 MM HG: CPT | Mod: HCNC,CPTII,S$GLB, | Performed by: ORTHOPAEDIC SURGERY

## 2019-10-22 PROCEDURE — 20550 PR INJECT TENDON SHEATH/LIGAMENT: ICD-10-PCS | Mod: 51,F4,HCNC,S$GLB | Performed by: ORTHOPAEDIC SURGERY

## 2019-10-22 PROCEDURE — 20550 NJX 1 TENDON SHEATH/LIGAMENT: CPT | Mod: F7,HCNC,S$GLB, | Performed by: ORTHOPAEDIC SURGERY

## 2019-10-22 PROCEDURE — 1101F PT FALLS ASSESS-DOCD LE1/YR: CPT | Mod: HCNC,CPTII,S$GLB, | Performed by: ORTHOPAEDIC SURGERY

## 2019-10-22 PROCEDURE — 99213 OFFICE O/P EST LOW 20 MIN: CPT | Mod: 25,HCNC,S$GLB, | Performed by: ORTHOPAEDIC SURGERY

## 2019-10-22 PROCEDURE — 99999 PR PBB SHADOW E&M-EST. PATIENT-LVL III: CPT | Mod: PBBFAC,HCNC,, | Performed by: ORTHOPAEDIC SURGERY

## 2019-10-22 PROCEDURE — 3074F SYST BP LT 130 MM HG: CPT | Mod: HCNC,CPTII,S$GLB, | Performed by: ORTHOPAEDIC SURGERY

## 2019-10-22 PROCEDURE — 3078F PR MOST RECENT DIASTOLIC BLOOD PRESSURE < 80 MM HG: ICD-10-PCS | Mod: HCNC,CPTII,S$GLB, | Performed by: ORTHOPAEDIC SURGERY

## 2019-10-22 RX ORDER — TRIAMCINOLONE ACETONIDE 40 MG/ML
40 INJECTION, SUSPENSION INTRA-ARTICULAR; INTRAMUSCULAR
Status: COMPLETED | OUTPATIENT
Start: 2019-10-22 | End: 2019-10-22

## 2019-10-22 RX ADMIN — TRIAMCINOLONE ACETONIDE 40 MG: 40 INJECTION, SUSPENSION INTRA-ARTICULAR; INTRAMUSCULAR at 09:10

## 2019-10-22 NOTE — PROGRESS NOTES
"Subjective:      Patient ID: Anjum Cai Jr. is a 83 y.o. male.  Chief Complaint: Pain of the Right Hand (Pt states soreness in his right middle finger and locking up)      HPI  Anjum Cai Jr. is a  83 y.o. male presenting today for follow up of triggering of both hands.  He reports that he is status post right carpal tunnel release and trigger release of right ring finger from last year doing well but now he is having some locking of the right middle finger and left small finger  He would like injections  No numbness or tingling reported.    Review of patient's allergies indicates:   Allergen Reactions    Ace inhibitors      Other reaction(s): cough    Cefadroxil      Other reaction(s): possible vasculitis  Other reaction(s): vasculitis         Current Outpatient Medications   Medication Sig Dispense Refill    ACCU-CHEK FASTCLIX LANCET DRUM Misc USE FOUR TIMES DAILY 204 each PRN    ACCU-CHEK SMARTVIEW TEST STRIP Strp CHECK BLOOD SUGAR FOUR TIMES DAILY BEFORE MEALS AS DIRECTED 600 strip 0    ASPIRIN (ASPIR-81 ORAL) Take 1 tablet by mouth Daily.      atorvastatin (LIPITOR) 40 MG tablet TAKE 1 TABLET(40 MG) BY MOUTH EVERY DAY 90 tablet 4    atorvastatin (LIPITOR) 40 MG tablet TAKE 1 TABLET(40 MG) BY MOUTH EVERY DAY 90 tablet 4    azithromycin (Z-GLORIA) 250 MG tablet Take 2 tablets by mouth on day 1; Take 1 tablet by mouth on days 2-5 6 tablet 0    BD INSULIN SYRINGE ULTRA-FINE 1 mL 30 x 1/2" Syrg USE AS DIRECTED FOUR TIMES DAILY 200 each 11    BD ULTRA-FINE MINI PEN NEEDLE 31 gauge x 3/16" Ndle USE WITH INSULIN INJECTION FOUR TIMES DAILY 100 each 3    ciclopirox (PENLAC) 8 % Soln Apply topically nightly. 6.6 mL 11    ciclopirox 0.77 % Gel Apply topically 2 (two) times daily. 100 g 3    ciprofloxacin HCl (CIPRO) 500 MG tablet Take 1 tablet (500 mg total) by mouth 2 (two) times daily. for 6 doses 6 tablet 0    ergocalciferol (VITAMIN D2) 50,000 unit Cap TAKE ONE CAPSULE BY MOUTH EVERY 30 DAYS 4 " capsule 3    finasteride (PROSCAR) 5 mg tablet Take 1 tablet (5 mg total) by mouth once daily. 90 tablet 0    finasteride (PROSCAR) 5 mg tablet Take 1 tablet (5 mg total) by mouth once daily. 90 tablet 3    insulin (LANTUS SOLOSTAR U-100 INSULIN) glargine 100 units/mL (3mL) SubQ pen ADMINISTER 65 UNITS UNDER THE SKIN EVERY EVENING 30 mL 5    insulin aspart U-100 (NOVOLOG FLEXPEN U-100 INSULIN) 100 unit/mL (3 mL) InPn pen ADMINISTER 12 UNITS UNDER THE SKIN THREE TIMES DAILY WITH MEALS PER SLIDING SCALE 15 mL 5    irbesartan (AVAPRO) 75 MG tablet TAKE 1 TABLET(75 MG) BY MOUTH EVERY DAY 30 tablet 0    irbesartan (AVAPRO) 75 MG tablet Take 1 tablet (75 mg total) by mouth once daily. 30 tablet 6    metoprolol succinate (TOPROL-XL) 50 MG 24 hr tablet Take 1 tablet (50 mg total) by mouth once daily. 90 tablet 3    MULTIVITAMIN W-MINERALS/LUTEIN (CENTRUM SILVER ORAL) Take by mouth once daily.      nitroGLYCERIN (NITROSTAT) 0.4 MG SL tablet Place 1 tablet (0.4 mg total) under the tongue every 5 (five) minutes as needed for Chest pain. 60 tablet 12    omega-3 fatty acids-vitamin E (FISH OIL) 1,000 mg Cap Take 2 capsules by mouth Daily.       omeprazole (PRILOSEC) 20 MG capsule Take 1 capsule (20 mg total) by mouth 2 (two) times daily before meals. 180 capsule 3     Current Facility-Administered Medications   Medication Dose Route Frequency Provider Last Rate Last Dose    gentamicin injection 160 mg  160 mg Intramuscular Once Alexis H. MD Maurilio           Past Medical History:   Diagnosis Date    Anemia 2/26/2018    Arthritis     BPH (benign prostatic hyperplasia)     Cataract     right     Colon polyp 11/18/2015    Colon polyps     Coronary artery disease     DR (diabetic retinopathy)     Dyslipidemia     Elevated PSA     Goiter, nontoxic, multinodular     Headaches, cluster     Hypertension     Kidney stones     Lump in the testicle     Left    Rotator cuff tendinitis     Right shoulder    Sleep  "apnea with use of continuous positive airway pressure (CPAP)     uses cpap at night    Type II or unspecified type diabetes mellitus with other specified manifestations, uncontrolled        Past Surgical History:   Procedure Laterality Date    CARDIAC CATHETERIZATION  2007    CATARACT EXTRACTION W/  INTRAOCULAR LENS IMPLANT Right 09/27/2016    Dr. Garcia    CHOLECYSTECTOMY  02/2018    CORONARY STENT PLACEMENT  02/02/2007    LAD, RCAx2    INGUINAL HERNIA REPAIR Right 1995    PROSTATE BIOPSY  2012    ROTATOR CUFF REPAIR Right 2014    x2    ROTATOR CUFF REPAIR Left 2013    TONSILLECTOMY, ADENOIDECTOMY      as child    TRIGGER FINGER RELEASE Right 2004    x2       OBJECTIVE:   PHYSICAL EXAM:  Height: 5' 6" (167.6 cm) Weight: 91.6 kg (202 lb)  Vitals:    10/22/19 0912   BP: 127/73   Pulse: 66   Weight: 91.6 kg (202 lb)   Height: 5' 6" (1.676 m)   PainSc:   4   PainLoc: Finger     Ortho/SPM Exam  Examination right hand there is mild tenderness and triggering right middle finger  Left hand demonstrates tenderness and triggering left small finger  Sensation intact all digits    RADIOGRAPHS:  None  Comments: I have personally reviewed the imaging and I agree with the above radiologist's report.    ASSESSMENT/PLAN:     IMPRESSION:  1.  Triggering right middle finger.  2.  Triggering left small finger    PLAN:  Discussed options including injection versus surgery  He would like try injection  After pause for time-out identified the right middle finger and left small finger each injected with combination Kenalog 20 mg 0.5 cc xylocaine sterile technique  Tolerated the procedure well without complication  Keep an eye on symptoms  Follow-up as needed    FOLLOW UP:  As needed    Disclaimer: This note has been generated using voice-recognition software. There may be typographical errors that have been missed during proof-reading.  "

## 2019-11-06 ENCOUNTER — HOSPITAL ENCOUNTER (OUTPATIENT)
Dept: RADIOLOGY | Facility: HOSPITAL | Age: 84
Discharge: HOME | End: 2019-11-06
Attending: UROLOGY
Payer: MEDICARE

## 2019-11-06 ENCOUNTER — TELEPHONE (OUTPATIENT)
Dept: UROLOGY | Facility: CLINIC | Age: 84
End: 2019-11-06

## 2019-11-06 DIAGNOSIS — D49.59 NEOPLASM OF PROSTATE: ICD-10-CM

## 2019-11-06 DIAGNOSIS — R97.20 ELEVATED PSA: Primary | ICD-10-CM

## 2019-11-06 LAB — POCT GLUCOSE: 203 MG/DL (ref 70–110)

## 2019-11-06 PROCEDURE — 25500020 PHARM REV CODE 255: Mod: HCNC | Performed by: UROLOGY

## 2019-11-06 PROCEDURE — A9585 GADOBUTROL INJECTION: HCPCS | Mod: HCNC | Performed by: UROLOGY

## 2019-11-06 PROCEDURE — 72197 MRI PROSTATE W W/O CONTRAST: ICD-10-PCS | Mod: 26,HCNC,, | Performed by: RADIOLOGY

## 2019-11-06 PROCEDURE — 72197 MRI PELVIS W/O & W/DYE: CPT | Mod: 26,HCNC,, | Performed by: RADIOLOGY

## 2019-11-06 PROCEDURE — 72197 MRI PELVIS W/O & W/DYE: CPT | Mod: TC,HCNC

## 2019-11-06 RX ORDER — GADOBUTROL 604.72 MG/ML
10 INJECTION INTRAVENOUS
Status: COMPLETED | OUTPATIENT
Start: 2019-11-06 | End: 2019-11-06

## 2019-11-06 RX ADMIN — GADOBUTROL 10 ML: 604.72 INJECTION INTRAVENOUS at 01:11

## 2019-11-06 NOTE — TELEPHONE ENCOUNTER
MRI of prostate 11/6/19    Previous biopsy: Negative for malignancy (03/28/2013).    PSA: 19.5 (10/10/2019), 15.0 (07/16/2019), 7.7 (07/14/2017).  Prior therapy: None.  Prostate: The prostate measures 5.7 x 4.2 x 5.2cm corresponding to a computed volume of 70.8cc.  Peripheral zone:  Lesion (MARK) #1.  Location: Side: Left region: Base zone: Posterior peripheral zone and anterior peripheral zone  Greatest dimension: 3.2cm  T2-WI: Lenticular or non-circumscribed, homogeneous, moderately hypointense - score 5  DWI/ADC: Focal markedly hypointense on ADC and markedly hyperintense on high b-value DWI - score 5  DCE: Negative.  Extraprostatic extension: Positive.  There is involvement of the left seminal vesicle base.  PI-RADS assessment category: 5.    Transition zone: Benign prostatic nodules without imaging features of prostate cancer.  Neurovascular bundle: There is involvement of the left neurovascular bundle.  Seminal vesicles:  SV invasion: There is invasion of the left seminal vesicle base.    Adjacent Organ Involvement: The lesion extends to the level of the urinary bladder wall, however there is no focal bladder wall thickening or involvement. There is no rectal involvement.    Lymphadenopathy: None.    Other Findings: There is a fat containing left inguinal hernia.  Degenerative changes of the lower lumbar spine identified.    Elevated PSA    I talked to him regarding the above findings.  Please change his URONAV bx of prostate to 11/12/19, Tues at 8:45 AM.  He will stop ASA and fish oil now.  I gave him the instruction of taking cipro and fleets enemas.  Please mail him the new appt confirmation.    Thank you,

## 2019-11-12 ENCOUNTER — PROCEDURE VISIT (OUTPATIENT)
Dept: UROLOGY | Facility: CLINIC | Age: 84
End: 2019-11-12
Payer: MEDICARE

## 2019-11-12 VITALS
SYSTOLIC BLOOD PRESSURE: 133 MMHG | HEIGHT: 67 IN | BODY MASS INDEX: 31.94 KG/M2 | DIASTOLIC BLOOD PRESSURE: 59 MMHG | RESPIRATION RATE: 16 BRPM | HEART RATE: 63 BPM | WEIGHT: 203.5 LBS | TEMPERATURE: 98 F

## 2019-11-12 DIAGNOSIS — R97.20 ELEVATED PSA: Primary | ICD-10-CM

## 2019-11-12 PROCEDURE — 96372 THER/PROPH/DIAG INJ SC/IM: CPT | Mod: HCNC,59,S$GLB, | Performed by: UROLOGY

## 2019-11-12 PROCEDURE — 76872 TRANSRECTAL ULTRASOUND W/ BIOPSY: ICD-10-PCS | Mod: 26,HCNC,S$GLB, | Performed by: UROLOGY

## 2019-11-12 PROCEDURE — 76942 TRANSRECTAL ULTRASOUND W/ BIOPSY: ICD-10-PCS | Mod: 26,HCNC,59,S$GLB | Performed by: UROLOGY

## 2019-11-12 PROCEDURE — 55700 TRANSRECTAL ULTRASOUND W/ BIOPSY: ICD-10-PCS | Mod: HCNC,S$GLB,, | Performed by: UROLOGY

## 2019-11-12 PROCEDURE — 76942 ECHO GUIDE FOR BIOPSY: CPT | Mod: 26,HCNC,59,S$GLB | Performed by: UROLOGY

## 2019-11-12 PROCEDURE — 88305 TISSUE EXAM BY PATHOLOGIST: CPT | Mod: 59,HCNC | Performed by: PATHOLOGY

## 2019-11-12 PROCEDURE — 88305 TISSUE EXAM BY PATHOLOGIST: CPT | Mod: 26,HCNC,, | Performed by: PATHOLOGY

## 2019-11-12 PROCEDURE — 96372 PR INJECTION,THERAP/PROPH/DIAG2ST, IM OR SUBCUT: ICD-10-PCS | Mod: HCNC,59,S$GLB, | Performed by: UROLOGY

## 2019-11-12 PROCEDURE — 55700 TRANSRECTAL ULTRASOUND W/ BIOPSY: CPT | Mod: HCNC,S$GLB,, | Performed by: UROLOGY

## 2019-11-12 PROCEDURE — 88305 TISSUE EXAM BY PATHOLOGIST: ICD-10-PCS | Mod: 26,HCNC,, | Performed by: PATHOLOGY

## 2019-11-12 PROCEDURE — 76872 US TRANSRECTAL: CPT | Mod: 26,HCNC,S$GLB, | Performed by: UROLOGY

## 2019-11-12 RX ORDER — LIDOCAINE HYDROCHLORIDE 20 MG/ML
JELLY TOPICAL ONCE
Status: COMPLETED | OUTPATIENT
Start: 2019-11-12 | End: 2019-11-12

## 2019-11-12 RX ORDER — LIDOCAINE HYDROCHLORIDE 10 MG/ML
10 INJECTION INFILTRATION; PERINEURAL
Status: COMPLETED | OUTPATIENT
Start: 2019-11-12 | End: 2019-11-12

## 2019-11-12 RX ADMIN — GENTAMICIN SULFATE 160 MG: 40 INJECTION, SOLUTION INTRAMUSCULAR; INTRAVENOUS at 08:11

## 2019-11-12 RX ADMIN — LIDOCAINE HYDROCHLORIDE 10 ML: 10 INJECTION INFILTRATION; PERINEURAL at 09:11

## 2019-11-12 RX ADMIN — LIDOCAINE HYDROCHLORIDE: 20 JELLY TOPICAL at 08:11

## 2019-11-12 NOTE — PATIENT INSTRUCTIONS

## 2019-11-13 RX ORDER — BLOOD SUGAR DIAGNOSTIC
STRIP MISCELLANEOUS
Qty: 600 STRIP | Refills: 3 | Status: SHIPPED | OUTPATIENT
Start: 2019-11-13 | End: 2020-02-11

## 2019-11-13 RX ORDER — BLOOD SUGAR DIAGNOSTIC
STRIP MISCELLANEOUS
Qty: 200 STRIP | Refills: 0 | Status: SHIPPED | OUTPATIENT
Start: 2019-11-13 | End: 2020-01-23 | Stop reason: SDUPTHER

## 2019-11-13 RX ORDER — PEN NEEDLE, DIABETIC 30 GX3/16"
NEEDLE, DISPOSABLE MISCELLANEOUS
Qty: 100 EACH | Refills: 3 | Status: SHIPPED | OUTPATIENT
Start: 2019-11-13

## 2019-11-15 RX ORDER — LANCETS
EACH MISCELLANEOUS
Qty: 204 EACH | Status: SHIPPED | OUTPATIENT
Start: 2019-11-15 | End: 2021-02-23 | Stop reason: SDUPTHER

## 2019-11-20 ENCOUNTER — TELEPHONE (OUTPATIENT)
Dept: UROLOGY | Facility: CLINIC | Age: 84
End: 2019-11-20

## 2019-11-20 DIAGNOSIS — C61 PROSTATE CANCER: ICD-10-CM

## 2019-11-20 DIAGNOSIS — D49.89 NEOPLASM OF PELVIS: ICD-10-CM

## 2019-11-20 LAB
FINAL PATHOLOGIC DIAGNOSIS: NORMAL
GROSS: NORMAL

## 2019-11-20 NOTE — TELEPHONE ENCOUNTER
UroNav bx of prostate  11/12/19  Findings:                                                                         --- Transrectal Ultrasound of the Prostate ---                               Prostate measurements.                                                                                                          PSA: Lab Results       Component                Value               Date                       PSA                      10.06 (H)           02/26/2013                 PSADIAG                  19.5 (H)            10/10/2019                   - Volume: 34 gm.           PSA Density: 0.58                                                         yes calcification in the prostate. MRI measurement of the prostate appears to overestimate its size.  To me TRUS volume seems to be more accurate.     MRI of prostate 11/6/19   Previous biopsy: Negative for malignancy (03/28/2013).  PSA: 19.5 (10/10/2019), 15.0 (07/16/2019), 7.7 (07/14/2017).  Prior therapy: None.  Prostate: The prostate measures 5.7 x 4.2 x 5.2cm corresponding to a computed volume of 70.8cc.  Peripheral zone:  Lesion (MARK) #1.  Location: Side: Left region: Base zone: Posterior peripheral zone and anterior peripheral zone  Greatest dimension: 3.2cm  T2-WI: Lenticular or non-circumscribed, homogeneous, moderately hypointense - score 5  DWI/ADC: Focal markedly hypointense on ADC and markedly hyperintense on high b-value DWI - score 5  DCE: Negative.  Extraprostatic extension: Positive.  There is involvement of the left seminal vesicle base.  PI-RADS assessment category: 5.  Transition zone: Benign prostatic nodules without imaging features of prostate cancer.     Neurovascular bundle: There is involvement of the left neurovascular bundle.  Seminal vesicles:  SV invasion: There is invasion of the left seminal vesicle base.  Adjacent Organ Involvement: The lesion extends to the level of the urinary bladder wall, however there is no focal bladder wall  thickening or involvement. There is no rectal involvement.  Lymphadenopathy: None.  Other Findings: There is a fat containing left inguinal hernia.  Degenerative changes of the lower lumbar spine identified.    1.  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  Prostate, left base (biopsy):   Prostatic adenocarcinoma, [place]rey[place] 3+ 4 = 7   Tumor involves 2 of 2 tissue cores   Tumor comprises 40% of submitted tissue     4.  Prostate, right apex (biopsy):   Benign prostatic glands and stroma     5.  Prostate, right middle (biopsy):   Benign prostatic glands and stroma     6.  Prostate, right base (biopsy):   Benign prostatic glands and stroma     7.  Prostate, target lesion (biopsy):   Prostatic adenocarcinoma, [place] 3+ 4 = 7   Tumor involves 2 of 4 tissue fragments   Tumor comprises approximately 25% of submitted tissue     Prostate cancer  -     NM Bone Scan Whole Body; Future; Expected date: 11/20/2019  -     Ambulatory referral to Radiation Oncology    Neoplasm of pelvis  -     NM Bone Scan Whole Body; Future; Expected date: 11/20/2019  -     Ambulatory referral to Radiation Oncology    Prostate cancer Freya sum 3 +4, involving left extracapsular dz with suspected SV involvement on MRI of the prostate.  Will get a bone scan and refer him to radiation oncology.  I was unable to reach him and left a message.

## 2019-11-22 ENCOUNTER — TELEPHONE (OUTPATIENT)
Dept: UROLOGY | Facility: CLINIC | Age: 84
End: 2019-11-22

## 2019-11-22 NOTE — TELEPHONE ENCOUNTER
UroNav bx of prostate    1.  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  Prostate, left base (biopsy):   Prostatic adenocarcinoma, [place] 3+ 4 = 7   Tumor involves 2 of 2 tissue cores   Tumor comprises 40% of submitted tissue     4.  Prostate, right apex (biopsy):   Benign prostatic glands and stroma     5.  Prostate, right middle (biopsy):   Benign prostatic glands and stroma     6.  Prostate, right base (biopsy):   Benign prostatic glands and stroma     7.  Prostate, target lesion (biopsy):   Prostatic adenocarcinoma, [place] 3+ 4 = 7   Tumor involves 2 of 4 tissue fragments   Tumor comprises approximately 25% of submitted tissue     Left a message again to the pt's phone.

## 2019-11-23 ENCOUNTER — PATIENT MESSAGE (OUTPATIENT)
Dept: UROLOGY | Facility: CLINIC | Age: 84
End: 2019-11-23

## 2019-11-26 ENCOUNTER — TELEPHONE (OUTPATIENT)
Dept: UROLOGY | Facility: CLINIC | Age: 84
End: 2019-11-26

## 2019-11-26 NOTE — TELEPHONE ENCOUNTER
Called him at home and his cell phone.  Unable to reach him.  I even called his daughter Maria Elena but no answer.    He is scheduled for bone scan and a follow up with me.

## 2019-12-02 ENCOUNTER — HOSPITAL ENCOUNTER (OUTPATIENT)
Dept: RADIOLOGY | Facility: HOSPITAL | Age: 84
Discharge: HOME | End: 2019-12-02
Attending: UROLOGY
Payer: MEDICARE

## 2019-12-02 DIAGNOSIS — D49.89 NEOPLASM OF PELVIS: ICD-10-CM

## 2019-12-02 DIAGNOSIS — C61 PROSTATE CANCER: ICD-10-CM

## 2019-12-02 PROCEDURE — 78306 NM BONE SCAN WHOLE BODY: ICD-10-PCS | Mod: 26,HCNC,, | Performed by: RADIOLOGY

## 2019-12-02 PROCEDURE — A9503 TC99M MEDRONATE: HCPCS | Mod: HCNC

## 2019-12-02 PROCEDURE — 78306 BONE IMAGING WHOLE BODY: CPT | Mod: 26,HCNC,, | Performed by: RADIOLOGY

## 2019-12-03 RX ORDER — OMEPRAZOLE 20 MG/1
CAPSULE, DELAYED RELEASE ORAL
Qty: 180 CAPSULE | Refills: 0 | Status: SHIPPED | OUTPATIENT
Start: 2019-12-03 | End: 2020-03-12 | Stop reason: SDUPTHER

## 2019-12-03 NOTE — PROGRESS NOTES
Refill Authorization Note     is requesting a refill authorization.    Brief assessment and rationale for refill: REVIEW: disease check          Medication Therapy Plan: Patient has been taking Prilosec since 11/12; Under Review for symptoms, Hiatal Hernia, GERD, well controlled(Liu-11/16)      Medication reconciliation completed: No   Pharmacist Review Requested: Yes           Comments:   Requested Prescriptions   Pending Prescriptions Disp Refills    omeprazole (PRILOSEC) 20 MG capsule [Pharmacy Med Name: OMEPRAZOLE 20MG CAPSULES] 180 capsule 0     Sig: TAKE 1 CAPSULE(20 MG) BY MOUTH TWICE DAILY BEFORE MEALS       Gastroenterology: Proton Pump Inhibitors Failed - 12/3/2019  1:22 PM        Failed - GERD is on problem list         Passed - Patient is at least 18 years old        Passed - Osteoporosis is not on problem list        Passed - Plavix is not on active medication list        Passed - Valid encounter within last 12 months     Recent Outpatient Visits            1 month ago Trigger middle finger of right hand    [facility] Shade Lilly Jr., MD    1 month ago Benign prostatic hyperplasia with urinary obstruction    [facility] 4th Floor Alexis Thorpe MD    1 month ago Acute bronchitis due to infection    [facility] Carissa Osei NP    2 months ago Kidney disease, chronic, stage III (GFR 30-59 ml/min)    [facility] Ne[facility]rology John Cody MD    3 months ago Postop check    [facility] General [facility] Arnaud Avery MD          Future Appointments              In 1 month MD [facility] 4th [facility], [facility]

## 2019-12-03 NOTE — TELEPHONE ENCOUNTER
I have reviewed the assessment below. Pt with history of gerd symptoms. Needs appt(follow up - GERD). Approve 3 more.

## 2019-12-03 NOTE — TELEPHONE ENCOUNTER
Please schedule patient for appointment:     Follow up (GERD)     Thanks!    Appointments past 12m or future 3m    Date Provider   Last Visit   5/29/2019 Gavin Martinez MD   Next Visit   Visit date not found Gavin Martinez MD

## 2019-12-04 ENCOUNTER — TELEPHONE (OUTPATIENT)
Dept: RADIATION ONCOLOGY | Facility: CLINIC | Age: 84
End: 2019-12-04

## 2019-12-04 NOTE — TELEPHONE ENCOUNTER
Called to schedule consult appointment; no answer, left voicemail requesting a call back to schedule appointment.

## 2019-12-05 RX ORDER — OMEPRAZOLE 20 MG/1
CAPSULE, DELAYED RELEASE ORAL
Qty: 180 CAPSULE | Refills: 0 | Status: CANCELLED | OUTPATIENT
Start: 2019-12-05

## 2019-12-05 NOTE — TELEPHONE ENCOUNTER
Medication has already been refilled, responded to pt portal msg informing pt to contact his pharmacy.

## 2020-01-10 ENCOUNTER — PATIENT OUTREACH (OUTPATIENT)
Dept: ADMINISTRATIVE | Facility: HOSPITAL | Age: 85
End: 2020-01-10

## 2020-01-17 ENCOUNTER — OFFICE VISIT (OUTPATIENT)
Dept: UROLOGY | Facility: CLINIC | Age: 85
End: 2020-01-17
Payer: MEDICARE

## 2020-01-17 ENCOUNTER — PATIENT OUTREACH (OUTPATIENT)
Dept: ADMINISTRATIVE | Facility: OTHER | Age: 85
End: 2020-01-17

## 2020-01-17 VITALS
HEART RATE: 65 BPM | HEIGHT: 67 IN | BODY MASS INDEX: 32.8 KG/M2 | DIASTOLIC BLOOD PRESSURE: 44 MMHG | SYSTOLIC BLOOD PRESSURE: 110 MMHG | WEIGHT: 209 LBS

## 2020-01-17 DIAGNOSIS — C61 PROSTATE CANCER: ICD-10-CM

## 2020-01-17 DIAGNOSIS — N40.1 BENIGN PROSTATIC HYPERPLASIA WITH URINARY OBSTRUCTION: Primary | ICD-10-CM

## 2020-01-17 DIAGNOSIS — N13.8 BENIGN PROSTATIC HYPERPLASIA WITH URINARY OBSTRUCTION: Primary | ICD-10-CM

## 2020-01-17 PROCEDURE — 1159F PR MEDICATION LIST DOCUMENTED IN MEDICAL RECORD: ICD-10-PCS | Mod: HCNC,S$GLB,, | Performed by: UROLOGY

## 2020-01-17 PROCEDURE — 1126F AMNT PAIN NOTED NONE PRSNT: CPT | Mod: HCNC,S$GLB,, | Performed by: UROLOGY

## 2020-01-17 PROCEDURE — 1159F MED LIST DOCD IN RCRD: CPT | Mod: HCNC,S$GLB,, | Performed by: UROLOGY

## 2020-01-17 PROCEDURE — 99215 OFFICE O/P EST HI 40 MIN: CPT | Mod: HCNC,S$GLB,, | Performed by: UROLOGY

## 2020-01-17 PROCEDURE — 1101F PR PT FALLS ASSESS DOC 0-1 FALLS W/OUT INJ PAST YR: ICD-10-PCS | Mod: HCNC,CPTII,S$GLB, | Performed by: UROLOGY

## 2020-01-17 PROCEDURE — 1101F PT FALLS ASSESS-DOCD LE1/YR: CPT | Mod: HCNC,CPTII,S$GLB, | Performed by: UROLOGY

## 2020-01-17 PROCEDURE — 3078F PR MOST RECENT DIASTOLIC BLOOD PRESSURE < 80 MM HG: ICD-10-PCS | Mod: HCNC,CPTII,S$GLB, | Performed by: UROLOGY

## 2020-01-17 PROCEDURE — 99999 PR PBB SHADOW E&M-EST. PATIENT-LVL IV: CPT | Mod: PBBFAC,HCNC,, | Performed by: UROLOGY

## 2020-01-17 PROCEDURE — 1126F PR PAIN SEVERITY QUANTIFIED, NO PAIN PRESENT: ICD-10-PCS | Mod: HCNC,S$GLB,, | Performed by: UROLOGY

## 2020-01-17 PROCEDURE — 99999 PR PBB SHADOW E&M-EST. PATIENT-LVL IV: ICD-10-PCS | Mod: PBBFAC,HCNC,, | Performed by: UROLOGY

## 2020-01-17 PROCEDURE — 3074F PR MOST RECENT SYSTOLIC BLOOD PRESSURE < 130 MM HG: ICD-10-PCS | Mod: HCNC,CPTII,S$GLB, | Performed by: UROLOGY

## 2020-01-17 PROCEDURE — 3078F DIAST BP <80 MM HG: CPT | Mod: HCNC,CPTII,S$GLB, | Performed by: UROLOGY

## 2020-01-17 PROCEDURE — 3074F SYST BP LT 130 MM HG: CPT | Mod: HCNC,CPTII,S$GLB, | Performed by: UROLOGY

## 2020-01-17 PROCEDURE — 99215 PR OFFICE/OUTPT VISIT, EST, LEVL V, 40-54 MIN: ICD-10-PCS | Mod: HCNC,S$GLB,, | Performed by: UROLOGY

## 2020-01-17 NOTE — PROGRESS NOTES
CC: newly diagnosed prostate cancer    Anjum Cai Jr. is a 84 y.o. man who is here for the evaluation of elevated PSA.  He is here to discuss his newly diagnosed prostate cancer.    had a negative prostate bx back on 3/28/13 by me for elevated PSA of 10. His prostate was 40 grams in size and no cancer was found.  Finasteride started for his LUTS and elevated PSA.  He has done well and reports no problem with urination.  He sleeps thru at night.  Voices no problem with urination.  Denies flank pain, dysuira, hematuria .     He had a MI back in 2016 and was not able to stop anti-coagulation if prostate biopsy was indicated.  He reports that he no longer takes blood thinning meds other there ASA and fish oil.  He was able to stop ASA and fish oil for recent surgeries.  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    UroNav bx of prostate 11/12/19  1.  Prostate, left apex (biopsy):   Prostatic adenocarcinoma, Freya 3 + 3 = 6   Tumor involves 2 of 3 tissue fragments   Tumor comprises approximately 10% of submitted tissue     2.  Prostate, left middle (biopsy):   Prostatic adenocarcinoma, [place] 3+ 3 = 6   Tumor involves 2 of 2 tissue cores   Tumor comprises approximately 20% of submitted tissue     3.  Prostate, left base (biopsy):   Prostatic adenocarcinoma, Freya 3+ 4 = 7   Tumor involves 2 of 2 tissue cores   Tumor comprises 40% of submitted tissue     4.  Prostate, right apex (biopsy):   Benign prostatic glands and stroma     5.  Prostate, right middle (biopsy):   Benign prostatic glands and stroma     6.  Prostate, right base (biopsy):   Benign prostatic glands and stroma     7.  Prostate, target lesion (biopsy):   Prostatic adenocarcinoma, Freya 3+ 4 = 7   Tumor involves 2 of 4 tissue fragments   Tumor comprises approximately 25% of submitted tissue     Past Medical History:   Diagnosis Date    Anemia 2/26/2018    Arthritis     BPH (benign prostatic hyperplasia)     Cataract     right     Colon polyp  2015    Colon polyps     Coronary artery disease      (diabetic retinopathy)     Dyslipidemia     Elevated PSA     Goiter, nontoxic, multinodular     Headaches, cluster     Hypertension     Kidney stones     Lump in the testicle     Left    Rotator cuff tendinitis     Right shoulder    Sleep apnea with use of continuous positive airway pressure (CPAP)     uses cpap at night    Type II or unspecified type diabetes mellitus with other specified manifestations, uncontrolled      Past Surgical History:   Procedure Laterality Date    CARDIAC CATHETERIZATION  2007    CATARACT EXTRACTION W/  INTRAOCULAR LENS IMPLANT Right 2016    Dr. Garcia    CHOLECYSTECTOMY  2018    CORONARY STENT PLACEMENT  2007    LAD, RCAx2    INGUINAL HERNIA REPAIR Right     PROSTATE BIOPSY  2012    ROTATOR CUFF REPAIR Right 2014    x2    ROTATOR CUFF REPAIR Left     TONSILLECTOMY, ADENOIDECTOMY      as child    TRIGGER FINGER RELEASE Right 2004    x2     Social History     Tobacco Use    Smoking status: Former Smoker     Last attempt to quit: 3/12/1978     Years since quittin.8    Smokeless tobacco: Never Used   Substance Use Topics    Alcohol use: No    Drug use: No     Family History   Problem Relation Age of Onset    Cataracts Father     Heart disease Father     Cancer Mother     Diabetes Mother     Amblyopia Neg Hx     Blindness Neg Hx     Glaucoma Neg Hx     Macular degeneration Neg Hx     Retinal detachment Neg Hx     Strabismus Neg Hx      Allergy:  Review of patient's allergies indicates:   Allergen Reactions    Ace inhibitors      Other reaction(s): cough    Cefadroxil      Other reaction(s): possible vasculitis  Other reaction(s): vasculitis     Outpatient Encounter Medications as of 2020   Medication Sig Dispense Refill    ACCU-CHEK SMARTVIEW TEST STRIP Strp CHECK BLOOD SUGARS THREE TIMES DAILY BEFORE MEALS AS DIRECTED 200 strip 0    ACCU-CHEK SMARTVIEW  "TEST STRIP Strp CHECK BLOOD SUGAR FOUR TIMES DAILY BEFORE MEALS AS DIRECTED 600 strip 3    ASPIRIN (ASPIR-81 ORAL) Take 1 tablet by mouth Daily.      atorvastatin (LIPITOR) 40 MG tablet TAKE 1 TABLET(40 MG) BY MOUTH EVERY DAY 90 tablet 4    atorvastatin (LIPITOR) 40 MG tablet TAKE 1 TABLET(40 MG) BY MOUTH EVERY DAY 90 tablet 4    azithromycin (Z-GLORIA) 250 MG tablet Take 2 tablets by mouth on day 1; Take 1 tablet by mouth on days 2-5 6 tablet 0    BD INSULIN SYRINGE ULTRA-FINE 1 mL 30 x 1/2" Syrg USE AS DIRECTED FOUR TIMES DAILY 200 each 11    ciclopirox (PENLAC) 8 % Soln Apply topically nightly. 6.6 mL 11    ciclopirox 0.77 % Gel Apply topically 2 (two) times daily. 100 g 3    ergocalciferol (VITAMIN D2) 50,000 unit Cap TAKE ONE CAPSULE BY MOUTH EVERY 30 DAYS 4 capsule 3    finasteride (PROSCAR) 5 mg tablet Take 1 tablet (5 mg total) by mouth once daily. 90 tablet 0    finasteride (PROSCAR) 5 mg tablet Take 1 tablet (5 mg total) by mouth once daily. 90 tablet 3    insulin (LANTUS SOLOSTAR U-100 INSULIN) glargine 100 units/mL (3mL) SubQ pen ADMINISTER 65 UNITS UNDER THE SKIN EVERY EVENING 30 mL 5    insulin aspart U-100 (NOVOLOG FLEXPEN U-100 INSULIN) 100 unit/mL (3 mL) InPn pen ADMINISTER 12 UNITS UNDER THE SKIN THREE TIMES DAILY WITH MEALS PER SLIDING SCALE 15 mL 5    irbesartan (AVAPRO) 75 MG tablet TAKE 1 TABLET(75 MG) BY MOUTH EVERY DAY 30 tablet 0    irbesartan (AVAPRO) 75 MG tablet Take 1 tablet (75 mg total) by mouth once daily. 30 tablet 6    lancets (ACCU-CHEK FASTCLIX LANCET DRUM) Misc Check blood sugars four times a day. 204 each PRN    metoprolol succinate (TOPROL-XL) 50 MG 24 hr tablet Take 1 tablet (50 mg total) by mouth once daily. 90 tablet 3    MULTIVITAMIN W-MINERALS/LUTEIN (CENTRUM SILVER ORAL) Take by mouth once daily.      nitroGLYCERIN (NITROSTAT) 0.4 MG SL tablet Place 1 tablet (0.4 mg total) under the tongue every 5 (five) minutes as needed for Chest pain. 60 tablet 12    " "omega-3 fatty acids-vitamin E (FISH OIL) 1,000 mg Cap Take 2 capsules by mouth Daily.       omeprazole (PRILOSEC) 20 MG capsule TAKE 1 CAPSULE(20 MG) BY MOUTH TWICE DAILY BEFORE MEALS 180 capsule 0    pen needle, diabetic (BD ULTRA-FINE MINI PEN NEEDLE) 31 gauge x 3/16" Ndle Use new needle with each injection. Inject insulin 4 times a day. 100 each 3     No facility-administered encounter medications on file as of 1/17/2020.      Review of Systems   ROS  Physical Exam     Vitals:    01/17/20 0953   BP: (!) 110/44   Pulse: 65     Physical Exam   Constitutional: He is oriented to person, place, and time. He appears well-developed and well-nourished. No distress.   HENT:   Head: Normocephalic and atraumatic.   Right Ear: External ear normal.   Left Ear: External ear normal.   Nose: Nose normal.   Mouth/Throat: Oropharynx is clear and moist.   Eyes: Conjunctivae are normal. Pupils are equal, round, and reactive to light.   Neck: Normal range of motion. Neck supple. No JVD present. No tracheal deviation present. No thyromegaly present.   Cardiovascular: Normal rate, regular rhythm, normal heart sounds and intact distal pulses.  Exam reveals no gallop and no friction rub.    No murmur heard.  Pulmonary/Chest: Effort normal and breath sounds normal. No respiratory distress. He has no wheezes. He exhibits no tenderness.   Abdominal: Soft. Bowel sounds are normal. He exhibits no distension and no mass. There is no tenderness. There is no rebound and no guarding.   Genitourinary: Rectum normal and penis normal. No penile tenderness.   Genitourinary Comments: Prostate 25 grams with positive nodule (slightt induration at the left side). negative tenderness     Musculoskeletal: Normal range of motion. He exhibits no edema, tenderness or deformity.   Lymphadenopathy:     He has no cervical adenopathy.   Neurological: He is alert and oriented to person, place, and time.   Skin: Skin is warm and dry. He is not diaphoretic. "     Psychiatric: He has a normal mood and affect. His behavior is normal. Thought content normal.     Genitalia:  Scrotum: no rash or lesion  Normal symmetric epididymis without masses  Normal vas palpated  Normal size, symmetric testicles with no masses   Normal urethral meatus with no discharge  Normal circumcised penis with no lesion   Rectal:  Normal perineum and anus upon inspection.  Normal tone, no masses or tenderness;     LABS:  Lab Results   Component Value Date    PSA 10.06 (H) 02/26/2013    PSA 6.3 (H) 09/15/2009    PSA 4.0 08/11/2005    PSA 3.7 06/17/2004    PSADIAG 19.5 (H) 10/10/2019    PSADIAG 15.0 (H) 07/16/2019    PSADIAG 7.7 (H) 07/14/2017    PSADIAG 5.5 (H) 04/05/2016    PSADIAG 5.0 (H) 10/28/2013     Results for orders placed or performed in visit on 10/10/19   Prostate Specific Antigen, Diagnostic   Result Value Ref Range    PSA DIAGNOSTIC 19.5 (H) 0.00 - 4.00 ng/mL   Results for orders placed or performed in visit on 07/16/19   Prostate Specific Antigen, Diagnostic   Result Value Ref Range    PSA DIAGNOSTIC 15.0 (H) 0.00 - 4.00 ng/mL   Results for orders placed or performed in visit on 07/14/17   Prostate Specific Antigen, Diagnostic   Result Value Ref Range    PSA DIAGNOSTIC 7.7 (H) 0.00 - 4.00 ng/mL     Lab Results   Component Value Date    CREATININE 1.5 (H) 09/19/2019    CREATININE 1.5 (H) 09/10/2019    CREATININE 1.9 (H) 08/20/2019     No results found for this or any previous visit.  Urine Culture, Routine   Date Value Ref Range Status   09/18/2018 No growth  Final     UA pH 6, 250 glucose, trace protein, neg. blood    Hemoglobin A1c 7.9    MRI of prostate 10/15/19  Large PIRADS 5 lesion within the left base posterior and anterior peripheral zone as described, with extraprostatic extension noting involvement of the left seminal vesicle base.    Bone scan 12/2/19  No definite evidence of osseous metastasis.    Subtle increased uptake at the level of L3-L4 and likely corresponding to  degenerative osteophytosis by CT.  Attention on follow-up.    Assessment and Plan:  Anjum was seen today for follow-up.    Diagnoses and all orders for this visit:    Benign prostatic hyperplasia with urinary obstruction    Prostate cancer    newly diagnosed prostate cancer, [place] 3+4, mainly left side of prostate is involved.  In addition, MRI of prostate showed extracapsular disease involving the left seminal vesicle.  Options of treatment including XRT combined with ADT discussed in depth.  He does not like the idea of undergoing ADT, due to its side effects.  He will discuss with his daughter.  Recommend that he should follow up with Dr. Cuello for a definite treatment of his locally advanced disease.  I spent 40 minutes with the patient of which more than half was spent in direct consultation with the patient in regards to our treatment and plan.      Follow-up:  Follow up Dr. Cuello for radiation therapy.

## 2020-01-17 NOTE — LETTER
January 17, 2020      Gavin Martinez MD  [address]  [place] [address]           [facility] - Urology 4th Floor  [address]  [place] [address]  Phone: [phone]          Patient: Anjum Cai Jr.   MR Number: [id number]   YOB: 1935   Date of Visit: 1/17/2020       Dear Dr. Gavin Martinez:    Thank you for referring Anjum Cai to me for evaluation. Attached you will find relevant portions of my assessment and plan of care.    If you have questions, please do not hesitate to call me. I look forward to following Anjum Cai along with you.    Sincerely,    Alexis Thorpe MD    Enclosure  CC:  No Recipients    If you would like to receive this communication electronically, please contact [email or web address] or [phone] to request more information on [employer] Link access.    For providers and/or their staff who would like to refer a patient to [facility], please contact us through our one-stop-shop provider referral line, [facility], at [phone].    If you feel you have received this communication in error or would no longer like to receive these types of communications, please e-mail [email or web address]

## 2020-01-23 ENCOUNTER — INITIAL CONSULT (OUTPATIENT)
Dept: RADIATION ONCOLOGY | Facility: CLINIC | Age: 85
End: 2020-01-23
Payer: MEDICARE

## 2020-01-23 VITALS
WEIGHT: 211.19 LBS | RESPIRATION RATE: 16 BRPM | DIASTOLIC BLOOD PRESSURE: 64 MMHG | TEMPERATURE: 98 F | HEART RATE: 72 BPM | BODY MASS INDEX: 33.15 KG/M2 | SYSTOLIC BLOOD PRESSURE: 137 MMHG | HEIGHT: 67 IN

## 2020-01-23 DIAGNOSIS — C61 PROSTATE CANCER: Primary | ICD-10-CM

## 2020-01-23 PROCEDURE — 1159F MED LIST DOCD IN RCRD: CPT | Mod: HCNC,S$GLB,, | Performed by: RADIOLOGY

## 2020-01-23 PROCEDURE — 1101F PT FALLS ASSESS-DOCD LE1/YR: CPT | Mod: HCNC,CPTII,S$GLB, | Performed by: RADIOLOGY

## 2020-01-23 PROCEDURE — 3078F DIAST BP <80 MM HG: CPT | Mod: HCNC,CPTII,S$GLB, | Performed by: RADIOLOGY

## 2020-01-23 PROCEDURE — 99499 RISK ADDL DX/OHS AUDIT: ICD-10-PCS | Mod: HCNC,S$GLB,, | Performed by: RADIOLOGY

## 2020-01-23 PROCEDURE — 3075F SYST BP GE 130 - 139MM HG: CPT | Mod: HCNC,CPTII,S$GLB, | Performed by: RADIOLOGY

## 2020-01-23 PROCEDURE — 1101F PR PT FALLS ASSESS DOC 0-1 FALLS W/OUT INJ PAST YR: ICD-10-PCS | Mod: HCNC,CPTII,S$GLB, | Performed by: RADIOLOGY

## 2020-01-23 PROCEDURE — 3075F PR MOST RECENT SYSTOLIC BLOOD PRESS GE 130-139MM HG: ICD-10-PCS | Mod: HCNC,CPTII,S$GLB, | Performed by: RADIOLOGY

## 2020-01-23 PROCEDURE — 1126F AMNT PAIN NOTED NONE PRSNT: CPT | Mod: HCNC,S$GLB,, | Performed by: RADIOLOGY

## 2020-01-23 PROCEDURE — 1159F PR MEDICATION LIST DOCUMENTED IN MEDICAL RECORD: ICD-10-PCS | Mod: HCNC,S$GLB,, | Performed by: RADIOLOGY

## 2020-01-23 PROCEDURE — 99203 PR OFFICE/OUTPT VISIT, NEW, LEVL III, 30-44 MIN: ICD-10-PCS | Mod: HCNC,S$GLB,, | Performed by: RADIOLOGY

## 2020-01-23 PROCEDURE — 1126F PR PAIN SEVERITY QUANTIFIED, NO PAIN PRESENT: ICD-10-PCS | Mod: HCNC,S$GLB,, | Performed by: RADIOLOGY

## 2020-01-23 PROCEDURE — 3078F PR MOST RECENT DIASTOLIC BLOOD PRESSURE < 80 MM HG: ICD-10-PCS | Mod: HCNC,CPTII,S$GLB, | Performed by: RADIOLOGY

## 2020-01-23 PROCEDURE — 99999 PR PBB SHADOW E&M-EST. PATIENT-LVL III: CPT | Mod: PBBFAC,HCNC,, | Performed by: RADIOLOGY

## 2020-01-23 PROCEDURE — 99203 OFFICE O/P NEW LOW 30 MIN: CPT | Mod: HCNC,S$GLB,, | Performed by: RADIOLOGY

## 2020-01-23 PROCEDURE — 99999 PR PBB SHADOW E&M-EST. PATIENT-LVL III: ICD-10-PCS | Mod: PBBFAC,HCNC,, | Performed by: RADIOLOGY

## 2020-01-23 PROCEDURE — 99499 UNLISTED E&M SERVICE: CPT | Mod: HCNC,S$GLB,, | Performed by: RADIOLOGY

## 2020-01-23 NOTE — LETTER
January 23, 2020      Alexis Thorpe MD  [address]  [place] [address]           [facility] - Radiation Oncology  [address]  [place] [address]  Phone: [phone]          Patient: Anjum Cai Jr.   MR Number: [id number]   YOB: 1935   Date of Visit: 1/23/2020       Dear Dr. Alexis Thorpe:    Thank you for referring Anjum Cai to me for evaluation. Attached you will find relevant portions of my assessment and plan of care.    If you have questions, please do not hesitate to call me. I look forward to following Anjum Cai along with you.    Sincerely,    Devaughn Cuello Jr., MD    Enclosure  CC:  No Recipients    If you would like to receive this communication electronically, please contact [email or web address] or [phone] to request more information on [employer] Link access.    For providers and/or their staff who would like to refer a patient to [facility], please contact us through our one-stop-shop provider referral line, [facility], at [phone].    If you feel you have received this communication in error or would no longer like to receive these types of communications, please e-mail [email or web address]

## 2020-01-23 NOTE — PROGRESS NOTES
HISTORY OF PRESENT ILLNESS:   This patient presents for discussion of treatment options for a recently diagnosed prostate cancer.      Mr. Cai has a history of BPH with and elevated PSA in the past.  He is status post biopsies in 2013.  His PSA at that time was 10 ng/ml.  JIN and biopsies were negative. The patient began Finasteride.  His PSA in July of 2019 increased to 15 ng/ml.  Previous PSA in 2017 was 7.7 ng/ml.  JIN revealed slight induration on the Lt.  Three month follow up PSA remained elevated at 19.5 ng/ml.  Subsequent MRI of the prostate on 11/6/19 revealed a 5.7 x 4.2 x 5.2cm prostate corresponding to a computed volume of 70.8cc.  There was a large 3.2cm T2 hypo-intense lesion at the Lt. base, PI-RADS 5.  There was extraprostatic extension and involvement of the left seminal vesicle base.  There was involvement of the left neurovascular bundle.  There was no lymphadenopathy.  TRUS and biopsy was performed on 11/12/19.  Biopsies from the Lt. base and targeted biopsies of the lesion revealed Freya 7 (3+4) adenocarcinoma involving 40% of the specimen at the base and 25% of the specimen from the targeted biopsy.  There was [place] 6 (3+3) adenocarcinoma involving 20% of the specimen from the Lt. mid gland and 10% of the specimen from the Lt. apex.  Further work up with bone scan on 12/2/19 was negative for tumor involvement.  The patient now presents for discussion of treatment options. Today the patient states he feels well.  No urinary complaints.  Denies urinary hesitancy or dysuria.  Currently .  Continues to work as a cool.     REVIEW OF SYSTEMS:   Review of Systems   Constitutional: Negative for chills, fever, malaise/fatigue and weight loss.   Respiratory: Negative for cough, sputum production and shortness of breath.    Cardiovascular: Negative for chest pain, palpitations and orthopnea.   Gastrointestinal: Negative for abdominal pain, constipation, diarrhea, nausea and vomiting.    Genitourinary: Negative for dysuria, frequency, hematuria and urgency.         PAST MEDICAL HISTORY:  Past Medical History:   Diagnosis Date    Anemia 2/26/2018    Arthritis     BPH (benign prostatic hyperplasia)     Cataract     right     Colon polyp 11/18/2015    Colon polyps     Coronary artery disease     DR (diabetic retinopathy)     Dyslipidemia     Elevated PSA     Goiter, nontoxic, multinodular     Headaches, cluster     Hypertension     Kidney stones     Lump in the testicle     Left    Prostate cancer     Rotator cuff tendinitis     Right shoulder    Sleep apnea with use of continuous positive airway pressure (CPAP)     uses cpap at night    Type II or unspecified type diabetes mellitus with other specified manifestations, uncontrolled        PAST SURGICAL HISTORY:  Past Surgical History:   Procedure Laterality Date    CARDIAC CATHETERIZATION  2007    CATARACT EXTRACTION W/  INTRAOCULAR LENS IMPLANT Right 09/27/2016    Dr. Garcia    CHOLECYSTECTOMY  02/2018    CORONARY STENT PLACEMENT  02/02/2007    LAD, RCAx2    INGUINAL HERNIA REPAIR Right 1995    PROSTATE BIOPSY  2012    ROTATOR CUFF REPAIR Right 2014    x2    ROTATOR CUFF REPAIR Left 2013    TONSILLECTOMY, ADENOIDECTOMY      as child    TRIGGER FINGER RELEASE Right 2004    x2       ALLERGIES:   Review of patient's allergies indicates:   Allergen Reactions    Ace inhibitors      Other reaction(s): cough    Cefadroxil      Other reaction(s): possible vasculitis  Other reaction(s): vasculitis       MEDICATIONS:  Current Outpatient Medications   Medication Sig    ACCU-CHEK SMARTVIEW TEST STRIP Strp CHECK BLOOD SUGAR FOUR TIMES DAILY BEFORE MEALS AS DIRECTED    ASPIRIN (ASPIR-81 ORAL) Take 1 tablet by mouth Daily.    atorvastatin (LIPITOR) 40 MG tablet TAKE 1 TABLET(40 MG) BY MOUTH EVERY DAY    ciclopirox (PENLAC) 8 % Soln Apply topically nightly.    ciclopirox 0.77 % Gel Apply topically 2 (two) times daily.     "ergocalciferol (VITAMIN D2) 50,000 unit Cap TAKE ONE CAPSULE BY MOUTH EVERY 30 DAYS    finasteride (PROSCAR) 5 mg tablet Take 1 tablet (5 mg total) by mouth once daily.    FLUAD 2960-7566, 65 YR UP,,PF, 45 mcg (15 mcg x 3)/0.5 mL Syrg     insulin (LANTUS SOLOSTAR U-100 INSULIN) glargine 100 units/mL (3mL) SubQ pen ADMINISTER 65 UNITS UNDER THE SKIN EVERY EVENING    insulin aspart U-100 (NOVOLOG FLEXPEN U-100 INSULIN) 100 unit/mL (3 mL) InPn pen ADMINISTER 12 UNITS UNDER THE SKIN THREE TIMES DAILY WITH MEALS PER SLIDING SCALE    irbesartan (AVAPRO) 75 MG tablet Take 1 tablet (75 mg total) by mouth once daily.    lancets (ACCU-CHEK FASTCLIX LANCET DRUM) Misc Check blood sugars four times a day.    metoprolol succinate (TOPROL-XL) 50 MG 24 hr tablet Take 1 tablet (50 mg total) by mouth once daily.    MULTIVITAMIN W-MINERALS/LUTEIN (CENTRUM SILVER ORAL) Take by mouth once daily.    nitroGLYCERIN (NITROSTAT) 0.4 MG SL tablet Place 1 tablet (0.4 mg total) under the tongue every 5 (five) minutes as needed for Chest pain.    omega-3 fatty acids-vitamin E (FISH OIL) 1,000 mg Cap Take 2 capsules by mouth Daily.     omeprazole (PRILOSEC) 20 MG capsule TAKE 1 CAPSULE(20 MG) BY MOUTH TWICE DAILY BEFORE MEALS    pen needle, diabetic (BD ULTRA-FINE MINI PEN NEEDLE) 31 gauge x 3/16" Ndle Use new needle with each injection. Inject insulin 4 times a day.     No current facility-administered medications for this visit.        SOCIAL HISTORY:  Social History     Socioeconomic History    Marital status:      Spouse name: Not on file    Number of children: Not on file    Years of education: Not on file    Highest education level: Not on file   Occupational History    Not on file   Social Needs    Financial resource strain: Not on file    Food insecurity:     Worry: Not on file     Inability: Not on file    Transportation needs:     Medical: Not on file     Non-medical: Not on file   Tobacco Use    Smoking " status: Former Smoker     Last attempt to quit: 3/12/1978     Years since quittin.8    Smokeless tobacco: Never Used   Substance and Sexual Activity    Alcohol use: No    Drug use: No    Sexual activity: Not on file   Lifestyle    Physical activity:     Days per week: Not on file     Minutes per session: Not on file    Stress: Not on file   Relationships    Social connections:     Talks on phone: Not on file     Gets together: Not on file     Attends Hindu service: Not on file     Active member of club or organization: Not on file     Attends meetings of clubs or organizations: Not on file     Relationship status: Not on file   Other Topics Concern    Not on file   Social History Narrative    Not on file       FAMILY HISTORY:  Family History   Problem Relation Age of Onset    Cataracts Father     Heart disease Father     Cancer Mother     Diabetes Mother     Amblyopia Neg Hx     Blindness Neg Hx     Glaucoma Neg Hx     Macular degeneration Neg Hx     Retinal detachment Neg Hx     Strabismus Neg Hx          PHYSICAL EXAMINATION:  Vitals:    20 0954   BP: 137/64   Pulse: 72   Resp: 16   Temp: 97.7 °F (36.5 °C)     Physical Exam   Constitutional: He is oriented to person, place, and time and well-developed, well-nourished, and in no distress.   Pulmonary/Chest: Effort normal. No respiratory distress.   Abdominal: Soft. He exhibits no distension.   Neurological: He is alert and oriented to person, place, and time.   Psychiatric: Affect and judgment normal.       ASSESSMENT/PLAN:  Stage IIIB (T3b, N0, M0, GG2, PSA 10 - 20) prostate cancer.      ECO    I had a long discussion with the patient.  We reviewed his diagnosis, stage, grade, risk group, and prognosis. We discussed the concept of low risk, moderate risk, and high risk disease.  I explained to the patient he is considered to have high risk prostate cancer.  We discussed the different treatment options including active  surveillance, prostate brachytherapy, external beam treatment using IMRT /  IGRT techniques and robotic prostatectomy.We discussed the advantages, disadvantages, risks and benefits, as well as complications of each option. Regarding radiation therapy we discussed treatment planning, the different techniques, short and long term complications. These included radiation cystitis, radiation proctitis, and impotence. We discussed success, failure, and salvage therapeutic options.  Based on his presentation and increasing PSA, I recommended the patient seek some form of active treatment.  Explained he is not a candidate for prostatectomy or brachytherapy.  Recommended he consider external beam irradiation combined with hormonal deprivation therapy for likely 6 months.  Discussed the rational for this recommendation and the risks and benefits of therapy.  After our discussion, the patient elected to proceed with radiotherapy alone.  He would like to avoid hormonal deprivation therapy.  Explained this is reasonable given his age and status.  Will plan to proceed with marker placement with NO space OAR followed by radiotherapy.  Thank you for allowing us to participate in the care of this patient.     Psychosocial Distress screening score of Distress Score: 0 noted and reviewed. No intervention indicated.  I spent approximately 50  minutes reviewing the available records and evaluating the patient, out of which over 50% of the time was spent face to face with the patient in counseling and coordinating this patient's care.

## 2020-01-24 ENCOUNTER — IMMUNIZATION (OUTPATIENT)
Dept: PHARMACY | Facility: CLINIC | Age: 85
End: 2020-01-24
Payer: MEDICARE

## 2020-01-24 ENCOUNTER — LAB VISIT (OUTPATIENT)
Dept: LAB | Facility: HOSPITAL | Age: 85
End: 2020-01-24
Attending: INTERNAL MEDICINE
Payer: MEDICARE

## 2020-01-24 ENCOUNTER — OFFICE VISIT (OUTPATIENT)
Dept: INTERNAL MEDICINE | Facility: CLINIC | Age: 85
End: 2020-01-24
Payer: MEDICARE

## 2020-01-24 VITALS
SYSTOLIC BLOOD PRESSURE: 124 MMHG | DIASTOLIC BLOOD PRESSURE: 62 MMHG | TEMPERATURE: 98 F | HEART RATE: 68 BPM | WEIGHT: 209.19 LBS | BODY MASS INDEX: 33.62 KG/M2 | HEIGHT: 66 IN

## 2020-01-24 DIAGNOSIS — I10 ESSENTIAL HYPERTENSION: ICD-10-CM

## 2020-01-24 DIAGNOSIS — E78.5 HYPERLIPIDEMIA, UNSPECIFIED HYPERLIPIDEMIA TYPE: ICD-10-CM

## 2020-01-24 DIAGNOSIS — N18.30 CKD (CHRONIC KIDNEY DISEASE) STAGE 3, GFR 30-59 ML/MIN: ICD-10-CM

## 2020-01-24 LAB
ALBUMIN SERPL BCP-MCNC: 3.2 G/DL (ref 3.5–5.2)
ALBUMIN/CREAT UR: 90.4 UG/MG (ref 0–30)
ALP SERPL-CCNC: 121 U/L (ref 55–135)
ALT SERPL W/O P-5'-P-CCNC: 60 U/L (ref 10–44)
ANION GAP SERPL CALC-SCNC: 8 MMOL/L (ref 8–16)
AST SERPL-CCNC: 31 U/L (ref 10–40)
BASOPHILS # BLD AUTO: 0.03 K/UL (ref 0–0.2)
BASOPHILS NFR BLD: 0.5 % (ref 0–1.9)
BILIRUB SERPL-MCNC: 0.4 MG/DL (ref 0.1–1)
BUN SERPL-MCNC: 19 MG/DL (ref 8–23)
CALCIUM SERPL-MCNC: 8.7 MG/DL (ref 8.7–10.5)
CHLORIDE SERPL-SCNC: 108 MMOL/L (ref 95–110)
CHOLEST SERPL-MCNC: 130 MG/DL (ref 120–199)
CHOLEST/HDLC SERPL: 2.6 {RATIO} (ref 2–5)
CO2 SERPL-SCNC: 26 MMOL/L (ref 23–29)
CREAT SERPL-MCNC: 1.3 MG/DL (ref 0.5–1.4)
CREAT UR-MCNC: 94 MG/DL (ref 23–375)
DIFFERENTIAL METHOD: ABNORMAL
EOSINOPHIL # BLD AUTO: 0.2 K/UL (ref 0–0.5)
EOSINOPHIL NFR BLD: 3.7 % (ref 0–8)
ERYTHROCYTE [DISTWIDTH] IN BLOOD BY AUTOMATED COUNT: 15.2 % (ref 11.5–14.5)
EST. GFR  (AFRICAN AMERICAN): 59 ML/MIN/1.73 M^2
EST. GFR  (NON AFRICAN AMERICAN): 51 ML/MIN/1.73 M^2
ESTIMATED AVG GLUCOSE: 212 MG/DL (ref 68–131)
GLUCOSE SERPL-MCNC: 148 MG/DL (ref 70–110)
HBA1C MFR BLD HPLC: 9 % (ref 4–5.6)
HCT VFR BLD AUTO: 32.8 % (ref 40–54)
HDLC SERPL-MCNC: 50 MG/DL (ref 40–75)
HDLC SERPL: 38.5 % (ref 20–50)
HGB BLD-MCNC: 10.8 G/DL (ref 14–18)
LDLC SERPL CALC-MCNC: 68.2 MG/DL (ref 63–159)
LYMPHOCYTES # BLD AUTO: 1.9 K/UL (ref 1–4.8)
LYMPHOCYTES NFR BLD: 28.6 % (ref 18–48)
MCH RBC QN AUTO: 28.5 PG (ref 27–31)
MCHC RBC AUTO-ENTMCNC: 32.9 G/DL (ref 32–36)
MCV RBC AUTO: 87 FL (ref 82–98)
MICROALBUMIN UR DL<=1MG/L-MCNC: 85 UG/ML
MONOCYTES # BLD AUTO: 0.8 K/UL (ref 0.3–1)
MONOCYTES NFR BLD: 12.1 % (ref 4–15)
NEUTROPHILS # BLD AUTO: 3.6 K/UL (ref 1.8–7.7)
NEUTROPHILS NFR BLD: 55.1 % (ref 38–73)
NONHDLC SERPL-MCNC: 80 MG/DL
PLATELET # BLD AUTO: 152 K/UL (ref 150–350)
PMV BLD AUTO: 11.8 FL (ref 9.2–12.9)
POTASSIUM SERPL-SCNC: 3.9 MMOL/L (ref 3.5–5.1)
PROT SERPL-MCNC: 7.3 G/DL (ref 6–8.4)
RBC # BLD AUTO: 3.79 M/UL (ref 4.6–6.2)
SODIUM SERPL-SCNC: 142 MMOL/L (ref 136–145)
TRIGL SERPL-MCNC: 59 MG/DL (ref 30–150)
WBC # BLD AUTO: 6.54 K/UL (ref 3.9–12.7)

## 2020-01-24 PROCEDURE — 99215 PR OFFICE/OUTPT VISIT, EST, LEVL V, 40-54 MIN: ICD-10-PCS | Mod: HCNC,S$GLB,, | Performed by: INTERNAL MEDICINE

## 2020-01-24 PROCEDURE — 3078F DIAST BP <80 MM HG: CPT | Mod: HCNC,CPTII,S$GLB, | Performed by: INTERNAL MEDICINE

## 2020-01-24 PROCEDURE — 82043 UR ALBUMIN QUANTITATIVE: CPT | Mod: HCNC

## 2020-01-24 PROCEDURE — 1126F AMNT PAIN NOTED NONE PRSNT: CPT | Mod: HCNC,S$GLB,, | Performed by: INTERNAL MEDICINE

## 2020-01-24 PROCEDURE — 3074F SYST BP LT 130 MM HG: CPT | Mod: HCNC,CPTII,S$GLB, | Performed by: INTERNAL MEDICINE

## 2020-01-24 PROCEDURE — 3074F PR MOST RECENT SYSTOLIC BLOOD PRESSURE < 130 MM HG: ICD-10-PCS | Mod: HCNC,CPTII,S$GLB, | Performed by: INTERNAL MEDICINE

## 2020-01-24 PROCEDURE — 99999 PR PBB SHADOW E&M-EST. PATIENT-LVL III: ICD-10-PCS | Mod: PBBFAC,HCNC,, | Performed by: INTERNAL MEDICINE

## 2020-01-24 PROCEDURE — 80053 COMPREHEN METABOLIC PANEL: CPT | Mod: HCNC

## 2020-01-24 PROCEDURE — 99499 RISK ADDL DX/OHS AUDIT: ICD-10-PCS | Mod: HCNC,S$GLB,, | Performed by: INTERNAL MEDICINE

## 2020-01-24 PROCEDURE — 3078F PR MOST RECENT DIASTOLIC BLOOD PRESSURE < 80 MM HG: ICD-10-PCS | Mod: HCNC,CPTII,S$GLB, | Performed by: INTERNAL MEDICINE

## 2020-01-24 PROCEDURE — 99499 UNLISTED E&M SERVICE: CPT | Mod: HCNC,S$GLB,, | Performed by: INTERNAL MEDICINE

## 2020-01-24 PROCEDURE — 36415 COLL VENOUS BLD VENIPUNCTURE: CPT | Mod: HCNC

## 2020-01-24 PROCEDURE — 85025 COMPLETE CBC W/AUTO DIFF WBC: CPT | Mod: HCNC

## 2020-01-24 PROCEDURE — 1159F MED LIST DOCD IN RCRD: CPT | Mod: HCNC,S$GLB,, | Performed by: INTERNAL MEDICINE

## 2020-01-24 PROCEDURE — 1126F PR PAIN SEVERITY QUANTIFIED, NO PAIN PRESENT: ICD-10-PCS | Mod: HCNC,S$GLB,, | Performed by: INTERNAL MEDICINE

## 2020-01-24 PROCEDURE — 99999 PR PBB SHADOW E&M-EST. PATIENT-LVL III: CPT | Mod: PBBFAC,HCNC,, | Performed by: INTERNAL MEDICINE

## 2020-01-24 PROCEDURE — 1101F PT FALLS ASSESS-DOCD LE1/YR: CPT | Mod: HCNC,CPTII,S$GLB, | Performed by: INTERNAL MEDICINE

## 2020-01-24 PROCEDURE — 80061 LIPID PANEL: CPT | Mod: HCNC

## 2020-01-24 PROCEDURE — 99215 OFFICE O/P EST HI 40 MIN: CPT | Mod: HCNC,S$GLB,, | Performed by: INTERNAL MEDICINE

## 2020-01-24 PROCEDURE — 83036 HEMOGLOBIN GLYCOSYLATED A1C: CPT | Mod: HCNC

## 2020-01-24 PROCEDURE — 1101F PR PT FALLS ASSESS DOC 0-1 FALLS W/OUT INJ PAST YR: ICD-10-PCS | Mod: HCNC,CPTII,S$GLB, | Performed by: INTERNAL MEDICINE

## 2020-01-24 PROCEDURE — 1159F PR MEDICATION LIST DOCUMENTED IN MEDICAL RECORD: ICD-10-PCS | Mod: HCNC,S$GLB,, | Performed by: INTERNAL MEDICINE

## 2020-01-24 NOTE — PROGRESS NOTES
CC:  Follow-up.    HPI:  The patient is an 84-year-old male who is currently being treated for hypertension, insulin-requiring diabetes, hyperlipidemia, prostate cancer, obstructive sleep apnea and coronary artery disease status post stent presents today for follow-up.  The patient is taking insulin for his diabetes.  He is on NovoLog 12 units t.i.d. with meals plus sliding scale as well as Lantus 64 units q.h.s..  He does check his blood sugars at least 3 times a day before meals plus at bedtime.  He does report that the been running high in the morning around 200.  It decreases during the day to around 140 to 160.  This morning was 220.  In regards to his blood pressure, he only checks his pressure when he comes to the doctor's office.  He does have a good blood pressure machine at home but has been using it.  In regards to his kidneys, he did see Dr. Jade as in September of this year.    ROS:  The patient does report some weight gain.  He did tend to get off his diet for the holidays.  No visual changes.  He does report having problems with cataract involving the left eye.  He is planning on seeing Optometry in the near future.  No auditory changes.  He does have decreased hearing and is contemplating getting hearing aids.  He did get his hearing checked for this.  No trouble swallowing.  No chest pain.  No shortness of breath.  He does not formally exercise but does stay active.  He cuts his own yd without any problems.  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  No problems with nausea vomiting.  No abdominal pain. He did have his gallbladder removed.  He has no problems with diarrhea.  No trouble urinating currently.  He has not started radiation therapy yet.  He does not want hormonal therapy.  No weakness in the arms or legs.  No numbness tingling arms or legs.    Physical exam:  General appearance:  No acute distress.  HEENT:  Conjunctiva is clear.  Right lens been replaced.  Left shows a  cataract.  TMs are clear.  Nasal septum is midline without discharge. Oropharynx is without erythema.  Trachea is midline without JVD.  Pulmonary:  Good inspiratory, expiratory breath sounds are heard.  His lungs were clear to auscultation.  Cardiovascular:  S1-S2, rhythm was normal.  2+ carotid pulses without bruits.  Extremities with 1+ edema. He did have some superficial vascularity.  GI:  Abdomen was nontender, nondistended without.  Hepatosplenomegaly  Protective Sensation (w/ 10 gram monofilament):  Right: Intact  Left: Intact    Visual Inspection:  Normal -  Bilateral    Pedal Pulses:   Right: Present  Left: Present    Posterior tibialis:   Right:Present  Left: Present     Assessment:  1.  Insulin quite diabetes  2.  Hypertension currently stable  3.  Hyperlipidemia  4.  Coronary artery disease  5.  CKD stage 3    Plan:  1.  Will schedule a CMP, A1c, lipid panel and urine for microalbumin today  2.  Put the patient in for a tetanus shot  3.  The patient was advised to get a shingles vaccine at about 1-2 weeks.  He is going to go to his local pharmacy for this peer

## 2020-01-26 DIAGNOSIS — E55.9 VITAMIN D DEFICIENCY: ICD-10-CM

## 2020-01-26 DIAGNOSIS — R79.89 ELEVATED SERUM CREATININE: ICD-10-CM

## 2020-01-26 RX ORDER — METOPROLOL SUCCINATE 50 MG/1
TABLET, EXTENDED RELEASE ORAL
Qty: 90 TABLET | Refills: 3 | Status: ON HOLD | OUTPATIENT
Start: 2020-01-26 | End: 2020-08-14 | Stop reason: HOSPADM

## 2020-01-27 NOTE — TELEPHONE ENCOUNTER
----- Message from Gavin Martinez MD sent at 1/26/2020 11:42 AM CST -----  Please call the patient regarding his abnormal result.His blood tests showed that is diabetes is not well controlled. I do want to increase his Novolog from 12 units to 14 units. He is still to ramiro is sliding scale. He will need a BMP and A1C in 4 months. Orders are in.

## 2020-01-27 NOTE — TELEPHONE ENCOUNTER
----- Message from Kiya Campbell sent at 1/27/2020  3:34 PM CST -----  Contact: [phone]  Type: Returning a call    Who left a message? Mary    When did the practice call?  Today    Comments: Regarding lab results

## 2020-01-28 RX ORDER — ERGOCALCIFEROL 1.25 MG/1
CAPSULE ORAL
Qty: 3 CAPSULE | Refills: 0 | Status: SHIPPED | OUTPATIENT
Start: 2020-01-28 | End: 2020-04-24

## 2020-01-28 NOTE — TELEPHONE ENCOUNTER
Please schedule patient for Labs (Vitamin D)    Please also check with your provider if any further labs need to be added and scheduled together.    Thanks !

## 2020-01-28 NOTE — PROGRESS NOTES
Refill Authorization Note     is requesting a refill authorization.    Brief assessment and rationale for refill: APPROVE: Needs Labs   Name and strength of medication: ergocalciferol (ERGOCALCIFEROL) 50,000 unit Cap  Medication-related problems identified: Requires labs    Medication Therapy Plan: NTBO(Vit D)    Medication reconciliation completed: No                         Comments:   Requested Prescriptions   Pending Prescriptions Disp Refills    ergocalciferol (ERGOCALCIFEROL) 50,000 unit Cap [Pharmacy Med Name: VITAMIN D2 50,000IU (ERGO) CAP RX] 3 capsule 0     Sig: TAKE ONE CAPSULE BY MOUTH EVERY 30 DAYS       Endocrinology:  Vitamins - Vitamin D Supplementation Failed - 1/28/2020 11:09 AM        Failed - Vit D is 20 or above and within 360 days     Vit D, 25-Hydroxy   Date Value Ref Range Status   10/16/2013 32 30 - 96 ng/mL Final     Comment:     Vitamin D deficiency.........<10 ng/mL                              Vitamin D insufficiency......10-29 ng/mL       Vitamin D sufficiency........> or equal to 30 ng/mL  Vitamin D toxicity............>100 ng/mL   03/21/2012 36 30 - 100 ng/mL Final     Comment:     Vitamin D, 25-Hydroxy:   Vitamin D deficiency <10 ng/mL   Vitamin D insufficiency 10-30 ng/mL   Vitamin D sufficiency >30 ng/mL   Vitamin D potential toxicity >100 ng/mL    09/20/2011 50 30 - 100 ng/mL Final     Comment:     Vitamin D, 25-Hydroxy:  Vitamin D deficiency <10 ng/mL  Vitamin D insufficiency 10-30 ng/mL  Vitamin D sufficiency >30 ng/mL  Vitamin D toxicity >100 ng/mL              Passed - Patient is at least 18 years old        Passed - Hypercalcemia is not present on problem list        Passed - Office visit in past 12 months or future 90 days     Recent Outpatient Visits            4 days ago Insulin dependent diabetes mellitus    [facility] Gavin Martinez MD    1 week ago Benign prostatic hyperplasia with urinary obstruction    [facility] 4[facility]  Alexis Thorpe MD    3 months ago Trigger middle finger of right hand    [place] Shade Lilly Jr., MD    3 months ago Benign prostatic hyperplasia with urinary obstruction    [facility] 4[facility] Alexis Thorpe MD    3 months ago Acute bronchitis due to infection    [facility] Carissa Osei, MARCEL          Future Appointments              In 1 month PROCEDURES, UROLOGY [facility]    In 1 month SIMULATION, RADIATION [facility]                Passed - Ca in normal range and within 360 days     Calcium   Date Value Ref Range Status   01/24/2020 8.7 8.7 - 10.5 mg/dL Final   09/19/2019 9.6 8.7 - 10.5 mg/dL Final   09/10/2019 9.5 8.7 - 10.5 mg/dL Final              Appointments  past 12m or future 3m with PCP    Date Provider   Last Visit   1/24/2020 Gavin Martinez MD   Next Visit   Visit date not found Gavin Martinez MD        Note composed: 01/28/2020

## 2020-02-11 RX ORDER — BLOOD SUGAR DIAGNOSTIC
STRIP MISCELLANEOUS
Qty: 400 STRIP | Refills: 3 | Status: SHIPPED | OUTPATIENT
Start: 2020-02-11 | End: 2020-11-23

## 2020-02-11 NOTE — PROGRESS NOTES
Refill Authorization Note     is requesting a refill authorization.    Brief assessment and rationale for refill: REVIEW: unclear indication for medication          Medication Therapy Plan: unclear if patient is checking blood sugar three or four times daily; please review        Pharmacist Review Requested: Yes                     Comments:   Requested Prescriptions   Pending Prescriptions Disp Refills    ACCU-CHEK SMARTVIEW TEST STRIP Strp [Pharmacy Med Name: ACCU-CHEK SMARTVIEW TESTSTRIPS 100S] 200 strip      Sig: CHECK BLOOD SUGARS THREE TIMES DAILY BEFORE MEALS AS DIRECTED       Endocrinology: Diabetes - Supplies Failed - 2/8/2020  8:15 AM        Failed - HBA1C is 7.9 or below and within 180 days     Hemoglobin A1C   Date Value Ref Range Status   01/24/2020 9.0 (H) 4.0 - 5.6 % Final     Comment:     ADA Screening Guidelines:  5.7-6.4%  Consistent with prediabetes  >or=6.5%  Consistent with diabetes  High levels of fetal hemoglobin interfere with the HbA1C  assay. Heterozygous hemoglobin variants (HbS, HgC, etc)do  not significantly interfere with this assay.   However, presence of multiple variants may affect accuracy.     09/19/2019 7.5 (H) 4.0 - 5.6 % Final     Comment:     ADA Screening Guidelines:  5.7-6.4%  Consistent with prediabetes  >or=6.5%  Consistent with diabetes  High levels of fetal hemoglobin interfere with the HbA1C  assay. Heterozygous hemoglobin variants (HbS, HgC, etc)do  not significantly interfere with this assay.   However, presence of multiple variants may affect accuracy.     08/20/2019 7.7 (H) 4.0 - 5.6 % Final     Comment:     ADA Screening Guidelines:  5.7-6.4%  Consistent with prediabetes  >or=6.5%  Consistent with diabetes  High levels of fetal hemoglobin interfere with the HbA1C  assay. Heterozygous hemoglobin variants (HbS, HgC, etc)do  not significantly interfere with this assay.   However, presence of multiple variants may affect accuracy.                Passed -  Patient is at least 18 years old        Passed - Office visit in past 12 months or future 90 days.     Recent Outpatient Visits            2 weeks ago Insulin dependent diabetes mellitus    [facility] Gavin Martinez MD    3 weeks ago Benign prostatic hyperplasia with urinary obstruction    [facility] 4[facility] Alexis Thorpe MD    3 months ago Trigger middle finger of right hand    [place] [facility] Shade Lilly Jr., MD    3 months ago Benign prostatic hyperplasia with urinary obstruction    [facility] Alexis Thorpe MD    4 months ago Acute bronchitis due to infection    [facility] [place] Carissa Osei, MARCEL          Future Appointments              In 4 weeks PROCEDURES, [facility]OLOGY [facility]    In 4 weeks SIMULATION, RADIATION [facility]

## 2020-02-13 ENCOUNTER — OFFICE VISIT (OUTPATIENT)
Dept: URGENT CARE | Facility: CLINIC | Age: 85
End: 2020-02-13
Payer: MEDICARE

## 2020-02-13 VITALS
OXYGEN SATURATION: 98 % | RESPIRATION RATE: 20 BRPM | WEIGHT: 209 LBS | TEMPERATURE: 98 F | BODY MASS INDEX: 33.59 KG/M2 | DIASTOLIC BLOOD PRESSURE: 61 MMHG | SYSTOLIC BLOOD PRESSURE: 134 MMHG | HEART RATE: 62 BPM | HEIGHT: 66 IN

## 2020-02-13 DIAGNOSIS — L23.9 ALLERGIC CONTACT DERMATITIS, UNSPECIFIED TRIGGER: Primary | ICD-10-CM

## 2020-02-13 PROCEDURE — 96372 PR INJECTION,THERAP/PROPH/DIAG2ST, IM OR SUBCUT: ICD-10-PCS | Mod: S$GLB,,, | Performed by: FAMILY MEDICINE

## 2020-02-13 PROCEDURE — 99214 OFFICE O/P EST MOD 30 MIN: CPT | Mod: 25,S$GLB,, | Performed by: FAMILY MEDICINE

## 2020-02-13 PROCEDURE — 99214 PR OFFICE/OUTPT VISIT, EST, LEVL IV, 30-39 MIN: ICD-10-PCS | Mod: 25,S$GLB,, | Performed by: FAMILY MEDICINE

## 2020-02-13 PROCEDURE — 96372 THER/PROPH/DIAG INJ SC/IM: CPT | Mod: S$GLB,,, | Performed by: FAMILY MEDICINE

## 2020-02-13 RX ORDER — MOMETASONE FUROATE 1 MG/G
CREAM TOPICAL
Qty: 45 G | Refills: 1 | Status: SHIPPED | OUTPATIENT
Start: 2020-02-13 | End: 2021-02-12

## 2020-02-13 RX ORDER — BETAMETHASONE SODIUM PHOSPHATE AND BETAMETHASONE ACETATE 3; 3 MG/ML; MG/ML
6 INJECTION, SUSPENSION INTRA-ARTICULAR; INTRALESIONAL; INTRAMUSCULAR; SOFT TISSUE
Status: COMPLETED | OUTPATIENT
Start: 2020-02-13 | End: 2020-02-13

## 2020-02-13 RX ADMIN — BETAMETHASONE SODIUM PHOSPHATE AND BETAMETHASONE ACETATE 6 MG: 3; 3 INJECTION, SUSPENSION INTRA-ARTICULAR; INTRALESIONAL; INTRAMUSCULAR; SOFT TISSUE at 09:02

## 2020-02-13 NOTE — PROGRESS NOTES
"Subjective:       Patient ID: Anjum Cai Jr. is a 84 y.o. male.    Vitals:  height is 5' 6" (1.676 m) and weight is 94.8 kg (209 lb). His tympanic temperature is 98.3 °F (36.8 °C). His blood pressure is 134/61 and his pulse is 62. His respiration is 20 and oxygen saturation is 98%.     Chief Complaint: Rash    This is a 84 y.o. 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, nontoxic, multinodular  No date: Headaches, cluster  No date: Hypertension  No date: Kidney stones  No date: Lump in the testicle      Comment:  Left  No date: Prostate cancer  No date: Rotator cuff tendinitis      Comment:  Right shoulder  No date: Sleep apnea with use of continuous positive airway pressure   (CPAP)      Comment:  uses cpap at night  No date: Type II or unspecified type diabetes mellitus with other   specified manifestations, uncontrolled   and Past Surgical History:  2007: CARDIAC CATHETERIZATION  09/27/2016: CATARACT EXTRACTION W/  INTRAOCULAR LENS IMPLANT; Right      Comment:  Dr. Garcia  02/2018: CHOLECYSTECTOMY  02/02/2007: CORONARY STENT PLACEMENT      Comment:  LAD, RCAx2  1995: INGUINAL HERNIA REPAIR; Right  2012: PROSTATE BIOPSY  2014: ROTATOR CUFF REPAIR; Right      Comment:  x2  2013: ROTATOR CUFF REPAIR; Left  No date: TONSILLECTOMY, ADENOIDECTOMY      Comment:  as child  2004: TRIGGER FINGER RELEASE; Right      Comment:  x2  who presents today with a chief complaint of a rash that he first noticed four days ago. He's complaining of skin redness and itching. The rash is located on his left arm and both legs. He's been using an antibiotic cream to help relieve his symptoms.     Rash   This is a new problem. The current " episode started in the past 7 days. The problem has been gradually worsening since onset. The affected locations include the left arm, left upper leg, left lower leg, right upper leg and right lower leg. The rash is characterized by itchiness and redness. It is unknown if there was an exposure to a precipitant. Pertinent negatives include no cough, fever or sore throat. Past treatments include antibiotics. The treatment provided no relief.       Constitution: Negative for chills and fever.   HENT: Negative for facial swelling and sore throat.    Neck: Negative for painful lymph nodes.   Eyes: Negative for eye itching and eyelid swelling.   Respiratory: Negative for cough.    Musculoskeletal: Negative for joint pain and joint swelling.   Skin: Positive for rash and erythema (diffuse erythematous papular lesions). Negative for color change, pale, wound, abrasion, laceration, lesion, skin thickening/induration, puncture wound, abscess, avulsion and hives.   Allergic/Immunologic: Positive for itching. Negative for environmental allergies, immunocompromised state and hives.   Hematologic/Lymphatic: Negative for swollen lymph nodes.       Objective:      Physical Exam   Constitutional: He appears well-developed and well-nourished.   HENT:   Head: Normocephalic and atraumatic.   Eyes: Pupils are equal, round, and reactive to light. EOM are normal.   Neck: Normal range of motion. Neck supple.   Cardiovascular: Normal rate, regular rhythm and normal heart sounds.   Pulmonary/Chest: Effort normal and breath sounds normal.   Skin: Skin is rash. Lesions:  erythema (diffuse erythematous papular lesions)  Nursing note and vitals reviewed.        Assessment:       1. Allergic contact dermatitis, unspecified trigger      probably poison Ivy  Plan:         Allergic contact dermatitis, unspecified trigger  -     betamethasone acetate-betamethasone sodium phosphate injection 6 mg  -     mometasone 0.1% (ELOCON) 0.1 % cream; Apply to  affected area daily  Dispense: 45 g; Refill: 1    discussed washing clothing and contacts. RTC lack of resolution of symptoms

## 2020-02-13 NOTE — PATIENT INSTRUCTIONS
"  Contact Dermatitis  Contact dermatitis is a skin rash caused by something that touches the skin and makes it irritated and inflamed. Your skin may be red, swollen, dry, and may be cracked. Blisters may form and ooze. The rash will itch.  Contact dermatitis can form on the face and neck, backs of hands, forearms, genitals, and lower legs.  People can get contact dermatitis from lots of sources. These include:  · Plants such as poison ivy, oak, or sumac  · Chemicals in hair dyes and rinses, soaps, solvents, waxes, fingernail polish, and deodorants   · Jewelry or watchbands made of nickel  Contact dermatitis is not passed from person to person.  Talk with your healthcare provider about what may have caused the rash. A type of allergy testing called "patch testing" may be used to discover what you are allergic to. You will need to avoid the source of your rash in the future to prevent it from coming back.  Treatment is done to relieve itching and prevent the rash from coming back. The rash should go away in a few days to a few weeks.  Home care  Your healthcare provider may prescribe medicine to relieve swelling and itching. Follow all instructions when using these medicines.  General care:  · Avoid anything that heats up your skin, such as hot showers or baths, or direct sunlight. This can make itching worse.  · Apply cold compresses to soothe your sores to help relieve your symptoms. Do this for 30 minutes 3 to 4 times a day. You can make a cold compress by soaking a cloth in cold water. Squeeze out excess water. You can add colloidal oatmeal to the water to help reduce itching. For severe itching in a small area, apply an ice pack wrapped in a thin towel. Do this for 20 minutes 3 to 4 times a day.  · You can also try wet dressings. One way to do this is to wear a wet piece of clothing under a dry one. Wear a damp shirt under a dry shirt if your upper body is affected. This can relieve itching and prevent you from " scratching the affected area.  · You can also help relieve large areas of itching by taking a lukewarm bath with colloidal oatmeal added to the water.  · Use hydrocortisone cream for redness and irritation, unless another medicine was prescribed. You can also use benzocaine anesthetic cream or spray. Calamine lotion can also relieve mild symptoms.  · Use oral diphenhydramine to help reduce itching. You can buy this antihistamine at drug and grocery stores. It can make you sleepy, so use lower doses during the daytime. Or you can use loratadine. This is an antihistamine that will not make you sleepy. Do not use diphenhydramine if you have glaucoma or have trouble urinating due to an enlarged prostate.  · If a plant causes your rash, make sure to wash your skin and the clothes you were wearing when you came into contact with the plant. This is to wash away the plant oils that gave you the rash and prevent more or worse symptoms.  · Stay away from the substance or object that causes your symptoms. If you cant avoid it, wear gloves or some other type of protection.  Follow-up care  Follow up with your healthcare provider, or as advised.  When to seek medical advice  Call your healthcare provider right away if any of these occur:  · Spreading of the rash to other parts of your body  · Severe swelling of your face, eyelids, mouth, throat or tongue  · Trouble urinating due to swelling in the genital area  · Fever of 100.4°F (38°C) or higher  · Redness or swelling that gets worse  · Pain that gets worse  · Foul-smelling fluid leaking from the skin  · Yellow-brown crusts on the open blisters  Date Last Reviewed: 9/1/2016  © 9515-4031 [employer]. [address] [place] [address], [place] [address]. All rights reserved. This information is not intended as a substitute for professional medical care. Always follow your healthcare professional's instructions.

## 2020-02-22 ENCOUNTER — OFFICE VISIT (OUTPATIENT)
Dept: URGENT CARE | Facility: CLINIC | Age: 85
End: 2020-02-22
Payer: MEDICARE

## 2020-02-22 VITALS
OXYGEN SATURATION: 99 % | HEIGHT: 66 IN | HEART RATE: 78 BPM | SYSTOLIC BLOOD PRESSURE: 138 MMHG | BODY MASS INDEX: 33.59 KG/M2 | WEIGHT: 209 LBS | TEMPERATURE: 98 F | DIASTOLIC BLOOD PRESSURE: 64 MMHG | RESPIRATION RATE: 20 BRPM

## 2020-02-22 DIAGNOSIS — J06.9 UPPER RESPIRATORY TRACT INFECTION, UNSPECIFIED TYPE: Primary | ICD-10-CM

## 2020-02-22 PROCEDURE — 99214 OFFICE O/P EST MOD 30 MIN: CPT | Mod: S$GLB,,, | Performed by: FAMILY MEDICINE

## 2020-02-22 PROCEDURE — 99214 PR OFFICE/OUTPT VISIT, EST, LEVL IV, 30-39 MIN: ICD-10-PCS | Mod: S$GLB,,, | Performed by: FAMILY MEDICINE

## 2020-02-22 RX ORDER — PROMETHAZINE HYDROCHLORIDE AND DEXTROMETHORPHAN HYDROBROMIDE 6.25; 15 MG/5ML; MG/5ML
5 SYRUP ORAL NIGHTLY PRN
Qty: 118 ML | Refills: 0 | Status: SHIPPED | OUTPATIENT
Start: 2020-02-22 | End: 2020-03-03

## 2020-02-22 RX ORDER — BENZONATATE 100 MG/1
100 CAPSULE ORAL EVERY 6 HOURS PRN
Qty: 30 CAPSULE | Refills: 1 | Status: SHIPPED | OUTPATIENT
Start: 2020-02-22 | End: 2020-08-25

## 2020-02-22 RX ORDER — AZITHROMYCIN 250 MG/1
TABLET, FILM COATED ORAL
Qty: 6 TABLET | Refills: 0 | Status: SHIPPED | OUTPATIENT
Start: 2020-02-22 | End: 2020-02-27

## 2020-02-22 NOTE — PATIENT INSTRUCTIONS
Viral Upper Respiratory Illness (Adult)  You have a viral upper respiratory illness (URI), which is another term for the common cold. This illness is contagious during the first few days. It is spread through the air by coughing and sneezing. It may also be spread by direct contact (touching the sick person and then touching your own eyes, nose, or mouth). Frequent handwashing will decrease risk of spread. Most viral illnesses go away within 7 to 10 days with rest and simple home remedies. Sometimes the illness may last for several weeks. Antibiotics will not kill a virus, and they are generally not prescribed for this condition.    Home care  · If symptoms are severe, rest at home for the first 2 to 3 days. When you resume activity, don't let yourself get too tired.  · Avoid being exposed to cigarette smoke (yours or others).  · You may use acetaminophen or ibuprofen to control pain and fever, unless another medicine was prescribed. (Note: If you have chronic liver or kidney disease, have ever had a stomach ulcer or gastrointestinal bleeding, or are taking blood-thinning medicines, talk with your healthcare provider before using these medicines.) Aspirin should never be given to anyone under 18 years of age who is ill with a viral infection or fever. It may cause severe liver or brain damage.  · Your appetite may be poor, so a light diet is fine. Avoid dehydration by drinking 6 to 8 glasses of fluids per day (water, soft drinks, juices, tea, or soup). Extra fluids will help loosen secretions in the nose and lungs.  · Over-the-counter cold medicines will not shorten the length of time youre sick, but they may be helpful for the following symptoms: cough, sore throat, and nasal and sinus congestion. (Note: Do not use decongestants if you have high blood pressure.)  Follow-up care  Follow up with your healthcare provider, or as advised.  When to seek medical advice  Call your healthcare provider right away if any  of these occur:  · Cough with lots of colored sputum (mucus)  · Severe headache; face, neck, or ear pain  · Difficulty swallowing due to throat pain  · Fever of 100.4°F (38°C)  Call 911, or get immediate medical care  Call emergency services right away if any of these occur:  · Chest pain, shortness of breath, wheezing, or difficulty breathing  · Coughing up blood  · Inability to swallow due to throat pain  Date Last Reviewed: 9/13/2015  © 7672-4546 [employer]. [address] [place] [address]. All rights reserved. This information is not intended as a substitute for professional medical care. Always follow your healthcare professional's instructions.

## 2020-02-22 NOTE — PROGRESS NOTES
"Subjective:       Patient ID: Anjum Cai Jr. is a 84 y.o. male.    Vitals:  height is 5' 6" (1.676 m) and weight is 94.8 kg (209 lb). His oral temperature is 97.9 °F (36.6 °C). His blood pressure is 138/64 and his pulse is 78. His respiration is 20 and oxygen saturation is 99%.     Chief Complaint: URI    This is a 84 y.o. 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, nontoxic, multinodular  No date: Headaches, cluster  No date: Hypertension  No date: Kidney stones  No date: Lump in the testicle      Comment:  Left  No date: Prostate cancer  No date: Rotator cuff tendinitis      Comment:  Right shoulder  No date: Sleep apnea with use of continuous positive airway pressure   (CPAP)      Comment:  uses cpap at night  No date: Type II or unspecified type diabetes mellitus with other   specified manifestations, uncontrolled   and Past Surgical History:  2007: CARDIAC CATHETERIZATION  09/27/2016: CATARACT EXTRACTION W/  INTRAOCULAR LENS IMPLANT; Right      Comment:  Dr. Garcia  02/2018: CHOLECYSTECTOMY  02/02/2007: CORONARY STENT PLACEMENT      Comment:  LAD, RCAx2  1995: INGUINAL HERNIA REPAIR; Right  2012: PROSTATE BIOPSY  2014: ROTATOR CUFF REPAIR; Right      Comment:  x2  2013: ROTATOR CUFF REPAIR; Left  No date: TONSILLECTOMY, ADENOIDECTOMY      Comment:  as child  2004: TRIGGER FINGER RELEASE; Right      Comment:  x2  who presents today with a chief complaint of cold symptoms that began three days ago. He's complaining of congestion and a cough. He's been taking cough medicine to help relieve his symptoms.     URI    This is a new problem. The current episode started in the past 7 days. The problem has been " gradually worsening. There has been no fever. Associated symptoms include congestion and coughing. Pertinent negatives include no ear pain, nausea, rash, sinus pain, sore throat, vomiting or wheezing. Treatments tried: cough medicine. The treatment provided mild relief.       Constitution: Negative for chills, sweating, fatigue and fever.   HENT: Positive for congestion. Negative for ear pain, sinus pain, sinus pressure, sore throat and voice change.    Neck: Negative for painful lymph nodes.   Eyes: Negative for eye redness.   Respiratory: Positive for cough. Negative for chest tightness, sputum production, bloody sputum, COPD, shortness of breath, stridor, wheezing and asthma.    Gastrointestinal: Negative for nausea and vomiting.   Musculoskeletal: Negative for muscle ache.   Skin: Negative for rash.   Allergic/Immunologic: Negative for seasonal allergies and asthma.   Hematologic/Lymphatic: Negative for swollen lymph nodes.       Objective:      Physical Exam   Constitutional: He appears well-developed and well-nourished.   HENT:   Head: Normocephalic and atraumatic.   Mouth/Throat: Posterior oropharyngeal erythema present. No oropharyngeal exudate.   Eyes: Pupils are equal, round, and reactive to light. EOM are normal.   Neck: Normal range of motion. Neck supple.   Cardiovascular: Normal rate, regular rhythm and normal heart sounds.   Pulmonary/Chest: Effort normal and breath sounds normal. He has no wheezes.   Lymphadenopathy:     He has cervical adenopathy.   Nursing note and vitals reviewed.        Assessment:       1. Upper respiratory tract infection, unspecified type        Plan:         Upper respiratory tract infection, unspecified type  -     benzonatate (TESSALON PERLES) 100 MG capsule; Take 1 capsule (100 mg total) by mouth every 6 (six) hours as needed for Cough.  Dispense: 30 capsule; Refill: 1  -     promethazine-dextromethorphan (PROMETHAZINE-DM) 6.25-15 mg/5 mL Syrp; Take 5 mLs by mouth nightly  as needed.  Dispense: 118 mL; Refill: 0  -     azithromycin (ZITHROMAX) 250 MG tablet; Take 2 tablets (500 mg) on  Day 1,  followed by 1 tablet (250 mg) once daily on Days 2 through 5.  Dispense: 6 tablet; Refill: 0

## 2020-03-10 ENCOUNTER — HOSPITAL ENCOUNTER (OUTPATIENT)
Dept: RADIATION THERAPY | Facility: HOSPITAL | Age: 85
Discharge: HOME | End: 2020-03-10
Attending: RADIOLOGY
Payer: MEDICARE

## 2020-03-10 ENCOUNTER — PROCEDURE VISIT (OUTPATIENT)
Dept: UROLOGY | Facility: CLINIC | Age: 85
End: 2020-03-10
Payer: MEDICARE

## 2020-03-10 VITALS
RESPIRATION RATE: 17 BRPM | SYSTOLIC BLOOD PRESSURE: 131 MMHG | TEMPERATURE: 98 F | HEART RATE: 72 BPM | BODY MASS INDEX: 31.8 KG/M2 | DIASTOLIC BLOOD PRESSURE: 62 MMHG | WEIGHT: 202.63 LBS | HEIGHT: 67 IN

## 2020-03-10 DIAGNOSIS — C61 PROSTATE CANCER: ICD-10-CM

## 2020-03-10 PROCEDURE — 77290 THER RAD SIMULAJ FIELD CPLX: CPT | Mod: TC,HCNC | Performed by: RADIOLOGY

## 2020-03-10 PROCEDURE — 77334 RADIATION TREATMENT AID(S): CPT | Mod: 26,HCNC,, | Performed by: RADIOLOGY

## 2020-03-10 PROCEDURE — 55876 PLACE RT DEVICE/MARKER PROS: CPT | Mod: HCNC,S$GLB,, | Performed by: UROLOGY

## 2020-03-10 PROCEDURE — 77290 THER RAD SIMULAJ FIELD CPLX: CPT | Mod: 26,HCNC,, | Performed by: RADIOLOGY

## 2020-03-10 PROCEDURE — 77263 THER RADIOLOGY TX PLNG CPLX: CPT | Mod: HCNC,,, | Performed by: RADIOLOGY

## 2020-03-10 PROCEDURE — 77334 PR  RADN TREATMENT AID(S) COMPLX: ICD-10-PCS | Mod: 26,HCNC,, | Performed by: RADIOLOGY

## 2020-03-10 PROCEDURE — 76872 US TRANSRECTAL: CPT | Mod: HCNC,S$GLB,, | Performed by: UROLOGY

## 2020-03-10 PROCEDURE — 77334 RADIATION TREATMENT AID(S): CPT | Mod: TC,HCNC | Performed by: RADIOLOGY

## 2020-03-10 PROCEDURE — A4648 PR TISSUE MARKER, IMPLANTABLE, ANY: ICD-10-PCS | Mod: HCNC,S$GLB,, | Performed by: UROLOGY

## 2020-03-10 PROCEDURE — 76872 PR US TRANSRECTAL: ICD-10-PCS | Mod: HCNC,S$GLB,, | Performed by: UROLOGY

## 2020-03-10 PROCEDURE — 77263 PR  RADIATION THERAPY PLAN COMPLEX: ICD-10-PCS | Mod: HCNC,,, | Performed by: RADIOLOGY

## 2020-03-10 PROCEDURE — 77290 PR  SET RADN THERAPY FIELD COMPLEX: ICD-10-PCS | Mod: 26,HCNC,, | Performed by: RADIOLOGY

## 2020-03-10 PROCEDURE — 55876 PR PLACE RADIOTHER DEVICE/MARKER, PROSTATE: ICD-10-PCS | Mod: HCNC,S$GLB,, | Performed by: UROLOGY

## 2020-03-10 PROCEDURE — 77014 HC CT GUIDANCE RADIATION THERAPY FLDS PLACEMENT: CPT | Mod: TC,HCNC | Performed by: RADIOLOGY

## 2020-03-10 PROCEDURE — A4648 IMPLANTABLE TISSUE MARKER: HCPCS | Mod: HCNC,S$GLB,, | Performed by: UROLOGY

## 2020-03-10 RX ORDER — LIDOCAINE HYDROCHLORIDE 20 MG/ML
JELLY TOPICAL ONCE
Status: COMPLETED | OUTPATIENT
Start: 2020-03-10 | End: 2020-03-10

## 2020-03-10 RX ORDER — LIDOCAINE HYDROCHLORIDE 10 MG/ML
1 INJECTION INFILTRATION; PERINEURAL
Status: COMPLETED | OUTPATIENT
Start: 2020-03-10 | End: 2020-03-10

## 2020-03-10 RX ADMIN — LIDOCAINE HYDROCHLORIDE: 20 JELLY TOPICAL at 08:03

## 2020-03-10 RX ADMIN — LIDOCAINE HYDROCHLORIDE 20 ML: 10 INJECTION INFILTRATION; PERINEURAL at 08:03

## 2020-03-10 NOTE — PROCEDURES
Transrectal ultrasound w/ marker placement  Date/Time: 3/10/2020 8:00 AM  Performed by: Alexis Thorpe MD  Authorized by: Devaughn Cuello Jr., MD        Procedure Date:  03/10/2020    Pre-op diagnosis:  Adenocarcinoma of the prostate   Post-op diagnosis:  Same    Anesthesia:  Anesthesia Administered:  Lidocaine Jelly to the rectum, then Pudendal nerve block using 10 mL of 0.5% lidocaine (Xylocaine) with epinephrine    Findings:  TRUS Volume: 43 cm3  Known prostate cancer, referred for fiduciary marker placement     Description of Procedure:  Procedure Details:      - Digital rectal examination performed.        - Ultrasound transducer passed into rectum.        - Prostate visualized with findings as above.        - Local anesthetic (as above) injected into periprostatic submucosa at level of seminal vesical/prostate junction.        - Two gold prostate markers placed at right base and left apex capsule of the prostate.     Complications:  No immediate complications.     Estimated Blood Loss:  Minimal    Post-op Orders and Disposition:  Discharge:      -  Today to home in a stable condition.   Follow up with  as scheduled for IGRT.  Finish given as antibiotics as instructed.

## 2020-03-10 NOTE — PATIENT INSTRUCTIONS
What to Expect After a TRUS (Transrectal Ultrasound) with Fiducial Marker Placement    Please be sure to finish your pre-procedure antibiotics as instructed.    You may have mild bleeding from the rectum or urine from about 1 week to 1 month, or in your ejaculate for several months. This bleeding is normal and expected, and it will stop. You may have mild discomfort in your rectal or urethral area for 24-48 hours.    You cannot do any strenuous lifting, straining, or exercising for 24 hours. You may return to full activity the day after the procedure.    You may continue to take all your regular medications after the procedure except for the blood thinners.    You may resume all blood-thinning medications once you no longer see any bleeding or whenever your physician prescribing the medication says it is all right to do so. You may take Tylenol if you have a fever and your temperature is less than 100° F or if you have some discomfort.    Signs and Symptoms to Report    Call your [facility] urologist at [phone] if you develop any of the following:  · Temperature greater than 101° F  · Inability to urinate  · A large amount of bleeding from the rectum or in the urine  · Persistent or severe pain    After hours or on weekends, you may reach a urology resident on call at this number: [phone].

## 2020-03-12 RX ORDER — IRBESARTAN 75 MG/1
75 TABLET ORAL DAILY
Qty: 30 TABLET | Refills: 6 | Status: SHIPPED | OUTPATIENT
Start: 2020-03-12 | End: 2020-03-23

## 2020-03-13 RX ORDER — OMEPRAZOLE 20 MG/1
20 CAPSULE, DELAYED RELEASE ORAL 2 TIMES DAILY
Qty: 180 CAPSULE | Refills: 0 | Status: SHIPPED | OUTPATIENT
Start: 2020-03-13 | End: 2020-03-24

## 2020-03-17 PROCEDURE — 77301 RADIOTHERAPY DOSE PLAN IMRT: CPT | Mod: 26,HCNC,, | Performed by: RADIOLOGY

## 2020-03-17 PROCEDURE — 77301 PR  INTEN MOD RADIOTHER PLAN W/DOSE VOL HIST: ICD-10-PCS | Mod: 26,HCNC,, | Performed by: RADIOLOGY

## 2020-03-17 PROCEDURE — 77301 RADIOTHERAPY DOSE PLAN IMRT: CPT | Mod: TC,HCNC | Performed by: RADIOLOGY

## 2020-03-18 PROCEDURE — 77300 RADIATION THERAPY DOSE PLAN: CPT | Mod: 26,HCNC,, | Performed by: RADIOLOGY

## 2020-03-18 PROCEDURE — 77338 PR  MLC IMRT DESIGN & CONSTRUCTION PER IMRT PLAN: ICD-10-PCS | Mod: 26,HCNC,, | Performed by: RADIOLOGY

## 2020-03-18 PROCEDURE — 77338 DESIGN MLC DEVICE FOR IMRT: CPT | Mod: 26,HCNC,, | Performed by: RADIOLOGY

## 2020-03-18 PROCEDURE — 77300 RADIATION THERAPY DOSE PLAN: CPT | Mod: TC,HCNC | Performed by: RADIOLOGY

## 2020-03-18 PROCEDURE — 77300 PR RADIATION THERAPY,DOSIMETRY PLAN: ICD-10-PCS | Mod: 26,HCNC,, | Performed by: RADIOLOGY

## 2020-03-18 PROCEDURE — 77338 DESIGN MLC DEVICE FOR IMRT: CPT | Mod: TC,HCNC | Performed by: RADIOLOGY

## 2020-03-23 RX ORDER — IRBESARTAN 75 MG/1
TABLET ORAL
Qty: 30 TABLET | Refills: 6 | Status: SHIPPED | OUTPATIENT
Start: 2020-03-23 | End: 2020-03-24 | Stop reason: SDUPTHER

## 2020-03-24 RX ORDER — OMEPRAZOLE 20 MG/1
20 CAPSULE, DELAYED RELEASE ORAL 2 TIMES DAILY
Qty: 180 CAPSULE | Refills: 0 | Status: CANCELLED | OUTPATIENT
Start: 2020-03-24

## 2020-03-24 RX ORDER — OMEPRAZOLE 20 MG/1
CAPSULE, DELAYED RELEASE ORAL
Qty: 180 CAPSULE | Refills: 0 | Status: SHIPPED | OUTPATIENT
Start: 2020-03-24 | End: 2020-07-01 | Stop reason: SDUPTHER

## 2020-03-24 NOTE — TELEPHONE ENCOUNTER
Spoke with pharmacist at [facility] confirmed that rx was received and filled, responded to pt portal msg informing that medication is ready for .

## 2020-03-25 RX ORDER — IRBESARTAN 75 MG/1
75 TABLET ORAL DAILY
Qty: 30 TABLET | Refills: 6 | Status: SHIPPED | OUTPATIENT
Start: 2020-03-25 | End: 2020-06-23 | Stop reason: SDUPTHER

## 2020-04-01 ENCOUNTER — HOSPITAL ENCOUNTER (OUTPATIENT)
Dept: RADIATION THERAPY | Facility: HOSPITAL | Age: 85
Discharge: HOME | End: 2020-04-01
Attending: RADIOLOGY
Payer: MEDICARE

## 2020-04-15 ENCOUNTER — DOCUMENTATION ONLY (OUTPATIENT)
Dept: RADIATION ONCOLOGY | Facility: CLINIC | Age: 85
End: 2020-04-15

## 2020-04-15 DIAGNOSIS — Z13.9 SCREENING FOR CONDITION: Primary | ICD-10-CM

## 2020-04-15 NOTE — PROGRESS NOTES
04/15/2020      In an effort to protect our immunocompromised patients from potential exposure to COVID-19, [facility] will now require all patients receiving an infusion, an injection, and/or radiation therapy to be tested for COVID-19 prior to their appointment.  All patients currently under treatment will be tested immediately, and patients initiating new treatment cycles or with one-time appointments (injections, transfusions, etc.) must be tested within 72 hours of their appointment.     Placed COVID-19 test order for patient.  A member of our team is to contact the patient in the near future to explain this process and the rationale behind it, to ask the COVID-19 screening questions, and to get the patient scheduled for their COVID-19 test.     The above was completed in accordance with instructions and guidelines set forth by [facility].     Signed,    Fernando Giordano, PUNEET     Date:  04/15/2020

## 2020-04-15 NOTE — PROGRESS NOTES
04/15/2020      Phoned the patient.      Discussed the following with the patient:  In an effort to protect our immunocompromised patients from potential exposure to COVID-19, [facility] will now require all patients receiving an infusion, an injection, and/or radiation therapy to be tested for COVID-19 prior to their appointment.  All patients currently under treatment will be tested immediately, and patients initiating new treatment cycles or with one-time appointments (injections, transfusions, etc.) must be tested within 72 hours of their appointment.      Symptom Patient's Response   Fever YES/NO: no   Cough YES/NO: no   Shortness of breath  YES/NO: no   Difficulty breathing YES/NO: no   GI symptoms such as diarrhea or nausea YES/NO: no   Loss of taste YES/NO: no   Loss of smell YES/NO: no     Other Screening Patient's Response   Has the patient previously undergone COVID-19 testing? YES/NO: no     If yes to the question directly above, what was the result? not applicable   Has the patient been in close contact with someone who has undergone COVID-19 testing? YES/NO: no     If yes to the question directly above, what was the result? not applicable      The patient's 24-hour COVID-19 test was ordered and scheduled.  Reviewed the appointment date, time, and location with the patient.      Advised the patient that he can expect the results to take approximately 24 hours and that someone will reach out to him regarding his results.  Advised the patient that his treatment appointment will be rescheduled if he tests positive for COVID-19.      Questions were answered to the patient's satisfaction, and the patient verbalized understanding of information and agreement with the plan.       The above was completed in accordance with instructions and guidelines set forth by [facility].     Signed,        Pam Brown MA     Date:  04/15/2020

## 2020-04-16 ENCOUNTER — LAB VISIT (OUTPATIENT)
Dept: INTERNAL MEDICINE | Facility: CLINIC | Age: 85
End: 2020-04-16
Payer: MEDICARE

## 2020-04-16 DIAGNOSIS — Z13.9 SCREENING FOR CONDITION: ICD-10-CM

## 2020-04-16 LAB — SARS-COV-2 RNA RESP QL NAA+PROBE: NOT DETECTED

## 2020-04-16 PROCEDURE — U0002 COVID-19 LAB TEST NON-CDC: HCPCS | Mod: HCNC

## 2020-04-17 NOTE — PROGRESS NOTES
To:  ELIDIA Garcia-  Please phone this patient with his negative COVID-19 test results following the scripting and protocol we have reviewed.  If my assistance is needed, don't hesitate to reach out.  Thanks!  -Fernando Giordano, DNP, APRN, FNP-BC, AOCNP  [facility], 3rd Floor  [facility] [address], [place], [place]  [id number] [phone]

## 2020-04-20 PROCEDURE — 77014 HC CT GUIDANCE RADIATION THERAPY FLDS PLACEMENT: CPT | Mod: TC,HCNC | Performed by: RADIOLOGY

## 2020-04-20 PROCEDURE — 77385 HC IMRT, SIMPLE: CPT | Mod: HCNC | Performed by: RADIOLOGY

## 2020-04-21 PROCEDURE — 77014 HC CT GUIDANCE RADIATION THERAPY FLDS PLACEMENT: CPT | Mod: TC,HCNC | Performed by: RADIOLOGY

## 2020-04-21 PROCEDURE — 77385 HC IMRT, SIMPLE: CPT | Mod: HCNC | Performed by: RADIOLOGY

## 2020-04-22 PROCEDURE — 77385 HC IMRT, SIMPLE: CPT | Mod: HCNC | Performed by: RADIOLOGY

## 2020-04-22 PROCEDURE — 77014 HC CT GUIDANCE RADIATION THERAPY FLDS PLACEMENT: CPT | Mod: TC,HCNC | Performed by: RADIOLOGY

## 2020-04-23 ENCOUNTER — DOCUMENTATION ONLY (OUTPATIENT)
Dept: RADIATION ONCOLOGY | Facility: CLINIC | Age: 85
End: 2020-04-23

## 2020-04-23 PROCEDURE — 77014 HC CT GUIDANCE RADIATION THERAPY FLDS PLACEMENT: CPT | Mod: TC,HCNC | Performed by: RADIOLOGY

## 2020-04-23 PROCEDURE — 77385 HC IMRT, SIMPLE: CPT | Mod: HCNC | Performed by: RADIOLOGY

## 2020-04-23 NOTE — PLAN OF CARE
Pt. On day 4 of outpt. Xrt to prostate.  Denies N/V/D.  No nocturia.  No hematuria.  Just started.

## 2020-04-24 DIAGNOSIS — E55.9 VITAMIN D DEFICIENCY: ICD-10-CM

## 2020-04-24 PROCEDURE — 77385 HC IMRT, SIMPLE: CPT | Mod: HCNC | Performed by: RADIOLOGY

## 2020-04-24 PROCEDURE — 77336 RADIATION PHYSICS CONSULT: CPT | Mod: HCNC | Performed by: RADIOLOGY

## 2020-04-24 PROCEDURE — 77014 HC CT GUIDANCE RADIATION THERAPY FLDS PLACEMENT: CPT | Mod: TC,HCNC | Performed by: RADIOLOGY

## 2020-04-24 RX ORDER — ERGOCALCIFEROL 1.25 MG/1
CAPSULE ORAL
Qty: 3 CAPSULE | Refills: 0 | Status: SHIPPED | OUTPATIENT
Start: 2020-04-24 | End: 2020-07-01 | Stop reason: SDUPTHER

## 2020-04-24 NOTE — PROGRESS NOTES
Refill Routing Note    Medication(s) are not appropriate for processing by [facility]:       Outside of protocol        Medication reconciliation completed: No      Appointments [id number] or future 3m with PCP    Date Provider   Last Visit   1/24/2020 Gavin Martinez MD   Next Visit   Visit date not found Gavin Martinez MD     Automatic Epic Protocol Generated Data:    Requested Prescriptions   Pending Prescriptions Disp Refills    ergocalciferol (ERGOCALCIFEROL) 50,000 unit Cap [Pharmacy Med Name: VITAMIN D2 50,000IU (ERGO) CAP RX] 3 capsule 0     Sig: TAKE 1 CAPSULE BY MOUTH ONCE EVERY 30 DAYS       Endocrinology:  Vitamins - Vitamin D Supplementation Failed - 4/24/2020  9:34 AM        Failed - Vitamin D is 20 or above and within 360 days     Vit D, 25-Hydroxy   Date Value Ref Range Status   10/16/2013 32 30 - 96 ng/mL Final     Comment:     Vitamin D deficiency.........<10 ng/mL                              Vitamin D insufficiency......10-29 ng/mL       Vitamin D sufficiency........> or equal to 30 ng/mL  Vitamin D toxicity............>100 ng/mL   03/21/2012 36 30 - 100 ng/mL Final     Comment:     Vitamin D, 25-Hydroxy:   Vitamin D deficiency <10 ng/mL   Vitamin D insufficiency 10-30 ng/mL   Vitamin D sufficiency >30 ng/mL   Vitamin D potential toxicity >100 ng/mL    09/20/2011 50 30 - 100 ng/mL Final     Comment:     Vitamin D, 25-Hydroxy:  Vitamin D deficiency <10 ng/mL  Vitamin D insufficiency 10-30 ng/mL  Vitamin D sufficiency >30 ng/mL  Vitamin D toxicity >100 ng/mL              Passed - Patient is at least 18 years old        Passed - Hypercalcemia is not present on problem list        Passed - Office visit in past 12 months or future 90 days.     Recent Outpatient Visits            2 months ago Upper respiratory tract infection, unspecified type    [facility] [place] Mike Morales MD    2 months ago Allergic contact dermatitis, unspecified trigger    [facility]  [place] Mike Morales MD    3 months ago Insulin dependent diabetes mellitus    [facility] Gavin Martinez MD    3 months ago Benign prostatic hyperplasia with urinary obstruction    [facility] 4th Floor Alexis Thorpe MD    6 months ago Trigger middle finger of right hand    [place] [facility] Shade Lilly Jr., MD                    Passed - Ca in normal range and within 360 days     Calcium   Date Value Ref Range Status   01/24/2020 8.7 8.7 - 10.5 mg/dL Final   09/19/2019 9.6 8.7 - 10.5 mg/dL Final   09/10/2019 9.5 8.7 - 10.5 mg/dL Final                 Note composed:4:04 PM 04/24/2020

## 2020-04-27 PROCEDURE — 77385 HC IMRT, SIMPLE: CPT | Mod: HCNC | Performed by: RADIOLOGY

## 2020-04-27 PROCEDURE — G6002 STEREOSCOPIC X-RAY GUIDANCE: HCPCS | Mod: 26,HCNC,, | Performed by: RADIOLOGY

## 2020-04-27 PROCEDURE — 77417 THER RADIOLOGY PORT IMAGE(S): CPT | Mod: HCNC | Performed by: RADIOLOGY

## 2020-04-27 PROCEDURE — G6002 PR STEREOSCOPIC XRAY GUIDE FOR RADIATION TX DELIV: ICD-10-PCS | Mod: 26,HCNC,, | Performed by: RADIOLOGY

## 2020-04-28 PROCEDURE — 77014 HC CT GUIDANCE RADIATION THERAPY FLDS PLACEMENT: CPT | Mod: TC,HCNC | Performed by: RADIOLOGY

## 2020-04-28 PROCEDURE — 77385 HC IMRT, SIMPLE: CPT | Mod: HCNC | Performed by: RADIOLOGY

## 2020-04-29 PROCEDURE — G6002 PR STEREOSCOPIC XRAY GUIDE FOR RADIATION TX DELIV: ICD-10-PCS | Mod: 26,HCNC,, | Performed by: RADIOLOGY

## 2020-04-29 PROCEDURE — G6002 STEREOSCOPIC X-RAY GUIDANCE: HCPCS | Mod: 26,HCNC,, | Performed by: RADIOLOGY

## 2020-04-29 PROCEDURE — 77385 HC IMRT, SIMPLE: CPT | Mod: HCNC | Performed by: RADIOLOGY

## 2020-04-30 ENCOUNTER — DOCUMENTATION ONLY (OUTPATIENT)
Dept: RADIATION ONCOLOGY | Facility: CLINIC | Age: 85
End: 2020-04-30

## 2020-04-30 PROCEDURE — G6002 PR STEREOSCOPIC XRAY GUIDE FOR RADIATION TX DELIV: ICD-10-PCS | Mod: 26,HCNC,, | Performed by: RADIOLOGY

## 2020-04-30 PROCEDURE — G6002 STEREOSCOPIC X-RAY GUIDANCE: HCPCS | Mod: 26,HCNC,, | Performed by: RADIOLOGY

## 2020-04-30 PROCEDURE — 77385 HC IMRT, SIMPLE: CPT | Mod: HCNC | Performed by: RADIOLOGY

## 2020-05-01 ENCOUNTER — HOSPITAL ENCOUNTER (OUTPATIENT)
Dept: RADIATION THERAPY | Facility: HOSPITAL | Age: 85
Discharge: HOME | End: 2020-05-01
Attending: RADIOLOGY
Payer: MEDICARE

## 2020-05-01 PROCEDURE — 77385 HC IMRT, SIMPLE: CPT | Mod: HCNC | Performed by: RADIOLOGY

## 2020-05-01 PROCEDURE — G6002 PR STEREOSCOPIC XRAY GUIDE FOR RADIATION TX DELIV: ICD-10-PCS | Mod: 26,HCNC,, | Performed by: RADIOLOGY

## 2020-05-01 PROCEDURE — 77336 RADIATION PHYSICS CONSULT: CPT | Mod: HCNC | Performed by: RADIOLOGY

## 2020-05-01 PROCEDURE — G6002 STEREOSCOPIC X-RAY GUIDANCE: HCPCS | Mod: 26,HCNC,, | Performed by: RADIOLOGY

## 2020-05-04 PROCEDURE — 77385 HC IMRT, SIMPLE: CPT | Mod: HCNC | Performed by: RADIOLOGY

## 2020-05-04 PROCEDURE — G6002 PR STEREOSCOPIC XRAY GUIDE FOR RADIATION TX DELIV: ICD-10-PCS | Mod: 26,HCNC,, | Performed by: RADIOLOGY

## 2020-05-04 PROCEDURE — G6002 STEREOSCOPIC X-RAY GUIDANCE: HCPCS | Mod: 26,HCNC,, | Performed by: RADIOLOGY

## 2020-05-05 PROCEDURE — G6002 STEREOSCOPIC X-RAY GUIDANCE: HCPCS | Mod: 26,HCNC,, | Performed by: RADIOLOGY

## 2020-05-05 PROCEDURE — G6002 PR STEREOSCOPIC XRAY GUIDE FOR RADIATION TX DELIV: ICD-10-PCS | Mod: 26,HCNC,, | Performed by: RADIOLOGY

## 2020-05-05 PROCEDURE — 77385 HC IMRT, SIMPLE: CPT | Mod: HCNC | Performed by: RADIOLOGY

## 2020-05-05 PROCEDURE — 77417 THER RADIOLOGY PORT IMAGE(S): CPT | Mod: HCNC | Performed by: RADIOLOGY

## 2020-05-06 PROCEDURE — 77014 HC CT GUIDANCE RADIATION THERAPY FLDS PLACEMENT: CPT | Mod: TC,HCNC | Performed by: RADIOLOGY

## 2020-05-06 PROCEDURE — 77385 HC IMRT, SIMPLE: CPT | Mod: HCNC | Performed by: RADIOLOGY

## 2020-05-07 ENCOUNTER — DOCUMENTATION ONLY (OUTPATIENT)
Dept: RADIATION ONCOLOGY | Facility: CLINIC | Age: 85
End: 2020-05-07

## 2020-05-07 PROCEDURE — G6002 STEREOSCOPIC X-RAY GUIDANCE: HCPCS | Mod: 26,HCNC,, | Performed by: RADIOLOGY

## 2020-05-07 PROCEDURE — G6002 PR STEREOSCOPIC XRAY GUIDE FOR RADIATION TX DELIV: ICD-10-PCS | Mod: 26,HCNC,, | Performed by: RADIOLOGY

## 2020-05-07 PROCEDURE — 77385 HC IMRT, SIMPLE: CPT | Mod: HCNC | Performed by: RADIOLOGY

## 2020-05-08 PROCEDURE — 77336 RADIATION PHYSICS CONSULT: CPT | Mod: HCNC | Performed by: RADIOLOGY

## 2020-05-08 PROCEDURE — G6002 STEREOSCOPIC X-RAY GUIDANCE: HCPCS | Mod: 26,HCNC,, | Performed by: RADIOLOGY

## 2020-05-08 PROCEDURE — G6002 PR STEREOSCOPIC XRAY GUIDE FOR RADIATION TX DELIV: ICD-10-PCS | Mod: 26,HCNC,, | Performed by: RADIOLOGY

## 2020-05-08 PROCEDURE — 77385 HC IMRT, SIMPLE: CPT | Mod: HCNC | Performed by: RADIOLOGY

## 2020-05-11 PROCEDURE — 77386 HC IMRT, COMPLEX: CPT | Mod: HCNC | Performed by: RADIOLOGY

## 2020-05-11 PROCEDURE — 77387 GUIDANCE FOR RADJ TX DLVR: CPT | Performed by: RADIOLOGY

## 2020-05-11 PROCEDURE — 77385 HC IMRT, SIMPLE: CPT | Mod: HCNC,59 | Performed by: RADIOLOGY

## 2020-05-12 PROCEDURE — 77417 THER RADIOLOGY PORT IMAGE(S): CPT | Mod: HCNC | Performed by: RADIOLOGY

## 2020-05-12 PROCEDURE — 77385 HC IMRT, SIMPLE: CPT | Mod: HCNC | Performed by: RADIOLOGY

## 2020-05-12 PROCEDURE — G6002 STEREOSCOPIC X-RAY GUIDANCE: HCPCS | Mod: 26,HCNC,, | Performed by: RADIOLOGY

## 2020-05-12 PROCEDURE — G6002 PR STEREOSCOPIC XRAY GUIDE FOR RADIATION TX DELIV: ICD-10-PCS | Mod: 26,HCNC,, | Performed by: RADIOLOGY

## 2020-05-13 PROCEDURE — 77014 HC CT GUIDANCE RADIATION THERAPY FLDS PLACEMENT: CPT | Mod: TC,HCNC | Performed by: RADIOLOGY

## 2020-05-13 PROCEDURE — 77385 HC IMRT, SIMPLE: CPT | Mod: HCNC | Performed by: RADIOLOGY

## 2020-05-14 ENCOUNTER — DOCUMENTATION ONLY (OUTPATIENT)
Dept: RADIATION ONCOLOGY | Facility: CLINIC | Age: 85
End: 2020-05-14

## 2020-05-14 PROCEDURE — 77385 HC IMRT, SIMPLE: CPT | Mod: HCNC | Performed by: RADIOLOGY

## 2020-05-14 PROCEDURE — G6002 PR STEREOSCOPIC XRAY GUIDE FOR RADIATION TX DELIV: ICD-10-PCS | Mod: 26,HCNC,, | Performed by: RADIOLOGY

## 2020-05-14 PROCEDURE — G6002 STEREOSCOPIC X-RAY GUIDANCE: HCPCS | Mod: 26,HCNC,, | Performed by: RADIOLOGY

## 2020-05-18 PROCEDURE — G6002 STEREOSCOPIC X-RAY GUIDANCE: HCPCS | Mod: 26,HCNC,, | Performed by: RADIOLOGY

## 2020-05-18 PROCEDURE — G6002 PR STEREOSCOPIC XRAY GUIDE FOR RADIATION TX DELIV: ICD-10-PCS | Mod: 26,HCNC,, | Performed by: RADIOLOGY

## 2020-05-18 PROCEDURE — 77385 HC IMRT, SIMPLE: CPT | Mod: HCNC | Performed by: RADIOLOGY

## 2020-05-19 PROCEDURE — 77385 HC IMRT, SIMPLE: CPT | Mod: HCNC | Performed by: RADIOLOGY

## 2020-05-19 PROCEDURE — 77014 HC CT GUIDANCE RADIATION THERAPY FLDS PLACEMENT: CPT | Mod: TC,HCNC | Performed by: RADIOLOGY

## 2020-05-19 PROCEDURE — 77336 RADIATION PHYSICS CONSULT: CPT | Mod: HCNC | Performed by: RADIOLOGY

## 2020-05-20 PROCEDURE — 77417 THER RADIOLOGY PORT IMAGE(S): CPT | Mod: HCNC | Performed by: RADIOLOGY

## 2020-05-20 PROCEDURE — 77385 HC IMRT, SIMPLE: CPT | Mod: HCNC | Performed by: RADIOLOGY

## 2020-05-20 PROCEDURE — G6002 STEREOSCOPIC X-RAY GUIDANCE: HCPCS | Mod: 26,HCNC,, | Performed by: RADIOLOGY

## 2020-05-20 PROCEDURE — G6002 PR STEREOSCOPIC XRAY GUIDE FOR RADIATION TX DELIV: ICD-10-PCS | Mod: 26,HCNC,, | Performed by: RADIOLOGY

## 2020-05-21 ENCOUNTER — DOCUMENTATION ONLY (OUTPATIENT)
Dept: RADIATION ONCOLOGY | Facility: CLINIC | Age: 85
End: 2020-05-21

## 2020-05-21 PROCEDURE — 77014 HC CT GUIDANCE RADIATION THERAPY FLDS PLACEMENT: CPT | Mod: TC,HCNC | Performed by: RADIOLOGY

## 2020-05-21 PROCEDURE — 77385 HC IMRT, SIMPLE: CPT | Mod: HCNC | Performed by: RADIOLOGY

## 2020-05-22 PROCEDURE — G6002 STEREOSCOPIC X-RAY GUIDANCE: HCPCS | Mod: 26,HCNC,, | Performed by: RADIOLOGY

## 2020-05-22 PROCEDURE — 77385 HC IMRT, SIMPLE: CPT | Mod: HCNC | Performed by: RADIOLOGY

## 2020-05-22 PROCEDURE — G6002 PR STEREOSCOPIC XRAY GUIDE FOR RADIATION TX DELIV: ICD-10-PCS | Mod: 26,HCNC,, | Performed by: RADIOLOGY

## 2020-05-25 PROCEDURE — 77385 HC IMRT, SIMPLE: CPT | Mod: HCNC | Performed by: RADIOLOGY

## 2020-05-25 PROCEDURE — G6002 PR STEREOSCOPIC XRAY GUIDE FOR RADIATION TX DELIV: ICD-10-PCS | Mod: 26,HCNC,, | Performed by: RADIOLOGY

## 2020-05-25 PROCEDURE — 77336 RADIATION PHYSICS CONSULT: CPT | Mod: HCNC | Performed by: RADIOLOGY

## 2020-05-25 PROCEDURE — G6002 STEREOSCOPIC X-RAY GUIDANCE: HCPCS | Mod: 26,HCNC,, | Performed by: RADIOLOGY

## 2020-05-26 PROCEDURE — G6002 PR STEREOSCOPIC XRAY GUIDE FOR RADIATION TX DELIV: ICD-10-PCS | Mod: 26,HCNC,, | Performed by: RADIOLOGY

## 2020-05-26 PROCEDURE — G6002 STEREOSCOPIC X-RAY GUIDANCE: HCPCS | Mod: 26,HCNC,, | Performed by: RADIOLOGY

## 2020-05-26 PROCEDURE — 77385 HC IMRT, SIMPLE: CPT | Mod: HCNC | Performed by: RADIOLOGY

## 2020-05-27 PROCEDURE — 77385 HC IMRT, SIMPLE: CPT | Mod: HCNC | Performed by: RADIOLOGY

## 2020-05-27 PROCEDURE — G6002 PR STEREOSCOPIC XRAY GUIDE FOR RADIATION TX DELIV: ICD-10-PCS | Mod: 26,HCNC,, | Performed by: RADIOLOGY

## 2020-05-27 PROCEDURE — G6002 STEREOSCOPIC X-RAY GUIDANCE: HCPCS | Mod: 26,HCNC,, | Performed by: RADIOLOGY

## 2020-05-28 ENCOUNTER — DOCUMENTATION ONLY (OUTPATIENT)
Dept: RADIATION ONCOLOGY | Facility: CLINIC | Age: 85
End: 2020-05-28

## 2020-05-28 PROCEDURE — 77385 HC IMRT, SIMPLE: CPT | Mod: HCNC | Performed by: RADIOLOGY

## 2020-05-28 PROCEDURE — G6002 PR STEREOSCOPIC XRAY GUIDE FOR RADIATION TX DELIV: ICD-10-PCS | Mod: 26,HCNC,, | Performed by: RADIOLOGY

## 2020-05-28 PROCEDURE — G6002 STEREOSCOPIC X-RAY GUIDANCE: HCPCS | Mod: 26,HCNC,, | Performed by: RADIOLOGY

## 2020-05-28 NOTE — PLAN OF CARE
Pt. Completed outpt. Xrt to prostate on 5/28.  Doing well.  No dysuria or hematuria.  Nocturia x 2.  No diarrhea.

## 2020-06-01 ENCOUNTER — HOSPITAL ENCOUNTER (OUTPATIENT)
Dept: RADIATION THERAPY | Facility: HOSPITAL | Age: 85
Discharge: HOME | End: 2020-06-01
Attending: RADIOLOGY
Payer: MEDICARE

## 2020-06-01 DIAGNOSIS — C61 PROSTATE CANCER: Primary | ICD-10-CM

## 2020-06-02 PROCEDURE — 77336 RADIATION PHYSICS CONSULT: CPT | Mod: HCNC | Performed by: RADIOLOGY

## 2020-06-09 RX ORDER — INSULIN GLARGINE 100 [IU]/ML
INJECTION, SOLUTION SUBCUTANEOUS
Qty: 60 ML | Refills: 0 | Status: SHIPPED | OUTPATIENT
Start: 2020-06-09 | End: 2020-07-25 | Stop reason: SDUPTHER

## 2020-06-09 NOTE — TELEPHONE ENCOUNTER
Refill Authorization Note    is requesting a refill authorization.    Brief assessment and rationale for refill: APPROVE: prr               Medication reconciliation completed: No                         Comments:      Requested Prescriptions   Signed Prescriptions Disp Refills    insulin (LANTUS SOLOSTAR U-100 INSULIN) glargine 100 units/mL (3mL) SubQ pen 60 mL 0     Sig: ADMINISTER 65 UNITS UNDER THE SKIN EVERY EVENING       Endocrinology:  Diabetes - Insulins Failed - 6/8/2020 12:28 PM        Failed - HBA1C is 7.9 or below and within 180 days     Hemoglobin A1C   Date Value Ref Range Status   01/24/2020 9.0 (H) 4.0 - 5.6 % Final     Comment:     ADA Screening Guidelines:  5.7-6.4%  Consistent with prediabetes  >or=6.5%  Consistent with diabetes  High levels of fetal hemoglobin interfere with the HbA1C  assay. Heterozygous hemoglobin variants (HbS, HgC, etc)do  not significantly interfere with this assay.   However, presence of multiple variants may affect accuracy.     09/19/2019 7.5 (H) 4.0 - 5.6 % Final     Comment:     ADA Screening Guidelines:  5.7-6.4%  Consistent with prediabetes  >or=6.5%  Consistent with diabetes  High levels of fetal hemoglobin interfere with the HbA1C  assay. Heterozygous hemoglobin variants (HbS, HgC, etc)do  not significantly interfere with this assay.   However, presence of multiple variants may affect accuracy.     08/20/2019 7.7 (H) 4.0 - 5.6 % Final     Comment:     ADA Screening Guidelines:  5.7-6.4%  Consistent with prediabetes  >or=6.5%  Consistent with diabetes  High levels of fetal hemoglobin interfere with the HbA1C  assay. Heterozygous hemoglobin variants (HbS, HgC, etc)do  not significantly interfere with this assay.   However, presence of multiple variants may affect accuracy.                Passed - Patient is at least 18 years old        Passed - Office visit in past 12 months or future 90 days.     Recent Outpatient Visits            3 months ago Upper  respiratory tract infection, unspecified type    [facility] [place] Mike Morales MD    3 months ago Allergic contact dermatitis, unspecified trigger    [facility] [place] Mike Morales MD    4 months ago Insulin dependent diabetes mellitus    [facility] - [facility] Medicine Gavin Martinez MD    4 months ago Benign prostatic hyperplasia with urinary obstruction    [facility] - Urology 4th Floor Alexis Thorpe MD    7 months ago Trigger middle finger of right hand    [place] [facility] Shade Lilly Jr., MD                    Passed - eGFR is 30 or above and within 360 days     eGFR if non    Date Value Ref Range Status   01/24/2020 51 (A) >60 mL/min/1.73 m^2 Final     Comment:     Calculation used to obtain the estimated glomerular filtration  rate (eGFR) is the CKD-EPI equation.      09/19/2019 43 (A) >60 mL/min/1.73 m^2 Final     Comment:     Calculation used to obtain the estimated glomerular filtration  rate (eGFR) is the CKD-EPI equation.      09/10/2019 43 (A) >60 mL/min/1.73 m^2 Final     Comment:     Calculation used to obtain the estimated glomerular filtration  rate (eGFR) is the CKD-EPI equation.        eGFR if    Date Value Ref Range Status   01/24/2020 59 (A) >60 mL/min/1.73 m^2 Final   09/19/2019 50 (A) >60 mL/min/1.73 m^2 Final   09/10/2019 50 (A) >60 mL/min/1.73 m^2 Final              Passed - Cr is 1.3 or below and within 360 days     Creatinine   Date Value Ref Range Status   01/24/2020 1.3 0.5 - 1.4 mg/dL Final   09/19/2019 1.5 (H) 0.5 - 1.4 mg/dL Final   09/10/2019 1.5 (H) 0.5 - 1.4 mg/dL Final     POC Creatinine   Date Value Ref Range Status   01/31/2018 1.3 0.5 - 1.4 mg/dL Final               Appointments  past 12m or future 3m with PCP    Date Provider   Last Visit   1/24/2020 Gavin Martinez MD   Next Visit   Visit date not found Gavin Martinez MD   ED visits in past 90 days: [unfilled]     Note composed:3:34 PM  06/09/2020

## 2020-06-13 NOTE — PROCEDURES
Transrectal Ultrasound w/ Biopsy  Date/Time: 11/12/2019 8:45 AM  Performed by: Alexis Thorpe MD  Authorized by: Alexis Thorpe MD     Total Biopsies:  0     Procedure Date:  11/12/2019    Procedure:   UroNav MRI/US fusion biopsy of the prostate                                                                     Transrectal Ultrasound of the Prostate                                       Transrectal Ultrasound Guided Prostate Biopsy                                - With Pudendal Nerve Block                                                                                      Pre-OP Diagnosis:                                                                 Elevated PSA, prostate lesions                                              Post-OP Diagnosis:                                                                Awaiting final pathology                                                Anesthesia:                                                                       Anesthesia Administered:                                                     Intrarectal instillation of 10 mL 2% lidocaine (Xylocaine) jelly.       Findings:                                                                         --- Transrectal Ultrasound of the Prostate ---                               Prostate measurements.                                                                                               PSA: Lab Results       Component                Value               Date                       PSA                      10.06 (H)           02/26/2013                 PSADIAG                  19.5 (H)            10/10/2019                   - Volume: 34 gm.           PSA Density: 0.58                                                         yes calcification in the prostate. MRI measurement of the prostate appears to overestimate its size.  To me TRUS volume seems to be more accurate.    MRI of prostate 11/6/19   Previous biopsy: Negative for  malignancy (03/28/2013).  PSA: 19.5 (10/10/2019), 15.0 (07/16/2019), 7.7 (07/14/2017).  Prior therapy: None.  Prostate: The prostate measures 5.7 x 4.2 x 5.2cm corresponding to a computed volume of 70.8cc.  Peripheral zone:  Lesion (MARK) #1.  Location: Side: Left region: Base zone: Posterior peripheral zone and anterior peripheral zone  Greatest dimension: 3.2cm  T2-WI: Lenticular or non-circumscribed, homogeneous, moderately hypointense - score 5  DWI/ADC: Focal markedly hypointense on ADC and markedly hyperintense on high b-value DWI - score 5  DCE: Negative.  Extraprostatic extension: Positive.  There is involvement of the left seminal vesicle base.  PI-RADS assessment category: 5.  Transition zone: Benign prostatic nodules without imaging features of prostate cancer.    Neurovascular bundle: There is involvement of the left neurovascular bundle.  Seminal vesicles:  SV invasion: There is invasion of the left seminal vesicle base.  Adjacent Organ Involvement: The lesion extends to the level of the urinary bladder wall, however there is no focal bladder wall thickening or involvement. There is no rectal involvement.  Lymphadenopathy: None.  Other Findings: There is a fat containing left inguinal hernia.  Degenerative changes of the lower lumbar spine identified.                          Description of Procedure:                                                         Informed Consent:                                                            - Risks, benefits and alternatives of procedure discussed with               patient and informed consent obtained.                                       Patient Position:                                                            - Left lateral.                                                              --- Transrectal Ultrasound of the Prostate ---                               Instruments:                                                                 - Bruel and Ashishaer.                                                            --- Transrectal U/S Biopsy ---                                               Instruments:                                                                 - Spring-loaded gun used for biopsy.                                         Procedure Details:                                                           MRI imaging of the prostate was preloaded to the UroNav machine.         US of prostate was performed and its image was super-imposed with that of MRI.  The target lesions identified and the biopsies were obtained using US guided UroNav guidance.    Biopsy Core Details:                                                         - Twelve core(s) in total.                                                   - Cores Taken From: Right apex x 2, right mid prostate x 2, right            base x 2, left apex x 2, left mid prostate x 2 and left base x 2.  Lesion; 4 cores obtained from each lesion             Estimated Blood Loss:                                                        - Minimal.                                                                   Pathology Specimen:                                                          - The specimen sent.                                                    Complications:                                                                    No immediate complications.                                             Post-OP Plan:                                                                                            Patient was discharged home in a stable condition.         Medications: finish off cipro given.         Will call him with the bx result.          If fever or unable to void, RTC or emergency room immediately.                                           RTC 6 months with PSA.                              NEGATIVE

## 2020-06-23 RX ORDER — IRBESARTAN 75 MG/1
75 TABLET ORAL DAILY
Qty: 30 TABLET | Refills: 6 | Status: SHIPPED | OUTPATIENT
Start: 2020-06-23 | End: 2020-11-09

## 2020-06-29 ENCOUNTER — OFFICE VISIT (OUTPATIENT)
Dept: URGENT CARE | Facility: CLINIC | Age: 85
End: 2020-06-29
Payer: MEDICARE

## 2020-06-29 VITALS
HEART RATE: 74 BPM | RESPIRATION RATE: 18 BRPM | TEMPERATURE: 98 F | SYSTOLIC BLOOD PRESSURE: 121 MMHG | OXYGEN SATURATION: 99 % | BODY MASS INDEX: 29.98 KG/M2 | DIASTOLIC BLOOD PRESSURE: 68 MMHG | WEIGHT: 191 LBS | HEIGHT: 67 IN

## 2020-06-29 DIAGNOSIS — R10.9 STOMACH PAIN: Primary | ICD-10-CM

## 2020-06-29 DIAGNOSIS — R19.7 DIARRHEA, UNSPECIFIED TYPE: ICD-10-CM

## 2020-06-29 LAB
BILIRUB UR QL STRIP: NEGATIVE
GLUCOSE SERPL-MCNC: 106 MG/DL (ref 70–110)
GLUCOSE UR QL STRIP: NEGATIVE
KETONES UR QL STRIP: NEGATIVE
LEUKOCYTE ESTERASE UR QL STRIP: NEGATIVE
PH, POC UA: 5 (ref 5–8)
POC BLOOD, URINE: NEGATIVE
POC NITRATES, URINE: NEGATIVE
PROT UR QL STRIP: NEGATIVE
SP GR UR STRIP: 1.02 (ref 1–1.03)
UROBILINOGEN UR STRIP-ACNC: NORMAL (ref 0.3–2.2)

## 2020-06-29 PROCEDURE — 81003 POCT URINALYSIS, DIPSTICK, AUTOMATED, W/O SCOPE: ICD-10-PCS | Mod: QW,S$GLB,, | Performed by: PHYSICIAN ASSISTANT

## 2020-06-29 PROCEDURE — 82962 GLUCOSE BLOOD TEST: CPT | Mod: S$GLB,,, | Performed by: PHYSICIAN ASSISTANT

## 2020-06-29 PROCEDURE — 99214 OFFICE O/P EST MOD 30 MIN: CPT | Mod: 25,S$GLB,, | Performed by: PHYSICIAN ASSISTANT

## 2020-06-29 PROCEDURE — 99214 PR OFFICE/OUTPT VISIT, EST, LEVL IV, 30-39 MIN: ICD-10-PCS | Mod: 25,S$GLB,, | Performed by: PHYSICIAN ASSISTANT

## 2020-06-29 PROCEDURE — 82962 POCT GLUCOSE, HAND-HELD DEVICE: ICD-10-PCS | Mod: S$GLB,,, | Performed by: PHYSICIAN ASSISTANT

## 2020-06-29 PROCEDURE — 81003 URINALYSIS AUTO W/O SCOPE: CPT | Mod: QW,S$GLB,, | Performed by: PHYSICIAN ASSISTANT

## 2020-06-29 RX ORDER — AZITHROMYCIN 1 G/1
1 POWDER, FOR SUSPENSION ORAL ONCE
Qty: 1 PACKET | Refills: 0 | Status: SHIPPED | OUTPATIENT
Start: 2020-06-29 | End: 2020-06-29

## 2020-06-29 NOTE — PROGRESS NOTES
"Subjective:       Patient ID: Anjum Cai Jr. is a 84 y.o. male.    Vitals:  height is 5' 6.5" (1.689 m) and weight is 86.6 kg (191 lb). His temperature is 98 °F (36.7 °C). His blood pressure is 121/68 and his pulse is 74. His respiration is 18 and oxygen saturation is 99%.     Chief Complaint: Diarrhea    84 yr old male c/o diarrhea onset 2 days ago. States 3 episodes of watery brown diarrhea per day. Denies bloody stool or black stool. Does not remember what he ate before symptoms started. Denies any diarrhea today. Denies abdominal pain, N/V, lightheadedness, presyncope, chest pain. Feels very well otherwise. Denies dysuria, frequency, urgency, hematuria.    Diarrhea   This is a new problem. Episode onset: 2 days. Episode frequency: 4 times per day. The problem has been unchanged. The stool consistency is described as watery. The patient states that diarrhea awakens him from sleep. Pertinent negatives include no abdominal pain, arthralgias, chills, coughing, fever, headaches, increased  flatus, myalgias, sweats, URI, vomiting or weight loss. He has tried anti-motility drug for the symptoms. The treatment provided no relief.       Constitution: Negative for chills, fatigue and fever.   HENT: Negative for congestion and sore throat.    Neck: Negative for painful lymph nodes.   Cardiovascular: Negative for chest pain and leg swelling.   Eyes: Negative for double vision and blurred vision.   Respiratory: Negative for cough and shortness of breath.    Gastrointestinal: Positive for diarrhea. Negative for abdominal pain, nausea and vomiting.   Genitourinary: Negative for dysuria, frequency and urgency.   Musculoskeletal: Negative for joint pain, joint swelling, muscle cramps and muscle ache.   Skin: Negative for color change, pale and rash.   Allergic/Immunologic: Negative for seasonal allergies.   Neurological: Negative for dizziness, history of vertigo, light-headedness, passing out and headaches. "   Hematologic/Lymphatic: Negative for swollen lymph nodes, easy bruising/bleeding and history of blood clots. Does not bruise/bleed easily.   Psychiatric/Behavioral: Negative for nervous/anxious, sleep disturbance and depression. The patient is not nervous/anxious.        Objective:      Physical Exam   Constitutional: He is oriented to person, place, and time. He appears well-developed.   Appears very well, resting comfortably in NAD, not toxic or ill appearing   HENT:   Head: Normocephalic and atraumatic.   Right Ear: External ear normal.   Left Ear: External ear normal.   Nose: Nose normal.   Mouth/Throat: Mucous membranes are normal.   Eyes: Conjunctivae and lids are normal.   Neck: Trachea normal and full passive range of motion without pain. Neck supple.   Cardiovascular: Normal rate, regular rhythm and normal heart sounds.   Pulmonary/Chest: Effort normal and breath sounds normal. No respiratory distress.   Abdominal: Soft. Normal appearance and bowel sounds are normal. He exhibits no distension, no abdominal bruit, no pulsatile midline mass and no mass. There is no abdominal tenderness.   There is no abdominal tenderness to light or deep palpation in any quadrant. No acute abdomen appreciated. Good bowel sounds.      No acute abdomen appreciated   Musculoskeletal: Normal range of motion.   Neurological: He is alert and oriented to person, place, and time. He has normal strength.   Skin: Skin is warm, dry, intact, not diaphoretic and not pale.   Psychiatric: His speech is normal and behavior is normal. Judgment and thought content normal.   Nursing note and vitals reviewed.    Results for orders placed or performed in visit on 06/29/20   POCT Urinalysis, Dipstick, Automated, W/O Scope   Result Value Ref Range    POC Blood, Urine Negative Negative    POC Bilirubin, Urine Negative Negative    POC Urobilinogen, Urine normal 0.3 - 2.2    POC Ketones, Urine Negative Negative    POC Protein, Urine Negative Negative     POC Nitrates, Urine Negative Negative    POC Glucose, Urine Negative Negative    pH, UA 5.0 5 - 8    POC Specific Gravity, Urine 1.020 1.003 - 1.029    POC Leukocytes, Urine Negative Negative   POCT Glucose, Hand-Held Device   Result Value Ref Range    POC Glucose 106 70 - 110 MG/DL           Assessment:       1. Stomach pain    2. Diarrhea, unspecified type        Plan:       Will tx empirically for infectious diarrhea with zithromax due to pt's age and hx of comorbidities  Has had zithromax in past with no bad rxns  Recommend fluids, rest, and f/u with pcp within few days for stool culture if this continues  Should go to ED for any worsening or changes whatsoever. All questions answered, he is happy with plan    Stomach pain  -     POCT Urinalysis, Dipstick, Automated, W/O Scope  -     POCT Glucose, Hand-Held Device    Diarrhea, unspecified type  -     bismuth subsalicylate 262 mg Tab; Take 2 tablets by mouth 3 (three) times daily as needed.  Dispense: 9 tablet; Refill: 0  -     POCT Glucose, Hand-Held Device  -     azithromycin (ZITHROMAX) 1 gram Pack; Take 1 g by mouth once. for 1 dose  Dispense: 1 packet; Refill: 0    Labs reviewed, pertinent imaging reviewed, previous medical records, medical history, surgical history, social history, family history reviewed.       Patient Instructions   Please see primary care today or tomorrow for follow up and stool studies  Please start antibiotics today  Fluids, rest, start bismuth prescription for diarrhea  Please eat simple foods, breads, water, rice, crackers  Avoid spicy foods or fatty foods or carbonated beverages      If you develop abdominal pain, new, worsening or concerning symptoms, lightheadedness or feel chest pain or as though you will pass out please go to the emergency department      Please arrange follow up with your primary medical clinic as soon as possible. You must understand that you've received an Urgent Care treatment only and that you may be  released before all of your medical problems are known or treated. You, the patient, will arrange for follow up as instructed. If your symptoms worsen or fail to improve you should go to the Emergency Room.  WE CANNOT RULE OUT ALL POSSIBLE CAUSES OF YOUR SYMPTOMS IN THE URGENT CARE SETTING PLEASE GO TO THE ER IF YOU FEELS YOUR CONDITION IS WORSENING OR YOU WOULD LIKE EMERGENT EVALUATION.    Please return here or go to the Emergency Department for any concerns or worsening of condition.  If you were prescribed antibiotics, please take them to completion.  If you were prescribed a narcotic medication, do not drive or operate heavy equipment or machinery while taking these medications.  Please follow up with your primary care doctor or specialist as needed.    If you  smoke, please stop smoking.        Treating Diarrhea    Diarrhea happens when you have loose, watery, or frequent bowel movements. It is a common problem with many causes. Most cases of diarrhea clear up on their own. But certain cases may need treatment. Be sure to see your healthcare provider if your symptoms do not improve within a few days.  Getting relief  Treatment of diarrhea depends on its cause. Diarrhea caused by bacterial or parasite infection is often treated with antibiotics. Diarrhea caused by other factors, such as a stomach virus, often improves with simple home treatment. The tips below may also help relieve your symptoms.  · Drink plenty of fluids. This helps prevent too much fluid loss (dehydration). Water, clear soups, and electrolyte solutions are good choices. Avoid alcohol, coffee, tea, and milk. These can irritate your intestines and make symptoms worse.  · Suck on ice chips if drinking makes you queasy.  · Return to your normal diet slowly. You may want to eat bland foods at first, such as rice and toast. Also, you may need to avoid certain foods for a while, such as dairy products. These can make symptoms worse. Ask  your healthcare provider if there are any other foods you should avoid.  · If you were prescribed antibiotics, take them as directed.  · Do not take anti-diarrhea medicines without asking your healthcare provider first.  Call your healthcare provider   Call your healthcare provider if you have any of the following:   · A fever of 100.4°F (38.0°C) or higher, or as directed by your healthcare provider  · Severe pain  · Worsening diarrhea or diarrhea for more than 2 days  · Bloody vomit or stool  · Signs of dehydration (dizziness, dry mouth and tongue, rapid pulse, dark urine)  Date Last Reviewed: 7/1/2016  © 4419-8271 [employer]. [address] [place] [address], [place] [address]. All rights reserved. This information is not intended as a substitute for professional medical care. Always follow your healthcare professional's instructions.

## 2020-06-29 NOTE — PATIENT INSTRUCTIONS
Please see primary care today or tomorrow for follow up and stool studies  Please start antibiotics today  Fluids, rest, start bismuth prescription for diarrhea  Please eat simple foods, breads, water, rice, crackers  Avoid spicy foods or fatty foods or carbonated beverages      If you develop abdominal pain, new, worsening or concerning symptoms, lightheadedness or feel chest pain or as though you will pass out please go to the emergency department      Please arrange follow up with your primary medical clinic as soon as possible. You must understand that you've received an Urgent Care treatment only and that you may be released before all of your medical problems are known or treated. You, the patient, will arrange for follow up as instructed. If your symptoms worsen or fail to improve you should go to the Emergency Room.  WE CANNOT RULE OUT ALL POSSIBLE CAUSES OF YOUR SYMPTOMS IN THE URGENT CARE SETTING PLEASE GO TO THE ER IF YOU FEELS YOUR CONDITION IS WORSENING OR YOU WOULD LIKE EMERGENT EVALUATION.    Please return here or go to the Emergency Department for any concerns or worsening of condition.  If you were prescribed antibiotics, please take them to completion.  If you were prescribed a narcotic medication, do not drive or operate heavy equipment or machinery while taking these medications.  Please follow up with your primary care doctor or specialist as needed.    If you  smoke, please stop smoking.        Treating Diarrhea    Diarrhea happens when you have loose, watery, or frequent bowel movements. It is a common problem with many causes. Most cases of diarrhea clear up on their own. But certain cases may need treatment. Be sure to see your healthcare provider if your symptoms do not improve within a few days.  Getting relief  Treatment of diarrhea depends on its cause. Diarrhea caused by bacterial or parasite infection is often treated with antibiotics. Diarrhea caused by other factors, such as a  stomach virus, often improves with simple home treatment. The tips below may also help relieve your symptoms.  · Drink plenty of fluids. This helps prevent too much fluid loss (dehydration). Water, clear soups, and electrolyte solutions are good choices. Avoid alcohol, coffee, tea, and milk. These can irritate your intestines and make symptoms worse.  · Suck on ice chips if drinking makes you queasy.  · Return to your normal diet slowly. You may want to eat bland foods at first, such as rice and toast. Also, you may need to avoid certain foods for a while, such as dairy products. These can make symptoms worse. Ask your healthcare provider if there are any other foods you should avoid.  · If you were prescribed antibiotics, take them as directed.  · Do not take anti-diarrhea medicines without asking your healthcare provider first.  Call your healthcare provider   Call your healthcare provider if you have any of the following:   · A fever of 100.4°F (38.0°C) or higher, or as directed by your healthcare provider  · Severe pain  · Worsening diarrhea or diarrhea for more than 2 days  · Bloody vomit or stool  · Signs of dehydration (dizziness, dry mouth and tongue, rapid pulse, dark urine)  Date Last Reviewed: 7/1/2016  © 7709-4132 [employer]. [address] [place] [address], [place] [address]. All rights reserved. This information is not intended as a substitute for professional medical care. Always follow your healthcare professional's instructions.

## 2020-07-01 DIAGNOSIS — E55.9 VITAMIN D DEFICIENCY: ICD-10-CM

## 2020-07-02 RX ORDER — ATORVASTATIN CALCIUM 40 MG/1
40 TABLET, FILM COATED ORAL DAILY
Qty: 90 TABLET | Refills: 3 | Status: ON HOLD | OUTPATIENT
Start: 2020-07-02 | End: 2020-08-14 | Stop reason: SDUPTHER

## 2020-07-02 RX ORDER — OMEPRAZOLE 40 MG/1
40 CAPSULE, DELAYED RELEASE ORAL DAILY
Qty: 90 CAPSULE | Refills: 3 | Status: SHIPPED | OUTPATIENT
Start: 2020-07-02

## 2020-07-02 RX ORDER — ERGOCALCIFEROL 1.25 MG/1
CAPSULE ORAL
Qty: 3 CAPSULE | Refills: 0 | Status: SHIPPED | OUTPATIENT
Start: 2020-07-02 | End: 2020-07-24

## 2020-07-06 DIAGNOSIS — E55.9 VITAMIN D DEFICIENCY: ICD-10-CM

## 2020-07-07 RX ORDER — ERGOCALCIFEROL 1.25 MG/1
CAPSULE ORAL
Qty: 3 CAPSULE | Refills: 0 | OUTPATIENT
Start: 2020-07-07

## 2020-07-07 NOTE — PROGRESS NOTES
Quick DC. Duplicate Request   Refill Authorization Note    is requesting a refill authorization.    Brief assessment and rationale for refill: QUICK DC: Duplicate               Medication reconciliation completed: No                          Comments:   Pended Medication(s)   Requested Prescriptions     Refused Prescriptions Disp Refills    ergocalciferol (ERGOCALCIFEROL) 50,000 unit Cap [Pharmacy Med Name: Vitamin D (Ergocalciferol) 35251 UNIT Oral Capsule] 3 capsule 0     Sig: TAKE 1 CAPSULE BY MOUTH ONCE EVERY MONTH     Refused By: SUMAN IRWIN     Reason for Refusal: Request already responded to by other means (e.g. phone or fax)        Duplicate Pended Encounter(s)/ Last Prescribed Details:    Ordering Encounter Report    Associated Reports   View Encounter          Note composed:11:56 AM 07/07/2020

## 2020-07-24 ENCOUNTER — PATIENT OUTREACH (OUTPATIENT)
Dept: ADMINISTRATIVE | Facility: HOSPITAL | Age: 85
End: 2020-07-24

## 2020-07-24 DIAGNOSIS — E55.9 VITAMIN D DEFICIENCY: ICD-10-CM

## 2020-07-24 RX ORDER — ERGOCALCIFEROL 1.25 MG/1
CAPSULE ORAL
Qty: 4 CAPSULE | Refills: 3 | Status: SHIPPED | OUTPATIENT
Start: 2020-07-24

## 2020-07-24 NOTE — PROGRESS NOTES
Refill Routing Note   Medication(s) are not appropriate for processing by [facility]:    - Outside of Protocol       Automatic Epic Protocol Generated Data:    Requested Prescriptions   Pending Prescriptions Disp Refills    ergocalciferol (ERGOCALCIFEROL) 50,000 unit Cap [Pharmacy Med Name: VITAMIN D2 50,000IU (ERGO) CAP RX] 3 capsule 0     Sig: TAKE 1 CAPSULE BY MOUTH ONCE EVERY 30 DAYS       Endocrinology:  Vitamins - Vitamin D Supplementation Failed - 7/24/2020  5:20 AM        Failed - Vitamin D is 20 or above and within 360 days     Vit D, 25-Hydroxy   Date Value Ref Range Status   10/16/2013 32 30 - 96 ng/mL Final     Comment:     Vitamin D deficiency.........<10 ng/mL                              Vitamin D insufficiency......10-29 ng/mL       Vitamin D sufficiency........> or equal to 30 ng/mL  Vitamin D toxicity............>100 ng/mL   03/21/2012 36 30 - 100 ng/mL Final     Comment:     Vitamin D, 25-Hydroxy:   Vitamin D deficiency <10 ng/mL   Vitamin D insufficiency 10-30 ng/mL   Vitamin D sufficiency >30 ng/mL   Vitamin D potential toxicity >100 ng/mL    09/20/2011 50 30 - 100 ng/mL Final     Comment:     Vitamin D, 25-Hydroxy:  Vitamin D deficiency <10 ng/mL  Vitamin D insufficiency 10-30 ng/mL  Vitamin D sufficiency >30 ng/mL  Vitamin D toxicity >100 ng/mL              Passed - Patient is at least 18 years old        Passed - Hypercalcemia is not present on problem list        Passed - Office visit in past 12 months or future 90 days.     Recent Outpatient Visits            3 weeks ago Stomach pain    [facility] Nuvia Carter PA-C    5 months ago Upper respiratory tract infection, unspecified type    [facility] Mike Morales MD    5 months ago Allergic contact dermatitis, unspecified trigger    [facility] Mike Morales MD    6 months ago Insulin dependent diabetes mellitus    [facility] Gavin JOHNSON  MD Michelle    6 months ago Benign prostatic hyperplasia with urinary obstruction    [facility] 4th Floor Alexis Thorpe MD          Future Appointments              In 5 days REILLY Guzman - Podiatry, [facility]    In 1 week Jose A Morgan OD [place], [place]    In 2 weeks MD Evans Parks - Internal Medicine, [facility]    In 1 month MD Evans Pacheco - Cardiology, [facility]                Passed - Ca in normal range and within 360 days     Calcium   Date Value Ref Range Status   01/24/2020 8.7 8.7 - 10.5 mg/dL Final   09/19/2019 9.6 8.7 - 10.5 mg/dL Final   09/10/2019 9.5 8.7 - 10.5 mg/dL Final                    Appointments [id number] or future 3m with PCP    Date Provider   Last Visit   1/24/2020 Gavin Martinez MD   Next Visit   8/7/2020 Gavin Martinez MD   ED visits in past 90 days: 0     Note composed:11:06 AM 07/24/2020

## 2020-07-24 NOTE — TELEPHONE ENCOUNTER
No new care gaps identified.  Powered by [employer]. Reference number: [id number]. 7/24/2020 5:21:35 AM CDT

## 2020-07-25 NOTE — TELEPHONE ENCOUNTER
No new care gaps identified.  Powered by [employer]. Reference number: [id number]. 7/25/2020 2:17:51 PM CDT

## 2020-07-27 ENCOUNTER — TELEPHONE (OUTPATIENT)
Dept: INTERNAL MEDICINE | Facility: CLINIC | Age: 85
End: 2020-07-27

## 2020-07-27 RX ORDER — INSULIN ASPART 100 [IU]/ML
INJECTION, SOLUTION INTRAVENOUS; SUBCUTANEOUS
Qty: 30 ML | Refills: 3 | Status: ON HOLD | OUTPATIENT
Start: 2020-07-27 | End: 2020-08-14 | Stop reason: SDUPTHER

## 2020-07-27 RX ORDER — INSULIN GLARGINE 100 [IU]/ML
INJECTION, SOLUTION SUBCUTANEOUS
Qty: 60 ML | Refills: 3 | Status: ON HOLD | OUTPATIENT
Start: 2020-07-27 | End: 2020-08-14 | Stop reason: SDUPTHER

## 2020-07-27 NOTE — TELEPHONE ENCOUNTER
----- Message from Emilia Carrillo sent at 7/27/2020 12:41 PM CDT -----  Contact: Pt-- [phone]  Type:  Needs Medical Advice    Who Called:  Pt    Symptoms (please be specific):  refill of insulin (LANTUS SOLOSTAR U-100 INSULIN) glargine 100 units/mL (3mL) SubQ pen    insulin aspart U-100 (NOVOLOG FLEXPEN U-100 INSULIN) 100 unit/mL (3 mL) InPn pen    Pharmacy name and phone #:  [facility] [address] [facility] [address] [facility] [phone] (Phone)  [phone] (Fax)    Would the patient rather a call back or a response via [facility]?call    Best Call Back Number:  [phone]    Additional Information:  Pt called to speak with nurse regarding his refill.

## 2020-07-28 ENCOUNTER — PATIENT OUTREACH (OUTPATIENT)
Dept: ADMINISTRATIVE | Facility: OTHER | Age: 85
End: 2020-07-28

## 2020-07-29 ENCOUNTER — OFFICE VISIT (OUTPATIENT)
Dept: PODIATRY | Facility: CLINIC | Age: 85
End: 2020-07-29
Payer: MEDICARE

## 2020-07-29 VITALS
DIASTOLIC BLOOD PRESSURE: 73 MMHG | WEIGHT: 183 LBS | SYSTOLIC BLOOD PRESSURE: 143 MMHG | HEART RATE: 69 BPM | BODY MASS INDEX: 29.09 KG/M2

## 2020-07-29 DIAGNOSIS — E11.42 DIABETIC POLYNEUROPATHY ASSOCIATED WITH TYPE 2 DIABETES MELLITUS: Primary | ICD-10-CM

## 2020-07-29 DIAGNOSIS — I87.8 VENOUS STASIS: ICD-10-CM

## 2020-07-29 PROCEDURE — 99213 OFFICE O/P EST LOW 20 MIN: CPT | Mod: 25,HCNC,S$GLB, | Performed by: PODIATRIST

## 2020-07-29 PROCEDURE — 99213 PR OFFICE/OUTPT VISIT, EST, LEVL III, 20-29 MIN: ICD-10-PCS | Mod: 25,HCNC,S$GLB, | Performed by: PODIATRIST

## 2020-07-29 PROCEDURE — 3078F DIAST BP <80 MM HG: CPT | Mod: HCNC,CPTII,S$GLB, | Performed by: PODIATRIST

## 2020-07-29 PROCEDURE — 3077F PR MOST RECENT SYSTOLIC BLOOD PRESSURE >= 140 MM HG: ICD-10-PCS | Mod: HCNC,CPTII,S$GLB, | Performed by: PODIATRIST

## 2020-07-29 PROCEDURE — 1159F MED LIST DOCD IN RCRD: CPT | Mod: HCNC,S$GLB,, | Performed by: PODIATRIST

## 2020-07-29 PROCEDURE — 3077F SYST BP >= 140 MM HG: CPT | Mod: HCNC,CPTII,S$GLB, | Performed by: PODIATRIST

## 2020-07-29 PROCEDURE — 3052F HG A1C>EQUAL 8.0%<EQUAL 9.0%: CPT | Mod: HCNC,CPTII,S$GLB, | Performed by: PODIATRIST

## 2020-07-29 PROCEDURE — 3052F PR MOST RECENT HEMOGLOBIN A1C LEVEL 8.0 - < 9.0%: ICD-10-PCS | Mod: HCNC,CPTII,S$GLB, | Performed by: PODIATRIST

## 2020-07-29 PROCEDURE — 1159F PR MEDICATION LIST DOCUMENTED IN MEDICAL RECORD: ICD-10-PCS | Mod: HCNC,S$GLB,, | Performed by: PODIATRIST

## 2020-07-29 PROCEDURE — 11721 DEBRIDE NAIL 6 OR MORE: CPT | Mod: 59,Q9,HCNC,S$GLB | Performed by: PODIATRIST

## 2020-07-29 PROCEDURE — 1101F PR PT FALLS ASSESS DOC 0-1 FALLS W/OUT INJ PAST YR: ICD-10-PCS | Mod: HCNC,CPTII,S$GLB, | Performed by: PODIATRIST

## 2020-07-29 PROCEDURE — 11056 PR TRIM BENIGN HYPERKERATOTIC SKIN LESION,2-4: ICD-10-PCS | Mod: Q9,HCNC,S$GLB, | Performed by: PODIATRIST

## 2020-07-29 PROCEDURE — 99999 PR PBB SHADOW E&M-EST. PATIENT-LVL III: ICD-10-PCS | Mod: PBBFAC,HCNC,, | Performed by: PODIATRIST

## 2020-07-29 PROCEDURE — 1101F PT FALLS ASSESS-DOCD LE1/YR: CPT | Mod: HCNC,CPTII,S$GLB, | Performed by: PODIATRIST

## 2020-07-29 PROCEDURE — 99999 PR PBB SHADOW E&M-EST. PATIENT-LVL III: CPT | Mod: PBBFAC,HCNC,, | Performed by: PODIATRIST

## 2020-07-29 PROCEDURE — 11721 PR DEBRIDEMENT OF NAILS, 6 OR MORE: ICD-10-PCS | Mod: 59,Q9,HCNC,S$GLB | Performed by: PODIATRIST

## 2020-07-29 PROCEDURE — 3078F PR MOST RECENT DIASTOLIC BLOOD PRESSURE < 80 MM HG: ICD-10-PCS | Mod: HCNC,CPTII,S$GLB, | Performed by: PODIATRIST

## 2020-07-29 PROCEDURE — 11056 PARNG/CUTG B9 HYPRKR LES 2-4: CPT | Mod: Q9,HCNC,S$GLB, | Performed by: PODIATRIST

## 2020-07-29 NOTE — LETTER
July 31, 2020      Gavin Martinez MD  [address]  [place] [address]           [facility] - Podiatry  [address]  [place] [address]  Phone: [phone]          Patient: Anjum Cai Jr.   MR Number: [id number]   YOB: 1935   Date of Visit: 7/29/2020       Dear Dr. Gavin Martinez:    Thank you for referring Anjum Cai to me for evaluation. Attached you will find relevant portions of my assessment and plan of care.    If you have questions, please do not hesitate to call me. I look forward to following Anjum Cai along with you.    Sincerely,    Ellis Paula, DPVICKI    Enclosure  CC:  No Recipients    If you would like to receive this communication electronically, please contact [email or web address] or [phone] to request more information on [employer] access.    For providers and/or their staff who would like to refer a patient to [facility], please contact us through our one-stop-shop provider referral line, [facility], at [phone].    If you feel you have received this communication in error or would no longer like to receive these types of communications, please e-mail [email or web address]

## 2020-07-31 ENCOUNTER — TELEPHONE (OUTPATIENT)
Dept: CARDIOLOGY | Facility: CLINIC | Age: 85
End: 2020-07-31

## 2020-07-31 NOTE — TELEPHONE ENCOUNTER
----- Message from Joie Nguyễn sent at 7/31/2020 11:15 AM CDT -----  Regarding: Missed call  Pt daughter Maria Elena [phone] returning missed call.    Thanks

## 2020-07-31 NOTE — PROGRESS NOTES
Subjective:      Patient ID: Anjum Cai Jr. is a 84 y.o. male.    Chief Complaint: Routine Foot Care (PCP 1/24/2020) and Diabetes Mellitus    Anjum is a 84 y.o. male who presents to the clinic for evaluation and treatment of high risk feet. Anjum has a past medical history of Anemia (2/26/2018), Arthritis, BPH (benign prostatic hyperplasia), Cataract, Colon polyp (11/18/2015), Colon polyps, Coronary artery disease, DR (diabetic retinopathy), Dyslipidemia, Elevated PSA, Goiter, nontoxic, multinodular, Headaches, cluster, Hypertension, Kidney stones, Lump in the testicle, Prostate cancer, Rotator cuff tendinitis, Sleep apnea with use of continuous positive airway pressure (CPAP), and Type II or unspecified type diabetes mellitus with other specified manifestations, uncontrolled. The patient's chief complaint is long, thick toenails. Gradual onset, worsening over past several weeks, aggravated by increased weight bearing, shoe gear, pressure.  Periodic debridement helps symptoms.  He is in need of new diabetic shoe prescription.    PCP: Gavin Martinez MD    Date Last Seen by PCP:   Chief Complaint   Patient presents with    Routine Foot Care     PCP 1/24/2020    Diabetes Mellitus         Current shoe gear:  Affected Foot: Casual shoes     Unaffected Foot: Casual shoes    Hemoglobin A1C   Date Value Ref Range Status   01/24/2020 9.0 (H) 4.0 - 5.6 % Final     Comment:     ADA Screening Guidelines:  5.7-6.4%  Consistent with prediabetes  >or=6.5%  Consistent with diabetes  High levels of fetal hemoglobin interfere with the HbA1C  assay. Heterozygous hemoglobin variants (HbS, HgC, etc)do  not significantly interfere with this assay.   However, presence of multiple variants may affect accuracy.     09/19/2019 7.5 (H) 4.0 - 5.6 % Final     Comment:     ADA Screening Guidelines:  5.7-6.4%  Consistent with prediabetes  >or=6.5%  Consistent with diabetes  High levels of fetal hemoglobin interfere with the HbA1C  assay.  Heterozygous hemoglobin variants (HbS, HgC, etc)do  not significantly interfere with this assay.   However, presence of multiple variants may affect accuracy.     08/20/2019 7.7 (H) 4.0 - 5.6 % Final     Comment:     ADA Screening Guidelines:  5.7-6.4%  Consistent with prediabetes  >or=6.5%  Consistent with diabetes  High levels of fetal hemoglobin interfere with the HbA1C  assay. Heterozygous hemoglobin variants (HbS, HgC, etc)do  not significantly interfere with this assay.   However, presence of multiple variants may affect accuracy.         Review of Systems   Constitution: Negative for chills, diaphoresis, fever, malaise/fatigue and night sweats.   Cardiovascular: Positive for leg swelling. Negative for claudication, cyanosis and syncope.   Skin: Positive for itching and nail changes. Negative for color change, dry skin, rash, suspicious lesions and unusual hair distribution.   Musculoskeletal: Negative for falls, joint pain, joint swelling, muscle cramps, muscle weakness and stiffness.   Gastrointestinal: Negative for constipation, diarrhea, nausea and vomiting.   Neurological: Positive for paresthesias and sensory change. Negative for brief paralysis, disturbances in coordination, focal weakness, numbness and tremors.           Objective:      Physical Exam  Constitutional:       Appearance: He is well-developed. He is not diaphoretic.      Comments: Oriented to time, place, and person.   Cardiovascular:      Pulses:           Dorsalis pedis pulses are 1+ on the right side and 1+ on the left side.        Posterior tibial pulses are 1+ on the right side and 1+ on the left side.      Comments: Capillary fill time 3-5 seconds.  All toes warm to touch.      <2+ pitting lower extremity edema bilateral.    Negative elevational pallor and dependent rubor bilateral.    Musculoskeletal:      Comments: Normal angle, base, station of gait. Decreased stride length, early heel off, moderately propulsive toe off  bilateral.    All ten toes without clubbing, cyanosis, or signs of ischemia.      Ankle dorsiflexion decreased at <10 degrees bilateral with moderate increase with knee flexion bilateral.      No pain to palpation bilateral lower extremities.      Range of motion, stability, muscle strength, and muscle tone are age and health appropriate normal bilateral feet and legs.       Lymphadenopathy:      Comments: Negative lymphadenopathy bilateral popliteal fossa and tarsal tunnel.  Negative lymphangitic streaking bilateral foot/ankle bilateral.     Skin:     General: Skin is warm and dry.      Coloration: Skin is not pale.      Findings: No abrasion, bruising, burn, ecchymosis, erythema, laceration, lesion, petechiae or rash.      Nails: There is no clubbing.        Comments: Dry scale with superficial flakes over an erythematous base on arches and between toes both feet without ulceration, drainage, pus, tracking, fluctuance, malodor, or cardinal signs infection.      Skin thin, atrophic, with decreased density and distribution of pedal hair bilateral, but without hyperpigmentation, meghna discoloration,  ulcers, masses, nodules or cords palpated bilateral feet and legs.      Toenails 1st,bilateral are hypertrophic thickened 2-3 mm, dystrophic, discolored tanish brown with tan, gray crumbly subungual debris.  Neatly trimmed and nontender to distal nail plate pressure, without periungual skin abnormality of each.    History preulcerative callus dr. Thayer note 10/2017.  Shoes preventing now.   Neurological:      Mental Status: He is alert and oriented to person, place, and time. He is not disoriented.      Sensory: Sensory deficit present.      Motor: No tremor, atrophy or abnormal muscle tone.      Deep Tendon Reflexes:      Reflex Scores:       Patellar reflexes are 2+ on the right side and 2+ on the left side.       Achilles reflexes are 2+ on the right side and 2+ on the left side.     Comments: Decreased/absent  vibratory sensation bilateral feet to 128Hz tuning fork.    Paresthesias, and burning bilateral feet with no clearly identified trigger or source.       Psychiatric:         Behavior: Behavior is cooperative.               Assessment:       Encounter Diagnoses   Name Primary?    Diabetic polyneuropathy associated with type 2 diabetes mellitus Yes    Venous stasis          Plan:       Anjum was seen today for routine foot care and diabetes mellitus.    Diagnoses and all orders for this visit:    Diabetic polyneuropathy associated with type 2 diabetes mellitus  -     DIABETIC SHOES FOR HOME USE    Venous stasis      I counseled the patient on his conditions, their implications and medical management.    Routine Foot Care    Performed by:  Ellis Paula. DPM  Authorized by:  Patient     Consent Done?:  Yes (Verbal)     Nail Care Type:  Debride  Location(s): All  (Left 1st Toe, Left 3rd Toe, Left 2nd Toe, Left 4th Toe, Left 5th Toe, Right 1st Toe, Right 2nd Toe, Right 3rd Toe, Right 4th Toe and Right 5th Toe)  Patient tolerance:  Patient tolerated the procedure well with no immediate complications     With patient's permission, the toenails mentioned above were aggressively reduced and debrided using a nail nipper, removing all offending nail and debris. The patient will continue to monitor the areas daily, inspect the feet, wear protective shoe gear when ambulatory, and moisturizer to maintain skin integrity.      Callus Care Type: Debride    With patient's permission, the calluses/hyperkeratotic lesions mentioned above were aggressively reduced and debrided using a number 15 blade. The patient will continue to monitor the areas daily, inspect the feet, wear protective shoe gear when ambulatory, and moisturizer to maintain skin integrity.         - Shoe inspection. Diabetic Foot Education. Patient reminded of the importance of good nutrition and blood sugar control to help prevent podiatric complications of  diabetes. Patient instructed on proper foot hygeine. We discussed wearing proper shoe gear, daily foot inspections, never walking without protective shoe gear, never putting sharp instruments to feet, routine podiatric visits at least annually.

## 2020-08-05 ENCOUNTER — OFFICE VISIT (OUTPATIENT)
Dept: OPTOMETRY | Facility: CLINIC | Age: 85
End: 2020-08-05
Payer: COMMERCIAL

## 2020-08-05 DIAGNOSIS — H52.7 REFRACTIVE ERROR: ICD-10-CM

## 2020-08-05 DIAGNOSIS — E11.9 TYPE 2 DIABETES MELLITUS WITHOUT RETINOPATHY: Primary | ICD-10-CM

## 2020-08-05 DIAGNOSIS — E11.36 TYPE 2 DIABETES MELLITUS WITH CATARACT: ICD-10-CM

## 2020-08-05 PROCEDURE — 92014 COMPRE OPH EXAM EST PT 1/>: CPT | Mod: S$GLB,,, | Performed by: OPTOMETRIST

## 2020-08-05 PROCEDURE — 92014 PR EYE EXAM, EST PATIENT,COMPREHESV: ICD-10-PCS | Mod: S$GLB,,, | Performed by: OPTOMETRIST

## 2020-08-05 PROCEDURE — 92015 DETERMINE REFRACTIVE STATE: CPT | Mod: S$GLB,,, | Performed by: OPTOMETRIST

## 2020-08-05 PROCEDURE — 99999 PR PBB SHADOW E&M-EST. PATIENT-LVL II: ICD-10-PCS | Mod: PBBFAC,,, | Performed by: OPTOMETRIST

## 2020-08-05 PROCEDURE — 92015 PR REFRACTION: ICD-10-PCS | Mod: S$GLB,,, | Performed by: OPTOMETRIST

## 2020-08-05 PROCEDURE — 99999 PR PBB SHADOW E&M-EST. PATIENT-LVL II: CPT | Mod: PBBFAC,,, | Performed by: OPTOMETRIST

## 2020-08-05 NOTE — PROGRESS NOTES
HPI     No vision complaints  Hemoglobin A1C       Date                     Value               Ref Range             Status                01/24/2020               9.0 (H)             4.0 - 5.6 %           Final              Comment:    ADA Screening Guidelines:  5.7-6.4%  Consistent with   prediabetes  >or=6.5%  Consistent with diabetes  High levels of fetal   hemoglobin interfere with the HbA1C  assay. Heterozygous hemoglobin   variants (HbS, HgC, etc)do  not significantly interfere with this assay.     However, presence of multiple variants may affect accuracy.         09/19/2019               7.5 (H)             4.0 - 5.6 %           Final              Comment:    ADA Screening Guidelines:  5.7-6.4%  Consistent with   prediabetes  >or=6.5%  Consistent with diabetes  High levels of fetal   hemoglobin interfere with the HbA1C  assay. Heterozygous hemoglobin   variants (HbS, HgC, etc)do  not significantly interfere with this assay.     However, presence of multiple variants may affect accuracy.         08/20/2019               7.7 (H)             4.0 - 5.6 %           Final              Comment:    ADA Screening Guidelines:  5.7-6.4%  Consistent with   prediabetes  >or=6.5%  Consistent with diabetes  High levels of fetal   hemoglobin interfere with the HbA1C  assay. Heterozygous hemoglobin   variants (HbS, HgC, etc)do  not significantly interfere with this assay.     However, presence of multiple variants may affect accuracy.    ----------    Last edited by Jose A Morgan, OD on 8/5/2020  8:19 AM. (History)            Assessment /Plan     For exam results, see Encounter Report.    Type 2 diabetes mellitus without retinopathy    Type 2 diabetes mellitus with cataract    Refractive error      1. No diabetic retinopathy, no csme. Return in 1 year for dilated eye exam.  2. Educated pt on presence of cataracts and effects on vision. No surgery at this time. Recheck in one year.  3. New Spec Rx given. Different  lens options discussed with patient. RTC 1 year full exam.

## 2020-08-06 ENCOUNTER — HOSPITAL ENCOUNTER (INPATIENT)
Facility: HOSPITAL | Age: 85
LOS: 8 days | Discharge: HOME HEALTH | DRG: 247 | End: 2020-08-14
Attending: EMERGENCY MEDICINE | Admitting: INTERNAL MEDICINE
Payer: MEDICARE

## 2020-08-06 DIAGNOSIS — I21.4 NSTEMI (NON-ST ELEVATION MYOCARDIAL INFARCTION): ICD-10-CM

## 2020-08-06 DIAGNOSIS — I10 ESSENTIAL HYPERTENSION: ICD-10-CM

## 2020-08-06 DIAGNOSIS — R78.81 BACTEREMIA: ICD-10-CM

## 2020-08-06 DIAGNOSIS — R74.8 ELEVATED LIVER ENZYMES: ICD-10-CM

## 2020-08-06 DIAGNOSIS — R17 JAUNDICE: ICD-10-CM

## 2020-08-06 DIAGNOSIS — I21.4 NSTEMI (NON-ST ELEVATED MYOCARDIAL INFARCTION): ICD-10-CM

## 2020-08-06 DIAGNOSIS — K80.50 CHOLEDOCHOLITHIASIS: Primary | ICD-10-CM

## 2020-08-06 DIAGNOSIS — R78.81 GRAM-NEGATIVE BACTEREMIA: ICD-10-CM

## 2020-08-06 DIAGNOSIS — D72.829 LEUKOCYTOSIS, UNSPECIFIED TYPE: ICD-10-CM

## 2020-08-06 DIAGNOSIS — R07.9 CHEST PAIN: ICD-10-CM

## 2020-08-06 DIAGNOSIS — R07.89 CHEST TIGHTNESS: ICD-10-CM

## 2020-08-06 DIAGNOSIS — I20.89 ANGINA AT REST: ICD-10-CM

## 2020-08-06 DIAGNOSIS — I21.4 NON-ST ELEVATION MYOCARDIAL INFARCTION (NSTEMI): ICD-10-CM

## 2020-08-06 DIAGNOSIS — E11.9 TYPE 2 DIABETES MELLITUS WITHOUT RETINOPATHY: ICD-10-CM

## 2020-08-06 PROBLEM — I20.0 UNSTABLE ANGINA: Status: ACTIVE | Noted: 2020-08-06

## 2020-08-06 LAB
ABO + RH BLD: NORMAL
ALBUMIN SERPL BCP-MCNC: 2.1 G/DL (ref 3.5–5.2)
ALP SERPL-CCNC: 834 U/L (ref 55–135)
ALT SERPL W/O P-5'-P-CCNC: 58 U/L (ref 10–44)
ANION GAP SERPL CALC-SCNC: 7 MMOL/L (ref 8–16)
APTT BLDCRRT: 33.7 SEC (ref 21–32)
AST SERPL-CCNC: 67 U/L (ref 10–40)
BASOPHILS # BLD AUTO: 0.01 K/UL (ref 0–0.2)
BASOPHILS NFR BLD: 0.1 % (ref 0–1.9)
BILIRUB SERPL-MCNC: 1.9 MG/DL (ref 0.1–1)
BLD GP AB SCN CELLS X3 SERPL QL: NORMAL
BNP SERPL-MCNC: 100 PG/ML (ref 0–99)
BUN SERPL-MCNC: 26 MG/DL (ref 8–23)
CALCIUM SERPL-MCNC: 9.3 MG/DL (ref 8.7–10.5)
CHLORIDE SERPL-SCNC: 105 MMOL/L (ref 95–110)
CHOLEST SERPL-MCNC: 122 MG/DL (ref 120–199)
CHOLEST/HDLC SERPL: 4.5 {RATIO} (ref 2–5)
CO2 SERPL-SCNC: 22 MMOL/L (ref 23–29)
CREAT SERPL-MCNC: 1.5 MG/DL (ref 0.5–1.4)
DIFFERENTIAL METHOD: ABNORMAL
EOSINOPHIL # BLD AUTO: 0 K/UL (ref 0–0.5)
EOSINOPHIL NFR BLD: 0.3 % (ref 0–8)
ERYTHROCYTE [DISTWIDTH] IN BLOOD BY AUTOMATED COUNT: 16.2 % (ref 11.5–14.5)
EST. GFR  (AFRICAN AMERICAN): 49.4 ML/MIN/1.73 M^2
EST. GFR  (NON AFRICAN AMERICAN): 42.7 ML/MIN/1.73 M^2
ESTIMATED AVG GLUCOSE: 174 MG/DL (ref 68–131)
GLUCOSE SERPL-MCNC: 211 MG/DL (ref 70–110)
HBA1C MFR BLD HPLC: 7.7 % (ref 4–5.6)
HCT VFR BLD AUTO: 31.2 % (ref 40–54)
HDLC SERPL-MCNC: 27 MG/DL (ref 40–75)
HDLC SERPL: 22.1 % (ref 20–50)
HGB BLD-MCNC: 9.9 G/DL (ref 14–18)
IMM GRANULOCYTES # BLD AUTO: 0.08 K/UL (ref 0–0.04)
IMM GRANULOCYTES NFR BLD AUTO: 0.8 % (ref 0–0.5)
INR PPP: 1.1 (ref 0.8–1.2)
LDLC SERPL CALC-MCNC: 75.6 MG/DL (ref 63–159)
LYMPHOCYTES # BLD AUTO: 0.7 K/UL (ref 1–4.8)
LYMPHOCYTES NFR BLD: 7.5 % (ref 18–48)
MCH RBC QN AUTO: 29.4 PG (ref 27–31)
MCHC RBC AUTO-ENTMCNC: 31.7 G/DL (ref 32–36)
MCV RBC AUTO: 93 FL (ref 82–98)
MONOCYTES # BLD AUTO: 0.7 K/UL (ref 0.3–1)
MONOCYTES NFR BLD: 6.8 % (ref 4–15)
NEUTROPHILS # BLD AUTO: 8.1 K/UL (ref 1.8–7.7)
NEUTROPHILS NFR BLD: 84.5 % (ref 38–73)
NONHDLC SERPL-MCNC: 95 MG/DL
NRBC BLD-RTO: 0 /100 WBC
PLATELET # BLD AUTO: 217 K/UL (ref 150–350)
PMV BLD AUTO: 11 FL (ref 9.2–12.9)
POCT GLUCOSE: 132 MG/DL (ref 70–110)
POCT GLUCOSE: 134 MG/DL (ref 70–110)
POTASSIUM SERPL-SCNC: 3.9 MMOL/L (ref 3.5–5.1)
PROT SERPL-MCNC: 8 G/DL (ref 6–8.4)
PROTHROMBIN TIME: 12.3 SEC (ref 9–12.5)
RBC # BLD AUTO: 3.37 M/UL (ref 4.6–6.2)
SARS-COV-2 RDRP RESP QL NAA+PROBE: NEGATIVE
SODIUM SERPL-SCNC: 134 MMOL/L (ref 136–145)
TRIGL SERPL-MCNC: 97 MG/DL (ref 30–150)
TROPONIN I SERPL DL<=0.01 NG/ML-MCNC: 0.03 NG/ML (ref 0–0.03)
TROPONIN I SERPL DL<=0.01 NG/ML-MCNC: 0.03 NG/ML (ref 0–0.03)
TROPONIN I SERPL DL<=0.01 NG/ML-MCNC: 0.04 NG/ML (ref 0–0.03)
TROPONIN I SERPL DL<=0.01 NG/ML-MCNC: 0.06 NG/ML (ref 0–0.03)
TROPONIN I SERPL DL<=0.01 NG/ML-MCNC: 0.13 NG/ML (ref 0–0.03)
WBC # BLD AUTO: 9.59 K/UL (ref 3.9–12.7)

## 2020-08-06 PROCEDURE — 84484 ASSAY OF TROPONIN QUANT: CPT | Mod: HCNC

## 2020-08-06 PROCEDURE — 63600175 PHARM REV CODE 636 W HCPCS: Mod: HCNC | Performed by: INTERNAL MEDICINE

## 2020-08-06 PROCEDURE — 99152 PR MOD CONSCIOUS SEDATION, SAME PHYS, 5+ YRS, FIRST 15 MIN: ICD-10-PCS | Mod: HCNC,,, | Performed by: INTERNAL MEDICINE

## 2020-08-06 PROCEDURE — C1725 CATH, TRANSLUMIN NON-LASER: HCPCS | Mod: HCNC | Performed by: INTERNAL MEDICINE

## 2020-08-06 PROCEDURE — 92928 PR STENT: ICD-10-PCS | Mod: RC,HCNC,, | Performed by: INTERNAL MEDICINE

## 2020-08-06 PROCEDURE — 85610 PROTHROMBIN TIME: CPT | Mod: HCNC

## 2020-08-06 PROCEDURE — 83880 ASSAY OF NATRIURETIC PEPTIDE: CPT | Mod: HCNC

## 2020-08-06 PROCEDURE — 85025 COMPLETE CBC W/AUTO DIFF WBC: CPT | Mod: HCNC

## 2020-08-06 PROCEDURE — C1874 STENT, COATED/COV W/DEL SYS: HCPCS | Mod: HCNC | Performed by: INTERNAL MEDICINE

## 2020-08-06 PROCEDURE — 99223 PR INITIAL HOSPITAL CARE,LEVL III: ICD-10-PCS | Mod: HCNC,GC,, | Performed by: INTERNAL MEDICINE

## 2020-08-06 PROCEDURE — 99152 MOD SED SAME PHYS/QHP 5/>YRS: CPT | Mod: HCNC,,, | Performed by: INTERNAL MEDICINE

## 2020-08-06 PROCEDURE — 99223 1ST HOSP IP/OBS HIGH 75: CPT | Mod: HCNC,GC,, | Performed by: INTERNAL MEDICINE

## 2020-08-06 PROCEDURE — 93005 ELECTROCARDIOGRAM TRACING: CPT | Mod: HCNC

## 2020-08-06 PROCEDURE — 93010 EKG 12-LEAD: ICD-10-PCS | Mod: 76,HCNC,, | Performed by: INTERNAL MEDICINE

## 2020-08-06 PROCEDURE — 92928 PRQ TCAT PLMT NTRAC ST 1 LES: CPT | Mod: RC,HCNC,, | Performed by: INTERNAL MEDICINE

## 2020-08-06 PROCEDURE — U0002 COVID-19 LAB TEST NON-CDC: HCPCS | Mod: HCNC

## 2020-08-06 PROCEDURE — 93010 ELECTROCARDIOGRAM REPORT: CPT | Mod: 76,HCNC,, | Performed by: INTERNAL MEDICINE

## 2020-08-06 PROCEDURE — 86901 BLOOD TYPING SEROLOGIC RH(D): CPT | Mod: HCNC

## 2020-08-06 PROCEDURE — 25000003 PHARM REV CODE 250: Mod: HCNC | Performed by: STUDENT IN AN ORGANIZED HEALTH CARE EDUCATION/TRAINING PROGRAM

## 2020-08-06 PROCEDURE — C1887 CATHETER, GUIDING: HCPCS | Mod: HCNC | Performed by: INTERNAL MEDICINE

## 2020-08-06 PROCEDURE — 93458 L HRT ARTERY/VENTRICLE ANGIO: CPT | Mod: 59,HCNC | Performed by: INTERNAL MEDICINE

## 2020-08-06 PROCEDURE — 85730 THROMBOPLASTIN TIME PARTIAL: CPT | Mod: HCNC

## 2020-08-06 PROCEDURE — C1769 GUIDE WIRE: HCPCS | Mod: HCNC | Performed by: INTERNAL MEDICINE

## 2020-08-06 PROCEDURE — 84484 ASSAY OF TROPONIN QUANT: CPT | Mod: 91,HCNC

## 2020-08-06 PROCEDURE — 99152 MOD SED SAME PHYS/QHP 5/>YRS: CPT | Mod: HCNC | Performed by: INTERNAL MEDICINE

## 2020-08-06 PROCEDURE — 63600175 PHARM REV CODE 636 W HCPCS: Mod: HCNC | Performed by: STUDENT IN AN ORGANIZED HEALTH CARE EDUCATION/TRAINING PROGRAM

## 2020-08-06 PROCEDURE — 99223 PR INITIAL HOSPITAL CARE,LEVL III: ICD-10-PCS | Mod: AI,HCNC,, | Performed by: INTERNAL MEDICINE

## 2020-08-06 PROCEDURE — 99284 PR EMERGENCY DEPT VISIT,LEVEL IV: ICD-10-PCS | Mod: HCNC,GC,, | Performed by: INTERNAL MEDICINE

## 2020-08-06 PROCEDURE — 93010 EKG 12-LEAD: ICD-10-PCS | Mod: HCNC,,, | Performed by: INTERNAL MEDICINE

## 2020-08-06 PROCEDURE — 93010 ELECTROCARDIOGRAM REPORT: CPT | Mod: HCNC,,, | Performed by: INTERNAL MEDICINE

## 2020-08-06 PROCEDURE — 93458 L HRT ARTERY/VENTRICLE ANGIO: CPT | Mod: 26,59,51,HCNC | Performed by: INTERNAL MEDICINE

## 2020-08-06 PROCEDURE — 99291 PR CRITICAL CARE, E/M 30-74 MINUTES: ICD-10-PCS | Mod: HCNC,,, | Performed by: EMERGENCY MEDICINE

## 2020-08-06 PROCEDURE — 99285 EMERGENCY DEPT VISIT HI MDM: CPT | Mod: 25,HCNC

## 2020-08-06 PROCEDURE — 25500020 PHARM REV CODE 255: Mod: HCNC | Performed by: INTERNAL MEDICINE

## 2020-08-06 PROCEDURE — C9399 UNCLASSIFIED DRUGS OR BIOLOG: HCPCS | Mod: HCNC | Performed by: STUDENT IN AN ORGANIZED HEALTH CARE EDUCATION/TRAINING PROGRAM

## 2020-08-06 PROCEDURE — 99284 EMERGENCY DEPT VISIT MOD MDM: CPT | Mod: HCNC,GC,, | Performed by: INTERNAL MEDICINE

## 2020-08-06 PROCEDURE — 96365 THER/PROPH/DIAG IV INF INIT: CPT | Mod: HCNC

## 2020-08-06 PROCEDURE — 83036 HEMOGLOBIN GLYCOSYLATED A1C: CPT | Mod: HCNC

## 2020-08-06 PROCEDURE — 80061 LIPID PANEL: CPT | Mod: HCNC

## 2020-08-06 PROCEDURE — C1894 INTRO/SHEATH, NON-LASER: HCPCS | Mod: HCNC | Performed by: INTERNAL MEDICINE

## 2020-08-06 PROCEDURE — C9600 PERC DRUG-EL COR STENT SING: HCPCS | Mod: RC,HCNC | Performed by: INTERNAL MEDICINE

## 2020-08-06 PROCEDURE — 99223 1ST HOSP IP/OBS HIGH 75: CPT | Mod: AI,HCNC,, | Performed by: INTERNAL MEDICINE

## 2020-08-06 PROCEDURE — 25000242 PHARM REV CODE 250 ALT 637 W/ HCPCS: Mod: HCNC | Performed by: EMERGENCY MEDICINE

## 2020-08-06 PROCEDURE — 99291 CRITICAL CARE FIRST HOUR: CPT | Mod: HCNC,,, | Performed by: EMERGENCY MEDICINE

## 2020-08-06 PROCEDURE — 25000003 PHARM REV CODE 250: Mod: HCNC | Performed by: INTERNAL MEDICINE

## 2020-08-06 PROCEDURE — 27201423 OPTIME MED/SURG SUP & DEVICES STERILE SUPPLY: Mod: HCNC | Performed by: INTERNAL MEDICINE

## 2020-08-06 PROCEDURE — 80053 COMPREHEN METABOLIC PANEL: CPT | Mod: HCNC

## 2020-08-06 PROCEDURE — 99153 MOD SED SAME PHYS/QHP EA: CPT | Mod: HCNC | Performed by: INTERNAL MEDICINE

## 2020-08-06 PROCEDURE — 20600001 HC STEP DOWN PRIVATE ROOM: Mod: HCNC

## 2020-08-06 PROCEDURE — 36415 COLL VENOUS BLD VENIPUNCTURE: CPT | Mod: HCNC

## 2020-08-06 PROCEDURE — 93458 PR CATH PLACE/CORON ANGIO, IMG SUPER/INTERP,W LEFT HEART VENTRICULOGRAPHY: ICD-10-PCS | Mod: 26,59,51,HCNC | Performed by: INTERNAL MEDICINE

## 2020-08-06 DEVICE — STENT RONYX35038UX RESOLUTE ONYX 3.50X38
Type: IMPLANTABLE DEVICE | Site: CORONARY | Status: FUNCTIONAL
Brand: RESOLUTE ONYX™

## 2020-08-06 RX ORDER — ERGOCALCIFEROL 1.25 MG/1
50000 CAPSULE ORAL
Status: DISCONTINUED | OUTPATIENT
Start: 2020-08-07 | End: 2020-08-14 | Stop reason: HOSPADM

## 2020-08-06 RX ORDER — NAPROXEN SODIUM 220 MG/1
81 TABLET, FILM COATED ORAL DAILY
Status: DISCONTINUED | OUTPATIENT
Start: 2020-08-07 | End: 2020-08-14 | Stop reason: HOSPADM

## 2020-08-06 RX ORDER — PANTOPRAZOLE SODIUM 40 MG/1
40 TABLET, DELAYED RELEASE ORAL DAILY
Status: DISCONTINUED | OUTPATIENT
Start: 2020-08-06 | End: 2020-08-14 | Stop reason: HOSPADM

## 2020-08-06 RX ORDER — MIDAZOLAM HYDROCHLORIDE 1 MG/ML
INJECTION, SOLUTION INTRAMUSCULAR; INTRAVENOUS
Status: DISCONTINUED | OUTPATIENT
Start: 2020-08-06 | End: 2020-08-06 | Stop reason: HOSPADM

## 2020-08-06 RX ORDER — OMEGA-3-ACID ETHYL ESTERS 1 G/1
1 CAPSULE, LIQUID FILLED ORAL DAILY
Status: DISCONTINUED | OUTPATIENT
Start: 2020-08-06 | End: 2020-08-14 | Stop reason: HOSPADM

## 2020-08-06 RX ORDER — HEPARIN SODIUM,PORCINE/D5W 25000/250
12 INTRAVENOUS SOLUTION INTRAVENOUS CONTINUOUS
Status: DISCONTINUED | OUTPATIENT
Start: 2020-08-06 | End: 2020-08-06

## 2020-08-06 RX ORDER — INSULIN ASPART 100 [IU]/ML
0-5 INJECTION, SOLUTION INTRAVENOUS; SUBCUTANEOUS
Status: DISCONTINUED | OUTPATIENT
Start: 2020-08-06 | End: 2020-08-14 | Stop reason: HOSPADM

## 2020-08-06 RX ORDER — NITROGLYCERIN 0.4 MG/1
0.4 TABLET SUBLINGUAL EVERY 5 MIN PRN
Status: DISCONTINUED | OUTPATIENT
Start: 2020-08-06 | End: 2020-08-07

## 2020-08-06 RX ORDER — SODIUM CHLORIDE 9 MG/ML
3 INJECTION, SOLUTION INTRAVENOUS CONTINUOUS
Status: ACTIVE | OUTPATIENT
Start: 2020-08-06 | End: 2020-08-06

## 2020-08-06 RX ORDER — ASPIRIN 325 MG
325 TABLET ORAL
Status: ACTIVE | OUTPATIENT
Start: 2020-08-06 | End: 2020-08-06

## 2020-08-06 RX ORDER — ATORVASTATIN CALCIUM 20 MG/1
80 TABLET, FILM COATED ORAL DAILY
Status: DISCONTINUED | OUTPATIENT
Start: 2020-08-06 | End: 2020-08-14 | Stop reason: HOSPADM

## 2020-08-06 RX ORDER — GLUCAGON 1 MG
1 KIT INJECTION
Status: DISCONTINUED | OUTPATIENT
Start: 2020-08-06 | End: 2020-08-14 | Stop reason: HOSPADM

## 2020-08-06 RX ORDER — IRBESARTAN 75 MG/1
75 TABLET ORAL DAILY
Status: DISCONTINUED | OUTPATIENT
Start: 2020-08-06 | End: 2020-08-06

## 2020-08-06 RX ORDER — IBUPROFEN 200 MG
24 TABLET ORAL
Status: DISCONTINUED | OUTPATIENT
Start: 2020-08-06 | End: 2020-08-14 | Stop reason: HOSPADM

## 2020-08-06 RX ORDER — HEPARIN SODIUM 1000 [USP'U]/ML
INJECTION, SOLUTION INTRAVENOUS; SUBCUTANEOUS
Status: DISCONTINUED | OUTPATIENT
Start: 2020-08-06 | End: 2020-08-06 | Stop reason: HOSPADM

## 2020-08-06 RX ORDER — INSULIN ASPART 100 [IU]/ML
5 INJECTION, SOLUTION INTRAVENOUS; SUBCUTANEOUS
Status: DISCONTINUED | OUTPATIENT
Start: 2020-08-07 | End: 2020-08-07

## 2020-08-06 RX ORDER — NITROGLYCERIN 5 MG/ML
INJECTION, SOLUTION INTRAVENOUS
Status: DISCONTINUED | OUTPATIENT
Start: 2020-08-06 | End: 2020-08-06 | Stop reason: HOSPADM

## 2020-08-06 RX ORDER — DIPHENHYDRAMINE HCL 50 MG
50 CAPSULE ORAL EVERY 6 HOURS
Status: DISCONTINUED | OUTPATIENT
Start: 2020-08-06 | End: 2020-08-06 | Stop reason: HOSPADM

## 2020-08-06 RX ORDER — LIDOCAINE HYDROCHLORIDE 20 MG/ML
INJECTION, SOLUTION EPIDURAL; INFILTRATION; INTRACAUDAL; PERINEURAL
Status: DISCONTINUED | OUTPATIENT
Start: 2020-08-06 | End: 2020-08-06 | Stop reason: HOSPADM

## 2020-08-06 RX ORDER — FENTANYL CITRATE 50 UG/ML
INJECTION, SOLUTION INTRAMUSCULAR; INTRAVENOUS
Status: DISCONTINUED | OUTPATIENT
Start: 2020-08-06 | End: 2020-08-06 | Stop reason: HOSPADM

## 2020-08-06 RX ORDER — NITROGLYCERIN 0.4 MG/1
0.4 TABLET SUBLINGUAL EVERY 5 MIN PRN
Status: COMPLETED | OUTPATIENT
Start: 2020-08-06 | End: 2020-08-08

## 2020-08-06 RX ORDER — ACETAMINOPHEN 325 MG/1
650 TABLET ORAL EVERY 4 HOURS PRN
Status: DISCONTINUED | OUTPATIENT
Start: 2020-08-06 | End: 2020-08-14 | Stop reason: HOSPADM

## 2020-08-06 RX ORDER — HEPARIN SODIUM 200 [USP'U]/100ML
INJECTION, SOLUTION INTRAVENOUS
Status: DISCONTINUED | OUTPATIENT
Start: 2020-08-06 | End: 2020-08-07

## 2020-08-06 RX ORDER — METOPROLOL TARTRATE 25 MG/1
25 TABLET, FILM COATED ORAL 2 TIMES DAILY
Status: DISCONTINUED | OUTPATIENT
Start: 2020-08-06 | End: 2020-08-09

## 2020-08-06 RX ORDER — FINASTERIDE 5 MG/1
5 TABLET, FILM COATED ORAL DAILY
Status: DISCONTINUED | OUTPATIENT
Start: 2020-08-06 | End: 2020-08-14 | Stop reason: HOSPADM

## 2020-08-06 RX ORDER — IBUPROFEN 200 MG
16 TABLET ORAL
Status: DISCONTINUED | OUTPATIENT
Start: 2020-08-06 | End: 2020-08-14 | Stop reason: HOSPADM

## 2020-08-06 RX ADMIN — SODIUM CHLORIDE 3 ML/KG/HR: 0.9 INJECTION, SOLUTION INTRAVENOUS at 03:08

## 2020-08-06 RX ADMIN — NITROGLYCERIN 0.4 MG: 0.4 TABLET, ORALLY DISINTEGRATING SUBLINGUAL at 12:08

## 2020-08-06 RX ADMIN — METOPROLOL TARTRATE 25 MG: 25 TABLET, FILM COATED ORAL at 08:08

## 2020-08-06 RX ADMIN — HEPARIN SODIUM AND DEXTROSE 12 UNITS/KG/HR: 10000; 5 INJECTION INTRAVENOUS at 02:08

## 2020-08-06 RX ADMIN — INSULIN DETEMIR 30 UNITS: 100 INJECTION, SOLUTION SUBCUTANEOUS at 08:08

## 2020-08-06 RX ADMIN — DIPHENHYDRAMINE HYDROCHLORIDE 50 MG: 50 CAPSULE ORAL at 02:08

## 2020-08-06 RX ADMIN — TICAGRELOR 180 MG: 90 TABLET ORAL at 01:08

## 2020-08-06 NOTE — CONSULTS
[facility]  Interventional Cardiology  Consult Note    Patient Name: Anjum Cai Jr.  MRN: [id number]  Admission Date: 8/6/2020  Hospital Length of Stay: 0 days  Code Status: Full Code   Attending Provider: Zuly Johnson MD  Consulting Provider: Mk Rogers MD  Primary Care Physician: Gavin Martinez MD  Principal Problem:NSTEMI (non-ST elevated myocardial infarction)    Patient information was obtained from patient and ER records.     Inpatient consult to Interventional Cardiology  Consult performed by: Mk Rogers MD  Consult ordered by: Reginald Rebollar MD        Subjective:     Chief Complaint:  Chest Pain     HPI:  84 year old male with history of DM2, prostate cancer s/p radiation treatment, HTN, HLP and CAD s/p PCI with LISSETH to LAD and RCA (2007) followed by coronary angiogram in 2016 (LM 50% stenosis with FFR 0.87 and obstructive disease in OM/D2 (no intervention because of small size vessels) who presents today with acute onset substernal chest pain with radiation to the left arm. Patient reports this morning (around 6 AM) he woke up and had chest discomfort similar to his episodes when he had heart attacks in the past. He was resting when the pain started. The chest pain was constant and associated with shortness of breath. He denied having nausea, numbness/tingling of arm or sweating. He took SL nitro tablet at home and immediately called 911. When he was evaluated in the ER, his CP decreased but was still present. EKG was done that showed sinus rhythm, RBBB and ST elevation of lead III/AVF (not meeting STEMI criteria). He was given additional SL nitro and is CP free now. He states three weeks ago he has completed radiation therapy and have been having a wide spectrum of symptoms. He states he went to the grocery store couple days ago and was able to walk without having CP or SOB but when he arrived home was completely exhausted. VSS and labs show Hb of 9.9, troponin of  0.4 and serum Cr of 1.5. Cardiology evaluated the pt in the ED and accepted him to the CCU. Interventional cardiology was then consulted for NSTEMI with plans for angiogram.     Past Medical History:   Diagnosis Date    Anemia 2018    Arthritis     BPH (benign prostatic hyperplasia)     Cataract     right     Colon polyp 2015    Colon polyps     Coronary artery disease      (diabetic retinopathy)     Dyslipidemia     Elevated PSA     Goiter, nontoxic, multinodular     Headaches, cluster     Hypertension     Kidney stones     Lump in the testicle     Left    Prostate cancer     Rotator cuff tendinitis     Right shoulder    Sleep apnea with use of continuous positive airway pressure (CPAP)     uses cpap at night    Type II or unspecified type diabetes mellitus with other specified manifestations, uncontrolled        Past Surgical History:   Procedure Laterality Date    CARDIAC CATHETERIZATION      CATARACT EXTRACTION W/  INTRAOCULAR LENS IMPLANT Right 2016    Dr. Garcia    CHOLECYSTECTOMY  2018    CORONARY STENT PLACEMENT  2007    LAD, RCAx2    INGUINAL HERNIA REPAIR Right     PROSTATE BIOPSY  2012    ROTATOR CUFF REPAIR Right 2014    x2    ROTATOR CUFF REPAIR Left     TONSILLECTOMY, ADENOIDECTOMY      as child    TRIGGER FINGER RELEASE Right 2004    x2       Review of patient's allergies indicates:   Allergen Reactions    Ace inhibitors      Other reaction(s): cough    Cefadroxil      Other reaction(s): possible vasculitis  Other reaction(s): vasculitis       (Not in a hospital admission)    Family History     Problem Relation (Age of Onset)    Cancer Mother    Cataracts Father    Diabetes Mother    Heart disease Father        Tobacco Use    Smoking status: Former Smoker     Quit date: 3/12/1978     Years since quittin.4    Smokeless tobacco: Never Used   Substance and Sexual Activity    Alcohol use: No    Drug use: No    Sexual  activity: Not on file     Review of Systems   Constitution: Negative.   HENT: Negative.    Eyes: Negative.    Cardiovascular: Positive for chest pain.   Respiratory: Negative.    Endocrine: Negative.    Skin: Negative.    Musculoskeletal: Negative.    Gastrointestinal: Negative.    Genitourinary: Negative.    Neurological: Negative.    All other systems reviewed and are negative.    Objective:     Vital Signs (Most Recent):  Temp: 98.7 °F (37.1 °C) (08/06/20 1121)  Pulse: 78 (08/06/20 1431)  Resp: 19 (08/06/20 1422)  BP: (!) 117/57 (08/06/20 1431)  SpO2: 100 % (08/06/20 1431) Vital Signs (24h Range):  Temp:  [98.7 °F (37.1 °C)] 98.7 °F (37.1 °C)  Pulse:  [73-94] 78  Resp:  [13-20] 19  SpO2:  [96 %-100 %] 100 %  BP: (104-134)/(53-63) 117/57     Weight: 81.6 kg (180 lb)  Body mass index is 29.05 kg/m².    SpO2: 100 %  O2 Device (Oxygen Therapy): room air    No intake or output data in the 24 hours ending 08/06/20 1447    Lines/Drains/Airways     Peripheral Intravenous Line                 Peripheral IV - Single Lumen 08/06/20 1157 18 G Left Forearm less than 1 day         Peripheral IV - Single Lumen 08/06/20 1415 18 G Left Forearm less than 1 day                Physical Exam   Constitutional: He is oriented to person, place, and time. He appears well-developed and well-nourished.   HENT:   Head: Normocephalic and atraumatic.   Eyes: Pupils are equal, round, and reactive to light. EOM are normal.   Neck: Normal range of motion. Neck supple. No JVD present.   Cardiovascular: Normal rate, regular rhythm and intact distal pulses.   Pulmonary/Chest: Effort normal.   Abdominal: Soft. Bowel sounds are normal.   Musculoskeletal: Normal range of motion.   Neurological: He is alert and oriented to person, place, and time.   Skin: Skin is warm and dry. He is not diaphoretic.       Significant Labs: All pertinent lab results from the last 24 hours have been reviewed.    Significant Imaging: reviewed.    Assessment and Plan:      * NSTEMI (non-ST elevated myocardial infarction)  -known history CAD s/p CAD s/p PCI to LAD and RCA x 2 (2007) for NSTEMI  -no hx LV dysfunction   -ECG changes seen in ED in inferior leads   -now CP free s/p nitro  -s/p asa and brilinta load in ED + lipitor 80. On home lipitor 40  -pre-hydrate with NS 3cc/kg/hr for 1 hr  -plan for LHC via right radial access today        VTE Risk Mitigation (From admission, onward)         Ordered     heparin 25,000 units in dextrose 5% (100 units/ml) IV bolus from bag - ADDITIONAL PRN BOLUS - 60 units/kg (max bolus 4000 units)  As needed (PRN)     Question:  Heparin Infusion Adjustment (DO NOT MODIFY ANSWER)  Answer:  [email or web address]\[email or web address]\Images\Pharmacy\HeparinInfusions\heparin LOW INTENSITY nomogram for [facility] [id number]    08/06/20 1403     heparin 25,000 units in dextrose 5% (100 units/ml) IV bolus from bag - ADDITIONAL PRN BOLUS - 30 units/kg (max bolus 4000 units)  As needed (PRN)     Question:  Heparin Infusion Adjustment (DO NOT MODIFY ANSWER)  Answer:  [email or web address]\[email or web address]\Images\Pharmacy\HeparinInfusions\heparin LOW INTENSITY nomogram for [facility] [id number]    08/06/20 1403     heparin 25,000 units in dextrose 5% 250 mL (100 units/mL) infusion LOW INTENSITY nomogram - [facility]  Continuous     Question:  Heparin Infusion Adjustment (DO NOT MODIFY ANSWER)  Answer:  [email or web address]\[email or web address]\Images\Pharmacy\HeparinInfusions\heparin LOW INTENSITY nomogram for [facility] [email or web address]    08/06/20 1403     IP VTE HIGH RISK PATIENT  Once      08/06/20 1403     Place sequential compression device  Until discontinued      08/06/20 1403                Thank you for your consult.    Mk Rogers MD  Interventional Cardiology   [facility]

## 2020-08-06 NOTE — ED PROVIDER NOTES
Encounter Date: 8/6/2020       History     Chief Complaint   Patient presents with    Chest Pain     Pt reports chest tightness x1 week. Pt took 1 nitro at home with some relief.      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 He says the chest tightness and pressure has been occurring intermittently for the past 2 weeks, but this morning it was worse described as a tightness throughout his entire chest with pain radiating to left shoulder and arm ending at the elbow. He says this is similar to his prior episodes of heart attack. He took nitroglycerine at home which relieved the pain some and called EMS. EMS gave  and could not give another Nitro due to low BP so fluids were given. On arrival to ED he rated his chest discomfort at 7/10, after resting in his room he rates it as a 5/10 currently. He says he did not come in sooner because he attributed his feeling bad to radiation therapy for prostate cancer. He endorsees SOB with walking, he has had to sit down due to being winded lately. Denies sweating, n/v, d/c, abdominal pain, leg swelling, or any other symptoms at this time.        Review of patient's allergies indicates:   Allergen Reactions    Ace inhibitors      Other reaction(s): cough    Cefadroxil      Other reaction(s): possible vasculitis  Other reaction(s): vasculitis     Past Medical History:   Diagnosis Date    Anemia 2/26/2018    Arthritis     BPH (benign prostatic hyperplasia)     Cataract     right     Colon polyp 11/18/2015    Colon polyps     Coronary artery disease     DR (diabetic retinopathy)     Dyslipidemia     Elevated PSA     Goiter, nontoxic, multinodular     Headaches, cluster     Hypertension     Kidney stones     Lump in the testicle     Left    Prostate  cancer     Rotator cuff tendinitis     Right shoulder    Sleep apnea with use of continuous positive airway pressure (CPAP)     uses cpap at night    Type II or unspecified type diabetes mellitus with other specified manifestations, uncontrolled      Past Surgical History:   Procedure Laterality Date    CARDIAC CATHETERIZATION      CATARACT EXTRACTION W/  INTRAOCULAR LENS IMPLANT Right 2016    Dr. Garcia    CHOLECYSTECTOMY  2018    CORONARY STENT PLACEMENT  2007    LAD, RCAx2    INGUINAL HERNIA REPAIR Right     PROSTATE BIOPSY  2012    ROTATOR CUFF REPAIR Right 2014    x2    ROTATOR CUFF REPAIR Left 2013    TONSILLECTOMY, ADENOIDECTOMY      as child    TRIGGER FINGER RELEASE Right 2004    x2     Family History   Problem Relation Age of Onset    Cataracts Father     Heart disease Father     Cancer Mother     Diabetes Mother     Amblyopia Neg Hx     Blindness Neg Hx     Glaucoma Neg Hx     Macular degeneration Neg Hx     Retinal detachment Neg Hx     Strabismus Neg Hx      Social History     Tobacco Use    Smoking status: Former Smoker     Quit date: 3/12/1978     Years since quittin.4    Smokeless tobacco: Never Used   Substance Use Topics    Alcohol use: No    Drug use: No     Review of Systems   Constitutional: Positive for activity change. Negative for chills, diaphoresis and fever.   HENT: Negative.  Negative for congestion, sinus pressure and sore throat.    Eyes: Negative.  Negative for visual disturbance.   Respiratory: Positive for chest tightness and shortness of breath (With exertion ). Negative for cough, wheezing and stridor.    Cardiovascular: Positive for chest pain. Negative for palpitations and leg swelling.   Gastrointestinal: Negative for abdominal distention, abdominal pain, constipation, diarrhea, nausea and vomiting.   Genitourinary: Negative for difficulty urinating, flank pain and urgency.        Urinary incontinence   Musculoskeletal:  Negative for arthralgias and neck pain.   Skin: Negative for color change and rash.   Neurological: Negative for syncope, weakness, light-headedness, numbness and headaches.   Psychiatric/Behavioral: Negative for agitation and confusion.       Physical Exam     Initial Vitals [08/06/20 1121]   BP Pulse Resp Temp SpO2   119/63 94 20 98.7 °F (37.1 °C) 98 %      MAP       --         Physical Exam    Constitutional: He appears well-developed and well-nourished. He is not diaphoretic.   HENT:   Head: Normocephalic and atraumatic.   Eyes: EOM are normal. Pupils are equal, round, and reactive to light.   Neck: Normal range of motion. Neck supple. No JVD present.   Cardiovascular: Normal rate, regular rhythm and normal heart sounds.   No murmur heard.  Pulses 2+, no JVD, no peripheral edema   Pulmonary/Chest: Breath sounds normal. No respiratory distress. He has no wheezes. He has no rhonchi.   Abdominal: Soft. Bowel sounds are normal. He exhibits no distension. There is no abdominal tenderness.   Musculoskeletal: Normal range of motion. No tenderness or edema.      Comments: Bruising/scrapes on bilateral LE   Neurological: He is alert and oriented to person, place, and time.   Skin: Skin is warm and dry. No rash noted.   Psychiatric: He has a normal mood and affect.         ED Course   Procedures  Labs Reviewed   CBC W/ AUTO DIFFERENTIAL - Abnormal; Notable for the following components:       Result Value    RBC 3.37 (*)     Hemoglobin 9.9 (*)     Hematocrit 31.2 (*)     Mean Corpuscular Hemoglobin Conc 31.7 (*)     RDW 16.2 (*)     Immature Granulocytes 0.8 (*)     Gran # (ANC) 8.1 (*)     Immature Grans (Abs) 0.08 (*)     Lymph # 0.7 (*)     Gran% 84.5 (*)     Lymph% 7.5 (*)     All other components within normal limits   COMPREHENSIVE METABOLIC PANEL - Abnormal; Notable for the following components:    Sodium 134 (*)     CO2 22 (*)     Glucose 211 (*)     BUN, Bld 26 (*)     Creatinine 1.5 (*)     Albumin 2.1 (*)      Total Bilirubin 1.9 (*)     Alkaline Phosphatase 834 (*)     AST 67 (*)     ALT 58 (*)     Anion Gap 7 (*)     eGFR if  49.4 (*)     eGFR if non  42.7 (*)     All other components within normal limits   TROPONIN I - Abnormal; Notable for the following components:    Troponin I 0.043 (*)     All other components within normal limits   B-TYPE NATRIURETIC PEPTIDE - Abnormal; Notable for the following components:     (*)     All other components within normal limits   APTT - Abnormal; Notable for the following components:    aPTT 33.7 (*)     All other components within normal limits    Narrative:     ADD-ON APTT #[id number] PER JULIOCESAR ROY MD 13:42  08/06/2020    SARS-COV-2 RNA AMPLIFICATION, QUAL   APTT     EKG Readings: (Independently Interpreted)   Initial Reading: No STEMI. Previous EKG: Compared with most recent EKG Previous EKG Date: 8/2/2018. Rhythm: Normal Sinus Rhythm. Heart Rate: 88. Ectopy: No Ectopy. Conduction: RBBB. Axis: Normal.     ECG Results          EKG 12-lead (In process)  Result time 08/06/20 14:20:19    In process by Interface, Lab In [facility] (08/06/20 14:20:19)                 Narrative:    Test Reason : I21.4,    Vent. Rate : 083 BPM     Atrial Rate : 083 BPM     P-R Int : 190 ms          QRS Dur : 128 ms      QT Int : 412 ms       P-R-T Axes : 039 070 069 degrees     QTc Int : 484 ms    Program found technically poor ECG  Normal sinus rhythm  Right bundle branch block  Abnormal ECG  When compared with ECG of 06-AUG-2020 11:47,  No significant change was found    Referred By: [email or web address]   SELF           Confirmed By:                              EKG 12-lead (In process)  Result time 08/06/20 12:07:33    In process by Interface, Lab In [facility] (08/06/20 12:07:33)                 Narrative:    Test Reason : R07.9,    Vent. Rate : 088 BPM     Atrial Rate : 088 BPM     P-R Int : 184 ms          QRS Dur : 128 ms      QT Int : 392 ms       P-R-T Axes : 049  070 066 degrees     QTc Int : 474 ms    Normal sinus rhythm  Right bundle branch block  Abnormal ECG  When compared with ECG of 02-AUG-2018 11:31,  DE interval has decreased  Right bundle branch block is now Present    Referred By: System System           Confirmed By:                             Imaging Results    None          Medical Decision Making:   History:   I obtained history from: EMS provider.  Old Medical Records: I decided to obtain old medical records.  Old Records Summarized: records from previous admission(s) and records from clinic visits.  Initial Assessment:   Pt with history of CAD [place] [facility] [place] to increased chest tightness radiating to L arm relieved with Nitro. EKG does not meet criteria for STEMI, cards consulted and has evaluated pt.  Differential Diagnosis:   ACS: unstable angina, NSTEMI, STEMI  GERD - h/o hiatal hernia  Independently Interpreted Test(s):   I have ordered and independently interpreted X-rays - see prior notes.  I have ordered and independently interpreted EKG Reading(s) - see prior notes  Clinical Tests:   Lab Tests: Ordered and Reviewed  Radiological Study: Ordered and Reviewed  Medical Tests: Ordered and Reviewed  ED Management:  Chest pain: pt presented with typical chest pain associated with ACS, EKG does not meet criteria for ST elevation due to RBBB, cardiology consulted and would like to treat as unstable angina and complete full chest pain workup, may consider cath after talking with staff. Pt's chest pressure relieved after second dose of Nitro in ED. Troponin 0.043, will trend q3. Per Cardiology recs started Heparin drip per ACS protocol and Brilenta 180. Cardiology will admit to CCU for cath today vs. tomorrow.   Other:   I have discussed this case with another health care provider.       <> Summary of the Discussion: Cardiology as above              Attending Attestation:   Physician Attestation Statement for Resident:  As the supervising MD   Physician  Attestation Statement: I have personally seen and examined this patient.   I agree with the above history. -: 84-year-old man presents complaining of chest pressure that has been intermittent for the past 2 weeks but worsened over the past 2 days.  This morning it has been more severe and radiating to his left arm.  Denies shortness of breath.  No diaphoresis or nausea.  Pain feels similar to prior MIs.   As the supervising MD I agree with the above PE.   -: Nontoxic appearing.  Nonlabored respirations.   As the supervising MD I agree with the above treatment, course, plan, and disposition.   -: Initial EKG, on my independent interpretation, showed normal sinus rhythm with a right bundle-branch block.  There is some mild ST elevation in the inferior and lateral leads however it does not meet criteria for STEMI.  This EKG appears change when compared to an EKG from 1 year ago which is his most recent EKG.  On my initial evaluation, patient's chest pain was rated a 7/10.  I saw him in the hallway when this initial EKG was run.  When patient was moved to a room he stated his chest pain had improved to a 5/10.  He was given 1 sublingual nitroglycerin which improved his pain to a 0/10.  I discussed his case with Cardiology immediately after my evaluation and they have come evaluate the patient.  Patient's troponin returned elevated at 0.043.  They feel this represents a new NSTEMI and plan to take him to the cath lab however today his creatinine is mildly elevated.  They plan to gently hydrate him 1st prior to angiogram.  Cardiology plans to admit.        Attending Critical Care:   Critical Care Times:   Direct Patient Care (initial evaluation, reassessments, and time considering the case)................................................................15 minutes.   Additional History from reviewing old medical records or taking additional history from the family, EMS, PCP, etc.......................5 minutes.   Ordering,  Reviewing, and Interpreting Diagnostic Studies...............................................................................................................5 minutes.   Documentation..................................................................................................................................................................................5 minutes.   Consultation with other Physicians. .................................................................................................................................................8 minutes.   ==============================================================  · Total Critical Care Time - exclusive of procedural time: 38 minutes.  ==============================================================  Critical Care Condition: potentially life-threatening   Critical Care Comments: Patient had the potential for deterioration in condition at any time.                               Clinical Impression:       ICD-10-CM ICD-9-CM   1. Chest pain  R07.9 786.50   2. Non-ST elevation myocardial infarction (NSTEMI)  I21.4 410.70   3. NSTEMI (non-ST elevation myocardial infarction)  I21.4 410.70   4. NSTEMI (non-ST elevated myocardial infarction)  I21.4 410.70   5. Angina at rest  I20.8 413.9         Disposition:   Disposition: Admitted  Condition: Stable     ED Disposition Condition    Admit                           Luba Velazco MD  Resident  08/06/20 [id number]       Zuyl Johnson MD  08/06/20 9835

## 2020-08-06 NOTE — ED NOTES
Bed: 05  Expected date:   Expected time:   Means of arrival:   Comments:     Steven Pulido, ELIDIA  08/06/20 1146

## 2020-08-06 NOTE — SUBJECTIVE & OBJECTIVE
Past Medical History:   Diagnosis Date    Anemia 2/26/2018    Arthritis     BPH (benign prostatic hyperplasia)     Cataract     right     Colon polyp 11/18/2015    Colon polyps     Coronary artery disease      (diabetic retinopathy)     Dyslipidemia     Elevated PSA     Goiter, nontoxic, multinodular     Headaches, cluster     Hypertension     Kidney stones     Lump in the testicle     Left    Prostate cancer     Rotator cuff tendinitis     Right shoulder    Sleep apnea with use of continuous positive airway pressure (CPAP)     uses cpap at night    Type II or unspecified type diabetes mellitus with other specified manifestations, uncontrolled        Past Surgical History:   Procedure Laterality Date    CARDIAC CATHETERIZATION  2007    CATARACT EXTRACTION W/  INTRAOCULAR LENS IMPLANT Right 09/27/2016    Dr. Garcia    CHOLECYSTECTOMY  02/2018    CORONARY STENT PLACEMENT  02/02/2007    LAD, RCAx2    INGUINAL HERNIA REPAIR Right 1995    PROSTATE BIOPSY  2012    ROTATOR CUFF REPAIR Right 2014    x2    ROTATOR CUFF REPAIR Left 2013    TONSILLECTOMY, ADENOIDECTOMY      as child    TRIGGER FINGER RELEASE Right 2004    x2       Review of patient's allergies indicates:   Allergen Reactions    Ace inhibitors      Other reaction(s): cough    Cefadroxil      Other reaction(s): possible vasculitis  Other reaction(s): vasculitis       No current facility-administered medications on file prior to encounter.      Current Outpatient Medications on File Prior to Encounter   Medication Sig    nitroGLYCERIN (NITROSTAT) 0.4 MG SL tablet Place 1 tablet (0.4 mg total) under the tongue every 5 (five) minutes as needed for Chest pain.    ACCU-CHEK SMARTVIEW TEST STRIP Strp CHECK BLOOD SUGARS FOUR TIMES DAILY BEFORE MEALS AS DIRECTED    ASPIRIN (ASPIR-81 ORAL) Take 1 tablet by mouth Daily.    atorvastatin (LIPITOR) 40 MG tablet Take 1 tablet (40 mg total) by mouth once daily.    benzonatate (TESSALON  "PERLES) 100 MG capsule Take 1 capsule (100 mg total) by mouth every 6 (six) hours as needed for Cough.    ciclopirox (PENLAC) 8 % Soln Apply topically nightly.    ciclopirox 0.77 % Gel Apply topically 2 (two) times daily.    ergocalciferol (ERGOCALCIFEROL) 50,000 unit Cap TAKE 1 CAPSULE BY MOUTH ONCE EVERY 30 DAYS    finasteride (PROSCAR) 5 mg tablet TAKE 1 TABLET(5 MG) BY MOUTH EVERY DAY    FLUAD 2403-7409, 65 YR UP,,PF, 45 mcg (15 mcg x 3)/0.5 mL Syrg     insulin (LANTUS SOLOSTAR U-100 INSULIN) glargine 100 units/mL (3mL) SubQ pen ADMINISTER 65 UNITS UNDER THE SKIN EVERY EVENING    insulin aspart U-100 (NOVOLOG FLEXPEN U-100 INSULIN) 100 unit/mL (3 mL) InPn pen ADMINISTER 12 UNITS UNDER THE SKIN THREE TIMES DAILY WITH MEALS PER SLIDING SCALE    irbesartan (AVAPRO) 75 MG tablet Take 1 tablet (75 mg total) by mouth once daily.    lancets (ACCU-CHEK FASTCLIX LANCET DRUM) Misc Check blood sugars four times a day.    metoprolol succinate (TOPROL-XL) 50 MG 24 hr tablet TAKE 1 TABLET(50 MG) BY MOUTH EVERY DAY    mometasone 0.1% (ELOCON) 0.1 % cream Apply to affected area daily    MULTIVITAMIN W-MINERALS/LUTEIN (CENTRUM SILVER ORAL) Take by mouth once daily.    omega-3 fatty acids-vitamin E (FISH OIL) 1,000 mg Cap Take 2 capsules by mouth Daily.     omeprazole (PRILOSEC) 40 MG capsule Take 1 capsule (40 mg total) by mouth once daily.    pen needle, diabetic (BD ULTRA-FINE MINI PEN NEEDLE) 31 gauge x 3/16" Ndle Use new needle with each injection. Inject insulin 4 times a day.     Family History     Problem Relation (Age of Onset)    Cancer Mother    Cataracts Father    Diabetes Mother    Heart disease Father        Tobacco Use    Smoking status: Former Smoker     Quit date: 3/12/1978     Years since quittin.4    Smokeless tobacco: Never Used   Substance and Sexual Activity    Alcohol use: No    Drug use: No    Sexual activity: Not on file     Review of Systems   Constitution: Negative for chills " and fever.   HENT: Negative for congestion.    Cardiovascular: Positive for chest pain. Negative for irregular heartbeat, leg swelling, near-syncope, orthopnea, palpitations, paroxysmal nocturnal dyspnea and syncope.   Respiratory: Positive for shortness of breath.    Gastrointestinal: Negative for abdominal pain, nausea and vomiting.   Neurological: Negative for dizziness, focal weakness, headaches, light-headedness and weakness.     Objective:     Vital Signs (Most Recent):  Temp: 98.7 °F (37.1 °C) (08/06/20 1121)  Pulse: 91 (08/06/20 1231)  Resp: 14 (08/06/20 1231)  BP: (!) 113/56 (08/06/20 1231)  SpO2: 98 % (08/06/20 1231) Vital Signs (24h Range):  Temp:  [98.7 °F (37.1 °C)] 98.7 °F (37.1 °C)  Pulse:  [83-94] 91  Resp:  [13-20] 14  SpO2:  [98 %-100 %] 98 %  BP: (108-134)/(55-63) 113/56     Weight: 81.6 kg (180 lb)  Body mass index is 29.05 kg/m².    SpO2: 98 %  O2 Device (Oxygen Therapy): room air    No intake or output data in the 24 hours ending 08/06/20 1240    Lines/Drains/Airways     Peripheral Intravenous Line                 Peripheral IV - Single Lumen 08/06/20 1156 20 G Right Forearm less than 1 day         Peripheral IV - Single Lumen 08/06/20 1157 18 G Left Forearm less than 1 day                Physical Exam   Constitutional: He is oriented to person, place, and time. No distress.   HENT:   Mouth/Throat: Oropharynx is clear and moist.   Eyes: Pupils are equal, round, and reactive to light.   Neck: Neck supple.   Cardiovascular: Normal rate, regular rhythm and intact distal pulses.   Pulmonary/Chest: Effort normal and breath sounds normal.   Abdominal: Soft.   Musculoskeletal:         General: No edema.   Neurological: He is alert and oriented to person, place, and time.   Skin: Skin is warm and dry. He is not diaphoretic.   Psychiatric: He has a normal mood and affect. His behavior is normal.   Vitals reviewed.      Significant Labs: All pertinent lab results from the last 24 hours have been  reviewed.    Significant Imaging: Echocardiogram:   2D echo with color flow doppler:   Results for orders placed or performed during the hospital encounter of 12/09/16   2D echo with color flow doppler   Result Value Ref Range    QEF 60 55 - 65    Mitral Valve Regurgitation TRIVIAL     Diastolic Dysfunction Yes (A)     Mitral Valve Mobility NORMAL     Narrative    Date of Procedure: 12/09/2016        TEST DESCRIPTION       Aorta: The aortic root is normal in size, measuring 3.4 cm at sinotubular junction and 3.9 cm at Sinuses of Valsalva. The proximal ascending aorta is normal in size, measuring 3.5 cm across.     Left Atrium: The left atrium is normal in size, measuring 3.5 cm across in the parasternal view.     Left Ventricle: The left ventricle is normal in size, with an end-diastolic diameter of 5.2 cm, and an end-systolic diameter of 3.1 cm. LV wall thickness is normal, with the septum and the posterior wall each measuring 0.9 cm across. Relative wall   thickness was normal at 0.35, and the LV mass index was 95.1 g/m2 consistent with normal left ventricular mass. Global left ventricular systolic function appears normal. Visually estimated ejection fraction is 60-65%. The LV Doppler derived stroke volume   equals 75.0 ccs.   There is normal systolic/diastolic flow in the pulmonary vein indicating normal left atrial pressures. The E/e'(lat) is 15, consistent with diastolic dysfunction secondary to relaxation abnormality.     Right Atrium: The right atrium is normal in size, measuring 4.7 cm in length and 3.3 cm in width in the apical view.     Right Ventricle: The right ventricle is normal in size measuring 3.4 cm at the base in the apical right ventricle-focused view. Global right ventricular systolic function appears low normal.     Aortic Valve:  The aortic valve is tri-leaflet in structure. Aortic valve is normal in structure with normal leaflet mobility.     Mitral Valve:  The mitral valve is mildly  sclerotic with normal leaflet mobility. There is trivial mitral regurgitation.     Tricuspid Valve:  Tricuspid valve is normal in structure with normal leaflet mobility.     Pulmonary Valve:  The pulmonic valve is not well seen.     IVC: IVC is normal in size and collapses > 50% with a sniff, suggesting normal right atrial pressure of 3 mmHg.     Intracavitary: There is no evidence of pericardial effusion, intracavity mass, thrombi, or vegetation.         CONCLUSIONS     1 - Normal left ventricular systolic function (EF 60-65%).     2 - Low normal right ventricular systolic function .     3 - Left ventricular diastolic dysfunction.             This document has been electronically    SIGNED BY: Rizwan Reyes MD On: 12/09/2016 11:07

## 2020-08-06 NOTE — NURSING
Notified MD Mendoza patient c/o 8/10 chest pain  Charge and RN applied bear hugger warmer, ordered EKG STAT and applied 2L O2  Awaiting further orders  Will continue to monitor

## 2020-08-06 NOTE — HPI
84 year old male with history of DM2, prostate cancer s/p radiation treatment, HTN, HLP and CAD s/p PCI with LISSETH to LAD and RCA (2007) followed by coronary angiogram in 2016 (LM 50% stenosis with FFR 0.87 and obstructive disease in OM/D2 (no intervention because of small size vessels) who presents today with acute onset substernal chest pain with radiation to the left arm. Patient reports this morning he woke up and had chest discomfort similar to his episodes when he had heart attacks in the past. The chest pain was constant and associated with shortness of breath. He denied having nausea, numbness/tingling of arm or sweating. He took SL nitro tablet at home and immediately called 911. When he was evaluated in the ER, his CP decreased but was still present. EKG was done that showed sinus rhythm, 1st degree AVB and iRBBB. He was given additional SL nitro and is CP free now. He states three weeks ago he has completed radiation therapy and have been having a wide spectrum of symptoms. He states he went to the grocery store couple days ago and was able to walk without having CP or SOB but when he arrived home was completely exhausted. Labs are currently pending. Cardiology consulted for chest pain.     Coronary Angiogram (2016):     Patient has a right dominant coronary artery.        - Left Main Coronary Artery:              The LM has a 50% stenosis. There is EMA 3 flow. FFR was 0.87.      - Left Anterior Descending Artery:              The LAD has luminal irregularities. There is EMA 3 flow.      - D2:              The ostial D2 has a 99% stenosis. There is EMA 3 flow.                      Lesion Details:   This is a Type C lesion.      - Left Circumflex Artery:              The LCX has luminal irregularities. There is EMA 3 flow.      - OM2:              The ostial OM2 has a 95% stenosis. There is EMA 3 flow.      - Right Coronary Artery:              The mid RCA has a 40% stenosis. There is EMA 3 flow.  Tandem lesions.      - Common Femoral Artery:              The right CFA is normal.      - Femoral Artery:              The femoral artery is normal.     TTE (2016):   CONCLUSIONS     1 - Normal left ventricular systolic function (EF 60-65%).     2 - Low normal right ventricular systolic function .     3 - Left ventricular diastolic dysfunction.     Patient was taken to the cath lab for angiogram. Admitted to the CCU for further management of NSTEMI.

## 2020-08-06 NOTE — H&P
[facility]  Cardiology  History and Physical     Patient Name: Anjum Cai Jr.  MRN: [id number]  Admission Date: 8/6/2020  Code Status: Full Code   Attending Provider: Neri Mendoza MD   Primary Care Physician: Gavin Martinez MD  Principal Problem:NSTEMI (non-ST elevated myocardial infarction)    Patient information was obtained from patient, past medical records and ER records.     Subjective:     Chief Complaint:  Chest pain     HPI:  84 year old male with history of DM2, prostate cancer s/p radiation treatment, HTN, HLP and CAD s/p PCI with LISESTH to LAD and RCA (2007) followed by coronary angiogram in 2016 (LM 50% stenosis with FFR 0.87 and obstructive disease in OM/D2 (no intervention because of small size vessels) who presents today with acute onset substernal chest pain with radiation to the left arm. Patient reports this morning he woke up and had chest discomfort similar to his episodes when he had heart attacks in the past. The chest pain was constant and associated with shortness of breath. He denied having nausea, numbness/tingling of arm or sweating. He took SL nitro tablet at home and immediately called 911. When he was evaluated in the ER, his CP decreased but was still present. EKG was done that showed sinus rhythm, 1st degree AVB and iRBBB. He was given additional SL nitro and is CP free now. He states three weeks ago he has completed radiation therapy and have been having a wide spectrum of symptoms. He states he went to the grocery store couple days ago and was able to walk without having CP or SOB but when he arrived home was completely exhausted. Labs are currently pending. Cardiology consulted for chest pain.     Coronary Angiogram (2016):     Patient has a right dominant coronary artery.        - Left Main Coronary Artery:              The LM has a 50% stenosis. There is EMA 3 flow. FFR was 0.87.      - Left Anterior Descending Artery:              The LAD has luminal  irregularities. There is EMA 3 flow.      - D2:              The ostial D2 has a 99% stenosis. There is EMA 3 flow.                      Lesion Details:   This is a Type C lesion.      - Left Circumflex Artery:              The LCX has luminal irregularities. There is EMA 3 flow.      - OM2:              The ostial OM2 has a 95% stenosis. There is EMA 3 flow.      - Right Coronary Artery:              The mid RCA has a 40% stenosis. There is EMA 3 flow. Tandem lesions.      - Common Femoral Artery:              The right CFA is normal.      - Femoral Artery:              The femoral artery is normal.     TTE (2016):   CONCLUSIONS     1 - Normal left ventricular systolic function (EF 60-65%).     2 - Low normal right ventricular systolic function .     3 - Left ventricular diastolic dysfunction.     Patient was taken to the cath lab for angiogram. Admitted to the CCU for further management of NSTEMI.     Past Medical History:   Diagnosis Date    Anemia 2/26/2018    Arthritis     BPH (benign prostatic hyperplasia)     Cataract     right     Colon polyp 11/18/2015    Colon polyps     Coronary artery disease     DR (diabetic retinopathy)     Dyslipidemia     Elevated PSA     Goiter, nontoxic, multinodular     Headaches, cluster     Hypertension     Kidney stones     Lump in the testicle     Left    Prostate cancer     Rotator cuff tendinitis     Right shoulder    Sleep apnea with use of continuous positive airway pressure (CPAP)     uses cpap at night    Type II or unspecified type diabetes mellitus with other specified manifestations, uncontrolled        Past Surgical History:   Procedure Laterality Date    CARDIAC CATHETERIZATION  2007    CATARACT EXTRACTION W/  INTRAOCULAR LENS IMPLANT Right 09/27/2016    Dr. Garcia    CHOLECYSTECTOMY  02/2018    CORONARY STENT PLACEMENT  02/02/2007    LAD, RCAx2    INGUINAL HERNIA REPAIR Right 1995    PROSTATE BIOPSY  2012    ROTATOR CUFF REPAIR  Right 2014    x2    ROTATOR CUFF REPAIR Left 2013    TONSILLECTOMY, ADENOIDECTOMY      as child    TRIGGER FINGER RELEASE Right 2004    x2       Review of patient's allergies indicates:   Allergen Reactions    Ace inhibitors      Other reaction(s): cough    Cefadroxil      Other reaction(s): possible vasculitis  Other reaction(s): vasculitis       No current facility-administered medications on file prior to encounter.      Current Outpatient Medications on File Prior to Encounter   Medication Sig    ACCU-CHEK SMARTVIEW TEST STRIP Strp CHECK BLOOD SUGARS FOUR TIMES DAILY BEFORE MEALS AS DIRECTED    ASPIRIN (ASPIR-81 ORAL) Take 1 tablet by mouth Daily.    atorvastatin (LIPITOR) 40 MG tablet Take 1 tablet (40 mg total) by mouth once daily.    ciclopirox (PENLAC) 8 % Soln Apply topically nightly.    ciclopirox 0.77 % Gel Apply topically 2 (two) times daily.    ergocalciferol (ERGOCALCIFEROL) 50,000 unit Cap TAKE 1 CAPSULE BY MOUTH ONCE EVERY 30 DAYS    finasteride (PROSCAR) 5 mg tablet TAKE 1 TABLET(5 MG) BY MOUTH EVERY DAY    FLUAD [id number], 65 YR UP,,PF, 45 mcg (15 mcg x 3)/0.5 mL Syrg     insulin (LANTUS SOLOSTAR U-100 INSULIN) glargine 100 units/mL (3mL) SubQ pen ADMINISTER 65 UNITS UNDER THE SKIN EVERY EVENING    insulin aspart U-100 (NOVOLOG FLEXPEN U-100 INSULIN) 100 unit/mL (3 mL) InPn pen ADMINISTER 12 UNITS UNDER THE SKIN THREE TIMES DAILY WITH MEALS PER SLIDING SCALE    irbesartan (AVAPRO) 75 MG tablet Take 1 tablet (75 mg total) by mouth once daily.    lancets (ACCU-CHEK FASTCLIX LANCET DRUM) Misc Check blood sugars four times a day.    metoprolol succinate (TOPROL-XL) 50 MG 24 hr tablet TAKE 1 TABLET(50 MG) BY MOUTH EVERY DAY    mometasone 0.1% (ELOCON) 0.1 % cream Apply to affected area daily    MULTIVITAMIN W-MINERALS/LUTEIN (CENTRUM SILVER ORAL) Take by mouth once daily.    nitroGLYCERIN (NITROSTAT) 0.4 MG SL tablet Place 1 tablet (0.4 mg total) under the tongue every 5 (five)  "minutes as needed for Chest pain.    omega-3 fatty acids-vitamin E (FISH OIL) 1,000 mg Cap Take 2 capsules by mouth Daily.     omeprazole (PRILOSEC) 40 MG capsule Take 1 capsule (40 mg total) by mouth once daily.    pen needle, diabetic (BD ULTRA-FINE MINI PEN NEEDLE) 31 gauge x 3/16" Ndle Use new needle with each injection. Inject insulin 4 times a day.    benzonatate (TESSALON PERLES) 100 MG capsule Take 1 capsule (100 mg total) by mouth every 6 (six) hours as needed for Cough.     Family History     Problem Relation (Age of Onset)    Cancer Mother    Cataracts Father    Diabetes Mother    Heart disease Father        Tobacco Use    Smoking status: Former Smoker     Quit date: 3/12/1978     Years since quittin.4    Smokeless tobacco: Never Used   Substance and Sexual Activity    Alcohol use: No    Drug use: No    Sexual activity: Not on file     Review of Systems   Constitution: Negative for chills and fever.   HENT: Negative for congestion.    Cardiovascular: Positive for chest pain. Negative for irregular heartbeat, leg swelling, near-syncope, orthopnea, palpitations, paroxysmal nocturnal dyspnea and syncope.   Respiratory: Negative for shortness of breath.    Gastrointestinal: Negative for abdominal pain, nausea and vomiting.   Neurological: Negative for dizziness, focal weakness, headaches, light-headedness and weakness.     Objective:     Vital Signs (Most Recent):  Temp: 98.7 °F (37.1 °C) (20 1121)  Pulse: 87 (20 1441)  Resp: 20 (20 1441)  BP: 136/63 (20 1441)  SpO2: 100 % (20 1441) Vital Signs (24h Range):  Temp:  [98.7 °F (37.1 °C)] 98.7 °F (37.1 °C)  Pulse:  [73-94] 87  Resp:  [13-20] 20  SpO2:  [96 %-100 %] 100 %  BP: (104-136)/(53-63) 136/63     Weight: 81.6 kg (180 lb)  Body mass index is 29.05 kg/m².    SpO2: 100 %  O2 Device (Oxygen Therapy): nasal cannula      Intake/Output Summary (Last 24 hours) at 2020 1613  Last data filed at 2020 1453  Gross " per 24 hour   Intake 4.96 ml   Output --   Net 4.96 ml       Lines/Drains/Airways     Peripheral Intravenous Line                 Peripheral IV - Single Lumen 08/06/20 1157 18 G Left Forearm less than 1 day         Peripheral IV - Single Lumen 08/06/20 1415 18 G Left Forearm less than 1 day                Physical Exam   Constitutional: He is oriented to person, place, and time. He appears well-developed and well-nourished. No distress.   HENT:   Mouth/Throat: Oropharynx is clear and moist.   Eyes: Pupils are equal, round, and reactive to light.   Neck: Neck supple.   Cardiovascular: Normal rate, regular rhythm and intact distal pulses.   No murmur heard.  Pulmonary/Chest: Effort normal and breath sounds normal. No respiratory distress. He has no wheezes. He has no rales.   Abdominal: Soft. Bowel sounds are normal. He exhibits no distension. There is no abdominal tenderness.   Musculoskeletal:         General: No tenderness or edema.   Neurological: He is alert and oriented to person, place, and time.   Skin: Skin is warm and dry. He is not diaphoretic.   Psychiatric: He has a normal mood and affect. His behavior is normal.   Vitals reviewed.      Significant Labs:   BMP:   Recent Labs   Lab 08/06/20  1150   *   *   K 3.9      CO2 22*   BUN 26*   CREATININE 1.5*   CALCIUM 9.3   , CMP   Recent Labs   Lab 08/06/20  1150   *   K 3.9      CO2 22*   *   BUN 26*   CREATININE 1.5*   CALCIUM 9.3   PROT 8.0   ALBUMIN 2.1*   BILITOT 1.9*   ALKPHOS 834*   AST 67*   ALT 58*   ANIONGAP 7*   ESTGFRAFRICA 49.4*   EGFRNONAA 42.7*    and CBC   Recent Labs   Lab 08/06/20  1150   WBC 9.59   HGB 9.9*   HCT 31.2*          Significant Imaging: EKG: <1 mm ST elevation in leadsd II and aVF    Assessment and Plan:     * NSTEMI (non-ST elevated myocardial infarction)  -Patient with EMA score of 4, patient clinical history consistent with angina  -EKG with <1mm ST elevation in leads III and AVF,  +cardiac biomarker (trops 0.043-> 0.028)  -Loaded with ASA + Brillanta and systemic heparin.  -Continue GDMT (BB, will restart losartan tomorrow pending improvement in renal function)  -s/p LHC revealed severe stenosis of the proximal and ostial RCA s/p 3.5 x 38 LISSETH.       Essential hypertension  Continue with metoprolol. Holding losartan in the setting of EMIGDIO     Coronary artery disease involving native coronary artery of native heart without angina pectoris  -See 'NSTEMI'    Dyslipidemia  -Continue atorvastatin 80mg qD  -Lipid panel pending     Type 2 diabetes mellitus without retinopathy  Hgb 7.7  Home insulin regimen- Insulin lantus 65 units qHS, novolog 12 units TIDWM    Plan  -Restart Insulin lantus 20 units, aspart 5 units TIDWM   -Maintain -180  -POCT glucose AC/HS         VTE Risk Mitigation (From admission, onward)         Ordered     heparin 25,000 units in dextrose 5% (100 units/ml) IV bolus from bag - ADDITIONAL PRN BOLUS - 60 units/kg (max bolus 4000 units)  As needed (PRN)     Question:  Heparin Infusion Adjustment (DO NOT MODIFY ANSWER)  Answer:  \\[email or web address]\epic\Images\Pharmacy\HeparinInfusions\heparin LOW INTENSITY nomogram for [facility] [id number]    08/06/20 1403     heparin 25,000 units in dextrose 5% (100 units/ml) IV bolus from bag - ADDITIONAL PRN BOLUS - 30 units/kg (max bolus 4000 units)  As needed (PRN)     Question:  Heparin Infusion Adjustment (DO NOT MODIFY ANSWER)  Answer:  \\[email or web address]\epic\Images\Pharmacy\HeparinInfusions\heparin LOW INTENSITY nomogram for [facility] [id number].pdf    08/06/20 1403     heparin (porcine) injection  As needed (PRN)      08/06/20 1527     heparin (porcine) 750 Units, verapamiL 1.25 mg, nitroGLYCERIN 1.25 mg in sodium chloride 0.9% 250 mL  As needed (PRN)      08/06/20 1514     heparin infusion 1,000 units/500 ml in 0.9% NaCl (pressure line flush)  Intra-op continuous PRN      08/06/20 1510     IP VTE HIGH RISK PATIENT  Once      08/06/20 1403     Place  sequential compression device  Until discontinued      08/06/20 140                Reginald Rebollar MD  Cardiology   [facility]

## 2020-08-06 NOTE — ED NOTES
Patient identifiers for Anjum Cai Jr. 84 y.o. male checked and correct.  Chief Complaint   Patient presents with    Chest Pain     Pt reports chest tightness x1 week. Pt took 1 nitro at home with some relief.      Past Medical History:   Diagnosis Date    Anemia 2/26/2018    Arthritis     BPH (benign prostatic hyperplasia)     Cataract     right     Colon polyp 11/18/2015    Colon polyps     Coronary artery disease     DR (diabetic retinopathy)     Dyslipidemia     Elevated PSA     Goiter, nontoxic, multinodular     Headaches, cluster     Hypertension     Kidney stones     Lump in the testicle     Left    Prostate cancer     Rotator cuff tendinitis     Right shoulder    Sleep apnea with use of continuous positive airway pressure (CPAP)     uses cpap at night    Type II or unspecified type diabetes mellitus with other specified manifestations, uncontrolled      Allergies reported:   Review of patient's allergies indicates:   Allergen Reactions    Ace inhibitors      Other reaction(s): cough    Cefadroxil      Other reaction(s): possible vasculitis  Other reaction(s): vasculitis         LOC: Patient is awake, alert, and aware of environment with an appropriate affect. Patient is oriented x 4 and speaking appropriately.  APPEARANCE: Patient resting comfortably and in no acute distress. Patient is clean and well groomed, patient's clothing is properly fastened.  HEENT: Wearing mask and glasses.  SKIN: The skin is warm and dry. Patient has normal skin turgor and moist mucus membranes. Denies fever or chills.   MUSKULOSKELETAL: Patient is moving all extremities well, no obvious deformities noted. Pulses intact. Reports weakness.  RESPIRATORY: Airway is open and patent. Respirations are spontaneous and non-labored with normal effort and rate. Reports SOB.  CARDIAC: Patient has a normal rate and rhythm. 85 on cardiac monitor. Bilateral lower extremity, non-pitting peripheral edema noted. Reports  chest tightness for a week. Took a nitro at home. States still feeling some pressure 5/10, but chest tightness has eased up since first nitro.   ABDOMEN: No distention noted. Soft and non-tender upon palpation. 293 CBG via EMS. Reports receiving radiation for prostate cancer. Denies n/v/d.   NEUROLOGICAL: pupils 3mm, PERRL. Facial expression is symmetrical. Hand grasps are equal bilaterally. Normal sensation in all extremities when touched with finger. Denies headache.       Ivett Pearl RN  08/06/20 [id number]

## 2020-08-06 NOTE — ASSESSMENT & PLAN NOTE
Hgb 7.7  Home insulin regimen- Insulin lantus 65 units qHS, novolog 12 units TIDWM    Plan  -Restart Insulin lantus 20 units, aspart 5 units TIDWM   -Maintain -180  -POCT glucose AC/HS

## 2020-08-06 NOTE — SUBJECTIVE & OBJECTIVE
Past Medical History:   Diagnosis Date    Anemia 2018    Arthritis     BPH (benign prostatic hyperplasia)     Cataract     right     Colon polyp 2015    Colon polyps     Coronary artery disease     DR (diabetic retinopathy)     Dyslipidemia     Elevated PSA     Goiter, nontoxic, multinodular     Headaches, cluster     Hypertension     Kidney stones     Lump in the testicle     Left    Prostate cancer     Rotator cuff tendinitis     Right shoulder    Sleep apnea with use of continuous positive airway pressure (CPAP)     uses cpap at night    Type II or unspecified type diabetes mellitus with other specified manifestations, uncontrolled        Past Surgical History:   Procedure Laterality Date    CARDIAC CATHETERIZATION      CATARACT EXTRACTION W/  INTRAOCULAR LENS IMPLANT Right 2016    Dr. Garcia    CHOLECYSTECTOMY  2018    CORONARY STENT PLACEMENT  2007    LAD, RCAx2    INGUINAL HERNIA REPAIR Right     PROSTATE BIOPSY  2012    ROTATOR CUFF REPAIR Right 2014    x2    ROTATOR CUFF REPAIR Left     TONSILLECTOMY, ADENOIDECTOMY      as child    TRIGGER FINGER RELEASE Right 2004    x2       Review of patient's allergies indicates:   Allergen Reactions    Ace inhibitors      Other reaction(s): cough    Cefadroxil      Other reaction(s): possible vasculitis  Other reaction(s): vasculitis       (Not in a hospital admission)    Family History     Problem Relation (Age of Onset)    Cancer Mother    Cataracts Father    Diabetes Mother    Heart disease Father        Tobacco Use    Smoking status: Former Smoker     Quit date: 3/12/1978     Years since quittin.4    Smokeless tobacco: Never Used   Substance and Sexual Activity    Alcohol use: No    Drug use: No    Sexual activity: Not on file     Review of Systems   Constitution: Negative.   HENT: Negative.    Eyes: Negative.    Cardiovascular: Positive for chest pain.   Respiratory: Negative.     Endocrine: Negative.    Skin: Negative.    Musculoskeletal: Negative.    Gastrointestinal: Negative.    Genitourinary: Negative.    Neurological: Negative.    All other systems reviewed and are negative.    Objective:     Vital Signs (Most Recent):  Temp: 98.7 °F (37.1 °C) (08/06/20 1121)  Pulse: 78 (08/06/20 1431)  Resp: 19 (08/06/20 1422)  BP: (!) 117/57 (08/06/20 1431)  SpO2: 100 % (08/06/20 1431) Vital Signs (24h Range):  Temp:  [98.7 °F (37.1 °C)] 98.7 °F (37.1 °C)  Pulse:  [73-94] 78  Resp:  [13-20] 19  SpO2:  [96 %-100 %] 100 %  BP: (104-134)/(53-63) 117/57     Weight: 81.6 kg (180 lb)  Body mass index is 29.05 kg/m².    SpO2: 100 %  O2 Device (Oxygen Therapy): room air    No intake or output data in the 24 hours ending 08/06/20 1447    Lines/Drains/Airways     Peripheral Intravenous Line                 Peripheral IV - Single Lumen 08/06/20 1157 18 G Left Forearm less than 1 day         Peripheral IV - Single Lumen 08/06/20 1415 18 G Left Forearm less than 1 day                Physical Exam   Constitutional: He is oriented to person, place, and time. He appears well-developed and well-nourished.   HENT:   Head: Normocephalic and atraumatic.   Eyes: Pupils are equal, round, and reactive to light. EOM are normal.   Neck: Normal range of motion. Neck supple. No JVD present.   Cardiovascular: Normal rate, regular rhythm and intact distal pulses.   Pulmonary/Chest: Effort normal.   Abdominal: Soft. Bowel sounds are normal.   Musculoskeletal: Normal range of motion.   Neurological: He is alert and oriented to person, place, and time.   Skin: Skin is warm and dry. He is not diaphoretic.       Significant Labs: All pertinent lab results from the last 24 hours have been reviewed.    Significant Imaging: reviewed.

## 2020-08-06 NOTE — BRIEF OP NOTE
Brief Operative Report:    : Jerson Cavanaugh MD     All Operators: Surgeon(s):  Jerson Cavanaugh MD     Preoperative Diagnosis: NSTEMI (non-ST elevated myocardial infarction) [I21.4]     Postop Diagnosis: NSTEMI (non-ST elevated myocardial infarction) [I21.4]    Treatments/Procedures: Procedure(s) (LRB):  CATHETERIZATION, HEART, LEFT (Left)    Findings:Severe coronary disease is present. See catheterization report for full details. Pt with severe stenosis of the proximal and ostial RCA s/p 3.5 x 38 LISSETH.     Estimated Blood loss: 20 cc    Specimens removed: No    Plan   ASA lifelong/ Brilinta for 1 year  Echo  Cardiac rehab   Continue Lipitor    [facility]

## 2020-08-06 NOTE — ASSESSMENT & PLAN NOTE
-Patient with EMA score of 4, patient clinical history consistent with UA  -recommend admission to hospital medicine for ACS   -give Brillanta load 180 mg x1 and  mg x1 now  -start systemic heparin drip   -resume home medications (statin, beta-blocker, ARB)  -trend cardiac enzymes x3 and obtain formal 2D echo  -monitor on telemetry

## 2020-08-06 NOTE — ASSESSMENT & PLAN NOTE
-Patient with EMA score of 4, patient clinical history consistent with angina  -EKG with <1mm ST elevation in leads III and AVF, +cardiac biomarker (trops 0.043-> 0.028)  -Loaded with ASA + Brillanta and systemic heparin.       -s/p LHC revealed severe stenosis of the proximal and ostial RCA s/p 3.5 x 38 LISSETH.   -Continue GDMT (BB, ARB), DAPT  - Cardiac rehab once discharged.

## 2020-08-06 NOTE — ASSESSMENT & PLAN NOTE
-Patient with EMA score of 4, patient clinical history consistent with angina  -EKG with <1mm ST elevation in leads III and AVF, +cardiac biomarker (trops 0.043-> 0.028)  -Loaded with ASA + Brillanta and systemic heparin  -Plan for C today

## 2020-08-06 NOTE — CONSULTS
[facility]  Cardiology  Consult Note    Patient Name: Anjum Cai Jr.  MRN: [id number]  Admission Date: 8/6/2020  Attending Provider: Zuly Johnson MD   Consulting Provider: Amanda Correia MD  Primary Care Physician: Gavin Martinez MD    Patient information was obtained from patient and ER records.     Inpatient consult to Cardiology  Consult performed by: Amanda Correia MD  Consult ordered by: Zuly Johnson MD  Reason for consult: chest pain         Subjective:     Chief Complaint:  Chest Pain      HPI: 84 year old male with history of DM2, prostate cancer s/p radiation treatment, HTN, HLP and CAD s/p PCI with LISSETH to LAD and RCA (2007) followed by coronary angiogram in 2016 (LM 50% stenosis with FFR 0.87 and obstructive disease in OM/D2 (no intervention because of small size vessels) who presents today with acute onset substernal chest pain with radiation to the left arm. Patient reports this morning (around 6 AM) he woke up and had chest discomfort similar to his episodes when he had heart attacks in the past. He was resting when the pain started. The chest pain was constant and associated with shortness of breath. He denied having nausea, numbness/tingling of arm or sweating. He took SL nitro tablet at home and immediately called 911. When he was evaluated in the ER, his CP decreased but was still present. EKG was done that showed sinus rhythm, RBBB and ZION of lead III/AVF (not meeting STEMI criteria). He was given additional SL nitro and is CP free now. He states three weeks ago he has completed radiation therapy and have been having a wide spectrum of symptoms. He states he went to the grocery store couple days ago and was able to walk without having CP or SOB but when he arrived home was completely exhausted. Labs show Hb of 9.9, troponin of 0.4 and serum Cr of 1.5. Cardiology consulted for chest pain.     Coronary Angiogram (2016):     Patient has a right dominant  coronary artery.        - Left Main Coronary Artery:              The LM has a 50% stenosis. There is EMA 3 flow. FFR was 0.87.      - Left Anterior Descending Artery:              The LAD has luminal irregularities. There is EMA 3 flow.      - D2:              The ostial D2 has a 99% stenosis. There is EMA 3 flow.                      Lesion Details:   This is a Type C lesion.      - Left Circumflex Artery:              The LCX has luminal irregularities. There is EMA 3 flow.      - OM2:              The ostial OM2 has a 95% stenosis. There is EMA 3 flow.      - Right Coronary Artery:              The mid RCA has a 40% stenosis. There is EMA 3 flow. Tandem lesions.      - Common Femoral Artery:              The right CFA is normal.      - Femoral Artery:              The femoral artery is normal.     TTE (2016):   CONCLUSIONS     1 - Normal left ventricular systolic function (EF 60-65%).     2 - Low normal right ventricular systolic function .     3 - Left ventricular diastolic dysfunction.     Past Medical History:   Diagnosis Date    Anemia 2/26/2018    Arthritis     BPH (benign prostatic hyperplasia)     Cataract     right     Colon polyp 11/18/2015    Colon polyps     Coronary artery disease     DR (diabetic retinopathy)     Dyslipidemia     Elevated PSA     Goiter, nontoxic, multinodular     Headaches, cluster     Hypertension     Kidney stones     Lump in the testicle     Left    Prostate cancer     Rotator cuff tendinitis     Right shoulder    Sleep apnea with use of continuous positive airway pressure (CPAP)     uses cpap at night    Type II or unspecified type diabetes mellitus with other specified manifestations, uncontrolled        Past Surgical History:   Procedure Laterality Date    CARDIAC CATHETERIZATION  2007    CATARACT EXTRACTION W/  INTRAOCULAR LENS IMPLANT Right 09/27/2016    Dr. Garcia    CHOLECYSTECTOMY  02/2018    CORONARY STENT PLACEMENT  02/02/2007    LAD,  RCAx2    INGUINAL HERNIA REPAIR Right 1995    PROSTATE BIOPSY  2012    ROTATOR CUFF REPAIR Right 2014    x2    ROTATOR CUFF REPAIR Left 2013    TONSILLECTOMY, ADENOIDECTOMY      as child    TRIGGER FINGER RELEASE Right 2004    x2       Review of patient's allergies indicates:   Allergen Reactions    Ace inhibitors      Other reaction(s): cough    Cefadroxil      Other reaction(s): possible vasculitis  Other reaction(s): vasculitis       No current facility-administered medications on file prior to encounter.      Current Outpatient Medications on File Prior to Encounter   Medication Sig    nitroGLYCERIN (NITROSTAT) 0.4 MG SL tablet Place 1 tablet (0.4 mg total) under the tongue every 5 (five) minutes as needed for Chest pain.    ACCU-CHEK SMARTVIEW TEST STRIP Strp CHECK BLOOD SUGARS FOUR TIMES DAILY BEFORE MEALS AS DIRECTED    ASPIRIN (ASPIR-81 ORAL) Take 1 tablet by mouth Daily.    atorvastatin (LIPITOR) 40 MG tablet Take 1 tablet (40 mg total) by mouth once daily.    benzonatate (TESSALON PERLES) 100 MG capsule Take 1 capsule (100 mg total) by mouth every 6 (six) hours as needed for Cough.    ciclopirox (PENLAC) 8 % Soln Apply topically nightly.    ciclopirox 0.77 % Gel Apply topically 2 (two) times daily.    ergocalciferol (ERGOCALCIFEROL) 50,000 unit Cap TAKE 1 CAPSULE BY MOUTH ONCE EVERY 30 DAYS    finasteride (PROSCAR) 5 mg tablet TAKE 1 TABLET(5 MG) BY MOUTH EVERY DAY    FLUAD 8896-2212, 65 YR UP,,PF, 45 mcg (15 mcg x 3)/0.5 mL Syrg     insulin (LANTUS SOLOSTAR U-100 INSULIN) glargine 100 units/mL (3mL) SubQ pen ADMINISTER 65 UNITS UNDER THE SKIN EVERY EVENING    insulin aspart U-100 (NOVOLOG FLEXPEN U-100 INSULIN) 100 unit/mL (3 mL) InPn pen ADMINISTER 12 UNITS UNDER THE SKIN THREE TIMES DAILY WITH MEALS PER SLIDING SCALE    irbesartan (AVAPRO) 75 MG tablet Take 1 tablet (75 mg total) by mouth once daily.    lancets (ACCU-CHEK FASTCLIX LANCET DRUM) Misc Check blood sugars four times a  "day.    metoprolol succinate (TOPROL-XL) 50 MG 24 hr tablet TAKE 1 TABLET(50 MG) BY MOUTH EVERY DAY    mometasone 0.1% (ELOCON) 0.1 % cream Apply to affected area daily    MULTIVITAMIN W-MINERALS/LUTEIN (CENTRUM SILVER ORAL) Take by mouth once daily.    omega-3 fatty acids-vitamin E (FISH OIL) 1,000 mg Cap Take 2 capsules by mouth Daily.     omeprazole (PRILOSEC) 40 MG capsule Take 1 capsule (40 mg total) by mouth once daily.    pen needle, diabetic (BD ULTRA-FINE MINI PEN NEEDLE) 31 gauge x 3/16" Ndle Use new needle with each injection. Inject insulin 4 times a day.     Family History     Problem Relation (Age of Onset)    Cancer Mother    Cataracts Father    Diabetes Mother    Heart disease Father        Tobacco Use    Smoking status: Former Smoker     Quit date: 3/12/1978     Years since quittin.4    Smokeless tobacco: Never Used   Substance and Sexual Activity    Alcohol use: No    Drug use: No    Sexual activity: Not on file     Review of Systems   Constitution: Negative for chills and fever.   HENT: Negative for congestion.    Cardiovascular: Positive for chest pain. Negative for irregular heartbeat, leg swelling, near-syncope, orthopnea, palpitations, paroxysmal nocturnal dyspnea and syncope.   Respiratory: Positive for shortness of breath.    Gastrointestinal: Negative for abdominal pain, nausea and vomiting.   Neurological: Negative for dizziness, focal weakness, headaches, light-headedness and weakness.     Objective:     Vital Signs (Most Recent):  Temp: 98.7 °F (37.1 °C) (20 1121)  Pulse: 91 (20 1231)  Resp: 14 (20 1231)  BP: (!) 113/56 (20 1231)  SpO2: 98 % (20 1231) Vital Signs (24h Range):  Temp:  [98.7 °F (37.1 °C)] 98.7 °F (37.1 °C)  Pulse:  [83-94] 91  Resp:  [13-20] 14  SpO2:  [98 %-100 %] 98 %  BP: (108-134)/(55-63) 113/56     Weight: 81.6 kg (180 lb)  Body mass index is 29.05 kg/m².    SpO2: 98 %  O2 Device (Oxygen Therapy): room air    No intake or " output data in the 24 hours ending 08/06/20 1240    Lines/Drains/Airways     Peripheral Intravenous Line                 Peripheral IV - Single Lumen 08/06/20 1156 20 G Right Forearm less than 1 day         Peripheral IV - Single Lumen 08/06/20 1157 18 G Left Forearm less than 1 day                Physical Exam   Constitutional: He is oriented to person, place, and time. No distress.   HENT:   Mouth/Throat: Oropharynx is clear and moist.   Eyes: Pupils are equal, round, and reactive to light.   Neck: Neck supple.   Cardiovascular: Normal rate, regular rhythm and intact distal pulses.   Pulmonary/Chest: Effort normal and breath sounds normal.   Abdominal: Soft.   Musculoskeletal:         General: No edema.   Neurological: He is alert and oriented to person, place, and time.   Skin: Skin is warm and dry. He is not diaphoretic.   Psychiatric: He has a normal mood and affect. His behavior is normal.   Vitals reviewed.      Significant Labs: All pertinent lab results from the last 24 hours have been reviewed.    Significant Imaging: Echocardiogram:   2D echo with color flow doppler:   Results for orders placed or performed during the hospital encounter of 12/09/16   2D echo with color flow doppler   Result Value Ref Range    QEF 60 55 - 65    Mitral Valve Regurgitation TRIVIAL     Diastolic Dysfunction Yes (A)     Mitral Valve Mobility NORMAL     Narrative    Date of Procedure: 12/09/2016        TEST DESCRIPTION       Aorta: The aortic root is normal in size, measuring 3.4 cm at sinotubular junction and 3.9 cm at Sinuses of Valsalva. The proximal ascending aorta is normal in size, measuring 3.5 cm across.     Left Atrium: The left atrium is normal in size, measuring 3.5 cm across in the parasternal view.     Left Ventricle: The left ventricle is normal in size, with an end-diastolic diameter of 5.2 cm, and an end-systolic diameter of 3.1 cm. LV wall thickness is normal, with the septum and the posterior wall each  measuring 0.9 cm across. Relative wall   thickness was normal at 0.35, and the LV mass index was 95.1 g/m2 consistent with normal left ventricular mass. Global left ventricular systolic function appears normal. Visually estimated ejection fraction is 60-65%. The LV Doppler derived stroke volume   equals 75.0 ccs.   There is normal systolic/diastolic flow in the pulmonary vein indicating normal left atrial pressures. The E/e'(lat) is 15, consistent with diastolic dysfunction secondary to relaxation abnormality.     Right Atrium: The right atrium is normal in size, measuring 4.7 cm in length and 3.3 cm in width in the apical view.     Right Ventricle: The right ventricle is normal in size measuring 3.4 cm at the base in the apical right ventricle-focused view. Global right ventricular systolic function appears low normal.     Aortic Valve:  The aortic valve is tri-leaflet in structure. Aortic valve is normal in structure with normal leaflet mobility.     Mitral Valve:  The mitral valve is mildly sclerotic with normal leaflet mobility. There is trivial mitral regurgitation.     Tricuspid Valve:  Tricuspid valve is normal in structure with normal leaflet mobility.     Pulmonary Valve:  The pulmonic valve is not well seen.     IVC: IVC is normal in size and collapses > 50% with a sniff, suggesting normal right atrial pressure of 3 mmHg.     Intracavitary: There is no evidence of pericardial effusion, intracavity mass, thrombi, or vegetation.         CONCLUSIONS     1 - Normal left ventricular systolic function (EF 60-65%).     2 - Low normal right ventricular systolic function .     3 - Left ventricular diastolic dysfunction.             This document has been electronically    SIGNED BY: Rizwan Reyes MD On: 12/09/2016 11:07     Assessment and Plan:     NSTEMI  -Patient with EMA score of 4, patient clinical history consistent with angina  -EKG with ischemic changes, +cardiac biomarker  -discussed with ER to give  Donnaillanta load and start systemic heparin  -will discuss with IC regarding LHC +/- PCI  -discussed with CCU attending regarding Cardiology team admission    Thank you for your consult.     Amanda Correia MD  Cardiology Fellow, PGY-6   [facility]

## 2020-08-06 NOTE — HPI
84 year old male with history of DM2, prostate cancer s/p radiation treatment, HTN, HLP and CAD s/p PCI with LISSETH to LAD and RCA (2007) followed by coronary angiogram in 2016 (LM 50% stenosis with FFR 0.87 and obstructive disease in OM/D2 (no intervention because of small size vessels) who presents today with acute onset substernal chest pain with radiation to the left arm. Patient reports this morning (around 6 AM) he woke up and had chest discomfort similar to his episodes when he had heart attacks in the past. He was resting when the pain started. The chest pain was constant and associated with shortness of breath. He denied having nausea, numbness/tingling of arm or sweating. He took SL nitro tablet at home and immediately called 911. When he was evaluated in the ER, his CP decreased but was still present. EKG was done that showed sinus rhythm, RBBB and ST elevation of lead III/AVF (not meeting STEMI criteria). He was given additional SL nitro and is CP free now. He states three weeks ago he has completed radiation therapy and have been having a wide spectrum of symptoms. He states he went to the grocery store couple days ago and was able to walk without having CP or SOB but when he arrived home was completely exhausted. VSS and labs show Hb of 9.9, troponin of 0.4 and serum Cr of 1.5. Cardiology evaluated the pt in the ED and accepted him to the CCU. Interventional cardiology was then consulted for NSTEMI with plans for angiogram.

## 2020-08-06 NOTE — NURSING TRANSFER
Nursing Transfer Note      8/6/2020     Transfer From: cath lab    Transfer via stretcher    Transfer with cardiac monitoring    Transported by RN    Medicines sent: NS infusing    Chart send with patient: Yes    Notified: family    Patient reassessed at: arrival    Upon arrival to floor: cardiac monitor applied, patient oriented to room, call bell in reach and bed in lowest position

## 2020-08-06 NOTE — ASSESSMENT & PLAN NOTE
-known history CAD s/p CAD s/p PCI to LAD and RCA x 2 (2007) for NSTEMI  -no hx LV dysfunction   -ECG changes seen in ED in inferior leads   -now CP free s/p nitro  -s/p asa and brilinta load in ED + lipitor 80. On home lipitor 40  -pre-hydrate with NS 3cc/kg/hr for 1 hr  -plan for LHC via right radial access today

## 2020-08-06 NOTE — SUBJECTIVE & OBJECTIVE
Past Medical History:   Diagnosis Date    Anemia 2/26/2018    Arthritis     BPH (benign prostatic hyperplasia)     Cataract     right     Colon polyp 11/18/2015    Colon polyps     Coronary artery disease      (diabetic retinopathy)     Dyslipidemia     Elevated PSA     Goiter, nontoxic, multinodular     Headaches, cluster     Hypertension     Kidney stones     Lump in the testicle     Left    Prostate cancer     Rotator cuff tendinitis     Right shoulder    Sleep apnea with use of continuous positive airway pressure (CPAP)     uses cpap at night    Type II or unspecified type diabetes mellitus with other specified manifestations, uncontrolled        Past Surgical History:   Procedure Laterality Date    CARDIAC CATHETERIZATION  2007    CATARACT EXTRACTION W/  INTRAOCULAR LENS IMPLANT Right 09/27/2016    Dr. Garcia    CHOLECYSTECTOMY  02/2018    CORONARY STENT PLACEMENT  02/02/2007    LAD, RCAx2    INGUINAL HERNIA REPAIR Right 1995    PROSTATE BIOPSY  2012    ROTATOR CUFF REPAIR Right 2014    x2    ROTATOR CUFF REPAIR Left 2013    TONSILLECTOMY, ADENOIDECTOMY      as child    TRIGGER FINGER RELEASE Right 2004    x2       Review of patient's allergies indicates:   Allergen Reactions    Ace inhibitors      Other reaction(s): cough    Cefadroxil      Other reaction(s): possible vasculitis  Other reaction(s): vasculitis       No current facility-administered medications on file prior to encounter.      Current Outpatient Medications on File Prior to Encounter   Medication Sig    ACCU-CHEK SMARTVIEW TEST STRIP Strp CHECK BLOOD SUGARS FOUR TIMES DAILY BEFORE MEALS AS DIRECTED    ASPIRIN (ASPIR-81 ORAL) Take 1 tablet by mouth Daily.    atorvastatin (LIPITOR) 40 MG tablet Take 1 tablet (40 mg total) by mouth once daily.    ciclopirox (PENLAC) 8 % Soln Apply topically nightly.    ciclopirox 0.77 % Gel Apply topically 2 (two) times daily.    ergocalciferol (ERGOCALCIFEROL) 50,000 unit  "Cap TAKE 1 CAPSULE BY MOUTH ONCE EVERY 30 DAYS    finasteride (PROSCAR) 5 mg tablet TAKE 1 TABLET(5 MG) BY MOUTH EVERY DAY    FLUAD 8931-1704, 65 YR UP,,PF, 45 mcg (15 mcg x 3)/0.5 mL Syrg     insulin (LANTUS SOLOSTAR U-100 INSULIN) glargine 100 units/mL (3mL) SubQ pen ADMINISTER 65 UNITS UNDER THE SKIN EVERY EVENING    insulin aspart U-100 (NOVOLOG FLEXPEN U-100 INSULIN) 100 unit/mL (3 mL) InPn pen ADMINISTER 12 UNITS UNDER THE SKIN THREE TIMES DAILY WITH MEALS PER SLIDING SCALE    irbesartan (AVAPRO) 75 MG tablet Take 1 tablet (75 mg total) by mouth once daily.    lancets (ACCU-CHEK FASTCLIX LANCET DRUM) Misc Check blood sugars four times a day.    metoprolol succinate (TOPROL-XL) 50 MG 24 hr tablet TAKE 1 TABLET(50 MG) BY MOUTH EVERY DAY    mometasone 0.1% (ELOCON) 0.1 % cream Apply to affected area daily    MULTIVITAMIN W-MINERALS/LUTEIN (CENTRUM SILVER ORAL) Take by mouth once daily.    nitroGLYCERIN (NITROSTAT) 0.4 MG SL tablet Place 1 tablet (0.4 mg total) under the tongue every 5 (five) minutes as needed for Chest pain.    omega-3 fatty acids-vitamin E (FISH OIL) 1,000 mg Cap Take 2 capsules by mouth Daily.     omeprazole (PRILOSEC) 40 MG capsule Take 1 capsule (40 mg total) by mouth once daily.    pen needle, diabetic (BD ULTRA-FINE MINI PEN NEEDLE) 31 gauge x 3/16" Ndle Use new needle with each injection. Inject insulin 4 times a day.    benzonatate (TESSALON PERLES) 100 MG capsule Take 1 capsule (100 mg total) by mouth every 6 (six) hours as needed for Cough.     Family History     Problem Relation (Age of Onset)    Cancer Mother    Cataracts Father    Diabetes Mother    Heart disease Father        Tobacco Use    Smoking status: Former Smoker     Quit date: 3/12/1978     Years since quittin.4    Smokeless tobacco: Never Used   Substance and Sexual Activity    Alcohol use: No    Drug use: No    Sexual activity: Not on file     Review of Systems   Constitution: Negative for chills " and fever.   HENT: Negative for congestion.    Cardiovascular: Positive for chest pain. Negative for irregular heartbeat, leg swelling, near-syncope, orthopnea, palpitations, paroxysmal nocturnal dyspnea and syncope.   Respiratory: Negative for shortness of breath.    Gastrointestinal: Negative for abdominal pain, nausea and vomiting.   Neurological: Negative for dizziness, focal weakness, headaches, light-headedness and weakness.     Objective:     Vital Signs (Most Recent):  Temp: 98.7 °F (37.1 °C) (08/06/20 1121)  Pulse: 87 (08/06/20 1441)  Resp: 20 (08/06/20 1441)  BP: 136/63 (08/06/20 1441)  SpO2: 100 % (08/06/20 1441) Vital Signs (24h Range):  Temp:  [98.7 °F (37.1 °C)] 98.7 °F (37.1 °C)  Pulse:  [73-94] 87  Resp:  [13-20] 20  SpO2:  [96 %-100 %] 100 %  BP: (104-136)/(53-63) 136/63     Weight: 81.6 kg (180 lb)  Body mass index is 29.05 kg/m².    SpO2: 100 %  O2 Device (Oxygen Therapy): nasal cannula      Intake/Output Summary (Last 24 hours) at 8/6/2020 1613  Last data filed at 8/6/2020 1453  Gross per 24 hour   Intake 4.96 ml   Output --   Net 4.96 ml       Lines/Drains/Airways     Peripheral Intravenous Line                 Peripheral IV - Single Lumen 08/06/20 1157 18 G Left Forearm less than 1 day         Peripheral IV - Single Lumen 08/06/20 1415 18 G Left Forearm less than 1 day                Physical Exam   Constitutional: He is oriented to person, place, and time. He appears well-developed and well-nourished. No distress.   HENT:   Mouth/Throat: Oropharynx is clear and moist.   Eyes: Pupils are equal, round, and reactive to light.   Neck: Neck supple.   Cardiovascular: Normal rate, regular rhythm and intact distal pulses.   No murmur heard.  Pulmonary/Chest: Effort normal and breath sounds normal. No respiratory distress. He has no wheezes. He has no rales.   Abdominal: Soft. Bowel sounds are normal. He exhibits no distension. There is no abdominal tenderness.   Musculoskeletal:         General: No  tenderness or edema.   Neurological: He is alert and oriented to person, place, and time.   Skin: Skin is warm and dry. He is not diaphoretic.   Psychiatric: He has a normal mood and affect. His behavior is normal.   Vitals reviewed.      Significant Labs:   BMP:   Recent Labs   Lab 08/06/20  1150   *   *   K 3.9      CO2 22*   BUN 26*   CREATININE 1.5*   CALCIUM 9.3   , CMP   Recent Labs   Lab 08/06/20  1150   *   K 3.9      CO2 22*   *   BUN 26*   CREATININE 1.5*   CALCIUM 9.3   PROT 8.0   ALBUMIN 2.1*   BILITOT 1.9*   ALKPHOS 834*   AST 67*   ALT 58*   ANIONGAP 7*   ESTGFRAFRICA 49.4*   EGFRNONAA 42.7*    and CBC   Recent Labs   Lab 08/06/20  1150   WBC 9.59   HGB 9.9*   HCT 31.2*          Significant Imaging: EKG: <1 mm ST elevation in leadsd II and aVF

## 2020-08-06 NOTE — NURSING
Dr. Rogers is aware of patient's hematoma on rt. Forearm adjacent to vascband. Vss. Palpable radial pulse. Reported to nightly OC.

## 2020-08-06 NOTE — ED NOTES
Patient reports 0/10 chest pressure after administration of nitroglycerin 0.4mg. Jacquie Odonnell MD informed of patient status. Continuous pulse oximetry, BP, and cardiac monitoring in place. Call bell within reach. Will continue to monitor.       Ivett Pearl RN  08/06/20 0005

## 2020-08-07 DIAGNOSIS — I25.10 CORONARY ARTERY DISEASE INVOLVING NATIVE CORONARY ARTERY OF NATIVE HEART WITHOUT ANGINA PECTORIS: ICD-10-CM

## 2020-08-07 DIAGNOSIS — Z98.61 POSTSURGICAL PERCUTANEOUS TRANSLUMINAL CORONARY ANGIOPLASTY STATUS: Primary | ICD-10-CM

## 2020-08-07 PROBLEM — D72.829 LEUKOCYTOSIS: Status: ACTIVE | Noted: 2020-08-07

## 2020-08-07 LAB
AMORPH CRY UR QL COMP ASSIST: NORMAL
ANION GAP SERPL CALC-SCNC: 10 MMOL/L (ref 8–16)
ANISOCYTOSIS BLD QL SMEAR: SLIGHT
APTT BLDCRRT: 27.3 SEC (ref 21–32)
ASCENDING AORTA: 3.75 CM
AV INDEX (PROSTH): 0.99
AV MEAN GRADIENT: 2 MMHG
AV PEAK GRADIENT: 3 MMHG
AV VALVE AREA: 3.92 CM2
AV VELOCITY RATIO: 0.93
BACTERIA #/AREA URNS AUTO: NORMAL /HPF
BASOPHILS # BLD AUTO: 0.03 K/UL (ref 0–0.2)
BASOPHILS NFR BLD: 0.2 % (ref 0–1.9)
BILIRUB UR QL STRIP: NEGATIVE
BSA FOR ECHO PROCEDURE: 1.95 M2
BUN SERPL-MCNC: 26 MG/DL (ref 8–23)
CALCIUM SERPL-MCNC: 9 MG/DL (ref 8.7–10.5)
CHLORIDE SERPL-SCNC: 104 MMOL/L (ref 95–110)
CLARITY UR REFRACT.AUTO: CLEAR
CO2 SERPL-SCNC: 20 MMOL/L (ref 23–29)
COLOR UR AUTO: YELLOW
CREAT SERPL-MCNC: 1.6 MG/DL (ref 0.5–1.4)
CV ECHO LV RWT: 0.46 CM
DIFFERENTIAL METHOD: ABNORMAL
DOP CALC AO PEAK VEL: 0.92 M/S
DOP CALC AO VTI: 18.74 CM
DOP CALC LVOT AREA: 4 CM2
DOP CALC LVOT DIAMETER: 2.25 CM
DOP CALC LVOT PEAK VEL: 0.86 M/S
DOP CALC LVOT STROKE VOLUME: 73.52 CM3
DOP CALCLVOT PEAK VEL VTI: 18.5 CM
E WAVE DECELERATION TIME: 143.17 MSEC
E/A RATIO: 0.62
E/E' RATIO: 10.5 M/S
ECHO LV POSTERIOR WALL: 0.99 CM (ref 0.6–1.1)
EOSINOPHIL # BLD AUTO: 0 K/UL (ref 0–0.5)
EOSINOPHIL NFR BLD: 0 % (ref 0–8)
ERYTHROCYTE [DISTWIDTH] IN BLOOD BY AUTOMATED COUNT: 16.4 % (ref 11.5–14.5)
EST. GFR  (AFRICAN AMERICAN): 45.7 ML/MIN/1.73 M^2
EST. GFR  (NON AFRICAN AMERICAN): 39.5 ML/MIN/1.73 M^2
FRACTIONAL SHORTENING: 30 % (ref 28–44)
GLUCOSE SERPL-MCNC: 306 MG/DL (ref 70–110)
GLUCOSE UR QL STRIP: ABNORMAL
HCT VFR BLD AUTO: 32.1 % (ref 40–54)
HGB BLD-MCNC: 10 G/DL (ref 14–18)
HGB UR QL STRIP: ABNORMAL
HYALINE CASTS UR QL AUTO: 1 /LPF
HYPOCHROMIA BLD QL SMEAR: ABNORMAL
IMM GRANULOCYTES # BLD AUTO: 0.11 K/UL (ref 0–0.04)
IMM GRANULOCYTES NFR BLD AUTO: 0.6 % (ref 0–0.5)
INTERVENTRICULAR SEPTUM: 1.12 CM (ref 0.6–1.1)
KETONES UR QL STRIP: NEGATIVE
LA MAJOR: 4.06 CM
LA MINOR: 4.09 CM
LA WIDTH: 3.02 CM
LEFT ATRIUM SIZE: 3.56 CM
LEFT ATRIUM VOLUME INDEX: 19.5 ML/M2
LEFT ATRIUM VOLUME: 37.24 CM3
LEFT INTERNAL DIMENSION IN SYSTOLE: 3 CM (ref 2.1–4)
LEFT VENTRICLE DIASTOLIC VOLUME INDEX: 43.09 ML/M2
LEFT VENTRICLE DIASTOLIC VOLUME: 82.42 ML
LEFT VENTRICLE MASS INDEX: 80 G/M2
LEFT VENTRICLE SYSTOLIC VOLUME INDEX: 18.3 ML/M2
LEFT VENTRICLE SYSTOLIC VOLUME: 34.94 ML
LEFT VENTRICULAR INTERNAL DIMENSION IN DIASTOLE: 4.29 CM (ref 3.5–6)
LEFT VENTRICULAR MASS: 153.01 G
LEUKOCYTE ESTERASE UR QL STRIP: NEGATIVE
LV LATERAL E/E' RATIO: 7.88 M/S
LV SEPTAL E/E' RATIO: 15.75 M/S
LYMPHOCYTES # BLD AUTO: 0.7 K/UL (ref 1–4.8)
LYMPHOCYTES NFR BLD: 4.1 % (ref 18–48)
MAGNESIUM SERPL-MCNC: 1.9 MG/DL (ref 1.6–2.6)
MCH RBC QN AUTO: 29.5 PG (ref 27–31)
MCHC RBC AUTO-ENTMCNC: 31.2 G/DL (ref 32–36)
MCV RBC AUTO: 95 FL (ref 82–98)
MICROSCOPIC COMMENT: NORMAL
MONOCYTES # BLD AUTO: 0.6 K/UL (ref 0.3–1)
MONOCYTES NFR BLD: 3.3 % (ref 4–15)
MV PEAK A VEL: 1.02 M/S
MV PEAK E VEL: 0.63 M/S
MV STENOSIS PRESSURE HALF TIME: 41.52 MS
MV VALVE AREA P 1/2 METHOD: 5.3 CM2
NEUTROPHILS # BLD AUTO: 16.5 K/UL (ref 1.8–7.7)
NEUTROPHILS NFR BLD: 91.8 % (ref 38–73)
NITRITE UR QL STRIP: NEGATIVE
NRBC BLD-RTO: 0 /100 WBC
OVALOCYTES BLD QL SMEAR: ABNORMAL
PH UR STRIP: 5 [PH] (ref 5–8)
PHOSPHATE SERPL-MCNC: 3.3 MG/DL (ref 2.7–4.5)
PLATELET # BLD AUTO: 207 K/UL (ref 150–350)
PMV BLD AUTO: 11.3 FL (ref 9.2–12.9)
POCT GLUCOSE: 194 MG/DL (ref 70–110)
POCT GLUCOSE: 212 MG/DL (ref 70–110)
POCT GLUCOSE: 225 MG/DL (ref 70–110)
POCT GLUCOSE: 322 MG/DL (ref 70–110)
POIKILOCYTOSIS BLD QL SMEAR: SLIGHT
POLYCHROMASIA BLD QL SMEAR: ABNORMAL
POTASSIUM SERPL-SCNC: 3.9 MMOL/L (ref 3.5–5.1)
PROT UR QL STRIP: ABNORMAL
PULM VEIN S/D RATIO: 2.41
PV PEAK D VEL: 0.17 M/S
PV PEAK S VEL: 0.41 M/S
RA MAJOR: 4.22 CM
RA PRESSURE: 3 MMHG
RA WIDTH: 2.78 CM
RBC # BLD AUTO: 3.39 M/UL (ref 4.6–6.2)
RBC #/AREA URNS AUTO: 1 /HPF (ref 0–4)
RIGHT VENTRICULAR END-DIASTOLIC DIMENSION: 2.82 CM
RV TISSUE DOPPLER FREE WALL SYSTOLIC VELOCITY 1 (APICAL 4 CHAMBER VIEW): 11.01 CM/S
SINUS: 3.48 CM
SODIUM SERPL-SCNC: 134 MMOL/L (ref 136–145)
SP GR UR STRIP: 1.02 (ref 1–1.03)
SQUAMOUS #/AREA URNS AUTO: 0 /HPF
STJ: 3.29 CM
TDI LATERAL: 0.08 M/S
TDI SEPTAL: 0.04 M/S
TDI: 0.06 M/S
TRICUSPID ANNULAR PLANE SYSTOLIC EXCURSION: 1.98 CM
URN SPEC COLLECT METH UR: ABNORMAL
WBC # BLD AUTO: 18.01 K/UL (ref 3.9–12.7)
WBC #/AREA URNS AUTO: 2 /HPF (ref 0–5)
YEAST UR QL AUTO: NORMAL

## 2020-08-07 PROCEDURE — 84100 ASSAY OF PHOSPHORUS: CPT | Mod: HCNC

## 2020-08-07 PROCEDURE — 87040 BLOOD CULTURE FOR BACTERIA: CPT | Mod: HCNC

## 2020-08-07 PROCEDURE — 87077 CULTURE AEROBIC IDENTIFY: CPT | Mod: 59,HCNC

## 2020-08-07 PROCEDURE — 63600175 PHARM REV CODE 636 W HCPCS: Mod: HCNC | Performed by: STUDENT IN AN ORGANIZED HEALTH CARE EDUCATION/TRAINING PROGRAM

## 2020-08-07 PROCEDURE — 25000003 PHARM REV CODE 250: Mod: HCNC | Performed by: STUDENT IN AN ORGANIZED HEALTH CARE EDUCATION/TRAINING PROGRAM

## 2020-08-07 PROCEDURE — 87186 SC STD MICRODIL/AGAR DIL: CPT | Mod: HCNC

## 2020-08-07 PROCEDURE — 85025 COMPLETE CBC W/AUTO DIFF WBC: CPT | Mod: HCNC

## 2020-08-07 PROCEDURE — 80048 BASIC METABOLIC PNL TOTAL CA: CPT | Mod: HCNC

## 2020-08-07 PROCEDURE — 36415 COLL VENOUS BLD VENIPUNCTURE: CPT | Mod: HCNC

## 2020-08-07 PROCEDURE — 20600001 HC STEP DOWN PRIVATE ROOM: Mod: HCNC

## 2020-08-07 PROCEDURE — 93010 EKG 12-LEAD: ICD-10-PCS | Mod: HCNC,,, | Performed by: INTERNAL MEDICINE

## 2020-08-07 PROCEDURE — 99232 SBSQ HOSP IP/OBS MODERATE 35: CPT | Mod: HCNC,GC,, | Performed by: INTERNAL MEDICINE

## 2020-08-07 PROCEDURE — 99900035 HC TECH TIME PER 15 MIN (STAT): Mod: HCNC

## 2020-08-07 PROCEDURE — 97802 MEDICAL NUTRITION INDIV IN: CPT | Mod: HCNC

## 2020-08-07 PROCEDURE — 85730 THROMBOPLASTIN TIME PARTIAL: CPT | Mod: HCNC

## 2020-08-07 PROCEDURE — 93005 ELECTROCARDIOGRAM TRACING: CPT | Mod: HCNC

## 2020-08-07 PROCEDURE — 94761 N-INVAS EAR/PLS OXIMETRY MLT: CPT | Mod: HCNC

## 2020-08-07 PROCEDURE — 81001 URINALYSIS AUTO W/SCOPE: CPT | Mod: HCNC

## 2020-08-07 PROCEDURE — 93010 ELECTROCARDIOGRAM REPORT: CPT | Mod: HCNC,,, | Performed by: INTERNAL MEDICINE

## 2020-08-07 PROCEDURE — 99232 PR SUBSEQUENT HOSPITAL CARE,LEVL II: ICD-10-PCS | Mod: HCNC,GC,, | Performed by: INTERNAL MEDICINE

## 2020-08-07 PROCEDURE — 83735 ASSAY OF MAGNESIUM: CPT | Mod: HCNC

## 2020-08-07 RX ORDER — INSULIN ASPART 100 [IU]/ML
5 INJECTION, SOLUTION INTRAVENOUS; SUBCUTANEOUS ONCE
Status: COMPLETED | OUTPATIENT
Start: 2020-08-07 | End: 2020-08-07

## 2020-08-07 RX ORDER — INSULIN ASPART 100 [IU]/ML
12 INJECTION, SOLUTION INTRAVENOUS; SUBCUTANEOUS
Status: DISCONTINUED | OUTPATIENT
Start: 2020-08-07 | End: 2020-08-08

## 2020-08-07 RX ORDER — IRBESARTAN 75 MG/1
75 TABLET ORAL DAILY
Status: DISCONTINUED | OUTPATIENT
Start: 2020-08-07 | End: 2020-08-14 | Stop reason: HOSPADM

## 2020-08-07 RX ORDER — INSULIN ASPART 100 [IU]/ML
8 INJECTION, SOLUTION INTRAVENOUS; SUBCUTANEOUS ONCE
Status: DISCONTINUED | OUTPATIENT
Start: 2020-08-07 | End: 2020-08-07

## 2020-08-07 RX ADMIN — METOPROLOL TARTRATE 25 MG: 25 TABLET, FILM COATED ORAL at 08:08

## 2020-08-07 RX ADMIN — INSULIN ASPART 2 UNITS: 100 INJECTION, SOLUTION INTRAVENOUS; SUBCUTANEOUS at 08:08

## 2020-08-07 RX ADMIN — TICAGRELOR 90 MG: 90 TABLET ORAL at 03:08

## 2020-08-07 RX ADMIN — ATORVASTATIN CALCIUM 80 MG: 20 TABLET, FILM COATED ORAL at 08:08

## 2020-08-07 RX ADMIN — TICAGRELOR 90 MG: 90 TABLET ORAL at 08:08

## 2020-08-07 RX ADMIN — OMEGA-3-ACID ETHYL ESTERS 1 G: 1 CAPSULE, LIQUID FILLED ORAL at 08:08

## 2020-08-07 RX ADMIN — INSULIN ASPART 4 UNITS: 100 INJECTION, SOLUTION INTRAVENOUS; SUBCUTANEOUS at 11:08

## 2020-08-07 RX ADMIN — INSULIN ASPART 5 UNITS: 100 INJECTION, SOLUTION INTRAVENOUS; SUBCUTANEOUS at 11:08

## 2020-08-07 RX ADMIN — INSULIN ASPART 5 UNITS: 100 INJECTION, SOLUTION INTRAVENOUS; SUBCUTANEOUS at 01:08

## 2020-08-07 RX ADMIN — ERGOCALCIFEROL 50000 UNITS: 1.25 CAPSULE ORAL at 08:08

## 2020-08-07 RX ADMIN — FINASTERIDE 5 MG: 5 TABLET, FILM COATED ORAL at 08:08

## 2020-08-07 RX ADMIN — INSULIN ASPART 5 UNITS: 100 INJECTION, SOLUTION INTRAVENOUS; SUBCUTANEOUS at 08:08

## 2020-08-07 RX ADMIN — ASPIRIN 81 MG CHEWABLE TABLET 81 MG: 81 TABLET CHEWABLE at 08:08

## 2020-08-07 RX ADMIN — INSULIN ASPART 12 UNITS: 100 INJECTION, SOLUTION INTRAVENOUS; SUBCUTANEOUS at 04:08

## 2020-08-07 RX ADMIN — PANTOPRAZOLE SODIUM 40 MG: 40 TABLET, DELAYED RELEASE ORAL at 08:08

## 2020-08-07 RX ADMIN — IRBESARTAN 75 MG: 75 TABLET ORAL at 11:08

## 2020-08-07 NOTE — CONSULTS
Food & Nutrition  Education    Diet Education: Cardiac diet  Time Spent: 5 minutes  Learners: patient     Nutrition Education provided with handouts: Low Sodium diet    Comments:  Pt reported good appetite, fair PO intake at this time due to disliking meals served. Pt states he is aware of low Na/low fat diet and follows diet at home. Family is encouraging of diet compliance and helps with intake. Provided handouts for pt to review with family.    Pt requesting boost plus to increase intake, unable to assess for malnutrition at this time, due to ultrasound.    All questions and concerns answered. Dietitian's contact information provided.       Follow-Up: 8/14/20    Please Re-consult as needed    Thanks!  Debra Mercado, LAKESHIA, LDN

## 2020-08-07 NOTE — PLAN OF CARE
Plan of care reviewed with pt, verbalized understanding  Answered questions  Free from falls and injury  Afebrile  ST on tele  No insulin coverage needed  Will continue to monitor

## 2020-08-07 NOTE — CONSULTS
"Information packet sent to patient, which includes "Your Guide to Living with Heart Disease".  Letter was also sent to patient.    Sharona Bravo RN  Cardiac Rehab Nurse    "

## 2020-08-07 NOTE — HOSPITAL COURSE
Patient was admitted for NSTEMI s/p LHC revealed severe stenosis of the proximal and ostial RCA s/p 3.5 x 38 LISSETH. WBC elevated at 18K. UA, CXR wnl. Blood culture was positive for Edwardsiella Tarda (exposure likely 2/2 to fishing trip 2 weeks ago). ID was consulted. Initiated IV aztreonam. Concern for possible biliary involvement given elevated ALP. US Abd revealed mildly prominent CBD. AES consulted to eval possible biliary infection? Per ID recs- will require colonoscopy outpatient given daughter's report of abdominal pain and weight loss. Repeat BCx- NGTD.  Adjusting blood glucose given morning hypoglycemic events X3. Home insulin includes (lantus 65 units qHS and aspart 12 units TIDWM). Decreased determir to 25 units given AM hypoglycemic events, also on [id number] untis TIDWM. Continue DAPT and GDMT for CAD. Transferred to Hospital Medicine for further management

## 2020-08-07 NOTE — PLAN OF CARE
CM to bedside - pt provided assessment info. Pt w/ CPAP and glucometer in place (available DME from  spouse in home that pt does not use), lives alone. Pt will likely d/c home w/ no needs. Pt does pay for prescriptions but reports concern for his insulin Rx: lantus is $290 for 3 months and novolog is $300 for 3 months.    CM provided patient anticipated KEVON which was written on the whiteboard and will be update by nursing staff.   Patient provided a Discharge Planning booklet. Patient verbalized understanding.    KAYLAH FARNSWORTH [id number] - assisting 3rd floor       20 1519   Discharge Assessment   Assessment Type Discharge Planning Assessment   Confirmed/corrected address and phone number on facesheet? Yes   Assessment information obtained from? Patient   Expected Length of Stay (days) 2   Communicated expected length of stay with patient/caregiver yes   Prior to hospitilization cognitive status: Alert/Oriented   Prior to hospitalization functional status: Independent   Current cognitive status: Alert/Oriented   Current Functional Status: Independent   Facility Arrived From: N/A   Lives With alone   Able to Return to Prior Arrangements yes   Is patient able to care for self after discharge? Yes   Who are your caregiver(s) and their phone number(s)? [facility] [phone]   Patient's perception of discharge disposition home or selfcare   Readmission Within the Last 30 Days no previous admission in last 30 days   Patient currently being followed by outpatient case management? No   Patient currently receives any other outside agency services? No   Equipment Currently Used at Home CPAP;glucometer;other (see comments)  (pt has a cane, RW, shower chair, BSC and power chair at home from  spouse that is available if needed)   Do you have any problems affording any of your prescribed medications? Yes   If yes, what medications? lantus is $290 for 3 month and novolog is $300 for 3 months   Is the patient  taking medications as prescribed? yes   Does the patient have transportation home? Yes  (pt's nephew will pickup pt at d/c)   Transportation Anticipated car, drives self   Dialysis Name and Scheduled days N/A   Does the patient receive services at the [facility]? No   Discharge Plan A Home   Discharge Plan B Home;Home Health   DME Needed Upon Discharge  other (see comments)  (TBD)   Patient/Family in Agreement with Plan yes

## 2020-08-07 NOTE — PLAN OF CARE
Was called by nursing staff at the bedside after patient had complaints of 7 out 10 chest pain.  After returning from the cath lab, he had violent shaking as he was very cold and required a warming blanket.  After this episode and did, he says his chest felt sore on the bilateral right and left pectoral muscles.  After palpating his chest wall, he said the pain feels similar but was getting better.  Denied radiation down his left arm and to his jaw.  He had no ST changes on ECG after calf of either.  Suspect this pain is musculoskeletal due to his chills he had after coming back from the cath lab.  Additionally, a TR band was noted to still be on the patient on approximately 5 hours after coming to the floor from the procedure.  I removed the TR band and saw no significant bleeding from his arteriotomy. Patient stable with no other acute events.

## 2020-08-07 NOTE — NURSING
"Patient stated 7/10 pain in left chest, notified Dr. Rogers. Dr. Rogers to bedside to assess patient, stated it "appears to be muscle pain" no new orders. Dr. Rogers removed pressure dressing applied to right wrist, no s/s of bleeding , gauze and tegaderm applied by MD.   "

## 2020-08-07 NOTE — PROGRESS NOTES
[facility]  Cardiology  Progress Note    Patient Name: Anjum Cai Jr.  MRN: [id number]  Admission Date: 8/6/2020  Hospital Length of Stay: 1 days  Code Status: Full Code   Attending Physician: Favian Cotton III, MD   Primary Care Physician: Gavin Martinez MD  Expected Discharge Date: 8/9/2020  Principal Problem:NSTEMI (non-ST elevated myocardial infarction)    Subjective:     Hospital Course:   Patient was admitted for NSTEMI s/p LHC revealed severe stenosis of the proximal and ostial RCA s/p 3.5 x 38 LISSETH. WBC elevated at 18K. Patient has no symptoms and is chest pain free. UA wnl, Blood cultures pending.     Interval History: NAEON. Patient denies any cough, chest pain, SOB, dysuria or diarrhea    Review of Systems   Constitution: Negative for chills and fever.   HENT: Negative for congestion.    Cardiovascular: Negative for chest pain, irregular heartbeat, leg swelling, near-syncope, orthopnea, palpitations, paroxysmal nocturnal dyspnea and syncope.   Respiratory: Negative for shortness of breath.    Gastrointestinal: Negative for abdominal pain, nausea and vomiting.   Neurological: Negative for dizziness, focal weakness, headaches, light-headedness and weakness.     Objective:     Vital Signs (Most Recent):  Temp: 97.9 °F (36.6 °C) (08/07/20 1115)  Pulse: 70 (08/07/20 1239)  Resp: 18 (08/07/20 1239)  BP: (!) 106/57 (08/07/20 1115)  SpO2: 99 % (08/07/20 1239) Vital Signs (24h Range):  Temp:  [97.6 °F (36.4 °C)-98.5 °F (36.9 °C)] 97.9 °F (36.6 °C)  Pulse:  [] 70  Resp:  [16-18] 18  SpO2:  [96 %-100 %] 99 %  BP: (105-148)/(52-67) 106/57     Weight: 81.6 kg (180 lb)  Body mass index is 29.05 kg/m².     SpO2: 99 %  O2 Device (Oxygen Therapy): room air      Intake/Output Summary (Last 24 hours) at 8/7/2020 1509  Last data filed at 8/7/2020 1400  Gross per 24 hour   Intake 1483 ml   Output 925 ml   Net 558 ml       Lines/Drains/Airways     Peripheral Intravenous Line                  Peripheral IV - Single Lumen 08/06/20 1157 18 G Left Forearm 1 day         Peripheral IV - Single Lumen 08/06/20 1415 18 G Left Forearm 1 day                Physical Exam   Constitutional: He is oriented to person, place, and time. He appears well-developed and well-nourished. No distress.   HENT:   Mouth/Throat: Oropharynx is clear and moist.   Eyes: Pupils are equal, round, and reactive to light.   Neck: Neck supple.   Cardiovascular: Normal rate, regular rhythm and intact distal pulses.   No murmur heard.  Pulmonary/Chest: Effort normal and breath sounds normal. No respiratory distress. He has no wheezes. He has no rales.   Abdominal: Soft. Bowel sounds are normal. He exhibits no distension. There is no abdominal tenderness.   Musculoskeletal:         General: No tenderness or edema.   Neurological: He is alert and oriented to person, place, and time.   Skin: Skin is warm and dry. He is not diaphoretic.   Psychiatric: He has a normal mood and affect. His behavior is normal.   Vitals reviewed.      Significant Labs:   BMP:   Recent Labs   Lab 08/06/20  1150 08/07/20  0841   * 306*   * 134*   K 3.9 3.9    104   CO2 22* 20*   BUN 26* 26*   CREATININE 1.5* 1.6*   CALCIUM 9.3 9.0   MG  --  1.9   , CMP   Recent Labs   Lab 08/06/20  1150 08/07/20  0841   * 134*   K 3.9 3.9    104   CO2 22* 20*   * 306*   BUN 26* 26*   CREATININE 1.5* 1.6*   CALCIUM 9.3 9.0   PROT 8.0  --    ALBUMIN 2.1*  --    BILITOT 1.9*  --    ALKPHOS 834*  --    AST 67*  --    ALT 58*  --    ANIONGAP 7* 10   ESTGFRAFRICA 49.4* 45.7*   EGFRNONAA 42.7* 39.5*    and CBC   Recent Labs   Lab 08/06/20  1150 08/07/20  0347   WBC 9.59 18.01*   HGB 9.9* 10.0*   HCT 31.2* 32.1*    207         Assessment and Plan:       * NSTEMI (non-ST elevated myocardial infarction)  -Patient with EMA score of 4, patient clinical history consistent with angina  -EKG with <1mm ST elevation in leads III and AVF, +cardiac biomarker  (trops 0.043-> 0.028)  -Loaded with ASA + Brillanta and systemic heparin.       -s/p LHC revealed severe stenosis of the proximal and ostial RCA s/p 3.5 x 38 LISSETH.   -Continue GDMT (BB, ARB), DAPT  - Cardiac rehab once discharged.         Leukocytosis  WBC noted to be 18 today. Denies dysuria, SOB, chest pain, diarrhea. Hemodynamically stable.     Plan  - Trend CBC  - Infectious work up- UA with no signs of UTI, CXR unremarkable.   - Blood cultures pending       Essential hypertension  Continue with BB and ARB     Coronary artery disease involving native coronary artery of native heart without angina pectoris  -See 'NSTEMI'           Dyslipidemia  -Lipid panel LDL 75, HDL 27  -Continue atorvastatin 80mg qD      Type 2 diabetes mellitus without retinopathy  Hgb 7.7  Home insulin regimen- Insulin lantus 65 units qHS, novolog 12 units TIDWM    Plan  -Increased Insulin lantus 65 units, aspart 12 units TIDWM   -Maintain -180  -POCT glucose AC/HS         VTE Risk Mitigation (From admission, onward)         Ordered     IP VTE HIGH RISK PATIENT  Once      08/06/20 1403     Place sequential compression device  Until discontinued      08/06/20 1403                Reginald Rebollar MD  Cardiology  [facility]

## 2020-08-07 NOTE — ASSESSMENT & PLAN NOTE
Hgb 7.7  Home insulin regimen- Insulin lantus 65 units qHS, novolog 12 units TIDWM    Plan  -Increased Insulin lantus 65 units, aspart 12 units TIDWM   -Maintain -180  -POCT glucose AC/HS

## 2020-08-07 NOTE — ASSESSMENT & PLAN NOTE
WBC noted to be 18 today. Denies dysuria, SOB, chest pain, diarrhea. Hemodynamically stable.     Plan  - Trend CBC  - Infectious work up- UA with no signs of UTI, CXR unremarkable.   - Blood cultures pending

## 2020-08-07 NOTE — NURSING
Notified MD Obi patient received at 1118 mealtime (lunch) 5units and slidescale 4 units of novolog  MD ordering extra units, once  RN awaiting final order and pharmacy verification  Will continue to monitor

## 2020-08-07 NOTE — SUBJECTIVE & OBJECTIVE
Interval History: NAEON. Patient denies any cough, chest pain, SOB, dysuria or diarrhea    Review of Systems   Constitution: Negative for chills and fever.   HENT: Negative for congestion.    Cardiovascular: Negative for chest pain, irregular heartbeat, leg swelling, near-syncope, orthopnea, palpitations, paroxysmal nocturnal dyspnea and syncope.   Respiratory: Negative for shortness of breath.    Gastrointestinal: Negative for abdominal pain, nausea and vomiting.   Neurological: Negative for dizziness, focal weakness, headaches, light-headedness and weakness.     Objective:     Vital Signs (Most Recent):  Temp: 97.9 °F (36.6 °C) (08/07/20 1115)  Pulse: 70 (08/07/20 1239)  Resp: 18 (08/07/20 1239)  BP: (!) 106/57 (08/07/20 1115)  SpO2: 99 % (08/07/20 1239) Vital Signs (24h Range):  Temp:  [97.6 °F (36.4 °C)-98.5 °F (36.9 °C)] 97.9 °F (36.6 °C)  Pulse:  [] 70  Resp:  [16-18] 18  SpO2:  [96 %-100 %] 99 %  BP: (105-148)/(52-67) 106/57     Weight: 81.6 kg (180 lb)  Body mass index is 29.05 kg/m².     SpO2: 99 %  O2 Device (Oxygen Therapy): room air      Intake/Output Summary (Last 24 hours) at 8/7/2020 1509  Last data filed at 8/7/2020 1400  Gross per 24 hour   Intake 1483 ml   Output 925 ml   Net 558 ml       Lines/Drains/Airways     Peripheral Intravenous Line                 Peripheral IV - Single Lumen 08/06/20 1157 18 G Left Forearm 1 day         Peripheral IV - Single Lumen 08/06/20 1415 18 G Left Forearm 1 day                Physical Exam   Constitutional: He is oriented to person, place, and time. He appears well-developed and well-nourished. No distress.   HENT:   Mouth/Throat: Oropharynx is clear and moist.   Eyes: Pupils are equal, round, and reactive to light.   Neck: Neck supple.   Cardiovascular: Normal rate, regular rhythm and intact distal pulses.   No murmur heard.  Pulmonary/Chest: Effort normal and breath sounds normal. No respiratory distress. He has no wheezes. He has no rales.   Abdominal:  Soft. Bowel sounds are normal. He exhibits no distension. There is no abdominal tenderness.   Musculoskeletal:         General: No tenderness or edema.   Neurological: He is alert and oriented to person, place, and time.   Skin: Skin is warm and dry. He is not diaphoretic.   Psychiatric: He has a normal mood and affect. His behavior is normal.   Vitals reviewed.      Significant Labs:   BMP:   Recent Labs   Lab 08/06/20  1150 08/07/20  0841   * 306*   * 134*   K 3.9 3.9    104   CO2 22* 20*   BUN 26* 26*   CREATININE 1.5* 1.6*   CALCIUM 9.3 9.0   MG  --  1.9   , CMP   Recent Labs   Lab 08/06/20  1150 08/07/20  0841   * 134*   K 3.9 3.9    104   CO2 22* 20*   * 306*   BUN 26* 26*   CREATININE 1.5* 1.6*   CALCIUM 9.3 9.0   PROT 8.0  --    ALBUMIN 2.1*  --    BILITOT 1.9*  --    ALKPHOS 834*  --    AST 67*  --    ALT 58*  --    ANIONGAP 7* 10   ESTGFRAFRICA 49.4* 45.7*   EGFRNONAA 42.7* 39.5*    and CBC   Recent Labs   Lab 08/06/20  1150 08/07/20  0347   WBC 9.59 18.01*   HGB 9.9* 10.0*   HCT 31.2* 32.1*    207

## 2020-08-07 NOTE — PLAN OF CARE
08/07/20 1518   Post-Acute Status   Post-Acute Authorization Other   Other Status No Post-Acute Service Needs   Discharge Delays None known at this time   Discharge Plan   Discharge Plan A Home with family   Discharge Plan B Home with family;Home Health

## 2020-08-07 NOTE — PLAN OF CARE
Plan of care reviewed with pt, verbalized understanding  Answered questions  Free from falls and injury  Afebrile  SR/ST on tele  Extra insulin coverage needed  Will continue to monitor

## 2020-08-07 NOTE — PLAN OF CARE
Plan of care discussed with patient.  Patient ambulating independently, fall precautions in place, Free of fall/trauma/injury. Denies SOB, C/O pain in chest, EKG negative, MD aware, no new orders. Encouraged bleeding precautions, right radial site with C/D/I dressing and tegaderm. Good UOP. ACHS glucose monitoring at HS, no coverage required. All questions addressed. Will continue to monitor  Discussed medications and care.  Patient has no questions at this time. Will continue to monitor.

## 2020-08-08 PROBLEM — I20.0 UNSTABLE ANGINA: Status: RESOLVED | Noted: 2020-08-06 | Resolved: 2020-08-08

## 2020-08-08 PROBLEM — R78.81 GRAM-NEGATIVE BACTEREMIA: Status: ACTIVE | Noted: 2020-08-08

## 2020-08-08 LAB
ANION GAP SERPL CALC-SCNC: 12 MMOL/L (ref 8–16)
APTT BLDCRRT: 31.9 SEC (ref 21–32)
BASOPHILS # BLD AUTO: 0.02 K/UL (ref 0–0.2)
BASOPHILS NFR BLD: 0.1 % (ref 0–1.9)
BUN SERPL-MCNC: 25 MG/DL (ref 8–23)
CALCIUM SERPL-MCNC: 9 MG/DL (ref 8.7–10.5)
CHLORIDE SERPL-SCNC: 103 MMOL/L (ref 95–110)
CO2 SERPL-SCNC: 22 MMOL/L (ref 23–29)
CREAT SERPL-MCNC: 1.4 MG/DL (ref 0.5–1.4)
DIFFERENTIAL METHOD: ABNORMAL
EOSINOPHIL # BLD AUTO: 0.1 K/UL (ref 0–0.5)
EOSINOPHIL NFR BLD: 0.7 % (ref 0–8)
ERYTHROCYTE [DISTWIDTH] IN BLOOD BY AUTOMATED COUNT: 16 % (ref 11.5–14.5)
EST. GFR  (AFRICAN AMERICAN): 53.7 ML/MIN/1.73 M^2
EST. GFR  (NON AFRICAN AMERICAN): 46.5 ML/MIN/1.73 M^2
GLUCOSE SERPL-MCNC: 69 MG/DL (ref 70–110)
HCT VFR BLD AUTO: 30.6 % (ref 40–54)
HGB BLD-MCNC: 10 G/DL (ref 14–18)
IMM GRANULOCYTES # BLD AUTO: 0.08 K/UL (ref 0–0.04)
IMM GRANULOCYTES NFR BLD AUTO: 0.6 % (ref 0–0.5)
LYMPHOCYTES # BLD AUTO: 0.9 K/UL (ref 1–4.8)
LYMPHOCYTES NFR BLD: 6.6 % (ref 18–48)
MAGNESIUM SERPL-MCNC: 2 MG/DL (ref 1.6–2.6)
MCH RBC QN AUTO: 29.8 PG (ref 27–31)
MCHC RBC AUTO-ENTMCNC: 32.7 G/DL (ref 32–36)
MCV RBC AUTO: 91 FL (ref 82–98)
MONOCYTES # BLD AUTO: 0.9 K/UL (ref 0.3–1)
MONOCYTES NFR BLD: 6.9 % (ref 4–15)
NEUTROPHILS # BLD AUTO: 11.7 K/UL (ref 1.8–7.7)
NEUTROPHILS NFR BLD: 85.1 % (ref 38–73)
NRBC BLD-RTO: 0 /100 WBC
PHOSPHATE SERPL-MCNC: 2.2 MG/DL (ref 2.7–4.5)
PLATELET # BLD AUTO: 231 K/UL (ref 150–350)
PMV BLD AUTO: 11.9 FL (ref 9.2–12.9)
POCT GLUCOSE: 188 MG/DL (ref 70–110)
POCT GLUCOSE: 41 MG/DL (ref 70–110)
POCT GLUCOSE: 52 MG/DL (ref 70–110)
POCT GLUCOSE: 61 MG/DL (ref 70–110)
POTASSIUM SERPL-SCNC: 3.6 MMOL/L (ref 3.5–5.1)
RBC # BLD AUTO: 3.36 M/UL (ref 4.6–6.2)
SODIUM SERPL-SCNC: 137 MMOL/L (ref 136–145)
WBC # BLD AUTO: 13.72 K/UL (ref 3.9–12.7)

## 2020-08-08 PROCEDURE — 93010 ELECTROCARDIOGRAM REPORT: CPT | Mod: HCNC,,, | Performed by: INTERNAL MEDICINE

## 2020-08-08 PROCEDURE — 99499 UNLISTED E&M SERVICE: CPT | Mod: HCNC,,, | Performed by: NURSE PRACTITIONER

## 2020-08-08 PROCEDURE — 93010 EKG 12-LEAD: ICD-10-PCS | Mod: HCNC,,, | Performed by: INTERNAL MEDICINE

## 2020-08-08 PROCEDURE — 25000242 PHARM REV CODE 250 ALT 637 W/ HCPCS: Mod: HCNC | Performed by: STUDENT IN AN ORGANIZED HEALTH CARE EDUCATION/TRAINING PROGRAM

## 2020-08-08 PROCEDURE — 25000003 PHARM REV CODE 250: Mod: HCNC | Performed by: STUDENT IN AN ORGANIZED HEALTH CARE EDUCATION/TRAINING PROGRAM

## 2020-08-08 PROCEDURE — 80048 BASIC METABOLIC PNL TOTAL CA: CPT | Mod: HCNC

## 2020-08-08 PROCEDURE — 99232 SBSQ HOSP IP/OBS MODERATE 35: CPT | Mod: HCNC,GC,, | Performed by: INTERNAL MEDICINE

## 2020-08-08 PROCEDURE — 99232 PR SUBSEQUENT HOSPITAL CARE,LEVL II: ICD-10-PCS | Mod: HCNC,GC,, | Performed by: INTERNAL MEDICINE

## 2020-08-08 PROCEDURE — 93005 ELECTROCARDIOGRAM TRACING: CPT | Mod: HCNC

## 2020-08-08 PROCEDURE — 99223 1ST HOSP IP/OBS HIGH 75: CPT | Mod: HCNC,,, | Performed by: NURSE PRACTITIONER

## 2020-08-08 PROCEDURE — 99499 NO LOS: ICD-10-PCS | Mod: HCNC,,, | Performed by: NURSE PRACTITIONER

## 2020-08-08 PROCEDURE — 85730 THROMBOPLASTIN TIME PARTIAL: CPT | Mod: HCNC

## 2020-08-08 PROCEDURE — 36415 COLL VENOUS BLD VENIPUNCTURE: CPT | Mod: HCNC

## 2020-08-08 PROCEDURE — 99223 PR INITIAL HOSPITAL CARE,LEVL III: ICD-10-PCS | Mod: HCNC,,, | Performed by: NURSE PRACTITIONER

## 2020-08-08 PROCEDURE — 84100 ASSAY OF PHOSPHORUS: CPT | Mod: HCNC

## 2020-08-08 PROCEDURE — 83735 ASSAY OF MAGNESIUM: CPT | Mod: HCNC

## 2020-08-08 PROCEDURE — 20600001 HC STEP DOWN PRIVATE ROOM: Mod: HCNC

## 2020-08-08 PROCEDURE — 87040 BLOOD CULTURE FOR BACTERIA: CPT | Mod: HCNC

## 2020-08-08 PROCEDURE — S0073 INJECTION, AZTREONAM, 500 MG: HCPCS | Mod: HCNC | Performed by: STUDENT IN AN ORGANIZED HEALTH CARE EDUCATION/TRAINING PROGRAM

## 2020-08-08 PROCEDURE — 85025 COMPLETE CBC W/AUTO DIFF WBC: CPT | Mod: HCNC

## 2020-08-08 RX ORDER — AZTREONAM 2 G/1
2000 INJECTION, POWDER, LYOPHILIZED, FOR SOLUTION INTRAMUSCULAR; INTRAVENOUS
Status: DISCONTINUED | OUTPATIENT
Start: 2020-08-08 | End: 2020-08-09

## 2020-08-08 RX ORDER — INSULIN ASPART 100 [IU]/ML
10 INJECTION, SOLUTION INTRAVENOUS; SUBCUTANEOUS
Status: DISCONTINUED | OUTPATIENT
Start: 2020-08-08 | End: 2020-08-10

## 2020-08-08 RX ADMIN — METOPROLOL TARTRATE 25 MG: 25 TABLET, FILM COATED ORAL at 08:08

## 2020-08-08 RX ADMIN — FINASTERIDE 5 MG: 5 TABLET, FILM COATED ORAL at 08:08

## 2020-08-08 RX ADMIN — TICAGRELOR 90 MG: 90 TABLET ORAL at 08:08

## 2020-08-08 RX ADMIN — AZTREONAM 2000 MG: 2 INJECTION, POWDER, LYOPHILIZED, FOR SOLUTION INTRAMUSCULAR; INTRAVENOUS at 04:08

## 2020-08-08 RX ADMIN — ASPIRIN 81 MG CHEWABLE TABLET 81 MG: 81 TABLET CHEWABLE at 08:08

## 2020-08-08 RX ADMIN — IRBESARTAN 75 MG: 75 TABLET ORAL at 08:08

## 2020-08-08 RX ADMIN — ACETAMINOPHEN 650 MG: 325 TABLET ORAL at 03:08

## 2020-08-08 RX ADMIN — NITROGLYCERIN 0.4 MG: 0.4 TABLET, ORALLY DISINTEGRATING SUBLINGUAL at 05:08

## 2020-08-08 RX ADMIN — AZTREONAM 2000 MG: 2 INJECTION, POWDER, LYOPHILIZED, FOR SOLUTION INTRAMUSCULAR; INTRAVENOUS at 01:08

## 2020-08-08 RX ADMIN — AZTREONAM 2000 MG: 2 INJECTION, POWDER, LYOPHILIZED, FOR SOLUTION INTRAMUSCULAR; INTRAVENOUS at 08:08

## 2020-08-08 RX ADMIN — ATORVASTATIN CALCIUM 80 MG: 20 TABLET, FILM COATED ORAL at 08:08

## 2020-08-08 RX ADMIN — PANTOPRAZOLE SODIUM 40 MG: 40 TABLET, DELAYED RELEASE ORAL at 08:08

## 2020-08-08 RX ADMIN — INSULIN ASPART 10 UNITS: 100 INJECTION, SOLUTION INTRAVENOUS; SUBCUTANEOUS at 11:08

## 2020-08-08 RX ADMIN — OMEGA-3-ACID ETHYL ESTERS 1 G: 1 CAPSULE, LIQUID FILLED ORAL at 08:08

## 2020-08-08 RX ADMIN — NITROGLYCERIN 0.4 MG: 0.4 TABLET, ORALLY DISINTEGRATING SUBLINGUAL at 06:08

## 2020-08-08 NOTE — SUBJECTIVE & OBJECTIVE
Past Medical History:   Diagnosis Date    Anemia 2/26/2018    Arthritis     BPH (benign prostatic hyperplasia)     Cataract     right     Colon polyp 11/18/2015    Colon polyps     Coronary artery disease     DR (diabetic retinopathy)     Dyslipidemia     Elevated PSA     Goiter, nontoxic, multinodular     Headaches, cluster     Hypertension     Kidney stones     Lump in the testicle     Left    Prostate cancer     Rotator cuff tendinitis     Right shoulder    Sleep apnea with use of continuous positive airway pressure (CPAP)     uses cpap at night    Type II or unspecified type diabetes mellitus with other specified manifestations, uncontrolled        Past Surgical History:   Procedure Laterality Date    CARDIAC CATHETERIZATION  2007    CATARACT EXTRACTION W/  INTRAOCULAR LENS IMPLANT Right 09/27/2016    Dr. Garcia    CHOLECYSTECTOMY  02/2018    CORONARY ANGIOGRAPHY N/A 8/6/2020    Procedure: ANGIOGRAM, CORONARY ARTERY;  Surgeon: Jerson Cavanaugh MD;  Location: [facility] CATH LAB;  Service: Cardiology;  Laterality: N/A;    CORONARY STENT PLACEMENT  02/02/2007    LAD, RCAx2    INGUINAL HERNIA REPAIR Right 1995    LEFT HEART CATHETERIZATION Left 8/6/2020    Procedure: CATHETERIZATION, HEART, LEFT;  Surgeon: Jerson Cavanaugh MD;  Location: [facility] CATH LAB;  Service: Cardiology;  Laterality: Left;    PROSTATE BIOPSY  2012    ROTATOR CUFF REPAIR Right 2014    x2    ROTATOR CUFF REPAIR Left 2013    TONSILLECTOMY, ADENOIDECTOMY      as child    TRIGGER FINGER RELEASE Right 2004    x2       Review of patient's allergies indicates:   Allergen Reactions    Ace inhibitors      Other reaction(s): cough    Cefadroxil      Other reaction(s): possible vasculitis  Other reaction(s): vasculitis       Medications:  Medications Prior to Admission   Medication Sig    ACCU-CHEK SMARTVIEW TEST STRIP Strp CHECK BLOOD SUGARS FOUR TIMES DAILY BEFORE MEALS AS DIRECTED    ASPIRIN (ASPIR-81 ORAL) Take 1  "tablet by mouth Daily.    atorvastatin (LIPITOR) 40 MG tablet Take 1 tablet (40 mg total) by mouth once daily.    ciclopirox (PENLAC) 8 % Soln Apply topically nightly.    ciclopirox 0.77 % Gel Apply topically 2 (two) times daily.    ergocalciferol (ERGOCALCIFEROL) 50,000 unit Cap TAKE 1 CAPSULE BY MOUTH ONCE EVERY 30 DAYS    finasteride (PROSCAR) 5 mg tablet TAKE 1 TABLET(5 MG) BY MOUTH EVERY DAY    FLUAD 2021-7209, 65 YR UP,,PF, 45 mcg (15 mcg x 3)/0.5 mL Syrg     insulin (LANTUS SOLOSTAR U-100 INSULIN) glargine 100 units/mL (3mL) SubQ pen ADMINISTER 65 UNITS UNDER THE SKIN EVERY EVENING    insulin aspart U-100 (NOVOLOG FLEXPEN U-100 INSULIN) 100 unit/mL (3 mL) InPn pen ADMINISTER 12 UNITS UNDER THE SKIN THREE TIMES DAILY WITH MEALS PER SLIDING SCALE    irbesartan (AVAPRO) 75 MG tablet Take 1 tablet (75 mg total) by mouth once daily.    lancets (ACCU-CHEK FASTCLIX LANCET DRUM) Misc Check blood sugars four times a day.    metoprolol succinate (TOPROL-XL) 50 MG 24 hr tablet TAKE 1 TABLET(50 MG) BY MOUTH EVERY DAY    mometasone 0.1% (ELOCON) 0.1 % cream Apply to affected area daily    MULTIVITAMIN W-MINERALS/LUTEIN (CENTRUM SILVER ORAL) Take by mouth once daily.    nitroGLYCERIN (NITROSTAT) 0.4 MG SL tablet Place 1 tablet (0.4 mg total) under the tongue every 5 (five) minutes as needed for Chest pain.    omega-3 fatty acids-vitamin E (FISH OIL) 1,000 mg Cap Take 2 capsules by mouth Daily.     omeprazole (PRILOSEC) 40 MG capsule Take 1 capsule (40 mg total) by mouth once daily.    pen needle, diabetic (BD ULTRA-FINE MINI PEN NEEDLE) 31 gauge x 3/16" Ndle Use new needle with each injection. Inject insulin 4 times a day.    benzonatate (TESSALON PERLES) 100 MG capsule Take 1 capsule (100 mg total) by mouth every 6 (six) hours as needed for Cough.     Antibiotics (From admission, onward)    Start     Stop Route Frequency Ordered    08/08/20 0115  aztreonam injection 2,000 mg      -- IV Every 8 hours " (non-standard times) 20 0008        Antifungals (From admission, onward)    None        Antivirals (From admission, onward)    None           Immunization History   Administered Date(s) Administered    Influenza - High Dose - PF (65 years and older) 10/05/2010, 2011, 10/24/2012, 10/17/2013, 2014, 10/19/2015, 2017, 10/17/2017, 10/06/2018    Influenza - Quadrivalent 2017    Influenza - Trivalent - Adjuvanted - PF 2019    Influenza Split 10/16/2007, 10/03/2008, 10/06/2009    Pneumococcal Conjugate - 13 Valent 09/15/2015, 2019    Tdap 2020    Zoster 2017       Family History     Problem Relation (Age of Onset)    Cancer Mother    Cataracts Father    Diabetes Mother    Heart disease Father        Social History     Socioeconomic History    Marital status:      Spouse name: Not on file    Number of children: Not on file    Years of education: Not on file    Highest education level: Not on file   Occupational History    Not on file   Social Needs    Financial resource strain: Not on file    Food insecurity     Worry: Not on file     Inability: Not on file    Transportation needs     Medical: Not on file     Non-medical: Not on file   Tobacco Use    Smoking status: Former Smoker     Quit date: 3/12/1978     Years since quittin.4    Smokeless tobacco: Never Used   Substance and Sexual Activity    Alcohol use: No    Drug use: No    Sexual activity: Not on file   Lifestyle    Physical activity     Days per week: Not on file     Minutes per session: Not on file    Stress: Not on file   Relationships    Social connections     Talks on phone: Not on file     Gets together: Not on file     Attends Yazdanism service: Not on file     Active member of club or organization: Not on file     Attends meetings of clubs or organizations: Not on file     Relationship status: Not on file   Other Topics Concern    Not on file   Social History Narrative     Not on file     Review of Systems   Constitutional: Positive for chills (prior to admission) and fever (prior to admission). Negative for activity change, appetite change, diaphoresis and fatigue.   HENT: Negative.    Eyes: Negative.    Respiratory: Positive for shortness of breath. Negative for cough and wheezing.    Cardiovascular: Positive for chest pain. Negative for palpitations and leg swelling.   Gastrointestinal: Negative for abdominal pain, constipation, diarrhea, nausea and vomiting.   Genitourinary: Negative for difficulty urinating and dysuria.   Musculoskeletal: Negative for back pain and myalgias.   Skin: Negative for rash and wound.   Neurological: Negative for dizziness, weakness and headaches.   Psychiatric/Behavioral: Negative for agitation and confusion.     Objective:     Vital Signs (Most Recent):  Temp: 97.4 °F (36.3 °C) (08/08/20 1515)  Pulse: 93 (08/08/20 1528)  Resp: 17 (08/08/20 1515)  BP: 125/62 (08/08/20 1515)  SpO2: (!) 92 % (08/08/20 1515) Vital Signs (24h Range):  Temp:  [97.4 °F (36.3 °C)-98.1 °F (36.7 °C)] 97.4 °F (36.3 °C)  Pulse:  [64-94] 93  Resp:  [16-18] 17  SpO2:  [92 %-99 %] 92 %  BP: (114-142)/(56-63) 125/62     Weight: 76.7 kg (169 lb 1.5 oz)  Body mass index is 27.29 kg/m².    Estimated Creatinine Clearance: 39.7 mL/min (based on SCr of 1.4 mg/dL).    Physical Exam  Vitals signs and nursing note reviewed.   Constitutional:       General: He is not in acute distress.     Appearance: Normal appearance. He is not ill-appearing, toxic-appearing or diaphoretic.   HENT:      Head: Normocephalic and atraumatic.      Mouth/Throat:      Mouth: Mucous membranes are moist.   Eyes:      General: No scleral icterus.     Conjunctiva/sclera: Conjunctivae normal.   Neck:      Musculoskeletal: Normal range of motion.   Cardiovascular:      Rate and Rhythm: Normal rate and regular rhythm.      Heart sounds: No murmur.   Pulmonary:      Effort: Pulmonary effort is normal.      Breath  sounds: Normal breath sounds.   Abdominal:      General: There is no distension.      Palpations: Abdomen is soft.      Tenderness: There is no abdominal tenderness. There is no guarding.   Musculoskeletal:      Right lower leg: No edema.      Left lower leg: No edema.   Skin:     General: Skin is warm and dry.   Neurological:      Mental Status: He is alert and oriented to person, place, and time.   Psychiatric:         Mood and Affect: Mood normal.         Behavior: Behavior normal.         Significant Labs:   Blood Culture:   Recent Labs   Lab 08/07/20  0841 08/08/20  1051 08/08/20  1121   LABBLOO Gram stain lily bottle: Gram negative rods  Results called to and read back by:NISREEN Mcqueen RN 08/07/2020  21:21  EDWARDSIELLA TARDA  For susceptibility see order # [id number]  *  Gram stain lily bottle: Gram negative rods  Positive results previously called 08/07/2020  EDWARDSIELLA TARDA  Susceptibility pending  * No Growth to date No Growth to date     CBC:   Recent Labs   Lab 08/07/20  0347 08/08/20  0306   WBC 18.01* 13.72*   HGB 10.0* 10.0*   HCT 32.1* 30.6*    231     CMP:   Recent Labs   Lab 08/07/20  0841 08/08/20  0306   * 137   K 3.9 3.6    103   CO2 20* 22*   * 69*   BUN 26* 25*   CREATININE 1.6* 1.4   CALCIUM 9.0 9.0   ANIONGAP 10 12   EGFRNONAA 39.5* 46.5*       Significant Imaging: I have reviewed all pertinent imaging results/findings within the past 24 hours.

## 2020-08-08 NOTE — NURSING
Accucheck performed and resulted in 41, patient requested orange juice, 500ml given and will recheck sugar.

## 2020-08-08 NOTE — PROGRESS NOTES
[facility]  Cardiology  Progress Note    Patient Name: Anjum Cai Jr.  MRN: [id number]  Admission Date: 8/6/2020  Hospital Length of Stay: 2 days  Code Status: Full Code   Attending Physician: Favian Cototn III, MD   Primary Care Physician: Gavin Martinez MD  Expected Discharge Date: 8/9/2020  Principal Problem:NSTEMI (non-ST elevated myocardial infarction)    Subjective:     Hospital Course:   Patient was admitted for NSTEMI s/p LHC revealed severe stenosis of the proximal and ostial RCA s/p 3.5 x 38 LISSETH. WBC elevated at 18K. Patient has no symptoms and is chest pain free. UA wnl, Blood cultures pending.     Interval History: Overnight, patient reported an episode of chest discomfort radiating to his neck and causing a headache. Continued this AM. POCT BG level of 41 this AM, given orange juice with improvement to 61. Reports that the symptoms resolved. Denies fevers, chills, nausea, vomiting, diarrhea, dysuria. BCx x 2  notable for GNR.       Objective:     Vital Signs (Most Recent):  Temp: 97.8 °F (36.6 °C) (08/08/20 0717)  Pulse: 94 (08/08/20 0739)  Resp: 16 (08/08/20 0717)  BP: (!) 114/56 (08/08/20 0717)  SpO2: 96 % (08/08/20 0717) Vital Signs (24h Range):  Temp:  [97.5 °F (36.4 °C)-98.1 °F (36.7 °C)] 97.8 °F (36.6 °C)  Pulse:  [64-94] 94  Resp:  [16-18] 16  SpO2:  [96 %-99 %] 96 %  BP: (114-142)/(56-63) 114/56     Weight: 76.7 kg (169 lb 1.5 oz)  Body mass index is 27.29 kg/m².     SpO2: 96 %  O2 Device (Oxygen Therapy): room air      Intake/Output Summary (Last 24 hours) at 8/8/2020 1150  Last data filed at 8/8/2020 0500  Gross per 24 hour   Intake 827 ml   Output 800 ml   Net 27 ml       Lines/Drains/Airways     Peripheral Intravenous Line                 Peripheral IV - Single Lumen 08/06/20 1157 18 G Left Forearm 1 day         Peripheral IV - Single Lumen 08/06/20 1415 18 G Left Forearm 1 day                Physical Exam   Constitutional: He is oriented to person, place, and  time. He appears well-developed and well-nourished. No distress.   HENT:   Mouth/Throat: Oropharynx is clear and moist.   Eyes: Pupils are equal, round, and reactive to light.   Neck: Neck supple.   Cardiovascular: Normal rate, regular rhythm and intact distal pulses.   No murmur heard.  Pulmonary/Chest: Effort normal and breath sounds normal. No respiratory distress. He has no wheezes. He has no rales.   Abdominal: Soft. Bowel sounds are normal. He exhibits no distension. There is no abdominal tenderness.   Musculoskeletal:         General: No tenderness or edema.   Neurological: He is alert and oriented to person, place, and time.   Skin: Skin is warm and dry. He is not diaphoretic.   Psychiatric: He has a normal mood and affect. His behavior is normal.   Vitals reviewed.     Significant Labs:   Recent Lab Results       08/08/20  0627   08/08/20  0618   08/08/20  0608   08/08/20  0306   08/07/20  2042        Amorphous, UA               Anion Gap       12       Appearance, UA               aPTT       31.9  Comment:  aPTT therapeutic range = 39-69 seconds       Bacteria, UA               Baso #       0.02       Basophil%       0.1       Bilirubin (UA)               BUN, Bld       25       Calcium       9.0       Chloride       103       CO2       22       Color, UA               Creatinine       1.4       Differential Method       Automated       eGFR if        53.7       eGFR if non        46.5  Comment:  Calculation used to obtain the estimated glomerular filtration  rate (eGFR) is the CKD-EPI equation.          Eos #       0.1       Eosinophil%       0.7       Glucose       69       Glucose, UA               Gran # (ANC)       11.7       Gran%       85.1       Hematocrit       30.6       Hemoglobin       10.0       Hyaline Casts, UA               Immature Grans (Abs)       0.08  Comment:  Mild elevation in immature granulocytes is non specific and   can be seen in a variety of  conditions including stress response,   acute inflammation, trauma and pregnancy. Correlation with other   laboratory and clinical findings is essential.         Immature Granulocytes       0.6       Ketones, UA               Leukocytes, UA               Lymph #       0.9       Lymph%       6.6       Magnesium       2.0       MCH       29.8       MCHC       32.7       MCV       91       Microscopic Comment               Mono #       0.9       Mono%       6.9       MPV       11.9       NITRITE UA               nRBC       0       Occult Blood UA               pH, UA               Phosphorus       2.2       Platelets       231       POCT Glucose 61 52 41   194     Potassium       3.6       Protein, UA               RBC       3.36       RBC, UA               RDW       16.0       Sodium       137       Specific [place], UA               Specimen UA               Squam Epithel, UA               WBC, UA               WBC       13.72       Yeast, UA                                08/07/20  1612   08/07/20  1301        Amorphous, UA   Rare     Anion Gap         Appearance, UA   Clear     aPTT         Bacteria, UA   Rare     Baso #         Basophil%         Bilirubin (UA)   Negative     BUN, Bld         Calcium         Chloride         CO2         Color, UA   Yellow     Creatinine         Differential Method         eGFR if          eGFR if non          Eos #         Eosinophil%         Glucose         Glucose, UA   3+     Gran # (ANC)         Gran%         Hematocrit         Hemoglobin         Hyaline Casts, UA   1     Immature Grans (Abs)         Immature Granulocytes         Ketones, UA   Negative     Leukocytes, UA   Negative     Lymph #         Lymph%         Magnesium         MCH         MCHC         MCV         Microscopic Comment   SEE COMMENT  Comment:  Other formed elements not mentioned in the report are not   present in the microscopic examination.        Mono #         Mono%          MPV         NITRITE UA   Negative     nRBC         Occult Blood UA   1+     pH, UA   5.0     Phosphorus         Platelets         POCT Glucose 212       Potassium         Protein, UA   2+  Comment:  Recommend a 24 hour urine protein or a urine   protein/creatinine ratio if globulin induced proteinuria is  clinically suspected.       RBC         RBC, UA   1     RDW         Sodium         Specific Gravity, UA   1.020     Specimen UA   Urine, Clean Catch     Squam Epithel, UA   0     WBC, UA   2     WBC         Yeast, UA   None           Significant Imaging: X-Ray: CXR: X-Ray Chest 1 View (CXR):   Results for orders placed or performed during the hospital encounter of 08/06/20   X-Ray Chest 1 View    Narrative    EXAMINATION:  XR CHEST 1 VIEW    CLINICAL HISTORY:  bacteremia;    TECHNIQUE:  Single frontal view of the chest was performed.    COMPARISON:  08/07/2020    FINDINGS:  Heart size is normal.  Mediastinal structures are midline with note of aortic atherosclerosis.  Lungs are expanded and clear.  No lung consolidation or pleural fluid is detected.  Skeletal structures show no acute finding.  Bone anchors are noted at the shoulders.      Impression    No acute cardiopulmonary disease      Electronically signed by: Nilson Pritchard MD  Date:    08/08/2020  Time:    09:37    and X-Ray Chest PA and Lateral (CXR): No results found for this visit on 08/06/20.    Assessment and Plan:     Brief HPI: 83yo M with DMII, HTN, HLD, CAD s/p PCI (07), who presents with NSTEMI. [facility] with PCI to RCA, now with leukocytosis and GNR bacteremia.     * NSTEMI (non-ST elevated myocardial infarction)  -Patient with EMA score of 4, patient clinical history consistent with angina  -EKG with <1mm ST elevation in leads III and AVF, +cardiac biomarker (trops 0.043-> 0.028)  -Loaded with ASA + Brillanta and systemic heparin.       -s/p LHC revealed severe stenosis of the proximal and ostial RCA s/p 3.5 x 38 LISSETH.   -Continue GDMT (NINA,  ARB), DAPT  - Cardiac rehab once discharged.         Leukocytosis  WBC noted to be 18, now trending down (13.7K). Denies dysuria, SOB, chest pain, diarrhea. Hemodynamically stable. BCx now growing GNR x 2. Previously had similar GNR bacteremia after cholecystectomy, was on aztreonam. Restarted aztreonam overnight.     Plan  - Trend CBC  - UA with no signs of UTI, CXR unremarkable  - Continue aztreonam for now, consult ID for recommendations  - Will order repeat BCx in the AM     Gram-negative bacteremia  - See above     Essential hypertension  Continue with BB and ARB     Coronary artery disease involving native coronary artery of native heart without angina pectoris  -See 'NSTEMI'           Dyslipidemia  -Lipid panel LDL 75, HDL 27  -Continue atorvastatin 80mg qD      Type 2 diabetes mellitus without retinopathy  Hgb 7.7  Home insulin regimen- Insulin lantus 65 units qHS, novolog 12 units TIDWM. Since hypoglycemic this AM, will decrease insulin regimen.      Plan  -Continue Insulin lantus 50 units, aspart 10 units TIDWM   -Maintain -180  -POCT glucose AC/HS           VTE Risk Mitigation (From admission, onward)         Ordered     IP VTE HIGH RISK PATIENT  Once      08/06/20 1403     Place sequential compression device  Until discontinued      08/06/20 1403                Valentina Easley MD  Cardiology  [facility]

## 2020-08-08 NOTE — HPI
Mr. Anjum Cai is an 84 year old man with DM, prostate CA s/p radiation (completed therapy in May 2020), HTN, CAD (s/p PCI 2007), hx cholecystectomy 2018 and hx hernia repair with mesh 2018, who presented to ED on 8/6 with NSTEMI.  Underwent angiogram and LHC with PCI to RCA yesterday.  Noted to have elevated WBC on 8/7 post procedure and blood cultures were drawn and showing 2/4 bottles with Edwardsiella tarda (sensitivities pending) .  He was started on IV aztreonam given cefadroxil allergy (history of vasculitis).  ID consulted for evaluation and antibiotic recommendations.      Patient reports he was having fevers to 101 and chills 4-5 days prior to admission. Denies cough, complains of SOB.  He denies any abdominal symptoms - no pain, nausea, vomiting or diarrhea or recent diarrheal illness.  He had a balloon-assisted colonoscopy in 2015  In which multiple polyps were found.  Follow up 6 month surveillance colonoscopy was recommended, but he was unable to schedule this because he had an MI and was on anti-coagulants.  He was to have Cardiology follow up for clearance for balloon-assisted enteroscopy, but he did not follow through with this.     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  Allergy reported in 2012.  He has had beta-lactam since that time per review of chart (Augmentin in 2015) with no apparent adverse reaction.

## 2020-08-08 NOTE — CONSULTS
[facility]  Infectious Disease  Consult Note    Patient Name: Anjum Cai Jr.  MRN: [id number]  Admission Date: 8/6/2020  Hospital Length of Stay: 2 days  Attending Physician: Favian Cotton III, MD  Primary Care Provider: Gavin Martinez MD     Isolation Status: No active isolations      Inpatient consult to Infectious Diseases  Consult performed by: WESTLEY Parks, ANP  Consult ordered by: Valentina Easley MD  Reason for consult: GNR bacteremia        ID Consult acknowledged.   Full consult and recommendations to follow today  In the interim, please call for any immediate concerns.     Thank you.   WESTLEY Ferraro, ANP-C  [phone] pager,  [id number] [phone]

## 2020-08-08 NOTE — ASSESSMENT & PLAN NOTE
Hgb 7.7  Home insulin regimen- Insulin lantus 65 units qHS, novolog 12 units TIDWM. Since hypoglycemic this AM, will decrease insulin regimen.      Plan  -Continue Insulin lantus 50 units, aspart 10 units TIDWM   -Maintain -180  -POCT glucose AC/HS

## 2020-08-08 NOTE — NURSING
Medical team encouraged patient to ambulate outside of room. RN educated patient to maintain tele on person and to stay on the floor to be monitored, patient verbalized understanding.  Will continue to monitor

## 2020-08-08 NOTE — CARE UPDATE
83yo M with DM, HTN, HLD, CAD s/p PCI (07), who presents with NSTEMI. [facility] with PCI to RCA, found to have gram negative bacteremia on 8/8/20.    Patient with leukocytosis (18) and rigors this morning for which blood cultures were obtained. Received notice this evening that blood cultures are growing gram negative rods.     Patient had a previous hospitalization in 2/2018 where we was found to have GNR bactermia and was initially managed with  Aztreonam 2 g q8 hrs due to several antibiotic allergies until cultures speciated to E. coli and he was transition to PO abx.     Will start Aztreonam this evening and will defer infectious disease consult to day this for  help with abx management given multiple allergies.    Kaylyn Choi MD PGY IV  Cardiology  Pager: [phone]  [facility]

## 2020-08-08 NOTE — NURSING
Patient c/o 7/10 CP, notified Dr. Choi, EKG ordered and Nitro SL given, will cont to monitor results

## 2020-08-08 NOTE — ASSESSMENT & PLAN NOTE
WBC noted to be 18, now trending down (13.7K). Denies dysuria, SOB, chest pain, diarrhea. Hemodynamically stable. BCx now growing GNR x 2. Previously had similar GNR bacteremia after cholecystectomy, was on aztreonam. Restarted aztreonam overnight.     Plan  - Trend CBC  - UA with no signs of UTI, CXR unremarkable  - Continue aztreonam for now, consult ID for recommendations  - Will order repeat BCx in the AM

## 2020-08-08 NOTE — NURSING
Notified by lab that patients BC drawn this morning are positive for gram - rods. , I called Dr. Choi. Awaiting new orders.

## 2020-08-08 NOTE — SUBJECTIVE & OBJECTIVE
Interval History: Overnight, patient reported an episode of chest discomfort radiating to his neck and causing a headache. Continued this AM. POCT BG level of 41 this AM, given orange juice with improvement to 61. Reports that the symptoms resolved. Denies fevers, chills, nausea, vomiting, diarrhea, dysuria. BCx x 2  notable for GNR.       Objective:     Vital Signs (Most Recent):  Temp: 97.8 °F (36.6 °C) (08/08/20 0717)  Pulse: 94 (08/08/20 0739)  Resp: 16 (08/08/20 0717)  BP: (!) 114/56 (08/08/20 0717)  SpO2: 96 % (08/08/20 0717) Vital Signs (24h Range):  Temp:  [97.5 °F (36.4 °C)-98.1 °F (36.7 °C)] 97.8 °F (36.6 °C)  Pulse:  [64-94] 94  Resp:  [16-18] 16  SpO2:  [96 %-99 %] 96 %  BP: (114-142)/(56-63) 114/56     Weight: 76.7 kg (169 lb 1.5 oz)  Body mass index is 27.29 kg/m².     SpO2: 96 %  O2 Device (Oxygen Therapy): room air      Intake/Output Summary (Last 24 hours) at 8/8/2020 1150  Last data filed at 8/8/2020 0500  Gross per 24 hour   Intake 827 ml   Output 800 ml   Net 27 ml       Lines/Drains/Airways     Peripheral Intravenous Line                 Peripheral IV - Single Lumen 08/06/20 1157 18 G Left Forearm 1 day         Peripheral IV - Single Lumen 08/06/20 1415 18 G Left Forearm 1 day                Physical Exam   Constitutional: He is oriented to person, place, and time. He appears well-developed and well-nourished. No distress.   HENT:   Mouth/Throat: Oropharynx is clear and moist.   Eyes: Pupils are equal, round, and reactive to light.   Neck: Neck supple.   Cardiovascular: Normal rate, regular rhythm and intact distal pulses.   No murmur heard.  Pulmonary/Chest: Effort normal and breath sounds normal. No respiratory distress. He has no wheezes. He has no rales.   Abdominal: Soft. Bowel sounds are normal. He exhibits no distension. There is no abdominal tenderness.   Musculoskeletal:         General: No tenderness or edema.   Neurological: He is alert and oriented to person, place, and time.   Skin:  Skin is warm and dry. He is not diaphoretic.   Psychiatric: He has a normal mood and affect. His behavior is normal.   Vitals reviewed.     Significant Labs:   Recent Lab Results       08/08/20  0627   08/08/20  0618   08/08/20  0608   08/08/20  0306   08/07/20  2042        Amorphous, UA               Anion Gap       12       Appearance, UA               aPTT       31.9  Comment:  aPTT therapeutic range = 39-69 seconds       Bacteria, UA               Baso #       0.02       Basophil%       0.1       Bilirubin (UA)               BUN, Bld       25       Calcium       9.0       Chloride       103       CO2       22       Color, UA               Creatinine       1.4       Differential Method       Automated       eGFR if        53.7       eGFR if non        46.5  Comment:  Calculation used to obtain the estimated glomerular filtration  rate (eGFR) is the CKD-EPI equation.          Eos #       0.1       Eosinophil%       0.7       Glucose       69       Glucose, UA               Gran # (ANC)       11.7       Gran%       85.1       Hematocrit       30.6       Hemoglobin       10.0       Hyaline Casts, UA               Immature Grans (Abs)       0.08  Comment:  Mild elevation in immature granulocytes is non specific and   can be seen in a variety of conditions including stress response,   acute inflammation, trauma and pregnancy. Correlation with other   laboratory and clinical findings is essential.         Immature Granulocytes       0.6       Ketones, UA               Leukocytes, UA               Lymph #       0.9       Lymph%       6.6       Magnesium       2.0       MCH       29.8       MCHC       32.7       MCV       91       Microscopic Comment               Mono #       0.9       Mono%       6.9       MPV       11.9       NITRITE UA               nRBC       0       Occult Blood UA               pH, UA               Phosphorus       2.2       Platelets       231       POCT Glucose 61  52 41   194     Potassium       3.6       Protein, UA               RBC       3.36       RBC, UA               RDW       16.0       Sodium       137       Specific [place], UA               Specimen UA               Squam Epithel, UA               WBC, UA               WBC       13.72       Yeast, UA                                08/07/20  1612   08/07/20  1301        Amorphous, UA   Rare     Anion Gap         Appearance, UA   Clear     aPTT         Bacteria, UA   Rare     Baso #         Basophil%         Bilirubin (UA)   Negative     BUN, Bld         Calcium         Chloride         CO2         Color, UA   Yellow     Creatinine         Differential Method         eGFR if          eGFR if non          Eos #         Eosinophil%         Glucose         Glucose, UA   3+     Gran # (ANC)         Gran%         Hematocrit         Hemoglobin         Hyaline Casts, UA   1     Immature Grans (Abs)         Immature Granulocytes         Ketones, UA   Negative     Leukocytes, UA   Negative     Lymph #         Lymph%         Magnesium         MCH         MCHC         MCV         Microscopic Comment   SEE COMMENT  Comment:  Other formed elements not mentioned in the report are not   present in the microscopic examination.        Mono #         Mono%         MPV         NITRITE UA   Negative     nRBC         Occult Blood UA   1+     pH, UA   5.0     Phosphorus         Platelets         POCT Glucose 212       Potassium         Protein, UA   2+  Comment:  Recommend a 24 hour urine protein or a urine   protein/creatinine ratio if globulin induced proteinuria is  clinically suspected.       RBC         RBC, UA   1     RDW         Sodium         Specific Gravity, UA   1.020     Specimen UA   Urine, Clean Catch     Squam Epithel, UA   0     WBC, UA   2     WBC         Yeast, UA   None           Significant Imaging: X-Ray: CXR: X-Ray Chest 1 View (CXR):   Results for orders placed or performed during the  hospital encounter of 08/06/20   X-Ray Chest 1 View    Narrative    EXAMINATION:  XR CHEST 1 VIEW    CLINICAL HISTORY:  bacteremia;    TECHNIQUE:  Single frontal view of the chest was performed.    COMPARISON:  08/07/2020    FINDINGS:  Heart size is normal.  Mediastinal structures are midline with note of aortic atherosclerosis.  Lungs are expanded and clear.  No lung consolidation or pleural fluid is detected.  Skeletal structures show no acute finding.  Bone anchors are noted at the shoulders.      Impression    No acute cardiopulmonary disease      Electronically signed by: Nilson Pritchard MD  Date:    08/08/2020  Time:    09:37    and X-Ray Chest PA and Lateral (CXR): No results found for this visit on 08/06/20.

## 2020-08-08 NOTE — ASSESSMENT & PLAN NOTE
84 year old man with DM, prostate CA s/p radiation (completed therapy in May 2020), HTN, CAD (s/p PCI 2007), hx cholecystectomy 2018 and hx hernia repair with mesh 2018, who presented to ED on 8/6 with NSTEMI.  Underwent angiogram and LHC with PCI to RCA yesterday.  Noted to have elevated WBC on 8/7 post procedure and blood cultures were drawn and showing 2/4 bottles with Edwardsiella tarda (sensitivities pending) .  He was started on IV aztreonam given cefadroxil allergy (history of vasculitis).  ID consulted for evaluation and antibiotic recommendations.       Edwardsiella tarda is a water and foodborne pathogen (found in fresh and brackish [place]) and typically causes gastroenteritis.  Bacteremia has been reported though very rare and is often associated with underlying hepatobiliary disease.    It is associated with fresh water food sources such as catfish and sushi.   Patient reports he was having fevers to 101 and chills 4-5 days prior to admission.   Denies sick contacts.    He denies any abdominal symptoms - no pain, nausea, vomiting or diarrhea or recent diarrheal illness.  Patient reports he frequently eats catfish from the food buffet at [facility] ( ? Source).   He had a balloon-assisted colonoscopy in 2015  In which multiple polyps were found.  Follow up 6 month surveillance colonoscopy was recommended, but he was unable to schedule this because he had an MI and was on anti-coagulants.  He was to have Cardiology follow up for clearance for balloon-assisted enteroscopy, but he did not follow through with this.       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  Reaction eported in 2012.  Stated he saw a dermatologist for this.    He has had beta-lactam since that time per review of chart (Augmentin in 2015) with no apparent adverse reaction.       He is afebrile, leukocytosis is downtrending.  Stable and non-septic appearing.  Sitting up on edge of bed.   Note that he had mildly elevated transaminases, t bili, and alk phos 839.  No history of underlying liver disease that I can see.  Would trend.      Plan/recommendations:  1.  Continue aztreonam for now pending susceptibilities.  If patient becomes significantly hemodynamically unstable, recommend stopping aztreonam and starting ertapenem 1 gram IV q 24 hours (cr cl >30).  Once susceptibilities in, may consider 3rd or 4th generation cephalosporin (low cross-reactivity with cefadroxil)  2. Repeat blood cultures ordered.   3. Daily cmp instead of bmp to trend liver enzymes  4. GGT ordered  5. Will need outpatient colonoscopy  6. Will follow up tomorrow with further recommendations.     Data reviewed and plan discussed with ID staff

## 2020-08-08 NOTE — PLAN OF CARE
Plan of care discussed with patient.  Patient ambulating independently, fall precautions in place, Free of fall/trauma/injury. Denies SOB. Encouraged bleeding precautions, right radial site with C/D/I dressing and tape. Good UOP. ACHS glucose monitoring at HS, no coverage required. Blood cultures positive today and new orders for abx IVP.c/o 4/10 headache, tylenol given. Discussed medications and care.  Patient has no questions at this time. Will continue to monitor.

## 2020-08-09 LAB
ALBUMIN SERPL BCP-MCNC: 2.1 G/DL (ref 3.5–5.2)
ALP SERPL-CCNC: 950 U/L (ref 55–135)
ALT SERPL W/O P-5'-P-CCNC: 65 U/L (ref 10–44)
ANION GAP SERPL CALC-SCNC: 11 MMOL/L (ref 8–16)
APTT BLDCRRT: 30.5 SEC (ref 21–32)
AST SERPL-CCNC: 85 U/L (ref 10–40)
BACTERIA BLD CULT: ABNORMAL
BASOPHILS # BLD AUTO: 0.03 K/UL (ref 0–0.2)
BASOPHILS NFR BLD: 0.2 % (ref 0–1.9)
BILIRUB SERPL-MCNC: 1.3 MG/DL (ref 0.1–1)
BUN SERPL-MCNC: 20 MG/DL (ref 8–23)
CALCIUM SERPL-MCNC: 9.5 MG/DL (ref 8.7–10.5)
CHLORIDE SERPL-SCNC: 103 MMOL/L (ref 95–110)
CO2 SERPL-SCNC: 24 MMOL/L (ref 23–29)
CREAT SERPL-MCNC: 1.3 MG/DL (ref 0.5–1.4)
DIFFERENTIAL METHOD: ABNORMAL
EOSINOPHIL # BLD AUTO: 0.1 K/UL (ref 0–0.5)
EOSINOPHIL NFR BLD: 0.5 % (ref 0–8)
ERYTHROCYTE [DISTWIDTH] IN BLOOD BY AUTOMATED COUNT: 16.1 % (ref 11.5–14.5)
EST. GFR  (AFRICAN AMERICAN): 58.7 ML/MIN/1.73 M^2
EST. GFR  (NON AFRICAN AMERICAN): 50.8 ML/MIN/1.73 M^2
GGT SERPL-CCNC: 1450 U/L (ref 8–55)
GLUCOSE SERPL-MCNC: 41 MG/DL (ref 70–110)
HCT VFR BLD AUTO: 32.8 % (ref 40–54)
HGB BLD-MCNC: 10.3 G/DL (ref 14–18)
IMM GRANULOCYTES # BLD AUTO: 0.07 K/UL (ref 0–0.04)
IMM GRANULOCYTES NFR BLD AUTO: 0.5 % (ref 0–0.5)
LYMPHOCYTES # BLD AUTO: 1.3 K/UL (ref 1–4.8)
LYMPHOCYTES NFR BLD: 9.5 % (ref 18–48)
MAGNESIUM SERPL-MCNC: 2 MG/DL (ref 1.6–2.6)
MCH RBC QN AUTO: 29.1 PG (ref 27–31)
MCHC RBC AUTO-ENTMCNC: 31.4 G/DL (ref 32–36)
MCV RBC AUTO: 93 FL (ref 82–98)
MONOCYTES # BLD AUTO: 0.9 K/UL (ref 0.3–1)
MONOCYTES NFR BLD: 6.5 % (ref 4–15)
NEUTROPHILS # BLD AUTO: 11 K/UL (ref 1.8–7.7)
NEUTROPHILS NFR BLD: 82.8 % (ref 38–73)
NRBC BLD-RTO: 0 /100 WBC
PHOSPHATE SERPL-MCNC: 3.4 MG/DL (ref 2.7–4.5)
PLATELET # BLD AUTO: 260 K/UL (ref 150–350)
PMV BLD AUTO: 11.9 FL (ref 9.2–12.9)
POCT GLUCOSE: 188 MG/DL (ref 70–110)
POCT GLUCOSE: 232 MG/DL (ref 70–110)
POCT GLUCOSE: 35 MG/DL (ref 70–110)
POCT GLUCOSE: 92 MG/DL (ref 70–110)
POTASSIUM SERPL-SCNC: 3.9 MMOL/L (ref 3.5–5.1)
PROT SERPL-MCNC: 8.7 G/DL (ref 6–8.4)
RBC # BLD AUTO: 3.54 M/UL (ref 4.6–6.2)
SODIUM SERPL-SCNC: 138 MMOL/L (ref 136–145)
WBC # BLD AUTO: 13.33 K/UL (ref 3.9–12.7)

## 2020-08-09 PROCEDURE — 84100 ASSAY OF PHOSPHORUS: CPT | Mod: HCNC

## 2020-08-09 PROCEDURE — 85730 THROMBOPLASTIN TIME PARTIAL: CPT | Mod: HCNC

## 2020-08-09 PROCEDURE — 85025 COMPLETE CBC W/AUTO DIFF WBC: CPT | Mod: HCNC

## 2020-08-09 PROCEDURE — S0073 INJECTION, AZTREONAM, 500 MG: HCPCS | Mod: HCNC | Performed by: NURSE PRACTITIONER

## 2020-08-09 PROCEDURE — S0073 INJECTION, AZTREONAM, 500 MG: HCPCS | Mod: HCNC | Performed by: STUDENT IN AN ORGANIZED HEALTH CARE EDUCATION/TRAINING PROGRAM

## 2020-08-09 PROCEDURE — 25000003 PHARM REV CODE 250: Mod: HCNC | Performed by: STUDENT IN AN ORGANIZED HEALTH CARE EDUCATION/TRAINING PROGRAM

## 2020-08-09 PROCEDURE — 99232 SBSQ HOSP IP/OBS MODERATE 35: CPT | Mod: HCNC,GC,, | Performed by: INTERNAL MEDICINE

## 2020-08-09 PROCEDURE — 80053 COMPREHEN METABOLIC PANEL: CPT | Mod: HCNC

## 2020-08-09 PROCEDURE — 82977 ASSAY OF GGT: CPT | Mod: HCNC

## 2020-08-09 PROCEDURE — 25000003 PHARM REV CODE 250: Mod: HCNC | Performed by: NURSE PRACTITIONER

## 2020-08-09 PROCEDURE — 36415 COLL VENOUS BLD VENIPUNCTURE: CPT | Mod: HCNC

## 2020-08-09 PROCEDURE — 99232 PR SUBSEQUENT HOSPITAL CARE,LEVL II: ICD-10-PCS | Mod: HCNC,GC,, | Performed by: INTERNAL MEDICINE

## 2020-08-09 PROCEDURE — 25000003 PHARM REV CODE 250: Mod: HCNC | Performed by: INTERNAL MEDICINE

## 2020-08-09 PROCEDURE — 99233 SBSQ HOSP IP/OBS HIGH 50: CPT | Mod: HCNC,,, | Performed by: NURSE PRACTITIONER

## 2020-08-09 PROCEDURE — 20600001 HC STEP DOWN PRIVATE ROOM: Mod: HCNC

## 2020-08-09 PROCEDURE — 83735 ASSAY OF MAGNESIUM: CPT | Mod: HCNC

## 2020-08-09 PROCEDURE — 99233 PR SUBSEQUENT HOSPITAL CARE,LEVL III: ICD-10-PCS | Mod: HCNC,,, | Performed by: NURSE PRACTITIONER

## 2020-08-09 RX ORDER — AZTREONAM 2 G/1
2000 INJECTION, POWDER, LYOPHILIZED, FOR SOLUTION INTRAMUSCULAR; INTRAVENOUS
Status: DISCONTINUED | OUTPATIENT
Start: 2020-08-09 | End: 2020-08-10

## 2020-08-09 RX ORDER — METOPROLOL TARTRATE 50 MG/1
50 TABLET ORAL 2 TIMES DAILY
Status: DISCONTINUED | OUTPATIENT
Start: 2020-08-09 | End: 2020-08-14 | Stop reason: HOSPADM

## 2020-08-09 RX ADMIN — ASPIRIN 81 MG CHEWABLE TABLET 81 MG: 81 TABLET CHEWABLE at 08:08

## 2020-08-09 RX ADMIN — ATORVASTATIN CALCIUM 80 MG: 20 TABLET, FILM COATED ORAL at 08:08

## 2020-08-09 RX ADMIN — AZTREONAM 2000 MG: 2 INJECTION, POWDER, LYOPHILIZED, FOR SOLUTION INTRAMUSCULAR; INTRAVENOUS at 12:08

## 2020-08-09 RX ADMIN — METOPROLOL TARTRATE 50 MG: 50 TABLET, FILM COATED ORAL at 08:08

## 2020-08-09 RX ADMIN — PANTOPRAZOLE SODIUM 40 MG: 40 TABLET, DELAYED RELEASE ORAL at 08:08

## 2020-08-09 RX ADMIN — ACETAMINOPHEN 650 MG: 325 TABLET ORAL at 07:08

## 2020-08-09 RX ADMIN — OMEGA-3-ACID ETHYL ESTERS 1 G: 1 CAPSULE, LIQUID FILLED ORAL at 08:08

## 2020-08-09 RX ADMIN — AZTREONAM 2000 MG: 2 INJECTION, POWDER, LYOPHILIZED, FOR SOLUTION INTRAMUSCULAR; INTRAVENOUS at 08:08

## 2020-08-09 RX ADMIN — AZTREONAM 2000 MG: 2 INJECTION, POWDER, LYOPHILIZED, FOR SOLUTION INTRAMUSCULAR; INTRAVENOUS at 06:08

## 2020-08-09 RX ADMIN — DEXTROSE MONOHYDRATE 25 G: 25 INJECTION, SOLUTION INTRAVENOUS at 05:08

## 2020-08-09 RX ADMIN — TICAGRELOR 90 MG: 90 TABLET ORAL at 08:08

## 2020-08-09 RX ADMIN — IRBESARTAN 75 MG: 75 TABLET ORAL at 08:08

## 2020-08-09 RX ADMIN — FINASTERIDE 5 MG: 5 TABLET, FILM COATED ORAL at 08:08

## 2020-08-09 NOTE — PLAN OF CARE
Pt is a 83 yo M who was admitted for NSTEMI s/p PCI. Pt is AAOx4, and independent. Pt is calm and cooperative. Fall precautions in place -- SR x2, non-slip socks on, call light in reach, bed in lowest position. Free of falls, traumas, and injury. Skin intact. Pt educated on s/s to report to healthcare providers. Pt demonstrates and verbalizes understanding. VSS on RA/NC 2L PRN. Pt is being monitored in Lead II and V1 and is in NSR. Pt denies pain. Plan of care reviewed with pt. Questions encouraged, and answered.

## 2020-08-09 NOTE — ASSESSMENT & PLAN NOTE
WBC noted to be 18, now trending down (13.7K). Denies dysuria, SOB, chest pain, diarrhea. Hemodynamically stable. BCx now growing GNR x 2. Previously had similar GNR bacteremia after cholecystectomy, was on aztreonam. Restarted aztreonam overnight.     Plan  - Trend CBC  - UA with no signs of UTI, CXR unremarkable  - Continue aztreonam for now, consult ID for recommendations  - Repeat BCx pending

## 2020-08-09 NOTE — ASSESSMENT & PLAN NOTE
2/4 bottles with Edwardsiella tarda (sensitivities pending), suspicious for biliary involvement given elevated ALP -950  - ID consulted- recommending IV aztreonam, will follow up with further recs  - Repeat BCx -NGTD   -Daily CMPs  - Will need Abdominal US to evaluate hepatobiliary structures.   - Will also need outpatient colonoscopy

## 2020-08-09 NOTE — NURSING
Performed 6am accucheck and patient was hypoglycemic at 35, asymptomatic except for reported chest tightness, gave PRN D50 IVP, recheck on Accucheck is 232, provided patient with snack and juice. Notified Dr. Simms. No new orders.

## 2020-08-09 NOTE — PLAN OF CARE
Plan of care discussed with patient.  Patient ambulating independently, fall precautions in place, Free of fall/trauma/injury. Denies pain or SOB. Encouraged bleeding precautions, right radial site resolved. Good UOP. ACHS glucose monitoring at HS, no coverage required. Continues on abx IVP. Discussed medications and care.  Patient has no questions at this time. Will continue to monitor.

## 2020-08-09 NOTE — SUBJECTIVE & OBJECTIVE
Interval History: Patient developed symptomatic hypoglycemia- BG dropped to 40s overnight.     Review of Systems   Constitution: Negative for chills and fever.   HENT: Negative for congestion.    Cardiovascular: Negative for chest pain, irregular heartbeat, leg swelling, near-syncope, orthopnea, palpitations, paroxysmal nocturnal dyspnea and syncope.   Respiratory: Negative for shortness of breath.    Gastrointestinal: Negative for abdominal pain, nausea and vomiting.   Neurological: Negative for dizziness, focal weakness, headaches, light-headedness and weakness.     Objective:     Vital Signs (Most Recent):  Temp: 98.4 °F (36.9 °C) (08/09/20 1134)  Pulse: 72 (08/09/20 1134)  Resp: 18 (08/09/20 1134)  BP: (!) 100/50 (08/09/20 1134)  SpO2: 100 % (08/09/20 1134) Vital Signs (24h Range):  Temp:  [96.2 °F (35.7 °C)-98.9 °F (37.2 °C)] 98.4 °F (36.9 °C)  Pulse:  [68-98] 72  Resp:  [17-19] 18  SpO2:  [92 %-100 %] 100 %  BP: (100-136)/(50-63) 100/50     Weight: 76.7 kg (169 lb 1.5 oz)  Body mass index is 27.29 kg/m².     SpO2: 100 %  O2 Device (Oxygen Therapy): room air      Intake/Output Summary (Last 24 hours) at 8/9/2020 1247  Last data filed at 8/9/2020 0500  Gross per 24 hour   Intake 472 ml   Output 635 ml   Net -163 ml       Lines/Drains/Airways     Peripheral Intravenous Line                 Peripheral IV - Single Lumen 08/06/20 1157 18 G Left Forearm 3 days         Peripheral IV - Single Lumen 08/06/20 1415 18 G Left Forearm 2 days                Physical Exam   Constitutional: He is oriented to person, place, and time. He appears well-developed and well-nourished. No distress.   HENT:   Mouth/Throat: Oropharynx is clear and moist.   Eyes: Pupils are equal, round, and reactive to light.   Neck: Neck supple.   Cardiovascular: Normal rate, regular rhythm and intact distal pulses.   No murmur heard.  Pulmonary/Chest: Effort normal and breath sounds normal. No respiratory distress. He has no wheezes. He has no rales.    Abdominal: Soft. Bowel sounds are normal. He exhibits no distension. There is no abdominal tenderness.   Musculoskeletal:         General: No tenderness or edema.   Neurological: He is alert and oriented to person, place, and time.   Skin: Skin is warm and dry. He is not diaphoretic.   Psychiatric: He has a normal mood and affect. His behavior is normal.   Vitals reviewed.      Significant Labs:   BMP:   Recent Labs   Lab 08/08/20 0306 08/09/20  0338   GLU 69* 41*    138   K 3.6 3.9    103   CO2 22* 24   BUN 25* 20   CREATININE 1.4 1.3   CALCIUM 9.0 9.5   MG 2.0 2.0   , CMP   Recent Labs   Lab 08/08/20 0306 08/09/20  0338    138   K 3.6 3.9    103   CO2 22* 24   GLU 69* 41*   BUN 25* 20   CREATININE 1.4 1.3   CALCIUM 9.0 9.5   PROT  --  8.7*   ALBUMIN  --  2.1*   BILITOT  --  1.3*   ALKPHOS  --  950*   AST  --  85*   ALT  --  65*   ANIONGAP 12 11   ESTGFRAFRICA 53.7* 58.7*   EGFRNONAA 46.5* 50.8*    and CBC   Recent Labs   Lab 08/08/20 0306 08/09/20  0338   WBC 13.72* 13.33*   HGB 10.0* 10.3*   HCT 30.6* 32.8*    260

## 2020-08-09 NOTE — CONSULTS
[facility]  Infectious Disease  Consult Note    Patient Name: Anjum Cai Jr.  MRN: [id number]  Admission Date: 8/6/2020  Hospital Length of Stay: 2 days  Attending Physician: Favian Cotton III, MD  Primary Care Provider: Gavin Martinez MD     Isolation Status: No active isolations    Patient information was obtained from patient, past medical records and ER records.      Consults  Assessment/Plan:     Gram-negative bacteremia     84 year old man with DM, prostate CA s/p radiation (completed therapy in May 2020), HTN, CAD (s/p PCI 2007), hx cholecystectomy 2018 and hx hernia repair with mesh 2018, who presented to ED on 8/6 with NSTEMI.  Underwent angiogram and LHC with PCI to RCA yesterday.  Noted to have elevated WBC on 8/7 post procedure and blood cultures were drawn and showing 2/4 bottles with Edwardsiella tarda (sensitivities pending) .  He was started on IV aztreonam given cefadroxil allergy (history of vasculitis).  ID consulted for evaluation and antibiotic recommendations.       Edwardsiella tarda is a water and foodborne pathogen (found in fresh and brackish valencia) and typically causes gastroenteritis.  Bacteremia has been reported though very rare and is often associated with underlying hepatobiliary disease.    It is associated with fresh water food sources such as catfish and sushi.   Patient reports he was having fevers to 101 and chills 4-5 days prior to admission.   Denies sick contacts.    He denies any abdominal symptoms - no pain, nausea, vomiting or diarrhea or recent diarrheal illness.  Patient reports he frequently eats catfish from the food buffet at [facility] ( ? Source).   He had a balloon-assisted colonoscopy in 2015  In which multiple polyps were found.  Follow up 6 month surveillance colonoscopy was recommended, but he was unable to schedule this because he had an MI and was on anti-coagulants.  He was to have Cardiology follow up for clearance for  balloon-assisted enteroscopy, but he did not follow through with this.       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  Reaction eported in 2012.  Stated he saw a dermatologist for this.    He has had beta-lactam since that time per review of chart (Augmentin in 2015) with no apparent adverse reaction.       He is afebrile, leukocytosis is downtrending.  Stable and non-septic appearing.  Sitting up on edge of bed.  Note that he had mildly elevated transaminases, t bili, and alk phos 839.  No history of underlying liver disease that I can see.  Would trend.      Plan/recommendations:  1.  Continue aztreonam for now pending susceptibilities.  If patient becomes significantly hemodynamically unstable, recommend stopping aztreonam and starting ertapenem 1 gram IV q 24 hours (cr cl >30).  Once susceptibilities in, may consider 3rd or 4th generation cephalosporin (low cross-reactivity with cefadroxil)  2. Repeat blood cultures ordered.   3. Daily cmp instead of bmp to trend liver enzymes  4. GGT ordered  5. Will need outpatient colonoscopy  6. Will follow up tomorrow with further recommendations.     Data reviewed and plan discussed with ID staff          Thank you.   Please call for any questions or concerns.  WESTLEY Ferraro, ANP-C  [phone] pager, [facility] [phone]    Subjective:     Principal Problem: NSTEMI (non-ST elevated myocardial infarction)    HPI: Mr. Anjum Cai is an 84 year old man with DM, prostate CA s/p radiation (completed therapy in May 2020), HTN, CAD (s/p PCI 2007), hx cholecystectomy 2018 and hx hernia repair with mesh 2018, who presented to ED on 8/6 with NSTEMI.  Underwent angiogram and LHC with PCI to RCA yesterday.  Noted to have elevated WBC on 8/7 post procedure and blood cultures were drawn and showing 2/4 bottles with Edwardsiella tarda (sensitivities pending) .  He was started on IV aztreonam given cefadroxil allergy (history of vasculitis).   ID consulted for evaluation and antibiotic recommendations.      Patient reports he was having fevers to 101 and chills 4-5 days prior to admission. Denies cough, complains of SOB.  He denies any abdominal symptoms - no pain, nausea, vomiting or diarrhea or recent diarrheal illness.  He had a balloon-assisted colonoscopy in 2015  In which multiple polyps were found.  Follow up 6 month surveillance colonoscopy was recommended, but he was unable to schedule this because he had an MI and was on anti-coagulants.  He was to have Cardiology follow up for clearance for balloon-assisted enteroscopy, but he did not follow through with this.     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  Allergy reported in 2012.  He has had beta-lactam since that time per review of chart (Augmentin in 2015) with no apparent adverse reaction.        Past Medical History:   Diagnosis Date    Anemia 2/26/2018    Arthritis     BPH (benign prostatic hyperplasia)     Cataract     right     Colon polyp 11/18/2015    Colon polyps     Coronary artery disease      (diabetic retinopathy)     Dyslipidemia     Elevated PSA     Goiter, nontoxic, multinodular     Headaches, cluster     Hypertension     Kidney stones     Lump in the testicle     Left    Prostate cancer     Rotator cuff tendinitis     Right shoulder    Sleep apnea with use of continuous positive airway pressure (CPAP)     uses cpap at night    Type II or unspecified type diabetes mellitus with other specified manifestations, uncontrolled        Past Surgical History:   Procedure Laterality Date    CARDIAC CATHETERIZATION  2007    CATARACT EXTRACTION W/  INTRAOCULAR LENS IMPLANT Right 09/27/2016    Dr. Garcia    CHOLECYSTECTOMY  02/2018    CORONARY ANGIOGRAPHY N/A 8/6/2020    Procedure: ANGIOGRAM, CORONARY ARTERY;  Surgeon: Jerson Cavanaugh MD;  Location: [facility] CATH LAB;  Service: Cardiology;  Laterality: N/A;     CORONARY STENT PLACEMENT  02/02/2007    LAD, RCAx2    INGUINAL HERNIA REPAIR Right 1995    LEFT HEART CATHETERIZATION Left 8/6/2020    Procedure: CATHETERIZATION, HEART, LEFT;  Surgeon: Jerson Cavanaugh MD;  Location: [facility] CATH LAB;  Service: Cardiology;  Laterality: Left;    PROSTATE BIOPSY  2012    ROTATOR CUFF REPAIR Right 2014    x2    ROTATOR CUFF REPAIR Left 2013    TONSILLECTOMY, ADENOIDECTOMY      as child    TRIGGER FINGER RELEASE Right 2004    x2       Review of patient's allergies indicates:   Allergen Reactions    Ace inhibitors      Other reaction(s): cough    Cefadroxil      Other reaction(s): possible vasculitis  Other reaction(s): vasculitis       Medications:  Medications Prior to Admission   Medication Sig    ACCU-CHEK SMARTVIEW TEST STRIP Strp CHECK BLOOD SUGARS FOUR TIMES DAILY BEFORE MEALS AS DIRECTED    ASPIRIN (ASPIR-81 ORAL) Take 1 tablet by mouth Daily.    atorvastatin (LIPITOR) 40 MG tablet Take 1 tablet (40 mg total) by mouth once daily.    ciclopirox (PENLAC) 8 % Soln Apply topically nightly.    ciclopirox 0.77 % Gel Apply topically 2 (two) times daily.    ergocalciferol (ERGOCALCIFEROL) 50,000 unit Cap TAKE 1 CAPSULE BY MOUTH ONCE EVERY 30 DAYS    finasteride (PROSCAR) 5 mg tablet TAKE 1 TABLET(5 MG) BY MOUTH EVERY DAY    FLUAD 6771-1190, 65 YR UP,,PF, 45 mcg (15 mcg x 3)/0.5 mL Syrg     insulin (LANTUS SOLOSTAR U-100 INSULIN) glargine 100 units/mL (3mL) SubQ pen ADMINISTER 65 UNITS UNDER THE SKIN EVERY EVENING    insulin aspart U-100 (NOVOLOG FLEXPEN U-100 INSULIN) 100 unit/mL (3 mL) InPn pen ADMINISTER 12 UNITS UNDER THE SKIN THREE TIMES DAILY WITH MEALS PER SLIDING SCALE    irbesartan (AVAPRO) 75 MG tablet Take 1 tablet (75 mg total) by mouth once daily.    lancets (ACCU-CHEK FASTCLIX LANCET DRUM) Misc Check blood sugars four times a day.    metoprolol succinate (TOPROL-XL) 50 MG 24 hr tablet TAKE 1 TABLET(50 MG) BY MOUTH EVERY DAY    mometasone 0.1%  "(ELOCON) 0.1 % cream Apply to affected area daily    MULTIVITAMIN W-MINERALS/LUTEIN (CENTRUM SILVER ORAL) Take by mouth once daily.    nitroGLYCERIN (NITROSTAT) 0.4 MG SL tablet Place 1 tablet (0.4 mg total) under the tongue every 5 (five) minutes as needed for Chest pain.    omega-3 fatty acids-vitamin E (FISH OIL) 1,000 mg Cap Take 2 capsules by mouth Daily.     omeprazole (PRILOSEC) 40 MG capsule Take 1 capsule (40 mg total) by mouth once daily.    pen needle, diabetic (BD ULTRA-FINE MINI PEN NEEDLE) 31 gauge x 3/16" Ndle Use new needle with each injection. Inject insulin 4 times a day.    benzonatate (TESSALON PERLES) 100 MG capsule Take 1 capsule (100 mg total) by mouth every 6 (six) hours as needed for Cough.     Antibiotics (From admission, onward)    Start     Stop Route Frequency Ordered    08/08/20 0115  aztreonam injection 2,000 mg      -- IV Every 8 hours (non-standard times) 08/08/20 0008        Antifungals (From admission, onward)    None        Antivirals (From admission, onward)    None           Immunization History   Administered Date(s) Administered    Influenza - High Dose - PF (65 years and older) 10/05/2010, 09/28/2011, 10/24/2012, 10/17/2013, 11/26/2014, 10/19/2015, 02/06/2017, 10/17/2017, 10/06/2018    Influenza - Quadrivalent 02/06/2017    Influenza - Trivalent - Adjuvanted - PF 11/18/2019    Influenza Split 10/16/2007, 10/03/2008, 10/06/2009    Pneumococcal Conjugate - 13 Valent 09/15/2015, 11/18/2019    Tdap 01/24/2020    Zoster 11/02/2017       Family History     Problem Relation (Age of Onset)    Cancer Mother    Cataracts Father    Diabetes Mother    Heart disease Father        Social History     Socioeconomic History    Marital status:      Spouse name: Not on file    Number of children: Not on file    Years of education: Not on file    Highest education level: Not on file   Occupational History    Not on file   Social Needs    Financial resource strain: Not " on file    Food insecurity     Worry: Not on file     Inability: Not on file    Transportation needs     Medical: Not on file     Non-medical: Not on file   Tobacco Use    Smoking status: Former Smoker     Quit date: 3/12/1978     Years since quittin.4    Smokeless tobacco: Never Used   Substance and Sexual Activity    Alcohol use: No    Drug use: No    Sexual activity: Not on file   Lifestyle    Physical activity     Days per week: Not on file     Minutes per session: Not on file    Stress: Not on file   Relationships    Social connections     Talks on phone: Not on file     Gets together: Not on file     Attends Jew service: Not on file     Active member of club or organization: Not on file     Attends meetings of clubs or organizations: Not on file     Relationship status: Not on file   Other Topics Concern    Not on file   Social History Narrative    Not on file     Review of Systems   Constitutional: Positive for chills (prior to admission) and fever (prior to admission). Negative for activity change, appetite change, diaphoresis and fatigue.   HENT: Negative.    Eyes: Negative.    Respiratory: Positive for shortness of breath. Negative for cough and wheezing.    Cardiovascular: Positive for chest pain. Negative for palpitations and leg swelling.   Gastrointestinal: Negative for abdominal pain, constipation, diarrhea, nausea and vomiting.   Genitourinary: Negative for difficulty urinating and dysuria.   Musculoskeletal: Negative for back pain and myalgias.   Skin: Negative for rash and wound.   Neurological: Negative for dizziness, weakness and headaches.   Psychiatric/Behavioral: Negative for agitation and confusion.     Objective:     Vital Signs (Most Recent):  Temp: 97.4 °F (36.3 °C) (20 1515)  Pulse: 93 (20 1528)  Resp: 17 (20)  BP: 125/62 (20 1515)  SpO2: (!) 92 % (20) Vital Signs (24h Range):  Temp:  [97.4 °F (36.3 °C)-98.1 °F (36.7 °C)] 97.4  °F (36.3 °C)  Pulse:  [64-94] 93  Resp:  [16-18] 17  SpO2:  [92 %-99 %] 92 %  BP: (114-142)/(56-63) 125/62     Weight: 76.7 kg (169 lb 1.5 oz)  Body mass index is 27.29 kg/m².    Estimated Creatinine Clearance: 39.7 mL/min (based on SCr of 1.4 mg/dL).    Physical Exam  Vitals signs and nursing note reviewed.   Constitutional:       General: He is not in acute distress.     Appearance: Normal appearance. He is not ill-appearing, toxic-appearing or diaphoretic.   HENT:      Head: Normocephalic and atraumatic.      Mouth/Throat:      Mouth: Mucous membranes are moist.   Eyes:      General: No scleral icterus.     Conjunctiva/sclera: Conjunctivae normal.   Neck:      Musculoskeletal: Normal range of motion.   Cardiovascular:      Rate and Rhythm: Normal rate and regular rhythm.      Heart sounds: No murmur.   Pulmonary:      Effort: Pulmonary effort is normal.      Breath sounds: Normal breath sounds.   Abdominal:      General: There is no distension.      Palpations: Abdomen is soft.      Tenderness: There is no abdominal tenderness. There is no guarding.   Musculoskeletal:      Right lower leg: No edema.      Left lower leg: No edema.   Skin:     General: Skin is warm and dry.   Neurological:      Mental Status: He is alert and oriented to person, place, and time.   Psychiatric:         Mood and Affect: Mood normal.         Behavior: Behavior normal.         Significant Labs:   Blood Culture:   Recent Labs   Lab 08/07/20  0841 08/08/20  1051 08/08/20  1121   LABBLOO Gram stain lily bottle: Gram negative rods  Results called to and read back by:NISREEN Mcqueen RN 08/07/2020  21:21  EDWARDSIELLA TARDA  For susceptibility see order # [id number]  *  Gram stain lily bottle: Gram negative rods  Positive results previously called 08/07/2020  EDWARDSIELLA TARDA  Susceptibility pending  * No Growth to date No Growth to date     CBC:   Recent Labs   Lab 08/07/20  0347 08/08/20  0306   WBC 18.01* 13.72*   HGB 10.0* 10.0*   HCT  32.1* 30.6*    231     CMP:   Recent Labs   Lab 08/07/20  0841 08/08/20  0306   * 137   K 3.9 3.6    103   CO2 20* 22*   * 69*   BUN 26* 25*   CREATININE 1.6* 1.4   CALCIUM 9.0 9.0   ANIONGAP 10 12   EGFRNONAA 39.5* 46.5*       Significant Imaging: I have reviewed all pertinent imaging results/findings within the past 24 hours.

## 2020-08-09 NOTE — PROGRESS NOTES
[facility]  Cardiology  Progress Note    Patient Name: Anjum Cai Jr.  MRN: [id number]  Admission Date: 8/6/2020  Hospital Length of Stay: 3 days  Code Status: Full Code   Attending Physician: Favian Cotton III, MD   Primary Care Physician: Gavin Martinez MD  Expected Discharge Date: 8/9/2020  Principal Problem:NSTEMI (non-ST elevated myocardial infarction)    Subjective:     Hospital Course:   Patient was admitted for NSTEMI s/p LHC revealed severe stenosis of the proximal and ostial RCA s/p 3.5 x 38 LISSETH. WBC elevated at 18K. Patient has no symptoms and is chest pain free. UA wnl, Blood cultures pending.     Interval History: Patient developed symptomatic hypoglycemia- BG dropped to 40s overnight.     Review of Systems   Constitution: Negative for chills and fever.   HENT: Negative for congestion.    Cardiovascular: Negative for chest pain, irregular heartbeat, leg swelling, near-syncope, orthopnea, palpitations, paroxysmal nocturnal dyspnea and syncope.   Respiratory: Negative for shortness of breath.    Gastrointestinal: Negative for abdominal pain, nausea and vomiting.   Neurological: Negative for dizziness, focal weakness, headaches, light-headedness and weakness.     Objective:     Vital Signs (Most Recent):  Temp: 98.4 °F (36.9 °C) (08/09/20 1134)  Pulse: 72 (08/09/20 1134)  Resp: 18 (08/09/20 1134)  BP: (!) 100/50 (08/09/20 1134)  SpO2: 100 % (08/09/20 1134) Vital Signs (24h Range):  Temp:  [96.2 °F (35.7 °C)-98.9 °F (37.2 °C)] 98.4 °F (36.9 °C)  Pulse:  [68-98] 72  Resp:  [17-19] 18  SpO2:  [92 %-100 %] 100 %  BP: (100-136)/(50-63) 100/50     Weight: 76.7 kg (169 lb 1.5 oz)  Body mass index is 27.29 kg/m².     SpO2: 100 %  O2 Device (Oxygen Therapy): room air      Intake/Output Summary (Last 24 hours) at 8/9/2020 1247  Last data filed at 8/9/2020 0500  Gross per 24 hour   Intake 472 ml   Output 635 ml   Net -163 ml       Lines/Drains/Airways     Peripheral Intravenous Line                  Peripheral IV - Single Lumen 08/06/20 1157 18 G Left Forearm 3 days         Peripheral IV - Single Lumen 08/06/20 1415 18 G Left Forearm 2 days                Physical Exam   Constitutional: He is oriented to person, place, and time. He appears well-developed and well-nourished. No distress.   HENT:   Mouth/Throat: Oropharynx is clear and moist.   Eyes: Pupils are equal, round, and reactive to light.   Neck: Neck supple.   Cardiovascular: Normal rate, regular rhythm and intact distal pulses.   No murmur heard.  Pulmonary/Chest: Effort normal and breath sounds normal. No respiratory distress. He has no wheezes. He has no rales.   Abdominal: Soft. Bowel sounds are normal. He exhibits no distension. There is no abdominal tenderness.   Musculoskeletal:         General: No tenderness or edema.   Neurological: He is alert and oriented to person, place, and time.   Skin: Skin is warm and dry. He is not diaphoretic.   Psychiatric: He has a normal mood and affect. His behavior is normal.   Vitals reviewed.      Significant Labs:   BMP:   Recent Labs   Lab 08/08/20 0306 08/09/20 0338   GLU 69* 41*    138   K 3.6 3.9    103   CO2 22* 24   BUN 25* 20   CREATININE 1.4 1.3   CALCIUM 9.0 9.5   MG 2.0 2.0   , CMP   Recent Labs   Lab 08/08/20 0306 08/09/20  0338    138   K 3.6 3.9    103   CO2 22* 24   GLU 69* 41*   BUN 25* 20   CREATININE 1.4 1.3   CALCIUM 9.0 9.5   PROT  --  8.7*   ALBUMIN  --  2.1*   BILITOT  --  1.3*   ALKPHOS  --  950*   AST  --  85*   ALT  --  65*   ANIONGAP 12 11   ESTGFRAFRICA 53.7* 58.7*   EGFRNONAA 46.5* 50.8*    and CBC   Recent Labs   Lab 08/08/20 0306 08/09/20 0338   WBC 13.72* 13.33*   HGB 10.0* 10.3*   HCT 30.6* 32.8*    260           Assessment and Plan:       * NSTEMI (non-ST elevated myocardial infarction)  -Patient with EMA score of 4, patient clinical history consistent with angina  -EKG with <1mm ST elevation in leads III and AVF, +cardiac biomarker  (trops 0.043-> 0.028)  -Loaded with ASA + Brillanta and systemic heparin.       -s/p LHC revealed severe stenosis of the proximal and ostial RCA s/p 3.5 x 38 LISSETH.   -Continue GDMT (BB, ARB), DAPT  - Cardiac rehab once discharged.         Gram-negative bacteremia  2/4 bottles with Edwardsiella tarda (sensitivities pending), suspicious for biliary involvement given elevated ALP -950  - ID consulted- recommending IV aztreonam, will follow up with further recs  - Repeat BCx -NGTD   -Daily CMPs  - Will need Abdominal US to evaluate hepatobiliary structures.   - Will also need outpatient colonoscopy     Leukocytosis  WBC noted to be 18, now trending down (13.7K). Denies dysuria, SOB, chest pain, diarrhea. Hemodynamically stable. BCx now growing GNR x 2. Previously had similar GNR bacteremia after cholecystectomy, was on aztreonam. Restarted aztreonam overnight.     Plan  - Trend CBC  - UA with no signs of UTI, CXR unremarkable  - Continue aztreonam for now, consult ID for recommendations  - Repeat BCx pending     Essential hypertension  Continue with BB and ARB     Coronary artery disease involving native coronary artery of native heart without angina pectoris  -See 'NSTEMI'           Dyslipidemia  -Lipid panel LDL 75, HDL 27  -Continue atorvastatin 80mg qD      Type 2 diabetes mellitus without retinopathy  Hgb 7.7  Home insulin regimen- Insulin lantus 65 units qHS, novolog 12 units TIDWM. Since hypoglycemic this AM, will decrease insulin regimen.      Plan  -Continue Insulin lantus 35units, aspart 10 units TIDWM   -Maintain -180  -POCT glucose AC/HS           VTE Risk Mitigation (From admission, onward)         Ordered     IP VTE HIGH RISK PATIENT  Once      08/06/20 1403     Place sequential compression device  Until discontinued      08/06/20 1403                Reginald Rebollar MD  Cardiology  [facility]

## 2020-08-09 NOTE — ASSESSMENT & PLAN NOTE
Hgb 7.7  Home insulin regimen- Insulin lantus 65 units qHS, novolog 12 units TIDWM. Since hypoglycemic this AM, will decrease insulin regimen.      Plan  -Continue Insulin lantus 35units, aspart 10 units TIDWM   -Maintain -180  -POCT glucose AC/HS

## 2020-08-10 LAB
ALBUMIN SERPL BCP-MCNC: 1.9 G/DL (ref 3.5–5.2)
ALP SERPL-CCNC: 876 U/L (ref 55–135)
ALT SERPL W/O P-5'-P-CCNC: 59 U/L (ref 10–44)
ANION GAP SERPL CALC-SCNC: 9 MMOL/L (ref 8–16)
APTT BLDCRRT: 34.4 SEC (ref 21–32)
AST SERPL-CCNC: 80 U/L (ref 10–40)
BASOPHILS # BLD AUTO: 0.04 K/UL (ref 0–0.2)
BASOPHILS NFR BLD: 0.2 % (ref 0–1.9)
BILIRUB SERPL-MCNC: 1 MG/DL (ref 0.1–1)
BUN SERPL-MCNC: 19 MG/DL (ref 8–23)
CALCIUM SERPL-MCNC: 8.7 MG/DL (ref 8.7–10.5)
CHLORIDE SERPL-SCNC: 102 MMOL/L (ref 95–110)
CK SERPL-CCNC: 99 U/L (ref 20–200)
CO2 SERPL-SCNC: 23 MMOL/L (ref 23–29)
CREAT SERPL-MCNC: 1.5 MG/DL (ref 0.5–1.4)
DIFFERENTIAL METHOD: ABNORMAL
EOSINOPHIL # BLD AUTO: 0.1 K/UL (ref 0–0.5)
EOSINOPHIL NFR BLD: 0.3 % (ref 0–8)
ERYTHROCYTE [DISTWIDTH] IN BLOOD BY AUTOMATED COUNT: 16.4 % (ref 11.5–14.5)
EST. GFR  (AFRICAN AMERICAN): 49.4 ML/MIN/1.73 M^2
EST. GFR  (NON AFRICAN AMERICAN): 42.7 ML/MIN/1.73 M^2
GLUCOSE SERPL-MCNC: 226 MG/DL (ref 70–110)
HCT VFR BLD AUTO: 31.5 % (ref 40–54)
HGB BLD-MCNC: 10 G/DL (ref 14–18)
IMM GRANULOCYTES # BLD AUTO: 0.11 K/UL (ref 0–0.04)
IMM GRANULOCYTES NFR BLD AUTO: 0.7 % (ref 0–0.5)
LYMPHOCYTES # BLD AUTO: 1.4 K/UL (ref 1–4.8)
LYMPHOCYTES NFR BLD: 8.8 % (ref 18–48)
MAGNESIUM SERPL-MCNC: 2 MG/DL (ref 1.6–2.6)
MCH RBC QN AUTO: 29.4 PG (ref 27–31)
MCHC RBC AUTO-ENTMCNC: 31.7 G/DL (ref 32–36)
MCV RBC AUTO: 93 FL (ref 82–98)
MONOCYTES # BLD AUTO: 1 K/UL (ref 0.3–1)
MONOCYTES NFR BLD: 6.2 % (ref 4–15)
NEUTROPHILS # BLD AUTO: 13.4 K/UL (ref 1.8–7.7)
NEUTROPHILS NFR BLD: 83.8 % (ref 38–73)
NRBC BLD-RTO: 0 /100 WBC
PHOSPHATE SERPL-MCNC: 3.4 MG/DL (ref 2.7–4.5)
PLATELET # BLD AUTO: 277 K/UL (ref 150–350)
PMV BLD AUTO: 11.5 FL (ref 9.2–12.9)
POCT GLUCOSE: 105 MG/DL (ref 70–110)
POCT GLUCOSE: 113 MG/DL (ref 70–110)
POCT GLUCOSE: 116 MG/DL (ref 70–110)
POCT GLUCOSE: 119 MG/DL (ref 70–110)
POCT GLUCOSE: 132 MG/DL (ref 70–110)
POCT GLUCOSE: 158 MG/DL (ref 70–110)
POCT GLUCOSE: 160 MG/DL (ref 70–110)
POCT GLUCOSE: 174 MG/DL (ref 70–110)
POCT GLUCOSE: 207 MG/DL (ref 70–110)
POCT GLUCOSE: 222 MG/DL (ref 70–110)
POCT GLUCOSE: 251 MG/DL (ref 70–110)
POCT GLUCOSE: 298 MG/DL (ref 70–110)
POCT GLUCOSE: 34 MG/DL (ref 70–110)
POCT GLUCOSE: 86 MG/DL (ref 70–110)
POTASSIUM SERPL-SCNC: 4.2 MMOL/L (ref 3.5–5.1)
PROT SERPL-MCNC: 8.2 G/DL (ref 6–8.4)
RBC # BLD AUTO: 3.4 M/UL (ref 4.6–6.2)
SODIUM SERPL-SCNC: 134 MMOL/L (ref 136–145)
WBC # BLD AUTO: 16.01 K/UL (ref 3.9–12.7)

## 2020-08-10 PROCEDURE — 83735 ASSAY OF MAGNESIUM: CPT | Mod: HCNC

## 2020-08-10 PROCEDURE — 99232 PR SUBSEQUENT HOSPITAL CARE,LEVL II: ICD-10-PCS | Mod: HCNC,GC,, | Performed by: INTERNAL MEDICINE

## 2020-08-10 PROCEDURE — 85730 THROMBOPLASTIN TIME PARTIAL: CPT | Mod: HCNC

## 2020-08-10 PROCEDURE — 99233 SBSQ HOSP IP/OBS HIGH 50: CPT | Mod: HCNC,,, | Performed by: NURSE PRACTITIONER

## 2020-08-10 PROCEDURE — 84100 ASSAY OF PHOSPHORUS: CPT | Mod: HCNC

## 2020-08-10 PROCEDURE — S0073 INJECTION, AZTREONAM, 500 MG: HCPCS | Mod: HCNC | Performed by: STUDENT IN AN ORGANIZED HEALTH CARE EDUCATION/TRAINING PROGRAM

## 2020-08-10 PROCEDURE — 80053 COMPREHEN METABOLIC PANEL: CPT | Mod: HCNC

## 2020-08-10 PROCEDURE — 99233 PR SUBSEQUENT HOSPITAL CARE,LEVL III: ICD-10-PCS | Mod: HCNC,,, | Performed by: NURSE PRACTITIONER

## 2020-08-10 PROCEDURE — 82550 ASSAY OF CK (CPK): CPT | Mod: HCNC

## 2020-08-10 PROCEDURE — 25000003 PHARM REV CODE 250: Mod: HCNC | Performed by: INTERNAL MEDICINE

## 2020-08-10 PROCEDURE — 36415 COLL VENOUS BLD VENIPUNCTURE: CPT | Mod: HCNC

## 2020-08-10 PROCEDURE — S0073 INJECTION, AZTREONAM, 500 MG: HCPCS | Mod: HCNC | Performed by: NURSE PRACTITIONER

## 2020-08-10 PROCEDURE — 85025 COMPLETE CBC W/AUTO DIFF WBC: CPT | Mod: HCNC

## 2020-08-10 PROCEDURE — 99232 SBSQ HOSP IP/OBS MODERATE 35: CPT | Mod: HCNC,GC,, | Performed by: INTERNAL MEDICINE

## 2020-08-10 PROCEDURE — 20600001 HC STEP DOWN PRIVATE ROOM: Mod: HCNC

## 2020-08-10 PROCEDURE — 25000003 PHARM REV CODE 250: Mod: HCNC | Performed by: STUDENT IN AN ORGANIZED HEALTH CARE EDUCATION/TRAINING PROGRAM

## 2020-08-10 PROCEDURE — 25000003 PHARM REV CODE 250: Mod: HCNC | Performed by: NURSE PRACTITIONER

## 2020-08-10 RX ORDER — AZTREONAM 2 G/1
2000 INJECTION, POWDER, LYOPHILIZED, FOR SOLUTION INTRAMUSCULAR; INTRAVENOUS
Status: COMPLETED | OUTPATIENT
Start: 2020-08-10 | End: 2020-08-11

## 2020-08-10 RX ORDER — AZTREONAM 1 G/1
1000 INJECTION, POWDER, LYOPHILIZED, FOR SOLUTION INTRAMUSCULAR; INTRAVENOUS ONCE
Status: COMPLETED | OUTPATIENT
Start: 2020-08-10 | End: 2020-08-10

## 2020-08-10 RX ORDER — AZTREONAM 1 G/1
1000 INJECTION, POWDER, LYOPHILIZED, FOR SOLUTION INTRAMUSCULAR; INTRAVENOUS
Status: DISCONTINUED | OUTPATIENT
Start: 2020-08-10 | End: 2020-08-10

## 2020-08-10 RX ADMIN — INSULIN DETEMIR 20 UNITS: 100 INJECTION, SOLUTION SUBCUTANEOUS at 09:08

## 2020-08-10 RX ADMIN — METOPROLOL TARTRATE 50 MG: 50 TABLET, FILM COATED ORAL at 09:08

## 2020-08-10 RX ADMIN — ATORVASTATIN CALCIUM 80 MG: 20 TABLET, FILM COATED ORAL at 09:08

## 2020-08-10 RX ADMIN — OMEGA-3-ACID ETHYL ESTERS 1 G: 1 CAPSULE, LIQUID FILLED ORAL at 09:08

## 2020-08-10 RX ADMIN — IRBESARTAN 75 MG: 75 TABLET ORAL at 09:08

## 2020-08-10 RX ADMIN — AZTREONAM 1000 MG: 1 INJECTION, POWDER, LYOPHILIZED, FOR SOLUTION INTRAMUSCULAR; INTRAVENOUS at 04:08

## 2020-08-10 RX ADMIN — TICAGRELOR 90 MG: 90 TABLET ORAL at 09:08

## 2020-08-10 RX ADMIN — DEXTROSE MONOHYDRATE 25 G: 25 INJECTION, SOLUTION INTRAVENOUS at 06:08

## 2020-08-10 RX ADMIN — FINASTERIDE 5 MG: 5 TABLET, FILM COATED ORAL at 09:08

## 2020-08-10 RX ADMIN — AZTREONAM 1000 MG: 1 INJECTION, POWDER, LYOPHILIZED, FOR SOLUTION INTRAMUSCULAR; INTRAVENOUS at 10:08

## 2020-08-10 RX ADMIN — AZTREONAM 2000 MG: 2 INJECTION, POWDER, LYOPHILIZED, FOR SOLUTION INTRAMUSCULAR; INTRAVENOUS at 06:08

## 2020-08-10 RX ADMIN — INSULIN ASPART 1 UNITS: 100 INJECTION, SOLUTION INTRAVENOUS; SUBCUTANEOUS at 09:08

## 2020-08-10 RX ADMIN — AZTREONAM 2000 MG: 2 INJECTION, POWDER, LYOPHILIZED, FOR SOLUTION INTRAMUSCULAR; INTRAVENOUS at 01:08

## 2020-08-10 RX ADMIN — PANTOPRAZOLE SODIUM 40 MG: 40 TABLET, DELAYED RELEASE ORAL at 09:08

## 2020-08-10 RX ADMIN — INSULIN ASPART 3 UNITS: 100 INJECTION, SOLUTION INTRAVENOUS; SUBCUTANEOUS at 01:08

## 2020-08-10 RX ADMIN — ASPIRIN 81 MG CHEWABLE TABLET 81 MG: 81 TABLET CHEWABLE at 09:08

## 2020-08-10 NOTE — PLAN OF CARE
Pt is a 85 yo M who was admitted for NSTEMI with positive blood cultures 2 days ago. Pt is AAOx4, and independent and ambulatory with steady gait. Pt is calm and cooperative. Fall precautions in place -- SR x2, non-slip socks on, call light in reach, bed in lowest position. Free of falls, traumas, and injury. Skin intact. Pt educated on plan of care. Pt demonstrates and verbalizes understanding. VSS on RA with Oxygen 2L PRN, with an O2 sat >95%. Pt is being monitored in Lead II and V1 and is in NSR. Pt denies pain. Plan of care reviewed with pt. Questions encouraged, and answered.

## 2020-08-10 NOTE — ASSESSMENT & PLAN NOTE
HgbA1c 7.7  Home insulin regimen- Insulin lantus 65 units qHS, novolog 12 units TIDWM. Since hypoglycemic this AM, will decrease insulin regimen.      Plan  -Decreased Insulin lantus to 25units given hypoglycemic, aspart 10 units TIDWM   -Maintain -180  -POCT glucose AC/HS   -Will likely require modification of home insulin regimen

## 2020-08-10 NOTE — ASSESSMENT & PLAN NOTE
2/4 bottles with Edwardsiella tarda (sensitivities pending), suspicious for biliary involvement given elevated ALP -950  - ID consulted- recommending IV aztreonam, will follow up with further recs  - Repeat BCx -NGTD   - Daily CMPs  - Abdominal US revealed mild prominence of CBD. AES consulted for possible biliary infection?  - Will also need outpatient colonoscopy.

## 2020-08-10 NOTE — PLAN OF CARE
Hospital Medicine ICU Acceptance Note      Date of Admit: 8/6/2020  Date of Transfer / Stepdown: 8/10/2020  Liz, ELVA/J, L, Onc (IV chemo w/in 1 month), Gyn/Onc, or other special case?: no   ICU team stepping patient down:CCU  ICU team member giving verbal handoff:   Accepting HM team: B    Brief History of Present Illness:      84 year old male with history of DM2, prostate cancer s/p radiation treatment, HTN, HLP and CAD s/p PCI with LISSETH to LAD and RCA (2007) followed by coronary angiogram in 2016 (LM 50% stenosis with FFR 0.87 and obstructive disease in OM/D2 (no intervention because of small size vessels) who presents today with acute onset substernal chest pain with radiation to the left arm. Patient reports this morning he woke up and had chest discomfort similar to his episodes when he had heart attacks in the past. The chest pain was constant and associated with shortness of breath. He denied having nausea, numbness/tingling of arm or sweating. He took SL nitro tablet at home and immediately called 911. When he was evaluated in the ER, his CP decreased but was still present. EKG was done that showed sinus rhythm, 1st degree AVB and iRBBB. He was given additional SL nitro and is CP free now. He states three weeks ago he has completed radiation therapy and have been having a wide spectrum of symptoms. He states he went to the grocery store couple days ago and was able to walk without having CP or SOB but when he arrived home was completely exhausted. Labs are currently pending. Cardiology consulted for chest pain.      Coronary Angiogram (2016):     Patient has a right dominant coronary artery.        - Left Main Coronary Artery:              The LM has a 50% stenosis. There is EMA 3 flow. FFR was 0.87.      - Left Anterior Descending Artery:              The LAD has luminal irregularities. There is EMA 3 flow.      - D2:              The ostial D2 has a 99% stenosis. There is EMA 3 flow.                       Lesion Details:   This is a Type C lesion.      - Left Circumflex Artery:              The LCX has luminal irregularities. There is EMA 3 flow.      - OM2:              The ostial OM2 has a 95% stenosis. There is EMA 3 flow.      - Right Coronary Artery:              The mid RCA has a 40% stenosis. There is EMA 3 flow. Tandem lesions.      - Common Femoral Artery:              The right CFA is normal.      - Femoral Artery:              The femoral artery is normal.      TTE (2016):   CONCLUSIONS     1 - Normal left ventricular systolic function (EF 60-65%).     2 - Low normal right ventricular systolic function .     3 - Left ventricular diastolic dysfunction.      Patient was taken to the cath lab for angiogram. Admitted to the CCU for further management of NSTEMI.     Hospital/ICU Course:     Patient was admitted for NSTEMI s/p LHC revealed severe stenosis of the proximal and ostial RCA s/p 3.5 x 38 LISSETH. WBC elevated at 18K. UA, CXR wnl. Blood culture was positive for Edwardsiella Tarda (exposure likely 2/2 to fishing trip 2 weeks ago). ID was consulted. Initiated IV aztreonam. Concern for possible biliary involvement given elevated ALP. US Abd revealed mildly prominent CBD. AES consulted to eval possible biliary infection? Per ID recs- will require colonoscopy outpatient given daughter's report of abdominal pain and weight loss. Repeat BCx- NGTD.    Adjusting blood glucose given morning hypoglycemic events X3. Home insulin includes (lantus 65 units qHS and aspart 12 units TIDWM). Decreased determir to 25 units given AM hypoglycemic events, also on [id number] untis TIDWM. Continue DAPT and GDMT for CAD. Transferred to Hospital Medicine for further management    Consultants and Procedures:     Consultants:  - ID  - Cardiology  - Nutrition    Procedures:    - LHC revealed severe stenosis of the proximal and ostial RCA s/p 3.5 x 38 LISSETH    Transfer Information:     Diet:  - Cardiac diet    Physical  Activity:  - PT OT    To Do / Pending Studies / Follow ups:  - Continue with ID recs.   - AES consulted- follow recs.   - Schedule colonoscopy preferrably outpatient  - Follow up with blood glucose management. Will need insulin modification upon discharge.  - DO NOT hold DAPT for any procedure given his recent LISSETH placement.   - Daily updates to family daughter, Maria Elena, is the POA. She can be contacted at [phone].        Patient has been accepted by [facility] Medicine Team B, who will assume care of the patient upon arrival to the floor from the ICU. Please contact ICU team with any concerns prior to arrival. Please contact [facility] Medicine at [phone] or [phone] (please do NOT leave a voicemail) when patient arrives to the floor.    Otis Aleman MD  [phone]

## 2020-08-10 NOTE — PROGRESS NOTES
[facility]  Infectious Disease  Progress Note    Patient Name: Anjmu Cai Jr.  MRN: [id number]  Admission Date: 8/6/2020  Length of Stay: 3 days  Attending Physician: Favian Cotton III, MD  Primary Care Provider: Gavin Martinez MD    Isolation Status: No active isolations  Assessment/Plan:      Gram-negative bacteremia     84 year old man with DM, prostate CA s/p radiation (completed therapy in May 2020), HTN, CAD (s/p PCI 2007), hx cholecystectomy 2018 and hx hernia repair with mesh 2018, who presented to ED on 8/6 with NSTEMI.  Underwent angiogram and LHC with PCI to RCA yesterday.  Noted to have elevated WBC on 8/7 post procedure and blood cultures were drawn and showing 2/4 bottles with Edwardsiella tarda.  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  ID consulted for evaluation and antibiotic recommendations.       Edwardsiella tarda is a water and foodborne pathogen (found in fresh and brackish valencia) and typically causes gastroenteritis.  Bacteremia has been reported though very rare and is often associated with underlying hepatobiliary disease.    It is associated with fresh water food sources such as catfish and sushi.   Patient reports he was having fevers to 101 and chills 4-5 days prior to admission.   Denies sick contacts.    He denies any abdominal symptoms - no pain, nausea, vomiting or diarrhea or recent diarrheal illness (but see discussion with daughter below).  He had a balloon-assisted colonoscopy in 2015  In which multiple polyps were found.  Follow up 6 month surveillance colonoscopy was recommended, but he was unable to schedule this because he had an MI and was on anti-coagulants.  He was to have Cardiology follow up for clearance for balloon-assisted enteroscopy, but he did not follow through with this.       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   "Review of [facility] documents show suspected vasculitis in 2007 after treatment with cefadroxil for a cellulitis.  Stated he saw a dermatologist for this.  I haven't yet found Derm note or path.    He has had beta-lactam since that time per review of chart (Augmentin in 2015) with no apparent adverse reaction.       ALP and GGT elevated.  AST/ALT uptrending.  No reported history of underlying hepatobiliary disease. Abd US pending.  Repeat blood cultures NGTD. Afebrile.      I spoke with his daughter this afternoon.   She reports that he has lost approximately 40 pounds since March. She lives in [place] and was recently visiting within the past two weeks and reports that he had episodes of severe "stomach pains" which he has failed to report to anyone, appetite loss, fevers, and chills.  She is unclear as to report vasculitis with cefadroxil, but requests that we hold off making antibiotic change (I was planning on ceftriaxone after discussions with PharmD and Staff) for now.  She is very concerned about his weight loss and  failure to report his GI symptoms.        Plan/recommendations:  1.  Continue IV aztreonam 2g IV q 8 hours (cr cl >30) for now (isolate is aztreonam sensitive).  Tentatively planning on transition to once a day ceftriaxone which has low cross-reactivity with cefadroxil.  Hesitant to consider fluoroquinolone in this elderly man with diabetes, particularly with hypoglycemic episodes.  Will discuss further with ID staff and PharmD tomorrow.    2. Follow up abdominal US.   3. Given additional history of weight loss and GI symptoms related by his daughter, liver enzyme abnormalities, consider GI evaluation prior to discharge.     4. Will need colonoscopy  5. Will follow up tomorrow       Data reviewed and plan discussed with ID staff  Discussed with Primary Team       Thank you.   Please call for any questions or concerns.  WESTLEY Ferraro, ANP-C  [phone] pager, [phone] [phone]    Subjective: "     Principal Problem:NSTEMI (non-ST elevated myocardial infarction)    HPI: Mr. Anjum Cai is an 84 year old man with DM, prostate CA s/p radiation (completed therapy in May 2020), HTN, CAD (s/p PCI 2007), hx cholecystectomy 2018 and hx hernia repair with mesh 2018, who presented to ED on 8/6 with NSTEMI.  Underwent angiogram and LHC with PCI to RCA yesterday.  Noted to have elevated WBC on 8/7 post procedure and blood cultures were drawn and showing 2/4 bottles with Edwardsiella tarda (sensitivities pending) .  He was started on IV aztreonam given cefadroxil allergy (history of vasculitis).  ID consulted for evaluation and antibiotic recommendations.      Patient reports he was having fevers to 101 and chills 4-5 days prior to admission. Denies cough, complains of SOB.  He denies any abdominal symptoms - no pain, nausea, vomiting or diarrhea or recent diarrheal illness.  He had a balloon-assisted colonoscopy in 2015  In which multiple polyps were found.  Follow up 6 month surveillance colonoscopy was recommended, but he was unable to schedule this because he had an MI and was on anti-coagulants.  He was to have Cardiology follow up for clearance for balloon-assisted enteroscopy, but he did not follow through with this.     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  Allergy reported in 2012.  He has had beta-lactam since that time per review of chart (Augmentin in 2015) with no apparent adverse reaction.      Interval History: Episode of hypoglycemia overnight.  No complaints this afternoon.     Repeat blood cultures NGTD.    Liver enzymes uptrending. GGT elevated.   Afebrile. WBC 13.    Review of Systems   Constitutional: Positive for chills (prior to admission) and fever (prior to admission). Negative for activity change, appetite change, diaphoresis and fatigue.   HENT: Negative.    Eyes: Negative.    Respiratory: Negative for cough, shortness of breath and  wheezing.    Cardiovascular: Positive for chest pain. Negative for palpitations and leg swelling.   Gastrointestinal: Negative for abdominal pain, constipation, diarrhea, nausea and vomiting.   Genitourinary: Negative for difficulty urinating and dysuria.   Musculoskeletal: Negative for back pain and myalgias.   Skin: Negative for rash and wound.   Neurological: Negative for dizziness, weakness and headaches.   Psychiatric/Behavioral: Negative for agitation and confusion.     Objective:     Vital Signs (Most Recent):  Temp: 98.2 °F (36.8 °C) (08/09/20 1927)  Pulse: 72 (08/09/20 2329)  Resp: 18 (08/09/20 1927)  BP: 124/63 (08/09/20 1927)  SpO2: 99 % (08/09/20 1927) Vital Signs (24h Range):  Temp:  [98.2 °F (36.8 °C)-98.9 °F (37.2 °C)] 98.2 °F (36.8 °C)  Pulse:  [] 72  Resp:  [18-19] 18  SpO2:  [98 %-100 %] 99 %  BP: (100-129)/(50-63) 124/63     Weight: 76.7 kg (169 lb 1.5 oz)  Body mass index is 27.29 kg/m².    Estimated Creatinine Clearance: 42.8 mL/min (based on SCr of 1.3 mg/dL).    Physical Exam  Vitals signs and nursing note reviewed.   Constitutional:       General: He is not in acute distress.     Appearance: Normal appearance. He is not ill-appearing, toxic-appearing or diaphoretic.   HENT:      Head: Normocephalic and atraumatic.   Eyes:      General: No scleral icterus.     Conjunctiva/sclera: Conjunctivae normal.   Neck:      Musculoskeletal: Normal range of motion.   Cardiovascular:      Rate and Rhythm: Normal rate and regular rhythm.      Heart sounds: No murmur.   Pulmonary:      Effort: Pulmonary effort is normal.      Breath sounds: Normal breath sounds.   Abdominal:      General: There is no distension.      Palpations: Abdomen is soft.      Tenderness: There is no abdominal tenderness. There is no guarding.   Musculoskeletal:      Right lower leg: No edema.      Left lower leg: No edema.   Skin:     General: Skin is warm and dry.   Neurological:      Mental Status: He is alert and oriented to  person, place, and time.   Psychiatric:         Mood and Affect: Mood normal.         Behavior: Behavior normal.         Significant Labs:   Blood Culture:   Recent Labs   Lab 08/07/20  0841 08/08/20  1051 08/08/20  1121   LABBLOO Gram stain lily bottle: Gram negative rods  Positive results previously called 08/07/2020  EDWARDSIELLA TARDA*  Gram stain lily bottle: Gram negative rods  Results called to and read back by:NISREEN Mcqueen RN 08/07/2020  21:21  EDWARDSIELLA TARDA  For susceptibility see order # [id number]  * No Growth to date  No Growth to date No Growth to date  No Growth to date     CBC:   Recent Labs   Lab 08/08/20  0306 08/09/20  0338   WBC 13.72* 13.33*   HGB 10.0* 10.3*   HCT 30.6* 32.8*    260     CMP:   Recent Labs   Lab 08/08/20  0306 08/09/20  0338    138   K 3.6 3.9    103   CO2 22* 24   GLU 69* 41*   BUN 25* 20   CREATININE 1.4 1.3   CALCIUM 9.0 9.5   PROT  --  8.7*   ALBUMIN  --  2.1*   BILITOT  --  1.3*   ALKPHOS  --  950*   AST  --  85*   ALT  --  65*   ANIONGAP 12 11   EGFRNONAA 46.5* 50.8*       Significant Imaging: I have reviewed all pertinent imaging results/findings within the past 24 hours.

## 2020-08-10 NOTE — PROGRESS NOTES
[facility]  Cardiology  Progress Note    Patient Name: Anjum Cai Jr.  MRN: [id number]  Admission Date: 8/6/2020  Hospital Length of Stay: 4 days  Code Status: Full Code   Attending Physician: Favian Cotton III, MD   Primary Care Physician: Gavin Martinez MD  Expected Discharge Date: 8/9/2020  Principal Problem:NSTEMI (non-ST elevated myocardial infarction)    Subjective:     Hospital Course:   Patient was admitted for NSTEMI s/p LHC revealed severe stenosis of the proximal and ostial RCA s/p 3.5 x 38 LISSETH. WBC elevated at 18K. UA, CXR wnl. Blood culture was positive for Edwardsiella Tarda (exposure likely 2/2 to fishing trip 2 weeks ago). ID was consulted. Initiated IV aztreonam. Concern for possible biliary involvement given elevated ALP. US Abd revealed mildly prominent CBD. AES consulted to eval possible biliary infection? Per ID recs- will require colonoscopy outpatient given daughter's report of abdominal pain and weight loss. Repeat BCx- NGTD.  Adjusting blood glucose given morning hypoglycemic events X3. Home insulin includes (lantus 65 units qHS and aspart 12 units TIDWM). Decreased determir to 25 units given AM hypoglycemic events, also on biuxeb87 untis TIDWM. Continue DAPT and GDMT for CAD. Transferred to Hospital Medicine for further management    Interval History: Patient had another hypoglycemic event this MA and received IV D5. Reports no symptoms.     Review of Systems   Constitution: Negative for chills and fever.   HENT: Negative for congestion.    Cardiovascular: Negative for chest pain, irregular heartbeat, leg swelling, near-syncope, orthopnea, palpitations, paroxysmal nocturnal dyspnea and syncope.   Respiratory: Negative for shortness of breath.    Gastrointestinal: Negative for abdominal pain, nausea and vomiting.   Neurological: Negative for dizziness, focal weakness, headaches, light-headedness and weakness.     Objective:     Vital Signs (Most Recent):  Temp:  98.5 °F (36.9 °C) (08/10/20 0729)  Pulse: 94 (08/10/20 1118)  Resp: 16 (08/10/20 0729)  BP: (!) 122/56 (08/10/20 0729)  SpO2: 98 % (08/10/20 0729) Vital Signs (24h Range):  Temp:  [97.2 °F (36.2 °C)-98.5 °F (36.9 °C)] 98.5 °F (36.9 °C)  Pulse:  [] 94  Resp:  [16-19] 16  SpO2:  [97 %-99 %] 98 %  BP: (108-129)/(53-63) 122/56     Weight: 76.4 kg (168 lb 6.9 oz)  Body mass index is 27.19 kg/m².     SpO2: 98 %  O2 Device (Oxygen Therapy): room air      Intake/Output Summary (Last 24 hours) at 8/10/2020 1235  Last data filed at 8/10/2020 0500  Gross per 24 hour   Intake 980 ml   Output 940 ml   Net 40 ml       Lines/Drains/Airways     Peripheral Intravenous Line                 Peripheral IV - Single Lumen 08/06/20 1157 18 G Left Forearm 4 days         Peripheral IV - Single Lumen 08/06/20 1415 18 G Left Forearm 3 days                Physical Exam   Constitutional: He is oriented to person, place, and time. He appears well-developed and well-nourished. No distress.   HENT:   Mouth/Throat: Oropharynx is clear and moist.   Eyes: Pupils are equal, round, and reactive to light.   Neck: Neck supple.   Cardiovascular: Normal rate, regular rhythm and intact distal pulses.   No murmur heard.  Pulmonary/Chest: Effort normal and breath sounds normal. No respiratory distress. He has no wheezes. He has no rales.   Abdominal: Soft. Bowel sounds are normal. He exhibits no distension. There is no abdominal tenderness.   Musculoskeletal:         General: No tenderness or edema.   Neurological: He is alert and oriented to person, place, and time.   Skin: Skin is warm and dry. He is not diaphoretic.   Psychiatric: He has a normal mood and affect. His behavior is normal.   Vitals reviewed.      Significant Labs:   Blood Culture: No results for input(s): LABBLOO in the last 48 hours., BMP:   Recent Labs   Lab 08/09/20  0338 08/10/20  0637   GLU 41* 226*    134*   K 3.9 4.2    102   CO2 24 23   BUN 20 19   CREATININE 1.3  1.5*   CALCIUM 9.5 8.7   MG 2.0 2.0    and CMP   Recent Labs   Lab 08/09/20  0338 08/10/20  0637    134*   K 3.9 4.2    102   CO2 24 23   GLU 41* 226*   BUN 20 19   CREATININE 1.3 1.5*   CALCIUM 9.5 8.7   PROT 8.7* 8.2   ALBUMIN 2.1* 1.9*   BILITOT 1.3* 1.0   ALKPHOS 950* 876*   AST 85* 80*   ALT 65* 59*   ANIONGAP 11 9   ESTGFRAFRICA 58.7* 49.4*   EGFRNONAA 50.8* 42.7*       Significant Imaging:  US Abd:   Unremarkable sonographic appearance of the liver.     Mild prominence of the common bile duct and mild intrahepatic biliary duct dilatation at the peter hepatis.  Consider further evaluation with MRCP in order to rule out early distal obstruction if clinically warranted.     Cholecystectomy.     Assessment and Plan:       * NSTEMI (non-ST elevated myocardial infarction)  -Patient with EMA score of 4, patient clinical history consistent with angina  -EKG with <1mm ST elevation in leads III and AVF, +cardiac biomarker (trops 0.043-> 0.028)  -Loaded with ASA + Brillanta and systemic heparin.       -s/p LHC revealed severe stenosis of the proximal and ostial RCA s/p 3.5 x 38 LISSETH.   -Continue GDMT (BB, ARB);  DAPT for 1 year. Do not hold DAPT for any procedures.   -Transferred to Hospital Medicine for further management. Please review resident handoff.   - Cardiac rehab once discharged.          Gram-negative bacteremia  2/4 bottles with Edwardsiella tarda (sensitivities pending), suspicious for biliary involvement given elevated ALP -950  - ID consulted- recommending IV aztreonam, will follow up with further recs  - Repeat BCx -NGTD   - Daily CMPs  - Abdominal US revealed mild prominence of CBD. AES consulted for possible biliary infection?  - Will also need outpatient colonoscopy.     Leukocytosis  WBC noted to be 18, now trending down (13.7K). Denies dysuria, SOB, chest pain, diarrhea. Hemodynamically stable. BCx now growing GNR x 2. Previously had similar GNR bacteremia after cholecystectomy, was on  aztreonam. Restarted aztreonam overnight.   - Refer to 'Gram Negative Rods'      Essential hypertension  Continue with BB and ARB     Coronary artery disease involving native coronary artery of native heart without angina pectoris  -See 'NSTEMI'           Dyslipidemia  -Lipid panel LDL 75, HDL 27  -Continue atorvastatin 80mg qD      Type 2 diabetes mellitus without retinopathy  HgbA1c 7.7  Home insulin regimen- Insulin lantus 65 units qHS, novolog 12 units TIDWM. Since hypoglycemic this AM, will decrease insulin regimen.      Plan  -Decreased Insulin lantus to 25units given hypoglycemic, aspart 10 units TIDWM   -Maintain -180  -POCT glucose AC/HS   -Will likely require modification of home insulin regimen        VTE Risk Mitigation (From admission, onward)         Ordered     IP VTE HIGH RISK PATIENT  Once      08/06/20 1403     Place sequential compression device  Until discontinued      08/06/20 1403                Reginald Rebollar MD  Cardiology  [facility]

## 2020-08-10 NOTE — ASSESSMENT & PLAN NOTE
-Patient with EMA score of 4, patient clinical history consistent with angina  -EKG with <1mm ST elevation in leads III and AVF, +cardiac biomarker (trops 0.043-> 0.028)  -Loaded with ASA + Brillanta and systemic heparin.       -s/p LHC revealed severe stenosis of the proximal and ostial RCA s/p 3.5 x 38 LISSETH.   -Continue GDMT (BB, ARB);  DAPT for 1 year. Do not hold DAPT for any procedures.   -Transferred to Hospital Medicine for further management. Please review resident handoff.   - Cardiac rehab once discharged.

## 2020-08-10 NOTE — ASSESSMENT & PLAN NOTE
WBC noted to be 18, now trending down (13.7K). Denies dysuria, SOB, chest pain, diarrhea. Hemodynamically stable. BCx now growing GNR x 2. Previously had similar GNR bacteremia after cholecystectomy, was on aztreonam. Restarted aztreonam overnight.   - Refer to 'Gram Negative Rods'

## 2020-08-10 NOTE — SUBJECTIVE & OBJECTIVE
Interval History: Continues to have hypoglycemia at night.    Repeat blood cultures NGTD.    Abd US showed mild CBD prominence and mild intrahepatic biliary duct dilitation  Patient transferred to Hospital Medicine service  GI workup pending    Review of Systems   Constitutional: Negative for activity change, appetite change, chills, diaphoresis, fatigue and fever.   HENT: Negative.    Eyes: Negative.    Respiratory: Negative for cough, shortness of breath and wheezing.    Cardiovascular: Negative for chest pain, palpitations and leg swelling.   Gastrointestinal: Negative for abdominal pain, constipation, diarrhea, nausea and vomiting.   Genitourinary: Negative for difficulty urinating and dysuria.   Musculoskeletal: Negative for back pain and myalgias.   Skin: Negative for rash and wound.   Neurological: Negative for dizziness, weakness and headaches.   Psychiatric/Behavioral: Negative for agitation and confusion.     Objective:     Vital Signs (Most Recent):  Temp: 98.5 °F (36.9 °C) (08/10/20 0729)  Pulse: 81 (08/10/20 0729)  Resp: 16 (08/10/20 0729)  BP: (!) 122/56 (08/10/20 0729)  SpO2: 98 % (08/10/20 0729) Vital Signs (24h Range):  Temp:  [97.2 °F (36.2 °C)-98.5 °F (36.9 °C)] 98.5 °F (36.9 °C)  Pulse:  [] 81  Resp:  [16-19] 16  SpO2:  [97 %-100 %] 98 %  BP: (100-129)/(50-63) 122/56     Weight: 76.7 kg (169 lb 1.5 oz)  Body mass index is 27.29 kg/m².    Estimated Creatinine Clearance: 37.1 mL/min (A) (based on SCr of 1.5 mg/dL (H)).    Physical Exam  Vitals signs and nursing note reviewed.   Constitutional:       General: He is not in acute distress.     Appearance: Normal appearance. He is not ill-appearing, toxic-appearing or diaphoretic.   HENT:      Head: Normocephalic and atraumatic.   Eyes:      General: No scleral icterus.     Conjunctiva/sclera: Conjunctivae normal.   Neck:      Musculoskeletal: Normal range of motion.   Cardiovascular:      Rate and Rhythm: Normal rate and regular rhythm.       Heart sounds: No murmur.   Pulmonary:      Effort: Pulmonary effort is normal.      Breath sounds: Normal breath sounds.   Abdominal:      General: There is no distension.      Palpations: Abdomen is soft.      Tenderness: There is no abdominal tenderness. There is no guarding.   Musculoskeletal:      Right lower leg: No edema.      Left lower leg: No edema.   Skin:     General: Skin is warm and dry.   Neurological:      Mental Status: He is alert and oriented to person, place, and time.   Psychiatric:         Mood and Affect: Mood normal.         Behavior: Behavior normal.         Significant Labs:   Blood Culture:   Recent Labs   Lab 08/07/20  0841 08/08/20  1051 08/08/20  1121   LABBLOO Gram stain lily bottle: Gram negative rods  Positive results previously called 08/07/2020  EDWARDSIELLA TARDA*  Gram stain lily bottle: Gram negative rods  Results called to and read back by:NISREEN Mcqueen RN 08/07/2020  21:21  EDWARDSIELLA TARDA  For susceptibility see order # [id number]  * No Growth to date  No Growth to date No Growth to date  No Growth to date     CBC:   Recent Labs   Lab 08/09/20  0338 08/10/20  0637   WBC 13.33* 16.01*   HGB 10.3* 10.0*   HCT 32.8* 31.5*    277     CMP:   Recent Labs   Lab 08/09/20  0338 08/10/20  0637    134*   K 3.9 4.2    102   CO2 24 23   GLU 41* 226*   BUN 20 19   CREATININE 1.3 1.5*   CALCIUM 9.5 8.7   PROT 8.7* 8.2   ALBUMIN 2.1* 1.9*   BILITOT 1.3* 1.0   ALKPHOS 950* 876*   AST 85* 80*   ALT 65* 59*   ANIONGAP 11 9   EGFRNONAA 50.8* 42.7*       Significant Imaging: I have reviewed all pertinent imaging results/findings within the past 24 hours.

## 2020-08-10 NOTE — PLAN OF CARE
Handoff     Primary Team: Networked reference to record PCT  Room Number: 358/358 A     Patient Name: Anjum Cai Jr. MRN: [id number]     Date of Birth: [id number] Allergies: Ace inhibitors and Cefadroxil     Age: 84 y.o. Admit Date: 8/6/2020     Sex: male  BMI: Body mass index is 27.19 kg/m².     Code Status: Full Code        Illness Level (current clinical status): Watcher - No    Reason for Admission: NSTEMI (non-ST elevated myocardial infarction)    Brief HPI (pertinent PMH and diagnosis or differential diagnosis): 85yo M with DM, HTN, HLD, CAD s/p PCI (07), who presents with NSTEMI. S/p LHC with LISSETH placement. On DAPT X1 year     Procedure Date: 08/06/2020    Hospital Course (updated, brief assessment by system or problem, significant events): Patient was admitted for NSTEMI s/p LHC revealed severe stenosis of the proximal and ostial RCA s/p 3.5 x 38 LISSETH. WBC elevated at 18K. UA, CXR wnl. Blood culture was positive for Edwardsiella Tarda (exposure likely 2/2 to fishing trip 2 weeks ago). ID was consulted. Initiated IV aztreonam. Concern for possible biliary involvement given elevated ALP. US Abd revealed mildly prominent CBD. AES consulted to eval possible biliary infection? Per ID recs- will require colonoscopy outpatient given daughter's report of abdominal pain and weight loss.   Adjusting blood glucose given morning hypoglycemic events X3. Home insulin includes (lantus 65 units qHS and aspart 12 units TIDWM). Decreased determir to 25 units given AM hypoglycemic events, also on [id number] untis TIDWM. Continue DAPT and GDMT for CAD.     Tasks (specific, using if-then statements): Continue with ID recs.   -AES consulted- follow recs.   -Schedule colonoscopy preferrably outpatient  -Follow up with blood glucose management. Will need insulin modification upon discharge.  - DO NOT hold DAPT for any procedure given his recent LISSETH placement.       Contingency Plan (special circumstances anticipated and plan): If with  chest pain or symptoms concerning for ACS- please consult cardiology    Estimated Discharge Date: 1-2 days.    Discharge Disposition: Home or Self Care. Cardiac rehab outpatient    Mentored By: Dr. Cotton

## 2020-08-10 NOTE — SUBJECTIVE & OBJECTIVE
Interval History: Episode of hypoglycemia overnight.  No complaints this afternoon.     Repeat blood cultures NGTD.    Liver enzymes uptrending. GGT elevated.   Afebrile. WBC 13.    Review of Systems   Constitutional: Positive for chills (prior to admission) and fever (prior to admission). Negative for activity change, appetite change, diaphoresis and fatigue.   HENT: Negative.    Eyes: Negative.    Respiratory: Negative for cough, shortness of breath and wheezing.    Cardiovascular: Positive for chest pain. Negative for palpitations and leg swelling.   Gastrointestinal: Negative for abdominal pain, constipation, diarrhea, nausea and vomiting.   Genitourinary: Negative for difficulty urinating and dysuria.   Musculoskeletal: Negative for back pain and myalgias.   Skin: Negative for rash and wound.   Neurological: Negative for dizziness, weakness and headaches.   Psychiatric/Behavioral: Negative for agitation and confusion.     Objective:     Vital Signs (Most Recent):  Temp: 98.2 °F (36.8 °C) (08/09/20 1927)  Pulse: 72 (08/09/20 2329)  Resp: 18 (08/09/20 1927)  BP: 124/63 (08/09/20 1927)  SpO2: 99 % (08/09/20 1927) Vital Signs (24h Range):  Temp:  [98.2 °F (36.8 °C)-98.9 °F (37.2 °C)] 98.2 °F (36.8 °C)  Pulse:  [] 72  Resp:  [18-19] 18  SpO2:  [98 %-100 %] 99 %  BP: (100-129)/(50-63) 124/63     Weight: 76.7 kg (169 lb 1.5 oz)  Body mass index is 27.29 kg/m².    Estimated Creatinine Clearance: 42.8 mL/min (based on SCr of 1.3 mg/dL).    Physical Exam  Vitals signs and nursing note reviewed.   Constitutional:       General: He is not in acute distress.     Appearance: Normal appearance. He is not ill-appearing, toxic-appearing or diaphoretic.   HENT:      Head: Normocephalic and atraumatic.   Eyes:      General: No scleral icterus.     Conjunctiva/sclera: Conjunctivae normal.   Neck:      Musculoskeletal: Normal range of motion.   Cardiovascular:      Rate and Rhythm: Normal rate and regular rhythm.      Heart  sounds: No murmur.   Pulmonary:      Effort: Pulmonary effort is normal.      Breath sounds: Normal breath sounds.   Abdominal:      General: There is no distension.      Palpations: Abdomen is soft.      Tenderness: There is no abdominal tenderness. There is no guarding.   Musculoskeletal:      Right lower leg: No edema.      Left lower leg: No edema.   Skin:     General: Skin is warm and dry.   Neurological:      Mental Status: He is alert and oriented to person, place, and time.   Psychiatric:         Mood and Affect: Mood normal.         Behavior: Behavior normal.         Significant Labs:   Blood Culture:   Recent Labs   Lab 08/07/20  0841 08/08/20  1051 08/08/20  1121   LABBLOO Gram stain lily bottle: Gram negative rods  Positive results previously called 08/07/2020  EDWARDSIELLA TARDA*  Gram stain lily bottle: Gram negative rods  Results called to and read back by:NISREEN Mcqueen RN 08/07/2020  21:21  EDWARDSIELLA TARDA  For susceptibility see order # [id number]  * No Growth to date  No Growth to date No Growth to date  No Growth to date     CBC:   Recent Labs   Lab 08/08/20  0306 08/09/20  0338   WBC 13.72* 13.33*   HGB 10.0* 10.3*   HCT 30.6* 32.8*    260     CMP:   Recent Labs   Lab 08/08/20  0306 08/09/20  0338    138   K 3.6 3.9    103   CO2 22* 24   GLU 69* 41*   BUN 25* 20   CREATININE 1.4 1.3   CALCIUM 9.0 9.5   PROT  --  8.7*   ALBUMIN  --  2.1*   BILITOT  --  1.3*   ALKPHOS  --  950*   AST  --  85*   ALT  --  65*   ANIONGAP 12 11   EGFRNONAA 46.5* 50.8*       Significant Imaging: I have reviewed all pertinent imaging results/findings within the past 24 hours.

## 2020-08-10 NOTE — PLAN OF CARE
Plan of care discussed with patient.  Patient ambulating independently, fall precautions in place, Free of fall/trauma/injury. Denies pain or SOB.  Good UOP. ACHS glucose monitoring at HS, no coverage required. Continues on abx IVP. Discussed medications and care.  Patient has no questions at this time. Will continue to monitor

## 2020-08-10 NOTE — NURSING
Patients morning accucheck again hypogyclemic at 34, D50 IVP given, patient again asymptomatic. Notified Dr. Alexandra., no new orders.

## 2020-08-10 NOTE — PROGRESS NOTES
"[facility]  Infectious Disease  Progress Note    Patient Name: Anjum Cai Jr.  MRN: [id number]  Admission Date: 8/6/2020  Length of Stay: 4 days  Attending Physician: Otis Aleman MD  Primary Care Provider: Gavin Martinez MD    Isolation Status: No active isolations  Assessment/Plan:      Gram-negative bacteremia     84 year old man with DM, prostate CA s/p radiation (completed therapy in May 2020), HTN, CAD (s/p PCI 2007), hx cholecystectomy 2018 and hx hernia repair with mesh 2018, who presented to ED on 8/6 with NSTEMI.  Underwent angiogram and LHC with PCI to RCA on 8/6.   Noted to have elevated WBC on 8/7 post procedure and blood cultures were drawn, showing 2/4 bottles with Edwardsiella tarda.  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  ID consulted for evaluation and antibiotic recommendations.       Edwardsiella tarda is a water and foodborne pathogen (found in fresh and brackish valencia) and typically causes gastroenteritis.  Bacteremia has been reported, though very rare and can be associated with underlying hepatobiliary disease.    It is associated with fresh/brackish water food sources such as catfish.    Patient reports he was having fevers to 101 and chills 4-5 days prior to admission and his daughter confirms this.  Patient denies any abdominal symptoms or diarrhea.  Daughter reports he has lost approximately 40 pounds since March. She lives in [place] but recently visited approx 2 weeks ago and reported he had episodes of severe "stomach pains" ( which he has failed to report to anyone), appetite loss, fevers, chills.  Also episodes of hypoglycemia and hypotension.         He had a balloon-assisted colonoscopy in 2015  In which multiple polyps were found.  Follow up 6 month surveillance colonoscopy was recommended, but he was unable to schedule this because he had an MI and was on anti-coagulants.  He was to " have Cardiology follow up for clearance for balloon-assisted enteroscopy, but he did not follow through with this.       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  Review of [facility] documents show suspected vasculitis in 2007 after treatment with cefadroxil for a cellulitis.  Stated he saw a dermatologist for this.  I haven't yet found Derm note or path.    He has had beta-lactam since that time per review of chart (Augmentin in 2015) with no apparent adverse reaction.       ALP and GGT elevated.  AST/ALT mildly elevated.  No reported history of underlying hepatobiliary disease. Abd US yesterday showed mild CBD prominence and mild intrahepatic biliary duct dilitation   His repeat blood cultures are negative.  He is afebrile, but still with leukocytosis.  He has been on aztreonam.  Transferred to Hospital Medicine Service today.       Plan/recommendations:  1.  Will discontinue aztreonam and start once daily ertapenem tomorrow.  Discussed with ID PharmD and ID staff. Cross reactivity with cefadroxil very unlikely and it's once a day dosing will be easier for patient compliance.  I discussed this with his daughter.  2.  GI/AES consult pending given history of weight loss and GI symptoms related by his daughter, liver enzyme abnormalities  3.  Will follow up tomorrow    Data reviewed and plan discussed with ID staff  Discussed with Hospital Medicine       Thank you.   Please call for any questions or concerns.  WESTLEY Ferraro, ANP-C  [phone] pager, [phone]    Subjective:     Principal Problem:NSTEMI (non-ST elevated myocardial infarction)    HPI: Mr. Anjum Cai is an 84 year old man with DM, prostate CA s/p radiation (completed therapy in May 2020), HTN, CAD (s/p PCI 2007), hx cholecystectomy 2018 and hx hernia repair with mesh 2018, who presented to ED on 8/6 with NSTEMI.  Underwent angiogram and LHC with PCI to RCA yesterday.  Noted to have elevated WBC on  8/7 post procedure and blood cultures were drawn and showing 2/4 bottles with Edwardsiella tarda (sensitivities pending) .  He was started on IV aztreonam given cefadroxil allergy (history of vasculitis).  ID consulted for evaluation and antibiotic recommendations.      Patient reports he was having fevers to 101 and chills 4-5 days prior to admission. Denies cough, complains of SOB.  He denies any abdominal symptoms - no pain, nausea, vomiting or diarrhea or recent diarrheal illness.  He had a balloon-assisted colonoscopy in 2015  In which multiple polyps were found.  Follow up 6 month surveillance colonoscopy was recommended, but he was unable to schedule this because he had an MI and was on anti-coagulants.  He was to have Cardiology follow up for clearance for balloon-assisted enteroscopy, but he did not follow through with this.     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  Allergy reported in 2012.  He has had beta-lactam since that time per review of chart (Augmentin in 2015) with no apparent adverse reaction.      Interval History: Continues to have hypoglycemia at night.    Repeat blood cultures NGTD.    Abd US showed mild CBD prominence and mild intrahepatic biliary duct dilitation  Patient transferred to Hospital Medicine service  GI workup pending    Review of Systems   Constitutional: Negative for activity change, appetite change, chills, diaphoresis, fatigue and fever.   HENT: Negative.    Eyes: Negative.    Respiratory: Negative for cough, shortness of breath and wheezing.    Cardiovascular: Negative for chest pain, palpitations and leg swelling.   Gastrointestinal: Negative for abdominal pain, constipation, diarrhea, nausea and vomiting.   Genitourinary: Negative for difficulty urinating and dysuria.   Musculoskeletal: Negative for back pain and myalgias.   Skin: Negative for rash and wound.   Neurological: Negative for dizziness, weakness and  headaches.   Psychiatric/Behavioral: Negative for agitation and confusion.     Objective:     Vital Signs (Most Recent):  Temp: 98.5 °F (36.9 °C) (08/10/20 0729)  Pulse: 81 (08/10/20 0729)  Resp: 16 (08/10/20 0729)  BP: (!) 122/56 (08/10/20 0729)  SpO2: 98 % (08/10/20 0729) Vital Signs (24h Range):  Temp:  [97.2 °F (36.2 °C)-98.5 °F (36.9 °C)] 98.5 °F (36.9 °C)  Pulse:  [] 81  Resp:  [16-19] 16  SpO2:  [97 %-100 %] 98 %  BP: (100-129)/(50-63) 122/56     Weight: 76.7 kg (169 lb 1.5 oz)  Body mass index is 27.29 kg/m².    Estimated Creatinine Clearance: 37.1 mL/min (A) (based on SCr of 1.5 mg/dL (H)).    Physical Exam  Vitals signs and nursing note reviewed.   Constitutional:       General: He is not in acute distress.     Appearance: Normal appearance. He is not ill-appearing, toxic-appearing or diaphoretic.   HENT:      Head: Normocephalic and atraumatic.   Eyes:      General: No scleral icterus.     Conjunctiva/sclera: Conjunctivae normal.   Neck:      Musculoskeletal: Normal range of motion.   Cardiovascular:      Rate and Rhythm: Normal rate and regular rhythm.      Heart sounds: No murmur.   Pulmonary:      Effort: Pulmonary effort is normal.      Breath sounds: Normal breath sounds.   Abdominal:      General: There is no distension.      Palpations: Abdomen is soft.      Tenderness: There is no abdominal tenderness. There is no guarding.   Musculoskeletal:      Right lower leg: No edema.      Left lower leg: No edema.   Skin:     General: Skin is warm and dry.   Neurological:      Mental Status: He is alert and oriented to person, place, and time.   Psychiatric:         Mood and Affect: Mood normal.         Behavior: Behavior normal.         Significant Labs:   Blood Culture:   Recent Labs   Lab 08/07/20  0841 08/08/20  1051 08/08/20  1121   LABBLOO Gram stain lily bottle: Gram negative rods  Positive results previously called 08/07/2020  EDWARDSIELLA TARDA*  Gram stain lily bottle: Gram negative rods   Results called to and read back by:NISREEN Mcqueen RN 08/07/2020  21:21  EDWARDSIELLA TARDA  For susceptibility see order # [id number]  * No Growth to date  No Growth to date No Growth to date  No Growth to date     CBC:   Recent Labs   Lab 08/09/20 0338 08/10/20  0637   WBC 13.33* 16.01*   HGB 10.3* 10.0*   HCT 32.8* 31.5*    277     CMP:   Recent Labs   Lab 08/09/20  0338 08/10/20  0637    134*   K 3.9 4.2    102   CO2 24 23   GLU 41* 226*   BUN 20 19   CREATININE 1.3 1.5*   CALCIUM 9.5 8.7   PROT 8.7* 8.2   ALBUMIN 2.1* 1.9*   BILITOT 1.3* 1.0   ALKPHOS 950* 876*   AST 85* 80*   ALT 65* 59*   ANIONGAP 11 9   EGFRNONAA 50.8* 42.7*       Significant Imaging: I have reviewed all pertinent imaging results/findings within the past 24 hours.

## 2020-08-10 NOTE — NURSING
Most recent accucheck is 116, patient is provided more snacks prior to bed.  Will monitor accucheck throughout the night. Reports chest tightness resolved with snacks and tylenol.

## 2020-08-10 NOTE — ASSESSMENT & PLAN NOTE
"   84 year old man with DM, prostate CA s/p radiation (completed therapy in May 2020), HTN, CAD (s/p PCI 2007), hx cholecystectomy 2018 and hx hernia repair with mesh 2018, who presented to ED on 8/6 with NSTEMI.  Underwent angiogram and LHC with PCI to RCA on 8/6.   Noted to have elevated WBC on 8/7 post procedure and blood cultures were drawn, showing 2/4 bottles with Edwardsiella tarda.  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  ID consulted for evaluation and antibiotic recommendations.       Edwardsiella tarda is a water and foodborne pathogen (found in fresh and brackish [place]) and typically causes gastroenteritis.  Bacteremia has been reported, though very rare and can be associated with underlying hepatobiliary disease.    It is associated with fresh/brackish water food sources such as catfish.    Patient reports he was having fevers to 101 and chills 4-5 days prior to admission and his daughter confirms this.  Patient denies any abdominal symptoms or diarrhea.  Daughter reports he has lost approximately 40 pounds since March. She lives in [place] but recently visited approx 2 weeks ago and reported he had episodes of severe "stomach pains" ( which he has failed to report to anyone), appetite loss, fevers, chills.  Also episodes of hypoglycemia and hypotension.         He had a balloon-assisted colonoscopy in 2015  In which multiple polyps were found.  Follow up 6 month surveillance colonoscopy was recommended, but he was unable to schedule this because he had an MI and was on anti-coagulants.  He was to have Cardiology follow up for clearance for balloon-assisted enteroscopy, but he did not follow through with this.       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  Review of [facility] documents show suspected vasculitis in 2007 after treatment with cefadroxil for a cellulitis.  Stated he saw a " dermatologist for this.  I haven't yet found Derm note or path.    He has had beta-lactam since that time per review of chart (Augmentin in 2015) with no apparent adverse reaction.       ALP and GGT elevated.  AST/ALT mildly elevated.  No reported history of underlying hepatobiliary disease. Abd US yesterday showed mild CBD prominence and mild intrahepatic biliary duct dilitation   His repeat blood cultures are negative.  He is afebrile, but still with leukocytosis.  He has been on aztreonam.  Transferred to Hospital Medicine Service today.       Plan/recommendations:  1.  Will discontinue aztreonam and start once daily ertapenem tomorrow.  Discussed with ID PharmD and ID staff. Cross reactivity with cefadroxil very unlikely and it's once a day dosing will be easier for patient compliance.  I discussed this with his daughter.  2.  GI/AES consult pending given history of weight loss and GI symptoms related by his daughter, liver enzyme abnormalities  3.  Will follow up tomorrow    Data reviewed and plan discussed with ID staff  Discussed with Hospital Medicine

## 2020-08-10 NOTE — NURSING
"Upon assessment this AM, pt verbalized frustrations with hospitalization and the uncertainty of treatment. Pt states "I want the head doctor of the Infectious disease people to talk to my daughter. Not the nurse, not the practitioner, not the resident, intern!" Concerns noted, empathetic listening utilized. Encouraged patient to verbalize frustrations. Pt also verbalized that he refused further assessment, states: "This is unnecessary." MD notified of patients concerns, Team reports to bedside to discuss plan of care.   "

## 2020-08-10 NOTE — NURSING
Most recent accucheck is 86, patient c/o chest tightness and consumed orange juice and snacks provided prior to accucheck performed. Assume patients glucose was lower prior to testing. Patient continues to be hypoglycemic during the night.  Will continue to monitor accucheck throughout the night.

## 2020-08-10 NOTE — ASSESSMENT & PLAN NOTE
84 year old man with DM, prostate CA s/p radiation (completed therapy in May 2020), HTN, CAD (s/p PCI 2007), hx cholecystectomy 2018 and hx hernia repair with mesh 2018, who presented to ED on 8/6 with NSTEMI.  Underwent angiogram and LHC with PCI to RCA yesterday.  Noted to have elevated WBC on 8/7 post procedure and blood cultures were drawn and showing 2/4 bottles with Edwardsiella tarda.  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  ID consulted for evaluation and antibiotic recommendations.       Edwardsiella tarda is a water and foodborne pathogen (found in fresh and brackish [place]) and typically causes gastroenteritis.  Bacteremia has been reported though very rare and is often associated with underlying hepatobiliary disease.    It is associated with fresh water food sources such as catfish and sushi.   Patient reports he was having fevers to 101 and chills 4-5 days prior to admission.   Denies sick contacts.    He denies any abdominal symptoms - no pain, nausea, vomiting or diarrhea or recent diarrheal illness (but see discussion with daughter below).  He had a balloon-assisted colonoscopy in 2015  In which multiple polyps were found.  Follow up 6 month surveillance colonoscopy was recommended, but he was unable to schedule this because he had an MI and was on anti-coagulants.  He was to have Cardiology follow up for clearance for balloon-assisted enteroscopy, but he did not follow through with this.       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  Review of [facility] documents show suspected vasculitis in 2007 after treatment with cefadroxil for a cellulitis.  Stated he saw a dermatologist for this.  I haven't yet found Derm note or path.    He has had beta-lactam since that time per review of chart (Augmentin in 2015) with no apparent adverse reaction.       ALP and GGT elevated.  AST/ALT  "uptrending.  No reported history of underlying hepatobiliary disease. Abd US pending.  Repeat blood cultures NGTD. Afebrile.      I spoke with his daughter this afternoon.   She reports that he has lost approximately 40 pounds since March. She lives in [place] and was recently visiting within the past two weeks and reports that he had episodes of severe "stomach pains" which he has failed to report to anyone, appetite loss, fevers, and chills.  She is unclear as to report vasculitis with cefadroxil, but requests that we hold off making antibiotic change (I was planning on ceftriaxone after discussions with PharmD and Staff) for now.  She is very concerned about his weight loss and  failure to report his GI symptoms.        Plan/recommendations:  1.  Continue IV aztreonam 2g IV q 8 hours (cr cl >30) for now (isolate is aztreonam sensitive).  Tentatively planning on transition to once a day ceftriaxone which has low cross-reactivity with cefadroxil.  Hesitant to consider fluoroquinolone in this elderly man with diabetes, particularly with hypoglycemic episodes.  Will discuss further with ID staff and PharmD tomorrow.    2. Follow up abdominal US.   3. Given additional history of weight loss and GI symptoms related by his daughter, liver enzyme abnormalities, consider GI evaluation prior to discharge.     4. Will need colonoscopy  5. Will follow up tomorrow       Data reviewed and plan discussed with ID staff  Discussed with Primary Team   "

## 2020-08-10 NOTE — SUBJECTIVE & OBJECTIVE
Interval History: Patient had another hypoglycemic event this MA and received IV D5. Reports no symptoms.     Review of Systems   Constitution: Negative for chills and fever.   HENT: Negative for congestion.    Cardiovascular: Negative for chest pain, irregular heartbeat, leg swelling, near-syncope, orthopnea, palpitations, paroxysmal nocturnal dyspnea and syncope.   Respiratory: Negative for shortness of breath.    Gastrointestinal: Negative for abdominal pain, nausea and vomiting.   Neurological: Negative for dizziness, focal weakness, headaches, light-headedness and weakness.     Objective:     Vital Signs (Most Recent):  Temp: 98.5 °F (36.9 °C) (08/10/20 0729)  Pulse: 94 (08/10/20 1118)  Resp: 16 (08/10/20 0729)  BP: (!) 122/56 (08/10/20 0729)  SpO2: 98 % (08/10/20 0729) Vital Signs (24h Range):  Temp:  [97.2 °F (36.2 °C)-98.5 °F (36.9 °C)] 98.5 °F (36.9 °C)  Pulse:  [] 94  Resp:  [16-19] 16  SpO2:  [97 %-99 %] 98 %  BP: (108-129)/(53-63) 122/56     Weight: 76.4 kg (168 lb 6.9 oz)  Body mass index is 27.19 kg/m².     SpO2: 98 %  O2 Device (Oxygen Therapy): room air      Intake/Output Summary (Last 24 hours) at 8/10/2020 1235  Last data filed at 8/10/2020 0500  Gross per 24 hour   Intake 980 ml   Output 940 ml   Net 40 ml       Lines/Drains/Airways     Peripheral Intravenous Line                 Peripheral IV - Single Lumen 08/06/20 1157 18 G Left Forearm 4 days         Peripheral IV - Single Lumen 08/06/20 1415 18 G Left Forearm 3 days                Physical Exam   Constitutional: He is oriented to person, place, and time. He appears well-developed and well-nourished. No distress.   HENT:   Mouth/Throat: Oropharynx is clear and moist.   Eyes: Pupils are equal, round, and reactive to light.   Neck: Neck supple.   Cardiovascular: Normal rate, regular rhythm and intact distal pulses.   No murmur heard.  Pulmonary/Chest: Effort normal and breath sounds normal. No respiratory distress. He has no wheezes. He  has no rales.   Abdominal: Soft. Bowel sounds are normal. He exhibits no distension. There is no abdominal tenderness.   Musculoskeletal:         General: No tenderness or edema.   Neurological: He is alert and oriented to person, place, and time.   Skin: Skin is warm and dry. He is not diaphoretic.   Psychiatric: He has a normal mood and affect. His behavior is normal.   Vitals reviewed.      Significant Labs:   Blood Culture: No results for input(s): LABBLOO in the last 48 hours., BMP:   Recent Labs   Lab 08/09/20  0338 08/10/20  0637   GLU 41* 226*    134*   K 3.9 4.2    102   CO2 24 23   BUN 20 19   CREATININE 1.3 1.5*   CALCIUM 9.5 8.7   MG 2.0 2.0    and CMP   Recent Labs   Lab 08/09/20 0338 08/10/20  0637    134*   K 3.9 4.2    102   CO2 24 23   GLU 41* 226*   BUN 20 19   CREATININE 1.3 1.5*   CALCIUM 9.5 8.7   PROT 8.7* 8.2   ALBUMIN 2.1* 1.9*   BILITOT 1.3* 1.0   ALKPHOS 950* 876*   AST 85* 80*   ALT 65* 59*   ANIONGAP 11 9   ESTGFRAFRICA 58.7* 49.4*   EGFRNONAA 50.8* 42.7*       Significant Imaging:  US Abd:   Unremarkable sonographic appearance of the liver.     Mild prominence of the common bile duct and mild intrahepatic biliary duct dilatation at the peter hepatis.  Consider further evaluation with MRCP in order to rule out early distal obstruction if clinically warranted.     Cholecystectomy.

## 2020-08-10 NOTE — NURSING
Most recent accucheck is 92, patient is provided ice cream and snacks as requested prior to bed.  Will monitor accucheck throughout the night. Reports some chest tightness , tylenol given.

## 2020-08-11 LAB
ALBUMIN SERPL BCP-MCNC: 1.8 G/DL (ref 3.5–5.2)
ALP SERPL-CCNC: 845 U/L (ref 55–135)
ALT SERPL W/O P-5'-P-CCNC: 55 U/L (ref 10–44)
ANION GAP SERPL CALC-SCNC: 10 MMOL/L (ref 8–16)
AST SERPL-CCNC: 75 U/L (ref 10–40)
BASOPHILS # BLD AUTO: 0.04 K/UL (ref 0–0.2)
BASOPHILS NFR BLD: 0.3 % (ref 0–1.9)
BILIRUB SERPL-MCNC: 0.9 MG/DL (ref 0.1–1)
BUN SERPL-MCNC: 22 MG/DL (ref 8–23)
CALCIUM SERPL-MCNC: 8.8 MG/DL (ref 8.7–10.5)
CHLORIDE SERPL-SCNC: 101 MMOL/L (ref 95–110)
CO2 SERPL-SCNC: 22 MMOL/L (ref 23–29)
CREAT SERPL-MCNC: 1.6 MG/DL (ref 0.5–1.4)
CRP SERPL-MCNC: 158.2 MG/L (ref 0–8.2)
DIFFERENTIAL METHOD: ABNORMAL
EOSINOPHIL # BLD AUTO: 0.1 K/UL (ref 0–0.5)
EOSINOPHIL NFR BLD: 0.5 % (ref 0–8)
ERYTHROCYTE [DISTWIDTH] IN BLOOD BY AUTOMATED COUNT: 16 % (ref 11.5–14.5)
EST. GFR  (AFRICAN AMERICAN): 45.7 ML/MIN/1.73 M^2
EST. GFR  (NON AFRICAN AMERICAN): 39.5 ML/MIN/1.73 M^2
GLUCOSE SERPL-MCNC: 118 MG/DL (ref 70–110)
HCT VFR BLD AUTO: 32 % (ref 40–54)
HGB BLD-MCNC: 10.1 G/DL (ref 14–18)
IMM GRANULOCYTES # BLD AUTO: 0.09 K/UL (ref 0–0.04)
IMM GRANULOCYTES NFR BLD AUTO: 0.6 % (ref 0–0.5)
LYMPHOCYTES # BLD AUTO: 0.9 K/UL (ref 1–4.8)
LYMPHOCYTES NFR BLD: 6.3 % (ref 18–48)
MAGNESIUM SERPL-MCNC: 1.9 MG/DL (ref 1.6–2.6)
MCH RBC QN AUTO: 29.4 PG (ref 27–31)
MCHC RBC AUTO-ENTMCNC: 31.6 G/DL (ref 32–36)
MCV RBC AUTO: 93 FL (ref 82–98)
MONOCYTES # BLD AUTO: 1.1 K/UL (ref 0.3–1)
MONOCYTES NFR BLD: 7.5 % (ref 4–15)
NEUTROPHILS # BLD AUTO: 12 K/UL (ref 1.8–7.7)
NEUTROPHILS NFR BLD: 84.8 % (ref 38–73)
NRBC BLD-RTO: 0 /100 WBC
PHOSPHATE SERPL-MCNC: 3.1 MG/DL (ref 2.7–4.5)
PLATELET # BLD AUTO: 266 K/UL (ref 150–350)
PMV BLD AUTO: 11.2 FL (ref 9.2–12.9)
POCT GLUCOSE: 113 MG/DL (ref 70–110)
POCT GLUCOSE: 144 MG/DL (ref 70–110)
POCT GLUCOSE: 161 MG/DL (ref 70–110)
POCT GLUCOSE: 162 MG/DL (ref 70–110)
POCT GLUCOSE: 187 MG/DL (ref 70–110)
POCT GLUCOSE: 192 MG/DL (ref 70–110)
POCT GLUCOSE: 242 MG/DL (ref 70–110)
POTASSIUM SERPL-SCNC: 4 MMOL/L (ref 3.5–5.1)
PROT SERPL-MCNC: 8.4 G/DL (ref 6–8.4)
RBC # BLD AUTO: 3.43 M/UL (ref 4.6–6.2)
SARS-COV-2 RDRP RESP QL NAA+PROBE: NEGATIVE
SODIUM SERPL-SCNC: 133 MMOL/L (ref 136–145)
WBC # BLD AUTO: 14.19 K/UL (ref 3.9–12.7)

## 2020-08-11 PROCEDURE — 99232 PR SUBSEQUENT HOSPITAL CARE,LEVL II: ICD-10-PCS | Mod: HCNC,GC,, | Performed by: INTERNAL MEDICINE

## 2020-08-11 PROCEDURE — 99232 SBSQ HOSP IP/OBS MODERATE 35: CPT | Mod: HCNC,GC,, | Performed by: INTERNAL MEDICINE

## 2020-08-11 PROCEDURE — 36415 COLL VENOUS BLD VENIPUNCTURE: CPT | Mod: HCNC

## 2020-08-11 PROCEDURE — C9399 UNCLASSIFIED DRUGS OR BIOLOG: HCPCS | Mod: HCNC | Performed by: INTERNAL MEDICINE

## 2020-08-11 PROCEDURE — A9585 GADOBUTROL INJECTION: HCPCS | Mod: HCNC | Performed by: INTERNAL MEDICINE

## 2020-08-11 PROCEDURE — 25500020 PHARM REV CODE 255: Mod: HCNC | Performed by: INTERNAL MEDICINE

## 2020-08-11 PROCEDURE — 99233 PR SUBSEQUENT HOSPITAL CARE,LEVL III: ICD-10-PCS | Mod: HCNC,,, | Performed by: NURSE PRACTITIONER

## 2020-08-11 PROCEDURE — 25000003 PHARM REV CODE 250: Mod: HCNC | Performed by: STUDENT IN AN ORGANIZED HEALTH CARE EDUCATION/TRAINING PROGRAM

## 2020-08-11 PROCEDURE — 63600175 PHARM REV CODE 636 W HCPCS: Mod: HCNC | Performed by: NURSE PRACTITIONER

## 2020-08-11 PROCEDURE — 86677 HELICOBACTER PYLORI ANTIBODY: CPT | Mod: HCNC

## 2020-08-11 PROCEDURE — 25000003 PHARM REV CODE 250: Mod: HCNC | Performed by: NURSE PRACTITIONER

## 2020-08-11 PROCEDURE — 86140 C-REACTIVE PROTEIN: CPT | Mod: HCNC

## 2020-08-11 PROCEDURE — 94761 N-INVAS EAR/PLS OXIMETRY MLT: CPT | Mod: HCNC

## 2020-08-11 PROCEDURE — U0002 COVID-19 LAB TEST NON-CDC: HCPCS | Mod: HCNC

## 2020-08-11 PROCEDURE — 25000003 PHARM REV CODE 250: Mod: HCNC | Performed by: INTERNAL MEDICINE

## 2020-08-11 PROCEDURE — 83735 ASSAY OF MAGNESIUM: CPT | Mod: HCNC

## 2020-08-11 PROCEDURE — 99233 PR SUBSEQUENT HOSPITAL CARE,LEVL III: ICD-10-PCS | Mod: HCNC,,, | Performed by: INTERNAL MEDICINE

## 2020-08-11 PROCEDURE — 84100 ASSAY OF PHOSPHORUS: CPT | Mod: HCNC

## 2020-08-11 PROCEDURE — 80053 COMPREHEN METABOLIC PANEL: CPT | Mod: HCNC

## 2020-08-11 PROCEDURE — S0073 INJECTION, AZTREONAM, 500 MG: HCPCS | Mod: HCNC | Performed by: NURSE PRACTITIONER

## 2020-08-11 PROCEDURE — 20600001 HC STEP DOWN PRIVATE ROOM: Mod: HCNC

## 2020-08-11 PROCEDURE — 99233 SBSQ HOSP IP/OBS HIGH 50: CPT | Mod: HCNC,,, | Performed by: NURSE PRACTITIONER

## 2020-08-11 PROCEDURE — 99233 SBSQ HOSP IP/OBS HIGH 50: CPT | Mod: HCNC,,, | Performed by: INTERNAL MEDICINE

## 2020-08-11 PROCEDURE — 85025 COMPLETE CBC W/AUTO DIFF WBC: CPT | Mod: HCNC

## 2020-08-11 RX ORDER — GADOBUTROL 604.72 MG/ML
10 INJECTION INTRAVENOUS
Status: COMPLETED | OUTPATIENT
Start: 2020-08-11 | End: 2020-08-11

## 2020-08-11 RX ADMIN — ACETAMINOPHEN 650 MG: 325 TABLET ORAL at 09:08

## 2020-08-11 RX ADMIN — GADOBUTROL 10 ML: 604.72 INJECTION INTRAVENOUS at 08:08

## 2020-08-11 RX ADMIN — OMEGA-3-ACID ETHYL ESTERS 1 G: 1 CAPSULE, LIQUID FILLED ORAL at 09:08

## 2020-08-11 RX ADMIN — INSULIN DETEMIR 20 UNITS: 100 INJECTION, SOLUTION SUBCUTANEOUS at 09:08

## 2020-08-11 RX ADMIN — ASPIRIN 81 MG CHEWABLE TABLET 81 MG: 81 TABLET CHEWABLE at 09:08

## 2020-08-11 RX ADMIN — FINASTERIDE 5 MG: 5 TABLET, FILM COATED ORAL at 09:08

## 2020-08-11 RX ADMIN — TICAGRELOR 90 MG: 90 TABLET ORAL at 09:08

## 2020-08-11 RX ADMIN — METOPROLOL TARTRATE 50 MG: 50 TABLET, FILM COATED ORAL at 09:08

## 2020-08-11 RX ADMIN — ATORVASTATIN CALCIUM 80 MG: 20 TABLET, FILM COATED ORAL at 09:08

## 2020-08-11 RX ADMIN — AZTREONAM 2000 MG: 2 INJECTION, POWDER, LYOPHILIZED, FOR SOLUTION INTRAMUSCULAR; INTRAVENOUS at 01:08

## 2020-08-11 RX ADMIN — ERTAPENEM 1 G: 1 INJECTION INTRAMUSCULAR; INTRAVENOUS at 09:08

## 2020-08-11 RX ADMIN — PANTOPRAZOLE SODIUM 40 MG: 40 TABLET, DELAYED RELEASE ORAL at 09:08

## 2020-08-11 RX ADMIN — IRBESARTAN 75 MG: 75 TABLET ORAL at 09:08

## 2020-08-11 NOTE — SUBJECTIVE & OBJECTIVE
Interval History: No acute events overnight.  Afebrile.  Repeat blood cultures NGTD.   Seen by GI - MRI/MRCP today concerning for cholangitis       Review of Systems   Constitutional: Positive for appetite change, chills, fever (prior to admission) and unexpected weight change. Negative for activity change, diaphoresis and fatigue.   HENT: Negative.    Eyes: Negative.    Respiratory: Positive for chest tightness (intermittent). Negative for cough, shortness of breath and wheezing.    Cardiovascular: Negative for chest pain, palpitations and leg swelling.   Gastrointestinal: Positive for abdominal pain (intermittent). Negative for constipation, diarrhea, nausea and vomiting.   Genitourinary: Negative for difficulty urinating and dysuria.   Musculoskeletal: Negative for back pain and myalgias.   Skin: Negative for rash and wound.   Neurological: Negative for dizziness, weakness and headaches.   Psychiatric/Behavioral: Negative for agitation and confusion.     Objective:     Vital Signs (Most Recent):  Temp: 98.5 °F (36.9 °C) (08/11/20 0723)  Pulse: 85 (08/11/20 0845)  Resp: 18 (08/11/20 0723)  BP: (!) 114/57 (08/11/20 0723)  SpO2: 100 % (08/11/20 0845) Vital Signs (24h Range):  Temp:  [98.2 °F (36.8 °C)-99.7 °F (37.6 °C)] 98.5 °F (36.9 °C)  Pulse:  [68-97] 85  Resp:  [16-18] 18  SpO2:  [94 %-100 %] 100 %  BP: (104-121)/(54-59) 114/57     Weight: 76.4 kg (168 lb 6.9 oz)  Body mass index is 27.19 kg/m².    Estimated Creatinine Clearance: 32.1 mL/min (A) (based on SCr of 1.6 mg/dL (H)).    Physical Exam  Vitals signs and nursing note reviewed.   Constitutional:       General: He is not in acute distress.     Appearance: Normal appearance. He is not ill-appearing, toxic-appearing or diaphoretic.   HENT:      Head: Normocephalic and atraumatic.   Eyes:      General: No scleral icterus.     Conjunctiva/sclera: Conjunctivae normal.   Neck:      Musculoskeletal: Normal range of motion.   Cardiovascular:      Rate and Rhythm:  Normal rate and regular rhythm.      Heart sounds: No murmur.   Pulmonary:      Effort: Pulmonary effort is normal.      Breath sounds: Normal breath sounds.   Abdominal:      General: There is no distension.      Palpations: Abdomen is soft.      Tenderness: There is no abdominal tenderness. There is no guarding.   Musculoskeletal:      Right lower leg: No edema.      Left lower leg: No edema.   Skin:     General: Skin is warm and dry.   Neurological:      Mental Status: He is alert and oriented to person, place, and time.   Psychiatric:         Mood and Affect: Mood normal.         Behavior: Behavior normal.         Significant Labs:   Blood Culture:   Recent Labs   Lab 08/07/20  0841 08/08/20  1051 08/08/20  1121   LABBLOO Gram stain lily bottle: Gram negative rods  Positive results previously called 08/07/2020  EDWARDSIELLA TARDA*  Gram stain lily bottle: Gram negative rods  Results called to and read back by:NISREEN Mcqueen RN 08/07/2020  21:21  EDWARDSIELLA TARDA  For susceptibility see order # [id number]  * No Growth to date  No Growth to date  No Growth to date No Growth to date  No Growth to date  No Growth to date     CBC:   Recent Labs   Lab 08/10/20  0637 08/11/20  0642   WBC 16.01* 14.19*   HGB 10.0* 10.1*   HCT 31.5* 32.0*    266     CMP:   Recent Labs   Lab 08/10/20  0637 08/11/20  0642   * 133*   K 4.2 4.0    101   CO2 23 22*   * 118*   BUN 19 22   CREATININE 1.5* 1.6*   CALCIUM 8.7 8.8   PROT 8.2 8.4   ALBUMIN 1.9* 1.8*   BILITOT 1.0 0.9   ALKPHOS 876* 845*   AST 80* 75*   ALT 59* 55*   ANIONGAP 9 10   EGFRNONAA 42.7* 39.5*       Significant Imaging: I have reviewed all pertinent imaging results/findings within the past 24 hours.

## 2020-08-11 NOTE — ASSESSMENT & PLAN NOTE
"   84 year old man with DM, prostate CA s/p radiation (completed therapy in May 2020), HTN, CAD (s/p PCI 2007), hx cholecystectomy 2018 and hx hernia repair with mesh 2018, who presented to ED on 8/6 with NSTEMI.  Underwent angiogram and LHC with PCI to RCA on 8/6.   Noted to have elevated WBC on 8/7 post procedure and blood cultures were drawn, showing 2/4 bottles with Edwardsiella tarda.  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  ID consulted for evaluation and antibiotic recommendations.       Edwardsiella tarda is a water and foodborne pathogen (found in fresh and brackish val[place]) and typically causes gastroenteritis.  Bacteremia has been reported, though very rare and can be associated with underlying hepatobiliary disease. Associated with fresh/brackish water food sources such as catfish.    Patient reports he was having fevers to 101 and chills 4-5 days prior to admission and his daughter confirms this.  Patient denies any abdominal symptoms or diarrhea.  Daughter reports he has lost approximately 40 pounds since March. She lives in [place] but recently visited approx 2 weeks ago and reported he had episodes of severe "stomach pains" ( which he has failed to report to anyone), appetite loss, fevers, chills.  Also episodes of hypoglycemia and hypotension.         He had a balloon-assisted colonoscopy in 2015  In which multiple polyps were found.  Follow up 6 month surveillance colonoscopy was recommended, but he was unable to schedule this because he had an MI and was on anti-coagulants.  He was to have Cardiology follow up for clearance for balloon-assisted enteroscopy, but he did not follow through with this.       ALP, GGT  noted to be elevated. No reported history of underlying hepatobiliary disease.  Abd US 8/9 showed mild CBD prominence and mild intrahepatic biliary duct dilation.  Transferred to Hospital Medicine service 8/10  for " further workup for GI/hepatobiliary source of bacteremia.  Evaluated by GI and underwent MRCP/MRI today (8/11)  which is concerning for cholangitis.  ERCP recommended, but in setting of recent ACS/dual antiplatelet therapy/need for anesthesia, procedure poses significant risks (bleeding, pancreatitis, adverse cardiac event).         An interdisciplinary discussion was held between ID, Hospital Medicine, AES, and Cardiology.    From Cardiovascular perspective, would be ideal to wait for 4 weeks from MI before ERCP.  Okay from ID perspective to delay ERCP for 4 weeks.  He had a transient bacteremia discovered while working up source of leukocytosis noted on admission with MI.  His blood cultures cleared quickly and he has been afebrile, hemodynamically stable.  Because he is currently stable, recommend continuing IV antibiotics for 14 days from first negative blood culture, then transitioning to oral antibiotic for another two weeks for a total of 4 weeks of antibiotics and then proceeding with ERCP/stent      Plan/recommendations:  1.  Continue ertapenem 1 gram IV q 24 hour X 14 days from first negative blood culture.  Estimated end date of IV abx  8/22.   2.   Monitor renal function closely.  If cr cl goes below 30, will need to change ertapenem to 500 mg q 24 hours.     3.   Weekly cbc, cmp, crp, esr and fax results to ID, attention Deny Ferguson NP, at fax [phone]. (Pager [phone], spectra [phone], office [phone])  4.    Plan on transition to oral Augmentin 875 mg twice daily X 14 days after completion of IV therapy until 9/5 or until ERCP.  Patient with reported vasculitis with Cefadroxil, but has tolerated Augmentin since that time (took 10 day course in April 2015).  5.  ID follow up at 14 days (can do virtual visit) and after ERCP.  ID will make the appointments.   6.  Will sign off.  Please call or re-consult as needed.      Data reviewed and plan discussed with ID staff  Discussed with Hospital Medicine,  GI, and Cardiology.

## 2020-08-11 NOTE — NURSING
Pt continues to urinate in toilet instead of urinal. RN re-iterated importance of accurate Is&Os and asked pt to urinate in urinal.

## 2020-08-11 NOTE — CONSULTS
[facility], [place]  Consultation  Cardiology      Anjum Cai Jr.  YOB: 1935  Medical Record Number:  [id number]  Attending Physician:  Otis Aleman MD   Date of Admission: 8/6/2020       Hospital Day:  5  Current Principal Problem:  NSTEMI (non-ST elevated myocardial infarction)      History     Cc: chest pain    HPI Patient was admitted for NSTEMI s/p LHC revealed severe stenosis of the proximal and ostial RCA s/p 3.5 x 38 LISSETH. WBC elevated at 18K. UA, CXR wnl. Blood culture was positive for Edwardsiella Tarda (exposure likely 2/2 to fishing trip 2 weeks ago). ID was consulted. Initiated IV aztreonam. Concern for possible biliary involvement given elevated ALP. US Abd revealed mildly prominent CBD. He is currently being treated for Cholangitis secondary to choledocholithiasis with IV abx with plans to transition to oral antibiotics soon. GI is following him with initial plans to do an ERCP with subsequent stent placement and sphincterotomy, however he currently on DAPT for his recent stent which cannot be interrupted at this time. His pain is controlled and he afebrile and hemodynamically stable.         Medications - Outpatient  Prior to Admission medications    Medication Sig Start Date End Date Taking? Authorizing Provider   ACCU-CHEK SMARTVIEW TEST STRIP Strp CHECK BLOOD SUGARS FOUR TIMES DAILY BEFORE MEALS AS DIRECTED 2/11/20  Yes Gavin Martinez MD   ASPIRIN (ASPIR-81 ORAL) Take 1 tablet by mouth Daily. 4/23/12  Yes Historical Provider, MD   atorvastatin (LIPITOR) 40 MG tablet Take 1 tablet (40 mg total) by mouth once daily. 7/2/20  Yes Louie Shin MD   ciclopirox (PENLAC) 8 % Soln Apply topically nightly. 6/5/19  Yes Galdino Caballero DPM   ciclopirox 0.77 % Gel Apply topically 2 (two) times daily. 6/5/19  Yes Galdino Caballero DPM   ergocalciferol (ERGOCALCIFEROL) 50,000 unit Cap TAKE 1 CAPSULE BY MOUTH ONCE EVERY 30 DAYS 7/24/20  Yes Gavin Martinez MD   finasteride  "(PROSCAR) 5 mg tablet TAKE 1 TABLET(5 MG) BY MOUTH EVERY DAY 7/27/20  Yes Alexis Thorpe MD   FLUAD [id number], 65 YR UP,,PF, 45 mcg (15 mcg x 3)/0.5 mL Syrg  11/18/19  Yes Historical Provider, MD   insulin (LANTUS SOLOSTAR U-100 INSULIN) glargine 100 units/mL (3mL) SubQ pen ADMINISTER 65 UNITS UNDER THE SKIN EVERY EVENING 7/27/20  Yes Gavin Martinez MD   insulin aspart U-100 (NOVOLOG FLEXPEN U-100 INSULIN) 100 unit/mL (3 mL) InPn pen ADMINISTER 12 UNITS UNDER THE SKIN THREE TIMES DAILY WITH MEALS PER SLIDING SCALE 7/27/20  Yes Gavin Martinez MD   irbesartan (AVAPRO) 75 MG tablet Take 1 tablet (75 mg total) by mouth once daily. 6/23/20  Yes Louie Shin MD   lancets (ACCU-CHEK FASTCLIX LANCET DRUM) Misc Check blood sugars four times a day. 11/15/19  Yes Gavin Martinez MD   metoprolol succinate (TOPROL-XL) 50 MG 24 hr tablet TAKE 1 TABLET(50 MG) BY MOUTH EVERY DAY 1/26/20  Yes Favian Cotton III, MD   mometasone 0.1% (ELOCON) 0.1 % cream Apply to affected area daily 2/13/20 2/12/21 Yes Mike Morales MD   MULTIVITAMIN W-MINERALS/LUTEIN (CENTRUM SILVER ORAL) Take by mouth once daily.   Yes Historical Provider, MD   nitroGLYCERIN (NITROSTAT) 0.4 MG SL tablet Place 1 tablet (0.4 mg total) under the tongue every 5 (five) minutes as needed for Chest pain. 11/26/18  Yes Louie Shin MD   omega-3 fatty acids-vitamin E (FISH OIL) 1,000 mg Cap Take 2 capsules by mouth Daily.  4/23/12  Yes Historical Provider, MD   omeprazole (PRILOSEC) 40 MG capsule Take 1 capsule (40 mg total) by mouth once daily. 7/2/20  Yes Gavin Martinez MD   pen needle, diabetic (BD ULTRA-FINE MINI PEN NEEDLE) 31 gauge x 3/16" Ndle Use new needle with each injection. Inject insulin 4 times a day. 11/13/19  Yes Gavin Martinez MD   benzonatate (TESSALON PERLES) 100 MG capsule Take 1 capsule (100 mg total) by mouth every 6 (six) hours as needed for Cough. 2/22/20 2/21/21  Mike Morales MD   ticagrelor (BRILINTA) 90 mg tablet Take 1 " tablet (90 mg total) by mouth 2 (two) times daily. 8/8/20 8/8/21  Favian Cotton III, MD         Medications - Current  Scheduled Meds:   aspirin  81 mg Oral Daily    atorvastatin  80 mg Oral Daily    ergocalciferol  50,000 Units Oral Q7 Days    ertapenem (INVANZ) IVPB  1 g Intravenous Q24H    finasteride  5 mg Oral Daily    insulin detemir U-100  20 Units Subcutaneous BID    irbesartan  75 mg Oral Daily    metoprolol tartrate  50 mg Oral BID    omega-3 acid ethyl esters  1 g Oral Daily    pantoprazole  40 mg Oral Daily    ticagrelor  90 mg Oral BID     Continuous Infusions:  PRN Meds:.acetaminophen, dextrose 50%, dextrose 50%, glucagon (human recombinant), glucose, glucose, insulin aspart U-100      Allergies  Review of patient's allergies indicates:   Allergen Reactions    Ace inhibitors      Other reaction(s): cough    Cefadroxil      Other reaction(s): possible vasculitis  Other reaction(s): vasculitis         Past Medical History  Past Medical History:   Diagnosis Date    Anemia 2/26/2018    Arthritis     BPH (benign prostatic hyperplasia)     Cataract     right     Colon polyp 11/18/2015    Colon polyps     Coronary artery disease     DR (diabetic retinopathy)     Dyslipidemia     Elevated PSA     Goiter, nontoxic, multinodular     Headaches, cluster     Hypertension     Kidney stones     Lump in the testicle     Left    Prostate cancer     Rotator cuff tendinitis     Right shoulder    Sleep apnea with use of continuous positive airway pressure (CPAP)     uses cpap at night    Type II or unspecified type diabetes mellitus with other specified manifestations, uncontrolled          Past Surgical History  Past Surgical History:   Procedure Laterality Date    CARDIAC CATHETERIZATION  2007    CATARACT EXTRACTION W/  INTRAOCULAR LENS IMPLANT Right 09/27/2016    Dr. Garcia    CHOLECYSTECTOMY  02/2018    CORONARY ANGIOGRAPHY N/A 8/6/2020    Procedure: ANGIOGRAM, CORONARY  ARTERY;  Surgeon: Jerson Cavanaugh MD;  Location: [facility] CATH LAB;  Service: Cardiology;  Laterality: N/A;    CORONARY STENT PLACEMENT  2007    LAD, RCAx2    INGUINAL HERNIA REPAIR Right     LEFT HEART CATHETERIZATION Left 2020    Procedure: CATHETERIZATION, HEART, LEFT;  Surgeon: Jerson Cavanaugh MD;  Location: [facility] CATH LAB;  Service: Cardiology;  Laterality: Left;    PROSTATE BIOPSY      ROTATOR CUFF REPAIR Right 2014    x2    ROTATOR CUFF REPAIR Left     TONSILLECTOMY, ADENOIDECTOMY      as child    TRIGGER FINGER RELEASE Right 2004    x2         Social History  Social History     Socioeconomic History    Marital status:      Spouse name: Not on file    Number of children: Not on file    Years of education: Not on file    Highest education level: Not on file   Occupational History    Not on file   Social Needs    Financial resource strain: Not on file    Food insecurity     Worry: Not on file     Inability: Not on file    Transportation needs     Medical: Not on file     Non-medical: Not on file   Tobacco Use    Smoking status: Former Smoker     Quit date: 3/12/1978     Years since quittin.4    Smokeless tobacco: Never Used   Substance and Sexual Activity    Alcohol use: No    Drug use: No    Sexual activity: Not on file   Lifestyle    Physical activity     Days per week: Not on file     Minutes per session: Not on file    Stress: Not on file   Relationships    Social connections     Talks on phone: Not on file     Gets together: Not on file     Attends Yarsanism service: Not on file     Active member of club or organization: Not on file     Attends meetings of clubs or organizations: Not on file     Relationship status: Not on file   Other Topics Concern    Not on file   Social History Narrative    Not on file         ROS   Admits Denies   Constitutional  Chills, diaphoresis, malaise   Eyes  Visual changes   ENMT  Dysphagia, Epistaxis, nasal  congestion, hearing loss   Cardiovascular  Chest pain, palpitations, edema   Respiratory  Cough, dyspnea, wheezing   Gastrointestinal  Nausea, vomiting, constipation, diarrhea, anorexia.     Genitourinary  Dysuria, incontinence   Musculoskeletal  Myalgias, joint pain, joint swelling   Integumentary  Rash, inflammation, burning   Neruo-Psychiatric  Anxiety, insomnia.  Changes in speech, strength, sensation.     Endocrine     Hematologic  Abnormal bruising, bleeding   Immunologic  Inflammation, pain at IV sites.  Pruritis.         Physical Examination         Vital Signs  Vitals  Temp: 98.9 °F (37.2 °C)  Temp src: Oral  Pulse: 84  Heart Rate Source: Monitor  Resp: 17  SpO2: 98 %  Pulse Oximetry Type: Intermittent  Flow (L/min): 2(PRN )  O2 Device (Oxygen Therapy): room air  BP: (!) 112/56  MAP (mmHg): 81  BP Location: Left arm  BP Method: Automatic  Patient Position: Lying          24 Hour VS Range    Temp:  [97.6 °F (36.4 °C)-99.7 °F (37.6 °C)]   Pulse:  [68-98]   Resp:  [16-18]   BP: (100-121)/(52-59)   SpO2:  [94 %-100 %]     Intake/Output Summary (Last 24 hours) at 8/11/2020 1547  Last data filed at 8/11/2020 1300  Gross per 24 hour   Intake 462 ml   Output 650 ml   Net -188 ml         General:   Head: NCAT  Eyes: conjunctivae and lids normal, no scleral icterus, EOMI.  Neck:  JVP normal.  Trachea non-displaced.     Chest:  Normal respiratory effort.  Chest clear to auscultation.  No wheezes, rales, or rhonchi.  Heart:  Normal PMI, S1 S2 normal quality, splitting.  No murmurs rubs or gallops.    Abdomen:  Non-distended, normal bowel sounds, non-tender.  No hepato-jugular reflux.  Extremities:  No edema.   Skin:  Warm and dry.  No cyanosis or pallor.  No ulcers, stasis.  IV sites without tenderness or inflammation.             Data       Recent Labs   Lab 08/07/20  0347 08/08/20  0306 08/09/20  0338 08/10/20  0637 08/11/20  0642   WBC 18.01* 13.72* 13.33* 16.01* 14.19*   HGB 10.0* 10.0* 10.3* 10.0* 10.1*   HCT  32.1* 30.6* 32.8* 31.5* 32.0*    231 260 277 266        Recent Labs   Lab 08/06/20  1415   INR 1.1        Recent Labs   Lab 08/07/20  0841 08/08/20  0306 08/09/20  0338 08/10/20  0637 08/11/20  0642   * 137 138 134* 133*   K 3.9 3.6 3.9 4.2 4.0    103 103 102 101   CO2 20* 22* 24 23 22*   BUN 26* 25* 20 19 22   CREATININE 1.6* 1.4 1.3 1.5* 1.6*   ANIONGAP 10 12 11 9 10   CALCIUM 9.0 9.0 9.5 8.7 8.8        Recent Labs   Lab 08/06/20  1150 08/09/20  0338 08/10/20  0637 08/11/20  0642   PROT 8.0 8.7* 8.2 8.4   ALBUMIN 2.1* 2.1* 1.9* 1.8*   BILITOT 1.9* 1.3* 1.0 0.9   ALKPHOS 834* 950* 876* 845*   AST 67* 85* 80* 75*   ALT 58* 65* 59* 55*        Recent Labs   Lab 08/06/20  1150 08/06/20  1415 08/06/20  1709 08/06/20  1926   TROPONINI 0.043* 0.028*  0.028* 0.061* 0.129*        BNP (pg/mL)   Date Value   08/06/2020 100 (H)   12/09/2016 72       Recent Labs   Lab 08/07/20  0841 08/08/20  1051 08/08/20  1121   LABBLOO Gram stain lily bottle: Gram negative rods  Positive results previously called 08/07/2020  EDWARDSIELLA TARDA*  Gram stain lily bottle: Gram negative rods  Results called to and read back by:NISREEN Mcqueen RN 08/07/2020  21:21  EDWARDSIELLA TARDA  For susceptibility see order # [id number]  * No Growth to date  No Growth to date  No Growth to date  No Growth to date No Growth to date  No Growth to date  No Growth to date  No Growth to date        [facility] 8/6/20:  1. Coronary dominance: Right  2. The left main coronary artery (LMCA) has luminal irregularities. It gives origin to the left anterior descending (LAD) and left circumflex (LCx) arteries. There is no ramus intermedius. There is EMA 3 flow.  3. The LAD has luminal irregularities. It is diffusely diseased in its distal and apical segment. It gives origin to two large diagonal branch(es). There is EMA 3 flow. D1 is occl;uded at the ostium. There is a patent mid LAD stent.   4. The LCx has luminal irregularities. It gives origin to  one large obtuse marginal branch(es). There is EMA 3 flow.  5. The RCA has a proximal 90% stenoisis. It gives origin to the posterior descending artery and the posterolateral branch.There is EMA 3 flow.   6. LVEDP 20  Intervention  7. Vessel: Proximal RCA. Pre intervention stenosis 90%, EMA flow 2. Post-intervention stenosis 0%, EMA flow 3. Stent(s): 3.5X38 mm LISSETH. Intervention was successful      Assessment & Plan      85 yo M with a history of CAD s/p recent 3.5X38 mm LISSETH to RCA, HTN, HLD, T2DM, and BPH who is currently being treated for Cholangitis secondary to choledocholithiasis with CBD dilation with IV antbioitics. Cardiology was consulted for pre-op risk assesment as well as periopeative DAPT therapy management.     Coronary Artery Disease s/p LISSETH stent to RCA:  - Patient's stent was placed 5 days ago yielding high risk for in-stent thrombosis if his DAPT therapy were to be interrupted. He was also noted to have multivessel CAD during his cath. Ideally we would hold his therapy for a brief period and resume after the procedure.  - Coordination of care discussed with primary team, ID and GI. Plan will be to treat him medically for 4 weeks with abx and DAPT and plan for ERCP at that time. If his condition deteriorates and his procedure becomes emergent, he will not need cardiac risk assessment to move forward.  - In 4 weeks, recommend holding his ticagrelor for as minimal time (3-5 days)  as possible prior to his ERCP.     HTN:  -continue current metoprolol.     HLD:  -atorvastatin          Signed:  Abelardo Simms M.D.  Page # [phone]  Cardiovascular Fellow  [facility]

## 2020-08-11 NOTE — CONSULTS
[facility]  Gastroenterology  Consult Note    Patient Name: Anjum Cai Jr.  MRN: [id number]  Admission Date: 8/6/2020  Hospital Length of Stay: 5 days  Code Status: Full Code   Attending Provider: Otis Aleman MD   Consulting Provider: My Kinsey MD  Primary Care Physician: Gavin Martinez MD  Principal Problem:NSTEMI (non-ST elevated myocardial infarction)    Inpatient consult to [facility] ([facility])  Consult performed by: My Kinsey MD  Consult ordered by: Reginald Rebollar MD        Subjective:     HPI:  Patient is an 84-year-old male with past medical history of coronary artery disease, type 2 diabetes mellitus, prostate cancer, who presented to the hospital on 08/06 with substernal chest pain and findings and evaluation suggestive of NSTEMI.  The patient underwent left heart catheterization after being loaded with aspirin and Brilinta, and underwent PCI with LISSETH placement in the RCA.  Due to leukocytosis present following catheterization and patient reporting fevers prior to admission, the patient had blood cultures drawn, this has resulted in a positive blood culture for  EDWARDSIELLA TARDA x2.  Infectious Disease has been consulted and the patient was placed on aztreonam.  On admission the patient was noted to have elevated alkaline phosphatase to 900 range, and total bilirubin of 1.9.  AST/ALT 80/50.  An ultrasound of the abdomen was obtained and showed surgically absent gallbladder and a CBD to 7 mm with mild intrahepatic biliary dilation.  Advanced endoscopy service was consulted for evaluation of biliary source of bacteremia.  There is also concern since the patient had chronic abdominal pain with weight loss to rule out underlying malignancy.  Patient had a colonoscopy in 2015 that was balloon assisted with removal of large polyps in the cecum.  Follow-up was recommended in 6 months however this was deferred by patient due to cardiac event requiring  anticoagulation.    Patient continues to be on Brilinta. He is otherwise hemodynamically stable and afebrile.  He denies abdominal pain or change in bowel habits. He reports his abdominal pain and loss of appetite worsened following radiation treatment he had for his prostate cancer in April.     Past Medical History:   Diagnosis Date    Anemia 2/26/2018    Arthritis     BPH (benign prostatic hyperplasia)     Cataract     right     Colon polyp 11/18/2015    Colon polyps     Coronary artery disease     DR (diabetic retinopathy)     Dyslipidemia     Elevated PSA     Goiter, nontoxic, multinodular     Headaches, cluster     Hypertension     Kidney stones     Lump in the testicle     Left    Prostate cancer     Rotator cuff tendinitis     Right shoulder    Sleep apnea with use of continuous positive airway pressure (CPAP)     uses cpap at night    Type II or unspecified type diabetes mellitus with other specified manifestations, uncontrolled        Past Surgical History:   Procedure Laterality Date    CARDIAC CATHETERIZATION  2007    CATARACT EXTRACTION W/  INTRAOCULAR LENS IMPLANT Right 09/27/2016    Dr. Garcia    CHOLECYSTECTOMY  02/2018    CORONARY ANGIOGRAPHY N/A 8/6/2020    Procedure: ANGIOGRAM, CORONARY ARTERY;  Surgeon: Jerson Cavanaugh MD;  Location: [facility] CATH LAB;  Service: Cardiology;  Laterality: N/A;    CORONARY STENT PLACEMENT  02/02/2007    LAD, RCAx2    INGUINAL HERNIA REPAIR Right 1995    LEFT HEART CATHETERIZATION Left 8/6/2020    Procedure: CATHETERIZATION, HEART, LEFT;  Surgeon: Jerson Cavanaugh MD;  Location: [facility] CATH LAB;  Service: Cardiology;  Laterality: Left;    PROSTATE BIOPSY  2012    ROTATOR CUFF REPAIR Right 2014    x2    ROTATOR CUFF REPAIR Left 2013    TONSILLECTOMY, ADENOIDECTOMY      as child    TRIGGER FINGER RELEASE Right 2004    x2       Review of patient's allergies indicates:   Allergen Reactions    Ace inhibitors      Other reaction(s):  cough    Cefadroxil      Other reaction(s): possible vasculitis  Other reaction(s): vasculitis     Family History     Problem Relation (Age of Onset)    Cancer Mother    Cataracts Father    Diabetes Mother    Heart disease Father        Tobacco Use    Smoking status: Former Smoker     Quit date: 3/12/1978     Years since quittin.4    Smokeless tobacco: Never Used   Substance and Sexual Activity    Alcohol use: No    Drug use: No    Sexual activity: Not on file     Review of Systems   Constitutional: Positive for activity change, appetite change, fatigue and unexpected weight change. Negative for fever.   HENT: Negative for trouble swallowing.    Eyes: Negative for visual disturbance.   Respiratory: Negative for cough and shortness of breath.    Cardiovascular: Negative for chest pain.   Gastrointestinal: Positive for abdominal pain. Negative for abdominal distention, constipation, diarrhea, nausea and vomiting.   Genitourinary: Negative for flank pain.   Musculoskeletal: Negative for arthralgias and back pain.   Skin: Negative for color change.   Neurological: Positive for weakness.   Psychiatric/Behavioral: Negative for confusion.     Objective:     Vital Signs (Most Recent):  Temp: 99.7 °F (37.6 °C) (08/10/20 1954)  Pulse: 97 (08/10/20 1954)  Resp: 18 (08/10/20 1954)  BP: (!) 121/58 (08/10/20 1954)  SpO2: (!) 94 % (08/10/20 1954) Vital Signs (24h Range):  Temp:  [97.2 °F (36.2 °C)-99.7 °F (37.6 °C)] 99.7 °F (37.6 °C)  Pulse:  [] 97  Resp:  [16-19] 18  SpO2:  [94 %-99 %] 94 %  BP: (108-129)/(53-59) 121/58     Weight: 76.4 kg (168 lb 6.9 oz) (08/10/20 1118)  Body mass index is 27.19 kg/m².      Intake/Output Summary (Last 24 hours) at 8/10/2020 2148  Last data filed at 8/10/2020 1900  Gross per 24 hour   Intake 480 ml   Output 900 ml   Net -420 ml       Lines/Drains/Airways     Peripheral Intravenous Line                 Peripheral IV - Single Lumen 20 1157 18 G Left Forearm 4 days          Peripheral IV - Single Lumen 08/06/20 1415 18 G Left Forearm 4 days                Physical Exam  Constitutional:       General: He is not in acute distress.     Appearance: He is well-developed.   HENT:      Head: Normocephalic.   Eyes:      General: No scleral icterus.     Conjunctiva/sclera: Conjunctivae normal.   Neck:      Musculoskeletal: Normal range of motion and neck supple.   Cardiovascular:      Rate and Rhythm: Normal rate and regular rhythm.   Pulmonary:      Effort: Pulmonary effort is normal.      Breath sounds: Normal breath sounds.   Abdominal:      General: Bowel sounds are normal. There is no distension.      Palpations: Abdomen is soft. There is no mass.      Tenderness: There is no abdominal tenderness. There is no guarding.   Musculoskeletal: Normal range of motion.   Skin:     General: Skin is warm and dry.   Neurological:      Mental Status: He is alert and oriented to person, place, and time.         Significant Labs:  CBC:   Recent Labs   Lab 08/09/20  0338 08/10/20  0637   WBC 13.33* 16.01*   HGB 10.3* 10.0*   HCT 32.8* 31.5*    277     BMP:   Recent Labs   Lab 08/10/20  0637   *   *   K 4.2      CO2 23   BUN 19   CREATININE 1.5*   CALCIUM 8.7   MG 2.0     CMP:   Recent Labs   Lab 08/10/20  0637   *   CALCIUM 8.7   ALBUMIN 1.9*   PROT 8.2   *   K 4.2   CO2 23      BUN 19   CREATININE 1.5*   ALKPHOS 876*   ALT 59*   AST 80*   BILITOT 1.0     Coagulation:   Recent Labs   Lab 08/10/20  0637   APTT 34.4*       Significant Imaging:  Imaging results within the past 24 hours have been reviewed.    Assessment/Plan:     Elevated liver enzymes  Patient is an 84-year-old male with history of coronary artery disease who presented with acute coronary syndrome status post PCI currently on Brilinta for stent placement, found to have EDWARDSIELLA TARDA bacteremia with a concern for hepatobiliary source based on elevated liver enzymes, ultrasound findings of  intrahepatic biliary dilation, and recent history of abdominal pain and weight loss.  Also the patient had history of high risk polyps in the colon that has been lost to follow-up due to cardiac events.   Given the need to continue anticoagulation, will proceed with MRCP and MRI with contrast to evaluate the hepatobiliary tree.  Suspect elevation of LFTs to be related to cardiac event rather than biliary obstruction or hepatobiliary disease.  The patient will need a colonoscopy at some point to rule GI malignancy, however will need to assess safety prior to proceeding with colonoscopy which can be done as an outpatient.    -proceed with MRCP and MRI with contrast to evaluate the hepatobiliary tree.  -antibiotics treatment per primary team        Thank you for your consult. I will follow-up with patient. Please contact us if you have any additional questions.    Kwame Kinsey MD  Gastroenterology  [facility]

## 2020-08-11 NOTE — TREATMENT PLAN
"Brief AES update    MRCP done and showed:  "Intra and extrahepatic biliary dilatation with two distal intraductal filling defects, each measuring 8-9 mm.  Findings most in keeping with component of choledocholithiasis.  This can be further evaluated with ERCP."    This is concerning for cholangitis as the source of bacteremia.  Given that the patient is on dual antiplatelet therapy and we cannot safely hold medications, we will be limited with what interventions we can do during ERCP.  Patient can have stent placed however we will be unable to perform a sphincterotomy.  In addition to significant risk of bleeding and pancreatitis associated with ERCP, we are concerned about cardiac complications related to anesthesia given recent cardiac event.    Given that the patient is currently stable and afebrile on antibiotics, will discuss with infectious disease and cardiology regarding appropriate timing, and proceed with ERCP after discussion if benefits seem to outweigh the risk.  Will also involve family in discussion.  If the patient becomes febrile or hemodynamically unstable, will proceed with ERCP with stent placement more urgently.    Discussed with primary team. Will continue to follow    Kwame Kinsey, PGY-VI  Gastroenterology Fellow   [phone]    "

## 2020-08-11 NOTE — PLAN OF CARE
08/11/20 0803   Discharge Reassessment   Assessment Type Discharge Planning Reassessment   Do you have any problems affording any of your prescribed medications? Yes   If yes, what medications? lantus is $290 for 3 month and novolog is $300 for 3 months   Discharge Plan A Home   Discharge Plan B Home;Home Health  (+/- IV Abx)

## 2020-08-11 NOTE — ASSESSMENT & PLAN NOTE
Patient is an 84-year-old male with history of coronary artery disease who presented with acute coronary syndrome status post PCI currently on Brilinta for stent placement, found to have EDWARDSIELLA TARDA bacteremia with a concern for hepatobiliary source based on elevated liver enzymes, ultrasound findings of intrahepatic biliary dilation, and recent history of abdominal pain and weight loss.  Also the patient had history of high risk polyps in the colon that has been lost to follow-up due to cardiac events.   Given the need to continue anticoagulation, will proceed with MRCP and MRI with contrast to evaluate the hepatobiliary tree.  Suspect elevation of LFTs to be related to cardiac event rather than biliary obstruction or hepatobiliary disease.  The patient will need a colonoscopy at some point to rule GI malignancy, however will need to assess safety prior to proceeding with colonoscopy which can be done as an outpatient.    -proceed with MRCP and MRI with contrast to evaluate the hepatobiliary tree.  -antibiotics treatment per primary team

## 2020-08-11 NOTE — CARE UPDATE
Patient arrived to MRI in NAD and placed on a MRI safe monitor. War room notified see vs in computer.

## 2020-08-11 NOTE — NURSING
Pt taken to MRI. Pt AAOx4. Pt stable. No complaints of pain or signs of distress. Left per stretcher with transport.

## 2020-08-11 NOTE — CARE UPDATE
Patient tolerated MRI well and placed back on tele. War room confirmed signal and patient transported by escort back to room.

## 2020-08-11 NOTE — PLAN OF CARE
Plan of care discussed with patient. Patient is free of fall/trauma/injury. Denies CP, SOB, or pain/discomfort. VSS and patient remains on tele.  MRI of abdomen completed. IV abx administered per order. Patient is ACHS; BG protocol maintained per order. Patient to be NPO at MN. All questions addressed. Will continue to monitor

## 2020-08-11 NOTE — HPI
Patient is an 84-year-old male with past medical history of coronary artery disease, type 2 diabetes mellitus, prostate cancer, who presented to the hospital on 08/06 with substernal chest pain and findings and evaluation suggestive of NSTEMI.  The patient underwent left heart catheterization after being loaded with aspirin and Brilinta, and underwent PCI with LISSETH placement in the RCA.  Due to leukocytosis present following catheterization and patient reporting fevers prior to admission, the patient had blood cultures drawn, this has resulted in a positive blood culture for  EDWARDSIELLA TARDA x2.  Infectious Disease has been consulted and the patient was placed on aztreonam.  On admission the patient was noted to have elevated alkaline phosphatase to 900 range, and total bilirubin of 1.9.  AST/ALT 80/50.  An ultrasound of the abdomen was obtained and showed surgically absent gallbladder and a CBD to 7 mm with mild intrahepatic biliary dilation.  Advanced endoscopy service was consulted for evaluation of biliary source of bacteremia.  There is also concern since the patient had chronic abdominal pain with weight loss to rule out underlying malignancy.  Patient had a colonoscopy in 2015 that was balloon assisted with removal of large polyps in the cecum.  Follow-up was recommended in 6 months however this was deferred by patient due to cardiac event requiring anticoagulation.    Patient continues to be on Brilinta. He is otherwise hemodynamically stable and afebrile.  He denies abdominal pain or change in bowel habits. He reports his abdominal pain and loss of appetite worsened following radiation treatment he had for his prostate cancer in April.

## 2020-08-11 NOTE — PROGRESS NOTES
[facility] Hospital Medicine                                                                     Progress Note     Team: [facility] HOSP MED B Otis Aleman MD   Admit Date: 8/6/2020   Hospital Day: 5  KEVON: 8/14/2020   Code status: Full Code   Principal Problem: NSTEMI (non-ST elevated myocardial infarction)     SUMMARY:     84 year old male with history of DM2, prostate cancer s/p radiation treatment, HTN, HLP and CAD s/p PCI with LISSETH to LAD and RCA (2007) followed by coronary angiogram in 2016 (LM 50% stenosis with FFR 0.87 and obstructive disease in OM/D2 (no intervention because of small size vessels) who presents today with acute onset substernal chest pain with radiation to the left arm. Patient reports this morning he woke up and had chest discomfort similar to his episodes when he had heart attacks in the past. The chest pain was constant and associated with shortness of breath. He denied having nausea, numbness/tingling of arm or sweating. He took SL nitro tablet at home and immediately called 911. When he was evaluated in the ER, his CP decreased but was still present. EKG was done that showed sinus rhythm, 1st degree AVB and iRBBB. He was given additional SL nitro and is CP free now. He states three weeks ago he has completed radiation therapy and have been having a wide spectrum of symptoms. He states he went to the grocery store couple days ago and was able to walk without having CP or SOB but when he arrived home was completely exhausted. Cardiology consulted for chest pain. Patient was taken to the cath lab for angiogram. Admitted to the CCU for further management of NSTEMI.     Patient was admitted for NSTEMI s/p LHC revealed severe stenosis of the proximal and ostial RCA s/p 3.5 x 38 LISSETH. WBC elevated at 18K. UA, CXR wnl. Blood culture was positive for  "Edwardsiella Tarda (exposure likely 2/2 to fishing trip 2 weeks ago). ID was consulted. Initiated IV aztreonam. Concern for possible biliary involvement given elevated ALP. US Abd revealed mildly prominent CBD. AES consulted to eval possible biliary infection. Multiple hypoglycemic events in the CCU, insulin titrated down. Remain on DAPT for NSTEMI. Transferred to medicine 8/10.    Discussed case with AES and ID. MRCP obtained "Intra and extrahepatic biliary dilatation with two distal intraductal filling defects, each measuring 8-9 mm.  Findings most in keeping with component of choledocholithiasis.  This can be further evaluated with ERCP." GI rec ERCP however will need interdisciplinary discussion with cardiology and ID regarding treatment plan considering his recent NSTEMI, DAPT and anesthesia requirement with high risk procedure. Cardiology consulted.    SUBJECTIVE:     Pt was seen and examined at bedside. Pt had no acute events overnight, and no new complaints this morning. Pt remained hemodynamically stable and afebrile. Pt has been tolerating PO intake well without any nausea, vomiting, diarrhea, constipation, blood in stools or trouble urinating. Pt denies any fevers, chills, malaise, headaches, chest pain, SOB, cough. MRCP done this AM. Currently discussing tx plan with GI, ID and Cardiology.    ROS (Positive in Bold, otherwise negative)  Pain Scale: 0 /10   Constitutional: fever, chills, night sweats  CV: chest pain, edema, palpitations  Resp: SOB, cough, sputum production  GI: changes in appetite, NVDC, pain, melena, hematochezia, GERD, hematemesis  : Dysuria, hematuria, urinary urgency, frequency  MSK: arthralgia/myalgia, joint swelling  SKIN: rashes, pruritis, petechiae   Neuro/Psych: FND, anxiety depression      OBJECTIVE:     Vitals:  Temp:  [97.6 °F (36.4 °C)-99.7 °F (37.6 °C)]   Pulse:  [68-98]   Resp:  [16-18]   BP: (100-121)/(52-59)   SpO2:  [94 %-100 %]      I & O (Last 24H):     Intake/Output " Summary (Last 24 hours) at 8/11/2020 1234  Last data filed at 8/11/2020 0600  Gross per 24 hour   Intake 240 ml   Output 650 ml   Net -410 ml       GEN:  male  in no acute distress. Nontoxic. Resting in bed. Cooperative.  HEENT: NCAT. PERRL. EOMI. Conjunctivae/corneas clear, sclera Anicteric.  CVS: RRR. Normal s1 s2 no murmur, click, rub or gallop  LUNG: CTAB. Normal respiratory effort. No wheezes, rhonchi, or crackles.  ABD: Normoactive BS, soft, NT, ND, no masses or organomegaly.  EXT: No edema. No cyanosis. Full ROM.  SKIN: color, texture, turgor normal. No rashes or lesions  NEURO: Alert, oriented x 4, Spont mvt of all extremities with no focal deficits noted.      All recent labs and imaging has been reviewed.     Recent Results (from the past 24 hour(s))   POCT glucose    Collection Time: 08/10/20  1:14 PM   Result Value Ref Range    POCT Glucose 251 (H) 70 - 110 mg/dL   POCT glucose    Collection Time: 08/10/20  4:14 PM   Result Value Ref Range    POCT Glucose 161 (H) 70 - 110 mg/dL   POCT glucose    Collection Time: 08/10/20  9:04 PM   Result Value Ref Range    POCT Glucose 298 (H) 70 - 110 mg/dL   POCT glucose    Collection Time: 08/11/20  1:45 AM   Result Value Ref Range    POCT Glucose 242 (H) 70 - 110 mg/dL   POCT glucose    Collection Time: 08/11/20  4:24 AM   Result Value Ref Range    POCT Glucose 144 (H) 70 - 110 mg/dL   CBC auto differential    Collection Time: 08/11/20  6:42 AM   Result Value Ref Range    WBC 14.19 (H) 3.90 - 12.70 K/uL    RBC 3.43 (L) 4.60 - 6.20 M/uL    Hemoglobin 10.1 (L) 14.0 - 18.0 g/dL    Hematocrit 32.0 (L) 40.0 - 54.0 %    Mean Corpuscular Volume 93 82 - 98 fL    Mean Corpuscular Hemoglobin 29.4 27.0 - 31.0 pg    Mean Corpuscular Hemoglobin Conc 31.6 (L) 32.0 - 36.0 g/dL    RDW 16.0 (H) 11.5 - 14.5 %    Platelets 266 150 - 350 K/uL    MPV 11.2 9.2 - 12.9 fL    Immature Granulocytes 0.6 (H) 0.0 - 0.5 %    Gran # (ANC) 12.0 (H) 1.8 - 7.7 K/uL    Immature Grans (Abs)  0.09 (H) 0.00 - 0.04 K/uL    Lymph # 0.9 (L) 1.0 - 4.8 K/uL    Mono # 1.1 (H) 0.3 - 1.0 K/uL    Eos # 0.1 0.0 - 0.5 K/uL    Baso # 0.04 0.00 - 0.20 K/uL    nRBC 0 0 /100 WBC    Gran% 84.8 (H) 38.0 - 73.0 %    Lymph% 6.3 (L) 18.0 - 48.0 %    Mono% 7.5 4.0 - 15.0 %    Eosinophil% 0.5 0.0 - 8.0 %    Basophil% 0.3 0.0 - 1.9 %    Differential Method Automated    Magnesium    Collection Time: 08/11/20  6:42 AM   Result Value Ref Range    Magnesium 1.9 1.6 - 2.6 mg/dL   Phosphorus    Collection Time: 08/11/20  6:42 AM   Result Value Ref Range    Phosphorus 3.1 2.7 - 4.5 mg/dL   Comprehensive metabolic panel    Collection Time: 08/11/20  6:42 AM   Result Value Ref Range    Sodium 133 (L) 136 - 145 mmol/L    Potassium 4.0 3.5 - 5.1 mmol/L    Chloride 101 95 - 110 mmol/L    CO2 22 (L) 23 - 29 mmol/L    Glucose 118 (H) 70 - 110 mg/dL    BUN, Bld 22 8 - 23 mg/dL    Creatinine 1.6 (H) 0.5 - 1.4 mg/dL    Calcium 8.8 8.7 - 10.5 mg/dL    Total Protein 8.4 6.0 - 8.4 g/dL    Albumin 1.8 (L) 3.5 - 5.2 g/dL    Total Bilirubin 0.9 0.1 - 1.0 mg/dL    Alkaline Phosphatase 845 (H) 55 - 135 U/L    AST 75 (H) 10 - 40 U/L    ALT 55 (H) 10 - 44 U/L    Anion Gap 10 8 - 16 mmol/L    eGFR if African American 45.7 (A) >60 mL/min/1.73 m^2    eGFR if non  39.5 (A) >60 mL/min/1.73 m^2   POCT glucose    Collection Time: 08/11/20  7:22 AM   Result Value Ref Range    POCT Glucose 113 (H) 70 - 110 mg/dL   POCT glucose    Collection Time: 08/11/20  9:33 AM   Result Value Ref Range    POCT Glucose 187 (H) 70 - 110 mg/dL   POCT glucose    Collection Time: 08/11/20 11:09 AM   Result Value Ref Range    POCT Glucose 192 (H) 70 - 110 mg/dL       Recent Labs   Lab 08/10/20  2104 08/11/20  0145 08/11/20  0424 08/11/20  0722 08/11/20  0933 08/11/20  1109   POCTGLUCOSE 298* 242* 144* 113* 187* 192*       Hemoglobin A1C   Date Value Ref Range Status   08/06/2020 7.7 (H) 4.0 - 5.6 % Final     Comment:     ADA Screening Guidelines:  5.7-6.4%   Consistent with prediabetes  >or=6.5%  Consistent with diabetes  High levels of fetal hemoglobin interfere with the HbA1C  assay. Heterozygous hemoglobin variants (HbS, HgC, etc)do  not significantly interfere with this assay.   However, presence of multiple variants may affect accuracy.     01/24/2020 9.0 (H) 4.0 - 5.6 % Final     Comment:     ADA Screening Guidelines:  5.7-6.4%  Consistent with prediabetes  >or=6.5%  Consistent with diabetes  High levels of fetal hemoglobin interfere with the HbA1C  assay. Heterozygous hemoglobin variants (HbS, HgC, etc)do  not significantly interfere with this assay.   However, presence of multiple variants may affect accuracy.     09/19/2019 7.5 (H) 4.0 - 5.6 % Final     Comment:     ADA Screening Guidelines:  5.7-6.4%  Consistent with prediabetes  >or=6.5%  Consistent with diabetes  High levels of fetal hemoglobin interfere with the HbA1C  assay. Heterozygous hemoglobin variants (HbS, HgC, etc)do  not significantly interfere with this assay.   However, presence of multiple variants may affect accuracy.          Active Hospital Problems    Diagnosis  POA    *NSTEMI (non-ST elevated myocardial infarction) [I21.4]  Yes    Elevated liver enzymes [R74.8]  Yes    Gram-negative bacteremia [R78.81]  Yes    Leukocytosis [D72.829]  No    Essential hypertension [I10]  Yes    Coronary artery disease involving native coronary artery of native heart without angina pectoris [I25.10]  Yes    Dyslipidemia [E78.5]  Yes    Type 2 diabetes mellitus without retinopathy [E11.9]  Yes      Resolved Hospital Problems   No resolved problems to display.          ASSESSMENT AND PLAN:     NSTEMI s/p PCI  CAD  -Patient with EMA score of 4, patient clinical history consistent with angina  -EKG with <1mm ST elevation in leads III and AVF, +cardiac biomarker (trops 0.043-> 0.028)  -Loaded with ASA + Brillanta and systemic heparin  -S/p LHC revealed severe stenosis of the proximal and ostial RCA s/p  3.5 x 38 LISSETH.   -Continue DAPT x1yr, metoprolol, high intensity statin, ARB  -As per cardiology, do not hold DAPT for any procedure at this time  -Cardiac rehab once discharged    Gram-negative bacteremia  Cholangitis   Leukocytosis - improving   - 2/4 bottles with Edwardsiella tarda   - ID consulted, appreciate recs, recommending IV aztreonam  - repeat cultures NGTD  - AUS with biliary dilation   - MRCP with cholangitis and choledocholithiasis  - GI planning tentative ERCP for source control with stent placement once cardiology clears patient given recent MI, DAPT and anesthesia requirement with high risk procedure.  - Will need interdisciplinary discussion with cardiology and ID regarding treatment plan    Hypertension  - controlled, continue current meds    DM2  - Takes 65 units qhs, aspart 12 units with meals, SSI  - resumed in CCU, however hypoglycemia noted  - changed detemir 20 units BID, better sugars now  - dc aspart, and continue SSI  - hypoglycemia protocol     Hyponatremia  - mild continue to trend  - suspecting nutritional at this time    CKD stage 3  - baseline Cr 1.6-1.3  - at baseline currently, continue to monitor  - note he had recent LHC and he is on ARB       Maria Elena, is the POA. She can be contacted at [phone]. I have updated her today.       Prophylaxis- DAPT, SCDs  Code Status- Full Code   Discharge plan and follow up - pending medical stability, will need outpatient colonoscopy at some point, cardiac rehab on dc, cardiology fu, lives alone, daughter to come stay with patient once released from hospital     Otis Aleman MD  Hospital Medicine Staff  Pager [phone]

## 2020-08-11 NOTE — SUBJECTIVE & OBJECTIVE
Past Medical History:   Diagnosis Date    Anemia 2/26/2018    Arthritis     BPH (benign prostatic hyperplasia)     Cataract     right     Colon polyp 11/18/2015    Colon polyps     Coronary artery disease     DR (diabetic retinopathy)     Dyslipidemia     Elevated PSA     Goiter, nontoxic, multinodular     Headaches, cluster     Hypertension     Kidney stones     Lump in the testicle     Left    Prostate cancer     Rotator cuff tendinitis     Right shoulder    Sleep apnea with use of continuous positive airway pressure (CPAP)     uses cpap at night    Type II or unspecified type diabetes mellitus with other specified manifestations, uncontrolled        Past Surgical History:   Procedure Laterality Date    CARDIAC CATHETERIZATION  2007    CATARACT EXTRACTION W/  INTRAOCULAR LENS IMPLANT Right 09/27/2016    Dr. Garcia    CHOLECYSTECTOMY  02/2018    CORONARY ANGIOGRAPHY N/A 8/6/2020    Procedure: ANGIOGRAM, CORONARY ARTERY;  Surgeon: Jerson Cavanaugh MD;  Location: [facility] CATH LAB;  Service: Cardiology;  Laterality: N/A;    CORONARY STENT PLACEMENT  02/02/2007    LAD, RCAx2    INGUINAL HERNIA REPAIR Right 1995    LEFT HEART CATHETERIZATION Left 8/6/2020    Procedure: CATHETERIZATION, HEART, LEFT;  Surgeon: Jerson Cavanaugh MD;  Location: [facility] CATH LAB;  Service: Cardiology;  Laterality: Left;    PROSTATE BIOPSY  2012    ROTATOR CUFF REPAIR Right 2014    x2    ROTATOR CUFF REPAIR Left 2013    TONSILLECTOMY, ADENOIDECTOMY      as child    TRIGGER FINGER RELEASE Right 2004    x2       Review of patient's allergies indicates:   Allergen Reactions    Ace inhibitors      Other reaction(s): cough    Cefadroxil      Other reaction(s): possible vasculitis  Other reaction(s): vasculitis     Family History     Problem Relation (Age of Onset)    Cancer Mother    Cataracts Father    Diabetes Mother    Heart disease Father        Tobacco Use    Smoking status: Former Smoker     Quit date:  3/12/1978     Years since quittin.4    Smokeless tobacco: Never Used   Substance and Sexual Activity    Alcohol use: No    Drug use: No    Sexual activity: Not on file     Review of Systems   Constitutional: Positive for activity change, appetite change, fatigue and unexpected weight change. Negative for fever.   HENT: Negative for trouble swallowing.    Eyes: Negative for visual disturbance.   Respiratory: Negative for cough and shortness of breath.    Cardiovascular: Negative for chest pain.   Gastrointestinal: Positive for abdominal pain. Negative for abdominal distention, constipation, diarrhea, nausea and vomiting.   Genitourinary: Negative for flank pain.   Musculoskeletal: Negative for arthralgias and back pain.   Skin: Negative for color change.   Neurological: Positive for weakness.   Psychiatric/Behavioral: Negative for confusion.     Objective:     Vital Signs (Most Recent):  Temp: 99.7 °F (37.6 °C) (08/10/20 1954)  Pulse: 97 (08/10/20 1954)  Resp: 18 (08/10/20 1954)  BP: (!) 121/58 (08/10/20 1954)  SpO2: (!) 94 % (08/10/20 1954) Vital Signs (24h Range):  Temp:  [97.2 °F (36.2 °C)-99.7 °F (37.6 °C)] 99.7 °F (37.6 °C)  Pulse:  [] 97  Resp:  [16-19] 18  SpO2:  [94 %-99 %] 94 %  BP: (108-129)/(53-59) 121/58     Weight: 76.4 kg (168 lb 6.9 oz) (08/10/20 1118)  Body mass index is 27.19 kg/m².      Intake/Output Summary (Last 24 hours) at 8/10/2020 2148  Last data filed at 8/10/2020 1900  Gross per 24 hour   Intake 480 ml   Output 900 ml   Net -420 ml       Lines/Drains/Airways     Peripheral Intravenous Line                 Peripheral IV - Single Lumen 20 1157 18 G Left Forearm 4 days         Peripheral IV - Single Lumen 20 1415 18 G Left Forearm 4 days                Physical Exam  Constitutional:       General: He is not in acute distress.     Appearance: He is well-developed.   HENT:      Head: Normocephalic.   Eyes:      General: No scleral icterus.     Conjunctiva/sclera:  Conjunctivae normal.   Neck:      Musculoskeletal: Normal range of motion and neck supple.   Cardiovascular:      Rate and Rhythm: Normal rate and regular rhythm.   Pulmonary:      Effort: Pulmonary effort is normal.      Breath sounds: Normal breath sounds.   Abdominal:      General: Bowel sounds are normal. There is no distension.      Palpations: Abdomen is soft. There is no mass.      Tenderness: There is no abdominal tenderness. There is no guarding.   Musculoskeletal: Normal range of motion.   Skin:     General: Skin is warm and dry.   Neurological:      Mental Status: He is alert and oriented to person, place, and time.         Significant Labs:  CBC:   Recent Labs   Lab 08/09/20  0338 08/10/20  0637   WBC 13.33* 16.01*   HGB 10.3* 10.0*   HCT 32.8* 31.5*    277     BMP:   Recent Labs   Lab 08/10/20  0637   *   *   K 4.2      CO2 23   BUN 19   CREATININE 1.5*   CALCIUM 8.7   MG 2.0     CMP:   Recent Labs   Lab 08/10/20  0637   *   CALCIUM 8.7   ALBUMIN 1.9*   PROT 8.2   *   K 4.2   CO2 23      BUN 19   CREATININE 1.5*   ALKPHOS 876*   ALT 59*   AST 80*   BILITOT 1.0     Coagulation:   Recent Labs   Lab 08/10/20  0637   APTT 34.4*       Significant Imaging:  Imaging results within the past 24 hours have been reviewed.

## 2020-08-11 NOTE — PROGRESS NOTES
"[facility]  Infectious Disease  Progress Note    Patient Name: Anjum Cai Jr.  MRN: [id number]  Admission Date: 8/6/2020  Length of Stay: 5 days  Attending Physician: Otis Aleman MD  Primary Care Provider: Gavin Martinez MD    Isolation Status: No active isolations  Assessment/Plan:      Gram-negative bacteremia     84 year old man with DM, prostate CA s/p radiation (completed therapy in May 2020), HTN, CAD (s/p PCI 2007), hx cholecystectomy 2018 and hx hernia repair with mesh 2018, who presented to ED on 8/6 with NSTEMI.  Underwent angiogram and LHC with PCI to RCA on 8/6.   Noted to have elevated WBC on 8/7 post procedure and blood cultures were drawn, showing 2/4 bottles with Edwardsiella tarda.  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  ID consulted for evaluation and antibiotic recommendations.       Edwardsiella tarda is a water and foodborne pathogen (found in fresh and brackish valencia) and typically causes gastroenteritis.  Bacteremia has been reported, though very rare and can be associated with underlying hepatobiliary disease. Associated with fresh/brackish water food sources such as catfish.    Patient reports he was having fevers to 101 and chills 4-5 days prior to admission and his daughter confirms this.  Patient denies any abdominal symptoms or diarrhea.  Daughter reports he has lost approximately 40 pounds since March. She lives in [place] but recently visited approx 2 weeks ago and reported he had episodes of severe "stomach pains" ( which he has failed to report to anyone), appetite loss, fevers, chills.  Also episodes of hypoglycemia and hypotension.         He had a balloon-assisted colonoscopy in 2015  In which multiple polyps were found.  Follow up 6 month surveillance colonoscopy was recommended, but he was unable to schedule this because he had an MI and was on anti-coagulants.  He was to have " Cardiology follow up for clearance for balloon-assisted enteroscopy, but he did not follow through with this.       ALP, GGT  noted to be elevated. No reported history of underlying hepatobiliary disease.  Abd US 8/9 showed mild CBD prominence and mild intrahepatic biliary duct dilation.  Transferred to Hospital Medicine service 8/10  for further workup for GI/hepatobiliary source of bacteremia.  Evaluated by GI and underwent MRCP/MRI today (8/11)  which is concerning for cholangitis.  ERCP recommended, but in setting of recent ACS/dual antiplatelet therapy/need for anesthesia, procedure poses significant risks (bleeding, pancreatitis, adverse cardiac event).         An interdisciplinary discussion was held between ID, Hospital Medicine, AES, and Cardiology.    From Cardiovascular perspective, would be ideal to wait for 4 weeks from MI before ERCP.  Okay from ID perspective to delay ERCP for 4 weeks.  He had a transient bacteremia discovered while working up source of leukocytosis noted on admission with MI.  His blood cultures cleared quickly and he has been afebrile, hemodynamically stable.  Because he is currently stable, recommend continuing IV antibiotics for 14 days from first negative blood culture, then transitioning to oral antibiotic for another two weeks for a total of 4 weeks of antibiotics and then proceeding with ERCP/stent      Plan/recommendations:  1.  Continue ertapenem 1 gram IV q 24 hour X 14 days from first negative blood culture.  Estimated end date of IV abx  8/22.   2.   Monitor renal function closely.  If cr cl goes below 30, will need to change ertapenem to 500 mg q 24 hours.     3.   Weekly cbc, cmp, crp, esr and fax results to ID, ramon Ferguson NP, at fax [phone]. (Pager [phone], spectra [phone], office [phone])  4.    Plan on transition to oral Augmentin 875 mg twice daily X 14 days after completion of IV therapy until 9/5 or until ERCP.  Patient with reported vasculitis with  Cefadroxil, but has tolerated Augmentin since that time (took 10 day course in April 2015).  5.  ID follow up at 14 days (can do virtual visit) and after ERCP.  ID will make the appointments.   6.  Will sign off.  Please call or re-consult as needed.      Data reviewed and plan discussed with ID staff  Discussed with Hospital Medicine, GI, and Cardiology.     Thank you.   Please call for any questions or concerns.  WESTLEY Ferraro, ANP-C  [phone] pager, [place] [phone]    Subjective:     Principal Problem:NSTEMI (non-ST elevated myocardial infarction)    HPI: Mr. Anjum Cai is an 84 year old man with DM, prostate CA s/p radiation (completed therapy in May 2020), HTN, CAD (s/p PCI 2007), hx cholecystectomy 2018 and hx hernia repair with mesh 2018, who presented to ED on 8/6 with NSTEMI.  Underwent angiogram and LHC with PCI to RCA yesterday.  Noted to have elevated WBC on 8/7 post procedure and blood cultures were drawn and showing 2/4 bottles with Edwardsiella tarda (sensitivities pending) .  He was started on IV aztreonam given cefadroxil allergy (history of vasculitis).  ID consulted for evaluation and antibiotic recommendations.      Patient reports he was having fevers to 101 and chills 4-5 days prior to admission. Denies cough, complains of SOB.  He denies any abdominal symptoms - no pain, nausea, vomiting or diarrhea or recent diarrheal illness.  He had a balloon-assisted colonoscopy in 2015  In which multiple polyps were found.  Follow up 6 month surveillance colonoscopy was recommended, but he was unable to schedule this because he had an MI and was on anti-coagulants.  He was to have Cardiology follow up for clearance for balloon-assisted enteroscopy, but he did not follow through with this.     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  Allergy reported in 2012.  He has had beta-lactam since that time per review of chart (Augmentin in 2015) with no  apparent adverse reaction.      Interval History: No acute events overnight.  Afebrile.  Repeat blood cultures NGTD.   Seen by GI - MRI/MRCP today concerning for cholangitis       Review of Systems   Constitutional: Positive for appetite change, chills, fever (prior to admission) and unexpected weight change. Negative for activity change, diaphoresis and fatigue.   HENT: Negative.    Eyes: Negative.    Respiratory: Positive for chest tightness (intermittent). Negative for cough, shortness of breath and wheezing.    Cardiovascular: Negative for chest pain, palpitations and leg swelling.   Gastrointestinal: Positive for abdominal pain (intermittent). Negative for constipation, diarrhea, nausea and vomiting.   Genitourinary: Negative for difficulty urinating and dysuria.   Musculoskeletal: Negative for back pain and myalgias.   Skin: Negative for rash and wound.   Neurological: Negative for dizziness, weakness and headaches.   Psychiatric/Behavioral: Negative for agitation and confusion.     Objective:     Vital Signs (Most Recent):  Temp: 98.5 °F (36.9 °C) (08/11/20 0723)  Pulse: 85 (08/11/20 0845)  Resp: 18 (08/11/20 0723)  BP: (!) 114/57 (08/11/20 0723)  SpO2: 100 % (08/11/20 0845) Vital Signs (24h Range):  Temp:  [98.2 °F (36.8 °C)-99.7 °F (37.6 °C)] 98.5 °F (36.9 °C)  Pulse:  [68-97] 85  Resp:  [16-18] 18  SpO2:  [94 %-100 %] 100 %  BP: (104-121)/(54-59) 114/57     Weight: 76.4 kg (168 lb 6.9 oz)  Body mass index is 27.19 kg/m².    Estimated Creatinine Clearance: 32.1 mL/min (A) (based on SCr of 1.6 mg/dL (H)).    Physical Exam  Vitals signs and nursing note reviewed.   Constitutional:       General: He is not in acute distress.     Appearance: Normal appearance. He is not ill-appearing, toxic-appearing or diaphoretic.   HENT:      Head: Normocephalic and atraumatic.   Eyes:      General: No scleral icterus.     Conjunctiva/sclera: Conjunctivae normal.   Neck:      Musculoskeletal: Normal range of motion.    Cardiovascular:      Rate and Rhythm: Normal rate and regular rhythm.      Heart sounds: No murmur.   Pulmonary:      Effort: Pulmonary effort is normal.      Breath sounds: Normal breath sounds.   Abdominal:      General: There is no distension.      Palpations: Abdomen is soft.      Tenderness: There is no abdominal tenderness. There is no guarding.   Musculoskeletal:      Right lower leg: No edema.      Left lower leg: No edema.   Skin:     General: Skin is warm and dry.   Neurological:      Mental Status: He is alert and oriented to person, place, and time.   Psychiatric:         Mood and Affect: Mood normal.         Behavior: Behavior normal.         Significant Labs:   Blood Culture:   Recent Labs   Lab 08/07/20  0841 08/08/20  1051 08/08/20  1121   LABBLOO Gram stain lily bottle: Gram negative rods  Positive results previously called 08/07/2020  EDWARDSIELLA TARDA*  Gram stain lily bottle: Gram negative rods  Results called to and read back by:NISREEN Mcqueen RN 08/07/2020  21:21  EDWARDSIELLA TARDA  For susceptibility see order # [id number]  * No Growth to date  No Growth to date  No Growth to date No Growth to date  No Growth to date  No Growth to date     CBC:   Recent Labs   Lab 08/10/20  0637 08/11/20  0642   WBC 16.01* 14.19*   HGB 10.0* 10.1*   HCT 31.5* 32.0*    266     CMP:   Recent Labs   Lab 08/10/20  0637 08/11/20  0642   * 133*   K 4.2 4.0    101   CO2 23 22*   * 118*   BUN 19 22   CREATININE 1.5* 1.6*   CALCIUM 8.7 8.8   PROT 8.2 8.4   ALBUMIN 1.9* 1.8*   BILITOT 1.0 0.9   ALKPHOS 876* 845*   AST 80* 75*   ALT 59* 55*   ANIONGAP 9 10   EGFRNONAA 42.7* 39.5*       Significant Imaging: I have reviewed all pertinent imaging results/findings within the past 24 hours.

## 2020-08-12 LAB
ALBUMIN SERPL BCP-MCNC: 1.7 G/DL (ref 3.5–5.2)
ALP SERPL-CCNC: 747 U/L (ref 55–135)
ALT SERPL W/O P-5'-P-CCNC: 49 U/L (ref 10–44)
AMORPH CRY UR QL COMP ASSIST: ABNORMAL
ANION GAP SERPL CALC-SCNC: 12 MMOL/L (ref 8–16)
AST SERPL-CCNC: 81 U/L (ref 10–40)
BACTERIA #/AREA URNS AUTO: ABNORMAL /HPF
BASOPHILS # BLD AUTO: 0.05 K/UL (ref 0–0.2)
BASOPHILS NFR BLD: 0.3 % (ref 0–1.9)
BILIRUB SERPL-MCNC: 1 MG/DL (ref 0.1–1)
BILIRUB UR QL STRIP: NEGATIVE
BUN SERPL-MCNC: 26 MG/DL (ref 8–23)
CALCIUM SERPL-MCNC: 8.7 MG/DL (ref 8.7–10.5)
CHLORIDE SERPL-SCNC: 102 MMOL/L (ref 95–110)
CLARITY UR REFRACT.AUTO: ABNORMAL
CO2 SERPL-SCNC: 21 MMOL/L (ref 23–29)
COLOR UR AUTO: ABNORMAL
CREAT SERPL-MCNC: 1.6 MG/DL (ref 0.5–1.4)
DIFFERENTIAL METHOD: ABNORMAL
EOSINOPHIL # BLD AUTO: 0.1 K/UL (ref 0–0.5)
EOSINOPHIL NFR BLD: 0.4 % (ref 0–8)
ERYTHROCYTE [DISTWIDTH] IN BLOOD BY AUTOMATED COUNT: 15.7 % (ref 11.5–14.5)
EST. GFR  (AFRICAN AMERICAN): 45.7 ML/MIN/1.73 M^2
EST. GFR  (NON AFRICAN AMERICAN): 39.5 ML/MIN/1.73 M^2
GLUCOSE SERPL-MCNC: 38 MG/DL (ref 70–110)
GLUCOSE UR QL STRIP: NEGATIVE
H PYLORI IGG SERPL QL IA: NEGATIVE
HCT VFR BLD AUTO: 29.4 % (ref 40–54)
HGB BLD-MCNC: 9.4 G/DL (ref 14–18)
HGB UR QL STRIP: NEGATIVE
HYALINE CASTS UR QL AUTO: 19 /LPF
IMM GRANULOCYTES # BLD AUTO: 0.09 K/UL (ref 0–0.04)
IMM GRANULOCYTES NFR BLD AUTO: 0.5 % (ref 0–0.5)
KETONES UR QL STRIP: NEGATIVE
LEUKOCYTE ESTERASE UR QL STRIP: NEGATIVE
LYMPHOCYTES # BLD AUTO: 1.4 K/UL (ref 1–4.8)
LYMPHOCYTES NFR BLD: 8.5 % (ref 18–48)
MAGNESIUM SERPL-MCNC: 2 MG/DL (ref 1.6–2.6)
MCH RBC QN AUTO: 28.9 PG (ref 27–31)
MCHC RBC AUTO-ENTMCNC: 32 G/DL (ref 32–36)
MCV RBC AUTO: 91 FL (ref 82–98)
MICROSCOPIC COMMENT: ABNORMAL
MONOCYTES # BLD AUTO: 1.4 K/UL (ref 0.3–1)
MONOCYTES NFR BLD: 8.3 % (ref 4–15)
NEUTROPHILS # BLD AUTO: 13.4 K/UL (ref 1.8–7.7)
NEUTROPHILS NFR BLD: 82 % (ref 38–73)
NITRITE UR QL STRIP: NEGATIVE
NRBC BLD-RTO: 0 /100 WBC
PH UR STRIP: 5 [PH] (ref 5–8)
PHOSPHATE SERPL-MCNC: 3.7 MG/DL (ref 2.7–4.5)
PLATELET # BLD AUTO: 273 K/UL (ref 150–350)
PMV BLD AUTO: 11.2 FL (ref 9.2–12.9)
POCT GLUCOSE: 104 MG/DL (ref 70–110)
POCT GLUCOSE: 159 MG/DL (ref 70–110)
POCT GLUCOSE: 165 MG/DL (ref 70–110)
POCT GLUCOSE: 174 MG/DL (ref 70–110)
POCT GLUCOSE: 228 MG/DL (ref 70–110)
POCT GLUCOSE: 255 MG/DL (ref 70–110)
POCT GLUCOSE: 32 MG/DL (ref 70–110)
POCT GLUCOSE: 99 MG/DL (ref 70–110)
POTASSIUM SERPL-SCNC: 3.6 MMOL/L (ref 3.5–5.1)
PROT SERPL-MCNC: 8.1 G/DL (ref 6–8.4)
PROT UR QL STRIP: ABNORMAL
RBC # BLD AUTO: 3.25 M/UL (ref 4.6–6.2)
RBC #/AREA URNS AUTO: 4 /HPF (ref 0–4)
SODIUM SERPL-SCNC: 135 MMOL/L (ref 136–145)
SP GR UR STRIP: 1.02 (ref 1–1.03)
SQUAMOUS #/AREA URNS AUTO: 0 /HPF
URN SPEC COLLECT METH UR: ABNORMAL
WBC # BLD AUTO: 16.37 K/UL (ref 3.9–12.7)
WBC #/AREA URNS AUTO: 2 /HPF (ref 0–5)

## 2020-08-12 PROCEDURE — 97110 THERAPEUTIC EXERCISES: CPT | Mod: HCNC

## 2020-08-12 PROCEDURE — 97161 PT EVAL LOW COMPLEX 20 MIN: CPT | Mod: HCNC

## 2020-08-12 PROCEDURE — 84100 ASSAY OF PHOSPHORUS: CPT | Mod: HCNC

## 2020-08-12 PROCEDURE — 25000003 PHARM REV CODE 250: Mod: HCNC | Performed by: INTERNAL MEDICINE

## 2020-08-12 PROCEDURE — 76937 US GUIDE VASCULAR ACCESS: CPT | Mod: HCNC

## 2020-08-12 PROCEDURE — 63600175 PHARM REV CODE 636 W HCPCS: Mod: HCNC | Performed by: NURSE PRACTITIONER

## 2020-08-12 PROCEDURE — 99233 SBSQ HOSP IP/OBS HIGH 50: CPT | Mod: HCNC,,, | Performed by: INTERNAL MEDICINE

## 2020-08-12 PROCEDURE — 81001 URINALYSIS AUTO W/SCOPE: CPT | Mod: HCNC

## 2020-08-12 PROCEDURE — 97165 OT EVAL LOW COMPLEX 30 MIN: CPT | Mod: HCNC

## 2020-08-12 PROCEDURE — 36415 COLL VENOUS BLD VENIPUNCTURE: CPT | Mod: HCNC

## 2020-08-12 PROCEDURE — 83735 ASSAY OF MAGNESIUM: CPT | Mod: HCNC

## 2020-08-12 PROCEDURE — 85025 COMPLETE CBC W/AUTO DIFF WBC: CPT | Mod: HCNC

## 2020-08-12 PROCEDURE — 99233 PR SUBSEQUENT HOSPITAL CARE,LEVL III: ICD-10-PCS | Mod: HCNC,,, | Performed by: INTERNAL MEDICINE

## 2020-08-12 PROCEDURE — 25000003 PHARM REV CODE 250: Mod: HCNC | Performed by: STUDENT IN AN ORGANIZED HEALTH CARE EDUCATION/TRAINING PROGRAM

## 2020-08-12 PROCEDURE — 36410 VNPNXR 3YR/> PHY/QHP DX/THER: CPT | Mod: HCNC

## 2020-08-12 PROCEDURE — C1751 CATH, INF, PER/CENT/MIDLINE: HCPCS | Mod: HCNC

## 2020-08-12 PROCEDURE — 20600001 HC STEP DOWN PRIVATE ROOM: Mod: HCNC

## 2020-08-12 PROCEDURE — C9399 UNCLASSIFIED DRUGS OR BIOLOG: HCPCS | Mod: HCNC | Performed by: INTERNAL MEDICINE

## 2020-08-12 PROCEDURE — 80053 COMPREHEN METABOLIC PANEL: CPT | Mod: HCNC

## 2020-08-12 PROCEDURE — 97535 SELF CARE MNGMENT TRAINING: CPT | Mod: HCNC

## 2020-08-12 RX ORDER — POTASSIUM CHLORIDE 20 MEQ/1
40 TABLET, EXTENDED RELEASE ORAL ONCE
Status: COMPLETED | OUTPATIENT
Start: 2020-08-12 | End: 2020-08-12

## 2020-08-12 RX ADMIN — ERTAPENEM 1 G: 1 INJECTION INTRAMUSCULAR; INTRAVENOUS at 09:08

## 2020-08-12 RX ADMIN — ACETAMINOPHEN 650 MG: 325 TABLET ORAL at 08:08

## 2020-08-12 RX ADMIN — INSULIN DETEMIR 5 UNITS: 100 INJECTION, SOLUTION SUBCUTANEOUS at 05:08

## 2020-08-12 RX ADMIN — POTASSIUM CHLORIDE 40 MEQ: 1500 TABLET, EXTENDED RELEASE ORAL at 05:08

## 2020-08-12 RX ADMIN — PANTOPRAZOLE SODIUM 40 MG: 40 TABLET, DELAYED RELEASE ORAL at 08:08

## 2020-08-12 RX ADMIN — METOPROLOL TARTRATE 50 MG: 50 TABLET, FILM COATED ORAL at 08:08

## 2020-08-12 RX ADMIN — TICAGRELOR 90 MG: 90 TABLET ORAL at 08:08

## 2020-08-12 RX ADMIN — OMEGA-3-ACID ETHYL ESTERS 1 G: 1 CAPSULE, LIQUID FILLED ORAL at 08:08

## 2020-08-12 RX ADMIN — ATORVASTATIN CALCIUM 80 MG: 20 TABLET, FILM COATED ORAL at 08:08

## 2020-08-12 RX ADMIN — FINASTERIDE 5 MG: 5 TABLET, FILM COATED ORAL at 08:08

## 2020-08-12 RX ADMIN — DEXTROSE MONOHYDRATE 25 G: 25 INJECTION, SOLUTION INTRAVENOUS at 05:08

## 2020-08-12 RX ADMIN — INSULIN ASPART 3 UNITS: 100 INJECTION, SOLUTION INTRAVENOUS; SUBCUTANEOUS at 04:08

## 2020-08-12 RX ADMIN — ACETAMINOPHEN 650 MG: 325 TABLET ORAL at 09:08

## 2020-08-12 RX ADMIN — TICAGRELOR 90 MG: 90 TABLET ORAL at 09:08

## 2020-08-12 RX ADMIN — METOPROLOL TARTRATE 50 MG: 50 TABLET, FILM COATED ORAL at 09:08

## 2020-08-12 RX ADMIN — ASPIRIN 81 MG CHEWABLE TABLET 81 MG: 81 TABLET CHEWABLE at 08:08

## 2020-08-12 RX ADMIN — IRBESARTAN 75 MG: 75 TABLET ORAL at 08:08

## 2020-08-12 NOTE — TREATMENT PLAN
Brief AES note     Chart checked   Patient is asymptomatic, WBC worsened to 16 today, but remains afebrile.    Following discussion yesterday, I contacted daughter and she is in agreement to hold off on doing ERCP at this time, and continue to watch daily.    If leukocytosis persistent or worsening tomorrow with no other explanation, we will discuss with cardiology and likely proceed with ERCP and stent placement    Discussed with primary team.    Kwame Kinsey, PGY-VI  Gastroenterology Fellow   [phone]

## 2020-08-12 NOTE — PLAN OF CARE
Pt is a 83 yo who was admitted for NSTEMI/Positive BCx. Pt is AAOx4, and independent. Pt is calm and cooperative. Fall precautions in place -- SR x2, non-slip socks on, call light in reach, bed in lowest position. Free of falls, traumas, and injury. Skin  intact. Pt educated on s/s of low blood sugar to notify healthcare staff about. Pt demonstrates and verbalizes understanding. VSS on RA. Pt is being monitored in Lead II and V1 and is in NSR. Pt denies pain. Plan of care reviewed with pt. Questions encouraged, and answered.

## 2020-08-12 NOTE — CONSULTS
Placed 18g, 10 cm Midline in right brachial vein using u/s guidance.  Max dwell date 9/10/2020.  Lot # [id number].    MIDLINE placed for 14 days of Invanz

## 2020-08-12 NOTE — PT/OT/SLP EVAL
Occupational Therapy   Evaluation and Discharge Note    Name: Anjum Cai Jr.  MRN: [id number]  Admitting Diagnosis:  NSTEMI (non-ST elevated myocardial infarction)     6 Days Post-Op  Pre-op Diagnosis: NSTEMI (non-ST elevated myocardial infarction) [I21.4]    Procedure(s):  CATHETERIZATION, HEART, LEFT  Stent, Drug Eluting, Single Vessel, Coronary  ANGIOGRAM, CORONARY ARTERY     Recommendations:     Discharge Recommendations: home  Discharge Equipment Recommendations:  none  Barriers to discharge:  None    Assessment:     Anjum Cai Jr. is a 84 y.o. male with a medical diagnosis of NSTEMI (non-ST elevated myocardial infarction). At this time, patient is functioning at their prior level of function and does not require further acute OT services.     Plan:     During this hospitalization, patient does not require further acute OT services.  Please re-consult if situation changes.    · Plan of Care Reviewed with: patient    Subjective     Chief Complaint: none stated  Patient/Family Comments/goals: to go home soon    Occupational Profile:  Living Environment: Patient lives alone in [facility] with 3 ZION with B handrails. Patient has a tub/shower combo with bench.  Previous level of function: Independent  Roles and Routines: Active; drives; works as kailash   Equipment Used at home: bath bench (Patient owns DME that belonged to his wife)  Assistance upon Discharge: Limited. Patient's daughter lives in [place]. Patient reports he has a nephew and friends that can provide limited assist.    Pain/Comfort:  · Pain Rating 1: 0/10    Patients cultural, spiritual, Anabaptism conflicts given the current situation: no    Objective:     Communicated with: RN prior to session. Patient found HOB elevated with telemetry upon OT entry to room. PT present for co-evaluation.    General Precautions: Standard  Orthopedic Precautions:N/A   Braces: N/A     Occupational Performance:    Bed Mobility:    · Patient completed Supine to Sit  with modified independence; HOB elevated    Functional Mobility/Transfers:  · Patient completed Sit <> Stand Transfer with independence with no AD  · Patient completed Toilet Transfer Step Transfer technique with independence with no AD  · Functional Mobility:   · Room: patient ambulated EOB <> bathroom with independence with no AD  · Hallway: patient ambulated ~400 feet with supervision with no AD    Activities of Daily Living:  · Grooming: independence standing at sink  · Toileting: independence    Cognitive/Visual Perceptual:  Cognitive/Psychosocial Skills:    -       Oriented to: Person, Place, Time and Situation   -       Follows Commands/attention:Follows multistep  commands  -       Communication: clear/fluent  -       Memory: No Deficits noted  -       Safety awareness/insight to disability: intact   -       Mood/Affect/Coping skills/emotional control: Appropriate to situation, Cooperative and Pleasant    Physical Exam:  Postural examination/scapula alignment:    -       Rounded shoulders  -       Forward head  Dominant hand:    -       Right  Upper Extremity Range of Motion:     -       Right Upper Extremity: WNL  -       Left Upper Extremity: WNL  Upper Extremity Strength:   -       Right Upper Extremity: WNL  -       Left Upper Extremity: WNL   Strength:    -       Right Upper Extremity: WNL  -       Left Upper Extremity: WNL  Fine Motor Coordination:    -       Intact  Gross motor coordination:  -       WFL    AMPAC 6 Click ADL:  AMPAC Total Score: 24    Treatment & Education:   Therapist provided facilitation and instruction of proper body mechanics, energy conservation, and fall prevention strategies during tasks listed above.   Instructed patient to sit in bedside chair daily and ambulate in hallway to maintain OOB/activity tolerance.   Educated patient on OT POC and answered all questions within OT scope of practice.   Whiteboard updated   Education:    Patient left up in chair with all lines  intact and call button in reach    GOALS:   Multidisciplinary Problems     Occupational Therapy Goals     Not on file          Multidisciplinary Problems (Resolved)        Problem: Occupational Therapy Goal    Goal Priority Disciplines Outcome Interventions   Occupational Therapy Goal   (Resolved)     OT, PT/OT Met    Description: Skilled OT services not necessary at this time secondary to patient performing ADLs at OF. Patient in agreement. Please re-consult OT if change in patient's functional status is noted.                    History:     Past Medical History:   Diagnosis Date    Anemia 2/26/2018    Arthritis     BPH (benign prostatic hyperplasia)     Cataract     right     Colon polyp 11/18/2015    Colon polyps     Coronary artery disease     DR (diabetic retinopathy)     Dyslipidemia     Elevated PSA     Goiter, nontoxic, multinodular     Headaches, cluster     Hypertension     Kidney stones     Lump in the testicle     Left    Prostate cancer     Rotator cuff tendinitis     Right shoulder    Sleep apnea with use of continuous positive airway pressure (CPAP)     uses cpap at night    Type II or unspecified type diabetes mellitus with other specified manifestations, uncontrolled        Past Surgical History:   Procedure Laterality Date    CARDIAC CATHETERIZATION  2007    CATARACT EXTRACTION W/  INTRAOCULAR LENS IMPLANT Right 09/27/2016    Dr. Garcia    CHOLECYSTECTOMY  02/2018    CORONARY ANGIOGRAPHY N/A 8/6/2020    Procedure: ANGIOGRAM, CORONARY ARTERY;  Surgeon: Jerson Cavanaugh MD;  Location: [facility] CATH LAB;  Service: Cardiology;  Laterality: N/A;    CORONARY STENT PLACEMENT  02/02/2007    LAD, RCAx2    INGUINAL HERNIA REPAIR Right 1995    LEFT HEART CATHETERIZATION Left 8/6/2020    Procedure: CATHETERIZATION, HEART, LEFT;  Surgeon: Jerson Cavanaugh MD;  Location: [facility] CATH LAB;  Service: Cardiology;  Laterality: Left;    PROSTATE BIOPSY  2012    ROTATOR CUFF REPAIR  Right 2014    x2    ROTATOR CUFF REPAIR Left 2013    TONSILLECTOMY, ADENOIDECTOMY      as child    TRIGGER FINGER RELEASE Right 2004    x2       Time Tracking:     OT Date of Treatment: 08/12/20  OT Start Time: 1220  OT Stop Time: 1238  OT Total Time (min): 18 min    Billable Minutes: Evaluation 10  Self Care/Home Management 8    Nuvia Lock OT  8/12/2020

## 2020-08-12 NOTE — PROGRESS NOTES
"[facility]  Adult Nutrition  Progress Note    SUMMARY       Recommendations  1. Continue cardiac diet + Boost Glucose Control as tolerated. Encourage PO intake.   2. RD to monitor.    Goals: Adequate PO intake to meet > 75% EEN/EPN by RD follow up  Nutrition Goal Status: new  Communication of RD Recs: other (comment)(POC)    Reason for Assessment    Reason For Assessment: RD follow-up  Diagnosis: (NSTEMI)  Relevant Medical History: DM2, prostate ca s/p radiation tx, HTN, HLP, CAD  Interdisciplinary Rounds: did not attend  General Information Comments: Pt reports fair intake ~50% of meals + drinking Boost 3x/day. 50-75% intake per RN documentation. Reports good appetite PTA. Pt is unsure of wt changes PTA but states his UBW is 183#. Wt 190-200# x 3 years per chart, 9.5% wt loss noted recently x 6 months to 180# on admission. Further 13# (7.2%) wt loss noted since admission. NFPE completed today, pt with mild age appropriate wasting. Pt is at risk for malnutrition given significant wt loss, will continue to monitor intake and weights. Pt reports frequent episodes of hypoglycemia this admission, encouraged increased intake of meals, saving CHO source such as fruit cup or Boost from trays and eating in between meals as a snack.  Nutrition Discharge Planning: d/c on diabetic low sodium diet    Nutrition Risk Screen    Nutrition Risk Screen: no indicators present    Nutrition/Diet History    Spiritual, Cultural Beliefs, Baptist Practices, Values that Affect Care: no    Anthropometrics    Temp: 98.4 °F (36.9 °C)  Height Method: Stated  Height: 5' 6" (167.6 cm)  Height (inches): 66 in  Weight Method: Standard Scale  Weight: 76.2 kg (167 lb 15.9 oz)  Weight (lb): 167.99 lb  Ideal Body Weight (IBW), Male: 142 lb  % Ideal Body Weight, Male (lb): 126.76 %  BMI (Calculated): 27.1    Lab/Procedures/Meds    Pertinent Labs Reviewed: reviewed  Pertinent Labs Comments: Na 135, BUN 26, Cr 1.6, GFR 39.5, Glu 38, " Alb 1.7, AST 81, ALT 49, .2  Pertinent Medications Reviewed: reviewed  Pertinent Medications Comments: statin, vitamin D, pantoprazole    Estimated/Assessed Needs    Weight Used For Calorie Calculations: 76.2 kg (167 lb 15.9 oz)  Energy Calorie Requirements (kcal): 1756 kcal/day(x 1.25 PAL)  Energy Need Method: [place]-St Jeor  Protein Requirements: 76-92 g/day (1-1.2 g/kg)  Weight Used For Protein Calculations: 76.2 kg (167 lb 15.9 oz)  Fluid Requirements (mL): per MD or 1mL/kcal     RDA Method (mL): 1756  CHO Requirement: 220 g/day    Nutrition Prescription Ordered    Current Diet Order: Cardiac  Oral Nutrition Supplement: Boost Glucose Control TID    Evaluation of Received Nutrient/Fluid Intake    Comments: LBM 8/11  % Intake of Estimated Energy Needs: 50 - 75 %  % Meal Intake: 50 - 75 %    Nutrition Risk    Level of Risk/Frequency of Follow-up: low(1x/week)     Assessment and Plan    Nutrition Problem  Inadequate energy intake    Related to (etiology):   Decreased ability to consume sufficient energy    Signs and Symptoms (as evidenced by):   Pt with fair intake 50-75% of meals    Interventions (treatment strategy):  Collaboration with other providers  Commercial beverage    Nutrition Diagnosis Status:   New     Monitor and Evaluation    Food and Nutrient Intake: energy intake, food and beverage intake  Food and Nutrient Adminstration: diet order  Knowledge/Beliefs/Attitudes: food and nutrition knowledge/skill  Anthropometric Measurements: weight, weight change, body mass index  Biochemical Data, Medical Tests and Procedures: electrolyte and renal panel, gastrointestinal profile, glucose/endocrine profile, inflammatory profile, lipid profile  Nutrition-Focused Physical Findings: overall appearance     Malnutrition Assessment  Orbital Region (Subcutaneous Fat Loss): mild depletion  Upper Arm Region (Subcutaneous Fat Loss): mild depletion   Scientologist Region (Muscle Loss): mild depletion  Clavicle Bone Region  (Muscle Loss): well nourished  Clavicle and Acromion Bone Region (Muscle Loss): well nourished  Dorsal Hand (Muscle Loss): well nourished  Anterior Thigh Region (Muscle Loss): mild depletion  Posterior Calf Region (Muscle Loss): mild depletion     Nutrition Follow-Up    RD Follow-up?: Yes

## 2020-08-12 NOTE — PT/OT/SLP EVAL
Physical Therapy Evaluation and Discharge Note    Patient Name:  Anjum Cai Jr.   MRN:  [id number]  Admitting Diagnosis:  NSTEMI (non-ST elevated myocardial infarction)   Recent Surgery: Procedure(s) (LRB):  CATHETERIZATION, HEART, LEFT (Left)  Stent, Drug Eluting, Single Vessel, Coronary  ANGIOGRAM, CORONARY ARTERY (N/A) 6 Days Post-Op  Admit Date: 8/6/2020  Length of Stay: 6 days    Recommendations:     Discharge Recommendations:  home   Discharge Equipment Recommendations: none   Barriers to discharge: None    Assessment:     Anjum Cai Jr. is a 84 y.o. male admitted with a medical diagnosis of NSTEMI (non-ST elevated myocardial infarction) . Pt reports they are at baseline mobility with all mobility and ADLs. Pt denies any dizziness, SOB, or recent falls. PT provided education to pt regarding importance of maintaining frequent activity and ambulating while admitted to maintain current level of mobility. Pt does not require further acute skilled therapy intervention.At this time, patient is functioning at their prior level of function and does not require further acute PT services. Please re-consult if pt experiences a change in status.    Plan:     During this hospitalization, patient does not require further acute PT services.  Please re-consult if situation changes.      Subjective     RN notified prior to session. No family present upon PT entrance into room.    Chief Complaint: Ready to go home  Patient/Family Comments/goals: go home  Pain/Comfort:  · Pain Rating 1: 0/10  · Pain Rating Post-Intervention 1: 0/10    Living Environment:  Patient lives with alone in a single family home with 3 ZION (pt describes these as 3 thresholds due to decreased height and need to ambulate before reaching next threshold).   Prior Level of Function: Patient reports being independent with mobility & with ADLs. Patient uses DME as follows: bath bench. DME owned (not currently used): pt owns previous DME from wife, did  not elaborate.  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 Drive: yes. Managing Medicines/Managing Home: yes.    Patient reports they will have assistance from nephew, friends upon discharge. Pt reports his daughter is coming in from [place].    Objective:     Additional staff present: OT for eval    Patient found HOB elevated with telemetry upon PT entry to room.    General Precautions: Standard, fall   Orthopedic Precautions:N/A   Braces: N/A   Body mass index is 27.11 kg/m².  Respiratory Status: Room Air    Exam:  · Mental Status: Patient is oriented to AxOx4 and follows all multistep commands. Pt is Alert and Cooperative during session.  · Skin Integrity: Visible skin intact  · Edema: None noted   · Sensation: Intact  · Hearing: Intact  · Vision:  Intact  · Postural Assessment: no deviations noted  · Range of Motion:  · RUE: WFL  · LUE: WFL  · RLE: WFL  · LLE: WFL  · Strength Exam:  · Bilateral Upper Extremity Strength: grossly WFL  · Bilateral Lower Extremity Strength: grossly WFL    Outcome Measures:  AM-PAC 6 CLICK MOBILITY  Turning over in bed (including adjusting bedclothes, sheets and blankets)?: 4  Sitting down on and standing up from a chair with arms (e.g., wheelchair, bedside commode, etc.): 4  Moving from lying on back to sitting on the side of the bed?: 4  Moving to and from a bed to a chair (including a wheelchair)?: 4  Need to walk in hospital room?: 4  Climbing 3-5 steps with a railing?: 4  Basic Mobility Total Score: 24     Functional Mobility:  Bed Mobility:   · Supine to Sit: modified independence and with the HOB elevated; from Rt side of bed    Sitting Balance at Edge of Bed:   Assistance Level Required: Lamar    Transfers:   · Sit <> Stand Transfer: independence with no assistive device   · Stand <> Sit Transfer: independence with no assistive device   · h2hakebm from EOB and o1nyorrq from toliet    Standing Balance:   Assistance Level  Required: [place]    Gait  · Patient ambulated: 400 ft    · Patient required: supervision  · Patient used:  no assistive device   · Gait Pattern observed: reciprocal gait  · Gait: reciprocal arm swing and steady gait  · all lines remained intact throughout ambulation trial  · Mask donned for out of room ambulation  · Comments: Pt was able to perform vertical/horizontal head turns, 180 degree turns while ambulating, and avoid hallway obstacles without LOB. Pt at supervision level due to mild SOB. Pt ambulating household and community distances safely with no SOB, MYERS, and increased RR rate.    Stairs:   Pt declined - no stairs, just small threshold    Education:   Time provided for education, counseling and discussion of health disposition in regards to patient's current status   All questions answered within PT scope of practice and to patient's satisfaction   PT role in POC to address current functional deficits   Pt educated on proper body mechanics, safety techniques, and energy conservation with PT facilitation and cueing throughout session   Call nursing/pct to transfer to chair/use bathroom. Pt stated understanding.   Whiteboard updated with pt's current mobility status documented above   Safe to perform step transfer to/from chair/bedside commode independent and no AD w/ nursing/PCT present   Pt to ambulate 3x/day supervision and no AD with nsg/self in order to maintain functional mobility   Importance of OOB tolerance 8-10 hrs/day to improve lung ventilation and expansion as well as strengthen postural musculature   Encouraged patient to perform daily exercises of LAQ BLE, ambulation 3x/day to increase endurance and decrease effects of bedrest     Patient left seated EOB with all lines intact, call button in reach and RN notified.    GOALS:   Multidisciplinary Problems     Physical Therapy Goals     Not on file          Multidisciplinary Problems (Resolved)        Problem: Physical Therapy  Goal    Goal Priority Disciplines Outcome Goal Variances Interventions   Physical Therapy Goal   (Resolved)     PT, PT/OT Met                     History:     Past Medical History:   Diagnosis Date    Anemia 2/26/2018    Arthritis     BPH (benign prostatic hyperplasia)     Cataract     right     Colon polyp 11/18/2015    Colon polyps     Coronary artery disease     DR (diabetic retinopathy)     Dyslipidemia     Elevated PSA     Goiter, nontoxic, multinodular     Headaches, cluster     Hypertension     Kidney stones     Lump in the testicle     Left    Prostate cancer     Rotator cuff tendinitis     Right shoulder    Sleep apnea with use of continuous positive airway pressure (CPAP)     uses cpap at night    Type II or unspecified type diabetes mellitus with other specified manifestations, uncontrolled        Past Surgical History:   Procedure Laterality Date    CARDIAC CATHETERIZATION  2007    CATARACT EXTRACTION W/  INTRAOCULAR LENS IMPLANT Right 09/27/2016    Dr. Garcai    CHOLECYSTECTOMY  02/2018    CORONARY ANGIOGRAPHY N/A 8/6/2020    Procedure: ANGIOGRAM, CORONARY ARTERY;  Surgeon: Jerson Cavanaugh MD;  Location: [facility] CATH LAB;  Service: Cardiology;  Laterality: N/A;    CORONARY STENT PLACEMENT  02/02/2007    LAD, RCAx2    INGUINAL HERNIA REPAIR Right 1995    LEFT HEART CATHETERIZATION Left 8/6/2020    Procedure: CATHETERIZATION, HEART, LEFT;  Surgeon: Jerson Cavanaugh MD;  Location: [facility] CATH LAB;  Service: Cardiology;  Laterality: Left;    PROSTATE BIOPSY  2012    ROTATOR CUFF REPAIR Right 2014    x2    ROTATOR CUFF REPAIR Left 2013    TONSILLECTOMY, ADENOIDECTOMY      as child    TRIGGER FINGER RELEASE Right 2004    x2       Time Tracking:     PT Received On: 08/12/20  PT Start Time: 1220     PT Stop Time: 1238  PT Total Time (min): 18 min     Billable Minutes: Evaluation 10 and Therapeutic Exercise 8    Mirta Casas PT, DPT  8/12/2020  Pager: [phone]

## 2020-08-12 NOTE — PLAN OF CARE
Discharge Recommendation: Home, no PT needs.    Eval completed today. PT goals appropriate.    Patient is safe to perform ambulation 2-3x/day with supervision with nursing staff/self.    Mirta Casas PT, DPT  8/12/2020  Pager: [phone]        Problem: Physical Therapy Goal  Goal: Physical Therapy Goal  Outcome: Met

## 2020-08-12 NOTE — PLAN OF CARE
Plan of care discussed with patient. Patient is free of fall/trauma/injury. Denies CP/SOB. Pain controlled with PRN tylenol. VSS and patient remains on tele. IV abx administered per order. Patient is ACHS; BG protocol maintained per order. UA completed. Midline placed. K 3.6; replaced. All questions addressed. Will continue to monitor

## 2020-08-12 NOTE — PLAN OF CARE
Problem: Occupational Therapy Goal  Goal: Occupational Therapy Goal  Description: Skilled OT services not necessary at this time secondary to patient performing ADLs at OF. Patient in agreement. Please re-consult OT if change in patient's functional status is noted.   Outcome: Met

## 2020-08-12 NOTE — PROGRESS NOTES
[facility] Hospital Medicine                                                                     Progress Note     Team: [facility] Otis Aleman MD   Admit Date: 8/6/2020   Hospital Day: 6  KEVON: 8/14/2020   Code status: Full Code   Principal Problem: NSTEMI (non-ST elevated myocardial infarction)     SUMMARY:     84 year old male with history of DM2, prostate cancer s/p radiation treatment, HTN, HLP and CAD s/p PCI with LISSETH to LAD and RCA (2007) followed by coronary angiogram in 2016 (LM 50% stenosis with FFR 0.87 and obstructive disease in OM/D2 (no intervention because of small size vessels) who presents today with acute onset substernal chest pain with radiation to the left arm. Patient reports this morning he woke up and had chest discomfort similar to his episodes when he had heart attacks in the past. The chest pain was constant and associated with shortness of breath. He denied having nausea, numbness/tingling of arm or sweating. He took SL nitro tablet at home and immediately called 911. When he was evaluated in the ER, his CP decreased but was still present. EKG was done that showed sinus rhythm, 1st degree AVB and iRBBB. He was given additional SL nitro and is CP free now. He states three weeks ago he has completed radiation therapy and have been having a wide spectrum of symptoms. He states he went to the grocery store couple days ago and was able to walk without having CP or SOB but when he arrived home was completely exhausted. Cardiology consulted for chest pain. Patient was taken to the cath lab for angiogram. Admitted to the CCU for further management of NSTEMI.     Patient was admitted for NSTEMI s/p LHC revealed severe stenosis of the proximal and ostial RCA s/p 3.5 x 38 LISSETH. WBC elevated at 18K. UA, CXR wnl. Blood culture was positive for  "Edwardsiella Tarda (exposure likely 2/2 to fishing trip 2 weeks ago). ID was consulted. Initiated IV aztreonam. Concern for possible biliary involvement given elevated ALP. US Abd revealed mildly prominent CBD. AES consulted to eval possible biliary infection. Multiple hypoglycemic events in the CCU, insulin titrated down. Remain on DAPT for NSTEMI. Transferred to medicine 8/10.    Discussed case with AES and ID. MRCP obtained "Intra and extrahepatic biliary dilatation with two distal intraductal filling defects, each measuring 8-9 mm.  Findings most in keeping with component of choledocholithiasis.  This can be further evaluated with ERCP." GI ideally would like ERCP however will need interdisciplinary discussion with cardiology and ID regarding treatment plan considering his recent NSTEMI, DAPT and anesthesia requirement with high risk procedure. Cardiology consulted. Discussed case with all consultants, and considering his need to continue DAPT, would ideally wait for 4 weeks from MI before ERCP. Okay from ID perspective to delay ERCP for 4 weeks.  He had a transient bacteremia discovered while working up source of leukocytosis noted on admission with MI.  His blood cultures cleared quickly and he has been afebrile, hemodynamically stable.  Because he is currently stable, recommend continuing IV antibiotics for 14 days from first negative blood culture, then transitioning to oral antibiotic for another two weeks for a total of 4 weeks of antibiotics and then proceeding with ERCP/stent. Plan to continue ertapenem gram IV q 24 hour X 14 days from first negative blood culture.  Estimated end date of IV abx  8/22. PICC placed.     Overall stable however hypoglycemia overnight (ate 2 bites of dinner), and worsening leukocytosis today (8/12). Has intermittent RUQ, Right lower rib pain likely from cholangitis. If leukocytosis worsening, or clinically declines will need to consider more urgent ERCP. He continues to look " non toxic at this time.    Eventual discharge plan is home with home health for IV abx. Daughter Maria Elena would like daily updated. She is coming to [facility] Friday 8/14 to take care of her father. She needs to be updated piror to discharge to make appropriate arrangements.      SUBJECTIVE:     Pt was seen and examined at bedside. HDS and afebrile. Hypoglycemia overnight, asked if ate dinner after insulin, he said he didn't like the food, and only had 2 bites. Insulin stopped. Educated on carb consistent diet. This morning he complained of transient right upper quadrant which quickly resolved. No other issues endorsed including fevers, chills, malaise, headaches, chest pain, SOB, cough. Pt has been tolerating PO intake well without any nausea, vomiting, diarrhea, constipation, blood in stools or trouble urinating. I have updated daughter Maria Elena today.      ROS (Positive in Bold, otherwise negative)  Pain Scale: 0 /10   Constitutional: fever, chills, night sweats, weakness  CV: chest pain, edema, palpitations  Resp: SOB, cough, sputum production  GI: changes in appetite, NVDC, intermittent right lower rib, right upper quadrant pain, melena, hematochezia, GERD, hematemesis  : Dysuria, hematuria, urinary urgency, frequency  MSK: arthralgia/myalgia, joint swelling  SKIN: rashes, pruritis, petechiae   Neuro/Psych: FND, anxiety depression      OBJECTIVE:     Vitals:  Temp:  [96 °F (35.6 °C)-98.4 °F (36.9 °C)]   Pulse:  [67-93]   Resp:  [16]   BP: ()/(52-70)   SpO2:  [97 %-99 %]      I & O (Last 24H):     Intake/Output Summary (Last 24 hours) at 8/12/2020 1634  Last data filed at 8/12/2020 1500  Gross per 24 hour   Intake 780 ml   Output 870 ml   Net -90 ml       GEN:  male  in no acute distress. Nontoxic. Resting in bed. Cooperative.  HEENT: NCAT. PERRL. EOMI. Conjunctivae/corneas clear, sclera Anicteric.  CVS: RRR. Normal s1 s2 no murmur, click, rub or gallop  LUNG: CTAB. Normal respiratory effort. No wheezes,  rhonchi, or crackles.  ABD: Normoactive BS, soft, NT, ND, no masses or organomegaly.  EXT: No edema. No cyanosis. Full ROM.  SKIN: color, texture, turgor normal. No rashes or lesions  NEURO: Alert, oriented x 4, Spont mvt of all extremities with no focal deficits noted.      All recent labs and imaging has been reviewed.     Recent Results (from the past 24 hour(s))   POCT glucose    Collection Time: 08/11/20  9:47 PM   Result Value Ref Range    POCT Glucose 159 (H) 70 - 110 mg/dL   CBC auto differential    Collection Time: 08/12/20  3:09 AM   Result Value Ref Range    WBC 16.37 (H) 3.90 - 12.70 K/uL    RBC 3.25 (L) 4.60 - 6.20 M/uL    Hemoglobin 9.4 (L) 14.0 - 18.0 g/dL    Hematocrit 29.4 (L) 40.0 - 54.0 %    Mean Corpuscular Volume 91 82 - 98 fL    Mean Corpuscular Hemoglobin 28.9 27.0 - 31.0 pg    Mean Corpuscular Hemoglobin Conc 32.0 32.0 - 36.0 g/dL    RDW 15.7 (H) 11.5 - 14.5 %    Platelets 273 150 - 350 K/uL    MPV 11.2 9.2 - 12.9 fL    Immature Granulocytes 0.5 0.0 - 0.5 %    Gran # (ANC) 13.4 (H) 1.8 - 7.7 K/uL    Immature Grans (Abs) 0.09 (H) 0.00 - 0.04 K/uL    Lymph # 1.4 1.0 - 4.8 K/uL    Mono # 1.4 (H) 0.3 - 1.0 K/uL    Eos # 0.1 0.0 - 0.5 K/uL    Baso # 0.05 0.00 - 0.20 K/uL    nRBC 0 0 /100 WBC    Gran% 82.0 (H) 38.0 - 73.0 %    Lymph% 8.5 (L) 18.0 - 48.0 %    Mono% 8.3 4.0 - 15.0 %    Eosinophil% 0.4 0.0 - 8.0 %    Basophil% 0.3 0.0 - 1.9 %    Differential Method Automated    Magnesium    Collection Time: 08/12/20  3:09 AM   Result Value Ref Range    Magnesium 2.0 1.6 - 2.6 mg/dL   Phosphorus    Collection Time: 08/12/20  3:09 AM   Result Value Ref Range    Phosphorus 3.7 2.7 - 4.5 mg/dL   Comprehensive metabolic panel    Collection Time: 08/12/20  3:09 AM   Result Value Ref Range    Sodium 135 (L) 136 - 145 mmol/L    Potassium 3.6 3.5 - 5.1 mmol/L    Chloride 102 95 - 110 mmol/L    CO2 21 (L) 23 - 29 mmol/L    Glucose 38 (LL) 70 - 110 mg/dL    BUN, Bld 26 (H) 8 - 23 mg/dL    Creatinine 1.6 (H) 0.5  - 1.4 mg/dL    Calcium 8.7 8.7 - 10.5 mg/dL    Total Protein 8.1 6.0 - 8.4 g/dL    Albumin 1.7 (L) 3.5 - 5.2 g/dL    Total Bilirubin 1.0 0.1 - 1.0 mg/dL    Alkaline Phosphatase 747 (H) 55 - 135 U/L    AST 81 (H) 10 - 40 U/L    ALT 49 (H) 10 - 44 U/L    Anion Gap 12 8 - 16 mmol/L    eGFR if African American 45.7 (A) >60 mL/min/1.73 m^2    eGFR if non  39.5 (A) >60 mL/min/1.73 m^2   POCT glucose    Collection Time: 08/12/20  5:10 AM   Result Value Ref Range    POCT Glucose 32 (LL) 70 - 110 mg/dL   POCT glucose    Collection Time: 08/12/20  5:37 AM   Result Value Ref Range    POCT Glucose 174 (H) 70 - 110 mg/dL   POCT glucose    Collection Time: 08/12/20  7:28 AM   Result Value Ref Range    POCT Glucose 104 70 - 110 mg/dL   POCT glucose    Collection Time: 08/12/20  9:00 AM   Result Value Ref Range    POCT Glucose 99 70 - 110 mg/dL   POCT glucose    Collection Time: 08/12/20 11:12 AM   Result Value Ref Range    POCT Glucose 165 (H) 70 - 110 mg/dL   Urinalysis, Reflex to Urine Culture Urine, Clean Catch    Collection Time: 08/12/20  3:46 PM    Specimen: Urine   Result Value Ref Range    Specimen UA Urine, Clean Catch     Color, UA Kiah Yellow, Straw, Kiah    Appearance, UA Cloudy (A) Clear    pH, UA 5.0 5.0 - 8.0    Specific Gravity, UA 1.025 1.005 - 1.030    Protein, UA 2+ (A) Negative    Glucose, UA Negative Negative    Ketones, UA Negative Negative    Bilirubin (UA) Negative Negative    Occult Blood UA Negative Negative    Nitrite, UA Negative Negative    Leukocytes, UA Negative Negative   Urinalysis Microscopic    Collection Time: 08/12/20  3:46 PM   Result Value Ref Range    RBC, UA 4 0 - 4 /hpf    WBC, UA 2 0 - 5 /hpf    Bacteria Rare None-Occ /hpf    Squam Epithel, UA 0 /hpf    Hyaline Casts, UA 19 (A) 0-1/lpf /lpf    Amorphous, UA Few None-Moderate    Microscopic Comment SEE COMMENT        Recent Labs   Lab 08/11/20  2147 08/12/20  0510 08/12/20  0537 08/12/20  0728 08/12/20  0900  08/12/20  1112   POCTGLUCOSE 159* 32* 174* 104 99 165*       Hemoglobin A1C   Date Value Ref Range Status   08/06/2020 7.7 (H) 4.0 - 5.6 % Final     Comment:     ADA Screening Guidelines:  5.7-6.4%  Consistent with prediabetes  >or=6.5%  Consistent with diabetes  High levels of fetal hemoglobin interfere with the HbA1C  assay. Heterozygous hemoglobin variants (HbS, HgC, etc)do  not significantly interfere with this assay.   However, presence of multiple variants may affect accuracy.     01/24/2020 9.0 (H) 4.0 - 5.6 % Final     Comment:     ADA Screening Guidelines:  5.7-6.4%  Consistent with prediabetes  >or=6.5%  Consistent with diabetes  High levels of fetal hemoglobin interfere with the HbA1C  assay. Heterozygous hemoglobin variants (HbS, HgC, etc)do  not significantly interfere with this assay.   However, presence of multiple variants may affect accuracy.     09/19/2019 7.5 (H) 4.0 - 5.6 % Final     Comment:     ADA Screening Guidelines:  5.7-6.4%  Consistent with prediabetes  >or=6.5%  Consistent with diabetes  High levels of fetal hemoglobin interfere with the HbA1C  assay. Heterozygous hemoglobin variants (HbS, HgC, etc)do  not significantly interfere with this assay.   However, presence of multiple variants may affect accuracy.          Active Hospital Problems    Diagnosis  POA    *NSTEMI (non-ST elevated myocardial infarction) [I21.4]  Yes    Elevated liver enzymes [R74.8]  Yes    Gram-negative bacteremia [R78.81]  Yes    Leukocytosis [D72.829]  No    Essential hypertension [I10]  Yes    Coronary artery disease involving native coronary artery of native heart without angina pectoris [I25.10]  Yes    Dyslipidemia [E78.5]  Yes    Type 2 diabetes mellitus without retinopathy [E11.9]  Yes      Resolved Hospital Problems   No resolved problems to display.          ASSESSMENT AND PLAN:     NSTEMI s/p PCI  CAD  -Patient with EMA score of 4, patient clinical history consistent with angina  -EKG with <1mm  ST elevation in leads III and AVF, +cardiac biomarker (trops 0.043-> 0.028)  -Loaded with ASA + Brillanta and systemic heparin  -S/p LHC revealed severe stenosis of the proximal and ostial RCA s/p 3.5 x 38 LISSETH.   -Continue DAPT x1yr, metoprolol, high intensity statin, ARB  -As per cardiology, do not hold DAPT for any procedure at this time  -Needs Cardiac rehab once discharged  -Cardiology fu on discharge    Gram-negative bacteremia  Cholangitis   Leukocytosis - improving   - 2/4 bottles with Edwardsiella tarda   - ID consulted, appreciate recs, initially started on IV aztreonam  - Repeat cultures NGTD  - AUS with biliary dilation   - MRCP with cholangitis and choledocholithiasis  - GI ideally would like ERCP however will need interdisciplinary discussion with cardiology and ID regarding treatment plan considering his recent NSTEMI, DAPT and anesthesia requirement with high risk procedure.   - Cardiology consulted.   - Discussed case with all consultants, and considering his need to continue DAPT, would ideally wait for 4 weeks from MI before ERCP. Okay from ID perspective to delay ERCP for 4 weeks.  He had a transient bacteremia discovered while working up source of leukocytosis noted on admission with MI.  His blood cultures cleared quickly and he has been afebrile, hemodynamically stable.  Because he is currently stable, recommend continuing IV antibiotics for 14 days from first negative blood culture, then transitioning to oral antibiotic for another two weeks for a total of 4 weeks of antibiotics and then proceeding with ERCP/stent. Plan to continue ertapenem gram IV q 24 hour X 14 days from first negative blood culture.  Estimated end date of IV abx  8/22. PICC placed.   - Overall stable however hypoglycemia overnight (ate 2 bites of dinner), and worsening leukocytosis today (8/12). Has intermittent RUQ, Right lower rib pain likely from cholangitis. If leukocytosis worsening, or clinically declines will need  to consider more urgent ERCP. He continues to look non toxic at this time.    Hypertension  - controlled, continue current meds    DM2  - Takes 65 units qhs, aspart 12 units with meals, SSI  - resumed in CCU, however hypoglycemia noted  - changed detemir 20 units BID, sugars were better however hypoglycemia overnight  - educated   - dc insulin for now, he will require a new insulin regimen on discharge  - recommended restarting insulin dose in the AM when able to avoid nocturnal hypoglycemia   - hypoglycemia protocol     Hyponatremia  - mild continue to trend, improving   - suspecting nutritional at this time    CKD stage 3  - baseline Cr 1.6-1.3  - at baseline currently, continue to monitor  - note he had recent LHC and he is on ARB       Maria Elena, is the POA. She can be contacted at [phone]. I have updated her today.       Prophylaxis- DAPT, SCDs  Code Status- Full Code   Discharge plan and follow up - pending medical stability, will need outpatient colonoscopy at some point, cardiac rehab on dc, cardiology fu, lives alone, daughter to come stay with patient once released from hospital     Otis Aleman MD  Hospital Medicine Staff  Pager [phone]

## 2020-08-12 NOTE — NURSING
Pt blood sugar checked -- reading <32. Pt diaphoretic, but otherwise asymptomatic. Pt was easily arousable, AAOx4, no tremors. Per protocol, D50 was administered IVP. Provider on-call for [facility] notified -- Jasmin Raza NP. Blood glucose rechecked and is currently 174. Blood glucose within defined goal parameters. Pt remains asymptomatic.

## 2020-08-13 LAB
ALBUMIN SERPL BCP-MCNC: 1.7 G/DL (ref 3.5–5.2)
ALP SERPL-CCNC: 730 U/L (ref 55–135)
ALT SERPL W/O P-5'-P-CCNC: 53 U/L (ref 10–44)
ANION GAP SERPL CALC-SCNC: 9 MMOL/L (ref 8–16)
AST SERPL-CCNC: 95 U/L (ref 10–40)
BACTERIA BLD CULT: NORMAL
BACTERIA BLD CULT: NORMAL
BASOPHILS # BLD AUTO: 0.03 K/UL (ref 0–0.2)
BASOPHILS NFR BLD: 0.3 % (ref 0–1.9)
BILIRUB SERPL-MCNC: 0.8 MG/DL (ref 0.1–1)
BUN SERPL-MCNC: 22 MG/DL (ref 8–23)
CALCIUM SERPL-MCNC: 8.7 MG/DL (ref 8.7–10.5)
CHLORIDE SERPL-SCNC: 102 MMOL/L (ref 95–110)
CO2 SERPL-SCNC: 20 MMOL/L (ref 23–29)
CREAT SERPL-MCNC: 1.4 MG/DL (ref 0.5–1.4)
DIFFERENTIAL METHOD: ABNORMAL
EOSINOPHIL # BLD AUTO: 0 K/UL (ref 0–0.5)
EOSINOPHIL NFR BLD: 0.3 % (ref 0–8)
ERYTHROCYTE [DISTWIDTH] IN BLOOD BY AUTOMATED COUNT: 15.9 % (ref 11.5–14.5)
EST. GFR  (AFRICAN AMERICAN): 53.7 ML/MIN/1.73 M^2
EST. GFR  (NON AFRICAN AMERICAN): 46.5 ML/MIN/1.73 M^2
GLUCOSE SERPL-MCNC: 190 MG/DL (ref 70–110)
HCT VFR BLD AUTO: 30.4 % (ref 40–54)
HGB BLD-MCNC: 9.8 G/DL (ref 14–18)
IMM GRANULOCYTES # BLD AUTO: 0.08 K/UL (ref 0–0.04)
IMM GRANULOCYTES NFR BLD AUTO: 0.7 % (ref 0–0.5)
LYMPHOCYTES # BLD AUTO: 0.6 K/UL (ref 1–4.8)
LYMPHOCYTES NFR BLD: 5.2 % (ref 18–48)
MAGNESIUM SERPL-MCNC: 2.1 MG/DL (ref 1.6–2.6)
MCH RBC QN AUTO: 29.3 PG (ref 27–31)
MCHC RBC AUTO-ENTMCNC: 32.2 G/DL (ref 32–36)
MCV RBC AUTO: 91 FL (ref 82–98)
MONOCYTES # BLD AUTO: 1 K/UL (ref 0.3–1)
MONOCYTES NFR BLD: 8.4 % (ref 4–15)
NEUTROPHILS # BLD AUTO: 9.6 K/UL (ref 1.8–7.7)
NEUTROPHILS NFR BLD: 85.1 % (ref 38–73)
NRBC BLD-RTO: 0 /100 WBC
PHOSPHATE SERPL-MCNC: 2.9 MG/DL (ref 2.7–4.5)
PLATELET # BLD AUTO: 267 K/UL (ref 150–350)
PMV BLD AUTO: 10.8 FL (ref 9.2–12.9)
POCT GLUCOSE: 171 MG/DL (ref 70–110)
POCT GLUCOSE: 178 MG/DL (ref 70–110)
POCT GLUCOSE: 181 MG/DL (ref 70–110)
POCT GLUCOSE: 267 MG/DL (ref 70–110)
POTASSIUM SERPL-SCNC: 4.6 MMOL/L (ref 3.5–5.1)
PROT SERPL-MCNC: 8.1 G/DL (ref 6–8.4)
RBC # BLD AUTO: 3.34 M/UL (ref 4.6–6.2)
SODIUM SERPL-SCNC: 131 MMOL/L (ref 136–145)
WBC # BLD AUTO: 11.3 K/UL (ref 3.9–12.7)

## 2020-08-13 PROCEDURE — 99232 SBSQ HOSP IP/OBS MODERATE 35: CPT | Mod: HCNC,,, | Performed by: INTERNAL MEDICINE

## 2020-08-13 PROCEDURE — 36415 COLL VENOUS BLD VENIPUNCTURE: CPT | Mod: HCNC

## 2020-08-13 PROCEDURE — 83735 ASSAY OF MAGNESIUM: CPT | Mod: HCNC

## 2020-08-13 PROCEDURE — 85025 COMPLETE CBC W/AUTO DIFF WBC: CPT | Mod: HCNC

## 2020-08-13 PROCEDURE — 63600175 PHARM REV CODE 636 W HCPCS: Mod: HCNC | Performed by: NURSE PRACTITIONER

## 2020-08-13 PROCEDURE — C9399 UNCLASSIFIED DRUGS OR BIOLOG: HCPCS | Mod: HCNC | Performed by: INTERNAL MEDICINE

## 2020-08-13 PROCEDURE — 20600001 HC STEP DOWN PRIVATE ROOM: Mod: HCNC

## 2020-08-13 PROCEDURE — 25000003 PHARM REV CODE 250: Mod: HCNC | Performed by: INTERNAL MEDICINE

## 2020-08-13 PROCEDURE — 84100 ASSAY OF PHOSPHORUS: CPT | Mod: HCNC

## 2020-08-13 PROCEDURE — 99232 PR SUBSEQUENT HOSPITAL CARE,LEVL II: ICD-10-PCS | Mod: HCNC,,, | Performed by: INTERNAL MEDICINE

## 2020-08-13 PROCEDURE — 80053 COMPREHEN METABOLIC PANEL: CPT | Mod: HCNC

## 2020-08-13 PROCEDURE — 25000003 PHARM REV CODE 250: Mod: HCNC | Performed by: STUDENT IN AN ORGANIZED HEALTH CARE EDUCATION/TRAINING PROGRAM

## 2020-08-13 RX ADMIN — PANTOPRAZOLE SODIUM 40 MG: 40 TABLET, DELAYED RELEASE ORAL at 08:08

## 2020-08-13 RX ADMIN — ATORVASTATIN CALCIUM 80 MG: 20 TABLET, FILM COATED ORAL at 08:08

## 2020-08-13 RX ADMIN — INSULIN ASPART 1 UNITS: 100 INJECTION, SOLUTION INTRAVENOUS; SUBCUTANEOUS at 12:08

## 2020-08-13 RX ADMIN — ERTAPENEM 1 G: 1 INJECTION INTRAMUSCULAR; INTRAVENOUS at 09:08

## 2020-08-13 RX ADMIN — FINASTERIDE 5 MG: 5 TABLET, FILM COATED ORAL at 08:08

## 2020-08-13 RX ADMIN — TICAGRELOR 90 MG: 90 TABLET ORAL at 10:08

## 2020-08-13 RX ADMIN — ACETAMINOPHEN 650 MG: 325 TABLET ORAL at 10:08

## 2020-08-13 RX ADMIN — ASPIRIN 81 MG CHEWABLE TABLET 81 MG: 81 TABLET CHEWABLE at 08:08

## 2020-08-13 RX ADMIN — ACETAMINOPHEN 650 MG: 325 TABLET ORAL at 08:08

## 2020-08-13 RX ADMIN — OMEGA-3-ACID ETHYL ESTERS 1 G: 1 CAPSULE, LIQUID FILLED ORAL at 08:08

## 2020-08-13 RX ADMIN — INSULIN DETEMIR 5 UNITS: 100 INJECTION, SOLUTION SUBCUTANEOUS at 08:08

## 2020-08-13 RX ADMIN — METOPROLOL TARTRATE 50 MG: 50 TABLET, FILM COATED ORAL at 10:08

## 2020-08-13 RX ADMIN — IRBESARTAN 75 MG: 75 TABLET ORAL at 08:08

## 2020-08-13 RX ADMIN — METOPROLOL TARTRATE 50 MG: 50 TABLET, FILM COATED ORAL at 08:08

## 2020-08-13 RX ADMIN — INSULIN ASPART 1 UNITS: 100 INJECTION, SOLUTION INTRAVENOUS; SUBCUTANEOUS at 10:08

## 2020-08-13 RX ADMIN — TICAGRELOR 90 MG: 90 TABLET ORAL at 08:08

## 2020-08-13 NOTE — PLAN OF CARE
Pt. AAOX4, NAD, denies pain and SOB, VS stable for Pt., AC HS Glucose 228, 1 Unit Insulin Aspart administered per order, AM glucose 171, Midline in place, Will monitor.

## 2020-08-13 NOTE — PROGRESS NOTES
[facility] Hospital Medicine  Progress Note    Patient Name: Anjum Cai Jr.  MRN: [id number]  Patient Class: IP- Inpatient   Admission Date: 8/6/2020  Length of Stay: 7 days  Attending Physician: Roderick Purvis MD  Primary Care Provider: Gavin Martinez MD    [facility] Medicine Team: [facility] HOSP MED B Roderick Purvis MD    HPI:    84 year old male with history of DM2, prostate cancer s/p radiation treatment, HTN, HLP and CAD s/p PCI with LISSETH to LAD and RCA (2007) followed by coronary angiogram in 2016 (LM 50% stenosis with FFR 0.87 and obstructive disease in OM/D2 (no intervention because of small size vessels) who presents today with acute onset substernal chest pain with radiation to the left arm. Patient reports this morning he woke up and had chest discomfort similar to his episodes when he had heart attacks in the past. The chest pain was constant and associated with shortness of breath. He denied having nausea, numbness/tingling of arm or sweating. He took SL nitro tablet at home and immediately called 911. When he was evaluated in the ER, his CP decreased but was still present. EKG was done that showed sinus rhythm, 1st degree AVB and iRBBB. He was given additional SL nitro and is CP free now. He states three weeks ago he has completed radiation therapy and have been having a wide spectrum of symptoms. He states he went to the grocery store couple days ago and was able to walk without having CP or SOB but when he arrived home was completely exhausted. Cardiology consulted for chest pain. Patient was taken to the cath lab for angiogram. Admitted to the CCU for further management of NSTEMI.      Patient was admitted for NSTEMI s/p LHC revealed severe stenosis of the proximal and ostial RCA s/p 3.5 x 38 LISSETH. WBC elevated at 18K. UA, CXR wnl. Blood culture was positive for Edwardsiella Tarda (exposure likely 2/2 to fishing trip 2 weeks ago). ID was consulted. Initiated IV aztreonam.  Concern for possible biliary involvement given elevated ALP. US Abd revealed mildly prominent CBD. AES consulted to eval possible biliary infection. Multiple hypoglycemic events in the CCU, insulin titrated down. Remain on DAPT for NSTEMI. Transferred to medicine 8/10.       Subjective:     Principal Problem:NSTEMI (non-ST elevated myocardial infarction)    Interval History: Patient sitting in chair, no acute distress. Reports decreased po intake but states that it is due to the taste of the hospital food. Denies fever, chills, chest pain, SOB, or weakness.     Review of Systems   Constitutional: Negative for chills and fever.   HENT: Negative for congestion.    Eyes: Negative for visual disturbance.   Respiratory: Negative for cough and shortness of breath.    Cardiovascular: Negative for chest pain and leg swelling.   Gastrointestinal: Negative for abdominal distention, abdominal pain, diarrhea, nausea and vomiting.   Genitourinary: Negative for dysuria.   Neurological: Negative for dizziness and weakness.   Psychiatric/Behavioral: Negative for confusion.        Objective:     Vital Signs (Most Recent):  Temp: 96.7 °F (35.9 °C) (08/13/20 0735)  Pulse: 78 (08/13/20 0738)  Resp: 16 (08/13/20 0735)  BP: (!) 88/49 (08/13/20 0735)  SpO2: 99 % (08/13/20 0735) Vital Signs (24h Range):  Temp:  [96.7 °F (35.9 °C)-99.8 °F (37.7 °C)] 96.7 °F (35.9 °C)  Pulse:  [] 78  Resp:  [16-18] 16  SpO2:  [96 %-99 %] 99 %  BP: ()/(49-70) 88/49     Weight: 76.2 kg (167 lb 15.9 oz)  Body mass index is 27.11 kg/m².    Intake/Output Summary (Last 24 hours) at 8/13/2020 0740  Last data filed at 8/13/2020 0600  Gross per 24 hour   Intake 780 ml   Output 1045 ml   Net -265 ml      Physical Exam  Constitutional:       Appearance: Normal appearance.   HENT:      Head: Normocephalic.      Mouth/Throat:      Mouth: Mucous membranes are moist.   Eyes:      Pupils: Pupils are equal, round, and reactive to light.   Neck:       "Musculoskeletal: Normal range of motion.   Cardiovascular:      Rate and Rhythm: Normal rate and regular rhythm.   Pulmonary:      Effort: Pulmonary effort is normal.      Breath sounds: Normal breath sounds.   Abdominal:      General: Bowel sounds are normal. There is no distension.      Palpations: Abdomen is soft.      Tenderness: There is no abdominal tenderness.   Musculoskeletal: Normal range of motion.   Skin:     General: Skin is warm.      Comments: RUE midline c/d/i without tenderness or erythema   Neurological:      General: No focal deficit present.      Mental Status: He is alert.   Psychiatric:         Mood and Affect: Mood normal.           Overview/Hospital Course:     Discussed case with AES and ID. MRCP obtained "Intra and extrahepatic biliary dilatation with two distal intraductal filling defects, each measuring 8-9 mm.  Findings most in keeping with component of choledocholithiasis.  This can be further evaluated with ERCP." GI ideally would like ERCP however will need interdisciplinary discussion with cardiology and ID regarding treatment plan considering his recent NSTEMI, DAPT and anesthesia requirement with high risk procedure. Cardiology consulted. Discussed case with all consultants, and considering his need to continue DAPT, would ideally wait for 4 weeks from MI before ERCP. Okay from ID perspective to delay ERCP for 4 weeks.  He had a transient bacteremia discovered while working up source of leukocytosis noted on admission with MI.  His blood cultures cleared quickly and he has been afebrile, hemodynamically stable.  Because he is currently stable, recommend continuing IV antibiotics for 14 days from first negative blood culture, then transitioning to oral antibiotic for another two weeks for a total of 4 weeks of antibiotics and then proceeding with ERCP/stent. Plan to continue ertapenem gram IV q 24 hour X 14 days from first negative blood culture.  Estimated end date of IV abx  8/22. " PICC placed.      Overall stable however hypoglycemia overnight (ate 2 bites of dinner), and worsening leukocytosis today (8/12). Has intermittent RUQ, Right lower rib pain likely from cholangitis. If leukocytosis worsening, or clinically declines will need to consider more urgent ERCP. He continues to look non toxic at this time.     Eventual discharge plan is home with home health for IV abx. Daughter Maria Elena would like daily updated. She is coming to [place] Friday 8/14 to take care of her father. She needs to be updated piror to discharge to make appropriate arrangements.    Significant Labs:   A1C:   Recent Labs   Lab 08/06/20  1415   HGBA1C 7.7*     Blood Culture: No results for input(s): LABBLOO in the last 48 hours.  CBC:   Recent Labs   Lab 08/12/20  0309 08/13/20  0453   WBC 16.37* 11.30   HGB 9.4* 9.8*   HCT 29.4* 30.4*    267     CMP:   Recent Labs   Lab 08/12/20  0309 08/13/20  0453   * 131*   K 3.6 4.6    102   CO2 21* 20*   GLU 38* 190*   BUN 26* 22   CREATININE 1.6* 1.4   CALCIUM 8.7 8.7   PROT 8.1 8.1   ALBUMIN 1.7* 1.7*   BILITOT 1.0 0.8   ALKPHOS 747* 730*   AST 81* 95*   ALT 49* 53*   ANIONGAP 12 9   EGFRNONAA 39.5* 46.5*     Magnesium:   Recent Labs   Lab 08/12/20  0309 08/13/20  0453   MG 2.0 2.1     POCT Glucose:   Recent Labs   Lab 08/12/20  2254 08/13/20  0552 08/13/20  0728   POCTGLUCOSE 228* 171* 181*     Urine Studies:   Recent Labs   Lab 08/12/20  1546   COLORU Kiah   APPEARANCEUA Cloudy*   PHUR 5.0   SPECGRAV 1.025   PROTEINUA 2+*   GLUCUA Negative   KETONESU Negative   BILIRUBINUA Negative   OCCULTUA Negative   NITRITE Negative   LEUKOCYTESUR Negative   RBCUA 4   WBCUA 2   BACTERIA Rare   SQUAMEPITHEL 0   HYALINECASTS 19*       Significant Imaging: I have reviewed and interpreted all pertinent imaging results/findings within the past 24 hours.    Assessment/Plan:      Active Diagnoses:    Diagnosis Date Noted POA    PRINCIPAL PROBLEM:  NSTEMI (non-ST elevated myocardial  infarction) [I21.4] 12/10/2016 Yes    Elevated liver enzymes [R74.8]  Yes    Gram-negative bacteremia [R78.81] 08/08/2020 Yes    Leukocytosis [D72.829] 08/07/2020 No    Essential hypertension [I10] 09/19/2016 Yes    Coronary artery disease involving native coronary artery of native heart without angina pectoris [I25.10] 08/28/2013 Yes    Dyslipidemia [E78.5]  Yes    Type 2 diabetes mellitus without retinopathy [E11.9] 11/30/2012 Yes      Problems Resolved During this Admission:     Scheduled Meds:   aspirin  81 mg Oral Daily    atorvastatin  80 mg Oral Daily    ergocalciferol  50,000 Units Oral Q7 Days    ertapenem (INVANZ) IVPB  1 g Intravenous Q24H    finasteride  5 mg Oral Daily    insulin detemir U-100  5 Units Subcutaneous Daily    irbesartan  75 mg Oral Daily    metoprolol tartrate  50 mg Oral BID    omega-3 acid ethyl esters  1 g Oral Daily    pantoprazole  40 mg Oral Daily    ticagrelor  90 mg Oral BID     Continuous Infusions:  PRN Meds:.acetaminophen, dextrose 50%, dextrose 50%, glucagon (human recombinant), glucose, glucose, insulin aspart U-100    PLAN:    NSTEMI s/p PCI  CAD  -Patient with EMA score of 4, patient clinical history consistent with angina  -EKG with <1mm ST elevation in leads III and AVF, +cardiac biomarker (trops 0.043-> 0.028)  -Loaded with ASA + Brillanta and systemic heparin  -S/p LHC revealed severe stenosis of the proximal and ostial RCA s/p 3.5 x 38 LISSETH.   -Continue DAPT x1yr, metoprolol, high intensity statin, ARB  -As per cardiology, do not hold DAPT for any procedure at this time  -Needs Cardiac rehab once discharged  -Cardiology followup on discharge     Gram-negative bacteremia  Cholangitis   Leukocytosis - improving   - 2/4 bottles with Edwardsiella tarda   - ID consulted, appreciate recs, initially started on IV aztreonam  - Repeat cultures NGTD  - AUS with biliary dilation   - MRCP with cholangitis and choledocholithiasis  - GI ideally would like ERCP  however will need interdisciplinary discussion with cardiology and ID regarding treatment plan considering his recent NSTEMI, DAPT and anesthesia requirement with high risk procedure.   - Cardiology consulted.   - Discussed case with all consultants, and considering his need to continue DAPT, would ideally wait for 4 weeks from MI before ERCP. Okay from ID perspective to delay ERCP for 4 weeks.  He had a transient bacteremia discovered while working up source of leukocytosis noted on admission with MI.  His blood cultures cleared quickly and he has been afebrile, hemodynamically stable.  Because he is currently stable, recommend continuing IV antibiotics for 14 days from first negative blood culture, then transitioning to oral antibiotic for another two weeks for a total of 4 weeks of antibiotics and then proceeding with ERCP/stent. Plan to continue ertapenem gram IV q 24 hour X 14 days from first negative blood culture.  Estimated end date of IV abx  8/22. PICC placed.   - Overall stable however hypoglycemia overnight (ate 2 bites of dinner), and worsening leukocytosis today (8/12). Has intermittent RUQ, Right lower rib pain likely from cholangitis. If leukocytosis worsening, or clinically declines will need to consider more urgent ERCP. He continues to look non toxic at this time.  - Outpatient AES followup     Hypertension  - controlled, continue current meds     DM2  - Takes 65 units qhs, aspart 12 units with meals, SSI  - resumed in CCU, however hypoglycemia noted  - changed detemir 5 units daily  - hypoglycemia protocol      Hyponatremia- resolved   - mild continue to trend, improving   - suspecting nutritional at this time     CKD stage 3  - baseline Cr 1.6-1.3  - at baseline currently, continue to monitor  - note he had recent LHC and he is on ARB         Maria Elena, is the POA. She can be contacted at [phone]. I have updated her today.     VTE Risk Mitigation (From admission, onward)         Ordered     IP  VTE HIGH RISK PATIENT  Once      08/06/20 1403     Place sequential compression device  Until discontinued      08/06/20 1403              Time spent in care of patient: > 35 minutes      Roderick Purvis MD  Department of Hospital Medicine   [facility]

## 2020-08-13 NOTE — PLAN OF CARE
Plan of care updated with patient. No falls during shift. No skin breakdown. Maintained fall protocol. Pt receiving IV Ertapenem Q24H for infection. Pt independent and ambulatory.Plan to discharge on home ABX. Addressed all issues throughout shift.

## 2020-08-13 NOTE — TREATMENT PLAN
Brief AES note    Leukocytosis improved.  Patient is afebrile.   Per chart review, the patient is asymptomatic.    Will hold off on endoscopic evaluation and keep the original plan of 4 weeks of antibiotics with outpatient ERCP (when Brilinta can be held for 5 days.)    Will continue to follow    Kwame Kinsey, PGY-VI  Gastroenterology Fellow   [phone]

## 2020-08-14 VITALS
WEIGHT: 167.75 LBS | OXYGEN SATURATION: 98 % | HEIGHT: 66 IN | HEART RATE: 78 BPM | RESPIRATION RATE: 18 BRPM | SYSTOLIC BLOOD PRESSURE: 109 MMHG | BODY MASS INDEX: 26.96 KG/M2 | DIASTOLIC BLOOD PRESSURE: 53 MMHG | TEMPERATURE: 99 F

## 2020-08-14 DIAGNOSIS — K83.09 CHOLANGITIS: Primary | ICD-10-CM

## 2020-08-14 PROBLEM — R78.81 GRAM-NEGATIVE BACTEREMIA: Status: RESOLVED | Noted: 2020-08-08 | Resolved: 2020-08-14

## 2020-08-14 PROBLEM — D72.829 LEUKOCYTOSIS: Status: RESOLVED | Noted: 2020-08-07 | Resolved: 2020-08-14

## 2020-08-14 LAB
ALBUMIN SERPL BCP-MCNC: 1.6 G/DL (ref 3.5–5.2)
ALP SERPL-CCNC: 655 U/L (ref 55–135)
ALT SERPL W/O P-5'-P-CCNC: 47 U/L (ref 10–44)
ANION GAP SERPL CALC-SCNC: 10 MMOL/L (ref 8–16)
AST SERPL-CCNC: 85 U/L (ref 10–40)
BASOPHILS # BLD AUTO: 0.03 K/UL (ref 0–0.2)
BASOPHILS NFR BLD: 0.3 % (ref 0–1.9)
BILIRUB SERPL-MCNC: 1 MG/DL (ref 0.1–1)
BUN SERPL-MCNC: 26 MG/DL (ref 8–23)
CALCIUM SERPL-MCNC: 8.9 MG/DL (ref 8.7–10.5)
CHLORIDE SERPL-SCNC: 102 MMOL/L (ref 95–110)
CO2 SERPL-SCNC: 22 MMOL/L (ref 23–29)
CREAT SERPL-MCNC: 1.6 MG/DL (ref 0.5–1.4)
DIFFERENTIAL METHOD: ABNORMAL
EOSINOPHIL # BLD AUTO: 0.1 K/UL (ref 0–0.5)
EOSINOPHIL NFR BLD: 0.5 % (ref 0–8)
ERYTHROCYTE [DISTWIDTH] IN BLOOD BY AUTOMATED COUNT: 15.9 % (ref 11.5–14.5)
EST. GFR  (AFRICAN AMERICAN): 45.7 ML/MIN/1.73 M^2
EST. GFR  (NON AFRICAN AMERICAN): 39.5 ML/MIN/1.73 M^2
GLUCOSE SERPL-MCNC: 233 MG/DL (ref 70–110)
HCT VFR BLD AUTO: 30.5 % (ref 40–54)
HGB BLD-MCNC: 9.8 G/DL (ref 14–18)
IMM GRANULOCYTES # BLD AUTO: 0.05 K/UL (ref 0–0.04)
IMM GRANULOCYTES NFR BLD AUTO: 0.5 % (ref 0–0.5)
LYMPHOCYTES # BLD AUTO: 0.8 K/UL (ref 1–4.8)
LYMPHOCYTES NFR BLD: 7.6 % (ref 18–48)
MAGNESIUM SERPL-MCNC: 2.2 MG/DL (ref 1.6–2.6)
MCH RBC QN AUTO: 29 PG (ref 27–31)
MCHC RBC AUTO-ENTMCNC: 32.1 G/DL (ref 32–36)
MCV RBC AUTO: 90 FL (ref 82–98)
MONOCYTES # BLD AUTO: 1 K/UL (ref 0.3–1)
MONOCYTES NFR BLD: 10.2 % (ref 4–15)
NEUTROPHILS # BLD AUTO: 8 K/UL (ref 1.8–7.7)
NEUTROPHILS NFR BLD: 80.9 % (ref 38–73)
NRBC BLD-RTO: 0 /100 WBC
PHOSPHATE SERPL-MCNC: 3.8 MG/DL (ref 2.7–4.5)
PLATELET # BLD AUTO: 269 K/UL (ref 150–350)
PMV BLD AUTO: 10.8 FL (ref 9.2–12.9)
POCT GLUCOSE: 259 MG/DL (ref 70–110)
POCT GLUCOSE: 277 MG/DL (ref 70–110)
POTASSIUM SERPL-SCNC: 4.9 MMOL/L (ref 3.5–5.1)
PROT SERPL-MCNC: 8.1 G/DL (ref 6–8.4)
RBC # BLD AUTO: 3.38 M/UL (ref 4.6–6.2)
SODIUM SERPL-SCNC: 134 MMOL/L (ref 136–145)
WBC # BLD AUTO: 9.93 K/UL (ref 3.9–12.7)

## 2020-08-14 PROCEDURE — 36415 COLL VENOUS BLD VENIPUNCTURE: CPT | Mod: HCNC

## 2020-08-14 PROCEDURE — 25000003 PHARM REV CODE 250: Mod: HCNC | Performed by: INTERNAL MEDICINE

## 2020-08-14 PROCEDURE — 84100 ASSAY OF PHOSPHORUS: CPT | Mod: HCNC

## 2020-08-14 PROCEDURE — 80053 COMPREHEN METABOLIC PANEL: CPT | Mod: HCNC

## 2020-08-14 PROCEDURE — 83735 ASSAY OF MAGNESIUM: CPT | Mod: HCNC

## 2020-08-14 PROCEDURE — 25000003 PHARM REV CODE 250: Mod: HCNC | Performed by: STUDENT IN AN ORGANIZED HEALTH CARE EDUCATION/TRAINING PROGRAM

## 2020-08-14 PROCEDURE — 99239 PR HOSPITAL DISCHARGE DAY,>30 MIN: ICD-10-PCS | Mod: HCNC,,, | Performed by: INTERNAL MEDICINE

## 2020-08-14 PROCEDURE — 99239 HOSP IP/OBS DSCHRG MGMT >30: CPT | Mod: HCNC,,, | Performed by: INTERNAL MEDICINE

## 2020-08-14 PROCEDURE — 85025 COMPLETE CBC W/AUTO DIFF WBC: CPT | Mod: HCNC

## 2020-08-14 PROCEDURE — 63600175 PHARM REV CODE 636 W HCPCS: Mod: HCNC | Performed by: NURSE PRACTITIONER

## 2020-08-14 RX ORDER — INSULIN ASPART 100 [IU]/ML
INJECTION, SOLUTION INTRAVENOUS; SUBCUTANEOUS
Qty: 30 ML | Refills: 3 | Status: SHIPPED | OUTPATIENT
Start: 2020-08-14 | End: 2020-11-03

## 2020-08-14 RX ORDER — INSULIN GLARGINE 100 [IU]/ML
INJECTION, SOLUTION SUBCUTANEOUS
Qty: 60 ML | Refills: 3 | Status: SHIPPED | OUTPATIENT
Start: 2020-08-14 | End: 2020-11-03

## 2020-08-14 RX ORDER — AMOXICILLIN AND CLAVULANATE POTASSIUM 875; 125 MG/1; MG/1
1 TABLET, FILM COATED ORAL 2 TIMES DAILY
Qty: 28 TABLET | Refills: 0 | Status: SHIPPED | OUTPATIENT
Start: 2020-08-22 | End: 2020-09-03 | Stop reason: SDUPTHER

## 2020-08-14 RX ORDER — INSULIN ASPART 100 [IU]/ML
3 INJECTION, SOLUTION INTRAVENOUS; SUBCUTANEOUS
Status: DISCONTINUED | OUTPATIENT
Start: 2020-08-14 | End: 2020-08-14

## 2020-08-14 RX ORDER — ASPIRIN 81 MG/1
81 TABLET ORAL DAILY
Qty: 90 TABLET | Refills: 3 | Status: SHIPPED | OUTPATIENT
Start: 2020-08-14 | End: 2021-08-14

## 2020-08-14 RX ORDER — METOPROLOL TARTRATE 50 MG/1
50 TABLET ORAL 2 TIMES DAILY
Qty: 60 TABLET | Refills: 11 | Status: SHIPPED | OUTPATIENT
Start: 2020-08-14 | End: 2020-11-09 | Stop reason: ALTCHOICE

## 2020-08-14 RX ORDER — ATORVASTATIN CALCIUM 80 MG/1
80 TABLET, FILM COATED ORAL DAILY
Qty: 90 TABLET | Refills: 3 | Status: SHIPPED | OUTPATIENT
Start: 2020-08-14

## 2020-08-14 RX ADMIN — ASPIRIN 81 MG CHEWABLE TABLET 81 MG: 81 TABLET CHEWABLE at 09:08

## 2020-08-14 RX ADMIN — ATORVASTATIN CALCIUM 80 MG: 20 TABLET, FILM COATED ORAL at 09:08

## 2020-08-14 RX ADMIN — TICAGRELOR 90 MG: 90 TABLET ORAL at 09:08

## 2020-08-14 RX ADMIN — PANTOPRAZOLE SODIUM 40 MG: 40 TABLET, DELAYED RELEASE ORAL at 09:08

## 2020-08-14 RX ADMIN — ERTAPENEM 1 G: 1 INJECTION INTRAMUSCULAR; INTRAVENOUS at 10:08

## 2020-08-14 RX ADMIN — METOPROLOL TARTRATE 50 MG: 50 TABLET, FILM COATED ORAL at 09:08

## 2020-08-14 RX ADMIN — FINASTERIDE 5 MG: 5 TABLET, FILM COATED ORAL at 09:08

## 2020-08-14 RX ADMIN — IRBESARTAN 75 MG: 75 TABLET ORAL at 09:08

## 2020-08-14 RX ADMIN — OMEGA-3-ACID ETHYL ESTERS 1 G: 1 CAPSULE, LIQUID FILLED ORAL at 09:08

## 2020-08-14 RX ADMIN — ERGOCALCIFEROL 50000 UNITS: 1.25 CAPSULE ORAL at 09:08

## 2020-08-14 NOTE — PLAN OF CARE
08/14/20 1514   Final Note   Assessment Type Final Discharge Note   Anticipated Discharge Disposition Home-Health   Hospital Follow Up  Appt(s) scheduled? Yes   Discharge plans and expectations educations in teach back method with documentation complete? Yes     Pt [facility]    Future Appointments   Date Time Provider Department Center   8/25/2020  8:30 AM Louie Shin MD [facility] CARDIO [address]   8/25/2020  9:30 AM LAB, APPOINTMENT [facility] LAB VNP [facility]   8/25/2020 10:30 AM WESTLEY Parks, ANP [facility] ID [place] [address]     Julie Haase RN  Case Management [phone]

## 2020-08-14 NOTE — PLAN OF CARE
[facility]    HOME HEALTH ORDERS  FACE TO FACE ENCOUNTER    Patient Name: Anjum Cai Jr.  YOB: 1935    PCP: Gavin Martinez MD   PCP Address: [address] [facility] [place] [facility] [place] [place] [address]  PCP Phone Number: [phone]  PCP Fax: [phone]    Encounter Date: 08/14/2020    Admit to Home Health    Diagnoses:  Active Hospital Problems    Diagnosis  POA    Elevated liver enzymes [R74.8]  Yes    Gram-negative bacteremia [R78.81]  Yes    Essential hypertension [I10]  Yes    Coronary artery disease involving native coronary artery of native heart without angina pectoris [I25.10]  Yes    Dyslipidemia [E78.5]  Yes    Type 2 diabetes mellitus without retinopathy [E11.9]  Yes      Resolved Hospital Problems    Diagnosis Date Resolved POA    *NSTEMI (non-ST elevated myocardial infarction) [I21.4] 08/14/2020 Yes    Leukocytosis [D72.829] 08/14/2020 No       Future Appointments   Date Time Provider Department Center   8/25/2020  8:30 AM Louie Shin MD [facility] CARDIO [address]   8/25/2020  9:30 AM LAB, APPOINTMENT [facility] LAB [facility]   8/25/2020 10:30 AM WESTLEY Parks, ANP [facility] ID [address]           I have seen and examined this patient face to face today. My clinical findings that support the need for the home health skilled services and home bound status are the following:  Weakness/numbness causing balance and gait disturbance due to Coronary Heart Disease, Infection, Weakness/Debility and Anemia making it taxing to leave home.    Allergies:  Review of patient's allergies indicates:   Allergen Reactions    Ace inhibitors      Other reaction(s): cough    Cefadroxil      Other reaction(s): possible vasculitis  Other reaction(s): vasculitis       Diet: cardiac diet and diabetic diet: 2000 calorie    Activities: activity as tolerated    Nursing:   SN to complete comprehensive assessment including routine vital signs. Instruct on disease process and s/s  of complications to report to MD. Review/verify medication list sent home with the patient at time of discharge  and instruct patient/caregiver as needed. Frequency may be adjusted depending on start of care date.    Notify MD if SBP > 160 or < 90; DBP > 90 or < 50; HR > 120 or < 50; Temp > 101    Weekly cbc, cmp, crp, esr and fax results to ramon ROGERS NP, at fax [phone]. (Pager [phone], spectra [phone], office [phone])      CONSULTS:    Physical Therapy to evaluate and treat. Evaluate for home safety and equipment needs; Establish/upgrade home exercise program. Perform / instruct on therapeutic exercises, gait training, transfer training, and Range of Motion.  Occupational Therapy to evaluate and treat. Evaluate home environment for safety and equipment needs. Perform/Instruct on transfers, ADL training, ROM, and therapeutic exercises.    MISCELLANEOUS CARE:  Home Infusion Therapy:   SN to perform Infusion Therapy/Central Line Care.  Review Central Line Care & Central Line Flush with patient.    Administer (drug and dose): IV Ertapenem 1 gram every 24 hours   Last dose given: 8/14/20                         Home dose due: 8/15/20  End date: 8/22/20    Scrub the Hub: Prior to accessing the line, always perform a 30 second alcohol scrub  Each lumen of the central line is to be flushed at least daily with 10 mL Normal Saline and 3 mL Heparin flush (10 units/mL)  Skilled Nurse (SN) may draw blood from IV access  Blood Draw Procedure:   - Aspirate at least 5 mL of blood   - Discard   - Obtain specimen   - Change injection cap   - Flush with 20 mL Normal Saline followed by a                 3-5 mL Heparin flush (10 units/mL)  Central :   - Sterile dressing changes are done weekly and as needed.   - Use chlor-hexadine scrub to cleanse site, apply Biopatch to insertion site,       apply securement device dressing   - Injection caps are changed weekly and after EVERY lab draw.   - If  sterile gauze is under dressing to control oozing,                 dressing change must be performed every 24 hours until gauze is not needed.  Diabetic Care:   SN to perform and educate Diabetic management with blood glucose monitoring:, Fingerstick blood sugar AC and HS and Report CBG < 60 or > 350 to physician.    WOUND CARE ORDERS  n/a      Medications: Review discharge medications with patient and family and provide education.      Current Discharge Medication List      START taking these medications    Details   amoxicillin-clavulanate 875-125mg (AUGMENTIN) 875-125 mg per tablet Take 1 tablet by mouth 2 (two) times daily. for 14 days  Qty: 28 tablet, Refills: 0      ertapenem sodium (ERTAPENEM, INVANZ, 1 G/100 ML NS, READY TO MIX,) Inject 100 mLs (1 g total) into the vein once daily. for 7 days  Qty: 7 each, Refills: 0      metoprolol tartrate (LOPRESSOR) 50 MG tablet Take 1 tablet (50 mg total) by mouth 2 (two) times daily.  Qty: 60 tablet, Refills: 11      ticagrelor (BRILINTA) 90 mg tablet Take 1 tablet (90 mg total) by mouth 2 (two) times daily.  Qty: 60 tablet, Refills: 11    Comments: Price check please. Would patient qualify for a savings card?         CONTINUE these medications which have CHANGED    Details   aspirin (ASPIR-81) 81 MG EC tablet Take 1 tablet (81 mg total) by mouth once daily.  Qty: 90 tablet, Refills: 3      atorvastatin (LIPITOR) 40 MG tablet Take 2 tablets (80 mg total) by mouth once daily.  Qty: 90 tablet, Refills: 3      insulin (LANTUS SOLOSTAR U-100 INSULIN) glargine 100 units/mL (3mL) SubQ pen ADMINISTER 12 UNITS UNDER THE SKIN EVERY EVENING  Qty: 60 mL, Refills: 3      insulin aspart U-100 (NOVOLOG FLEXPEN U-100 INSULIN) 100 unit/mL (3 mL) InPn pen ADMINISTER 4 UNITS UNDER THE SKIN THREE TIMES DAILY WITH MEALS PER SLIDING SCALE  Qty: 30 mL, Refills: 3    Comments: Please inactivate all prior scripts with same name and strength including on holds.  Associated Diagnoses: Insulin  "dependent diabetes mellitus         CONTINUE these medications which have NOT CHANGED    Details   ACCU-CHEK SMARTVIEW TEST STRIP Strp CHECK BLOOD SUGARS FOUR TIMES DAILY BEFORE MEALS AS DIRECTED  Qty: 400 strip, Refills: 3    Associated Diagnoses: Insulin dependent diabetes mellitus      ciclopirox (PENLAC) 8 % Soln Apply topically nightly.  Qty: 6.6 mL, Refills: 11      ciclopirox 0.77 % Gel Apply topically 2 (two) times daily.  Qty: 100 g, Refills: 3      ergocalciferol (ERGOCALCIFEROL) 50,000 unit Cap TAKE 1 CAPSULE BY MOUTH ONCE EVERY 30 DAYS  Qty: 4 capsule, Refills: 3    Associated Diagnoses: Vitamin D deficiency      finasteride (PROSCAR) 5 mg tablet TAKE 1 TABLET(5 MG) BY MOUTH EVERY DAY  Qty: 90 tablet, Refills: 3    Associated Diagnoses: Benign non-nodular prostatic hyperplasia with lower urinary tract symptoms; Elevated PSA      FLUAD 5111-4506, 65 YR UP,,PF, 45 mcg (15 mcg x 3)/0.5 mL Syrg       irbesartan (AVAPRO) 75 MG tablet Take 1 tablet (75 mg total) by mouth once daily.  Qty: 30 tablet, Refills: 6    Comments: .      lancets (ACCU-CHEK FASTCLIX LANCET DRUM) Misc Check blood sugars four times a day.  Qty: 204 each, Refills: PRN      mometasone 0.1% (ELOCON) 0.1 % cream Apply to affected area daily  Qty: 45 g, Refills: 1    Associated Diagnoses: Allergic contact dermatitis, unspecified trigger      MULTIVITAMIN W-MINERALS/LUTEIN (CENTRUM SILVER ORAL) Take by mouth once daily.      nitroGLYCERIN (NITROSTAT) 0.4 MG SL tablet Place 1 tablet (0.4 mg total) under the tongue every 5 (five) minutes as needed for Chest pain.  Qty: 60 tablet, Refills: 12    Associated Diagnoses: Angina effort      omega-3 fatty acids-vitamin E (FISH OIL) 1,000 mg Cap Take 2 capsules by mouth Daily.       omeprazole (PRILOSEC) 40 MG capsule Take 1 capsule (40 mg total) by mouth once daily.  Qty: 90 capsule, Refills: 3      pen needle, diabetic (BD ULTRA-FINE MINI PEN NEEDLE) 31 gauge x 3/16" Ndle Use new needle with each " injection. Inject insulin 4 times a day.  Qty: 100 each, Refills: 3      benzonatate (TESSALON PERLES) 100 MG capsule Take 1 capsule (100 mg total) by mouth every 6 (six) hours as needed for Cough.  Qty: 30 capsule, Refills: 1    Associated Diagnoses: Upper respiratory tract infection, unspecified type         STOP taking these medications       metoprolol succinate (TOPROL-XL) 50 MG 24 hr tablet Comments:   Reason for Stopping:               I certify that this patient is confined to his home and needs intermittent skilled nursing care, physical therapy and occupational therapy.

## 2020-08-14 NOTE — PLAN OF CARE
Pt is a 85 yo M who was admitted for NSTEMI and Positive Blood Cx. Pt is AAOx4, and independent. Pt is calm and cooperative. Fall precautions in place -- SR x2, non-slip socks on, call light in reach, bed in lowest position. Free of falls, traumas, and injury. Skin intact. Pt educated on medications and discharge instructions. Pt demonstrates and verbalizes understanding. VSS on RA. Pt is being monitored in Lead II and V1 and is in NSR. Pt denies pain. Plan of care reviewed with pt. Questions encouraged, and answered.

## 2020-08-14 NOTE — PLAN OF CARE
08/14/20 1218   Post-Acute Status   Post-Acute Authorization Home Health   Home Health Status Set-up Complete     SW met with pt and daughter who reported no preference of HH provider.  [facility].  Lefty with [facility] to teach pt and daughter.  OH to admit tomorrow 8/15/20.    UPDATE 2:30 PM  SW informed by Lefty with [facility] that pt/family have been taught and that meds will be delivered to the home, and therefore pt is cleared to discharge.  ELIDIA Mays notified.    Emilia Mcelroy, DION  [facility]  [phone]

## 2020-08-14 NOTE — DISCHARGE SUMMARY
[facility] Hospital Medicine  Discharge Summary      Patient Name: Anjum Cai Jr.  MRN: [id number]  Admission Date: 8/6/2020  Hospital Length of Stay: 8 days  Discharge Date and Time: 8/14/2020  2:44 PM  Attending Physician: Roderick Purvis MD   Discharging Provider: Roderick Purvis MD  Primary Care Provider: Gavin Martinez MD    Hospital Medicine Team: [facility] Roderick Purvis MD    HPI:        84 year old male with history of DM2, prostate cancer s/p radiation treatment, HTN, HLP and CAD s/p PCI with LISSETH to LAD and RCA (2007) followed by coronary angiogram in 2016 (LM 50% stenosis with FFR 0.87 and obstructive disease in OM/D2 (no intervention because of small size vessels) who presents today with acute onset substernal chest pain with radiation to the left arm. Patient reports this morning he woke up and had chest discomfort similar to his episodes when he had heart attacks in the past. The chest pain was constant and associated with shortness of breath. He denied having nausea, numbness/tingling of arm or sweating. He took SL nitro tablet at home and immediately called 911. When he was evaluated in the ER, his CP decreased but was still present. EKG was done that showed sinus rhythm, 1st degree AVB and iRBBB. He was given additional SL nitro and is CP free now. He states three weeks ago he has completed radiation therapy and have been having a wide spectrum of symptoms. He states he went to the grocery store couple days ago and was able to walk without having CP or SOB but when he arrived home was completely exhausted. Cardiology consulted for chest pain. Patient was taken to the cath lab for angiogram. Admitted to the CCU for further management of NSTEMI.      Patient was admitted for NSTEMI s/p LHC revealed severe stenosis of the proximal and ostial RCA s/p 3.5 x 38 LISSETH. WBC elevated at 18K. UA, CXR wnl. Blood culture was positive for Edwardsiella Tarda (exposure likely 2/2 to  fishing trip 2 weeks ago). ID was consulted. Initiated IV aztreonam. Concern for possible biliary involvement given elevated ALP. US Abd revealed mildly prominent CBD. AES consulted to eval possible biliary infection. Multiple hypoglycemic events in the CCU, insulin titrated down. Remain on DAPT for NSTEMI. Transferred to medicine 8/10.    Procedure(s) (LRB):  CATHETERIZATION, HEART, LEFT (Left)  Stent, Drug Eluting, Single Vessel, Coronary  ANGIOGRAM, CORONARY ARTERY (N/A)      Hospital Course:     NSTEMI s/p PCI  CAD  -Patient with EMA score of 4, patient clinical history consistent with angina  -EKG with <1mm ST elevation in leads III and AVF, +cardiac biomarker (trops 0.043-> 0.028)  -Loaded with ASA + Brillanta and systemic heparin  -S/p LHC revealed severe stenosis of the proximal and ostial RCA s/p 3.5 x 38 LISSETH.   -Continue DAPT x1yr, metoprolol, high intensity statin, ARB  -As per cardiology, do not hold DAPT for any procedure at this time  - Cardiac rehab  -Cardiology followup on discharge     Gram-negative bacteremia  Cholangitis   Leukocytosis - improving   - 2/4 bottles with Edwardsiella tarda   - ID consulted, appreciate recs, initially started on IV aztreonam  - Repeat cultures NGTD  - AUS with biliary dilation   - MRCP with cholangitis and choledocholithiasis  - GI ideally would like ERCP however will need interdisciplinary discussion with cardiology and ID regarding treatment plan considering his recent NSTEMI, DAPT and anesthesia requirement with high risk procedure.   - Cardiology consulted. apprec recs   - Discussed case with all consultants, and considering his need to continue DAPT, would ideally wait for 4 weeks from MI before ERCP. Okay from ID perspective to delay ERCP for 4 weeks.  He had a transient bacteremia discovered while working up source of leukocytosis noted on admission with MI.  His blood cultures cleared quickly and he has been afebrile, hemodynamically stable.    - Continue  ertapenem 1 gram IV q 24 hour X 14 days from first negative blood culture.  Estimated end date of IV abx  8/22.   - Monitor renal function closely.  If cr cl goes below 30, will need to change ertapenem to 500 mg q 24 hours.     - Weekly cbc, cmp, crp, esr and fax results to ID, attention Deny Ferguson NP, at fax [phone]. (Pager [phone], spectra [phone], office [phone])  - Plan on transition to oral Augmentin 875 mg twice daily X 14 days after completion of IV therapy until 9/5 or until ERCP.  Patient with reported vasculitis with Cefadroxil, but has tolerated Augmentin since that time (took 10 day course in April 2015).  - ID follow up at 14 days (can do virtual visit) and after ERCP.  ID will make the appointments.   - Overall stable however hypoglycemia overnight (ate 2 bites of dinner), and worsening leukocytosis today (8/12). Has intermittent RUQ, Right lower rib pain likely from cholangitis. If leukocytosis worsening, or clinically declines will need to consider more urgent ERCP. He continues to look non toxic at this time.  - Outpatient [facility] followup     Hypertension  - controlled, continue current meds     DM2  - Takes 65 units qhs, aspart 12 units with meals, SSI  - resumed in CCU, however hypoglycemia noted  - changed detemir 12 units daily and aspart 4 units TIDWM  - hypoglycemia protocol   - outpatient endocrinology followup     Hyponatremia- resolved   - mild continue to trend, improving   - suspecting nutritional at this time     CKD stage 3  - baseline Cr 1.6-1.3  - at baseline currently, continue to monitor  - note he had recent LHC and he is on ARB     Consults:   Consults (From admission, onward)        Status Ordering Provider     Inpatient consult to [facility] ([facility])  Once     Provider:  (Not yet assigned)    Completed OBI, KOYENUM     Inpatient consult to Cardiac Rehab  Once     Provider:  (Not yet assigned)    Completed OBI, KOYENUM     Inpatient consult to Cardiology  Once      Provider:  (Not yet assigned)    Completed JULIOCESAR SHAW     Inpatient consult to Cardiology  Once     Provider:  (Not yet assigned)    Completed SUSHIL GUERRIER     Inpatient consult to Infectious Diseases  Once     Provider:  (Not yet assigned)    Completed TULIO HAYDEN     Inpatient consult to Interventional Cardiology  Once     Provider:  (Not yet assigned)    Completed OBI, KOYENUM     [facility]  Once     Provider:  (Not yet assigned)    Completed SUSHIL GUERRIER     Inpatient consult to Registered Dietitian/Nutritionist  Once     Provider:  (Not yet assigned)    Completed OBI, KOYENUM     Inpatient consult to Social Work/Case Management  Once     Provider:  (Not yet assigned)    Acknowledged OBI, IVANNAYENUM          Final Active Diagnoses:    Diagnosis Date Noted POA    Elevated liver enzymes [R74.8]  Yes    Gram-negative bacteremia [R78.81] 08/08/2020 Yes    Essential hypertension [I10] 09/19/2016 Yes    Coronary artery disease involving native coronary artery of native heart without angina pectoris [I25.10] 08/28/2013 Yes    Dyslipidemia [E78.5]  Yes    Type 2 diabetes mellitus without retinopathy [E11.9] 11/30/2012 Yes      Problems Resolved During this Admission:    Diagnosis Date Noted Date Resolved POA    PRINCIPAL PROBLEM:  NSTEMI (non-ST elevated myocardial infarction) [I21.4] 12/10/2016 08/14/2020 Yes    Leukocytosis [D72.829] 08/07/2020 08/14/2020 No      Discharged Condition: good    Disposition: Home-Health Care c    Follow Up:    Patient Instructions:   No discharge procedures on file.  Medications:  Reconciled Home Medications:      Medication List      START taking these medications    amoxicillin-clavulanate 875-125mg 875-125 mg per tablet  Commonly known as: AUGMENTIN  Take 1 tablet by mouth 2 (two) times daily. for 14 days  Start taking on: August 22, 2020     * BRILINTA 90 mg tablet  Generic drug: ticagrelor  Take 1 tablet (90 mg total) by  mouth 2 (two) times daily.     * ticagrelor 90 mg tablet  Commonly known as: BRILINTA  Take 1 tablet (90 mg total) by mouth 2 (two) times daily.     ERTAPENEM (INVANZ) 1 G/100 ML NS (READY TO MIX)  Inject 100 mLs (1 g total) into the vein once daily. for 7 days     metoprolol tartrate 50 MG tablet  Commonly known as: LOPRESSOR  Take 1 tablet (50 mg total) by mouth 2 (two) times daily.         * This list has 2 medication(s) that are the same as other medications prescribed for you. Read the directions carefully, and ask your doctor or other care provider to review them with you.            CHANGE how you take these medications    aspirin 81 MG EC tablet  Commonly known as: ASPIR-81  Take 1 tablet (81 mg total) by mouth once daily.  What changed:   · medication strength  · how much to take  · when to take this     atorvastatin 80 MG tablet  Commonly known as: LIPITOR  Take 1 tablet (80 mg total) by mouth once daily.  What changed:   · medication strength  · how much to take     insulin aspart U-100 100 unit/mL (3 mL) Inpn pen  Commonly known as: NovoLOG Flexpen U-100 Insulin  ADMINISTER 4 UNITS UNDER THE SKIN THREE TIMES DAILY WITH MEALS PER SLIDING SCALE  What changed: additional instructions     LANTUS SOLOSTAR U-100 INSULIN glargine 100 units/mL (3mL) SubQ pen  Generic drug: insulin  ADMINISTER 12 UNITS UNDER THE SKIN EVERY EVENING  What changed: additional instructions        CONTINUE taking these medications    ACCU-CHEK SMARTVIEW TEST STRIP Strp  Generic drug: blood sugar diagnostic  CHECK BLOOD SUGARS FOUR TIMES DAILY BEFORE MEALS AS DIRECTED     benzonatate 100 MG capsule  Commonly known as: TESSALON PERLES  Take 1 capsule (100 mg total) by mouth every 6 (six) hours as needed for Cough.     CENTRUM SILVER ORAL  Take by mouth once daily.     * ciclopirox 0.77 % Gel  Apply topically 2 (two) times daily.     * ciclopirox 8 % Soln  Commonly known as: PENLAC  Apply topically nightly.     ergocalciferol 50,000 unit  "Cap  Commonly known as: ERGOCALCIFEROL  TAKE 1 CAPSULE BY MOUTH ONCE EVERY 30 DAYS     finasteride 5 mg tablet  Commonly known as: PROSCAR  TAKE 1 TABLET(5 MG) BY MOUTH EVERY DAY     FISH OIL 1,000 mg Cap  Generic drug: omega-3 fatty acids-vitamin E  Take 2 capsules by mouth Daily.     FLUAD 5637-5318 (65 YR UP)(PF) 45 mcg (15 mcg x 3)/0.5 mL Syrg  Generic drug: flu vac 2019 [id number](PF)     irbesartan 75 MG tablet  Commonly known as: AVAPRO  Take 1 tablet (75 mg total) by mouth once daily.     lancets Misc  Commonly known as: ACCU-CHEK FASTCLIX LANCET DRUM  Check blood sugars four times a day.     mometasone 0.1% 0.1 % cream  Commonly known as: ELOCON  Apply to affected area daily     nitroGLYCERIN 0.4 MG SL tablet  Commonly known as: NITROSTAT  Place 1 tablet (0.4 mg total) under the tongue every 5 (five) minutes as needed for Chest pain.     omeprazole 40 MG capsule  Commonly known as: PRILOSEC  Take 1 capsule (40 mg total) by mouth once daily.     pen needle, diabetic 31 gauge x 3/16" Ndle  Commonly known as: BD ULTRA-FINE MINI PEN NEEDLE  Use new needle with each injection. Inject insulin 4 times a day.         * This list has 2 medication(s) that are the same as other medications prescribed for you. Read the directions carefully, and ask your doctor or other care provider to review them with you.            STOP taking these medications    metoprolol succinate 50 MG 24 hr tablet  Commonly known as: TOPROL-XL            Significant Diagnostic Studies: Labs:   CMP   Recent Labs   Lab 08/13/20  0453 08/14/20  0332   * 134*   K 4.6 4.9    102   CO2 20* 22*   * 233*   BUN 22 26*   CREATININE 1.4 1.6*   CALCIUM 8.7 8.9   PROT 8.1 8.1   ALBUMIN 1.7* 1.6*   BILITOT 0.8 1.0   ALKPHOS 730* 655*   AST 95* 85*   ALT 53* 47*   ANIONGAP 9 10   ESTGFRAFRICA 53.7* 45.7*   EGFRNONAA 46.5* 39.5*   , CBC   Recent Labs   Lab 08/13/20  0453 08/14/20  0332   WBC 11.30 9.93   HGB 9.8* 9.8*   HCT 30.4* 30.5* "    269   , INR   Lab Results   Component Value Date    INR 1.1 08/06/2020    INR 1.0 02/24/2018    INR 1.0 12/09/2016   , Lipid Panel   Lab Results   Component Value Date    CHOL 122 08/06/2020    HDL 27 (L) 08/06/2020    LDLCALC 75.6 08/06/2020    TRIG 97 08/06/2020    CHOLHDL 22.1 08/06/2020   , Troponin No results for input(s): TROPONINI in the last 168 hours. and A1C:   Recent Labs   Lab 08/06/20  1415   HGBA1C 7.7*       Pending Diagnostic Studies:     None        Indwelling Lines/Drains at time of discharge:   Lines/Drains/Airways     None                 Time spent on the discharge of patient: 45 minutes  Patient was seen and examined on the date of discharge and determined to be suitable for discharge.         Roderick Purvis MD  Department of Hospital Medicine  [facility]

## 2020-08-14 NOTE — PROGRESS NOTES
Pt discharged to home by wheelchair via transport, accompanied by daughter. Belongings accounted for. IV and Telemetry removed. Discharge education provided, questions encouraged and answered. Pt verbalizes understanding.

## 2020-08-14 NOTE — PLAN OF CARE
[facility] educator met with patient and daughter to discuss IVABX to discharge home with. Patient will dc home with her support. Daughter will infuse medication. Educated daughter on S.A.S.[facility] procedure. S.A.S.[facility] mat provided.  Patient education checklist reviewed with daughter. Daughter is agreeable to dc home with infusion. Daughter is able to return demonstration of administration with example provided. Daughter flushed midline with saline and heparin without any issues and minimal cues. Daughter feels comfortable with infusion. Patient will dc home with ertapenem 1 GM IV q day for estimated dc date 08/22/2020 Dosing time is 10am. No extension placed to right 18 gauge midline.  [facility] will follow patient for weekly dressing changes and lab draws. Time allotted for questions. Blaise BRENNER and Emilia TRAVIS notified teaching has been completed.     Left message for Adelaida with [facility] to give report    Medication delivery will be made to home.     [facility]  Lefty Anguiano RN, Clinical Educator  Cell [phone]  Office [phone]  Fax [phone]

## 2020-08-14 NOTE — PLAN OF CARE
Referral received. Will run benefits and team will call patient to make sure he is agreeable. Once agreeable, Clinical Educator will teach patient. Will cont to follow.       [facility]  Lefty Anguiano RN, Clinical Educator  Cell [phone]  Office [phone]  Fax [phone]

## 2020-08-15 PROCEDURE — G0180 PR HOME HEALTH MD CERTIFICATION: ICD-10-PCS | Mod: ,,, | Performed by: INTERNAL MEDICINE

## 2020-08-15 PROCEDURE — G0180 MD CERTIFICATION HHA PATIENT: HCPCS | Mod: ,,, | Performed by: INTERNAL MEDICINE

## 2020-08-17 ENCOUNTER — LAB VISIT (OUTPATIENT)
Dept: LAB | Facility: HOSPITAL | Age: 85
End: 2020-08-17
Attending: INTERNAL MEDICINE
Payer: MEDICARE

## 2020-08-17 ENCOUNTER — PATIENT OUTREACH (OUTPATIENT)
Dept: ADMINISTRATIVE | Facility: CLINIC | Age: 85
End: 2020-08-17

## 2020-08-17 DIAGNOSIS — R78.81 BACTEREMIA: Primary | ICD-10-CM

## 2020-08-17 LAB
ALBUMIN SERPL BCP-MCNC: 1.7 G/DL (ref 3.5–5.2)
ALP SERPL-CCNC: 876 U/L (ref 55–135)
ALT SERPL W/O P-5'-P-CCNC: 60 U/L (ref 10–44)
ANION GAP SERPL CALC-SCNC: 11 MMOL/L (ref 8–16)
AST SERPL-CCNC: 115 U/L (ref 10–40)
BASOPHILS # BLD AUTO: 0.03 K/UL (ref 0–0.2)
BASOPHILS NFR BLD: 0.3 % (ref 0–1.9)
BILIRUB SERPL-MCNC: 1.2 MG/DL (ref 0.1–1)
BUN SERPL-MCNC: 29 MG/DL (ref 8–23)
CALCIUM SERPL-MCNC: 9.3 MG/DL (ref 8.7–10.5)
CHLORIDE SERPL-SCNC: 101 MMOL/L (ref 95–110)
CO2 SERPL-SCNC: 22 MMOL/L (ref 23–29)
CREAT SERPL-MCNC: 1.5 MG/DL (ref 0.5–1.4)
CRP SERPL-MCNC: 151.5 MG/L (ref 0–8.2)
DIFFERENTIAL METHOD: ABNORMAL
EOSINOPHIL # BLD AUTO: 0.1 K/UL (ref 0–0.5)
EOSINOPHIL NFR BLD: 0.6 % (ref 0–8)
ERYTHROCYTE [DISTWIDTH] IN BLOOD BY AUTOMATED COUNT: 16.1 % (ref 11.5–14.5)
ERYTHROCYTE [SEDIMENTATION RATE] IN BLOOD BY WESTERGREN METHOD: 84 MM/HR (ref 0–23)
EST. GFR  (AFRICAN AMERICAN): 49.4 ML/MIN/1.73 M^2
EST. GFR  (NON AFRICAN AMERICAN): 42.7 ML/MIN/1.73 M^2
GLUCOSE SERPL-MCNC: 203 MG/DL (ref 70–110)
HCT VFR BLD AUTO: 25.7 % (ref 40–54)
HGB BLD-MCNC: 8 G/DL (ref 14–18)
IMM GRANULOCYTES # BLD AUTO: 0.06 K/UL (ref 0–0.04)
IMM GRANULOCYTES NFR BLD AUTO: 0.5 % (ref 0–0.5)
LYMPHOCYTES # BLD AUTO: 0.8 K/UL (ref 1–4.8)
LYMPHOCYTES NFR BLD: 6.7 % (ref 18–48)
MCH RBC QN AUTO: 28.6 PG (ref 27–31)
MCHC RBC AUTO-ENTMCNC: 31.1 G/DL (ref 32–36)
MCV RBC AUTO: 92 FL (ref 82–98)
MONOCYTES # BLD AUTO: 1 K/UL (ref 0.3–1)
MONOCYTES NFR BLD: 8 % (ref 4–15)
NEUTROPHILS # BLD AUTO: 10 K/UL (ref 1.8–7.7)
NEUTROPHILS NFR BLD: 83.9 % (ref 38–73)
NRBC BLD-RTO: 0 /100 WBC
PLATELET # BLD AUTO: 243 K/UL (ref 150–350)
PMV BLD AUTO: 12.8 FL (ref 9.2–12.9)
POTASSIUM SERPL-SCNC: 4.6 MMOL/L (ref 3.5–5.1)
PROT SERPL-MCNC: 8.2 G/DL (ref 6–8.4)
RBC # BLD AUTO: 2.8 M/UL (ref 4.6–6.2)
SODIUM SERPL-SCNC: 134 MMOL/L (ref 136–145)
WBC # BLD AUTO: 11.95 K/UL (ref 3.9–12.7)

## 2020-08-17 PROCEDURE — 85652 RBC SED RATE AUTOMATED: CPT | Mod: HCNC

## 2020-08-17 PROCEDURE — 86140 C-REACTIVE PROTEIN: CPT | Mod: HCNC

## 2020-08-17 PROCEDURE — 85025 COMPLETE CBC W/AUTO DIFF WBC: CPT | Mod: HCNC

## 2020-08-17 PROCEDURE — 80053 COMPREHEN METABOLIC PANEL: CPT | Mod: HCNC

## 2020-08-17 NOTE — PATIENT INSTRUCTIONS
Discharge Instructions for Heart Attack  You have had a heart attack. Also known as acute myocardial infarction, or AMI, a heart attack occurs when a vessel supplying the heart with blood suddenly becomes blocked. Follow these guidelines for home care and lifestyle changes.  Home Care  Take your medications exactly as directed. Dont skip doses.  Remember that recovery after a heart attack takes time. Plan to rest for at least 4-8 weeks while you recover. Then return to normal activity when your doctor says its okay.  Ask your doctor about joining a heart rehabilitation program.  Tell your doctor if you are feeling depressed. Feelings of sadness are common after a heart attack, but it is important that you speak to someone if you are feeling overwhelmed by these feelings.  If you are having chest pain, call 911 for an ambulance. Do NOT drive yourself to the hospital.  Ask your family members to learn CPR.  Learn to take your own blood pressure and pulse. Keep a record of your results. Ask your doctor when you should seek emergency medical attention. He or she will tell you which blood pressure reading is dangerous.  Lifestyle Changes  Maintain a healthy weight. Get help to lose any extra pounds.  Cut back on salt.  Limit canned, dried, packaged, and fast foods.  Dont add salt to your food.  Season foods with herbs instead of salt when you cook.  Break the smoking habit. Enroll in a stop-smoking program to improve your chances of success.  Limit fatty foods.  Check your lipid levels regularly. (Your doctor can show you how to do this.)  Build up your activity according to your doctors recommendation.  Ask your doctor when its okay to resume sexual activity.  Tell your doctor about any erectile dysfunction (ED) medication you are taking. Some ED medications are not safe if you take certain heart medications.  Try to manage stress.  Follow-Up  Make a follow-up appointment as directed by our staff.    When to Seek  Medical Attention  Call 911 right away if you have:  Chest pain that is not relieved by medication.  Shortness of breath.  Otherwise, call your doctor immediately if you have:  Lightheadedness, dizziness, or fainting.  Feeling of irregular heartbeat or fast pulse.   © 0619-3360 [employer], [address] [place] [id number]. All rights reserved. This information is not intended as a substitute for professional medical care. Always follow your healthcare professional's instructions.

## 2020-08-18 ENCOUNTER — TELEPHONE (OUTPATIENT)
Dept: CARDIAC REHAB | Facility: CLINIC | Age: 85
End: 2020-08-18

## 2020-08-18 NOTE — TELEPHONE ENCOUNTER
Spoke to daughter and she stated that she believes the patient was not interested in cardiac rehab once before. In addition, pt is awaiting surgery for gallbladder stones. Patient is receiving physical therapy at home. Explained to daughter that he would need to be discharged from home health before attending cardiac rehab. Daughter also stated that the patient has appt on 8/25 with physician and will discuss cardiac rehab further then if he is interested.

## 2020-08-19 ENCOUNTER — TELEPHONE (OUTPATIENT)
Dept: INFECTIOUS DISEASES | Facility: HOSPITAL | Age: 85
End: 2020-08-19

## 2020-08-19 NOTE — TELEPHONE ENCOUNTER
Infectious Disease - Lab follow up   Labs 8/17    Dx: Edwardsiella tarda bacteremia, cholangitis  Antibiotics: Ertapenem 1 gram IV q 24 hours   Estimated End Date: 8/22    Discharged 8/14    [facility]    Weekly laboratory work up reviewed.     Date Discharge 8/14 8/17   H/H 9.8/30.5 8/25.7   Plts 269 243   WBC 9.93 11.9   BUN 26 29   Creat 1.6 1.5   AST 85 115   ALT 47 60   T bili 1.0 1.2    876   ESR  84   .2 151     On admit , AST85, ALT67 and T bili 1.9  Called patient and spoke with his daughter who is his caregiver.    She reports that today is the best day since discharge.   Feeling a bit stronger, a little bit more of appetite   No abdominal pain.  Taking Gas X twice a day and this is making a difference.   Doesn't want to eat protein.   No diarrhea.  No blood in stools or dark tarry stools.  No fevers, chills.  Daughter has been recording.   She is concerned with tolerance of oral medications   Taking protein drinks.    Weight is stable.    Doing PT at home.   Walked grocery for about 45 minutes.    Today did all ADLs and normal morning routine by himself.  First time since discharge.     Will repeat blood work tomorrow to trend.   Need to set up with outpatient GI/endoscopy follow up  Advised that if he develops worsening abdominal pain, nausea/vomiting, fevers, chills, she is to bring him to the ED.   I will extend his IV abx to follow up.   Given my contact information.

## 2020-08-20 ENCOUNTER — LAB VISIT (OUTPATIENT)
Dept: LAB | Facility: HOSPITAL | Age: 85
End: 2020-08-20
Attending: NURSE PRACTITIONER
Payer: MEDICARE

## 2020-08-20 DIAGNOSIS — R78.81 BACTEREMIA: Primary | ICD-10-CM

## 2020-08-20 LAB
ALBUMIN SERPL BCP-MCNC: 1.9 G/DL (ref 3.5–5.2)
ALP SERPL-CCNC: 1001 U/L (ref 55–135)
ALT SERPL W/O P-5'-P-CCNC: 66 U/L (ref 10–44)
ANION GAP SERPL CALC-SCNC: 12 MMOL/L (ref 8–16)
AST SERPL-CCNC: 137 U/L (ref 10–40)
BASOPHILS # BLD AUTO: 0.04 K/UL (ref 0–0.2)
BASOPHILS NFR BLD: 0.4 % (ref 0–1.9)
BILIRUB SERPL-MCNC: 0.8 MG/DL (ref 0.1–1)
BUN SERPL-MCNC: 28 MG/DL (ref 8–23)
CALCIUM SERPL-MCNC: 9.6 MG/DL (ref 8.7–10.5)
CHLORIDE SERPL-SCNC: 99 MMOL/L (ref 95–110)
CO2 SERPL-SCNC: 24 MMOL/L (ref 23–29)
CREAT SERPL-MCNC: 1.7 MG/DL (ref 0.5–1.4)
CRP SERPL-MCNC: 121.4 MG/L (ref 0–8.2)
DIFFERENTIAL METHOD: ABNORMAL
EOSINOPHIL # BLD AUTO: 0.1 K/UL (ref 0–0.5)
EOSINOPHIL NFR BLD: 0.6 % (ref 0–8)
ERYTHROCYTE [DISTWIDTH] IN BLOOD BY AUTOMATED COUNT: 16.1 % (ref 11.5–14.5)
ERYTHROCYTE [SEDIMENTATION RATE] IN BLOOD BY WESTERGREN METHOD: 77 MM/HR (ref 0–23)
EST. GFR  (AFRICAN AMERICAN): 42.5 ML/MIN/1.73 M^2
EST. GFR  (NON AFRICAN AMERICAN): 36.7 ML/MIN/1.73 M^2
GLUCOSE SERPL-MCNC: 221 MG/DL (ref 70–110)
HCT VFR BLD AUTO: 29.1 % (ref 40–54)
HGB BLD-MCNC: 8.9 G/DL (ref 14–18)
IMM GRANULOCYTES # BLD AUTO: 0.04 K/UL (ref 0–0.04)
IMM GRANULOCYTES NFR BLD AUTO: 0.4 % (ref 0–0.5)
LYMPHOCYTES # BLD AUTO: 1.1 K/UL (ref 1–4.8)
LYMPHOCYTES NFR BLD: 10 % (ref 18–48)
MCH RBC QN AUTO: 29 PG (ref 27–31)
MCHC RBC AUTO-ENTMCNC: 30.6 G/DL (ref 32–36)
MCV RBC AUTO: 95 FL (ref 82–98)
MONOCYTES # BLD AUTO: 0.9 K/UL (ref 0.3–1)
MONOCYTES NFR BLD: 8.7 % (ref 4–15)
NEUTROPHILS # BLD AUTO: 8.6 K/UL (ref 1.8–7.7)
NEUTROPHILS NFR BLD: 79.9 % (ref 38–73)
NRBC BLD-RTO: 0 /100 WBC
PLATELET # BLD AUTO: 276 K/UL (ref 150–350)
PMV BLD AUTO: 12.2 FL (ref 9.2–12.9)
POTASSIUM SERPL-SCNC: 5 MMOL/L (ref 3.5–5.1)
PROT SERPL-MCNC: 9 G/DL (ref 6–8.4)
RBC # BLD AUTO: 3.07 M/UL (ref 4.6–6.2)
SODIUM SERPL-SCNC: 135 MMOL/L (ref 136–145)
WBC # BLD AUTO: 10.7 K/UL (ref 3.9–12.7)

## 2020-08-20 PROCEDURE — 85652 RBC SED RATE AUTOMATED: CPT | Mod: HCNC

## 2020-08-20 PROCEDURE — 86140 C-REACTIVE PROTEIN: CPT | Mod: HCNC

## 2020-08-20 PROCEDURE — 80053 COMPREHEN METABOLIC PANEL: CPT | Mod: HCNC

## 2020-08-20 PROCEDURE — 85025 COMPLETE CBC W/AUTO DIFF WBC: CPT | Mod: HCNC

## 2020-08-21 ENCOUNTER — TELEPHONE (OUTPATIENT)
Dept: INFECTIOUS DISEASES | Facility: HOSPITAL | Age: 85
End: 2020-08-21

## 2020-08-21 ENCOUNTER — PATIENT MESSAGE (OUTPATIENT)
Dept: PODIATRY | Facility: CLINIC | Age: 85
End: 2020-08-21

## 2020-08-21 NOTE — TELEPHONE ENCOUNTER
LFTs trending up, though no leukocytosis and crp trending down.    Have called patient/patient's daughter.  No answer.  Left voicemail to call me to assess clinical status.    Discussed lab results with GI (Dr. Kinsey).  Will follow clinically. Patient had been previously advised to come to ED with fevers, worsening abdominal pain.  Phiilp get AES clinic follow up set up.      He has follow up with ID and Cards on 8/25.

## 2020-08-22 NOTE — PROGRESS NOTES
"Subjective:      Patient ID: Anjum Cai Jr. is a 84 y.o. male.    Chief Complaint:Follow-up (bacteremia/cholangitis )       Principal Problem:NSTEMI (non-ST elevated myocardial infarction)     HPI:      Mr. Anjum Cai is an 84 year old man with DM, prostate CA s/p radiation (completed therapy in May 2020), HTN, CAD (s/p PCI 2007), hx cholecystectomy 2018 and hx hernia repair with mesh 2018, who presented to ED on 8/6 with NSTEMI, s/p PCI to RCA.  Noted to have elevated WBC on 8/7 post procedure and blood cultures positive for Edwardsiella tarda.  Blood cultures cleared quickly (8/8).      ALP, AST, ALT were noted to be elevated and GGT also elevated.  Concerned for GI or hepatobiliary source of bacteremia.  Last colonoscopy 2015 with large polyps, recommended 6 months repeat but for variety of reasons was not done.  No reported history of underlying hepatobiliary disease.  Abd US 8/9 showed mild CBD prominence and mild intrahepatic biliary duct dilation.  Evaluated by GI and underwent MRCP/MRI 8/11 which was concerning for cholangitis.  ERCP with stent recommended, but in setting of recent ACS/dual antiplatelet therapy/need for anesthesia, procedure posed significant risks (bleeding, pancreatitis, adverse cardiac event).  Interdisciplinary discussions were held between ID, Hospital Medicine, AES, and Cardiology.   From Cardiovascular perspective, recommended waiting 4 weeks from MI before ERCP if possible.   GI and ID agreed so long as remained stable.  He was discharged with plan for 14 days of IV ertapenem with transition to oral Augmentin until ERCP/stent placement.  He is here today for follow up and is accompanied by his daughter.  End date 9/22, but I extended last week until follow up.      Transaminases have been trending up, though no leukocytosis or fevers.  Had discussed with GI last week. Advised to follow clinically.  Denies fevers, chills, sweats.  His appetite is poor ("everything tastes the " "same").  Daughter taking daily weights.  Had significant weight loss prior to admission, but weight now appears to be stable.  No nausea or vomiting.  Primary complaint is reflux and gas.   Taking PPI and gas -X.  Taking oral medications without problem.   Denies abdominal pain.  No diarrhea, some constipation, but having a bowel movement daily.   No further episodes with low blood sugars.  He is supplementing meals with protein drinks. Activity tolerance improved per daughter. Walked the perimeter of [place] yesterday with no SOB or having to sit.  Saw Cardiology, Dr. Shin, today who is concerned about stopping DAPT at 4 weeks for procedure.               Review of Systems   Constitution: Positive for decreased appetite, malaise/fatigue and weight loss. Negative for chills, fever, night sweats and weight gain.   HENT: Negative for congestion, ear pain, hearing loss, hoarse voice, sore throat and tinnitus.    Eyes: Negative for blurred vision, redness and visual disturbance.   Cardiovascular: Negative for chest pain, leg swelling and palpitations.   Respiratory: Positive for shortness of breath (with exertion). Negative for cough, hemoptysis, sputum production and wheezing.    Hematologic/Lymphatic: Negative for adenopathy. Bruises/bleeds easily.   Skin: Positive for dry skin. Negative for itching, rash and suspicious lesions.   Musculoskeletal: Negative for back pain, joint pain, myalgias and neck pain.   Gastrointestinal: Positive for constipation and heartburn. Negative for abdominal pain, diarrhea, nausea and vomiting.   Genitourinary: Negative for dysuria, flank pain, frequency, hematuria, hesitancy and urgency.   Neurological: Negative for dizziness, headaches, numbness, paresthesias and weakness.   Psychiatric/Behavioral: Negative for depression and memory loss. The patient does not have insomnia and is not nervous/anxious.    Allergic/Immunologic: Negative for environmental allergies, HIV exposure, hives and " persistent infections.     Objective:   Physical Exam  Vitals signs reviewed.   Constitutional:       General: He is not in acute distress.     Appearance: Normal appearance. He is not ill-appearing, toxic-appearing or diaphoretic.   HENT:      Head: Normocephalic and atraumatic.   Eyes:      General: No scleral icterus.     Conjunctiva/sclera: Conjunctivae normal.   Cardiovascular:      Rate and Rhythm: Normal rate and regular rhythm.      Heart sounds: Normal heart sounds.   Pulmonary:      Effort: Pulmonary effort is normal. No respiratory distress.      Breath sounds: Normal breath sounds. No wheezing or rales.   Abdominal:      General: There is no distension.      Palpations: Abdomen is soft.      Tenderness: There is no abdominal tenderness. There is no guarding.   Musculoskeletal:         General: No swelling or tenderness.   Skin:     General: Skin is warm and dry.      Findings: No rash.      Comments: Midline RUE - c/d/i   Neurological:      Mental Status: He is alert and oriented to person, place, and time.   Psychiatric:         Mood and Affect: Mood normal.         Behavior: Behavior normal.       Assessment:       1. Gram-negative bacteremia    2. Choledocholithiasis         84 year old man with DM, prostate CA s/p radiation (completed therapy in May 2020), HTN, CAD (s/p PCI 2007), hx cholecystectomy 2018 and hx hernia repair with mesh 2018, who is s/p NSTEMI on  8/6 , s/p PCI to RCA.  Noted to have elevated WBC on 8/7 post procedure and blood cultures positive for Edwardsiella tarda.  Blood cultures cleared quickly (8/8).  Bacteria noted to often be associated with hepatobiliary source.  Patient noted to have elevated transaminases and GGT during hospitalization. AES consulted and MRCP/MRI  8/11 which was concerning for choledocholithiasis/cholangitis.  ERCP with stent recommended, but in setting of recent ACS/dual antiplatelet therapy/need for anesthesia, procedure posed significant risks (bleeding,  pancreatitis, adverse cardiac event).  Interdisciplinary discussions were held between ID, Hospital Medicine, AES, and Cardiology and determined to wait 4 weeks from MI before ERCP if possible.        He has completed 17 days of IV ertapenem.  Currently without systemic signs of infection, afebrile.  Liver enzymes trending up and have communicated with GI.  No abdominal pain, n/v/d.  No leukocytosis and CRP has been trending down.       Plan:     1.  Stop the ertapenem today and pull midline   2.  Start Augmentin 875 mg twice daily until ERCP.  Patient is tolerating oral medications without problem. If ERCP delayed, patient's daughter instructed to call me before Augmentin ended so can refill.  He has taken augmentin before without adverse effect.   3.  Will monitor labs weekly.  Will schedule labs for Mondays for now here at [facility].   4.  Stressed that he is to go to ED for any worsening abdominal pain, nausea, vomiting, fevers, shaking chills or any other concerns.   5.  We will discuss with Dr. Shin and set up GI/AES follow up which is not yet scheduled.   6.  Follow up with ID to be determined pending procedure.

## 2020-08-24 ENCOUNTER — TELEPHONE (OUTPATIENT)
Dept: ENDOSCOPY | Facility: HOSPITAL | Age: 85
End: 2020-08-24

## 2020-08-24 ENCOUNTER — LAB VISIT (OUTPATIENT)
Dept: LAB | Facility: HOSPITAL | Age: 85
End: 2020-08-24
Attending: NURSE PRACTITIONER
Payer: MEDICARE

## 2020-08-24 DIAGNOSIS — R78.81 BACTEREMIA: Primary | ICD-10-CM

## 2020-08-24 LAB
ALBUMIN SERPL BCP-MCNC: 2 G/DL (ref 3.5–5.2)
ALP SERPL-CCNC: 1112 U/L (ref 55–135)
ALT SERPL W/O P-5'-P-CCNC: 89 U/L (ref 10–44)
ANION GAP SERPL CALC-SCNC: 11 MMOL/L (ref 8–16)
AST SERPL-CCNC: 190 U/L (ref 10–40)
BILIRUB SERPL-MCNC: 0.8 MG/DL (ref 0.1–1)
BUN SERPL-MCNC: 28 MG/DL (ref 8–23)
CALCIUM SERPL-MCNC: 9.5 MG/DL (ref 8.7–10.5)
CHLORIDE SERPL-SCNC: 102 MMOL/L (ref 95–110)
CO2 SERPL-SCNC: 25 MMOL/L (ref 23–29)
CREAT SERPL-MCNC: 1.5 MG/DL (ref 0.5–1.4)
CRP SERPL-MCNC: 85.6 MG/L (ref 0–8.2)
ERYTHROCYTE [DISTWIDTH] IN BLOOD BY AUTOMATED COUNT: 16 % (ref 11.5–14.5)
ERYTHROCYTE [SEDIMENTATION RATE] IN BLOOD BY WESTERGREN METHOD: 66 MM/HR (ref 0–23)
EST. GFR  (AFRICAN AMERICAN): 49.4 ML/MIN/1.73 M^2
EST. GFR  (NON AFRICAN AMERICAN): 42.7 ML/MIN/1.73 M^2
GLUCOSE SERPL-MCNC: 127 MG/DL (ref 70–110)
HCT VFR BLD AUTO: 30.3 % (ref 40–54)
HGB BLD-MCNC: 9.4 G/DL (ref 14–18)
MCH RBC QN AUTO: 29.8 PG (ref 27–31)
MCHC RBC AUTO-ENTMCNC: 31 G/DL (ref 32–36)
MCV RBC AUTO: 96 FL (ref 82–98)
PLATELET # BLD AUTO: 314 K/UL (ref 150–350)
PMV BLD AUTO: 12.7 FL (ref 9.2–12.9)
POTASSIUM SERPL-SCNC: 5.2 MMOL/L (ref 3.5–5.1)
PROT SERPL-MCNC: 9.3 G/DL (ref 6–8.4)
RBC # BLD AUTO: 3.15 M/UL (ref 4.6–6.2)
SODIUM SERPL-SCNC: 138 MMOL/L (ref 136–145)
WBC # BLD AUTO: 10.67 K/UL (ref 3.9–12.7)

## 2020-08-24 PROCEDURE — 85652 RBC SED RATE AUTOMATED: CPT | Mod: HCNC

## 2020-08-24 PROCEDURE — 85027 COMPLETE CBC AUTOMATED: CPT | Mod: HCNC

## 2020-08-24 PROCEDURE — 86140 C-REACTIVE PROTEIN: CPT | Mod: HCNC

## 2020-08-24 PROCEDURE — 80053 COMPREHEN METABOLIC PANEL: CPT | Mod: HCNC

## 2020-08-25 ENCOUNTER — INFUSION (OUTPATIENT)
Dept: INFECTIOUS DISEASES | Facility: HOSPITAL | Age: 85
End: 2020-08-25
Attending: RADIOLOGY
Payer: MEDICARE

## 2020-08-25 ENCOUNTER — TELEPHONE (OUTPATIENT)
Dept: CARDIOLOGY | Facility: CLINIC | Age: 85
End: 2020-08-25

## 2020-08-25 ENCOUNTER — OFFICE VISIT (OUTPATIENT)
Dept: CARDIOLOGY | Facility: CLINIC | Age: 85
End: 2020-08-25
Payer: MEDICARE

## 2020-08-25 ENCOUNTER — OFFICE VISIT (OUTPATIENT)
Dept: URGENT CARE | Facility: CLINIC | Age: 85
End: 2020-08-25
Payer: MEDICARE

## 2020-08-25 ENCOUNTER — OFFICE VISIT (OUTPATIENT)
Dept: INFECTIOUS DISEASES | Facility: CLINIC | Age: 85
End: 2020-08-25
Payer: MEDICARE

## 2020-08-25 ENCOUNTER — LAB VISIT (OUTPATIENT)
Dept: LAB | Facility: HOSPITAL | Age: 85
End: 2020-08-25
Attending: RADIOLOGY
Payer: MEDICARE

## 2020-08-25 VITALS
TEMPERATURE: 98 F | OXYGEN SATURATION: 98 % | RESPIRATION RATE: 18 BRPM | DIASTOLIC BLOOD PRESSURE: 64 MMHG | HEIGHT: 66 IN | SYSTOLIC BLOOD PRESSURE: 117 MMHG | BODY MASS INDEX: 27.32 KG/M2 | HEART RATE: 79 BPM | WEIGHT: 170 LBS

## 2020-08-25 VITALS
BODY MASS INDEX: 27.39 KG/M2 | HEART RATE: 76 BPM | SYSTOLIC BLOOD PRESSURE: 100 MMHG | TEMPERATURE: 98 F | DIASTOLIC BLOOD PRESSURE: 65 MMHG | HEIGHT: 66 IN | WEIGHT: 170.44 LBS

## 2020-08-25 VITALS
WEIGHT: 169.31 LBS | BODY MASS INDEX: 26.57 KG/M2 | HEART RATE: 74 BPM | HEIGHT: 67 IN | SYSTOLIC BLOOD PRESSURE: 116 MMHG | DIASTOLIC BLOOD PRESSURE: 56 MMHG

## 2020-08-25 DIAGNOSIS — E11.22 TYPE 2 DIABETES MELLITUS WITH STAGE 3 CHRONIC KIDNEY DISEASE, WITH LONG-TERM CURRENT USE OF INSULIN: ICD-10-CM

## 2020-08-25 DIAGNOSIS — R78.81 GRAM-NEGATIVE BACTEREMIA: Primary | ICD-10-CM

## 2020-08-25 DIAGNOSIS — I10 ESSENTIAL HYPERTENSION: ICD-10-CM

## 2020-08-25 DIAGNOSIS — Z79.02 ANTIPLATELET OR ANTITHROMBOTIC LONG-TERM USE: ICD-10-CM

## 2020-08-25 DIAGNOSIS — N18.30 TYPE 2 DIABETES MELLITUS WITH STAGE 3 CHRONIC KIDNEY DISEASE, WITH LONG-TERM CURRENT USE OF INSULIN: ICD-10-CM

## 2020-08-25 DIAGNOSIS — K80.50 CHOLEDOCHOLITHIASIS: ICD-10-CM

## 2020-08-25 DIAGNOSIS — Z79.4 TYPE 2 DIABETES MELLITUS WITH STAGE 3 CHRONIC KIDNEY DISEASE, WITH LONG-TERM CURRENT USE OF INSULIN: ICD-10-CM

## 2020-08-25 DIAGNOSIS — E78.2 MIXED HYPERLIPIDEMIA: ICD-10-CM

## 2020-08-25 DIAGNOSIS — R04.0 EPISTAXIS: Primary | ICD-10-CM

## 2020-08-25 DIAGNOSIS — I87.2 VENOUS INSUFFICIENCY: ICD-10-CM

## 2020-08-25 DIAGNOSIS — I25.10 CORONARY ARTERY DISEASE INVOLVING NATIVE CORONARY ARTERY OF NATIVE HEART WITHOUT ANGINA PECTORIS: Primary | ICD-10-CM

## 2020-08-25 DIAGNOSIS — I21.4 NSTEMI (NON-ST ELEVATION MYOCARDIAL INFARCTION): ICD-10-CM

## 2020-08-25 DIAGNOSIS — C61 PROSTATE CANCER: ICD-10-CM

## 2020-08-25 LAB
BILIRUB UR QL STRIP: POSITIVE
COMPLEXED PSA SERPL-MCNC: 2.5 NG/ML (ref 0–4)
GLUCOSE UR QL STRIP: NEGATIVE
KETONES UR QL STRIP: NEGATIVE
LEUKOCYTE ESTERASE UR QL STRIP: NEGATIVE
PH, POC UA: 6 (ref 5–8)
POC BLOOD, URINE: POSITIVE
POC NITRATES, URINE: NEGATIVE
PROT UR QL STRIP: POSITIVE
SP GR UR STRIP: 1.01 (ref 1–1.03)
UROBILINOGEN UR STRIP-ACNC: NORMAL (ref 0.3–2.2)

## 2020-08-25 PROCEDURE — 99999 PR PBB SHADOW E&M-EST. PATIENT-LVL V: ICD-10-PCS | Mod: PBBFAC,HCNC,, | Performed by: NURSE PRACTITIONER

## 2020-08-25 PROCEDURE — 36415 COLL VENOUS BLD VENIPUNCTURE: CPT | Mod: HCNC

## 2020-08-25 PROCEDURE — 99499 RISK ADDL DX/OHS AUDIT: ICD-10-PCS | Mod: S$GLB,,, | Performed by: NURSE PRACTITIONER

## 2020-08-25 PROCEDURE — 3074F SYST BP LT 130 MM HG: CPT | Mod: HCNC,CPTII,S$GLB, | Performed by: INTERNAL MEDICINE

## 2020-08-25 PROCEDURE — 1126F PR PAIN SEVERITY QUANTIFIED, NO PAIN PRESENT: ICD-10-PCS | Mod: HCNC,S$GLB,, | Performed by: NURSE PRACTITIONER

## 2020-08-25 PROCEDURE — 1159F MED LIST DOCD IN RCRD: CPT | Mod: HCNC,S$GLB,, | Performed by: NURSE PRACTITIONER

## 2020-08-25 PROCEDURE — 99999 PR PBB SHADOW E&M-EST. PATIENT-LVL V: ICD-10-PCS | Mod: PBBFAC,HCNC,, | Performed by: INTERNAL MEDICINE

## 2020-08-25 PROCEDURE — 99213 OFFICE O/P EST LOW 20 MIN: CPT | Mod: HCNC,S$GLB,, | Performed by: NURSE PRACTITIONER

## 2020-08-25 PROCEDURE — 3078F PR MOST RECENT DIASTOLIC BLOOD PRESSURE < 80 MM HG: ICD-10-PCS | Mod: HCNC,CPTII,S$GLB, | Performed by: NURSE PRACTITIONER

## 2020-08-25 PROCEDURE — 1101F PR PT FALLS ASSESS DOC 0-1 FALLS W/OUT INJ PAST YR: ICD-10-PCS | Mod: HCNC,CPTII,S$GLB, | Performed by: INTERNAL MEDICINE

## 2020-08-25 PROCEDURE — 3078F DIAST BP <80 MM HG: CPT | Mod: HCNC,CPTII,S$GLB, | Performed by: NURSE PRACTITIONER

## 2020-08-25 PROCEDURE — 1126F PR PAIN SEVERITY QUANTIFIED, NO PAIN PRESENT: ICD-10-PCS | Mod: HCNC,S$GLB,, | Performed by: INTERNAL MEDICINE

## 2020-08-25 PROCEDURE — 1159F PR MEDICATION LIST DOCUMENTED IN MEDICAL RECORD: ICD-10-PCS | Mod: HCNC,S$GLB,, | Performed by: NURSE PRACTITIONER

## 2020-08-25 PROCEDURE — 1159F PR MEDICATION LIST DOCUMENTED IN MEDICAL RECORD: ICD-10-PCS | Mod: HCNC,S$GLB,, | Performed by: INTERNAL MEDICINE

## 2020-08-25 PROCEDURE — 99999 PR PBB SHADOW E&M-EST. PATIENT-LVL V: CPT | Mod: PBBFAC,HCNC,, | Performed by: NURSE PRACTITIONER

## 2020-08-25 PROCEDURE — 1126F AMNT PAIN NOTED NONE PRSNT: CPT | Mod: HCNC,S$GLB,, | Performed by: NURSE PRACTITIONER

## 2020-08-25 PROCEDURE — 3074F PR MOST RECENT SYSTOLIC BLOOD PRESSURE < 130 MM HG: ICD-10-PCS | Mod: HCNC,CPTII,S$GLB, | Performed by: NURSE PRACTITIONER

## 2020-08-25 PROCEDURE — 3051F PR MOST RECENT HEMOGLOBIN A1C LEVEL 7.0 - < 8.0%: ICD-10-PCS | Mod: HCNC,CPTII,S$GLB, | Performed by: INTERNAL MEDICINE

## 2020-08-25 PROCEDURE — 99499 RISK ADDL DX/OHS AUDIT: ICD-10-PCS | Mod: S$GLB,,, | Performed by: INTERNAL MEDICINE

## 2020-08-25 PROCEDURE — 99999 PR PBB SHADOW E&M-EST. PATIENT-LVL V: CPT | Mod: PBBFAC,HCNC,, | Performed by: INTERNAL MEDICINE

## 2020-08-25 PROCEDURE — 3078F DIAST BP <80 MM HG: CPT | Mod: HCNC,CPTII,S$GLB, | Performed by: INTERNAL MEDICINE

## 2020-08-25 PROCEDURE — 99499 UNLISTED E&M SERVICE: CPT | Mod: S$GLB,,, | Performed by: NURSE PRACTITIONER

## 2020-08-25 PROCEDURE — 99214 OFFICE O/P EST MOD 30 MIN: CPT | Mod: HCNC,S$GLB,, | Performed by: INTERNAL MEDICINE

## 2020-08-25 PROCEDURE — 1159F MED LIST DOCD IN RCRD: CPT | Mod: HCNC,S$GLB,, | Performed by: INTERNAL MEDICINE

## 2020-08-25 PROCEDURE — 99214 PR OFFICE/OUTPT VISIT, EST, LEVL IV, 30-39 MIN: ICD-10-PCS | Mod: HCNC,S$GLB,, | Performed by: INTERNAL MEDICINE

## 2020-08-25 PROCEDURE — 1101F PT FALLS ASSESS-DOCD LE1/YR: CPT | Mod: HCNC,CPTII,S$GLB, | Performed by: INTERNAL MEDICINE

## 2020-08-25 PROCEDURE — 3074F PR MOST RECENT SYSTOLIC BLOOD PRESSURE < 130 MM HG: ICD-10-PCS | Mod: HCNC,CPTII,S$GLB, | Performed by: INTERNAL MEDICINE

## 2020-08-25 PROCEDURE — 84153 ASSAY OF PSA TOTAL: CPT | Mod: HCNC

## 2020-08-25 PROCEDURE — 3074F SYST BP LT 130 MM HG: CPT | Mod: HCNC,CPTII,S$GLB, | Performed by: NURSE PRACTITIONER

## 2020-08-25 PROCEDURE — 99213 PR OFFICE/OUTPT VISIT, EST, LEVL III, 20-29 MIN: ICD-10-PCS | Mod: HCNC,S$GLB,, | Performed by: NURSE PRACTITIONER

## 2020-08-25 PROCEDURE — 99499 UNLISTED E&M SERVICE: CPT | Mod: S$GLB,,, | Performed by: INTERNAL MEDICINE

## 2020-08-25 PROCEDURE — 99213 OFFICE O/P EST LOW 20 MIN: CPT | Mod: S$GLB,,, | Performed by: NURSE PRACTITIONER

## 2020-08-25 PROCEDURE — 1126F AMNT PAIN NOTED NONE PRSNT: CPT | Mod: HCNC,S$GLB,, | Performed by: INTERNAL MEDICINE

## 2020-08-25 PROCEDURE — 1101F PT FALLS ASSESS-DOCD LE1/YR: CPT | Mod: HCNC,CPTII,S$GLB, | Performed by: NURSE PRACTITIONER

## 2020-08-25 PROCEDURE — 3051F HG A1C>EQUAL 7.0%<8.0%: CPT | Mod: HCNC,CPTII,S$GLB, | Performed by: INTERNAL MEDICINE

## 2020-08-25 PROCEDURE — 99213 PR OFFICE/OUTPT VISIT, EST, LEVL III, 20-29 MIN: ICD-10-PCS | Mod: S$GLB,,, | Performed by: NURSE PRACTITIONER

## 2020-08-25 PROCEDURE — 3078F PR MOST RECENT DIASTOLIC BLOOD PRESSURE < 80 MM HG: ICD-10-PCS | Mod: HCNC,CPTII,S$GLB, | Performed by: INTERNAL MEDICINE

## 2020-08-25 PROCEDURE — 1101F PR PT FALLS ASSESS DOC 0-1 FALLS W/OUT INJ PAST YR: ICD-10-PCS | Mod: HCNC,CPTII,S$GLB, | Performed by: NURSE PRACTITIONER

## 2020-08-25 NOTE — PATIENT INSTRUCTIONS
Return to Urgent Care or go to ER if symptoms worsen or fail to improve.  Follow up with PCP/Cardiology tomorrow as recommended for further management.   If nose bleeding starts and cannot be controlled in 10-15 minutes, go to ER for further evaluation.       Nosebleed  The skin inside your nose is fragile and filled with blood vessels. That's why even a slight injury to your nose sometimes may cause bleeding. Hard nose blowing, dry winter air, colds, and nose-picking can also cause nosebleeds. Medicines such as warfarin, aspirin, and other blood thinners can make it more likely to have a nosebleed that is difficult to stop. Normally, nosebleeds aren't a cause for concern. But in some cases, they can mean that you have a more serious health problem. Know when to seek medical care for a nosebleed.  When to go to the emergency room (ER)  Most nosebleeds arent a medical emergency. In fact, you often can treat them yourself. But see your healthcare provider if you have nosebleeds often. And seek care right away if you:  · Have a head injury  · Have bleeding that lasts more than 15 to 30 minutes or is severe  · Feel weak or faint  · Have trouble breathing  What to expect in the ER  · You will be examined and may have blood tests.  · You may be given medicated nose drops to stop the nosebleed.  · The doctor may pack gauze into your nose to put pressure on the vessel and help stop bleeding.  · The bleeding vessel may be cauterized. During this procedure, the vessel is burned with an electrical device or chemical. Your nose is first numbed so you wont feel any pain.  · In rare cases, you may need surgery to control the bleeding.  Home care for a nosebleed  · Don't blow your nose for 12 hours after the bleeding stops. This will allow a strong blood clot to form. Don't pick your nose. This may restart bleeding.  · Don't drink alcohol or hot liquids for the next 2 days. Alcohol and hot liquids can dilate blood vessels in  your nose. This can cause bleeding to start again.  · Don't take ibuprofen, naproxen, or medicines that contain aspirin. These thin the blood and may cause your nose to bleed. You may take acetaminophen for pain, unless another pain medicine was prescribed.  · If the bleeding starts again, sit up and lean forward to prevent swallowing blood. Pinch your nose tightly on both sides for 10 to 15 minutes. Time yourself. Dont release the pressure on your nose until 10 minutes is up. If bleeding doesn't stop, continue to pinch your nose. Call your healthcare provider.  · If you have a cold, allergies, or dry nasal membranes, lubricate the nasal passages. Apply a small amount of petroleum jelly inside the nose with a cotton swab twice a day (morning and night).  · Don't overheat your home. This can dry the air and make your condition worse.  · Put a humidifier in the room where you sleep. This will add moisture to the air.  · Use a saline nasal spray to keep nasal passages moist.  · Don't pick your nose. Keep fingernails trimmed to decrease risk of bleeds.  · Don't smoke.  · Follow all other home care instructions from your healthcare provider.  · Call your healthcare provider if you have any questions or concerns.  Date Last Reviewed: 10/1/2016  © 5048-9438 [employer]. [address] [place] [address], [place] [address]. All rights reserved. This information is not intended as a substitute for professional medical care. Always follow your healthcare professional's instructions.        Ticagrelor oral tablet  What is this medicine?  TICAGRELOR (JASPER ka GREL or) helps to prevent blood clots. This medicine is used to prevent heart attack, stroke, or other vascular events in people who have had a recent heart attack or who have severe chest pain.  How should I use this medicine?  Take this medicine by mouth with a glass of water. Follow the directions on the prescription label. You can take it with or without food. If it  upsets your stomach, take it with food. Take your medicine at regular intervals. Do not take it more often than directed. Do not stop taking except on your doctor's advice.  Talk to you pediatrician regarding the use of this medicine in children. Special care may be needed.  What side effects may I notice from receiving this medicine?  Side effects that you should report to your doctor or health care professional as soon as possible:  · allergic reactions like skin rash, itching or hives, swelling of the face, lips, or tongue  · breathing problems  · fast or irregular heartbeat  · feeling faint or light-headed, falls  · signs and symptoms of bleeding such as bloody or black, tarry stools; red or dark-brown urine; spitting up blood or brown material that looks like coffee grounds; red spots on the skin; unusual bruising or bleeding from the eye, gums, or nose  Side effects that usually do not require medical attention (Report these to your doctor or health care professional if they continue or are bothersome.):  · breast enlargement in both males and females  · diarrhea  · dizziness  · headache  · tiredness  · upset stomach  What may interact with this medicine?  · certain antibiotics like clarithromycin and telithromycin  · certain medicines for fungal infections like itraconazole, ketoconazole, and voriconazole  · certain medicines for HIV infection like atazanavir, indinavir, nelfinavir, ritonavir, and saquinavir  · certain medicines for seizures like carbamazepine, phenobarbital, and phenytoin  · certain medicines that treat or prevent blood clots like warfarin  · dexamethasone  · digoxin  · lovastatin  · nefazodone  · rifampin  · simvastatin  What if I miss a dose?  If you miss a dose, take it as soon as you can. If it is almost time for your next dose, take only that dose. Do not take double or extra doses.  Where should I keep my medicine?  Keep out of the reach of children.  Store at room temperature of 59 to  86 degrees F (15 to 30 degrees C). Throw away any unused medicine after the expiration date.  What should I tell my health care provider before I take this medicine?  They need to know if you have any of these conditions:  · bleeding disorders  · bleeding in the brain  · having surgery  · history of irregular heartbeat  · history of stomach bleeding  · liver disease  · an unusual or allergic reaction to ticagrelor, other medicines, foods, dyes, or preservatives  · pregnant or trying to get pregnant  · breast-feeding  What should I watch for while using this medicine?  Visit your doctor or health care professional for regular check ups. Do not stop taking you medicine unless your doctor tells you to.  Notify your doctor or health care professional and seek emergency treatment if you develop breathing problems; changes in vision; chest pain; severe, sudden headache; pain, swelling, warmth in the leg; trouble speaking; sudden numbness or weakness of the face, arm, or leg. These can be signs that your condition has gotten worse.  If you are going to have surgery or dental work, tell your doctor or health care professional that you are taking this medicine.  You should take aspirin every day with this medicine. Do not take more than 100 mg each day. Talk to your doctor if you have questions.  NOTE:This sheet is a summary. It may not cover all possible information. If you have questions about this medicine, talk to your doctor, pharmacist, or health care provider. Copyright© 2017 [employer]

## 2020-08-25 NOTE — PROGRESS NOTES
Midline removed from [place]. Midline catheter tip visualized and intact. Pressure dressing applied with Vaseline gauze, 2x2 gauze and Coban. No redness, ecchymosis, edema, swelling, or drainage noted at site. Pt left in NAD. Instructions provided on post midline discharge care, including followup notification instructions.

## 2020-08-25 NOTE — PATIENT INSTRUCTIONS
1.  Stop the ertapenem today and pull midline   2.  Start Augmentin 875 mg twice daily.  3.  Will monitor labs weekly.  Will schedule labs for Mondays for now here at [facility].   4.  Go to ED for any worsening abdominal pain, nausea, vomiting, fevers, shaking chills or any other concerns.   5.  Call me with any concerns.    6.  We will discuss with Dr. Shin.  I will get GI follow up set up.    7.  Call me if we have not made a plan before running of of the Augmentin so I can refill.

## 2020-08-25 NOTE — TELEPHONE ENCOUNTER
[facility] nurse [place] calling to report advised therapist at patient home to advise patient to go to urgent care or ER today due to nosebleed. Reports therapist noted patient nosebleed and unable to control after approx 5min or more. Routed to Dr. Shin as WU.

## 2020-08-25 NOTE — PROGRESS NOTES
Subjective:    Patient ID:  Anjum Cai Jr. is a 84 y.o. male who presents for follow-up of CAD    HPI     The patient is a 84 year old male with CAD poat PCI 2007, hypertension, diabetes, hyperlipidemia, DAMIAN and CKD 3. He was admitted 8/6/20 with a NSTEMI and had a PCI to RCA.  During his hospitalizing and since he has been having reflux. She was started on radiation Tx  for prostate cancer in May has lost his appetite and lost 25#. He has some MYERS but no exertional chest pain. He is followed by GI with a bile duct stone occlusion but don't have access to those Epic records at this time.        Findings: 8/6/20     1. Coronary dominance: Right  2. The left main coronary artery (LMCA) has luminal irregularities. It gives origin to the left anterior descending (LAD) and left circumflex (LCx) arteries. There is no ramus intermedius. There is EMA 3 flow.  3. The LAD has luminal irregularities. It is diffusely diseased in its distal and apical segment. It gives origin to two large diagonal branch(es). There is EMA 3 flow. D1 is occl;uded at the ostium. There is a patent mid LAD stent.   4. The LCx has luminal irregularities. It gives origin to one large obtuse marginal branch(es). There is EMA 3 flow.  5. The RCA has a proximal 90% stenoisis. It gives origin to the posterior descending artery and the posterolateral branch.There is EMA 3 flow.      6. LVEDP 20     Intervention  7. Vessel: Proximal RCA. Pre intervention stenosis 90%, EMA flow 2. Post-intervention stenosis 0%, EMA flow 3. Stent(s): 3.5X38 mm LISSETH. Intervention was successful       Summary:     1. Three vessel CAD  2. Successful PCI with LISSETH of proximal RCA    Conclusion 8/7/20    · Normal left ventricular systolic function. The estimated ejection fraction is 63%.  · Concentric left ventricular remodeling.  · No wall motion abnormalities.  · Normal LV diastolic function.  · Normal right ventricular systolic function.  · Normal central venous  pressure (3 mmHg).  · The ascending aorta is mildly dilated.             Lab Results   Component Value Date     08/24/2020    K 5.2 (H) 08/24/2020     08/24/2020    CO2 25 08/24/2020    BUN 28 (H) 08/24/2020    CREATININE 1.5 (H) 08/24/2020     (H) 08/24/2020    HGBA1C 7.7 (H) 08/06/2020    MG 2.2 08/14/2020     (H) 08/24/2020    ALT 89 (H) 08/24/2020    ALBUMIN 2.0 (L) 08/24/2020    PROT 9.3 (H) 08/24/2020    BILITOT 0.8 08/24/2020    WBC 10.67 08/24/2020    HGB 9.4 (L) 08/24/2020    HCT 30.3 (L) 08/24/2020    MCV 96 08/24/2020     08/24/2020    INR 1.1 08/06/2020    PSA 10.06 (H) 02/26/2013    TSH 1.336 10/16/2013         Lab Results   Component Value Date    CHOL 122 08/06/2020    HDL 27 (L) 08/06/2020    TRIG 97 08/06/2020       Lab Results   Component Value Date    LDLCALC 75.6 08/06/2020       Past Medical History:   Diagnosis Date    Anemia 2/26/2018    Arthritis     BPH (benign prostatic hyperplasia)     Cataract     right     Colon polyp 11/18/2015    Colon polyps     Coronary artery disease     DR (diabetic retinopathy)     Dyslipidemia     Elevated PSA     Goiter, nontoxic, multinodular     Headaches, cluster     Hypertension     Kidney stones     Lump in the testicle     Left    Prostate cancer     Rotator cuff tendinitis     Right shoulder    Sleep apnea with use of continuous positive airway pressure (CPAP)     uses cpap at night    Type II or unspecified type diabetes mellitus with other specified manifestations, uncontrolled        Current Outpatient Medications:     ACCU-CHEK SMARTVIEW TEST STRIP Strp, CHECK BLOOD SUGARS FOUR TIMES DAILY BEFORE MEALS AS DIRECTED, Disp: 400 strip, Rfl: 3    aspirin (ASPIR-81) 81 MG EC tablet, Take 1 tablet (81 mg total) by mouth once daily., Disp: 90 tablet, Rfl: 3    atorvastatin (LIPITOR) 80 MG tablet, Take 1 tablet (80 mg total) by mouth once daily., Disp: 90 tablet, Rfl: 3    ciclopirox (PENLAC) 8 % Soln,  "Apply topically nightly., Disp: 6.6 mL, Rfl: 11    ciclopirox 0.77 % Gel, Apply topically 2 (two) times daily., Disp: 100 g, Rfl: 3    ergocalciferol (ERGOCALCIFEROL) 50,000 unit Cap, TAKE 1 CAPSULE BY MOUTH ONCE EVERY 30 DAYS, Disp: 4 capsule, Rfl: 3    finasteride (PROSCAR) 5 mg tablet, TAKE 1 TABLET(5 MG) BY MOUTH EVERY DAY, Disp: 90 tablet, Rfl: 3    FLUAD [id number], 65 YR UP,,PF, 45 mcg (15 mcg x 3)/0.5 mL Syrg, , Disp: , Rfl:     insulin (LANTUS SOLOSTAR U-100 INSULIN) glargine 100 units/mL (3mL) SubQ pen, ADMINISTER 12 UNITS UNDER THE SKIN EVERY EVENING, Disp: 60 mL, Rfl: 3    insulin aspart U-100 (NOVOLOG FLEXPEN U-100 INSULIN) 100 unit/mL (3 mL) InPn pen, ADMINISTER 4 UNITS UNDER THE SKIN THREE TIMES DAILY WITH MEALS PER SLIDING SCALE, Disp: 30 mL, Rfl: 3    irbesartan (AVAPRO) 75 MG tablet, Take 1 tablet (75 mg total) by mouth once daily., Disp: 30 tablet, Rfl: 6    lancets (ACCU-CHEK FASTCLIX LANCET DRUM) Misc, Check blood sugars four times a day., Disp: 204 each, Rfl: PRN    metoprolol tartrate (LOPRESSOR) 50 MG tablet, Take 1 tablet (50 mg total) by mouth 2 (two) times daily., Disp: 60 tablet, Rfl: 11    mometasone 0.1% (ELOCON) 0.1 % cream, Apply to affected area daily, Disp: 45 g, Rfl: 1    MULTIVITAMIN W-MINERALS/LUTEIN (CENTRUM SILVER ORAL), Take by mouth once daily., Disp: , Rfl:     nitroGLYCERIN (NITROSTAT) 0.4 MG SL tablet, Place 1 tablet (0.4 mg total) under the tongue every 5 (five) minutes as needed for Chest pain., Disp: 60 tablet, Rfl: 12    omega-3 fatty acids-vitamin E (FISH OIL) 1,000 mg Cap, Take 2 capsules by mouth Daily. , Disp: , Rfl:     omeprazole (PRILOSEC) 40 MG capsule, Take 1 capsule (40 mg total) by mouth once daily., Disp: 90 capsule, Rfl: 3    pen needle, diabetic (BD ULTRA-FINE MINI PEN NEEDLE) 31 gauge x 3/16" Ndle, Use new needle with each injection. Inject insulin 4 times a day., Disp: 100 each, Rfl: 3    ticagrelor (BRILINTA) 90 mg tablet, Take 1 tablet " (90 mg total) by mouth 2 (two) times daily., Disp: 60 tablet, Rfl: 11    ticagrelor (BRILINTA) 90 mg tablet, Take 1 tablet (90 mg total) by mouth 2 (two) times daily., Disp: 60 tablet, Rfl: 11    amoxicillin-clavulanate 875-125mg (AUGMENTIN) 875-125 mg per tablet, Take 1 tablet by mouth 2 (two) times daily. for 14 days (Patient not taking: Reported on 8/25/2020), Disp: 28 tablet, Rfl: 0    benzonatate (TESSALON PERLES) 100 MG capsule, Take 1 capsule (100 mg total) by mouth every 6 (six) hours as needed for Cough., Disp: 30 capsule, Rfl: 1          Review of Systems   Constitution: Positive for decreased appetite and weight loss. Negative for diaphoresis, fever, malaise/fatigue and weight gain.   HENT: Negative for congestion, ear discharge, ear pain and nosebleeds.    Eyes: Negative for blurred vision, double vision and visual disturbance.   Cardiovascular: Negative for chest pain, claudication, cyanosis, dyspnea on exertion, irregular heartbeat, leg swelling, near-syncope, orthopnea, palpitations, paroxysmal nocturnal dyspnea and syncope.   Respiratory: Negative for cough, hemoptysis, shortness of breath, sleep disturbances due to breathing, snoring, sputum production and wheezing.    Endocrine: Negative for polydipsia, polyphagia and polyuria.   Hematologic/Lymphatic: Negative for adenopathy and bleeding problem. Does not bruise/bleed easily.   Skin: Negative for color change, nail changes, poor wound healing and rash.   Musculoskeletal: Negative for muscle cramps and muscle weakness.   Gastrointestinal: Positive for bloating and heartburn. Negative for abdominal pain, anorexia, change in bowel habit, hematochezia, nausea and vomiting.   Genitourinary: Negative for dysuria, frequency and hematuria.   Neurological: Negative for brief paralysis, difficulty with concentration, excessive daytime sleepiness, dizziness, focal weakness, headaches, light-headedness, seizures, vertigo and weakness.  "  Psychiatric/Behavioral: Negative for altered mental status and depression.   Allergic/Immunologic: Negative for persistent infections.        Objective:BP (!) 116/56 (BP Location: Left arm, Patient Position: Sitting, BP Method: Medium (Automatic))   Pulse 74   Ht 5' 6.5" (1.689 m)   Wt 76.8 kg (169 lb 5 oz)   BMI 26.92 kg/m²             Physical Exam   Constitutional: He is oriented to person, place, and time. He appears well-developed and well-nourished.   HENT:   Head: Normocephalic.   Right Ear: External ear normal.   Left Ear: External ear normal.   Nose: Nose normal.   Inspection of lips, teeth and gums normal   Eyes: Pupils are equal, round, and reactive to light. EOM are normal. No scleral icterus.   Neck: Normal range of motion. Neck supple. No JVD present. No tracheal deviation present. No thyromegaly present.   Cardiovascular: Normal rate, regular rhythm and intact distal pulses. Exam reveals no gallop and no friction rub.   No murmur heard.  Pulses:       Femoral pulses are 2+ on the right side and 2+ on the left side.       Dorsalis pedis pulses are 0 on the right side and 0 on the left side.        Posterior tibial pulses are 0 on the right side and 0 on the left side.   Pulmonary/Chest: Effort normal and breath sounds normal.   Abdominal: Bowel sounds are normal. He exhibits no distension. There is no hepatosplenomegaly. There is no abdominal tenderness. There is no guarding.   Musculoskeletal: Normal range of motion.         General: No tenderness or edema.   Lymphadenopathy:   Palpation of neck and groin lymph nodes normal   Neurological: He is alert and oriented to person, place, and time. No cranial nerve deficit. He exhibits normal muscle tone. Coordination normal.   Skin: Skin is dry.   Palpation of skin normal   Psychiatric: His behavior is normal. Judgment and thought content normal.         Assessment:       1. Coronary artery disease involving native coronary artery of native heart " without angina pectoris    2. NSTEMI (non-ST elevation myocardial infarction)    3. Essential hypertension    4. Mixed hyperlipidemia    5. Venous insufficiency    6. Type 2 diabetes mellitus with stage 3 chronic kidney disease, with long-term current use of insulin         Plan:       Anjum was seen today for nstemi (non-st elevated myocardial infarction).    Diagnoses and all orders for this visit:    Coronary artery disease involving native coronary artery of native heart without angina pectoris    NSTEMI (non-ST elevation myocardial infarction)  -     Cardiac Rehab Phase II; Future; Expected date: 09/25/2020    Essential hypertension    Mixed hyperlipidemia    Venous insufficiency    Type 2 diabetes mellitus with stage 3 chronic kidney disease, with long-term current use of insulin

## 2020-08-26 ENCOUNTER — TELEPHONE (OUTPATIENT)
Dept: INFECTIOUS DISEASES | Facility: CLINIC | Age: 85
End: 2020-08-26

## 2020-08-26 NOTE — TELEPHONE ENCOUNTER
Spoke to patient's daughter.   He went to urgent care. Nosebleed resolved.   No recurrence today.

## 2020-08-26 NOTE — TELEPHONE ENCOUNTER
Discussed case with Dr. Shin who will discuss issues pertaining to timing of ERCP and interrupting DAPT therapy with Interventional Cards.  Working on setting up GI follow up in the next 7 days.

## 2020-08-26 NOTE — TELEPHONE ENCOUNTER
----- Message from Lea Herman MA sent at 8/25/2020  5:03 PM CDT -----  Contact: Maria Elena   tel:  [phone]  fy  ----- Message -----  From: Gail Vega  Sent: 8/25/2020   4:55 PM CDT  To: Marty Castellanos Staff    Caller:  Daughter :  Maria Elena   tel:  [phone]   .   Pt.  Went to [facility] due to nose bleed.   Caller says when pt. Went to P.T. he started with a nose bleed, after just sitting and talking to the nurse.   Wanted to make you aware of this.   Doing better now.   Had to have a lab done and he has blood in his urine.

## 2020-08-27 ENCOUNTER — EXTERNAL HOME HEALTH (OUTPATIENT)
Dept: HOME HEALTH SERVICES | Facility: HOSPITAL | Age: 85
End: 2020-08-27
Payer: MEDICARE

## 2020-08-29 ENCOUNTER — TELEPHONE (OUTPATIENT)
Dept: ENDOSCOPY | Facility: HOSPITAL | Age: 85
End: 2020-08-29

## 2020-08-31 ENCOUNTER — LAB VISIT (OUTPATIENT)
Dept: LAB | Facility: HOSPITAL | Age: 85
End: 2020-08-31
Payer: MEDICARE

## 2020-08-31 ENCOUNTER — TELEPHONE (OUTPATIENT)
Dept: INFECTIOUS DISEASES | Facility: CLINIC | Age: 85
End: 2020-08-31

## 2020-08-31 ENCOUNTER — TELEPHONE (OUTPATIENT)
Dept: ENDOSCOPY | Facility: HOSPITAL | Age: 85
End: 2020-08-31

## 2020-08-31 DIAGNOSIS — K80.50 CHOLEDOCHOLITHIASIS: ICD-10-CM

## 2020-08-31 LAB
ALBUMIN SERPL BCP-MCNC: 2 G/DL (ref 3.5–5.2)
ALP SERPL-CCNC: 1097 U/L (ref 55–135)
ALT SERPL W/O P-5'-P-CCNC: 248 U/L (ref 10–44)
ANION GAP SERPL CALC-SCNC: 11 MMOL/L (ref 8–16)
AST SERPL-CCNC: 451 U/L (ref 10–40)
BASOPHILS # BLD AUTO: 0.06 K/UL (ref 0–0.2)
BASOPHILS NFR BLD: 0.5 % (ref 0–1.9)
BILIRUB SERPL-MCNC: 0.9 MG/DL (ref 0.1–1)
BUN SERPL-MCNC: 25 MG/DL (ref 8–23)
CALCIUM SERPL-MCNC: 9.4 MG/DL (ref 8.7–10.5)
CHLORIDE SERPL-SCNC: 100 MMOL/L (ref 95–110)
CO2 SERPL-SCNC: 24 MMOL/L (ref 23–29)
CREAT SERPL-MCNC: 1.4 MG/DL (ref 0.5–1.4)
CRP SERPL-MCNC: 51.8 MG/L (ref 0–8.2)
DIFFERENTIAL METHOD: ABNORMAL
EOSINOPHIL # BLD AUTO: 0.1 K/UL (ref 0–0.5)
EOSINOPHIL NFR BLD: 1.1 % (ref 0–8)
ERYTHROCYTE [DISTWIDTH] IN BLOOD BY AUTOMATED COUNT: 16 % (ref 11.5–14.5)
ERYTHROCYTE [SEDIMENTATION RATE] IN BLOOD BY WESTERGREN METHOD: 66 MM/HR (ref 0–23)
EST. GFR  (AFRICAN AMERICAN): 53.7 ML/MIN/1.73 M^2
EST. GFR  (NON AFRICAN AMERICAN): 46.5 ML/MIN/1.73 M^2
GLUCOSE SERPL-MCNC: 218 MG/DL (ref 70–110)
HCT VFR BLD AUTO: 30.4 % (ref 40–54)
HGB BLD-MCNC: 9.2 G/DL (ref 14–18)
IMM GRANULOCYTES # BLD AUTO: 0.06 K/UL (ref 0–0.04)
IMM GRANULOCYTES NFR BLD AUTO: 0.5 % (ref 0–0.5)
LYMPHOCYTES # BLD AUTO: 0.8 K/UL (ref 1–4.8)
LYMPHOCYTES NFR BLD: 6.3 % (ref 18–48)
MCH RBC QN AUTO: 28.6 PG (ref 27–31)
MCHC RBC AUTO-ENTMCNC: 30.3 G/DL (ref 32–36)
MCV RBC AUTO: 94 FL (ref 82–98)
MONOCYTES # BLD AUTO: 0.7 K/UL (ref 0.3–1)
MONOCYTES NFR BLD: 5.3 % (ref 4–15)
NEUTROPHILS # BLD AUTO: 11.5 K/UL (ref 1.8–7.7)
NEUTROPHILS NFR BLD: 86.3 % (ref 38–73)
NRBC BLD-RTO: 0 /100 WBC
PLATELET # BLD AUTO: 279 K/UL (ref 150–350)
PMV BLD AUTO: 10.9 FL (ref 9.2–12.9)
POTASSIUM SERPL-SCNC: 4.9 MMOL/L (ref 3.5–5.1)
PROT SERPL-MCNC: 8.7 G/DL (ref 6–8.4)
RBC # BLD AUTO: 3.22 M/UL (ref 4.6–6.2)
SODIUM SERPL-SCNC: 135 MMOL/L (ref 136–145)
WBC # BLD AUTO: 13.26 K/UL (ref 3.9–12.7)

## 2020-08-31 PROCEDURE — 85652 RBC SED RATE AUTOMATED: CPT | Mod: HCNC

## 2020-08-31 PROCEDURE — 80053 COMPREHEN METABOLIC PANEL: CPT | Mod: HCNC

## 2020-08-31 PROCEDURE — 36415 COLL VENOUS BLD VENIPUNCTURE: CPT | Mod: HCNC

## 2020-08-31 PROCEDURE — 86140 C-REACTIVE PROTEIN: CPT | Mod: HCNC

## 2020-08-31 PROCEDURE — 85025 COMPLETE CBC W/AUTO DIFF WBC: CPT | Mod: HCNC

## 2020-08-31 NOTE — TELEPHONE ENCOUNTER
Pt is scheduled for ERCP on 9/9/20. Currently taking brilinta. Can we hold x3 days prior to procedure per protocol? Please advise.

## 2020-08-31 NOTE — TELEPHONE ENCOUNTER
My Open Encounters    Louie Shin MD  You 37 minutes ago (3:33 PM)     Yes approved by Dr Baez. PB    Message text       You routed conversation to Louie Shin MD; Aleida ANSARI Staff 50 minutes ago (3:21 PM)      You 50 minutes ago (3:21 PM)        Pt is scheduled for ERCP on 9/9/20. Currently taking brilinta. Can we hold x3 days prior to procedure per protocol? Please advise.         Documentation

## 2020-08-31 NOTE — TELEPHONE ENCOUNTER
ID Follow up:  Labs of today  WBC 13.2      ALP 1097  CRP 51.8 (continues to downtrend)  Creatine 1.4    Spoke to patient's daughter.  He is afebrile, no n/v.  Denies abdominal pain except LLQ abdominal mesh site which is chronic.   He is feeling very fatigued today, poor activity tolerance which is a change from last week.   Taking the Augmentin 875 q 12 hours and tolerating well.     Awaiting appt with AES   Will follow up.

## 2020-09-01 ENCOUNTER — TELEPHONE (OUTPATIENT)
Dept: CARDIAC REHAB | Facility: CLINIC | Age: 85
End: 2020-09-01

## 2020-09-01 ENCOUNTER — TELEPHONE (OUTPATIENT)
Dept: ENDOSCOPY | Facility: HOSPITAL | Age: 85
End: 2020-09-01

## 2020-09-01 DIAGNOSIS — Z01.818 PRE-OP TESTING: ICD-10-CM

## 2020-09-01 NOTE — TELEPHONE ENCOUNTER
Received request to schedule patient for ERCP on 9/9/20 at 9:00am. Spoke with Patient and daughter. Reviewed medical history and medications. Instructed on procedure and prep. Patient and daughter verbalized understanding of instructions. E-mailed copy of instructions to address in chart.

## 2020-09-02 ENCOUNTER — TELEPHONE (OUTPATIENT)
Dept: INFECTIOUS DISEASES | Facility: CLINIC | Age: 85
End: 2020-09-02

## 2020-09-02 DIAGNOSIS — R78.81 GRAM-NEGATIVE BACTEREMIA: Primary | ICD-10-CM

## 2020-09-02 NOTE — TELEPHONE ENCOUNTER
----- Message from WESTLEY Parks, ANP sent at 9/2/2020  8:39 AM CDT -----  Lea:  Please schedule cbc for tomorrow at [facility] lab.  Please call patient's daughter to confirm and ask her what time she'd like. Thanks.

## 2020-09-02 NOTE — TELEPHONE ENCOUNTER
Called patient daughter and scheduled appointment for 9:30am at [facility] labs. Patient daughter agreed to appointment date/time/location.

## 2020-09-03 ENCOUNTER — TELEPHONE (OUTPATIENT)
Dept: INFECTIOUS DISEASES | Facility: CLINIC | Age: 85
End: 2020-09-03

## 2020-09-03 ENCOUNTER — LAB VISIT (OUTPATIENT)
Dept: LAB | Facility: HOSPITAL | Age: 85
End: 2020-09-03
Payer: MEDICARE

## 2020-09-03 DIAGNOSIS — R78.81 GRAM-NEGATIVE BACTEREMIA: Primary | ICD-10-CM

## 2020-09-03 DIAGNOSIS — K80.50 CHOLEDOCHOLITHIASIS: ICD-10-CM

## 2020-09-03 DIAGNOSIS — R78.81 GRAM-NEGATIVE BACTEREMIA: ICD-10-CM

## 2020-09-03 LAB
BASOPHILS # BLD AUTO: 0.06 K/UL (ref 0–0.2)
BASOPHILS NFR BLD: 0.6 % (ref 0–1.9)
DIFFERENTIAL METHOD: ABNORMAL
EOSINOPHIL # BLD AUTO: 0.2 K/UL (ref 0–0.5)
EOSINOPHIL NFR BLD: 2.2 % (ref 0–8)
ERYTHROCYTE [DISTWIDTH] IN BLOOD BY AUTOMATED COUNT: 16.2 % (ref 11.5–14.5)
HCT VFR BLD AUTO: 31.2 % (ref 40–54)
HGB BLD-MCNC: 9.5 G/DL (ref 14–18)
IMM GRANULOCYTES # BLD AUTO: 0.04 K/UL (ref 0–0.04)
IMM GRANULOCYTES NFR BLD AUTO: 0.4 % (ref 0–0.5)
LYMPHOCYTES # BLD AUTO: 1.1 K/UL (ref 1–4.8)
LYMPHOCYTES NFR BLD: 10 % (ref 18–48)
MCH RBC QN AUTO: 28.4 PG (ref 27–31)
MCHC RBC AUTO-ENTMCNC: 30.4 G/DL (ref 32–36)
MCV RBC AUTO: 93 FL (ref 82–98)
MONOCYTES # BLD AUTO: 0.7 K/UL (ref 0.3–1)
MONOCYTES NFR BLD: 7 % (ref 4–15)
NEUTROPHILS # BLD AUTO: 8.4 K/UL (ref 1.8–7.7)
NEUTROPHILS NFR BLD: 79.8 % (ref 38–73)
NRBC BLD-RTO: 0 /100 WBC
PLATELET # BLD AUTO: 306 K/UL (ref 150–350)
PMV BLD AUTO: 10.8 FL (ref 9.2–12.9)
RBC # BLD AUTO: 3.34 M/UL (ref 4.6–6.2)
WBC # BLD AUTO: 10.52 K/UL (ref 3.9–12.7)

## 2020-09-03 PROCEDURE — 85025 COMPLETE CBC W/AUTO DIFF WBC: CPT | Mod: HCNC

## 2020-09-03 PROCEDURE — 36415 COLL VENOUS BLD VENIPUNCTURE: CPT | Mod: HCNC

## 2020-09-03 RX ORDER — AMOXICILLIN AND CLAVULANATE POTASSIUM 875; 125 MG/1; MG/1
1 TABLET, FILM COATED ORAL 2 TIMES DAILY
Qty: 28 TABLET | Refills: 0 | Status: SHIPPED | OUTPATIENT
Start: 2020-09-03 | End: 2020-09-17

## 2020-09-03 NOTE — TELEPHONE ENCOUNTER
CBC today - WBC 10.  No uptrend.  Notified patient's daughter.   Refilled Augmentin to take until procedure scheduled for 9/9.

## 2020-09-04 ENCOUNTER — TELEPHONE (OUTPATIENT)
Dept: INTERNAL MEDICINE | Facility: CLINIC | Age: 85
End: 2020-09-04

## 2020-09-04 NOTE — TELEPHONE ENCOUNTER
Spoke with patient's daughter and she declined to schedule a appointment due to patient is having surgery for a heart attack, patient's daughter stated the will call to schedule a appointment later.

## 2020-09-04 NOTE — TELEPHONE ENCOUNTER
----- Message from Gavin Martinez MD sent at 9/3/2020  6:47 PM CDT -----  Please contact and schedule a diabetic foot exam with me.

## 2020-09-06 ENCOUNTER — LAB VISIT (OUTPATIENT)
Dept: URGENT CARE | Facility: CLINIC | Age: 85
End: 2020-09-06
Payer: MEDICARE

## 2020-09-06 VITALS — TEMPERATURE: 99 F | HEART RATE: 97 BPM | OXYGEN SATURATION: 99 %

## 2020-09-06 DIAGNOSIS — Z01.818 PRE-OP TESTING: ICD-10-CM

## 2020-09-06 PROCEDURE — U0003 INFECTIOUS AGENT DETECTION BY NUCLEIC ACID (DNA OR RNA); SEVERE ACUTE RESPIRATORY SYNDROME CORONAVIRUS 2 (SARS-COV-2) (CORONAVIRUS DISEASE [COVID-19]), AMPLIFIED PROBE TECHNIQUE, MAKING USE OF HIGH THROUGHPUT TECHNOLOGIES AS DESCRIBED BY CMS-2020-01-R: HCPCS | Mod: HCNC

## 2020-09-07 LAB — SARS-COV-2 RNA RESP QL NAA+PROBE: NOT DETECTED

## 2020-09-08 ENCOUNTER — LAB VISIT (OUTPATIENT)
Dept: LAB | Facility: HOSPITAL | Age: 85
End: 2020-09-08
Payer: MEDICARE

## 2020-09-08 ENCOUNTER — TELEPHONE (OUTPATIENT)
Dept: INFECTIOUS DISEASES | Facility: CLINIC | Age: 85
End: 2020-09-08

## 2020-09-08 DIAGNOSIS — K80.50 CHOLEDOCHOLITHIASIS: ICD-10-CM

## 2020-09-08 LAB
ALBUMIN SERPL BCP-MCNC: 2.2 G/DL (ref 3.5–5.2)
ALP SERPL-CCNC: 1340 U/L (ref 55–135)
ALT SERPL W/O P-5'-P-CCNC: 301 U/L (ref 10–44)
ANION GAP SERPL CALC-SCNC: 11 MMOL/L (ref 8–16)
AST SERPL-CCNC: 320 U/L (ref 10–40)
BASOPHILS # BLD AUTO: 0.07 K/UL (ref 0–0.2)
BASOPHILS NFR BLD: 0.9 % (ref 0–1.9)
BILIRUB SERPL-MCNC: 0.8 MG/DL (ref 0.1–1)
BUN SERPL-MCNC: 25 MG/DL (ref 8–23)
CALCIUM SERPL-MCNC: 9.7 MG/DL (ref 8.7–10.5)
CHLORIDE SERPL-SCNC: 104 MMOL/L (ref 95–110)
CO2 SERPL-SCNC: 24 MMOL/L (ref 23–29)
CREAT SERPL-MCNC: 1.4 MG/DL (ref 0.5–1.4)
CRP SERPL-MCNC: 52.2 MG/L (ref 0–8.2)
DIFFERENTIAL METHOD: ABNORMAL
EOSINOPHIL # BLD AUTO: 0.3 K/UL (ref 0–0.5)
EOSINOPHIL NFR BLD: 4.2 % (ref 0–8)
ERYTHROCYTE [DISTWIDTH] IN BLOOD BY AUTOMATED COUNT: 16.3 % (ref 11.5–14.5)
ERYTHROCYTE [SEDIMENTATION RATE] IN BLOOD BY WESTERGREN METHOD: 57 MM/HR (ref 0–23)
EST. GFR  (AFRICAN AMERICAN): 53.7 ML/MIN/1.73 M^2
EST. GFR  (NON AFRICAN AMERICAN): 46.5 ML/MIN/1.73 M^2
GLUCOSE SERPL-MCNC: 207 MG/DL (ref 70–110)
HCT VFR BLD AUTO: 29.4 % (ref 40–54)
HGB BLD-MCNC: 9 G/DL (ref 14–18)
IMM GRANULOCYTES # BLD AUTO: 0.03 K/UL (ref 0–0.04)
IMM GRANULOCYTES NFR BLD AUTO: 0.4 % (ref 0–0.5)
LYMPHOCYTES # BLD AUTO: 1.1 K/UL (ref 1–4.8)
LYMPHOCYTES NFR BLD: 14.7 % (ref 18–48)
MCH RBC QN AUTO: 28.8 PG (ref 27–31)
MCHC RBC AUTO-ENTMCNC: 30.6 G/DL (ref 32–36)
MCV RBC AUTO: 94 FL (ref 82–98)
MONOCYTES # BLD AUTO: 0.6 K/UL (ref 0.3–1)
MONOCYTES NFR BLD: 7.9 % (ref 4–15)
NEUTROPHILS # BLD AUTO: 5.3 K/UL (ref 1.8–7.7)
NEUTROPHILS NFR BLD: 71.9 % (ref 38–73)
NRBC BLD-RTO: 0 /100 WBC
PLATELET # BLD AUTO: 281 K/UL (ref 150–350)
PMV BLD AUTO: 10.7 FL (ref 9.2–12.9)
POTASSIUM SERPL-SCNC: 5.7 MMOL/L (ref 3.5–5.1)
PROT SERPL-MCNC: 8.5 G/DL (ref 6–8.4)
RBC # BLD AUTO: 3.12 M/UL (ref 4.6–6.2)
SODIUM SERPL-SCNC: 139 MMOL/L (ref 136–145)
WBC # BLD AUTO: 7.37 K/UL (ref 3.9–12.7)

## 2020-09-08 PROCEDURE — 80053 COMPREHEN METABOLIC PANEL: CPT | Mod: HCNC

## 2020-09-08 PROCEDURE — 36415 COLL VENOUS BLD VENIPUNCTURE: CPT | Mod: HCNC

## 2020-09-08 PROCEDURE — 85025 COMPLETE CBC W/AUTO DIFF WBC: CPT | Mod: HCNC

## 2020-09-08 PROCEDURE — 86140 C-REACTIVE PROTEIN: CPT | Mod: HCNC

## 2020-09-08 PROCEDURE — 85652 RBC SED RATE AUTOMATED: CPT | Mod: HCNC

## 2020-09-08 NOTE — TELEPHONE ENCOUNTER
Labs today received.   K 5.7  BUN 25, Creatinine 1.4  No leukocytosis    Spoke to patient's daughter.  No complaints of of muscle weakness, palpitations.  He has been drinking protein drinks.  Added additional protein drink recently. Drinking tea and no water.  He does not take potassium supplements.  No diuretics.  Consumes banana daily.   He is urinating well, no decrease in urine output.    He is going for ERCP tomorrow.  Notified Cardiologist and AES, Dr. Patino, of lab results in event wanted to attempt to lower prior to procedure.  No plan for kayexelate or other lowering agent at this time.  Plan to repeat K first thing in morning.   Advised daughter to have patient hold any further protein drinks, no orange juice, bananas or other high potassium dietary sources.    He will be on clear liquids this evening from 7 pm on.  Encouraged water in lieu of tea

## 2020-09-09 ENCOUNTER — ANESTHESIA (OUTPATIENT)
Dept: ENDOSCOPY | Facility: HOSPITAL | Age: 85
End: 2020-09-09
Payer: MEDICARE

## 2020-09-09 ENCOUNTER — HOSPITAL ENCOUNTER (OUTPATIENT)
Facility: HOSPITAL | Age: 85
Discharge: HOME | End: 2020-09-09
Attending: INTERNAL MEDICINE | Admitting: INTERNAL MEDICINE
Payer: MEDICARE

## 2020-09-09 ENCOUNTER — ANESTHESIA EVENT (OUTPATIENT)
Dept: ENDOSCOPY | Facility: HOSPITAL | Age: 85
End: 2020-09-09
Payer: MEDICARE

## 2020-09-09 VITALS
RESPIRATION RATE: 20 BRPM | HEIGHT: 67 IN | WEIGHT: 166 LBS | DIASTOLIC BLOOD PRESSURE: 58 MMHG | BODY MASS INDEX: 26.06 KG/M2 | TEMPERATURE: 98 F | OXYGEN SATURATION: 100 % | HEART RATE: 85 BPM | SYSTOLIC BLOOD PRESSURE: 125 MMHG

## 2020-09-09 DIAGNOSIS — R74.8 ELEVATED LIVER ENZYMES: ICD-10-CM

## 2020-09-09 DIAGNOSIS — E11.9 TYPE 2 DIABETES MELLITUS WITHOUT RETINOPATHY: Primary | ICD-10-CM

## 2020-09-09 LAB
POCT GLUCOSE: 220 MG/DL (ref 70–110)
POCT GLUCOSE: 228 MG/DL (ref 70–110)

## 2020-09-09 PROCEDURE — 63600175 PHARM REV CODE 636 W HCPCS: Mod: HCNC | Performed by: NURSE ANESTHETIST, CERTIFIED REGISTERED

## 2020-09-09 PROCEDURE — 43262 ENDO CHOLANGIOPANCREATOGRAPH: CPT | Mod: HCNC | Performed by: INTERNAL MEDICINE

## 2020-09-09 PROCEDURE — 74328 X-RAY BILE DUCT ENDOSCOPY: CPT | Mod: HCNC | Performed by: INTERNAL MEDICINE

## 2020-09-09 PROCEDURE — 43264 ERCP REMOVE DUCT CALCULI: CPT | Mod: HCNC | Performed by: INTERNAL MEDICINE

## 2020-09-09 PROCEDURE — C1769 GUIDE WIRE: HCPCS | Mod: HCNC | Performed by: INTERNAL MEDICINE

## 2020-09-09 PROCEDURE — 25500020 PHARM REV CODE 255: Mod: HCNC | Performed by: INTERNAL MEDICINE

## 2020-09-09 PROCEDURE — D9220A PRA ANESTHESIA: ICD-10-PCS | Mod: HCNC,ANES,, | Performed by: ANESTHESIOLOGY

## 2020-09-09 PROCEDURE — 43262 ENDO CHOLANGIOPANCREATOGRAPH: CPT | Mod: 51,HCNC,, | Performed by: INTERNAL MEDICINE

## 2020-09-09 PROCEDURE — 37000009 HC ANESTHESIA EA ADD 15 MINS: Mod: HCNC | Performed by: INTERNAL MEDICINE

## 2020-09-09 PROCEDURE — 82962 GLUCOSE BLOOD TEST: CPT | Mod: HCNC,91 | Performed by: INTERNAL MEDICINE

## 2020-09-09 PROCEDURE — 37000008 HC ANESTHESIA 1ST 15 MINUTES: Mod: HCNC | Performed by: INTERNAL MEDICINE

## 2020-09-09 PROCEDURE — 82962 GLUCOSE BLOOD TEST: CPT | Mod: HCNC | Performed by: INTERNAL MEDICINE

## 2020-09-09 PROCEDURE — 43264 ERCP REMOVE DUCT CALCULI: CPT | Mod: HCNC,,, | Performed by: INTERNAL MEDICINE

## 2020-09-09 PROCEDURE — 43264 PR ERCP,W/REMOVAL STONE,BIL/PANCR DUCTS: ICD-10-PCS | Mod: HCNC,,, | Performed by: INTERNAL MEDICINE

## 2020-09-09 PROCEDURE — D9220A PRA ANESTHESIA: ICD-10-PCS | Mod: HCNC,CRNA,, | Performed by: NURSE ANESTHETIST, CERTIFIED REGISTERED

## 2020-09-09 PROCEDURE — 43262 PR ERCP,SPHINCTEROTOMY: ICD-10-PCS | Mod: 51,HCNC,, | Performed by: INTERNAL MEDICINE

## 2020-09-09 PROCEDURE — 27202125 HC BALLOON, EXTRACTION (ANY): Mod: HCNC | Performed by: INTERNAL MEDICINE

## 2020-09-09 PROCEDURE — 74328 X-RAY BILE DUCT ENDOSCOPY: CPT | Mod: 26,HCNC,, | Performed by: INTERNAL MEDICINE

## 2020-09-09 PROCEDURE — 74328 PR  X-RAY FOR BILE DUCT ENDOSCOPY: ICD-10-PCS | Mod: 26,HCNC,, | Performed by: INTERNAL MEDICINE

## 2020-09-09 PROCEDURE — D9220A PRA ANESTHESIA: Mod: HCNC,ANES,, | Performed by: ANESTHESIOLOGY

## 2020-09-09 PROCEDURE — 25000003 PHARM REV CODE 250: Mod: HCNC | Performed by: INTERNAL MEDICINE

## 2020-09-09 PROCEDURE — D9220A PRA ANESTHESIA: Mod: HCNC,CRNA,, | Performed by: NURSE ANESTHETIST, CERTIFIED REGISTERED

## 2020-09-09 PROCEDURE — 27201674 HC SPHINCTERTOME: Mod: HCNC | Performed by: INTERNAL MEDICINE

## 2020-09-09 RX ORDER — SODIUM CHLORIDE 0.9 % (FLUSH) 0.9 %
3 SYRINGE (ML) INJECTION
Status: DISCONTINUED | OUTPATIENT
Start: 2020-09-09 | End: 2020-09-09 | Stop reason: HOSPADM

## 2020-09-09 RX ORDER — FENTANYL CITRATE 50 UG/ML
INJECTION, SOLUTION INTRAMUSCULAR; INTRAVENOUS
Status: DISCONTINUED | OUTPATIENT
Start: 2020-09-09 | End: 2020-09-09

## 2020-09-09 RX ORDER — PROPOFOL 10 MG/ML
VIAL (ML) INTRAVENOUS CONTINUOUS PRN
Status: DISCONTINUED | OUTPATIENT
Start: 2020-09-09 | End: 2020-09-09

## 2020-09-09 RX ORDER — PHENYLEPHRINE HYDROCHLORIDE 10 MG/ML
INJECTION INTRAVENOUS
Status: DISCONTINUED | OUTPATIENT
Start: 2020-09-09 | End: 2020-09-09

## 2020-09-09 RX ORDER — ONDANSETRON 2 MG/ML
4 INJECTION INTRAMUSCULAR; INTRAVENOUS DAILY PRN
Status: DISCONTINUED | OUTPATIENT
Start: 2020-09-09 | End: 2020-09-09 | Stop reason: HOSPADM

## 2020-09-09 RX ORDER — SODIUM CHLORIDE 9 MG/ML
INJECTION, SOLUTION INTRAVENOUS CONTINUOUS
Status: DISCONTINUED | OUTPATIENT
Start: 2020-09-09 | End: 2020-09-09 | Stop reason: HOSPADM

## 2020-09-09 RX ORDER — PROPOFOL 10 MG/ML
VIAL (ML) INTRAVENOUS
Status: DISCONTINUED | OUTPATIENT
Start: 2020-09-09 | End: 2020-09-09

## 2020-09-09 RX ORDER — HYDROMORPHONE HYDROCHLORIDE 1 MG/ML
0.2 INJECTION, SOLUTION INTRAMUSCULAR; INTRAVENOUS; SUBCUTANEOUS EVERY 5 MIN PRN
Status: DISCONTINUED | OUTPATIENT
Start: 2020-09-09 | End: 2020-09-09 | Stop reason: HOSPADM

## 2020-09-09 RX ORDER — MEPERIDINE HYDROCHLORIDE 50 MG/ML
12.5 INJECTION INTRAMUSCULAR; INTRAVENOUS; SUBCUTANEOUS ONCE AS NEEDED
Status: DISCONTINUED | OUTPATIENT
Start: 2020-09-09 | End: 2020-09-09 | Stop reason: HOSPADM

## 2020-09-09 RX ADMIN — IOHEXOL 5 ML: 300 INJECTION, SOLUTION INTRAVENOUS at 09:09

## 2020-09-09 RX ADMIN — PHENYLEPHRINE HYDROCHLORIDE 100 MCG: 10 INJECTION INTRAVENOUS at 09:09

## 2020-09-09 RX ADMIN — PROPOFOL 20 MG: 10 INJECTION, EMULSION INTRAVENOUS at 09:09

## 2020-09-09 RX ADMIN — PROPOFOL 50 MG: 10 INJECTION, EMULSION INTRAVENOUS at 09:09

## 2020-09-09 RX ADMIN — SODIUM CHLORIDE: 0.9 INJECTION, SOLUTION INTRAVENOUS at 08:09

## 2020-09-09 RX ADMIN — FENTANYL CITRATE 50 MCG: 50 INJECTION, SOLUTION INTRAMUSCULAR; INTRAVENOUS at 09:09

## 2020-09-09 RX ADMIN — PROPOFOL 150 MCG/KG/MIN: 10 INJECTION, EMULSION INTRAVENOUS at 09:09

## 2020-09-09 NOTE — ANESTHESIA PREPROCEDURE EVALUATION
09/09/2020  Anjum Cai Jr. is a 84 y.o., male.    Anesthesia Evaluation    I have reviewed the Patient Summary Reports.    I have reviewed the Nursing Notes.    I have reviewed the Medications.     Review of Systems  Anesthesia Hx:  No problems with previous Anesthesia  Denies Family Hx of Anesthesia complications.   Denies Personal Hx of Anesthesia complications.   Cardiovascular:   2016  CONCLUSIONS     1 - Normal left ventricular systolic function (EF 60-65%).     2 - Low normal right ventricular systolic function .     3 - Left ventricular diastolic dysfunction.      Patient Active Problem List   Diagnosis    Type 2 diabetes mellitus without retinopathy    Headaches, cluster    Thyroid nodule    Elevated PSA    Right rotator cuff tear    Kidney stones    Lump in the testicle    Low back pain radiating to right leg    Rotator cuff tear    DR (diabetic retinopathy)    Goiter, nontoxic, multinodular    Dyslipidemia    Shoulder pain    History of PTCA 2    Coronary artery disease involving native coronary artery of native heart without angina pectoris    Hyperlipidemia    Kidney disease, chronic, stage III (GFR 30-59 ml/min)    Personal history of colonic polyps    Colon polyp    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    Nuclear sclerosis of both eyes    PSC (posterior subcapsular cataract), right    Essential hypertension    Senile nuclear sclerosis    Post-operative state    Trigger middle finger of right hand    Nuclear sclerosis of left eye    Refractive error    Chest pain    Angina effort    Carpal tunnel syndrome of right wrist    Post-procedural fever    Anemia    Inguinal hernia    Pure hypercholesterolemia    Right carotid bruit    Bilateral leg edema    Venous insufficiency    Prostate cancer     Gram-negative bacteremia    Elevated liver enzymes         Physical Exam  General:  Well nourished    Airway/Jaw/Neck:  Airway Findings: Mouth Opening: Normal Tongue: Normal  General Airway Assessment: Adult  Mallampati: III  Improves to II with phonation.  TM Distance: 4 - 6 cm  Jaw/Neck Findings:  Neck ROM: Normal ROM      Dental:  Dental Findings: In tact   Chest/Lungs:  Chest/Lungs Findings: Clear to auscultation, Normal Respiratory Rate     Heart/Vascular:  Heart Findings: Rate: Normal  Rhythm: Regular Rhythm  Sounds: Normal     Abdomen:  Abdomen Findings: Normal    Musculoskeletal:  Musculoskeletal Findings: Normal   Skin:  Skin Findings: Normal    Mental Status:  Mental Status Findings:  Cooperative, Alert and Oriented         Anesthesia Plan  Type of Anesthesia, risks & benefits discussed:  Anesthesia Type:  general  Patient's Preference: General  Intra-op Monitoring Plan: standard ASA monitors  Intra-op Monitoring Plan Comments:   Post Op Pain Control Plan:   Post Op Pain Control Plan Comments: Per primary service  Induction:   IV  Beta Blocker:  Patient is not currently on a Beta-Blocker (No further documentation required).       Informed Consent: Patient understands risks and agrees with Anesthesia plan.  Questions answered. Anesthesia consent signed with patient.  ASA Score: 3     Day of Surgery Review of History & Physical:    H&P update referred to the surgeon.         Ready For Surgery From Anesthesia Perspective.

## 2020-09-09 NOTE — DISCHARGE INSTRUCTIONS
ERCP (Endoscopic Retrograde Cholangiopancreatography)     A balloon at the tip of a catheter opens above the stone. The stone is pulled out of the duct and leaves your body through stool.     ERCP stands for endoscopic retrograde cholangiopancreatography. This procedure is used to view the biliary and pancreatic ducts.  It is used to evaluate diseases that affect the ducts and to help locate and treat blockages that may be present.  How do I get ready for ERCP?  · Talk to your doctor about any health problems or allergies you have.  · Ask your doctor about the risks of ERCP. These include pancreatitis, infection, bleeding, and tearing the bowel.  · Be sure your doctor knows about all medicines you take. You may be told to stop taking some or all of them before the test. This includes:  · All prescription medicines  · Over-the-counter medicines that don't need a prescription  ·  Any street drugs you may use   · Herbs, vitamins, kelp, seaweed, cough syrups, and other supplements  · You may be asked to take antibiotics ahead of time.  · Avoid blood-thinning medicines for 1 week before ERCP.  · Do not eat or drink for 8 to 12 hours before ERCP.  · Have someone ready to take you home.  What happens during the procedure?  · You may be given medicine through an IV to help you relax.  · Your throat is numbed.  · A thin tube (endoscope) is placed into your throat. It is advanced from the throat through the upper digestive tract, to the common bile duct opening. The endoscope lets the doctor see the common bile and pancreatic ducts on a video screen.  · A cut may be made where the common bile duct opens to the duodenum to make it easier to remove stones.  · As blockages are located and removed, X-rays are taken.  · Contrast dye is injected through a catheter to make the duct show up better on the X-rays.  · An imaging technique that uses X-rays to obtain real-time moving images of internal organs is called fluoroscopy.  Fluoroscopy is used to watch and guide progress of the procedure.   · In some cases, a plastic tube (stent) is placed to hold the ducts open. This stent may be replaced or removed in 6 to 8 weeks. Or it may be left to fall out on its own and be passed in the stool.  What happens after ERCP?  Your doctor may discuss the test results right away or a return visit may be scheduled. You may go home the same day or spend the night in the hospital. Follow these tips:  · You can return to a normal routine the day after the ERCP.  · If a cut was made in the duct, avoid blood-thinning medicines such as aspirin for 5 to 7 days.  · Call your doctor right away if you have a fever or abdominal pain. These may be signs of an infection or torn bowel.   Date Last Reviewed: 6/19/2015  © 3924-2423 [email or web address]. [address] [place] [id number], [place] [address]. All rights reserved. This information is not intended as a substitute for professional medical care. Always follow your healthcare professional's instructions.

## 2020-09-09 NOTE — [employer] PATIENT INSTRUCTIONS
Discharge Summary/Instructions after an Endoscopic Procedure  Patient Name: Anjum Cai  Patient MRN: [id number]  Patient YOB: 1935  Wednesday, September 9, 2020  Justin Rene MD  RESTRICTIONS:  During your procedure today, you received medications for sedation.  These   medications may affect your judgment, balance and coordination.  Therefore,   for 24 hours, you have the following restrictions:   - DO NOT drive a car, operate machinery, make legal/financial decisions,   sign important papers or drink alcohol.    ACTIVITY:  Today: no heavy lifting, straining or running due to procedural   sedation/anesthesia.  The following day: return to full activity including work.  DIET:  Eat and drink normally unless instructed otherwise.     TREATMENT FOR COMMON SIDE EFFECTS:  - Mild abdominal pain, nausea, belching, bloating or excessive gas:  rest,   eat lightly and use a heating pad.  - Sore Throat: treat with throat lozenges and/or gargle with warm salt   water.  - Because air was used during the procedure, expelling large amounts of air   from your rectum or belching is normal.  - If a bowel prep was taken, you may not have a bowel movement for 1-3 days.    This is normal.  SYMPTOMS TO WATCH FOR AND REPORT TO YOUR PHYSICIAN:  1. Abdominal pain or bloating, other than gas cramps.  2. Chest pain.  3. Back pain.  4. Signs of infection such as: chills or fever occurring within 24 hours   after the procedure.  5. Rectal bleeding, which would show as bright red, maroon, or black stools.   (A tablespoon of blood from the rectum is not serious, especially if   hemorrhoids are present.)  6. Vomiting.  7. Weakness or dizziness.  GO DIRECTLY TO THE NEAREST EMERGENCY ROOM IF YOU HAVE ANY OF THE FOLLOWING:      Difficulty breathing              Chills and/or fever over 101 F   Persistent vomiting and/or vomiting blood   Severe abdominal pain   Severe chest pain   Black, tarry stools   Bleeding- more than one  tablespoon   Any other symptom or condition that you feel may need urgent attention  Your doctor recommends these additional instructions:  If any biopsies were taken, your doctors clinic will contact you in 1 to 2   weeks with any results.  - Discharge patient to home.   - Resume previous diet.   - Continue present medications.   - Resume Brilinta at prior dose tomorrow.   - Return to primary care physician at appointment to be scheduled.  For questions, problems or results please call your physician - Justin Rene MD at Work:  [phone].  [facility], EMERGENCY ROOM PHONE NUMBER: [phone]  IF A COMPLICATION OR EMERGENCY SITUATION ARISES AND YOU ARE UNABLE TO REACH   YOUR PHYSICIAN - GO DIRECTLY TO THE EMERGENCY ROOM.  Justin Rene MD  9/9/2020 9:31:12 AM  This report has been verified and signed electronically.  [employer]

## 2020-09-09 NOTE — H&P
Short Stay Endoscopy History and Physical    PCP - Gavin Martinez MD  Referring Physician - My Kinsey MD  [id number] [place] [address]    Procedure - ercp  ASA - per anesthesia  Mallampati - per anesthesia  History of Anesthesia problems - no  Family history Anesthesia problems -  no   Plan of anesthesia - General    HPI:  This is a 84 y.o. male here for evaluation of: stone    Reflux - no  Dysphagia - no  Abdominal pain - no  Diarrhea - no    ROS:  Constitutional: No fevers, chills, No weight loss  CV: No chest pain  Pulm: No cough, No shortness of breath  Ophtho: No vision changes  GI: see HPI  Derm: No rash    Medical History:  has a past medical history of Anemia (2/26/2018), Arthritis, BPH (benign prostatic hyperplasia), Cataract, Colon polyp (11/18/2015), Colon polyps, Coronary artery disease, DR (diabetic retinopathy), Dyslipidemia, Elevated PSA, Goiter, nontoxic, multinodular, Headaches, cluster, Hypertension, Kidney stones, Lump in the testicle, Prostate cancer, Rotator cuff tendinitis, Sleep apnea with use of continuous positive airway pressure (CPAP), and Type II or unspecified type diabetes mellitus with other specified manifestations, uncontrolled.    Surgical History:  has a past surgical history that includes Coronary stent placement (02/02/2007); Prostate biopsy (2012); TONSILLECTOMY, ADENOIDECTOMY; Rotator cuff repair (Right, 2014); Cardiac catheterization (2007); Cataract extraction w/  intraocular lens implant (Right, 09/27/2016); Rotator cuff repair (Left, 2013); Trigger finger release (Right, 2004); Inguinal hernia repair (Right, 1995); Cholecystectomy (02/2018); Left heart catheterization (Left, 8/6/2020); and Coronary angiography (N/A, 8/6/2020).    Family History: family history includes Cancer in his mother; Cataracts in his father; Diabetes in his mother; Heart disease in his father..    Social History:  reports that he quit smoking about 42 years ago. He has never used  smokeless tobacco. He reports that he does not drink alcohol or use drugs.    Review of patient's allergies indicates:   Allergen Reactions    Ace inhibitors      Other reaction(s): cough    Cefadroxil      Other reaction(s): possible vasculitis  Other reaction(s): vasculitis       Medications:   Medications Prior to Admission   Medication Sig Dispense Refill Last Dose    amoxicillin-clavulanate 875-125mg (AUGMENTIN) 875-125 mg per tablet Take 1 tablet by mouth 2 (two) times daily. for 14 days 28 tablet 0 9/8/2020 at Unknown time    aspirin (ASPIR-81) 81 MG EC tablet Take 1 tablet (81 mg total) by mouth once daily. 90 tablet 3 Past Week at Unknown time    atorvastatin (LIPITOR) 80 MG tablet Take 1 tablet (80 mg total) by mouth once daily. 90 tablet 3 Past Week at Unknown time    ergocalciferol (ERGOCALCIFEROL) 50,000 unit Cap TAKE 1 CAPSULE BY MOUTH ONCE EVERY 30 DAYS 4 capsule 3 Past Week at Unknown time    finasteride (PROSCAR) 5 mg tablet TAKE 1 TABLET(5 MG) BY MOUTH EVERY DAY 90 tablet 3 Past Week at Unknown time    insulin (LANTUS SOLOSTAR U-100 INSULIN) glargine 100 units/mL (3mL) SubQ pen ADMINISTER 12 UNITS UNDER THE SKIN EVERY EVENING 60 mL 3 9/8/2020 at Unknown time    insulin aspart U-100 (NOVOLOG FLEXPEN U-100 INSULIN) 100 unit/mL (3 mL) InPn pen ADMINISTER 4 UNITS UNDER THE SKIN THREE TIMES DAILY WITH MEALS PER SLIDING SCALE 30 mL 3 9/8/2020 at Unknown time    irbesartan (AVAPRO) 75 MG tablet Take 1 tablet (75 mg total) by mouth once daily. 30 tablet 6 Past Week at Unknown time    metoprolol tartrate (LOPRESSOR) 50 MG tablet Take 1 tablet (50 mg total) by mouth 2 (two) times daily. 60 tablet 11 Past Week at Unknown time    nitroGLYCERIN (NITROSTAT) 0.4 MG SL tablet Place 1 tablet (0.4 mg total) under the tongue every 5 (five) minutes as needed for Chest pain. 60 tablet 12 Past Month at Unknown time    omega-3 fatty acids-vitamin E (FISH OIL) 1,000 mg Cap Take 2 capsules by mouth Daily.     "Past Week at Unknown time    omeprazole (PRILOSEC) 40 MG capsule Take 1 capsule (40 mg total) by mouth once daily. 90 capsule 3 Past Week at Unknown time    ticagrelor (BRILINTA) 90 mg tablet Take 1 tablet (90 mg total) by mouth 2 (two) times daily. 60 tablet 11 Past Week at Unknown time    ticagrelor (BRILINTA) 90 mg tablet Take 1 tablet (90 mg total) by mouth 2 (two) times daily. 60 tablet 11 Past Week at Unknown time    ACCU-CHEK SMARTVIEW TEST STRIP Strp CHECK BLOOD SUGARS FOUR TIMES DAILY BEFORE MEALS AS DIRECTED 400 strip 3     ciclopirox (PENLAC) 8 % Soln Apply topically nightly. (Patient not taking: Reported on 8/25/2020) 6.6 mL 11     ciclopirox 0.77 % Gel Apply topically 2 (two) times daily. (Patient not taking: Reported on 8/25/2020) 100 g 3     FLUAD 0064-7236, 65 YR UP,,PF, 45 mcg (15 mcg x 3)/0.5 mL Syrg        lancets (ACCU-CHEK FASTCLIX LANCET DRUM) Misc Check blood sugars four times a day. 204 each PRN     mometasone 0.1% (ELOCON) 0.1 % cream Apply to affected area daily (Patient not taking: Reported on 8/25/2020) 45 g 1     MULTIVITAMIN W-MINERALS/LUTEIN (CENTRUM SILVER ORAL) Take by mouth once daily.       pen needle, diabetic (BD ULTRA-FINE MINI PEN NEEDLE) 31 gauge x 3/16" Ndle Use new needle with each injection. Inject insulin 4 times a day. 100 each 3     simethicone (GAS-X ORAL) Take by mouth.          Physical Exam:    Vital Signs: There were no vitals filed for this visit.    General Appearance: Well appearing in no acute distress    Labs:  Lab Results   Component Value Date    WBC 7.37 09/08/2020    HGB 9.0 (L) 09/08/2020    HCT 29.4 (L) 09/08/2020     09/08/2020    CHOL 122 08/06/2020    TRIG 97 08/06/2020    HDL 27 (L) 08/06/2020     (H) 09/08/2020     (H) 09/08/2020     09/08/2020    K 5.7 (H) 09/08/2020     09/08/2020    CREATININE 1.4 09/08/2020    BUN 25 (H) 09/08/2020    CO2 24 09/08/2020    TSH 1.336 10/16/2013    PSA 10.06 (H) " 02/26/2013    INR 1.1 08/06/2020    HGBA1C 7.7 (H) 08/06/2020       I have explained the risks and benefits of this endoscopic procedure to the patient including but not limited to bleeding, inflammation, infection, perforation, and death.      Justin Rene MD

## 2020-09-09 NOTE — TRANSFER OF CARE
"Anesthesia Transfer of Care Note    Patient: Anjum Cai JrApril    Procedure(s) Performed: Procedure(s) (LRB):  ercp (N/A)    Patient location: [facility]    Anesthesia Type: general    Transport from OR: Transported from OR on room air with adequate spontaneous ventilation    Post pain: adequate analgesia    Post assessment: no apparent anesthetic complications and tolerated procedure well    Post vital signs: stable    Level of consciousness: awake and alert    Nausea/Vomiting: no nausea/vomiting    Complications: none    Transfer of care protocol was followed      Last vitals:   Visit Vitals  BP (!) 121/56 (BP Location: Left arm, Patient Position: Lying)   Pulse 92   Temp 36.6 °C (97.9 °F) (Oral)   Resp 17   Ht 5' 6.5" (1.689 m)   Wt 75.3 kg (166 lb)   SpO2 100%   BMI 26.39 kg/m²     "

## 2020-09-09 NOTE — ANESTHESIA POSTPROCEDURE EVALUATION
Anesthesia Post Evaluation    Patient: Anjum Cai JrApril    Procedure(s) Performed: Procedure(s) (LRB):  ercp (N/A)    Final Anesthesia Type: general    Patient location during evaluation: PACU  Patient participation: Yes- Able to Participate  Level of consciousness: awake and alert  Post-procedure vital signs: reviewed and stable  Pain management: adequate  Airway patency: patent    PONV status at discharge: No PONV  Anesthetic complications: no      Cardiovascular status: blood pressure returned to baseline and hemodynamically stable  Respiratory status: unassisted, spontaneous ventilation and room air  Hydration status: euvolemic  Follow-up not needed.          Vitals Value Taken Time   /58 09/09/20 1001   Temp 36.5 °C (97.7 °F) 09/09/20 0937   Pulse 85 09/09/20 1012   Resp 18 09/09/20 1012   SpO2 100 % 09/09/20 1009   Vitals shown include unvalidated device data.      No case tracking events are documented in the log.      Pain/Renetta Score: Renetta Score: 10 (9/9/2020 10:13 AM)

## 2020-09-09 NOTE — PLAN OF CARE
Patient tolerated procedure and anesthesia with no complaints of pain or nausea. Discharge material given and explained to patient and daughter. Both verbalized understanding. Patient awaiting ride in wheelchair in stable condition with no complaints.

## 2020-09-10 ENCOUNTER — TELEPHONE (OUTPATIENT)
Dept: INFECTIOUS DISEASES | Facility: HOSPITAL | Age: 85
End: 2020-09-10

## 2020-09-10 DIAGNOSIS — R89.9 ABNORMAL LABORATORY TEST: Primary | ICD-10-CM

## 2020-09-11 ENCOUNTER — LAB VISIT (OUTPATIENT)
Dept: LAB | Facility: HOSPITAL | Age: 85
End: 2020-09-11
Payer: MEDICARE

## 2020-09-11 DIAGNOSIS — R89.9 ABNORMAL LABORATORY TEST: ICD-10-CM

## 2020-09-11 LAB
ALBUMIN SERPL BCP-MCNC: 2.2 G/DL (ref 3.5–5.2)
ALP SERPL-CCNC: 1272 U/L (ref 55–135)
ALT SERPL W/O P-5'-P-CCNC: 311 U/L (ref 10–44)
ANION GAP SERPL CALC-SCNC: 13 MMOL/L (ref 8–16)
AST SERPL-CCNC: 287 U/L (ref 10–40)
BASOPHILS # BLD AUTO: 0.05 K/UL (ref 0–0.2)
BASOPHILS NFR BLD: 0.6 % (ref 0–1.9)
BILIRUB SERPL-MCNC: 1.7 MG/DL (ref 0.1–1)
BUN SERPL-MCNC: 28 MG/DL (ref 8–23)
CALCIUM SERPL-MCNC: 9.9 MG/DL (ref 8.7–10.5)
CHLORIDE SERPL-SCNC: 103 MMOL/L (ref 95–110)
CO2 SERPL-SCNC: 25 MMOL/L (ref 23–29)
CREAT SERPL-MCNC: 1.3 MG/DL (ref 0.5–1.4)
CRP SERPL-MCNC: 110.1 MG/L (ref 0–8.2)
DIFFERENTIAL METHOD: ABNORMAL
EOSINOPHIL # BLD AUTO: 0.4 K/UL (ref 0–0.5)
EOSINOPHIL NFR BLD: 4.1 % (ref 0–8)
ERYTHROCYTE [DISTWIDTH] IN BLOOD BY AUTOMATED COUNT: 16.7 % (ref 11.5–14.5)
EST. GFR  (AFRICAN AMERICAN): 58.7 ML/MIN/1.73 M^2
EST. GFR  (NON AFRICAN AMERICAN): 50.8 ML/MIN/1.73 M^2
GLUCOSE SERPL-MCNC: 187 MG/DL (ref 70–110)
HCT VFR BLD AUTO: 31 % (ref 40–54)
HGB BLD-MCNC: 9.4 G/DL (ref 14–18)
IMM GRANULOCYTES # BLD AUTO: 0.03 K/UL (ref 0–0.04)
IMM GRANULOCYTES NFR BLD AUTO: 0.3 % (ref 0–0.5)
LYMPHOCYTES # BLD AUTO: 1.1 K/UL (ref 1–4.8)
LYMPHOCYTES NFR BLD: 12.1 % (ref 18–48)
MCH RBC QN AUTO: 28.1 PG (ref 27–31)
MCHC RBC AUTO-ENTMCNC: 30.3 G/DL (ref 32–36)
MCV RBC AUTO: 93 FL (ref 82–98)
MONOCYTES # BLD AUTO: 0.6 K/UL (ref 0.3–1)
MONOCYTES NFR BLD: 6.9 % (ref 4–15)
NEUTROPHILS # BLD AUTO: 6.6 K/UL (ref 1.8–7.7)
NEUTROPHILS NFR BLD: 76 % (ref 38–73)
NRBC BLD-RTO: 0 /100 WBC
PLATELET # BLD AUTO: 293 K/UL (ref 150–350)
PMV BLD AUTO: 10.8 FL (ref 9.2–12.9)
POTASSIUM SERPL-SCNC: 4.6 MMOL/L (ref 3.5–5.1)
PROT SERPL-MCNC: 8.7 G/DL (ref 6–8.4)
RBC # BLD AUTO: 3.35 M/UL (ref 4.6–6.2)
SODIUM SERPL-SCNC: 141 MMOL/L (ref 136–145)
WBC # BLD AUTO: 8.73 K/UL (ref 3.9–12.7)

## 2020-09-11 PROCEDURE — 85025 COMPLETE CBC W/AUTO DIFF WBC: CPT | Mod: HCNC

## 2020-09-11 PROCEDURE — 86140 C-REACTIVE PROTEIN: CPT | Mod: HCNC

## 2020-09-11 PROCEDURE — 36415 COLL VENOUS BLD VENIPUNCTURE: CPT | Mod: HCNC

## 2020-09-11 PROCEDURE — 80053 COMPREHEN METABOLIC PANEL: CPT | Mod: HCNC

## 2020-09-14 ENCOUNTER — LAB VISIT (OUTPATIENT)
Dept: LAB | Facility: HOSPITAL | Age: 85
End: 2020-09-14
Payer: MEDICARE

## 2020-09-14 DIAGNOSIS — K80.50 CHOLEDOCHOLITHIASIS: ICD-10-CM

## 2020-09-14 LAB
ALBUMIN SERPL BCP-MCNC: 2.3 G/DL (ref 3.5–5.2)
ALP SERPL-CCNC: 1001 U/L (ref 55–135)
ALT SERPL W/O P-5'-P-CCNC: 189 U/L (ref 10–44)
ANION GAP SERPL CALC-SCNC: 10 MMOL/L (ref 8–16)
AST SERPL-CCNC: 142 U/L (ref 10–40)
BASOPHILS # BLD AUTO: 0.05 K/UL (ref 0–0.2)
BASOPHILS NFR BLD: 0.6 % (ref 0–1.9)
BILIRUB SERPL-MCNC: 0.9 MG/DL (ref 0.1–1)
BUN SERPL-MCNC: 20 MG/DL (ref 8–23)
CALCIUM SERPL-MCNC: 9.5 MG/DL (ref 8.7–10.5)
CHLORIDE SERPL-SCNC: 106 MMOL/L (ref 95–110)
CO2 SERPL-SCNC: 25 MMOL/L (ref 23–29)
CREAT SERPL-MCNC: 1.3 MG/DL (ref 0.5–1.4)
CRP SERPL-MCNC: 29.7 MG/L (ref 0–8.2)
DIFFERENTIAL METHOD: ABNORMAL
EOSINOPHIL # BLD AUTO: 0.4 K/UL (ref 0–0.5)
EOSINOPHIL NFR BLD: 4.9 % (ref 0–8)
ERYTHROCYTE [DISTWIDTH] IN BLOOD BY AUTOMATED COUNT: 17.1 % (ref 11.5–14.5)
ERYTHROCYTE [SEDIMENTATION RATE] IN BLOOD BY WESTERGREN METHOD: 86 MM/HR (ref 0–23)
EST. GFR  (AFRICAN AMERICAN): 58.7 ML/MIN/1.73 M^2
EST. GFR  (NON AFRICAN AMERICAN): 50.8 ML/MIN/1.73 M^2
GLUCOSE SERPL-MCNC: 173 MG/DL (ref 70–110)
HCT VFR BLD AUTO: 31.1 % (ref 40–54)
HGB BLD-MCNC: 9.5 G/DL (ref 14–18)
IMM GRANULOCYTES # BLD AUTO: 0.03 K/UL (ref 0–0.04)
IMM GRANULOCYTES NFR BLD AUTO: 0.4 % (ref 0–0.5)
LYMPHOCYTES # BLD AUTO: 1.4 K/UL (ref 1–4.8)
LYMPHOCYTES NFR BLD: 16.8 % (ref 18–48)
MCH RBC QN AUTO: 28.8 PG (ref 27–31)
MCHC RBC AUTO-ENTMCNC: 30.5 G/DL (ref 32–36)
MCV RBC AUTO: 94 FL (ref 82–98)
MONOCYTES # BLD AUTO: 0.6 K/UL (ref 0.3–1)
MONOCYTES NFR BLD: 7.9 % (ref 4–15)
NEUTROPHILS # BLD AUTO: 5.7 K/UL (ref 1.8–7.7)
NEUTROPHILS NFR BLD: 69.4 % (ref 38–73)
NRBC BLD-RTO: 0 /100 WBC
PLATELET # BLD AUTO: 295 K/UL (ref 150–350)
PMV BLD AUTO: 11 FL (ref 9.2–12.9)
POTASSIUM SERPL-SCNC: 4.9 MMOL/L (ref 3.5–5.1)
PROT SERPL-MCNC: 8.4 G/DL (ref 6–8.4)
RBC # BLD AUTO: 3.3 M/UL (ref 4.6–6.2)
SODIUM SERPL-SCNC: 141 MMOL/L (ref 136–145)
WBC # BLD AUTO: 8.14 K/UL (ref 3.9–12.7)

## 2020-09-14 PROCEDURE — 85652 RBC SED RATE AUTOMATED: CPT | Mod: HCNC

## 2020-09-14 PROCEDURE — 86140 C-REACTIVE PROTEIN: CPT | Mod: HCNC

## 2020-09-14 PROCEDURE — 80053 COMPREHEN METABOLIC PANEL: CPT | Mod: HCNC

## 2020-09-14 PROCEDURE — 85025 COMPLETE CBC W/AUTO DIFF WBC: CPT | Mod: HCNC

## 2020-09-14 PROCEDURE — 36415 COLL VENOUS BLD VENIPUNCTURE: CPT | Mod: HCNC

## 2020-09-16 ENCOUNTER — TELEPHONE (OUTPATIENT)
Dept: INFECTIOUS DISEASES | Facility: HOSPITAL | Age: 85
End: 2020-09-16
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
## 2020-09-17 ENCOUNTER — DOCUMENT SCAN (OUTPATIENT)
Dept: HOME HEALTH SERVICES | Facility: HOSPITAL | Age: 85
End: 2020-09-17
Payer: MEDICARE

## 2020-09-28 ENCOUNTER — TELEPHONE (OUTPATIENT)
Dept: INFECTIOUS DISEASES | Facility: HOSPITAL | Age: 85
End: 2020-09-28

## 2020-09-28 ENCOUNTER — DOCUMENT SCAN (OUTPATIENT)
Dept: HOME HEALTH SERVICES | Facility: HOSPITAL | Age: 85
End: 2020-09-28
Payer: MEDICARE

## 2020-09-28 ENCOUNTER — TELEPHONE (OUTPATIENT)
Dept: INTERNAL MEDICINE | Facility: CLINIC | Age: 85
End: 2020-09-28

## 2020-09-28 NOTE — TELEPHONE ENCOUNTER
ID follow up call  Patient is still in [place] with his daughter.  She reports he is going well. He has gained 5 pounds, baking and cooking for family and doing PT exercises daily.  Driving and walking though the store without shortness of breath. No fevers.  Appetitie coming back and no abdominal pain.    They will be returning next week.   They have yet to make an appointment with Dr. Martinez, his PCP, for follow up.   She would like to check his blood work when he returns.    He has standing orders and I will schedule and try to schedule appointment with his PCP.

## 2020-09-28 NOTE — TELEPHONE ENCOUNTER
----- Message from Lea Herman MA sent at 9/28/2020  1:24 PM CDT -----  Contact: Daughter  Can you please call patient to set up a good day and time for both your office and him?  ----- Message -----  From: Sophie Peters  Sent: 9/28/2020   1:04 PM CDT  To: Lea Herman MA    Pt's daughter spoke to Doctor, and they were coming back this weekend. Have not heard from his PCP. Pt wont be in town until 10/3 OR 10/4. If the appt could be scheduled for the following week or schedule it the same day as labs.      Contact Info [phone]

## 2020-09-29 ENCOUNTER — PATIENT MESSAGE (OUTPATIENT)
Dept: OTHER | Facility: OTHER | Age: 85
End: 2020-09-29

## 2020-10-05 ENCOUNTER — LAB VISIT (OUTPATIENT)
Dept: LAB | Facility: HOSPITAL | Age: 85
End: 2020-10-05
Attending: INTERNAL MEDICINE
Payer: MEDICARE

## 2020-10-05 ENCOUNTER — OFFICE VISIT (OUTPATIENT)
Dept: INTERNAL MEDICINE | Facility: CLINIC | Age: 85
End: 2020-10-05
Payer: MEDICARE

## 2020-10-05 VITALS
RESPIRATION RATE: 20 BRPM | WEIGHT: 173.94 LBS | TEMPERATURE: 99 F | HEIGHT: 67 IN | DIASTOLIC BLOOD PRESSURE: 60 MMHG | OXYGEN SATURATION: 99 % | HEART RATE: 76 BPM | SYSTOLIC BLOOD PRESSURE: 126 MMHG | BODY MASS INDEX: 27.3 KG/M2

## 2020-10-05 DIAGNOSIS — I10 ESSENTIAL HYPERTENSION: ICD-10-CM

## 2020-10-05 DIAGNOSIS — C61 PROSTATE CANCER: ICD-10-CM

## 2020-10-05 DIAGNOSIS — I25.2 HX OF NON-ST ELEVATION MYOCARDIAL INFARCTION (NSTEMI): Primary | ICD-10-CM

## 2020-10-05 DIAGNOSIS — K80.50 CHOLEDOCHOLITHIASIS: ICD-10-CM

## 2020-10-05 DIAGNOSIS — Z79.4 INSULIN-REQUIRING OR DEPENDENT TYPE II DIABETES MELLITUS: ICD-10-CM

## 2020-10-05 DIAGNOSIS — E78.5 HYPERLIPIDEMIA, UNSPECIFIED HYPERLIPIDEMIA TYPE: ICD-10-CM

## 2020-10-05 DIAGNOSIS — E11.9 INSULIN-REQUIRING OR DEPENDENT TYPE II DIABETES MELLITUS: ICD-10-CM

## 2020-10-05 LAB
ALBUMIN SERPL BCP-MCNC: 2.9 G/DL (ref 3.5–5.2)
ALP SERPL-CCNC: 276 U/L (ref 55–135)
ALT SERPL W/O P-5'-P-CCNC: 25 U/L (ref 10–44)
ANION GAP SERPL CALC-SCNC: 8 MMOL/L (ref 8–16)
AST SERPL-CCNC: 34 U/L (ref 10–40)
BASOPHILS # BLD AUTO: 0.05 K/UL (ref 0–0.2)
BASOPHILS NFR BLD: 1 % (ref 0–1.9)
BILIRUB SERPL-MCNC: 0.5 MG/DL (ref 0.1–1)
BUN SERPL-MCNC: 18 MG/DL (ref 8–23)
CALCIUM SERPL-MCNC: 9 MG/DL (ref 8.7–10.5)
CHLORIDE SERPL-SCNC: 104 MMOL/L (ref 95–110)
CO2 SERPL-SCNC: 28 MMOL/L (ref 23–29)
CREAT SERPL-MCNC: 1.2 MG/DL (ref 0.5–1.4)
CRP SERPL-MCNC: 4 MG/L (ref 0–8.2)
DIFFERENTIAL METHOD: ABNORMAL
EOSINOPHIL # BLD AUTO: 0.3 K/UL (ref 0–0.5)
EOSINOPHIL NFR BLD: 6.3 % (ref 0–8)
ERYTHROCYTE [DISTWIDTH] IN BLOOD BY AUTOMATED COUNT: 18.1 % (ref 11.5–14.5)
ERYTHROCYTE [SEDIMENTATION RATE] IN BLOOD BY WESTERGREN METHOD: 43 MM/HR (ref 0–23)
EST. GFR  (AFRICAN AMERICAN): >60 ML/MIN/1.73 M^2
EST. GFR  (NON AFRICAN AMERICAN): 56 ML/MIN/1.73 M^2
GLUCOSE SERPL-MCNC: 282 MG/DL (ref 70–110)
HCT VFR BLD AUTO: 31.7 % (ref 40–54)
HGB BLD-MCNC: 9.5 G/DL (ref 14–18)
IMM GRANULOCYTES # BLD AUTO: 0.02 K/UL (ref 0–0.04)
IMM GRANULOCYTES NFR BLD AUTO: 0.4 % (ref 0–0.5)
LYMPHOCYTES # BLD AUTO: 1.3 K/UL (ref 1–4.8)
LYMPHOCYTES NFR BLD: 24.1 % (ref 18–48)
MCH RBC QN AUTO: 28.9 PG (ref 27–31)
MCHC RBC AUTO-ENTMCNC: 30 G/DL (ref 32–36)
MCV RBC AUTO: 96 FL (ref 82–98)
MONOCYTES # BLD AUTO: 0.4 K/UL (ref 0.3–1)
MONOCYTES NFR BLD: 7.9 % (ref 4–15)
NEUTROPHILS # BLD AUTO: 3.2 K/UL (ref 1.8–7.7)
NEUTROPHILS NFR BLD: 60.3 % (ref 38–73)
NRBC BLD-RTO: 0 /100 WBC
PLATELET # BLD AUTO: 184 K/UL (ref 150–350)
PMV BLD AUTO: 11.8 FL (ref 9.2–12.9)
POTASSIUM SERPL-SCNC: 4.3 MMOL/L (ref 3.5–5.1)
PROT SERPL-MCNC: 7.8 G/DL (ref 6–8.4)
RBC # BLD AUTO: 3.29 M/UL (ref 4.6–6.2)
SODIUM SERPL-SCNC: 140 MMOL/L (ref 136–145)
WBC # BLD AUTO: 5.22 K/UL (ref 3.9–12.7)

## 2020-10-05 PROCEDURE — 1159F MED LIST DOCD IN RCRD: CPT | Mod: HCNC,S$GLB,, | Performed by: INTERNAL MEDICINE

## 2020-10-05 PROCEDURE — 3074F SYST BP LT 130 MM HG: CPT | Mod: HCNC,CPTII,S$GLB, | Performed by: INTERNAL MEDICINE

## 2020-10-05 PROCEDURE — 1126F PR PAIN SEVERITY QUANTIFIED, NO PAIN PRESENT: ICD-10-PCS | Mod: HCNC,S$GLB,, | Performed by: INTERNAL MEDICINE

## 2020-10-05 PROCEDURE — 36415 COLL VENOUS BLD VENIPUNCTURE: CPT | Mod: HCNC

## 2020-10-05 PROCEDURE — 99215 PR OFFICE/OUTPT VISIT, EST, LEVL V, 40-54 MIN: ICD-10-PCS | Mod: HCNC,S$GLB,, | Performed by: INTERNAL MEDICINE

## 2020-10-05 PROCEDURE — 1101F PR PT FALLS ASSESS DOC 0-1 FALLS W/OUT INJ PAST YR: ICD-10-PCS | Mod: HCNC,CPTII,S$GLB, | Performed by: INTERNAL MEDICINE

## 2020-10-05 PROCEDURE — 3051F HG A1C>EQUAL 7.0%<8.0%: CPT | Mod: HCNC,CPTII,S$GLB, | Performed by: INTERNAL MEDICINE

## 2020-10-05 PROCEDURE — 85652 RBC SED RATE AUTOMATED: CPT | Mod: HCNC

## 2020-10-05 PROCEDURE — 3074F PR MOST RECENT SYSTOLIC BLOOD PRESSURE < 130 MM HG: ICD-10-PCS | Mod: HCNC,CPTII,S$GLB, | Performed by: INTERNAL MEDICINE

## 2020-10-05 PROCEDURE — 80053 COMPREHEN METABOLIC PANEL: CPT | Mod: HCNC

## 2020-10-05 PROCEDURE — 3078F DIAST BP <80 MM HG: CPT | Mod: HCNC,CPTII,S$GLB, | Performed by: INTERNAL MEDICINE

## 2020-10-05 PROCEDURE — 99999 PR PBB SHADOW E&M-EST. PATIENT-LVL V: CPT | Mod: PBBFAC,HCNC,, | Performed by: INTERNAL MEDICINE

## 2020-10-05 PROCEDURE — 1126F AMNT PAIN NOTED NONE PRSNT: CPT | Mod: HCNC,S$GLB,, | Performed by: INTERNAL MEDICINE

## 2020-10-05 PROCEDURE — 1159F PR MEDICATION LIST DOCUMENTED IN MEDICAL RECORD: ICD-10-PCS | Mod: HCNC,S$GLB,, | Performed by: INTERNAL MEDICINE

## 2020-10-05 PROCEDURE — 3051F PR MOST RECENT HEMOGLOBIN A1C LEVEL 7.0 - < 8.0%: ICD-10-PCS | Mod: HCNC,CPTII,S$GLB, | Performed by: INTERNAL MEDICINE

## 2020-10-05 PROCEDURE — 99215 OFFICE O/P EST HI 40 MIN: CPT | Mod: HCNC,S$GLB,, | Performed by: INTERNAL MEDICINE

## 2020-10-05 PROCEDURE — 86140 C-REACTIVE PROTEIN: CPT | Mod: HCNC

## 2020-10-05 PROCEDURE — 3078F PR MOST RECENT DIASTOLIC BLOOD PRESSURE < 80 MM HG: ICD-10-PCS | Mod: HCNC,CPTII,S$GLB, | Performed by: INTERNAL MEDICINE

## 2020-10-05 PROCEDURE — 99999 PR PBB SHADOW E&M-EST. PATIENT-LVL V: ICD-10-PCS | Mod: PBBFAC,HCNC,, | Performed by: INTERNAL MEDICINE

## 2020-10-05 PROCEDURE — 1101F PT FALLS ASSESS-DOCD LE1/YR: CPT | Mod: HCNC,CPTII,S$GLB, | Performed by: INTERNAL MEDICINE

## 2020-10-05 PROCEDURE — 85025 COMPLETE CBC W/AUTO DIFF WBC: CPT | Mod: HCNC

## 2020-10-08 NOTE — PROGRESS NOTES
CC:  Hospital follow-up    HPI:  The patient is a 84-year-old male with insulin-requiring diabetes, hypertension, coronary artery disease status post stent, prostate cancer status post radiation therapy, kidney stones, obstructive sleep apnea on CPAP and multinodular goiter who was recently admitted in August for non ST elevation MI.  The patient underwent a left heart catheterization which showed severe stenosis of the proximal and ostial RCA.  He underwent a drug-eluting stent.  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  Patient was treated with ertapenem then transition to Augmentin.  He is currently off antibiotics.  The patient had a transaminitis.  He was found have a dilated common bile duct.  He underwent an outpatient ERCP on September 9th.  It showed he had choledocholithiasis.  He underwent a biliary sphincterotomy is currently doing well.  The patient's blood sugars were low in the hospital requiring a decreased dose of long-acting and short-acting insulin.  He was discharged on 12 units of Levemir and 4 units of NovoLog.  He is back on his Lantus and NovoLog.  He is currently on 64 units of Lantus and 12 units of NovoLog plus sliding scale.  In regards to his hypertension, he is taking for his irbesartan on a p.r.n. basis.    ROS:  Patient reports no fever chills.  He has a good appetite.  No chest pain.  No shortness of breath.  He is walking.  He has no problems with abdominal pain or constipation.  He is here today with his daughter.  She lives in [place].  The patient does have a sister who is currently in a sniff unit and has a terminal illness.  He does have some nephews in town for support.  His PSA is doing well with radiation therapy.    Physical exam:  General appearance:  No acute distress  HEENT:  Conjunctiva is clear.  Pupils equal.  TMs are clear.  Nasal septum is midline without discharge.  Oropharynx is without erythema.  Trachea is midline without JVD or  thyromegaly.  Pulmonary:  Good inspiratory, expiratory breath sounds are heard.  Lungs are clear to auscultation.  Cardiovascular:  S1-S2, rhythm is normal.  Extremities with slight trace edema.  GI:  Abdomen was nontender, nondistended without hepatosplenomegaly  Ortho:  The patient had a diabetic foot exam performed.        The patient does have hammertoe deformity involving the 2nd toe of each foot.  The arch of both feet tends to be flattened.    Assessment:  1.  Status post non ST elevation MI  2.  Coronary artery disease status post stent  3.  Transaminitis status post sphincterotomy  4.  Insulin-requiring diabetes  5.  Hypertension  6.  Hyperlipidemia    Plan:  1.  Will evaluate the patient for diabetic shoes  2.  Patient has blood test already ordered by Infectious Diseases.  Will review those  3.  The patient will follow up pending test results.

## 2020-10-14 DIAGNOSIS — N40.1 BENIGN NON-NODULAR PROSTATIC HYPERPLASIA WITH LOWER URINARY TRACT SYMPTOMS: ICD-10-CM

## 2020-10-14 DIAGNOSIS — R97.20 ELEVATED PSA: ICD-10-CM

## 2020-10-15 RX ORDER — FINASTERIDE 5 MG/1
5 TABLET, FILM COATED ORAL DAILY
Qty: 90 TABLET | Refills: 3 | Status: SHIPPED | OUTPATIENT
Start: 2020-10-15 | End: 2020-10-23 | Stop reason: SDUPTHER

## 2020-10-16 ENCOUNTER — DOCUMENT SCAN (OUTPATIENT)
Dept: HOME HEALTH SERVICES | Facility: HOSPITAL | Age: 85
End: 2020-10-16
Payer: MEDICARE

## 2020-10-16 ENCOUNTER — OFFICE VISIT (OUTPATIENT)
Dept: URGENT CARE | Facility: CLINIC | Age: 85
End: 2020-10-16
Payer: MEDICARE

## 2020-10-16 VITALS
DIASTOLIC BLOOD PRESSURE: 65 MMHG | HEART RATE: 66 BPM | HEIGHT: 67 IN | TEMPERATURE: 99 F | WEIGHT: 173 LBS | OXYGEN SATURATION: 99 % | SYSTOLIC BLOOD PRESSURE: 135 MMHG | RESPIRATION RATE: 20 BRPM | BODY MASS INDEX: 27.15 KG/M2

## 2020-10-16 DIAGNOSIS — B34.9 VIRAL SYNDROME: Primary | ICD-10-CM

## 2020-10-16 LAB
CTP QC/QA: YES
CTP QC/QA: YES
FLUAV AG NPH QL: NEGATIVE
FLUBV AG NPH QL: NEGATIVE
SARS-COV-2 RDRP RESP QL NAA+PROBE: NEGATIVE

## 2020-10-16 PROCEDURE — 99214 OFFICE O/P EST MOD 30 MIN: CPT | Mod: 25,S$GLB,, | Performed by: PHYSICIAN ASSISTANT

## 2020-10-16 PROCEDURE — 71046 XR CHEST PA AND LATERAL: ICD-10-PCS | Mod: S$GLB,,, | Performed by: RADIOLOGY

## 2020-10-16 PROCEDURE — U0002 COVID-19 LAB TEST NON-CDC: HCPCS | Mod: QW,S$GLB,, | Performed by: PHYSICIAN ASSISTANT

## 2020-10-16 PROCEDURE — 87804 POCT INFLUENZA A/B: ICD-10-PCS | Mod: QW,S$GLB,, | Performed by: PHYSICIAN ASSISTANT

## 2020-10-16 PROCEDURE — 87804 INFLUENZA ASSAY W/OPTIC: CPT | Mod: QW,S$GLB,, | Performed by: PHYSICIAN ASSISTANT

## 2020-10-16 PROCEDURE — U0002: ICD-10-PCS | Mod: QW,S$GLB,, | Performed by: PHYSICIAN ASSISTANT

## 2020-10-16 PROCEDURE — 71046 X-RAY EXAM CHEST 2 VIEWS: CPT | Mod: S$GLB,,, | Performed by: RADIOLOGY

## 2020-10-16 PROCEDURE — 99214 PR OFFICE/OUTPT VISIT, EST, LEVL IV, 30-39 MIN: ICD-10-PCS | Mod: 25,S$GLB,, | Performed by: PHYSICIAN ASSISTANT

## 2020-10-16 NOTE — PROGRESS NOTES
"Subjective:       Patient ID: Anjum Cai Jr. is a 84 y.o. male.    Vitals:  height is 5' 6.5" (1.689 m) and weight is 78.5 kg (173 lb). His temperature is 99.1 °F (37.3 °C). His blood pressure is 135/65 and his pulse is 66. His respiration is 20 and oxygen saturation is 99%.     Chief Complaint: Fever    This is a 84 y.o. male   who presents today with a chief complaint of a fever that began a day ago. He's also complaining of muscle aches - left and right side of anterior chest. He states he has bad shoulders, so he believes these aches are associated with movement but is not sure. Pain is exacerbated when he takes a deep breath as well. His fever went as high as 101.2. He's been taking tylenol to help relieve his symptoms.     Fever   This is a new problem. The current episode started yesterday. The problem occurs constantly. The problem has been unchanged. Pertinent negatives include no congestion, coughing, ear pain, nausea, rash, sore throat, vomiting or wheezing. Treatments tried: tylenol. The treatment provided mild relief.       Constitution: Positive for fever. Negative for chills, sweating and fatigue.   HENT: Negative for ear pain, congestion, sinus pain, sinus pressure, sore throat and voice change.    Neck: Negative for painful lymph nodes.   Eyes: Negative for eye redness.   Respiratory: Negative for chest tightness, cough, sputum production, bloody sputum, COPD, shortness of breath, stridor, wheezing and asthma.    Gastrointestinal: Negative for nausea and vomiting.   Musculoskeletal: Positive for muscle ache.   Skin: Negative for rash.   Allergic/Immunologic: Negative for seasonal allergies and asthma.   Hematologic/Lymphatic: Negative for swollen lymph nodes.       Objective:      Physical Exam   Constitutional: He is oriented to person, place, and time. He appears well-developed. He is cooperative. He does not appear ill. No distress.   HENT:   Head: Normocephalic and atraumatic.   Ears: "   Right Ear: Hearing, tympanic membrane, external ear and ear canal normal.   Left Ear: Hearing, tympanic membrane, external ear and ear canal normal.   Nose: Nose normal. No mucosal edema or rhinorrhea. Right sinus exhibits no maxillary sinus tenderness and no frontal sinus tenderness. Left sinus exhibits no maxillary sinus tenderness and no frontal sinus tenderness.   Mouth/Throat: Uvula is midline, oropharynx is clear and moist and mucous membranes are normal. No oropharyngeal exudate, posterior oropharyngeal edema, posterior oropharyngeal erythema or cobblestoning.   Eyes: Conjunctivae, EOM and lids are normal.   Neck: Trachea normal, full passive range of motion without pain and phonation normal. Neck supple.   Cardiovascular: Regular rhythm and normal heart sounds. Exam reveals no gallop.   No murmur heard.  Pulmonary/Chest: Effort normal and breath sounds normal. No respiratory distress. He has no decreased breath sounds. He has no wheezes. He has no rhonchi. He has no rales. Chest wall is not dull to percussion. He exhibits no mass, no tenderness, no bony tenderness, no laceration, no crepitus, no edema, no deformity, no swelling and no retraction.   Musculoskeletal: Normal range of motion.   Neurological: He is alert and oriented to person, place, and time.   Skin: Skin is warm, dry, intact and not diaphoretic. Psychiatric: His speech is normal and behavior is normal. Judgment and thought content normal.   Nursing note and vitals reviewed.        Assessment:       1. Viral syndrome        Office Visit on 10/16/2020   Component Date Value Ref Range Status    POC Rapid COVID 10/16/2020 Negative  Negative Final     Acceptable 10/16/2020 Yes   Final    Rapid Influenza A Ag 10/16/2020 Negative  Negative Final    Rapid Influenza B Ag 10/16/2020 Negative  Negative Final     Acceptable 10/16/2020 Yes   Final     Xr Chest Pa And Lateral    Result Date: 10/16/2020  EXAMINATION: XR  "CHEST PA AND LATERAL CLINICAL HISTORY: Fever, unspecified TECHNIQUE: PA and lateral views of the chest were performed. COMPARISON: 08/08/2020 FINDINGS: The cardiomediastinal silhouette is not enlarged.  There is stable elevation of the right hemidiaphragm..  There is no pleural effusion.  The trachea is midline.  The lungs are symmetrically expanded bilaterally without evidence of acute parenchymal process. No large focal consolidation seen.  There is no pneumothorax.  The osseous structures are remarkable for degenerative changes..     1. No acute cardiopulmonary process. Electronically signed by: Ronnie Bundy MD Date:    10/16/2020 Time:    20:08    Plan:         Viral syndrome  -     POCT COVID-19 Rapid Screening  -     POCT Influenza A/B  -     XR CHEST PA AND LATERAL; Future; Expected date: 10/16/2020      Patient Instructions   Rest and stay hydrated.  Take tylenol/motrin as needed for pain/fever.    Please follow up with your primary care provider within 2-5 days if your signs and symptoms have not resolved or worsen.     If your condition worsens or fails to improve we recommend that you receive another evaluation at the emergency room immediately or contact your primary medical clinic to discuss your concerns.   You must understand that you have received an Urgent Care treatment only and that you may be released before all of your medical problems are known or treated. You, the patient, will arrange for follow up care as instructed.         Viral Syndrome (Adult)  A viral illness may cause a number of symptoms. The symptoms depend on the part of the body that the virus affects. If it settles in your nose, throat, and lungs, it may cause cough, sore throat, congestion, and sometimes headache. If it settles in your stomach and intestinal tract, it may cause vomiting and diarrhea. Sometimes it causes vague symptoms like "aching all over," feeling tired, loss of appetite, or fever.  A viral illness usually " lasts 1 to 2 weeks, but sometimes it lasts longer. In some cases, a more serious infection can look like a viral syndrome in the first few days of the illness. You may need another exam and additional tests to know the difference. Watch for the warning signs listed below.  Home care  Follow these guidelines for taking care of yourself at home:  · If symptoms are severe, rest at home for the first 2 to 3 days.  · Stay away from cigarette smoke - both your smoke and the smoke from others.  · You may use over-the-counter acetaminophen or ibuprofen for fever, muscle aching, and headache, unless another medicine was prescribed for this. If you have chronic liver or kidney disease or ever had a stomach ulcer or GI bleeding, talk with your doctor before using these medicines. No one who is younger than 18 and ill with a fever should take aspirin. It may cause severe disease or death.  · Your appetite may be poor, so a light diet is fine. Avoid dehydration by drinking 8 to 12 8-ounce glasses of fluids each day. This may include water; orange juice; lemonade; apple, grape, and cranberry juice; clear fruit drinks; electrolyte replacement and sports drinks; and decaffeinated teas and coffee. If you have been diagnosed with a kidney disease, ask your doctor how much and what types of fluids you should drink to prevent dehydration. If you have kidney disease, drinking too much fluid can cause it build up in the your body and be dangerous to your health.  · Over-the-counter remedies won't shorten the length of the illness but may be helpful for cough, sore throat; and nasal and sinus congestion. Don't use decongestants if you have high blood pressure.  Follow-up care  Follow up with your healthcare provider if you do not improve over the next week.  Call 911  Get emergency medical care if any of the following occur:  · Convulsion  · Feeling weak, dizzy, or like you are going to faint  · Chest pain, shortness of breath, wheezing,  or difficulty breathing  When to seek medical advice  Call your healthcare provider right away if any of these occur:  · Cough with lots of colored sputum (mucus) or blood in your sputum  · Chest pain, shortness of breath, wheezing, or difficulty breathing  · Severe headache; face, neck, or ear pain  · Severe, constant pain in the lower right side of your belly (abdominal)  · Continued vomiting (cant keep liquids down)  · Frequent diarrhea (more than 5 times a day); blood (red or black color) or mucus in diarrhea  · Feeling weak, dizzy, or like you are going to faint  · Extreme thirst  · Fever of 100.4°F (38°C) or higher, or as directed by your healthcare provider  Date Last Reviewed: 9/25/2015 © 2000-2017 [employer]. [address] [place] [address], [place] [address]. All rights reserved. This information is not intended as a substitute for professional medical care. Always follow your healthcare professional's instructions.

## 2020-10-17 ENCOUNTER — PATIENT MESSAGE (OUTPATIENT)
Dept: INTERNAL MEDICINE | Facility: CLINIC | Age: 85
End: 2020-10-17

## 2020-10-17 NOTE — PATIENT INSTRUCTIONS
"Rest and stay hydrated.  Take tylenol/motrin as needed for pain/fever.    Please follow up with your primary care provider within 2-5 days if your signs and symptoms have not resolved or worsen.     If your condition worsens or fails to improve we recommend that you receive another evaluation at the emergency room immediately or contact your primary medical clinic to discuss your concerns.   You must understand that you have received an Urgent Care treatment only and that you may be released before all of your medical problems are known or treated. You, the patient, will arrange for follow up care as instructed.         Viral Syndrome (Adult)  A viral illness may cause a number of symptoms. The symptoms depend on the part of the body that the virus affects. If it settles in your nose, throat, and lungs, it may cause cough, sore throat, congestion, and sometimes headache. If it settles in your stomach and intestinal tract, it may cause vomiting and diarrhea. Sometimes it causes vague symptoms like "aching all over," feeling tired, loss of appetite, or fever.  A viral illness usually lasts 1 to 2 weeks, but sometimes it lasts longer. In some cases, a more serious infection can look like a viral syndrome in the first few days of the illness. You may need another exam and additional tests to know the difference. Watch for the warning signs listed below.  Home care  Follow these guidelines for taking care of yourself at home:  · If symptoms are severe, rest at home for the first 2 to 3 days.  · Stay away from cigarette smoke - both your smoke and the smoke from others.  · You may use over-the-counter acetaminophen or ibuprofen for fever, muscle aching, and headache, unless another medicine was prescribed for this. If you have chronic liver or kidney disease or ever had a stomach ulcer or GI bleeding, talk with your doctor before using these medicines. No one who is younger than 18 and ill with a fever should take " aspirin. It may cause severe disease or death.  · Your appetite may be poor, so a light diet is fine. Avoid dehydration by drinking 8 to 12 8-ounce glasses of fluids each day. This may include water; orange juice; lemonade; apple, grape, and cranberry juice; clear fruit drinks; electrolyte replacement and sports drinks; and decaffeinated teas and coffee. If you have been diagnosed with a kidney disease, ask your doctor how much and what types of fluids you should drink to prevent dehydration. If you have kidney disease, drinking too much fluid can cause it build up in the your body and be dangerous to your health.  · Over-the-counter remedies won't shorten the length of the illness but may be helpful for cough, sore throat; and nasal and sinus congestion. Don't use decongestants if you have high blood pressure.  Follow-up care  Follow up with your healthcare provider if you do not improve over the next week.  Call 911  Get emergency medical care if any of the following occur:  · Convulsion  · Feeling weak, dizzy, or like you are going to faint  · Chest pain, shortness of breath, wheezing, or difficulty breathing  When to seek medical advice  Call your healthcare provider right away if any of these occur:  · Cough with lots of colored sputum (mucus) or blood in your sputum  · Chest pain, shortness of breath, wheezing, or difficulty breathing  · Severe headache; face, neck, or ear pain  · Severe, constant pain in the lower right side of your belly (abdominal)  · Continued vomiting (cant keep liquids down)  · Frequent diarrhea (more than 5 times a day); blood (red or black color) or mucus in diarrhea  · Feeling weak, dizzy, or like you are going to faint  · Extreme thirst  · Fever of 100.4°F (38°C) or higher, or as directed by your healthcare provider  Date Last Reviewed: 9/25/2015  © 1873-8664 [employer]. [address] [place] [address], [place] [address]. All rights reserved. This information is not intended  as a substitute for professional medical care. Always follow your healthcare professional's instructions.

## 2020-10-22 ENCOUNTER — TELEPHONE (OUTPATIENT)
Dept: INTERNAL MEDICINE | Facility: CLINIC | Age: 85
End: 2020-10-22

## 2020-10-22 ENCOUNTER — PATIENT MESSAGE (OUTPATIENT)
Dept: INTERNAL MEDICINE | Facility: CLINIC | Age: 85
End: 2020-10-22

## 2020-10-22 NOTE — TELEPHONE ENCOUNTER
----- Message from Torie Juan sent at 10/22/2020 10:10 AM CDT -----  Contact: Pt [phone]  Patient is requesting a call about a medication. Has been waiting for a call about 2-3 days.    Please call and advise.    Thank You

## 2020-10-23 DIAGNOSIS — N40.1 BENIGN NON-NODULAR PROSTATIC HYPERPLASIA WITH LOWER URINARY TRACT SYMPTOMS: ICD-10-CM

## 2020-10-23 DIAGNOSIS — R97.20 ELEVATED PSA: ICD-10-CM

## 2020-10-26 ENCOUNTER — PATIENT MESSAGE (OUTPATIENT)
Dept: INTERNAL MEDICINE | Facility: CLINIC | Age: 85
End: 2020-10-26

## 2020-10-26 ENCOUNTER — TELEPHONE (OUTPATIENT)
Dept: INTERNAL MEDICINE | Facility: CLINIC | Age: 85
End: 2020-10-26

## 2020-10-26 RX ORDER — FINASTERIDE 5 MG/1
5 TABLET, FILM COATED ORAL DAILY
Qty: 90 TABLET | Refills: 3 | Status: SHIPPED | OUTPATIENT
Start: 2020-10-26

## 2020-10-26 NOTE — PLAN OF CARE
Recommendations  1. Continue cardiac diet + Boost Glucose Control as tolerated. Encourage PO intake.   2. RD to monitor.     Goals: Adequate PO intake to meet > 75% EEN/EPN by RD follow up  Nutrition Goal Status: new  Communication of RD Recs: other (comment)(POC)      Universal Safety Interventions

## 2020-10-26 NOTE — TELEPHONE ENCOUNTER
----- Message from Amy Nelson sent at 10/26/2020 10:38 AM CDT -----  Contact: Self   Pt is requesting a call regarding status of rx for shoes. Please advise

## 2020-10-27 NOTE — TELEPHONE ENCOUNTER
Please have patient call his [employer] store we faxed all the forms weeks ago. I have not heard from them that they need anything else.

## 2020-11-02 ENCOUNTER — PATIENT MESSAGE (OUTPATIENT)
Dept: INTERNAL MEDICINE | Facility: CLINIC | Age: 85
End: 2020-11-02

## 2020-11-03 DIAGNOSIS — Z79.4 TYPE 2 DIABETES MELLITUS WITH OTHER SPECIFIED COMPLICATION, WITH LONG-TERM CURRENT USE OF INSULIN: ICD-10-CM

## 2020-11-03 DIAGNOSIS — E11.69 TYPE 2 DIABETES MELLITUS WITH OTHER SPECIFIED COMPLICATION, WITH LONG-TERM CURRENT USE OF INSULIN: ICD-10-CM

## 2020-11-03 RX ORDER — INSULIN GLARGINE 100 [IU]/ML
INJECTION, SOLUTION SUBCUTANEOUS
Qty: 60 ML | Refills: 3 | Status: SHIPPED | OUTPATIENT
Start: 2020-11-03

## 2020-11-03 RX ORDER — INSULIN ASPART 100 [IU]/ML
INJECTION, SOLUTION INTRAVENOUS; SUBCUTANEOUS
Qty: 30 ML | Refills: 3 | Status: SHIPPED | OUTPATIENT
Start: 2020-11-03

## 2020-11-04 ENCOUNTER — PATIENT MESSAGE (OUTPATIENT)
Dept: INTERNAL MEDICINE | Facility: CLINIC | Age: 85
End: 2020-11-04

## 2020-11-04 ENCOUNTER — TELEPHONE (OUTPATIENT)
Dept: INTERNAL MEDICINE | Facility: CLINIC | Age: 85
End: 2020-11-04

## 2020-11-04 NOTE — TELEPHONE ENCOUNTER
----- Message from Latisha Seals sent at 11/4/2020  9:56 AM CST -----  Contact: [phone]  Patient called to follow up on the status of the forms that needs Dr. Martinez signature for diabetic shoes. Please call and advise.

## 2020-11-08 ENCOUNTER — PATIENT OUTREACH (OUTPATIENT)
Dept: ADMINISTRATIVE | Facility: OTHER | Age: 85
End: 2020-11-08

## 2020-11-08 ENCOUNTER — PATIENT MESSAGE (OUTPATIENT)
Dept: INTERNAL MEDICINE | Facility: CLINIC | Age: 85
End: 2020-11-08

## 2020-11-09 ENCOUNTER — OFFICE VISIT (OUTPATIENT)
Dept: CARDIOLOGY | Facility: CLINIC | Age: 85
End: 2020-11-09
Payer: MEDICARE

## 2020-11-09 ENCOUNTER — TELEPHONE (OUTPATIENT)
Dept: CARDIOLOGY | Facility: CLINIC | Age: 85
End: 2020-11-09

## 2020-11-09 VITALS
HEIGHT: 66 IN | DIASTOLIC BLOOD PRESSURE: 62 MMHG | BODY MASS INDEX: 28.67 KG/M2 | SYSTOLIC BLOOD PRESSURE: 120 MMHG | OXYGEN SATURATION: 98 % | HEART RATE: 64 BPM | WEIGHT: 178.38 LBS

## 2020-11-09 DIAGNOSIS — I21.4 NSTEMI (NON-ST ELEVATED MYOCARDIAL INFARCTION): ICD-10-CM

## 2020-11-09 DIAGNOSIS — E78.2 MIXED HYPERLIPIDEMIA: ICD-10-CM

## 2020-11-09 DIAGNOSIS — N18.32 TYPE 2 DIABETES MELLITUS WITH STAGE 3B CHRONIC KIDNEY DISEASE, WITH LONG-TERM CURRENT USE OF INSULIN: ICD-10-CM

## 2020-11-09 DIAGNOSIS — I25.10 CORONARY ARTERY DISEASE INVOLVING NATIVE CORONARY ARTERY OF NATIVE HEART WITHOUT ANGINA PECTORIS: ICD-10-CM

## 2020-11-09 DIAGNOSIS — Z79.4 TYPE 2 DIABETES MELLITUS WITH STAGE 3B CHRONIC KIDNEY DISEASE, WITH LONG-TERM CURRENT USE OF INSULIN: ICD-10-CM

## 2020-11-09 DIAGNOSIS — I25.10 CORONARY ARTERY DISEASE INVOLVING NATIVE CORONARY ARTERY OF NATIVE HEART WITHOUT ANGINA PECTORIS: Primary | ICD-10-CM

## 2020-11-09 DIAGNOSIS — I87.2 VENOUS INSUFFICIENCY: ICD-10-CM

## 2020-11-09 DIAGNOSIS — I10 ESSENTIAL HYPERTENSION: ICD-10-CM

## 2020-11-09 DIAGNOSIS — E11.22 TYPE 2 DIABETES MELLITUS WITH STAGE 3B CHRONIC KIDNEY DISEASE, WITH LONG-TERM CURRENT USE OF INSULIN: ICD-10-CM

## 2020-11-09 PROCEDURE — 3074F PR MOST RECENT SYSTOLIC BLOOD PRESSURE < 130 MM HG: ICD-10-PCS | Mod: CPTII,S$GLB,, | Performed by: INTERNAL MEDICINE

## 2020-11-09 PROCEDURE — 3051F PR MOST RECENT HEMOGLOBIN A1C LEVEL 7.0 - < 8.0%: ICD-10-PCS | Mod: CPTII,S$GLB,, | Performed by: INTERNAL MEDICINE

## 2020-11-09 PROCEDURE — 1101F PT FALLS ASSESS-DOCD LE1/YR: CPT | Mod: CPTII,S$GLB,, | Performed by: INTERNAL MEDICINE

## 2020-11-09 PROCEDURE — 1159F MED LIST DOCD IN RCRD: CPT | Mod: S$GLB,,, | Performed by: INTERNAL MEDICINE

## 2020-11-09 PROCEDURE — 3051F HG A1C>EQUAL 7.0%<8.0%: CPT | Mod: CPTII,S$GLB,, | Performed by: INTERNAL MEDICINE

## 2020-11-09 PROCEDURE — 3074F SYST BP LT 130 MM HG: CPT | Mod: CPTII,S$GLB,, | Performed by: INTERNAL MEDICINE

## 2020-11-09 PROCEDURE — 99999 PR PBB SHADOW E&M-EST. PATIENT-LVL V: ICD-10-PCS | Mod: PBBFAC,,, | Performed by: INTERNAL MEDICINE

## 2020-11-09 PROCEDURE — 3078F DIAST BP <80 MM HG: CPT | Mod: CPTII,S$GLB,, | Performed by: INTERNAL MEDICINE

## 2020-11-09 PROCEDURE — 1126F AMNT PAIN NOTED NONE PRSNT: CPT | Mod: S$GLB,,, | Performed by: INTERNAL MEDICINE

## 2020-11-09 PROCEDURE — 99999 PR PBB SHADOW E&M-EST. PATIENT-LVL V: CPT | Mod: PBBFAC,,, | Performed by: INTERNAL MEDICINE

## 2020-11-09 PROCEDURE — 99214 PR OFFICE/OUTPT VISIT, EST, LEVL IV, 30-39 MIN: ICD-10-PCS | Mod: S$GLB,,, | Performed by: INTERNAL MEDICINE

## 2020-11-09 PROCEDURE — 3078F PR MOST RECENT DIASTOLIC BLOOD PRESSURE < 80 MM HG: ICD-10-PCS | Mod: CPTII,S$GLB,, | Performed by: INTERNAL MEDICINE

## 2020-11-09 PROCEDURE — 1101F PR PT FALLS ASSESS DOC 0-1 FALLS W/OUT INJ PAST YR: ICD-10-PCS | Mod: CPTII,S$GLB,, | Performed by: INTERNAL MEDICINE

## 2020-11-09 PROCEDURE — 99214 OFFICE O/P EST MOD 30 MIN: CPT | Mod: S$GLB,,, | Performed by: INTERNAL MEDICINE

## 2020-11-09 PROCEDURE — 1159F PR MEDICATION LIST DOCUMENTED IN MEDICAL RECORD: ICD-10-PCS | Mod: S$GLB,,, | Performed by: INTERNAL MEDICINE

## 2020-11-09 PROCEDURE — 1126F PR PAIN SEVERITY QUANTIFIED, NO PAIN PRESENT: ICD-10-PCS | Mod: S$GLB,,, | Performed by: INTERNAL MEDICINE

## 2020-11-09 RX ORDER — METOPROLOL SUCCINATE 50 MG/1
50 TABLET, EXTENDED RELEASE ORAL DAILY
Qty: 30 TABLET | Refills: 11 | Status: SHIPPED | OUTPATIENT
Start: 2020-11-09 | End: 2021-02-19

## 2020-11-09 RX ORDER — METOPROLOL SUCCINATE 25 MG/1
25 TABLET, EXTENDED RELEASE ORAL DAILY
Qty: 30 TABLET | Refills: 11 | Status: SHIPPED | OUTPATIENT
Start: 2020-11-09 | End: 2020-11-09 | Stop reason: SDUPTHER

## 2020-11-09 NOTE — PROGRESS NOTES
Requested updates within Care Everywhere.  Patient's chart was reviewed for overdue SOPHIE topics.  Immunizations reconciled.

## 2020-11-09 NOTE — TELEPHONE ENCOUNTER
Patient daughter calling to report patient wrongly reported at visit today was off of Metoprolol but has actually stopped taking Irbesartan. States they had tried giving him half doses of Irbsartan but BP was still too low so he has not been taking it. Wants to clarify with Dr. Shin if ok to continue off of Irbesartan and instead take Metoprolol tartrate 50mg BID as before or change to succinate 50mg daily.

## 2020-11-09 NOTE — TELEPHONE ENCOUNTER
----- Message from Joie Nguyễn sent at 11/9/2020  2:04 PM CST -----  Regarding: Medication  Pt daughter Maria Elena [phone] would like a call in ref to her dad appt he had with Dr. Shin today. She would like to discuss his Avapro 75 mg. She says the notes are in the computer, and he was started on a new medicine when he shouldn't have.  LOV 11/9/20 Dr. Shin    Thanks

## 2020-11-09 NOTE — PROGRESS NOTES
"Subjective:    Patient ID:  Anjum Cai Jr. is a 84 y.o. male who presents for follow-up of CAD    HPI     The patient is a 84 year old male with CAD poat PCI 2007, hypertension, diabetes, hyperlipidemia, DAMIAN and CKD 3. He was admitted 8/6/20 with a NSTEMI and had a PCI to RCA. During that admit he ws noted to have abnormal LFT and was noted to have common bile duct stone. He had a ERCP 9/9/20 and underwent a biliary sphincterotomy. He was started on radiation Tx for prostate cancer in May and when last seen 8/25/20 had lost 25#. He is doing well mowing lawn etc. He deies chest pain or MYERS. He has regained 9#. He had stopped  Metoprolol due to "low blood pressure".   Lab Results   Component Value Date     10/05/2020    K 4.3 10/05/2020     10/05/2020    CO2 28 10/05/2020    BUN 18 10/05/2020    CREATININE 1.2 10/05/2020     (H) 10/05/2020    HGBA1C 7.7 (H) 08/06/2020    MG 2.2 08/14/2020    AST 34 10/05/2020    ALT 25 10/05/2020    ALBUMIN 2.9 (L) 10/05/2020    PROT 7.8 10/05/2020    BILITOT 0.5 10/05/2020    WBC 5.22 10/05/2020    HGB 9.5 (L) 10/05/2020    HCT 31.7 (L) 10/05/2020    MCV 96 10/05/2020     10/05/2020    INR 1.1 08/06/2020    PSA 10.06 (H) 02/26/2013    TSH 1.336 10/16/2013         Lab Results   Component Value Date    CHOL 122 08/06/2020    HDL 27 (L) 08/06/2020    TRIG 97 08/06/2020       Lab Results   Component Value Date    LDLCALC 75.6 08/06/2020       Past Medical History:   Diagnosis Date    Anemia 2/26/2018    Arthritis     BPH (benign prostatic hyperplasia)     Cataract     right     Choledocholithiasis     Colon polyp 11/18/2015    Colon polyps     Coronary artery disease     DR (diabetic retinopathy)     Dyslipidemia     Elevated PSA     Goiter, nontoxic, multinodular     Headaches, cluster     Hypertension     Kidney stones     Lump in the testicle     Left    Prostate cancer     Rotator cuff tendinitis     Right shoulder    Sleep apnea " with use of continuous positive airway pressure (CPAP)     uses cpap at night    Type II or unspecified 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 (Patient not taking: Reported on 8/25/2020), Disp: 6.6 mL, Rfl: 11    ciclopirox 0.77 % Gel, Apply topically 2 (two) times daily., Disp: 100 g, Rfl: 3    ergocalciferol (ERGOCALCIFEROL) 50,000 unit Cap, TAKE 1 CAPSULE BY MOUTH ONCE EVERY 30 DAYS, Disp: 4 capsule, Rfl: 3    finasteride (PROSCAR) 5 mg tablet, Take 1 tablet (5 mg total) by mouth once daily., Disp: 90 tablet, Rfl: 3    FLUAD 7413-4162, 65 YR UP,,PF, 45 mcg (15 mcg x 3)/0.5 mL Syrg, , Disp: , Rfl:     insulin (LANTUS SOLOSTAR U-100 INSULIN) glargine 100 units/mL (3mL) SubQ pen, ADMINISTER 65 UNITS UNDER THE SKIN EVERY EVENING, Disp: 60 mL, Rfl: 3    insulin aspart U-100 (NOVOLOG FLEXPEN U-100 INSULIN) 100 unit/mL (3 mL) InPn pen, ADMINISTER 12 UNITS UNDER THE SKIN THREE TIMES DAILY WITH MEALS PER SLIDING SCALE, Disp: 30 mL, Rfl: 3    irbesartan (AVAPRO) 75 MG tablet, Take 1 tablet (75 mg total) by mouth once daily., Disp: 30 tablet, Rfl: 6    lancets (ACCU-CHEK FASTCLIX LANCET DRUM) Misc, Check blood sugars four times a day., Disp: 204 each, Rfl: PRN    metoprolol tartrate (LOPRESSOR) 50 MG tablet, Take 1 tablet (50 mg total) by mouth 2 (two) times daily., Disp: 60 tablet, Rfl: 11    mometasone 0.1% (ELOCON) 0.1 % cream, Apply to affected area daily, Disp: 45 g, Rfl: 1    MULTIVITAMIN W-MINERALS/LUTEIN (CENTRUM SILVER  "ORAL), Take by mouth once daily., Disp: , Rfl:     nitroGLYCERIN (NITROSTAT) 0.4 MG SL tablet, Place 1 tablet (0.4 mg total) under the tongue every 5 (five) minutes as needed for Chest pain., Disp: 60 tablet, Rfl: 12    omega-3 fatty acids-vitamin E (FISH OIL) 1,000 mg Cap, Take 2 capsules by mouth Daily. , Disp: , Rfl:     omeprazole (PRILOSEC) 40 MG capsule, Take 1 capsule (40 mg total) by mouth once daily., Disp: 90 capsule, Rfl: 3    pen needle, diabetic (BD ULTRA-FINE MINI PEN NEEDLE) 31 gauge x 3/16" Ndle, Use new needle with each injection. Inject insulin 4 times a day., Disp: 100 each, Rfl: 3    simethicone (GAS-X ORAL), Take by mouth., Disp: , Rfl:     ticagrelor (BRILINTA) 90 mg tablet, Take 1 tablet (90 mg total) by mouth 2 (two) times daily., Disp: 60 tablet, Rfl: 11    ticagrelor (BRILINTA) 90 mg tablet, Take 1 tablet (90 mg total) by mouth 2 (two) times daily. (Patient not taking: Reported on 10/16/2020), Disp: 60 tablet, Rfl: 11          Review of Systems   Constitution: Positive for weight gain.        Objective:There were no vitals taken for this visit.            Physical Exam   Constitutional: He is oriented to person, place, and time. He appears well-developed and well-nourished.   HENT:   Head: Normocephalic.   Right Ear: External ear normal.   Left Ear: External ear normal.   Nose: Nose normal.   Inspection of lips, teeth and gums normal   Eyes: Pupils are equal, round, and reactive to light. EOM are normal. No scleral icterus.   Neck: Normal range of motion. Neck supple. No JVD present. No tracheal deviation present. No thyromegaly present.   Cardiovascular: Normal rate, regular rhythm and intact distal pulses. Exam reveals no gallop and no friction rub.   No murmur heard.  Pulses:       Dorsalis pedis pulses are 0 on the right side and 0 on the left side.        Posterior tibial pulses are 0 on the right side and 0 on the left side.   Pulmonary/Chest: Effort normal and breath sounds " normal.   Abdominal: Bowel sounds are normal. He exhibits no distension. There is no hepatosplenomegaly. There is no abdominal tenderness. There is no guarding.   Musculoskeletal: Normal range of motion.         General: No tenderness or edema.   Lymphadenopathy:   Palpation of neck and groin lymph nodes normal   Neurological: He is alert and oriented to person, place, and time. No cranial nerve deficit. He exhibits normal muscle tone. Coordination normal.   Skin: Skin is dry.   Palpation of skin normal   Psychiatric: His behavior is normal. Judgment and thought content normal.         Assessment:       No diagnosis found.     Plan:       There are no diagnoses linked to this encounter.

## 2020-11-10 ENCOUNTER — PATIENT MESSAGE (OUTPATIENT)
Dept: INTERNAL MEDICINE | Facility: CLINIC | Age: 85
End: 2020-11-10

## 2020-11-11 ENCOUNTER — PATIENT MESSAGE (OUTPATIENT)
Dept: CARDIOLOGY | Facility: CLINIC | Age: 85
End: 2020-11-11

## 2020-11-11 ENCOUNTER — PATIENT MESSAGE (OUTPATIENT)
Dept: INTERNAL MEDICINE | Facility: CLINIC | Age: 85
End: 2020-11-11

## 2020-11-12 NOTE — TELEPHONE ENCOUNTER
Notified patient that I will fax over PT order patient verbally understand.   Pulmonary and Critical Care Consult Note  [facility]    MR# [id number]    Acct# [de-identified]  11/11/2020   6:26 PM CST    Referring Provider:Davidson Thorpe DO     Chief Complaint:   Acute hypoxic respiratory failure  Large left pleural effusion  Pneumonia  History of stroke  Possible dementia  Hypertension  Diabetes mellitus type 2. HPI: We have been consulted to see this 82y.o. year old female born on 2/17/1929. Patient is elderly  female who was seen as inpatient pulmonary consult. She was transferred from outside hospital for shortness of breath and was diagnosed with pneumonia the large left-sided pleural effusion. Patient is a very poor historian and may have some underlying dementia. She cannot provide much history apparently she did not have any fever chills rigors but had some cough at home. She was tested negative for Covid to my knowledge. X-ray showed a large left-sided pleural effusion and an ultrasound-guided thoracentesis was done today and 1 L of pleural fluid was aspirated. The fluid was sent to the laboratory for analysis. Patient did not have any history of tobacco abuse and was not on any home oxygen. She used to live in a nursing home but currently been at home with her son. No further history could be obtained from the patient. She had no exposure to any Covid patient is sick contact and recent travel did not have any recent hospitalizations or pneumonia. She did not have any fever chills rigors cough or sputum production and was afebrile and hemodynamically stable at the time of my visit.         Past Medical History      Past Medical History:   Diagnosis Date    Blood circulation, collateral     Cerebral artery occlusion with cerebral infarction ([address]     Diabetes mellitus [address] [address]     Hyperlipidemia     Hypertension      SurgicalHistory  Past Surgical History:   Procedure Laterality Date    CHOLECYSTECTOMY       Allergies  Allergies Allergen Reactions    Erythromycin     Niacin And Related     Penicillins     Sulfa Antibiotics      Medications  sodium chloride flush, 10 mL, Intravenous, 2 times per day    ipratropium-albuterol, 1 ampule, Inhalation, Q4H WA    piperacillin-tazobactam, 3.375 g, Intravenous, Q8H **AND** levofloxacin, 750 mg, Intravenous, Q24H    insulin lispro, 0-6 Units, Subcutaneous, TID WC    insulin lispro, 0-3 Units, Subcutaneous, Nightly    vancomycin (VANCOCIN) intermittent dosing (placeholder), , Other, RX Placeholder    [START ON 11/12/2020] vancomycin, 1,000 mg, Intravenous, Q24H   Social History   reports that she does not drink alcohol. Family History  family history is not on file. Review of Systems:  Review of Systems   Unable to perform ROS: Dementia   And advanced age. Physical Exam:   height is 5' 7\" (1.702 m) and weight is 122 lb 9 oz (55.6 kg). Her temperature is 97.6 °F (36.4 °C). Her blood pressure is 108/77 and her pulse is 61. Her respiration is 16 and oxygen saturation is 96%. No intake or output data in the 24 hours ending 11/11/20 1826  Physical Exam     Early  female lying in the bed looks tired and lethargic but not short of breath. HEENT: Atraumatic normocephalic pupil equal reactive light and accommodation. Neck: Supple no mass no jugular distention no lymphadenopathy no thyroid swelling. Heart: Sounds normal rate and rhythm regular no murmur. No gallop. Lungs: Bibasilar crackles and diminished air entry in left lung. Abdomen: Soft nontender bowel sounds present no organomegaly. Extremities: No edema normal pulses normal color  Neurologic: Grossly intact no focal deficit. Skin: A few chronic changes no breakdown. Psych: Patient appears to have forgetfulness and may have dementia.       Recent Labs     11/11/20  0741   WBC 14.0*   RBC 3.25*   HGB 9.4*   HCT 29.2*      MCV 89.8   MCH 28.9   MCHC 32.2*   RDW 13.1      Recent Labs     11/11/20  0741   *   K 3.7   CL 97*   CO2 23   BUN 11   CREATININE 0.6   CALCIUM 9.1   GLUCOSE 170*      No results for input(s): PHART, BBY1ZTS, PO2ART, KPM1OBB, A6IQJSBG, BEART in the last 72 hours. Recent Labs     11/11/20  0741   AST 19   ALT 10   ALKPHOS 64   BILITOT 0.4   MG 1.6   CALCIUM 9.1   TROPONINI <0.01   INR 1.25*     No results for input(s): BC, LABGRAM, CULTRESP, BFCX in the last 72 hours. Radiograph: Xr Chest Portable    Result Date: 11/11/2020  Examination. XR CHEST PORTABLE 11/11/2020 8:01 AM History: Shortness of breath. A single frontal portable upright view of the chest is obtained. There is no previous study for comparison. There is a left lower lung consolidation and a large pleural effusion, free and loculated, in the left chest extending into the left apex. The right lung is poorly distended. No obvious abnormality, infiltrate or consolidation. The Heart size is not evaluated in this study. The distal trachea is displaced towards the right which is probably due to a tortuous and atheromatous thoracic aorta. There is no pneumothorax. Consolidation and pleural effusion in the left chest. The right lung is unremarkable. Signed by Dr Yesica Jacobs on 11/11/2020 3:41 PM    Us Thoracentesis    Result Date: 11/11/2020  EXAM: US THORACENTESIS -- 11/11/2020 1:12 PM HISTORY: 82 years, Female, left pleural effusion, pneumonia on chart review. COMPARISON: No existing relevant imaging studies available PROCEDURE: Risks, benefits and alternatives to the procedure were discussed with the patient. Written informed consent obtained. Time Out: Immediately prior to the procedure, a time out was performed. The patient's name, date of birth, and procedure (including laterality) to be performed were verified. Using ultrasound guidance, a site in the left posterior chest was selected and prepped in a sterile fashion. Lidocaine was used for local anesthesia. A 4 [place] one-step catheter was inserted into the left pleural space. 1000 mL of serosanguineous fluid was withdrawn. The catheter was then withdrawn. The fluid was sent to the laboratory for analysis. The patient tolerated the procedure well. No evidence of complication. Successful ultrasound-guided diagnostic and therapeutic left thoracentesis with drainage of 1000 mL of serous sanguinous pleural fluid. Signed by Dr Krista Menendez on 2020 4:11 PM    My radiograph interpretation/independent review of imaging: Reviewed: Other test results (not lab or imaging): Agree with current interpretation. Independent review of ekg: None  Problem list generated by [employer]:  Patient Active Problem List   Diagnosis    Pneumonia     Pulmonary Assessment:  New problem (to me), with additional workup planned: Large left pleural effusion likely parapneumonic effusion versus heart failure. New problem (to me), no additional workup planned: Pneumonia  Other problems either stable, failing to improve or worsenin. Acute hypoxic respiratory failure  2. Large left-sided pleural effusion  3. Status post ultrasound-guided thoracentesis  4. Possible heart failure versus parapneumonic effusion  5. Community-acquired pneumonia  6. Hypertension  7. Hyperlipidemia  8. Diabetes mellitus  9. History of stroke. Recommend/plan:   · Ultrasound-guided thoracentesis done and pleural fluid sent for testing. · For pleural fluid analysis to decide whether it is a transudate or exudate. · New empiric antibiotic treatment for possible pneumonia and supple respiratory care and bronchodilators. · Incentive spirometry to improve pulmonary compliance and atelectasis. · PT OT speech therapy evaluation. · DVT and ulcer prophylaxis and pain and anxiety control. · Continue current plan and supportive care and nutritional support. · Repeat labs tomorrow morning. Imaging studies as needed. · Pulmonary team will continue following and make further recommendations. · CODE STATUS: DNR.   · Time spent in seeing this patient as pulmonary consult 45 minutes. · We appreciate the consult and like to thank the referring physician. Thank you for this consult.   We will follow    Electronically signed by  Madeleine Lindquist MD   Pulmonologist/intensivist.    On 11/11/20 at 6:26 PM

## 2020-11-16 ENCOUNTER — TELEPHONE (OUTPATIENT)
Dept: CARDIAC REHAB | Facility: CLINIC | Age: 85
End: 2020-11-16

## 2021-01-28 ENCOUNTER — TELEPHONE (OUTPATIENT)
Dept: PODIATRY | Facility: CLINIC | Age: 86
End: 2021-01-28

## 2021-03-02 ENCOUNTER — PES CALL (OUTPATIENT)
Dept: ADMINISTRATIVE | Facility: CLINIC | Age: 86
End: 2021-03-02

## 2021-08-03 ENCOUNTER — PATIENT MESSAGE (OUTPATIENT)
Dept: ADMINISTRATIVE | Facility: HOSPITAL | Age: 86
End: 2021-08-03

## 2022-03-16 ENCOUNTER — PATIENT MESSAGE (OUTPATIENT)
Dept: ADMINISTRATIVE | Facility: HOSPITAL | Age: 87
End: 2022-03-16
Payer: MEDICARE

## 2022-07-06 NOTE — NURSING
Pt. AAOX4, NAD, denies pain and SOB, VS stable for Pt., AC HS, Insulin Aspart administered per order, Midline in place, Will monitor.    Price (Do Not Change): 0.00 Instructions: This plan will send the code FBSD to the PM system.  DO NOT or CHANGE the price. Detail Level: Simple

## 2023-01-03 ENCOUNTER — PATIENT OUTREACH (OUTPATIENT)
Dept: ADMINISTRATIVE | Facility: HOSPITAL | Age: 88
End: 2023-01-03
Payer: MEDICARE

## 2023-01-10 NOTE — PROGRESS NOTES
"Subjective:       Patient ID: Anjum Cai Jr. is a 84 y.o. male.    Vitals:  height is 5' 6" (1.676 m) and weight is 77.1 kg (170 lb). His temperature is 97.6 °F (36.4 °C). His blood pressure is 117/64 and his pulse is 79. His respiration is 18 and oxygen saturation is 98%.     Chief Complaint: Epistaxis    This is a 84 y.o. male   who presents today with a chief complaint of a nosebleed that began today. He's not complaining of nay headache or light headedness. He's been using ice to help relieve his symptoms. CBC done yesterday. Taking brillinta for a stent placed 2 weeks ago.  Platelets 314 and have been normal.  History of nosebleeds as a child-- had both side cauterized, but has not had that in a while.     Epistaxis   This is a new problem. The current episode started today. He has experienced no nasal trauma. The problem occurs constantly. The problem has been unchanged. The bleeding is associated with nothing. He has tried ice for the symptoms.       Constitution: Negative for chills, fatigue and fever.   HENT: Positive for nosebleeds. Negative for congestion and sore throat.    Neck: Negative for painful lymph nodes.   Cardiovascular: Negative for chest pain and leg swelling.   Eyes: Negative for double vision and blurred vision.   Respiratory: Negative for cough and shortness of breath.    Gastrointestinal: Negative for nausea, vomiting and diarrhea.   Genitourinary: Negative for dysuria, frequency and urgency.   Musculoskeletal: Negative for joint pain, joint swelling, muscle cramps and muscle ache.   Skin: Negative for color change, pale and rash.   Allergic/Immunologic: Negative for seasonal allergies.   Neurological: Negative for dizziness, history of vertigo, light-headedness, passing out and headaches.   Hematologic/Lymphatic: Negative for swollen lymph nodes, easy bruising/bleeding and history of blood clots. Does not bruise/bleed easily.   Psychiatric/Behavioral: Negative for nervous/anxious, " Jose Lozano (1988) initiated an asynchronous digital communication through [employer]. HPI: per patient questionnaire     Exam: not applicable    Diagnoses and all orders for this visit:  Diagnoses and all orders for this visit:    1. Acute nasopharyngitis  Has had symptoms for 7 days, most likely viral, recommendations for symptomatic treatment provided. Advised to contact me if symptoms persist for more than 2 weeks without improvement. Time: EV1 - 5-10 minutes were spent on the digital evaluation and management of this patient.       Jose L Rodriguez NP sleep disturbance and depression. The patient is not nervous/anxious.        Objective:      Physical Exam   Constitutional: He is oriented to person, place, and time. He appears well-developed. He is cooperative.  Non-toxic appearance. He does not appear ill. No distress.   HENT:   Head: Normocephalic and atraumatic.   Ears:   Right Ear: Hearing, tympanic membrane, external ear and ear canal normal.   Left Ear: Hearing, tympanic membrane, external ear and ear canal normal.   Nose: Nose normal. No mucosal edema, rhinorrhea or nasal deformity. No epistaxis. Right sinus exhibits no maxillary sinus tenderness and no frontal sinus tenderness. Left sinus exhibits no maxillary sinus tenderness and no frontal sinus tenderness.   Mouth/Throat: Uvula is midline, oropharynx is clear and moist and mucous membranes are normal. No trismus in the jaw. Normal dentition. No uvula swelling. No oropharyngeal exudate, posterior oropharyngeal edema or posterior oropharyngeal erythema.   Eyes: Conjunctivae and lids are normal. No scleral icterus.   Neck: Trachea normal, full passive range of motion without pain and phonation normal. Neck supple. No neck rigidity. No edema and no erythema present.   Cardiovascular: Normal rate, regular rhythm, normal heart sounds and normal pulses.   Pulmonary/Chest: Effort normal and breath sounds normal. No respiratory distress. He has no decreased breath sounds. He has no rhonchi.   Abdominal: Normal appearance.   Musculoskeletal: Normal range of motion.         General: No deformity.   Neurological: He is alert and oriented to person, place, and time. He exhibits normal muscle tone. Coordination normal.   Skin: Skin is warm, dry, intact, not diaphoretic and not pale. Psychiatric: His speech is normal and behavior is normal. Judgment and thought content normal.   Nursing note and vitals reviewed.        Results for orders placed or performed in visit on 08/25/20   POCT Urinalysis, Dipstick, Automated,  W/O Scope   Result Value Ref Range    POC Blood, Urine Positive (A) Negative    POC Bilirubin, Urine Positive (A) Negative    POC Urobilinogen, Urine normal 0.3 - 2.2    POC Ketones, Urine Negative Negative    POC Protein, Urine Positive (A) Negative    POC Nitrates, Urine Negative Negative    POC Glucose, Urine Negative Negative    pH, UA 6.0 5 - 8    POC Specific Gravity, Urine 1.015 1.003 - 1.029    POC Leukocytes, Urine Negative Negative       Assessment:       1. Epistaxis    2. Antiplatelet or antithrombotic long-term use        Plan:         Epistaxis  -     POCT Urinalysis, Dipstick, Automated, W/O Scope    Antiplatelet or antithrombotic long-term use  -     POCT Urinalysis, Dipstick, Automated, W/O Scope

## 2024-03-23 NOTE — PROGRESS NOTES
"History & Physical    SUBJECTIVE:     History of Present Illness:  Patient is a 82 y.o. male presents with gallstones.  He has 8/10 pain in his upper abdoman.  It is a pressure type of pain that radiates to the lower chest.  It is intermittent, coming every few weeks and lasting 20 minutes.  During one attack nitro seemed to make it better but he has no other inciting or relieving factors.  He has not lost weight.  The pain is not affected by eating or bowel habits.     No chief complaint on file.      Review of patient's allergies indicates:   Allergen Reactions    Ace inhibitors      Other reaction(s): cough    Cefadroxil      Other reaction(s): possible vasculitis  Other reaction(s): vasculitis       Current Outpatient Prescriptions   Medication Sig Dispense Refill    ACCU-CHEK SMARTVIEW TEST STRIP Strp       ASPIRIN (ASPIR-81 ORAL) Take 1 tablet by mouth Daily.      atorvastatin (LIPITOR) 40 MG tablet Take 1 tablet (40 mg total) by mouth once daily. 30 tablet 6    BD INSULIN PEN NEEDLE UF MINI 31 gauge x 3/16" Ndle USE WITH INSULIN INJECTION FOUR TIMES DAILY 100 each 4    BD INSULIN SYRINGE ULTRA-FINE 1 mL 30 x 1/2" Syrg USE AS DIRECTED FOUR TIMES DAILY 200 each 11    blood sugar diagnostic, drum (ACCU-CHEK COMPACT TEST) Str CHECK BLOOD GLUCOSE 4 TIMES DAILY 204 each 5    finasteride (PROSCAR) 5 mg tablet Take 1 tablet (5 mg total) by mouth once daily. 30 tablet 11    insulin aspart (NOVOLOG FLEXPEN) 100 unit/mL InPn pen Inject 12 Units into the skin 3 (three) times daily with meals. Insulin sliding scale : 100 to 200 -12 units.                                     200 to 250 -14 units.                                      251 to 300 -16 units.                                       301 to 350 - 18 units. 15 mL 3    insulin glargine (LANTUS SOLOSTAR) 100 unit/mL (3 mL) InPn pen inject 65 units subcutaneously every evening 30 mL 4    irbesartan (AVAPRO) 75 MG tablet Take 1 tablet (75 mg total) by mouth " once daily. 30 tablet 6    metoprolol succinate (TOPROL-XL) 50 MG 24 hr tablet Take 1 tablet (50 mg total) by mouth once daily. 90 tablet 3    MULTIVITAMIN W-MINERALS/LUTEIN (CENTRUM SILVER ORAL) Take by mouth once daily.      nitroGLYCERIN (NITROSTAT) 0.4 MG SL tablet Place 1 tablet (0.4 mg total) under the tongue every 5 (five) minutes as needed for Chest pain. 60 tablet 12    omega-3 fatty acids-vitamin E (FISH OIL) 1,000 mg Cap Take 2 capsules by mouth Daily.       omeprazole (PRILOSEC) 20 MG capsule Take 1 capsule (20 mg total) by mouth 2 (two) times daily before meals. 60 capsule 4    ticagrelor (BRILINTA) 90 mg tablet Take 1 tablet (90 mg total) by mouth 2 (two) times daily. 60 tablet 11    VITAMIN D2 50,000 unit capsule take one capsule by mouth every 30 days 3 capsule 1    ZOSTAVAX, PF, 19,400 unit/0.65 mL injection       clotrimazole (LOTRIMIN) 1 % Soln Apply topically once daily. Apply to filed toenails daily 30 mL 5     No current facility-administered medications for this visit.        Past Medical History:   Diagnosis Date    Arthritis     BPH (benign prostatic hyperplasia)     Cataract     right     Colon polyp 11/18/2015    Colon polyps     Coronary artery disease      (diabetic retinopathy)     Dyslipidemia     Elevated PSA     Goiter, nontoxic, multinodular     Headaches, cluster     Hypertension     Kidney stones     Lump in the testicle     Left    Rotator cuff tendinitis     Right shoulder    Sleep apnea with use of continuous positive airway pressure (CPAP)     uses cpap at night    Type II or unspecified type diabetes mellitus with other specified manifestations, uncontrolled      Past Surgical History:   Procedure Laterality Date    CARDIAC CATHETERIZATION  2007    CATARACT EXTRACTION W/  INTRAOCULAR LENS IMPLANT Right 09/27/2016    Dr. Garcia    CORONARY STENT PLACEMENT  02/02/2007    LAD, RCAx2    INGUINAL HERNIA REPAIR Right 1995    PROSTATE BIOPSY   "2012    ROTATOR CUFF REPAIR Right 2014    x2    ROTATOR CUFF REPAIR Left 2013    TONSILLECTOMY, ADENOIDECTOMY      TRIGGER FINGER RELEASE Right 2004    x2     Family History   Problem Relation Age of Onset    Cataracts Father     Heart disease Father     Cancer Mother     Diabetes Mother     Amblyopia Neg Hx     Blindness Neg Hx     Glaucoma Neg Hx     Macular degeneration Neg Hx     Retinal detachment Neg Hx     Strabismus Neg Hx      Social History   Substance Use Topics    Smoking status: Former Smoker     Quit date: 3/12/1978    Smokeless tobacco: Never Used    Alcohol use No        Review of Systems:  Review of Systems   Constitutional: Negative for fever.   Respiratory: Positive for shortness of breath. Negative for cough and chest tightness.    Cardiovascular: Negative for chest pain.   Gastrointestinal: Negative for constipation, diarrhea, nausea and vomiting.   Genitourinary: Negative for difficulty urinating and dysuria.   Skin: Negative for rash and wound.   Neurological: Negative for seizures and headaches.   Hematological: Does not bruise/bleed easily.       OBJECTIVE:     Vital Signs (Most Recent)  Temp: 97.9 °F (36.6 °C) (01/23/18 0841)  Pulse: 71 (01/23/18 0841)  BP: (!) 117/57 (01/23/18 0841)  5' 6" (1.676 m)  96.7 kg (213 lb 4.7 oz)     Physical Exam:  Physical Exam   Constitutional: He is oriented to person, place, and time. He appears well-developed and well-nourished.   Neck: Normal range of motion. Neck supple.   Cardiovascular: Normal rate, regular rhythm and normal heart sounds.    Pulmonary/Chest: Effort normal and breath sounds normal.   Abdominal: Soft. Bowel sounds are normal. There is no tenderness.   Neurological: He is alert and oriented to person, place, and time.   Skin: Skin is warm and dry.   Psychiatric: He has a normal mood and affect. His behavior is normal. Judgment and thought content normal.   Vitals reviewed.      Laboratory  CBC: Reviewed  CMP: " There are no Wet Read(s) to document. Reviewed    Diagnostic Results:  US: Reviewed  Echo: Reviewed    ASSESSMENT/PLAN:     Gallstones, DM2 with poor control, renal manifestations, diabetic foot problems and retinopathy, CAD with stents, s/p MI 2016, now off blood thinners, obesity.    PLAN:    Likely biliary colic.  Will get CT.  If otherwise negative for lap zhang.  Will need cardiology clearance and will stay on asa.

## (undated) DEVICE — TUBING HF INSUFFLATION W/ FLTR

## (undated) DEVICE — GUIDE VISTA 6FR JR 4

## (undated) DEVICE — SEE MEDLINE ITEM 157128

## (undated) DEVICE — CATH JACKY RADIAL 5FR 100CM

## (undated) DEVICE — KIT CUSTOM MANIFOLD

## (undated) DEVICE — BANDAGE ELASTIC 3X5 VELCRO ST

## (undated) DEVICE — CLIP HEMO-LOK ML

## (undated) DEVICE — SUT PROLENE 0 MO6 30IN BLUE

## (undated) DEVICE — SEE MEDLINE ITEM 157116

## (undated) DEVICE — DRESSING XEROFORM FOIL PK 1X8

## (undated) DEVICE — INSTRUMENT SUCTION FRAZIER 12F

## (undated) DEVICE — TROCAR ENDOPATH XCEL 11MM 10CM

## (undated) DEVICE — BAG TISS RETRV MONARCH 10MM

## (undated) DEVICE — DRESSING TELFA STRL 4X3 LF

## (undated) DEVICE — SEE MEDLINE ITEM 157131

## (undated) DEVICE — DRAIN PENROSE XRAY 12 X 1/4 ST

## (undated) DEVICE — CATH NC QUANTUM APEX MR 4X12

## (undated) DEVICE — BLADE SURG CARBON STEEL SZ11

## (undated) DEVICE — BLADE SCALP OPHTL BEVEL STR

## (undated) DEVICE — GOWN SURGICAL X-LARGE

## (undated) DEVICE — WIRE BENTSON 035/180

## (undated) DEVICE — PADDING CAST SPECIALIST 3X4YD

## (undated) DEVICE — CATH BLLN FG APEX MR 3.00X20MM

## (undated) DEVICE — ELECTRODE REM PLYHSV RETURN 9

## (undated) DEVICE — NDL HYPO REG 25G X 1 1/2

## (undated) DEVICE — SUT MONOCRYL 4-0 PS-2

## (undated) DEVICE — CATH DXTERITY JL35 100CM 6FR

## (undated) DEVICE — SUT VICRYL PLUS 4-0 P3 18IN

## (undated) DEVICE — APPLICATOR CHLORAPREP ORN 26ML

## (undated) DEVICE — SPIKE CONTRAST CONTROLLER

## (undated) DEVICE — PADDING CAST 2IN DELTA ROLL

## (undated) DEVICE — PAD CAST SPECIALIST 2X4

## (undated) DEVICE — KIT GLIDESHEATH SLEND 6FR 10CM

## (undated) DEVICE — SUT VICRYL 3-0 27 SH

## (undated) DEVICE — SCISSOR 5MMX35CM DIRECT DRIVE

## (undated) DEVICE — SUT PROLENE 2-0 KS BL MONO

## (undated) DEVICE — IRRIGATOR ENDOSCOPY DISP.

## (undated) DEVICE — BANDAGE ELASTIC 2X5 VELCRO ST

## (undated) DEVICE — SUT 0 VICRYL / UR6 (J603)

## (undated) DEVICE — STOCKINET 4INX48

## (undated) DEVICE — DRESSING LEUKOPLAST FLEX 1X3IN

## (undated) DEVICE — CLOSURE SKIN STERI STRIP 1/4X3

## (undated) DEVICE — KIT CO-PILOT

## (undated) DEVICE — GUIDE WIRE BMW 014 X190

## (undated) DEVICE — CATH DXTERITY PG145 110CM 6FR

## (undated) DEVICE — CLOSURE SKIN STERI STRIP 1/2X4

## (undated) DEVICE — CATH FLASH MINI 8MM X 4MM

## (undated) DEVICE — GLOVE PROTEXIS HYDROGEL SZ7.5

## (undated) DEVICE — SEE MEDLINE ITEM 157148

## (undated) DEVICE — SEE MEDLINE ITEM 152622

## (undated) DEVICE — ADHESIVE MASTISOL VIAL 48/BX

## (undated) DEVICE — DRESSING TRANS 4X4 TEGADERM

## (undated) DEVICE — SUT 2-0 VICRYL / SH (J417)

## (undated) DEVICE — GUIDEWIRE SUPRA CORE 035 190CM

## (undated) DEVICE — SEE MEDLINE ITEM 152514

## (undated) DEVICE — BLADE SURG #15 CARBON STEEL

## (undated) DEVICE — SEE MEDLINE ITEM 157187

## (undated) DEVICE — Device

## (undated) DEVICE — SEE MEDLINE ITEM 157117

## (undated) DEVICE — SEE MEDLINE ITEM 156955

## (undated) DEVICE — SEE MEDLINE ITEM 156894

## (undated) DEVICE — WIRE GUIDE SAFE-T-J .035 260CM

## (undated) DEVICE — SUT GUT PL. 4-0 27 FS-2

## (undated) DEVICE — GAUZE SPONGE 4X4 12PLY

## (undated) DEVICE — SYR B-D DISP CONTROL 10CC100/C

## (undated) DEVICE — TROCAR ENDOPATH XCEL 5MM 7.5CM

## (undated) DEVICE — PACK BASIC

## (undated) DEVICE — ADHESIVE DERMABOND ADVANCED

## (undated) DEVICE — BANDAGE ACE NON LATEX 2IN

## (undated) DEVICE — PROTECTION STATION PLUS

## (undated) DEVICE — COVER OVERHEAD SURG LT BLUE

## (undated) DEVICE — SOL NS 1000CC

## (undated) DEVICE — PAD PREP 50/CA

## (undated) DEVICE — MANIFOLD 4 PORT

## (undated) DEVICE — SYR 10CC LUER LOCK

## (undated) DEVICE — OMNIPAQUE 350 200ML

## (undated) DEVICE — BANDAGE ADHESIVE

## (undated) DEVICE — SEE MEDLINE ITEM 152522

## (undated) DEVICE — TRAY MINOR GEN SURG

## (undated) DEVICE — SEE MEDLINE ITEM 157173

## (undated) DEVICE — INFLATOR ENCORE 26 BLLN INFL

## (undated) DEVICE — HEMOSTAT VASC BAND REG 24CM